# Patient Record
Sex: MALE | Race: WHITE | NOT HISPANIC OR LATINO | Employment: UNEMPLOYED | URBAN - METROPOLITAN AREA
[De-identification: names, ages, dates, MRNs, and addresses within clinical notes are randomized per-mention and may not be internally consistent; named-entity substitution may affect disease eponyms.]

---

## 2017-01-30 ENCOUNTER — TELEPHONE (OUTPATIENT)
Dept: MEDICAL GROUP | Facility: PHYSICIAN GROUP | Age: 57
End: 2017-01-30

## 2017-01-30 NOTE — TELEPHONE ENCOUNTER
1. Caller Name: Dylon                                         Call Back Number: [phone] (home)       Patient approves a detailed voicemail message: N\A    Pt calls with questions concerning his MA visit from 12/13/16, pt asks why he has received a bill for $25.00. Pt was notified that [facility] does charge for Flu/Strep testing within an MA visit. Pt understood. He will contact [facility].

## 2017-02-21 ENCOUNTER — OFFICE VISIT (OUTPATIENT)
Dept: URGENT CARE | Facility: PHYSICIAN GROUP | Age: 57
End: 2017-02-21
Payer: MEDICARE

## 2017-02-21 ENCOUNTER — HOSPITAL ENCOUNTER (OUTPATIENT)
Facility: MEDICAL CENTER | Age: 57
End: 2017-02-21
Attending: PHYSICIAN ASSISTANT
Payer: MEDICARE

## 2017-02-21 VITALS
HEART RATE: 87 BPM | SYSTOLIC BLOOD PRESSURE: 136 MMHG | BODY MASS INDEX: 24.96 KG/M2 | RESPIRATION RATE: 16 BRPM | OXYGEN SATURATION: 97 % | DIASTOLIC BLOOD PRESSURE: 70 MMHG | HEIGHT: 67 IN | WEIGHT: 159 LBS | TEMPERATURE: 98.4 F

## 2017-02-21 DIAGNOSIS — L08.9 DIABETIC FOOT INFECTION (HCC): ICD-10-CM

## 2017-02-21 DIAGNOSIS — E11.628 DIABETIC FOOT INFECTION (HCC): ICD-10-CM

## 2017-02-21 PROCEDURE — 4004F PT TOBACCO SCREEN RCVD TLK: CPT | Performed by: PHYSICIAN ASSISTANT

## 2017-02-21 PROCEDURE — G8420 CALC BMI NORM PARAMETERS: HCPCS | Performed by: PHYSICIAN ASSISTANT

## 2017-02-21 PROCEDURE — 87205 SMEAR GRAM STAIN: CPT

## 2017-02-21 PROCEDURE — 99214 OFFICE O/P EST MOD 30 MIN: CPT | Performed by: PHYSICIAN ASSISTANT

## 2017-02-21 PROCEDURE — 3017F COLORECTAL CA SCREEN DOC REV: CPT | Performed by: PHYSICIAN ASSISTANT

## 2017-02-21 PROCEDURE — 87070 CULTURE OTHR SPECIMN AEROBIC: CPT

## 2017-02-21 PROCEDURE — 87075 CULTR BACTERIA EXCEPT BLOOD: CPT

## 2017-02-21 PROCEDURE — 3045F PR MOST RECENT HEMOGLOBIN A1C LEVEL 7.0-9.0%: CPT | Performed by: PHYSICIAN ASSISTANT

## 2017-02-21 PROCEDURE — G8432 DEP SCR NOT DOC, RNG: HCPCS | Performed by: PHYSICIAN ASSISTANT

## 2017-02-21 PROCEDURE — G8484 FLU IMMUNIZE NO ADMIN: HCPCS | Performed by: PHYSICIAN ASSISTANT

## 2017-02-21 RX ORDER — SULFAMETHOXAZOLE AND TRIMETHOPRIM 800; 160 MG/1; MG/1
1 TABLET ORAL 2 TIMES DAILY
Qty: 20 TAB | Refills: 0 | Status: SHIPPED | OUTPATIENT
Start: 2017-02-21 | End: 2017-03-03

## 2017-02-21 RX ORDER — AMOXICILLIN AND CLAVULANATE POTASSIUM 875; 125 MG/1; MG/1
1 TABLET, FILM COATED ORAL 2 TIMES DAILY
Qty: 20 TAB | Refills: 0 | Status: SHIPPED | OUTPATIENT
Start: 2017-02-21 | End: 2017-03-03

## 2017-02-21 RX ORDER — ASPIRIN 81 MG/1
81 TABLET, CHEWABLE ORAL DAILY
Status: ON HOLD | COMMUNITY
End: 2017-05-02

## 2017-02-21 NOTE — MR AVS SNAPSHOT
"        Dylon Santa   2017 6:00 PM   Office Visit   MRN: [id number]    Department:  [facility]   Dept Phone:  [phone]    Description:  Male : 1960   Provider:  Sharon Lopez PA-C           Reason for Visit     Toe Pain Lt dorsal foot abrasion - originated as a blister from shoes x 3 wk, 3rd and 4th toes are red with burning x 2 days      Allergies as of 2017     No Known Allergies      You were diagnosed with     Diabetic foot infection (CMS-HCC)   [117897]         Vital Signs     Blood Pressure Pulse Temperature Respirations Height Weight    136/70 mmHg 87 36.9 °C (98.4 °F) 16 1.702 m (5' 7\") 72.122 kg (159 lb)    Body Mass Index Oxygen Saturation Smoking Status             24.90 kg/m2 97% Never Smoker          Basic Information     Date Of Birth Sex Race Ethnicity Preferred Language    1960 Male White Non- English      Your appointments     Mar 10, 2017  9:00 AM   Non Provider 1 with [facility])    [address], [address]  [facility] [address]   [phone]           You will be receiving a confirmation call a few days before your appointment from our automated call confirmation system.              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 Pascual N18.3   2016 - Present    Blind left eye H54.42   2016 - Present    Diabetic retinopathy of both " eyes (CMS-[facility]) E11.319   12/9/2016 - Present      Health Maintenance        Date Due Completion Dates    IMM INFLUENZA (1) 9/1/2016 11/12/2015, 12/31/2014    RETINAL SCREENING 12/21/2016 12/21/2015, 4/28/2014, 4/16/2013    IMM HEP B VACCINE (3 of 3 - Primary Series) 2/3/2017 12/9/2016, 9/30/2016    A1C SCREENING 2/17/2017 8/17/2016, 1/18/2016, 6/26/2015, 12/31/2014    FASTING LIPID PROFILE 5/13/2017 5/13/2016, 11/2/2015, 1/9/2015    URINE ACR / MICROALBUMIN 5/20/2017 5/20/2016, 1/18/2016    DIABETES MONOFILAMENT / LE EXAM 6/17/2017 6/17/2016 (Postponed)    Override on 6/17/2016: Postponed    SERUM CREATININE 9/23/2017 9/23/2016, 5/27/2016, 5/13/2016, 11/2/2015, 8/28/2015, 6/26/2015, 6/11/2015, 6/10/2015, 1/9/2015    COLONOSCOPY 9/24/2020 9/24/2015    IMM DTaP/Tdap/Td Vaccine (2 - Td) 12/31/2024 12/31/2014            Current Immunizations     Hepatitis B Vaccine Recombivax (Adol/Adult) 12/9/2016  8:40 AM, 9/30/2016    Influenza Vaccine Quad Inj (Preserved) 11/12/2015, 12/31/2014    Pneumococcal polysaccharide vaccine (PPSV-23) 6/17/2016    Tdap Vaccine 12/31/2014      Below and/or attached are the medications your provider expects you to take. Review all of your home medications and newly ordered medications with your provider and/or pharmacist. Follow medication instructions as directed by your provider and/or pharmacist. Please keep your medication list with you and share with your provider. Update the information when medications are discontinued, doses are changed, or new medications (including over-the-counter products) are added; and carry medication information at all times in the event of emergency situations     Allergies:  No Known Allergies          Medications  Valid as of: February 21, 2017 -  7:05 PM    Generic Name Brand Name Tablet Size Instructions for use    Acyclovir (Cap) ZOVIRAX 200 MG Take 1 Cap by mouth every day.        Amoxicillin-Pot Clavulanate (Tab) AUGMENTIN 875-125 MG Take 1 Tab by mouth  2 times a day for 10 days.        Aspirin (Chew Tab) ASA 81 MG Take 81 mg by mouth every day.        Azithromycin (Tab) ZITHROMAX 250 MG Uses directed if sputum becomes purulent.        Blood Glucose Monitoring Suppl (Misc) Blood Glucose Monitoring Suppl SUPPLIES Corrie Accu Check test strips, #600, RF4 and lancets #600, RF 4. To test fasting in the AM and random 4 times a day.        Blood Glucose Monitoring Suppl (Misc) Blood Glucose Monitoring Suppl SUPPLIES Test Strips freestyle lite or similar per insurance Sig: testing 5 times a day        Glucose Blood (Strip) ACCU-CHEK CORRIE PLUS          Guaifenesin-Codeine (Solution) ROBITUSSIN -10 mg/5mL Take 5 mL by mouth every four hours as needed.        Insulin NPH Human (Isophane) (Suspension) NOVOLIN N RELION 100 UNIT/ML USE AS DIRECTED PER SLIDING SCALE FOR MEAL TIME DOSING        Insulin Regular Human (Solution) NOVOLIN R RELION 100 UNIT/ML USE AS DIRECTED PER SLIDING SCALE FOR MEAL TIME DOSING        Lancet Devices (Misc) Lancet Device  Freestyle lite or similar per insurnace, To test 5 times a day        Lancets (Misc) ACCU-CHEK MULTICLIX LANCETS          Losartan Potassium (Tab) COZAAR 50 MG Take 50 mg by mouth every day.        Losartan Potassium (Tab) COZAAR 50 MG Take 1 Tab by mouth every day.        Metoprolol Succinate (TABLET SR 24 HR) TOPROL XL 25 MG Take 25 mg by mouth every day.        Metoprolol Succinate (TABLET SR 24 HR) TOPROL XL 25 MG TAKE ONE TABLET BY MOUTH ONCE DAILY        Misc. Devices (Kit) Misc. Devices  Glucometer (brand as accepted by insurance). To test fasting in the AM and random in the PM.        Simvastatin (Tab) ZOCOR 40 MG TAKE ONE TABLET BY MOUTH ONCE DAILY IN THE EVENING        Sulfamethoxazole-Trimethoprim (Tab) BACTRIM -160 MG Take 1 Tab by mouth 2 times a day for 10 days.        .                 Medicines prescribed today were sent to:     [facility] [address] [place] [address], [place] - [address]  [facility] [address]    Phone: [phone] Fax: [phone]    Open 24 Hours?: No    [facility] [place] [facility] - [place] - [address] [facility] [address]    [address] [place] [address] [address] [place] [address]    Phone: [phone] Fax: [phone]    Open 24 Hours?: No      Medication refill instructions:       If your prescription bottle indicates you have medication refills left, it is not necessary to call your provider’s office. Please contact your pharmacy and they will refill your medication.    If your prescription bottle indicates you do not have any refills left, you may request refills at any time through one of the following ways: The online [employer] system (except Urgent Care), by calling your provider’s office, or by asking your pharmacy to contact your provider’s office with a refill request. Medication refills are processed only during regular business hours and may not be available until the next business day. Your provider may request additional information or to have a follow-up visit with you prior to refilling your medication.   *Please Note: Medication refills are assigned a new Rx number when refilled electronically. Your pharmacy may indicate that no refills were authorized even though a new prescription for the same medication is available at the pharmacy. Please request the medicine by name with the pharmacy before contacting your provider for a refill.        Referral     A referral request has been sent to our patient care coordination department. Please allow 3-5 business days for us to process this request and contact you either by phone or mail. If you do not hear from us by the 5th business day, please call us at [phone].           [employer] Access Code: Activation code not generated  Current [employer] Status: Active

## 2017-02-22 LAB
GRAM STN SPEC: NORMAL
SIGNIFICANT IND 70042: NORMAL
SITE SITE: NORMAL
SOURCE SOURCE: NORMAL

## 2017-02-22 ASSESSMENT — ENCOUNTER SYMPTOMS
ABDOMINAL PAIN: 0
DIARRHEA: 0
SHORTNESS OF BREATH: 0
CHILLS: 0
NAUSEA: 0
VOMITING: 0
MUSCULOSKELETAL NEGATIVE: 1
DIZZINESS: 0
HEADACHES: 0
FEVER: 0

## 2017-02-22 NOTE — PROGRESS NOTES
"Subjective:      Dylon Santa is a 56 y.o. male who presents with Toe Pain            HPI  Patient presents to urgent care complaining of redness and burning on the dorsal aspect of his left foot. He states that he developed a small blister on the top of his foot from his shoes rubbing on the area about 3 weeks ago. He has been soaking the foot in epsom salts trying to get it to heal, but it hasn't gone away. Over the last 2-3 days he has noticed a red streak on his skin extending from the blister to his 3rd and 4th toes with some pain and burning. Patient also reports he had some \"hot flashes\" the past couple of days, but has not noticed a fever. Patient reports history of insulin dependent diabetes for 40 years, with significant peripheral neuropathy as well as retinopathy. Last Hemoglobin A1C was 8.4 in 8/2016.     Review of Systems   Constitutional: Negative for fever and chills.   Respiratory: Negative for shortness of breath.    Cardiovascular: Negative for chest pain.   Gastrointestinal: Negative for nausea, vomiting, abdominal pain and diarrhea.   Genitourinary: Negative.    Musculoskeletal: Negative.    Neurological: Negative for dizziness and headaches.          Objective:     /70 mmHg  Pulse 87  Temp(Src) 36.9 °C (98.4 °F)  Resp 16  Ht 1.702 m (5' 7\")  Wt 72.122 kg (159 lb)  BMI 24.90 kg/m2  SpO2 97%     Physical Exam   Constitutional: He is oriented to person, place, and time. He appears well-developed and well-nourished. No distress.   HENT:   Head: Normocephalic and atraumatic.   Eyes: Pupils are equal, round, and reactive to light.   Neck: Normal range of motion.   Cardiovascular: Normal rate.    Pulmonary/Chest: Effort normal.   Musculoskeletal: Normal range of motion.        Feet:    Small blister present on dorsal aspect of left foot, with erythema and warmth extending distally to 4th toe. No drainage or edema noted around blister.     Abscess present on plantar aspect of 4th toe with " significant fluid pocket present. No tenderness to palpation. Decreased sensation to all distal toes on left foot.     Significant amount of bloody, yellow purulent discharge extracted from abscess after lancing with 18 gauge needle.    Neurological: He is alert and oriented to person, place, and time.   Skin: Skin is warm and dry. He is not diaphoretic.   Psychiatric: He has a normal mood and affect. His behavior is normal.   Nursing note and vitals reviewed.         PMH:  has a past medical history of Type II or unspecified type diabetes mellitus without mention of complication, uncontrolled (12/31/2014); History of shingles (12/31/2014); Diabetic neuropathy (CMS-HCC) (12/31/2014); Hypertension; Hyperlipidemia; Kidney stones; and Diabetic retinopathy of both eyes (CMS-HCC) (12/9/2016).  MEDS:   Current outpatient prescriptions:   •  aspirin (ASA) 81 MG Chew Tab chewable tablet, Take 81 mg by mouth every day., Disp: , Rfl:   •  amoxicillin-clavulanate (AUGMENTIN) 875-125 MG Tab, Take 1 Tab by mouth 2 times a day for 10 days., Disp: 20 Tab, Rfl: 0  •  sulfamethoxazole-trimethoprim (BACTRIM DS) 800-160 MG tablet, Take 1 Tab by mouth 2 times a day for 10 days., Disp: 20 Tab, Rfl: 0  •  simvastatin (ZOCOR) 40 MG Tab, TAKE ONE TABLET BY MOUTH ONCE DAILY IN THE EVENING, Disp: 90 Tab, Rfl: 4  •  losartan (COZAAR) 50 MG Tab, Take 1 Tab by mouth every day., Disp: 90 Tab, Rfl: 4  •  metoprolol SR (TOPROL XL) 25 MG TABLET SR 24 HR, TAKE ONE TABLET BY MOUTH ONCE DAILY, Disp: 90 Tab, Rfl: 4  •  Misc. Devices Kit, Glucometer (brand as accepted by insurance). To test fasting in the AM and random in the PM., Disp: 1 Kit, Rfl: 0  •  Blood Glucose Monitoring Suppl SUPPLIES Misc, Test Strips freestyle lite or similar per insurance Sig: testing 5 times a day, Disp: 450 Each, Rfl: 4  •  acyclovir (ZOVIRAX) 200 MG Cap, Take 1 Cap by mouth every day., Disp: 90 Cap, Rfl: 4  •  ACCU-CHEK CAIN PLUS strip, , Disp: , Rfl:   •  ACCU-CHEK  MULTICLIX LANCETS Misc, , Disp: , Rfl:   •  NOVOLIN R RELION 100 UNIT/ML Solution, USE AS DIRECTED PER SLIDING SCALE FOR MEAL TIME DOSING, Disp: 10 mL, Rfl: 0  •  NOVOLIN N RELION 100 UNIT/ML Suspension, USE AS DIRECTED PER SLIDING SCALE FOR MEAL TIME DOSING, Disp: 10 mL, Rfl: 0  •  Blood Glucose Monitoring Suppl SUPPLIES Misc, Corrie Accu Check test strips, #600, RF4 and lancets #600, RF 4. To test fasting in the AM and random 4 times a day., Disp: 600 Each, Rfl: 4  •  azithromycin (ZITHROMAX) 250 MG Tab, Uses directed if sputum becomes purulent., Disp: 6 Tab, Rfl: 0  •  guaifenesin-codeine (CHERATUSSIN AC) Solution oral solution, Take 5 mL by mouth every four hours as needed., Disp: 150 mL, Rfl: 0  •  Lancet Device Misc, Freestyle lite or similar per insurnace, To test 5 times a day, Disp: 450 Each, Rfl: 4  •  losartan (COZAAR) 50 MG Tab, Take 50 mg by mouth every day., Disp: , Rfl:   •  metoprolol SR (TOPROL XL) 25 MG TABLET SR 24 HR, Take 25 mg by mouth every day., Disp: , Rfl:   ALLERGIES: No Known Allergies  SURGHX:   Past Surgical History   Procedure Laterality Date   • Eye surgery     • Appendectomy     • Knee arthroscopy       x3   • Tonsillectomy       SOCHX:  reports that he has never smoked. His smokeless tobacco use includes Snuff. He reports that he does not drink alcohol or use illicit drugs.  FH: family history includes Cancer in his paternal aunt and paternal grandfather; Diabetes in his maternal grandfather, maternal uncle, and paternal grandmother; Lung Disease in his brother, father, and mother; Thyroid in his brother, mother, and sister. There is no history of Hypertension, Hyperlipidemia, or Stroke.         Assessment/Plan:     1. Diabetic foot infection (CMS-HCC)  - Patient appears to have small area of cellulitis on dorsal aspect of left foot, as well as abscessed area on plantar aspect of left 4th toe.   - Patient was unaware of abscess present on plantar aspect of left 4th toe. Area was  sterilized with alcohol prep, then lanced with 18 gauge needle. Culture taken in clinic today. Abscess dressed with polysporin then dressed with band-aid.     - Keep area clean and dry   - amoxicillin-clavulanate (AUGMENTIN) 875-125 MG Tab; Take 1 Tab by mouth 2 times a day for 10 days.  Dispense: 20 Tab; Refill: 0   - Complete full course of antibiotics as prescribed   - sulfamethoxazole-trimethoprim (BACTRIM DS) 800-160 MG tablet; Take 1 Tab by mouth 2 times a day for 10 days.  Dispense: 20 Tab; Refill: 0   - Complete full course of antibiotics as prescribed   - Pending wound culture  - ANAEROBIC/AEROBIC/GRAM STAIN  - REFERRAL TO WOUND CLINIC

## 2017-02-24 LAB
BACTERIA WND AEROBE CULT: NORMAL
GRAM STN SPEC: NORMAL
SIGNIFICANT IND 70042: NORMAL
SITE SITE: NORMAL
SOURCE SOURCE: NORMAL

## 2017-02-25 LAB
BACTERIA SPEC ANAEROBE CULT: NORMAL
SIGNIFICANT IND 70042: NORMAL
SITE SITE: NORMAL
SOURCE SOURCE: NORMAL

## 2017-03-10 ENCOUNTER — NON-PROVIDER VISIT (OUTPATIENT)
Dept: MEDICAL GROUP | Facility: PHYSICIAN GROUP | Age: 57
End: 2017-03-10
Payer: MEDICARE

## 2017-03-10 ENCOUNTER — APPOINTMENT (OUTPATIENT)
Dept: RADIOLOGY | Facility: IMAGING CENTER | Age: 57
End: 2017-03-10
Attending: PHYSICIAN ASSISTANT
Payer: MEDICARE

## 2017-03-10 ENCOUNTER — OFFICE VISIT (OUTPATIENT)
Dept: URGENT CARE | Facility: PHYSICIAN GROUP | Age: 57
End: 2017-03-10
Payer: MEDICARE

## 2017-03-10 VITALS
HEIGHT: 67 IN | HEART RATE: 78 BPM | OXYGEN SATURATION: 95 % | WEIGHT: 163 LBS | RESPIRATION RATE: 16 BRPM | SYSTOLIC BLOOD PRESSURE: 140 MMHG | DIASTOLIC BLOOD PRESSURE: 78 MMHG | TEMPERATURE: 98 F | BODY MASS INDEX: 25.58 KG/M2

## 2017-03-10 DIAGNOSIS — L97.529 TOE ULCER, LEFT, WITH UNSPECIFIED SEVERITY (HCC): ICD-10-CM

## 2017-03-10 DIAGNOSIS — L97.529 DIABETIC ULCER OF LEFT FOOT ASSOCIATED WITH TYPE 1 DIABETES MELLITUS (HCC): ICD-10-CM

## 2017-03-10 DIAGNOSIS — Z23 NEED FOR HEPATITIS B VACCINATION: ICD-10-CM

## 2017-03-10 DIAGNOSIS — E10.621 DIABETIC ULCER OF LEFT FOOT ASSOCIATED WITH TYPE 1 DIABETES MELLITUS (HCC): ICD-10-CM

## 2017-03-10 PROCEDURE — G8432 DEP SCR NOT DOC, RNG: HCPCS | Performed by: PHYSICIAN ASSISTANT

## 2017-03-10 PROCEDURE — G8484 FLU IMMUNIZE NO ADMIN: HCPCS | Performed by: PHYSICIAN ASSISTANT

## 2017-03-10 PROCEDURE — G0010 ADMIN HEPATITIS B VACCINE: HCPCS | Performed by: INTERNAL MEDICINE

## 2017-03-10 PROCEDURE — 3017F COLORECTAL CA SCREEN DOC REV: CPT | Performed by: PHYSICIAN ASSISTANT

## 2017-03-10 PROCEDURE — G8419 CALC BMI OUT NRM PARAM NOF/U: HCPCS | Performed by: PHYSICIAN ASSISTANT

## 2017-03-10 PROCEDURE — 4004F PT TOBACCO SCREEN RCVD TLK: CPT | Performed by: PHYSICIAN ASSISTANT

## 2017-03-10 PROCEDURE — 3045F PR MOST RECENT HEMOGLOBIN A1C LEVEL 7.0-9.0%: CPT | Performed by: PHYSICIAN ASSISTANT

## 2017-03-10 PROCEDURE — 90746 HEPB VACCINE 3 DOSE ADULT IM: CPT | Performed by: INTERNAL MEDICINE

## 2017-03-10 PROCEDURE — 73660 X-RAY EXAM OF TOE(S): CPT | Mod: TC,LT | Performed by: PHYSICIAN ASSISTANT

## 2017-03-10 PROCEDURE — 99214 OFFICE O/P EST MOD 30 MIN: CPT | Performed by: PHYSICIAN ASSISTANT

## 2017-03-10 RX ORDER — AMOXICILLIN AND CLAVULANATE POTASSIUM 875; 125 MG/1; MG/1
1 TABLET, FILM COATED ORAL 2 TIMES DAILY
Qty: 14 TAB | Refills: 0 | Status: SHIPPED | OUTPATIENT
Start: 2017-03-10 | End: 2017-04-07

## 2017-03-10 ASSESSMENT — ENCOUNTER SYMPTOMS
NAUSEA: 0
VOMITING: 0
CONSTITUTIONAL NEGATIVE: 1
TINGLING: 1
BRUISES/BLEEDS EASILY: 0

## 2017-03-10 NOTE — PROGRESS NOTES
Dylon Santa is a 56 y.o. male here for a non-provider visit for:   HEPATITIS B 3 of 3    Reason for immunization: continue or complete series started at the office  Immunization records indicate need for vaccine: Yes  Minimum interval has been met for this vaccine: Yes  ABN completed: Not Indicated    VIS Dated  7/20/16 was given to patient Yes  IAC Questionnaire abnormal. Questionnaire reviewed and administration of injection approved by provider: yes    Patient tolerated injection and no adverse effects were observed or reported .    Pt scheduled for next dose in series: Not Indicated

## 2017-03-10 NOTE — PROGRESS NOTES
Subjective:      Dylon Santa is a 56 y.o. male who presents with Rash        Rash  Pertinent negatives include no vomiting.   Patient presents today for ongoing left foot complaints.   Patient was seen here on 02/21/17 and dx with foot ulcer, infection and placed on both Bactrim and Augmentin for coverage.  Wound culture was taken from a blister and ended up negative for pathologic growth.  Patient states he did not follow up with wound because he was doing fine until a few days ago he noticed pain and felt he saw a red streak starting on his foot.   Type 1 DM for last 40 years he states.  He does have neuropathy but states he can feel the pain in his toe.   No fevers.     Review of Systems   Constitutional: Negative.    Gastrointestinal: Negative for nausea and vomiting.   Musculoskeletal:        LEFT FOOT, SEE HPI   Skin: Positive for rash. Negative for itching.   Neurological: Positive for tingling (chronical bilateral LE neuropathy).   Endo/Heme/Allergies: Does not bruise/bleed easily.       PMH:  has a past medical history of Type II or unspecified type diabetes mellitus without mention of complication, uncontrolled (12/31/2014); History of shingles (12/31/2014); Diabetic neuropathy (CMS-HCC) (12/31/2014); Hypertension; Hyperlipidemia; Kidney stones; and Diabetic retinopathy of both eyes (CMS-HCC) (12/9/2016).  MEDS:   Current outpatient prescriptions:   •  amoxicillin-clavulanate (AUGMENTIN) 875-125 MG Tab, Take 1 Tab by mouth 2 times a day., Disp: 14 Tab, Rfl: 0  •  aspirin (ASA) 81 MG Chew Tab chewable tablet, Take 81 mg by mouth every day., Disp: , Rfl:   •  simvastatin (ZOCOR) 40 MG Tab, TAKE ONE TABLET BY MOUTH ONCE DAILY IN THE EVENING, Disp: 90 Tab, Rfl: 4  •  Misc. Devices Kit, Glucometer (brand as accepted by insurance). To test fasting in the AM and random in the PM., Disp: 1 Kit, Rfl: 0  •  Lancet Device Misc, Freestyle lite or similar per insurnace, To test 5 times a day, Disp: 450 Each, Rfl: 4  •   "Blood Glucose Monitoring Suppl SUPPLIES Misc, Test Strips freestyle lite or similar per insurance Sig: testing 5 times a day, Disp: 450 Each, Rfl: 4  •  acyclovir (ZOVIRAX) 200 MG Cap, Take 1 Cap by mouth every day., Disp: 90 Cap, Rfl: 4  •  losartan (COZAAR) 50 MG Tab, Take 50 mg by mouth every day., Disp: , Rfl:   •  metoprolol SR (TOPROL XL) 25 MG TABLET SR 24 HR, Take 25 mg by mouth every day., Disp: , Rfl:   •  NOVOLIN N RELION 100 UNIT/ML Suspension, USE AS DIRECTED PER SLIDING SCALE FOR MEAL TIME DOSING, Disp: 10 mL, Rfl: 0  •  Blood Glucose Monitoring Suppl SUPPLIES Misc, Corrie Accu Check test strips, #600, RF4 and lancets #600, RF 4. To test fasting in the AM and random 4 times a day., Disp: 600 Each, Rfl: 4  •  azithromycin (ZITHROMAX) 250 MG Tab, Uses directed if sputum becomes purulent., Disp: 6 Tab, Rfl: 0  •  guaifenesin-codeine (CHERATUSSIN AC) Solution oral solution, Take 5 mL by mouth every four hours as needed., Disp: 150 mL, Rfl: 0  •  losartan (COZAAR) 50 MG Tab, Take 1 Tab by mouth every day., Disp: 90 Tab, Rfl: 4  •  metoprolol SR (TOPROL XL) 25 MG TABLET SR 24 HR, TAKE ONE TABLET BY MOUTH ONCE DAILY, Disp: 90 Tab, Rfl: 4  •  ACCU-CHEK CORRIE PLUS strip, , Disp: , Rfl:   •  ACCU-CHEK MULTICLIX LANCETS Misc, , Disp: , Rfl:   •  NOVOLIN R RELION 100 UNIT/ML Solution, USE AS DIRECTED PER SLIDING SCALE FOR MEAL TIME DOSING, Disp: 10 mL, Rfl: 0  ALLERGIES: No Known Allergies  SURGHX:   Past Surgical History   Procedure Laterality Date   • Eye surgery     • Appendectomy     • Knee arthroscopy       x3   • Tonsillectomy       SOCHX:  reports that he has never smoked. His smokeless tobacco use includes Snuff. He reports that he does not drink alcohol or use illicit drugs.  FH: Family history was reviewed, no pertinent findings to report     Objective:     /78 mmHg  Pulse 78  Temp(Src) 36.7 °C (98 °F)  Resp 16  Ht 1.702 m (5' 7\")  Wt 73.936 kg (163 lb)  BMI 25.52 kg/m2  SpO2 95%     Physical " Exam   Constitutional: He is oriented to person, place, and time. He appears well-developed and well-nourished. No distress.   Cardiovascular: Normal rate.    Pulmonary/Chest: Effort normal.   Musculoskeletal:        Left foot: There is normal capillary refill.        Feet:    Neurological: He is alert and oriented to person, place, and time.   Skin: Skin is warm and dry.   Psychiatric: He has a normal mood and affect. His behavior is normal.   Vitals reviewed.       RAD    Impression        Soft tissue swelling left fifth toe. No bony destruction identified.         Reading Provider Reading Date     Pop Barry M.D. Mar 10, 2017        Assessment/Plan:     1. Toe ulcer, left, with unspecified severity (CMS-HCC)  DX-TOE(S) 2+ LEFT    amoxicillin-clavulanate (AUGMENTIN) 875-125 MG Tab   2. Diabetic ulcer of left foot associated with type 1 diabetes mellitus (CMS-HCC)  amoxicillin-clavulanate (AUGMENTIN) 875-125 MG Tab       -no clinical evidence or RAD evidence of developing osteo at this time.   -discussed concerns medically with patient for him to be established and following up with PCP and I want him to follow up with wound care at this point as well regarding this ulceration  -will cover for developing cellulitis at this time, tolerated Augmentin well. Will avoid further Bactrim due to CKD stage 3 and on Losartan as well. Also, culture was neg for MRSA  -elevate leg when possible.  Cushioning provided between toes.   -RTC precautions discussed.   Appt made with Dr. Kan for 04/07/17      Susan Joshi PA-C

## 2017-03-10 NOTE — MR AVS SNAPSHOT
"        Dylon Santa   3/10/2017 9:15 AM   Office Visit   MRN: [id number]    Department:  [facility]   Dept Phone:  [phone]    Description:  Male : 1960   Provider:  Susan Joshi PA-C           Reason for Visit     Rash L. foot and infection Pt. was seen on 2017for the same foot       Allergies as of 3/10/2017     No Known Allergies      You were diagnosed with     Toe ulcer, left, with unspecified severity (CMS-HCC)   [3072528]       Diabetic ulcer of left foot associated with type 1 diabetes mellitus (CMS-HCC)   [8168249]         Vital Signs     Blood Pressure Pulse Temperature Respirations Height Weight    140/78 mmHg 78 36.7 °C (98 °F) 16 1.702 m (5' 7\") 73.936 kg (163 lb)    Body Mass Index Oxygen Saturation Smoking Status             25.52 kg/m2 95% Never Smoker          Basic Information     Date Of Birth Sex Race Ethnicity Preferred Language    1960 Male White Non- English      Your appointments     2017 11:20 AM   Established Patient with Eliza Kan D.O.   [facility])    [address], [address]  [facility] [address] [phone]           You will be receiving a confirmation call a few days before your appointment from our automated call confirmation system.              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 Pascual N18.3   2016 - Present   " Blind left eye H54.42   6/17/2016 - Present    Diabetic retinopathy of both eyes (CMS-[facility]) E11.319   12/9/2016 - Present      Health Maintenance        Date Due Completion Dates    IMM INFLUENZA (1) 9/1/2016 11/12/2015, 12/31/2014    RETINAL SCREENING 12/21/2016 12/21/2015, 4/28/2014, 4/16/2013    A1C SCREENING 2/17/2017 8/17/2016, 1/18/2016, 6/26/2015, 12/31/2014    FASTING LIPID PROFILE 5/13/2017 5/13/2016, 11/2/2015, 1/9/2015    URINE ACR / MICROALBUMIN 5/20/2017 5/20/2016, 1/18/2016    DIABETES MONOFILAMENT / LE EXAM 6/17/2017 6/17/2016 (Postponed)    Override on 6/17/2016: Postponed    SERUM CREATININE 9/23/2017 9/23/2016, 5/27/2016, 5/13/2016, 11/2/2015, 8/28/2015, 6/26/2015, 6/11/2015, 6/10/2015, 1/9/2015    COLONOSCOPY 9/24/2020 9/24/2015    IMM DTaP/Tdap/Td Vaccine (2 - Td) 12/31/2024 12/31/2014            Current Immunizations     Hepatitis B Vaccine Recombivax (Adol/Adult) 3/10/2017, 12/9/2016  8:40 AM, 9/30/2016    Influenza Vaccine Quad Inj (Preserved) 11/12/2015, 12/31/2014    Pneumococcal polysaccharide vaccine (PPSV-23) 6/17/2016    Tdap Vaccine 12/31/2014      Below and/or attached are the medications your provider expects you to take. Review all of your home medications and newly ordered medications with your provider and/or pharmacist. Follow medication instructions as directed by your provider and/or pharmacist. Please keep your medication list with you and share with your provider. Update the information when medications are discontinued, doses are changed, or new medications (including over-the-counter products) are added; and carry medication information at all times in the event of emergency situations     Allergies:  No Known Allergies          Medications  Valid as of: March 10, 2017 - 10:23 AM    Generic Name Brand Name Tablet Size Instructions for use    Acyclovir (Cap) ZOVIRAX 200 MG Take 1 Cap by mouth every day.        Amoxicillin-Pot Clavulanate (Tab) AUGMENTIN 875-125 MG Take 1 Tab  by mouth 2 times a day.        Aspirin (Chew Tab) ASA 81 MG Take 81 mg by mouth every day.        Azithromycin (Tab) ZITHROMAX 250 MG Uses directed if sputum becomes purulent.        Blood Glucose Monitoring Suppl (Misc) Blood Glucose Monitoring Suppl SUPPLIES Corrie Accu Check test strips, #600, RF4 and lancets #600, RF 4. To test fasting in the AM and random 4 times a day.        Blood Glucose Monitoring Suppl (Misc) Blood Glucose Monitoring Suppl SUPPLIES Test Strips freestyle lite or similar per insurance Sig: testing 5 times a day        Glucose Blood (Strip) ACCU-CHEK CORRIE PLUS          Guaifenesin-Codeine (Solution) ROBITUSSIN -10 mg/5mL Take 5 mL by mouth every four hours as needed.        Insulin NPH Human (Isophane) (Suspension) NOVOLIN N RELION 100 UNIT/ML USE AS DIRECTED PER SLIDING SCALE FOR MEAL TIME DOSING        Insulin Regular Human (Solution) NOVOLIN R RELION 100 UNIT/ML USE AS DIRECTED PER SLIDING SCALE FOR MEAL TIME DOSING        Lancet Devices (Misc) Lancet Device  Freestyle lite or similar per insurnace, To test 5 times a day        Lancets (Misc) ACCU-CHEK MULTICLIX LANCETS          Losartan Potassium (Tab) COZAAR 50 MG Take 50 mg by mouth every day.        Losartan Potassium (Tab) COZAAR 50 MG Take 1 Tab by mouth every day.        Metoprolol Succinate (TABLET SR 24 HR) TOPROL XL 25 MG Take 25 mg by mouth every day.        Metoprolol Succinate (TABLET SR 24 HR) TOPROL XL 25 MG TAKE ONE TABLET BY MOUTH ONCE DAILY        Misc. Devices (Kit) Misc. Devices  Glucometer (brand as accepted by insurance). To test fasting in the AM and random in the PM.        Simvastatin (Tab) ZOCOR 40 MG TAKE ONE TABLET BY MOUTH ONCE DAILY IN THE EVENING        .                 Medicines prescribed today were sent to:     [facility] [address] [place] [address]    Phone: [phone] Fax: [phone]    Open 24 Hours?: No    [facility] [place] #  [address] [place] [address] 140    [address] [place] [address] 140 [facility] [address]    Phone: [phone] Fax: [phone]    Open 24 Hours?: No      Medication refill instructions:       If your prescription bottle indicates you have medication refills left, it is not necessary to call your provider’s office. Please contact your pharmacy and they will refill your medication.    If your prescription bottle indicates you do not have any refills left, you may request refills at any time through one of the following ways: The online [employer] system (except Urgent Care), by calling your provider’s office, or by asking your pharmacy to contact your provider’s office with a refill request. Medication refills are processed only during regular business hours and may not be available until the next business day. Your provider may request additional information or to have a follow-up visit with you prior to refilling your medication.   *Please Note: Medication refills are assigned a new Rx number when refilled electronically. Your pharmacy may indicate that no refills were authorized even though a new prescription for the same medication is available at the pharmacy. Please request the medicine by name with the pharmacy before contacting your provider for a refill.        Your To Do List     Future Labs/Procedures Complete By Expires    DX-TOE(S) 2+ LEFT  As directed 3/10/2018         [employer]Rock Access Code: Activation code not generated  Current TeamRock Status: Active

## 2017-03-10 NOTE — MR AVS SNAPSHOT
Dylon Santa   3/10/2017 9:00 AM   Non-Provider Visit   MRN: [id number]    Department:  [facility]   Dept Phone:  [phone]    Description:  Male : 1960   Provider:  [place] [facility]           Reason for Visit     Immunizations Heb B #3      Allergies as of 3/10/2017     No Known Allergies      You were diagnosed with     Need for hepatitis B vaccination   [391841]         Vital Signs     Smoking Status                   Never Smoker            Basic Information     Date Of Birth Sex Race Ethnicity Preferred Language    1960 Male White Non- English      Problem List              ICD-10-CM Priority Class Noted - Resolved    Essential hypertension I10   2014 - Present    History of shingles Z86.19   2014 - Present    Diabetic neuropathy (CMS-HCC) E11.40   2014 - Present    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 Pascual N18.3   2016 - Present    Blind left eye H54.42   2016 - Present    Diabetic retinopathy of both eyes (CMS-HCC) E11.319   2016 - Present      Health Maintenance        Date Due Completion Dates    IMM INFLUENZA (1) 2015, 2014    RETINAL SCREENING 2016, 2014, 2013    A1C SCREENING 2017, 2016, 2015, 2014    FASTING LIPID PROFILE 2017, 2015, 2015    URINE ACR / MICROALBUMIN 2017, 2016    DIABETES MONOFILAMENT / LE EXAM 2017 (Postponed)    Override on 2016: Postponed    SERUM CREATININE 2017, 2016, 2016, 2015, 2015, 2015, 2015,  6/10/2015, 1/9/2015    COLONOSCOPY 9/24/2020 9/24/2015    IMM DTaP/Tdap/Td Vaccine (2 - Td) 12/31/2024 12/31/2014            Current Immunizations     Hepatitis B Vaccine Recombivax (Adol/Adult) 3/10/2017, 12/9/2016  8:40 AM, 9/30/2016    Influenza Vaccine Quad Inj (Preserved) 11/12/2015, 12/31/2014    Pneumococcal polysaccharide vaccine (PPSV-23) 6/17/2016    Tdap Vaccine 12/31/2014      Below and/or attached are the medications your provider expects you to take. Review all of your home medications and newly ordered medications with your provider and/or pharmacist. Follow medication instructions as directed by your provider and/or pharmacist. Please keep your medication list with you and share with your provider. Update the information when medications are discontinued, doses are changed, or new medications (including over-the-counter products) are added; and carry medication information at all times in the event of emergency situations     Allergies:  No Known Allergies          Medications  Valid as of: March 10, 2017 -  9:25 AM    Generic Name Brand Name Tablet Size Instructions for use    Acyclovir (Cap) ZOVIRAX 200 MG Take 1 Cap by mouth every day.        Aspirin (Chew Tab) ASA 81 MG Take 81 mg by mouth every day.        Azithromycin (Tab) ZITHROMAX 250 MG Uses directed if sputum becomes purulent.        Blood Glucose Monitoring Suppl (Misc) Blood Glucose Monitoring Suppl SUPPLIES Corrie Accu Check test strips, #600, RF4 and lancets #600, RF 4. To test fasting in the AM and random 4 times a day.        Blood Glucose Monitoring Suppl (Misc) Blood Glucose Monitoring Suppl SUPPLIES Test Strips freestyle lite or similar per insurance Sig: testing 5 times a day        Glucose Blood (Strip) ACCU-CHEK CORRIE PLUS          Guaifenesin-Codeine (Solution) ROBITUSSIN -10 mg/5mL Take 5 mL by mouth every four hours as needed.        Insulin NPH Human (Isophane) (Suspension) NOVOLIN N RELION 100 UNIT/ML USE AS DIRECTED  PER SLIDING SCALE FOR MEAL TIME DOSING        Insulin Regular Human (Solution) NOVOLIN R RELION 100 UNIT/ML USE AS DIRECTED PER SLIDING SCALE FOR MEAL TIME DOSING        Lancet Devices (Misc) Lancet Device  Freestyle lite or similar per insurnace, To test 5 times a day        Lancets (Misc) ACCU-CHEK MULTICLIX LANCETS          Losartan Potassium (Tab) COZAAR 50 MG Take 50 mg by mouth every day.        Losartan Potassium (Tab) COZAAR 50 MG Take 1 Tab by mouth every day.        Metoprolol Succinate (TABLET SR 24 HR) TOPROL XL 25 MG Take 25 mg by mouth every day.        Metoprolol Succinate (TABLET SR 24 HR) TOPROL XL 25 MG TAKE ONE TABLET BY MOUTH ONCE DAILY        Misc. Devices (Kit) Misc. Devices  Glucometer (brand as accepted by insurance). To test fasting in the AM and random in the PM.        Simvastatin (Tab) ZOCOR 40 MG TAKE ONE TABLET BY MOUTH ONCE DAILY IN THE EVENING        .                 Medicines prescribed today were sent to:     [facility] [address] [place] [address]    Phone: [phone] Fax: [phone]    Open 24 Hours?: No    [facility] [place] [facility] [phone] - [address] [address]    Phone: [phone] Fax: [phone]    Open 24 Hours?: No      Medication refill instructions:       If your prescription bottle indicates you have medication refills left, it is not necessary to call your provider’s office. Please contact your pharmacy and they will refill your medication.    If your prescription bottle indicates you do not have any refills left, you may request refills at any time through one of the following ways: The online [employer] system (except Urgent Care), by calling your provider’s office, or by asking your pharmacy to contact your provider’s office with a refill request. Medication refills are processed only during regular business hours and may not be  available until the next business day. Your provider may request additional information or to have a follow-up visit with you prior to refilling your medication.   *Please Note: Medication refills are assigned a new Rx number when refilled electronically. Your pharmacy may indicate that no refills were authorized even though a new prescription for the same medication is available at the pharmacy. Please request the medicine by name with the pharmacy before contacting your provider for a refill.           [employer] Access Code: Activation code not generated  Current [employer] Status: Active

## 2017-03-14 ENCOUNTER — SUPERVISING PHYSICIAN REVIEW (OUTPATIENT)
Dept: URGENT CARE | Facility: PHYSICIAN GROUP | Age: 57
End: 2017-03-14

## 2017-03-16 ENCOUNTER — HOSPITAL ENCOUNTER (EMERGENCY)
Facility: MEDICAL CENTER | Age: 57
End: 2017-03-16
Attending: EMERGENCY MEDICINE
Payer: MEDICARE

## 2017-03-16 VITALS
RESPIRATION RATE: 16 BRPM | DIASTOLIC BLOOD PRESSURE: 80 MMHG | BODY MASS INDEX: 24.27 KG/M2 | HEART RATE: 87 BPM | SYSTOLIC BLOOD PRESSURE: 156 MMHG | TEMPERATURE: 98 F | WEIGHT: 155 LBS | OXYGEN SATURATION: 97 %

## 2017-03-16 DIAGNOSIS — E11.622 DIABETIC PRESSURE ULCER, STAGE 1 (HCC): ICD-10-CM

## 2017-03-16 DIAGNOSIS — L89.91 DIABETIC PRESSURE ULCER, STAGE 1 (HCC): ICD-10-CM

## 2017-03-16 PROCEDURE — 99283 EMERGENCY DEPT VISIT LOW MDM: CPT

## 2017-03-16 ASSESSMENT — ENCOUNTER SYMPTOMS
CHILLS: 0
FEVER: 0

## 2017-03-16 ASSESSMENT — PAIN SCALES - GENERAL: PAINLEVEL_OUTOF10: 3

## 2017-03-16 NOTE — ED NOTES
DC paperwork reviewed and signed. Pt denies any further questions at this time; pt ambulated independently to lobby.

## 2017-03-16 NOTE — ED AVS SNAPSHOT
[email or web address] Access Code: Activation code not generated  Current [email or web address].pl Status: Active    [employer]  A secure, online tool to manage your health information     [employer]’s TAXI5.pl® is a secure, online tool that connects you to your personalized health information from the privacy of your home -- day or night - making it very easy for you to manage your healthcare. Once the activation process is completed, you can even access your medical information using the [email or web address].pl allison, which is available for free in the Apple Allison store or Google Play store.     [email or web address].pl provides the following levels of access (as shown below):   My Chart Features   [facility] Primary Care Doctor [facility]  Specialists [facility]  Urgent  Care Non-[facility]  Primary Care  Doctor   Email your healthcare team securely and privately 24/7 X X X X   Manage appointments: schedule your next appointment; view details of past/upcoming appointments X      Request prescription refills. X      View recent personal medical records, including lab and immunizations X X X X   View health record, including health history, allergies, medications X X X X   Read reports about your outpatient visits, procedures, consult and ER notes X X X X   See your discharge summary, which is a recap of your hospital and/or ER visit that includes your diagnosis, lab results, and care plan. X X       How to register for [email or web address]:  1. Go to  [email or web address].  2. Click on the Sign Up Now box, which takes you to the New Member Sign Up page. You will need to provide the following information:  a. Enter your [email or web address].pl Access Code exactly as it appears at the top of this page. (You will not need to use this code after you’ve completed the sign-up process. If you do not sign up before the expiration date, you must request a new code.)   b. Enter your date of birth.   c. Enter your home email address.   d. Click Submit, and follow the next screen’s instructions.  3. Create a [email or web address].pl ID. This will  be your [employer] login ID and cannot be changed, so think of one that is secure and easy to remember.  4. Create a [employer] password. You can change your password at any time.  5. Enter your Password Reset Question and Answer. This can be used at a later time if you forget your password.   6. Enter your e-mail address. This allows you to receive e-mail notifications when new information is available in [employer].  7. Click Sign Up. You can now view your health information.    For assistance activating your [employer] account, call [phone]

## 2017-03-16 NOTE — ED NOTES
"Pt c/o blister to left foot, opened up at urgent care, seen at urgent care x2, finished 2 courses of antibiotics. Pt now states redness is worse and there is a \"hole\". Pt is a DM, insulin dependent.   "

## 2017-03-16 NOTE — ED AVS SNAPSHOT
3/16/2017          Dylon Santa  [address] [place] [address]    Dear Dylon:    [facility] wants to ensure your discharge home is safe and you or your loved ones have had all your questions answered regarding your care after you leave the hospital.    You may receive a telephone call within two days of your discharge.  This call is to make certain you understand your discharge instructions as well as ensure we provided you with the best care possible during your stay with us.     The call will only last approximately 3-5 minutes and will be done by a nurse.    Once again, we want to ensure your discharge home is safe and that you have a clear understanding of any next steps in your care.  If you have any questions or concerns, please do not hesitate to contact us, we are here for you.  Thank you for choosing [facility] for your healthcare needs.    Sincerely,    Roman Valenzuela    [facility]

## 2017-03-16 NOTE — DISCHARGE INSTRUCTIONS
Diabetes and Foot Care  Diabetes may cause you to have problems because of poor blood supply (circulation) to your feet and legs. This may cause the skin on your feet to become thinner, break easier, and heal more slowly. Your skin may become dry, and the skin may peel and crack. You may also have nerve damage in your legs and feet causing decreased feeling in them. You may not notice minor injuries to your feet that could lead to infections or more serious problems. Taking care of your feet is one of the most important things you can do for yourself.   HOME CARE INSTRUCTIONS  · Wear shoes at all times, even in the house. Do not go barefoot. Bare feet are easily injured.  · Check your feet daily for blisters, cuts, and redness. If you cannot see the bottom of your feet, use a mirror or ask someone for help.  · Wash your feet with warm water (do not use hot water) and mild soap. Then pat your feet and the areas between your toes until they are completely dry. Do not soak your feet as this can dry your skin.  · Apply a moisturizing lotion or petroleum jelly (that does not contain alcohol and is unscented) to the skin on your feet and to dry, brittle toenails. Do not apply lotion between your toes.  · Trim your toenails straight across. Do not dig under them or around the cuticle. File the edges of your nails with an emery board or nail file.  · Do not cut corns or calluses or try to remove them with medicine.  · Wear clean socks or stockings every day. Make sure they are not too tight. Do not wear knee-high stockings since they may decrease blood flow to your legs.  · Wear shoes that fit properly and have enough cushioning. To break in new shoes, wear them for just a few hours a day. This prevents you from injuring your feet. Always look in your shoes before you put them on to be sure there are no objects inside.  · Do not cross your legs. This may decrease the blood flow to your feet.  · If you find a minor scrape,  cut, or break in the skin on your feet, keep it and the skin around it clean and dry. These areas may be cleansed with mild soap and water. Do not cleanse the area with peroxide, alcohol, or iodine.  · When you remove an adhesive bandage, be sure not to damage the skin around it.  · If you have a wound, look at it several times a day to make sure it is healing.  · Do not use heating pads or hot water bottles. They may burn your skin. If you have lost feeling in your feet or legs, you may not know it is happening until it is too late.  · Make sure your health care provider performs a complete foot exam at least annually or more often if you have foot problems. Report any cuts, sores, or bruises to your health care provider immediately.  SEEK MEDICAL CARE IF:   · You have an injury that is not healing.  · You have cuts or breaks in the skin.  · You have an ingrown nail.  · You notice redness on your legs or feet.  · You feel burning or tingling in your legs or feet.  · You have pain or cramps in your legs and feet.  · Your legs or feet are numb.  · Your feet always feel cold.  SEEK IMMEDIATE MEDICAL CARE IF:   · There is increasing redness, swelling, or pain in or around a wound.  · There is a red line that goes up your leg.  · Pus is coming from a wound.  · You develop a fever or as directed by your health care provider.  · You notice a bad smell coming from an ulcer or wound.     This information is not intended to replace advice given to you by your health care provider. Make sure you discuss any questions you have with your health care provider.     Document Released: 12/15/2001 Document Revised: 08/20/2014 Document Reviewed: 05/27/2014  [employer] Interactive Patient Education ©2016 [employer]    Pressure Ulcer  A pressure ulcer is a sore that has formed from the breakdown of skin and exposure of deeper layers of tissue. It develops in areas of the body where there is unrelieved pressure. Pressure ulcers are  usually found over a bony area, such as the shoulder blades, spine, lower back, hips, knees, ankles, and heels.  Pressure ulcers vary in severity. Your health care provider may determine the severity (stage) of your pressure ulcer. The stages include:  · Stage I--The skin is red, and when the skin is pressed, it stays red.  · Stage II--The top layer of skin is gone, and there is a shallow, pink ulcer.  · Stage III--The ulcer becomes deeper, and it is more difficult to see the whole wound. Also, there may be yellow or brown parts, as well as pink and red parts.  · Stage IV--The ulcer may be deep and red, pink, brown, white, or yellow. Bone or muscle may be seen.  · Unstageable pressure ulcer--The ulcer is covered almost completely with black, brown, or yellow tissue. It is not known how deep the ulcer is or what stage it is until this covering comes off.  · Suspected deep tissue injury--A person's skin can be injured from pressure or pulling on the skin when his or her position is changed. The skin appears purple or maroon. There may not be an opening in the skin, but there could be a blood-filled blister. This deep tissue injury is often difficult to see in people with darker skin tones. The site may open and become deeper in time. However, early interventions will help the area heal and may prevent the area from opening.  CAUSES  Pressure ulcers are caused by pressure against the skin that limits the flow of blood to the skin and nearby tissues. There are many risk factors that can lead to pressure sores.  RISK FACTORS  · Decreased ability to move.  · Decreased ability to feel pain or discomfort.  · Excessive skin moisture from urine, stool, sweat, or secretions.  · Poor nutrition.  · Dehydration.  · Tobacco, drug, or alcohol abuse.  · Having someone pull on bedsheets that are under you, such as when health care workers are changing your position in a hospital bed.  · Obesity.  · Increased adult  age.  · Hospitalization in a critical care unit for longer than 4 days with use of medical devices.  · Prolonged use of medical devices.  · Critical illness.  · Anemia.  · Traumatic brain injury.  · Spinal cord injury.  · Stroke.  · Diabetes.  · Poor blood glucose control.  · Low blood pressure (hypotension).  · Low oxygen levels.  · Medicines that reduce blood flow.  · Infection.  · Being of -American or / descent.  · Loss of bladder and bowel control (incontinence).  · Malnutrition.  If you are at risk for pressure ulcers, your health care provider may recommend certain types of bedding to help prevent them. Bedding options may include foam or gel mattresses that are covered with one of these:  · A sheepskin blanket.  · A pad that is filled with gel, air, water, or foam.  DIAGNOSIS  Your health care provider will diagnose your pressure ulcer based on its appearance. The health care provider may determine the stage of your pressure ulcer as well. Tests may be done to check for infection, to assess your circulation, or to check for other diseases, such as diabetes.  TREATMENT  Treatment of your pressure ulcer begins with determining what stage the ulcer is in. Your treatment team may include your health care provider, a wound care specialist, a nutritionist, a physical therapist, and a surgeon. Possible treatments may include:  · Moving or repositioning every 1-2 hours.  · Using beds or mattresses to shift your body weight and pressure points frequently.  · Improving your diet.  · Cleaning and bandaging (dressing) the open wound.  · Giving antibiotic medicines.  · Removing damaged tissue.  · Surgery and sometimes skin grafts.  HOME CARE INSTRUCTIONS  · If you were hospitalized, follow the care plan that was started in the hospital.  · Avoid staying in the same position for more than 2 hours. Use padding, devices, or mattresses to cushion your pressure points as directed by your health care  provider.  · Eat a well-balanced diet. Take nutritional supplements and vitamins as directed by your health care provider.  · Keep all follow-up appointments.  · Only take over-the-counter or prescription medicines for pain, fever, or discomfort as directed by your health care provider.  SEEK MEDICAL CARE IF:  · Your pressure ulcer is not improving.  · You do not know how to care for your pressure ulcer.  · You notice other areas of redness on your skin.  · You have a fever.  SEEK IMMEDIATE MEDICAL CARE IF:  · You have increasing redness, swelling, or pain in your pressure ulcer.  · You notice pus coming from your pressure ulcer.  · You notice a bad smell coming from the wound or dressing.  · Your pressure ulcer opens up again.     This information is not intended to replace advice given to you by your health care provider. Make sure you discuss any questions you have with your health care provider.     Document Released: 12/18/2006 Document Revised: 01/08/2016 Document Reviewed: 08/25/2014  [employer] Interactive Patient Education ©2016 [employer]

## 2017-03-16 NOTE — ED NOTES
[id number] RN phoned wound care per Dr Uribe request;   spoke with wound care; POC includes Dc with follow up care at out patient wound clinic

## 2017-03-16 NOTE — ED PROVIDER NOTES
"ED Provider Note    Scribed for Roberto Carlos West M.D. by Lenard Tran. 3/16/2017, 8:01 AM.    Primary care provider: Eliza Kan D.O.  Means of arrival: Walk-in  History obtained from: Patient  History limited by: None    CHIEF COMPLAINT  Chief Complaint   Patient presents with   • Wound Infection     L 4th toe x 2/21/17       HPI  Dylon Santa is a 56 y.o. male who presents to the Emergency Department complaining of wound infection on the left toe.  Per patient, he has been dealing with this same wound since February 21st.  Patient states the wound was initially just red and sore.  He went to Urgent Care for evaluation and was prescribed antibiotics for the wound to heal.  The patient reports the antibiotics initially alleviated the soreness and the wound appeared better.  About four days after finishing the antibiotics the wound became infected again so patient went back to Urgent Care and received another dose of antibiotics.  Patient arrived to the ED today after finishing two courses of antibiotics for further evaluation of this reoccurring wound infection. Patient denies going to the wound clinic because they \"had no openings.\"  Patient is an insulin dependent diabetic.  He reports his blood sugars are pretty regular during the day but when he wakes up in the morning his sugars are always high.      REVIEW OF SYSTEMS  Review of Systems   Constitutional: Negative for fever and chills.   Skin:        Wound on fourth toe of left foot.        PAST MEDICAL HISTORY   has a past medical history of Type II or unspecified type diabetes mellitus without mention of complication, uncontrolled (12/31/2014); History of shingles (12/31/2014); Diabetic neuropathy (CMS-HCC) (12/31/2014); Hypertension; Hyperlipidemia; Kidney stones; and Diabetic retinopathy of both eyes (CMS-HCC) (12/9/2016).    SURGICAL HISTORY   has past surgical history that includes eye surgery; appendectomy; knee arthroscopy; and " tonsillectomy.    SOCIAL HISTORY  Social History   Substance Use Topics   • Smoking status: Never Smoker    • Smokeless tobacco: Current User     Types: Chew      Comment: tobacco cessation recommended   • Alcohol Use: No      History   Drug Use No       FAMILY HISTORY  Family History   Problem Relation Age of Onset   • Thyroid Mother    • Lung Disease Mother      COPD   • Thyroid Sister    • Thyroid Brother    • Lung Disease Brother      COPD   • Diabetes Maternal Uncle    • Cancer Paternal Aunt      type unknown   • Diabetes Maternal Grandfather    • Diabetes Paternal Grandmother    • Cancer Paternal Grandfather      prostate   • Lung Disease Father      COPD   • Hypertension Neg Hx    • Hyperlipidemia Neg Hx    • Stroke Neg Hx        CURRENT MEDICATIONS  No current facility-administered medications on file prior to encounter.     Current Outpatient Prescriptions on File Prior to Encounter   Medication Sig Dispense Refill   • amoxicillin-clavulanate (AUGMENTIN) 875-125 MG Tab Take 1 Tab by mouth 2 times a day. 14 Tab 0   • aspirin (ASA) 81 MG Chew Tab chewable tablet Take 81 mg by mouth every day.     • azithromycin (ZITHROMAX) 250 MG Tab Uses directed if sputum becomes purulent. 6 Tab 0   • guaifenesin-codeine (CHERATUSSIN AC) Solution oral solution Take 5 mL by mouth every four hours as needed. 150 mL 0   • simvastatin (ZOCOR) 40 MG Tab TAKE ONE TABLET BY MOUTH ONCE DAILY IN THE EVENING 90 Tab 4   • losartan (COZAAR) 50 MG Tab Take 1 Tab by mouth every day. 90 Tab 4   • metoprolol SR (TOPROL XL) 25 MG TABLET SR 24 HR TAKE ONE TABLET BY MOUTH ONCE DAILY 90 Tab 4   • Misc. Devices Kit Glucometer (brand as accepted by insurance). To test fasting in the AM and random in the PM. 1 Kit 0   • Lancet Device Misc Freestyle lite or similar per insurnace, To test 5 times a day 450 Each 4   • Blood Glucose Monitoring Suppl SUPPLIES Misc Test Strips freestyle lite or similar per insurance Sig: testing 5 times a day 450 Each  4   • acyclovir (ZOVIRAX) 200 MG Cap Take 1 Cap by mouth every day. 90 Cap 4   • ACCU-CHEK CAIN PLUS strip      • losartan (COZAAR) 50 MG Tab Take 50 mg by mouth every day.     • metoprolol SR (TOPROL XL) 25 MG TABLET SR 24 HR Take 25 mg by mouth every day.     • ACCU-CHEK MULTICLIX LANCETS Misc      • NOVOLIN R RELION 100 UNIT/ML Solution USE AS DIRECTED PER SLIDING SCALE FOR MEAL TIME DOSING 10 mL 0   • NOVOLIN N RELION 100 UNIT/ML Suspension USE AS DIRECTED PER SLIDING SCALE FOR MEAL TIME DOSING 10 mL 0   • Blood Glucose Monitoring Suppl SUPPLIES Misc Cain Accu Check test strips, #600, RF4 and lancets #600, RF 4. To test fasting in the AM and random 4 times a day. 600 Each 4       ALLERGIES  No Known Allergies    PHYSICAL EXAM  VITAL SIGNS: /81 mmHg  Pulse 92  Temp(Src) 36.7 °C (98 °F)  Resp 18  Wt 70.308 kg (155 lb)  SpO2 97%    Constitutional: Well developed, Well nourished, No acute distress, Non-toxic appearance.     Skin: Left ring toe has pressure ulcer 2cm in diameter appearing on lateral aspect of toe, appears well, no erythema, no streaking, no discharge. Warm, Dry.    Musculoskeletal: Good range of motion in all major joints. No tenderness to palpation or major deformities noted. Intact distal pulses, no clubbing, no cyanosis, no edema,   Neurologic: She has decreased sensation bilateral feet. History of diabetic neuropathy.   Psychiatric: Affect normal, Judgment normal, Mood normal.       COURSE & MEDICAL DECISION MAKING  Pertinent Labs & Imaging studies reviewed. (See chart for details)    8:01 AM - Patient seen and examined at bedside. I called the wound care to come evaluate patient.  I also called scheduling to schedule an appointment for patient with the wound care clinic.  Until patient receives an appointment, I informed him he needs to keep his toes  to relieve the pressure.  Patient was advised to clean the wound with soap and water and dry completely before   his toes with guaze.  Patient needs to keep the wound open for the meantime and wear open toed shoes.     8:39 AM - I spoke with the wound clinic who advised the patient to buy silicon toe sleeves to help keep his toes surrounding the wound .       Decision Making:  Currently, there is no signs of cellulitis. I recommended for the patient have aggressive wound care treatment at this time. Recommended that he follow-up with the wound care clinic. The patient's return as needed.    The patient will return for new or worsening symptoms and is stable at the time of discharge.    It was noticed that the patient's blood pressure was greater than 120/80 on today's visit. At this point, most likely related to reactive hypertension, secondary to the emergency visit itself. I have recommend the patient follow up with his primary care physician for recheck of his blood pressure.       DISPOSITION:  Patient will be discharged home in stable condition.    FOLLOW UP:  WOUND CARE [address] [address]  [phone]          OUTPATIENT MEDICATIONS:  Discharge Medication List as of 3/16/2017  9:25 AM               FINAL IMPRESSION  1. Diabetic pressure ulcer, stage 1 (CMS-HCC)          Lenard ROSE (Scribe), am scribing for, and in the presence of, Roberto Carlos West M.D..    Electronically signed by: Lenard Tran (Catalinoibchavo), 3/16/2017    Roberto Carlos ROSE M.D. personally performed the services described in this documentation, as scribed by Lenard Tran in my presence, and it is both accurate and complete.    The note accurately reflects work and decisions made by me.  Roberto Carlos West  3/16/2017  1:45 PM

## 2017-03-16 NOTE — ED NOTES
Pt brought to room 23 by primary RN  Chief Complaint   Patient presents with   • Wound Infection

## 2017-03-16 NOTE — ED AVS SNAPSHOT
Home Care Instructions                                                                                                                Dylon Santa   MRN: [id number]    Department:  [facility], Emergency Dept   Date of Visit:  3/16/2017            [facility], Emergency Dept    [address] [address]    Phone:  [phone]      You were seen by     Roberto Carlos West M.D.      Your Diagnosis Was     Diabetic pressure ulcer, stage 1 (CMS-[facility])     E11.622, L89.91       Follow-up Information     1. Follow up with [facility].    Contact information    [address] [address] [phone]      Medication Information     Review all of your home medications and newly ordered medications with your primary doctor and/or pharmacist as soon as possible. Follow medication instructions as directed by your doctor and/or pharmacist.     Please keep your complete medication list with you and share with your physician. Update the information when medications are discontinued, doses are changed, or new medications (including over-the-counter products) are added; and carry medication information at all times in the event of emergency situations.               Medication List      ASK your doctor about these medications        Instructions    Morning Afternoon Evening Bedtime    ACCU-CHEK CAIN PLUS strip   Generic drug:  glucose blood                             ACCU-CHEK MULTICLIX LANCETS Misc                             acyclovir 200 MG Caps   Commonly known as:  ZOVIRAX        Take 1 Cap by mouth every day.   Dose:  200 mg                        amoxicillin-clavulanate 875-125 MG Tabs   Commonly known as:  AUGMENTIN        Take 1 Tab by mouth 2 times a day.   Dose:  1 Tab                        aspirin 81 MG Chew chewable tablet   Commonly known as:  ASA        Take 81 mg by mouth every day.   Dose:  81 mg                        azithromycin 250 MG Tabs   Commonly  known as:  ZITHROMAX        Uses directed if sputum becomes purulent.                        * Blood Glucose Monitoring Suppl SUPPLIES Misc        Corrie Accu Check test strips, #600, RF4 and lancets #600, RF 4. To test fasting in the AM and random 4 times a day.                        * Blood Glucose Monitoring Suppl SUPPLIES Misc        Test Strips freestyle lite or similar per insurance Sig: testing 5 times a day                        guaifenesin-codeine Soln oral solution   Commonly known as:  CHERATUSSIN AC        Take 5 mL by mouth every four hours as needed.   Dose:  5 mL                        Lancet Device [facility]        Freestyle lite or similar per insurnace, To test 5 times a day                        * losartan 50 MG Tabs   Commonly known as:  COZAAR        Take 50 mg by mouth every day.   Dose:  50 mg                        * losartan 50 MG Tabs   Commonly known as:  COZAAR        Take 1 Tab by mouth every day.   Dose:  50 mg                        * metoprolol SR 25 MG Tb24   Commonly known as:  TOPROL XL        Take 25 mg by mouth every day.   Dose:  25 mg                        * metoprolol SR 25 MG Tb24   Commonly known as:  TOPROL XL        TAKE ONE TABLET BY MOUTH ONCE DAILY                        Misc. Devices Kit        Doctor's comments:  Freestyle lite or similar per insurance   Glucometer (brand as accepted by insurance). To test fasting in the AM and random in the PM.                        NOVOLIN N RELION 100 UNIT/ML Susp   Generic drug:  insulin NPH        USE AS DIRECTED PER SLIDING SCALE FOR MEAL TIME DOSING                        NOVOLIN R RELION 100 Unit/mL Soln   Generic drug:  insulin regular        USE AS DIRECTED PER SLIDING SCALE FOR MEAL TIME DOSING                        simvastatin 40 MG Tabs   Commonly known as:  ZOCOR        TAKE ONE TABLET BY MOUTH ONCE DAILY IN THE EVENING                        * Notice:  This list has 6 medication(s) that are the same as other medications  prescribed for you. Read the directions carefully, and ask your doctor or other care provider to review them with you.              Discharge Instructions       Diabetes and Foot Care  Diabetes may cause you to have problems because of poor blood supply (circulation) to your feet and legs. This may cause the skin on your feet to become thinner, break easier, and heal more slowly. Your skin may become dry, and the skin may peel and crack. You may also have nerve damage in your legs and feet causing decreased feeling in them. You may not notice minor injuries to your feet that could lead to infections or more serious problems. Taking care of your feet is one of the most important things you can do for yourself.   HOME CARE INSTRUCTIONS  · Wear shoes at all times, even in the house. Do not go barefoot. Bare feet are easily injured.  · Check your feet daily for blisters, cuts, and redness. If you cannot see the bottom of your feet, use a mirror or ask someone for help.  · Wash your feet with warm water (do not use hot water) and mild soap. Then pat your feet and the areas between your toes until they are completely dry. Do not soak your feet as this can dry your skin.  · Apply a moisturizing lotion or petroleum jelly (that does not contain alcohol and is unscented) to the skin on your feet and to dry, brittle toenails. Do not apply lotion between your toes.  · Trim your toenails straight across. Do not dig under them or around the cuticle. File the edges of your nails with an emery board or nail file.  · Do not cut corns or calluses or try to remove them with medicine.  · Wear clean socks or stockings every day. Make sure they are not too tight. Do not wear knee-high stockings since they may decrease blood flow to your legs.  · Wear shoes that fit properly and have enough cushioning. To break in new shoes, wear them for just a few hours a day. This prevents you from injuring your feet. Always look in your shoes before  you put them on to be sure there are no objects inside.  · Do not cross your legs. This may decrease the blood flow to your feet.  · If you find a minor scrape, cut, or break in the skin on your feet, keep it and the skin around it clean and dry. These areas may be cleansed with mild soap and water. Do not cleanse the area with peroxide, alcohol, or iodine.  · When you remove an adhesive bandage, be sure not to damage the skin around it.  · If you have a wound, look at it several times a day to make sure it is healing.  · Do not use heating pads or hot water bottles. They may burn your skin. If you have lost feeling in your feet or legs, you may not know it is happening until it is too late.  · Make sure your health care provider performs a complete foot exam at least annually or more often if you have foot problems. Report any cuts, sores, or bruises to your health care provider immediately.  SEEK MEDICAL CARE IF:   · You have an injury that is not healing.  · You have cuts or breaks in the skin.  · You have an ingrown nail.  · You notice redness on your legs or feet.  · You feel burning or tingling in your legs or feet.  · You have pain or cramps in your legs and feet.  · Your legs or feet are numb.  · Your feet always feel cold.  SEEK IMMEDIATE MEDICAL CARE IF:   · There is increasing redness, swelling, or pain in or around a wound.  · There is a red line that goes up your leg.  · Pus is coming from a wound.  · You develop a fever or as directed by your health care provider.  · You notice a bad smell coming from an ulcer or wound.     This information is not intended to replace advice given to you by your health care provider. Make sure you discuss any questions you have with your health care provider.     Document Released: 12/15/2001 Document Revised: 08/20/2014 Document Reviewed: 05/27/2014  [employer] Interactive Patient Education ©2016 [employer]    Pressure Ulcer  A pressure ulcer is a sore that has formed  from the breakdown of skin and exposure of deeper layers of tissue. It develops in areas of the body where there is unrelieved pressure. Pressure ulcers are usually found over a bony area, such as the shoulder blades, spine, lower back, hips, knees, ankles, and heels.  Pressure ulcers vary in severity. Your health care provider may determine the severity (stage) of your pressure ulcer. The stages include:  · Stage I--The skin is red, and when the skin is pressed, it stays red.  · Stage II--The top layer of skin is gone, and there is a shallow, pink ulcer.  · Stage III--The ulcer becomes deeper, and it is more difficult to see the whole wound. Also, there may be yellow or brown parts, as well as pink and red parts.  · Stage IV--The ulcer may be deep and red, pink, brown, white, or yellow. Bone or muscle may be seen.  · Unstageable pressure ulcer--The ulcer is covered almost completely with black, brown, or yellow tissue. It is not known how deep the ulcer is or what stage it is until this covering comes off.  · Suspected deep tissue injury--A person's skin can be injured from pressure or pulling on the skin when his or her position is changed. The skin appears purple or maroon. There may not be an opening in the skin, but there could be a blood-filled blister. This deep tissue injury is often difficult to see in people with darker skin tones. The site may open and become deeper in time. However, early interventions will help the area heal and may prevent the area from opening.  CAUSES  Pressure ulcers are caused by pressure against the skin that limits the flow of blood to the skin and nearby tissues. There are many risk factors that can lead to pressure sores.  RISK FACTORS  · Decreased ability to move.  · Decreased ability to feel pain or discomfort.  · Excessive skin moisture from urine, stool, sweat, or secretions.  · Poor nutrition.  · Dehydration.  · Tobacco, drug, or alcohol abuse.  · Having someone pull on  bedsheets that are under you, such as when health care workers are changing your position in a hospital bed.  · Obesity.  · Increased adult age.  · Hospitalization in a critical care unit for longer than 4 days with use of medical devices.  · Prolonged use of medical devices.  · Critical illness.  · Anemia.  · Traumatic brain injury.  · Spinal cord injury.  · Stroke.  · Diabetes.  · Poor blood glucose control.  · Low blood pressure (hypotension).  · Low oxygen levels.  · Medicines that reduce blood flow.  · Infection.  · Being of -American or / descent.  · Loss of bladder and bowel control (incontinence).  · Malnutrition.  If you are at risk for pressure ulcers, your health care provider may recommend certain types of bedding to help prevent them. Bedding options may include foam or gel mattresses that are covered with one of these:  · A sheepskin blanket.  · A pad that is filled with gel, air, water, or foam.  DIAGNOSIS  Your health care provider will diagnose your pressure ulcer based on its appearance. The health care provider may determine the stage of your pressure ulcer as well. Tests may be done to check for infection, to assess your circulation, or to check for other diseases, such as diabetes.  TREATMENT  Treatment of your pressure ulcer begins with determining what stage the ulcer is in. Your treatment team may include your health care provider, a wound care specialist, a nutritionist, a physical therapist, and a surgeon. Possible treatments may include:  · Moving or repositioning every 1-2 hours.  · Using beds or mattresses to shift your body weight and pressure points frequently.  · Improving your diet.  · Cleaning and bandaging (dressing) the open wound.  · Giving antibiotic medicines.  · Removing damaged tissue.  · Surgery and sometimes skin grafts.  HOME CARE INSTRUCTIONS  · If you were hospitalized, follow the care plan that was started in the hospital.  · Avoid staying in the  same position for more than 2 hours. Use padding, devices, or mattresses to cushion your pressure points as directed by your health care provider.  · Eat a well-balanced diet. Take nutritional supplements and vitamins as directed by your health care provider.  · Keep all follow-up appointments.  · Only take over-the-counter or prescription medicines for pain, fever, or discomfort as directed by your health care provider.  SEEK MEDICAL CARE IF:  · Your pressure ulcer is not improving.  · You do not know how to care for your pressure ulcer.  · You notice other areas of redness on your skin.  · You have a fever.  SEEK IMMEDIATE MEDICAL CARE IF:  · You have increasing redness, swelling, or pain in your pressure ulcer.  · You notice pus coming from your pressure ulcer.  · You notice a bad smell coming from the wound or dressing.  · Your pressure ulcer opens up again.     This information is not intended to replace advice given to you by your health care provider. Make sure you discuss any questions you have with your health care provider.     Document Released: 12/18/2006 Document Revised: 01/08/2016 Document Reviewed: 08/25/2014  [employer] Interactive Patient Education ©2016 [employer]            Patient Information     Patient Information    Following emergency treatment: all patient requiring follow-up care must return either to a private physician or a clinic if your condition worsens before you are able to obtain further medical attention, please return to the emergency room.     Billing Information    At [facility], we work to make the billing process streamlined for our patients.  Our Representatives are here to answer any questions you may have regarding your hospital bill.  If you have insurance coverage and have supplied your insurance information to us, we will submit a claim to your insurer on your behalf.  Should you have any questions regarding your bill, we can be reached online or by phone as  follows:  Online: You are able pay your bills online or live chat with our representatives about any billing questions you may have. We are here to help Monday - Friday from 8:00am to 7:30pm and 9:00am - 12:00pm on Saturdays.  Please visit [email or web address]  for more information.   Phone:  [phone] or [phone]    Please note that your emergency physician, surgeon, pathologist, radiologist, anesthesiologist, and other specialists are not employed by [facility] and will therefore bill separately for their services.  Please contact them directly for any questions concerning their bills at the numbers below:     Emergency Physician Services:  [phone]  [facility]:  [phone]  [facility]:  [phone]  [facility]:  [phone]    1. Your final bill may vary from the amount quoted upon discharge if all procedures are not complete at that time, or if your doctor has additional procedures of which we are not aware. You will receive an additional bill if you return to the Emergency Department at [facility] for suture removal regardless of the facility of which the sutures were placed.     2. Please arrange for settlement of this account at the emergency registration.    3. All self-pay accounts are due in full at the time of treatment.  If you are unable to meet this obligation then payment is expected within 4-5 days.     4. If you have had radiology studies (CT, X-ray, Ultrasound, MRI), you have received a preliminary result during your emergency department visit. Please contact the radiology department [phone] to receive a copy of your final result. Please discuss the Final result with your primary physician or with the follow up physician provided.     Crisis Hotline:  [place] Crisis Hotline:  6-329-XLMQPMZ or 1-928.381.9806  [place] Crisis Hotline:    1-110.105.6022 or 452-017-0469         ED Discharge Follow  Up Questions    1. In order to provide you with very good care, we would like to follow up with a phone call in the next few days.  May we have your permission to contact you?     YES /  NO    2. What is the best phone number to call you? (       )_____-__________    3. What is the best time to call you?      Morning  /  Afternoon  /  Evening                   Patient Signature:  ____________________________________________________________    Date:  ____________________________________________________________      Your appointments     Apr 07, 2017 11:20 AM   Established Patient with Eliza Kan D.O.   [facility] [place]    [address] [place] [address], [address]  [facility] [address]   [phone]           You will be receiving a confirmation call a few days before your appointment from our automated call confirmation system.

## 2017-03-17 ENCOUNTER — NON-PROVIDER VISIT (OUTPATIENT)
Dept: WOUND CARE | Facility: MEDICAL CENTER | Age: 57
End: 2017-03-17
Attending: EMERGENCY MEDICINE
Payer: MEDICARE

## 2017-03-17 DIAGNOSIS — E11.621 DIABETIC ULCER OF LEFT FIFTH TOE (HCC): Primary | ICD-10-CM

## 2017-03-17 DIAGNOSIS — R09.89 DIMINISHED PULSES IN LOWER EXTREMITY: ICD-10-CM

## 2017-03-17 DIAGNOSIS — L97.529 DIABETIC ULCER OF LEFT FIFTH TOE (HCC): Primary | ICD-10-CM

## 2017-03-17 PROCEDURE — 97162 PT EVAL MOD COMPLEX 30 MIN: CPT

## 2017-03-17 PROCEDURE — A6213 FOAM DRG >16<=48 SQ IN W/BDR: HCPCS

## 2017-03-17 PROCEDURE — A6402 STERILE GAUZE <= 16 SQ IN: HCPCS

## 2017-03-17 PROCEDURE — A6237 HYDROCOLLD DRG <=16 IN W/BDR: HCPCS

## 2017-03-17 PROCEDURE — 97597 DBRDMT OPN WND 1ST 20 CM/<: CPT

## 2017-03-17 PROCEDURE — A6212 FOAM DRG <=16 SQ IN W/BORDER: HCPCS

## 2017-03-18 NOTE — PROGRESS NOTES
Patient with nonhealing ulcer to left 5th toe.  Pedal pulses are not palpable.  Arterial studies ordered

## 2017-03-25 ENCOUNTER — NON-PROVIDER VISIT (OUTPATIENT)
Dept: WOUND CARE | Facility: MEDICAL CENTER | Age: 57
End: 2017-03-25
Attending: EMERGENCY MEDICINE
Payer: MEDICARE

## 2017-03-25 PROCEDURE — 97602 WOUND(S) CARE NON-SELECTIVE: CPT

## 2017-03-25 PROCEDURE — A6402 STERILE GAUZE <= 16 SQ IN: HCPCS

## 2017-03-25 PROCEDURE — A6212 FOAM DRG <=16 SQ IN W/BORDER: HCPCS

## 2017-03-25 NOTE — WOUND TEAM
Advanced Wound Care  [facility]  [address]  [address]  [place] [address]  [phone] Fax: [phone]      Encounter  For Certification Period:03/17/17-04/17/17            Referring Physician: Sharon Lopez PA-C ([facility])  Primary Physician:      Eliza Kan DO  Consulting Physicians:         Wound(s):L foot lateral digit 4  Start of Care: 3/17/17       Subjective:        HPI:      56 year old male referred by [facility] practitioner for treatment of non-healing left foot digit #4 wound.    Patient [place]        Pain:        Moderate pain with palpation of wound depth.    Past Medical History:   Diabetes type I, Neuropathy, HTN, Diabetic retinopathy- both eyes    Current Medications:  No recent changes.    Allergies: Review of patient's allergies indicates no known allergies.          Objective:      Tests and Measures:  Pulses faint, biphasic?    JD unable to determine-requested arterial wound healing study from [facility]    Fall Risk Assessment (tierney all that apply with an X):3/17/17: (+) fall risk       Orthotic, protective, supportive devices:      Wound Characteristics                                                    Location:  L foot lateral digit 4 Initial Evaluation  Date:3/17/17 Encounter  Date: 3/25/17   Tissue Type and %: 75%pink; 25%yellow 50% pale red, 50% pale moist pink   Periwound: callus callus   Drainage: KATIANA(no dressing) Minimal SS   Exposed structures none None visible or palpable   Wound Edges:   attached open   Odor: none none   S&S of Infection:   none erythema   Edema: none localized   Sensation: LOPS, pain with pressure to 4,5 digits and wound area impaired               Measurements:  L foot lateral digit 4 Initial Evaluation  Date:3/17/17 Encounter  Date: 3/25/17   Length (cm) 0.7 0.3   Width (cm) 0.5 0.3   Depth (cm) UA 0.1   Area (cm2) 0.35 0.09 cm2    Tract/undermine none Undermining circumferentially - 0.3cm        Procedures:     Debridement :  Non-selective using cotton-tipped applicator to remove biofilm from wound base.   Cleansed with:      NS                                                                    Periwound protected with: Cavilon skin prep   Primary dressing: Medihoney alginate   Secondary Dressing: non-adhesive foam secured with hypafix   Other:      Patient Education:    Patient instructed on need for arterial studies. Apparently [facility] called him to schedule an appointment and he thought studies had already been performed at last wound appointment, when MARILIA Alas attempted to hear pulses with doppler. I gave patient the phone number and directions on how to schedule and obtain arterial studies, he said he was going to see if he could have studies done today while he is at [facility]. I also changed patient's next appointment, next Saturday to be scheduled with MARILIA Alas for [facility] pending arterial studies. Patient was given the remainder of wound care supplies so that he can change dressing at home if needed. Patient has difficulty with transportation so can only come to wound care 1x/week.     Pt instructed in wound care POC, dressing selection rationale and maintenance. Reviewed importance of tight glucose control, implications of loss of protective sensation, including increased risk for amputation. Instructed to inspect both feet at least daily. Instructed in importance of offloading foot in order for wound to heal. Given prescription for inserts/diabetic footwear. Instructed pt to keep dressing dry until next visit.  Pt verbalized understanding.    Professional Collaboration: Evette Heller PT brought in to evaluate wound as no picture from initial evaluation was available. Evette thought that wound had improved somewhat in appearance.       Assessment:      PT Evaluation [id number]  Reference:  History: 01739  Exam of body systems: 11764  Clinical  "presentation: 12905    Wound etiology: neuropathic    Wound Progress: Decrease in wound measurements, increased undermining, pending arterial studies. Once callus is \"de-roofed\" wound will likely reveal increased dimensions.     Rationale for Treatment:honey alginate for antimicrobial, exudate control,autolytic properties with longer wear time.     Patient tolerance/compliance: Pt verbalized understanding of initial instructions and education; consented to POC    Complicating factors: DM, HTN, neuropathy    Need for ongoing Advanced Wound Care services:Pt requires continued skilled wound care for compression bandaging, sharp debridement prn, dressing management and application, patient education, and clinical observation       Plan:      Treatment Plan and Recommendations:  Diagnosis/ICD10: E11.69; L08.9    Procedures/CPT:selective, non-selective debridement    Frequency: 2x/week: pt unable to arrange transportation>1x/week.      Treatment Goals: STG 2 Weeks  LTG 4 Weeks   Granulation Tissue: 100% %   Decrease Necrotic Tissue to: 0% %   Wound Phase:  prolif    Decrease Size by: 25% 50%   Periwound:  intact    Decrease tracts/undermining by: % %   Decrease Pain:          At the time of each visit a thorough assessment of the patient is completed to assure the  appropriateness of our plan of care.  The dressings or modalities may need to be adapted   from the original plan to address any significant changes in the wound environment.          Clinician Signature:_______Evette Heller PT_________Date____3/17/17_______      Physician Signature:______________________________Date:__________________      "

## 2017-03-29 ENCOUNTER — NON-PROVIDER VISIT (OUTPATIENT)
Dept: WOUND CARE | Facility: MEDICAL CENTER | Age: 57
End: 2017-03-29
Attending: EMERGENCY MEDICINE
Payer: MEDICARE

## 2017-03-29 ENCOUNTER — HOSPITAL ENCOUNTER (OUTPATIENT)
Dept: RADIOLOGY | Facility: MEDICAL CENTER | Age: 57
End: 2017-03-29
Attending: NURSE PRACTITIONER
Payer: MEDICARE

## 2017-03-29 DIAGNOSIS — R09.89 DIMINISHED PULSES IN LOWER EXTREMITY: ICD-10-CM

## 2017-03-29 PROCEDURE — 93922 UPR/L XTREMITY ART 2 LEVELS: CPT

## 2017-03-29 PROCEDURE — 303972 HCHG HYPAFIX RET DRST 18SQ PC"

## 2017-03-29 PROCEDURE — A6402 STERILE GAUZE <= 16 SQ IN: HCPCS

## 2017-03-29 PROCEDURE — 97597 DBRDMT OPN WND 1ST 20 CM/<: CPT

## 2017-03-29 PROCEDURE — A6212 FOAM DRG <=16 SQ IN W/BORDER: HCPCS

## 2017-03-29 PROCEDURE — 93925 LOWER EXTREMITY STUDY: CPT

## 2017-03-29 NOTE — WOUND TEAM
Advanced Wound Care  [facility]  [address]  [address]  [place] [address]  [phone] Fax: [phone]      Encounter  For Certification Period:03/17/17-04/17/17            Referring Physician: Sharon Lopez PA-C ([facility])  Primary Physician:      Eliza Kan DO  Consulting Physicians:         Wound(s):L foot lateral digit 4  Start of Care: 3/17/17       Subjective:        HPI:      56 year old male referred by [facility] practitioner for treatment of non-healing left foot digit #4 wound.    [place]        Pain:        Moderate pain with palpation of wound depth.    Past Medical History:   Diabetes type I, Neuropathy, HTN, Diabetic retinopathy- both eyes    Current Medications:  No recent changes.    Allergies: Review of patient's allergies indicates no known allergies.          Objective:      Tests and Measures:  Pulses faint, biphasic?    JD unable to determine-requested arterial wound healing study from [facility]    Fall Risk Assessment (tierney all that apply with an X):3/17/17: (+) fall risk       Orthotic, protective, supportive devices:      Wound Characteristics                                                    Location:  L foot lateral digit 4 Initial Evaluation  Date:3/17/17 Encounter  Date: 3/29/17   Tissue Type and %: 75%pink; 25%yellow 50% pale red, 50% pale moist pink   Periwound: callus callus   Drainage: KATIANA(no dressing) Minimal SS   Exposed structures none None visible or palpable   Wound Edges:   attached open   Odor: none none   S&S of Infection:   none erythema   Edema: none localized   Sensation: LOPS, pain with pressure to 4,5 digits and wound area impaired               Measurements:  L foot lateral digit 4 Initial Evaluation  Date:3/17/17 Encounter  Date: 3/25/17   Length (cm) 0.7 0.3   Width (cm) 0.5 0.3   Depth (cm) UA 0.1   Area (cm2) 0.35 0.09 cm2    Tract/undermine none Undermining circumferentially - 0.3cm        Procedures:     Debridement : forceps and scissors to remove lifting tissue of open area surrounding wound that was pocketing fluid revealing macerated tissue   Cleansed with:      NS                                                                    Periwound protected with: Cavilon skin prep   Primary dressing: Medihoney colloid   Secondary Dressing: non-adhesive foam secured with hypafix   Other:      Patient Education:    3/29/17:  Arterial studies today.  Debridement done today was of unattached lifting tissue that was detrimental to surrounding tissue.  Will determine course of action once studies read and presented to our providers.     Patient instructed on need for arterial studies. Apparently [facility] called him to schedule an appointment and he thought studies had already been performed at last wound appointment, when MARILIA Alas attempted to hear pulses with doppler. I gave patient the phone number and directions on how to schedule and obtain arterial studies, he said he was going to see if he could have studies done today while he is at [facility]. I also changed patient's next appointment, next Saturday to be scheduled with MARILIA Alas for CSWD pending arterial studies. Patient was given the remainder of wound care supplies so that he can change dressing at home if needed. Patient has difficulty with transportation so can only come to wound care 1x/week.     Pt instructed in wound care POC, dressing selection rationale and maintenance. Reviewed importance of tight glucose control, implications of loss of protective sensation, including increased risk for amputation. Instructed to inspect both feet at least daily. Instructed in importance of offloading foot in order for wound to heal. Given prescription for inserts/diabetic footwear. Instructed pt to keep dressing dry until next visit.  Pt verbalized understanding.    Professional Collaboration:  Evette Heller, PT brought in to evaluate wound as no picture from initial evaluation was available. Evette thought that wound had improved somewhat in appearance.       Assessment:      PT Evaluation 88117  Reference:  History: 09638  Exam of body systems: 76340  Clinical presentation: 06216    Wound etiology: neuropathic    Wound Progress: Decrease in wound measurements, , pending arterial studies. Callous deroofed due to pocketing fluid and causing more damage to skin beneath  Rationale for Treatment:honey alginate for antimicrobial, exudate control,autolytic properties with longer wear time.     Patient tolerance/compliance: Pt verbalized understanding of initial instructions and education; consented to POC    Complicating factors: DM, HTN, neuropathy    Need for ongoing Advanced Wound Care services:Pt requires continued skilled wound care for compression bandaging, sharp debridement prn, dressing management and application, patient education, and clinical observation       Plan:      Treatment Plan and Recommendations:  Diagnosis/ICD10: E11.69; L08.9    Procedures/CPT:selective, non-selective debridement    Frequency: 2x/week: pt unable to arrange transportation>1x/week.      Treatment Goals: STG 2 Weeks  LTG 4 Weeks   Granulation Tissue: 100% %   Decrease Necrotic Tissue to: 0% %   Wound Phase:  prolif    Decrease Size by: 25% 50%   Periwound:  intact    Decrease tracts/undermining by: % %   Decrease Pain:          At the time of each visit a thorough assessment of the patient is completed to assure the  appropriateness of our plan of care.  The dressings or modalities may need to be adapted   from the original plan to address any significant changes in the wound environment.          Clinician Signature:______    Physician Signature:______________________________Date:__________________

## 2017-03-31 ENCOUNTER — NON-PROVIDER VISIT (OUTPATIENT)
Dept: WOUND CARE | Facility: MEDICAL CENTER | Age: 57
End: 2017-03-31
Attending: NURSE PRACTITIONER
Payer: MEDICARE

## 2017-03-31 PROCEDURE — A6402 STERILE GAUZE <= 16 SQ IN: HCPCS

## 2017-03-31 PROCEDURE — 97597 DBRDMT OPN WND 1ST 20 CM/<: CPT

## 2017-03-31 PROCEDURE — 303972 HCHG HYPAFIX RET DRST 18SQ PC"

## 2017-03-31 PROCEDURE — A6212 FOAM DRG <=16 SQ IN W/BORDER: HCPCS

## 2017-03-31 NOTE — WOUND TEAM
Advanced Wound Care  [facility]  [address]  [address]  [place] [address]  [phone] Fax: [phone]      Encounter  For Certification Period:03/17/17-04/17/17            Referring Physician: Sharon Lopez PA-C ([facility])  Primary Physician:      Eliza Kan DO  Consulting Physicians:         Wound(s):L foot lateral digit 4  Start of Care: 3/17/17       Subjective:        HPI:      56 year old male referred by [facility] practitioner for treatment of non-healing left foot digit #4 wound.    Patient believes wound [place].        Pain:        Moderate pain with palpation of wound depth.    Past Medical History:   Diabetes type I, Neuropathy, HTN, Diabetic retinopathy- both eyes    Current Medications:  No recent changes.    Allergies: Review of patient's allergies indicates no known allergies.          Objective:      Tests and Measures:  Pulses faint, biphasic?    JD unable to determine-requested arterial wound healing study from [facility]    Fall Risk Assessment (tierney all that apply with an X):3/17/17: (+) fall risk       Orthotic, protective, supportive devices:      Wound Characteristics                                                    Location:  L foot lateral digit 4 Initial Evaluation  Date:3/17/17 Encounter  Date: 3/29/17 Encounter  3/31/17   Tissue Type and %: 75%pink; 25%yellow 50% pale red, 50% pale moist pink 40% red and 60% light brown adherent non-viable tissue   Periwound: callus callus callus   Drainage: KATIANA(no dressing) Minimal SS Small ss   Exposed structures none None visible or palpable None visible   Wound Edges:   attached open open   Odor: none none none   S&S of Infection:   none erythema erythema   Edema: none localized localized   Sensation: LOPS, pain with pressure to 4,5 digits and wound area impaired Impaired, but reacts to sharp pain               Measurements:  L foot lateral digit  4 Initial Evaluation  Date:3/17/17 Encounter  Date: 3/25/17   Length (cm) 0.7 0.3   Width (cm) 0.5 0.3   Depth (cm) UA 0.1   Area (cm2) 0.35 0.09 cm2   Tract/undermine none Undermining circumferentially - 0.3cm        Procedures:     Debridement :CSWD to red area to remove biofilm.    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 Pt. Verbalized signs and symptoms of infection, including erythema, induration, increased pain, increased drainage and fever and to go to ER if any of these occur.    3/29/17:  Arterial studies today.  Debridement done today was of unattached lifting tissue that was detrimental to surrounding tissue.  Will determine course of action once studies read and presented to our providers.     Patient instructed on need for arterial studies. Apparently [facility] called him to schedule an appointment and he thought studies had already been performed at last wound appointment, when MARILIA Alas attempted to hear pulses with doppler. I gave patient the phone number and directions on how to schedule and obtain arterial studies, he said he was going to see if he could have studies done today while he is at [facility]. I also changed patient's next appointment, next Saturday to be scheduled with MARILIA Alas for CSWD pending arterial studies. Patient was given the remainder of wound care supplies so that he can change dressing at home if needed. Patient has difficulty with transportation so can only come to wound care 1x/week.     Pt instructed in wound care POC, dressing selection rationale and maintenance. Reviewed importance of tight glucose control, implications of loss of protective sensation,  including increased risk for amputation. Instructed to inspect both feet at least daily. Instructed in importance of offloading foot in order for wound to heal. Given prescription for inserts/diabetic footwear. Instructed pt to keep dressing dry until next visit.  Pt verbalized understanding.    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    Decrease in wound measurements, , pending arterial studies. Callous deroofed due to pocketing fluid and causing more damage to skin beneath  Rationale for Treatment:honey alginate for antimicrobial, exudate control,autolytic properties with longer wear time.     Patient tolerance/compliance: Pt verbalized understanding of initial instructions and education; consented to POC    Complicating factors: DM, HTN, neuropathy    Need for ongoing Advanced Wound Care services:Pt requires continued skilled wound care for compression bandaging, sharp debridement prn, dressing management and application, patient education, and clinical observation       Plan:      Treatment Plan and Recommendations:  Diagnosis/ICD10: E11.69; L08.9    Procedures/CPT:selective, non-selective debridement    Frequency: 2x/week: pt unable to arrange transportation>1x/week.      Treatment Goals: STG 2 Weeks  LTG 4 Weeks   Granulation Tissue: 100% %   Decrease Necrotic Tissue to: 0% %   Wound Phase:  prolif    Decrease Size by: 25% 50%   Periwound:  intact    Decrease tracts/undermining by: % %   Decrease Pain:          At the time of each visit a thorough assessment of the patient is completed to assure the  appropriateness of our plan of care.  The dressings or modalities may need to be adapted   from the original plan to address any significant changes in the wound  environment.          Clinician Signature:______    Physician Signature:______________________________Date:__________________    ''''

## 2017-04-01 ENCOUNTER — APPOINTMENT (OUTPATIENT)
Dept: WOUND CARE | Facility: MEDICAL CENTER | Age: 57
End: 2017-04-01
Attending: EMERGENCY MEDICINE
Payer: MEDICARE

## 2017-04-07 ENCOUNTER — OFFICE VISIT (OUTPATIENT)
Dept: MEDICAL GROUP | Facility: PHYSICIAN GROUP | Age: 57
End: 2017-04-07
Payer: MEDICARE

## 2017-04-07 ENCOUNTER — NON-PROVIDER VISIT (OUTPATIENT)
Dept: WOUND CARE | Facility: MEDICAL CENTER | Age: 57
End: 2017-04-07
Attending: EMERGENCY MEDICINE
Payer: MEDICARE

## 2017-04-07 VITALS
RESPIRATION RATE: 16 BRPM | SYSTOLIC BLOOD PRESSURE: 150 MMHG | TEMPERATURE: 98.6 F | BODY MASS INDEX: 25.58 KG/M2 | WEIGHT: 163 LBS | OXYGEN SATURATION: 99 % | HEART RATE: 86 BPM | DIASTOLIC BLOOD PRESSURE: 80 MMHG | HEIGHT: 67 IN

## 2017-04-07 DIAGNOSIS — E10.22 TYPE 1 DIABETES MELLITUS WITH STAGE 3 CHRONIC KIDNEY DISEASE (HCC): ICD-10-CM

## 2017-04-07 DIAGNOSIS — L97.529 DIABETIC ULCER OF LEFT FOOT ASSOCIATED WITH TYPE 1 DIABETES MELLITUS (HCC): ICD-10-CM

## 2017-04-07 DIAGNOSIS — E10.42 DIABETIC POLYNEUROPATHY ASSOCIATED WITH TYPE 1 DIABETES MELLITUS (HCC): ICD-10-CM

## 2017-04-07 DIAGNOSIS — E78.00 PURE HYPERCHOLESTEROLEMIA: ICD-10-CM

## 2017-04-07 DIAGNOSIS — E11.319 DIABETIC RETINOPATHY OF BOTH EYES (HCC): ICD-10-CM

## 2017-04-07 DIAGNOSIS — E10.621 DIABETIC ULCER OF LEFT FOOT ASSOCIATED WITH TYPE 1 DIABETES MELLITUS (HCC): ICD-10-CM

## 2017-04-07 DIAGNOSIS — I10 ESSENTIAL HYPERTENSION: ICD-10-CM

## 2017-04-07 DIAGNOSIS — N18.30 TYPE 1 DIABETES MELLITUS WITH STAGE 3 CHRONIC KIDNEY DISEASE (HCC): ICD-10-CM

## 2017-04-07 PROCEDURE — A6402 STERILE GAUZE <= 16 SQ IN: HCPCS

## 2017-04-07 PROCEDURE — A6237 HYDROCOLLD DRG <=16 IN W/BDR: HCPCS

## 2017-04-07 PROCEDURE — G8432 DEP SCR NOT DOC, RNG: HCPCS | Performed by: FAMILY MEDICINE

## 2017-04-07 PROCEDURE — A6212 FOAM DRG <=16 SQ IN W/BORDER: HCPCS

## 2017-04-07 PROCEDURE — 4004F PT TOBACCO SCREEN RCVD TLK: CPT | Performed by: FAMILY MEDICINE

## 2017-04-07 PROCEDURE — 3046F HEMOGLOBIN A1C LEVEL >9.0%: CPT | Mod: 8P | Performed by: FAMILY MEDICINE

## 2017-04-07 PROCEDURE — 99214 OFFICE O/P EST MOD 30 MIN: CPT | Performed by: FAMILY MEDICINE

## 2017-04-07 PROCEDURE — 97602 WOUND(S) CARE NON-SELECTIVE: CPT

## 2017-04-07 PROCEDURE — G8419 CALC BMI OUT NRM PARAM NOF/U: HCPCS | Performed by: FAMILY MEDICINE

## 2017-04-07 NOTE — PROGRESS NOTES
Subjective:     Chief Complaint   Patient presents with   • Follow-Up       Dylon Santa is a 56 y.o. male here today for follow-up on chronic conditions.  Pt has ulcer on left 4th toe.  Pt is following with wound center twice a week.Pt has bilat LE neuropathy.   Pt reports blood sugars are elevated with stress. Most recent A1c was 8.4 in August 2016. Pt takes blood sugar 5 times a day. He reports elevated levels at night. He denies taking sliding scale insulin. Patient states he understands corrections and keeps his blood sugars well maintained.      Patient has history of diabetic induced CK D stage III. He is followed by nephrology. Patient denies any changes in urination such as decreased urination, urinary frequency, dysuria, or change in urine color.  Patient has a history of diabetic neuropathy. Patient states it frequently occurs when his blood sugars are poorly controlled. Denies any current complaints of burning sensation in lower or upper extremities today. Patient has a history of diabetic retinopathy for which he is followed by ophthalmology.  No Known Allergies  Current medicines (including changes today)  Current Outpatient Prescriptions   Medication Sig Dispense Refill   • aspirin (ASA) 81 MG Chew Tab chewable tablet Take 81 mg by mouth every day.     • simvastatin (ZOCOR) 40 MG Tab TAKE ONE TABLET BY MOUTH ONCE DAILY IN THE EVENING 90 Tab 4   • metoprolol SR (TOPROL XL) 25 MG TABLET SR 24 HR TAKE ONE TABLET BY MOUTH ONCE DAILY 90 Tab 4   • Misc. Devices Kit Glucometer (brand as accepted by insurance). To test fasting in the AM and random in the PM. 1 Kit 0   • Lancet Device Misc Freestyle lite or similar per insurnace, To test 5 times a day 450 Each 4   • Blood Glucose Monitoring Suppl SUPPLIES Misc Test Strips freestyle lite or similar per insurance Sig: testing 5 times a day 450 Each 4   • acyclovir (ZOVIRAX) 200 MG Cap Take 1 Cap by mouth every day. 90 Cap 4   • ACCU-CHEK CAIN PLUS strip      •  losartan (COZAAR) 50 MG Tab Take 50 mg by mouth every day.     • ACCU-CHEK MULTICLIX LANCETS [facility]      • NOVOLIN N RELION 100 UNIT/ML Suspension USE AS DIRECTED PER SLIDING SCALE FOR MEAL TIME DOSING 10 mL 0   • Blood Glucose Monitoring Suppl SUPPLIES Misc Corrie Accu Check test strips, #600, RF4 and lancets #600, RF 4. To test fasting in the AM and random 4 times a day. 600 Each 4     No current facility-administered medications for this visit.     Social History   Substance Use Topics   • Smoking status: Never Smoker    • Smokeless tobacco: Current User     Types: Chew      Comment: tobacco cessation recommended   • Alcohol Use: No     Family Status   Relation Status Death Age   • Mother Alive    • Sister Alive    • Brother Alive    • Father Alive    • Brother       MVA     Family History   Problem Relation Age of Onset   • Thyroid Mother    • Lung Disease Mother      COPD   • Thyroid Sister    • Thyroid Brother    • Lung Disease Brother      COPD   • Diabetes Maternal Uncle    • Cancer Paternal Aunt      type unknown   • Diabetes Maternal Grandfather    • Diabetes Paternal Grandmother    • Cancer Paternal Grandfather      prostate   • Lung Disease Father      COPD   • Hypertension Neg Hx    • Hyperlipidemia Neg Hx    • Stroke Neg Hx      He    has a past medical history of Type II or unspecified type diabetes mellitus without mention of complication, uncontrolled (2014); History of shingles (2014); Diabetic neuropathy (CMS-HCC) (2014); Hypertension; Hyperlipidemia; Kidney stones; and Diabetic retinopathy of both eyes (CMS-HCC) (2016).        ROS GEN: no weight loss, fevers, or chills  HEENT: Left eye multiple areas of diminished vision, right eye loss of peripheral vision, no ear pain, no difficulty swallowing, no throat pain, no runny nose, no nasal congestion  Resp: no shortness of breath, no cough  CV: no racing heart, no irregular beats, no chest pain  Abd: no nausea, no  "vomiting, no diarrhea, no constipation, no blood in stool, no dark stools, no incontinence  : no dysuria, no hematuria, no urinary incontinence, no increased frequency  MSK: no muscle aches, no joint pain, no limited motion  Neuro: no headaches, no dizziness, no LOC, no weakness       Objective:     Blood pressure 150/80, pulse 86, temperature 37 °C (98.6 °F), resp. rate 16, height 1.702 m (5' 7.01\"), weight 73.936 kg (163 lb), SpO2 99 %. Body mass index is 25.52 kg/(m^2).   Physical Exam:  Constitutional: Alert, no distress.  Skin: Warm, dry, good turgor, no rashes in visible areas, left fourth toe lateral aspect 0.5 cm diameter ulcer with 0.1 cm surrounding erythema, no discharge.  Eye: Equal, round and reactive, conjunctiva clear, lids normal.  ENMT: Lips without lesions, good dentition, oropharynx non-erythematous, no exudate, moist oral mucosa, bilateral tympanic membranes: No bulging, no retraction, no fluid, nonerythematous, positive light reflex, external auditory canals: Clear, scant cerumen, nonerythematous  Neck: Trachea midline, no masses, no thyromegaly. No cervical or supraclavicular lymphadenopathy. Full ROM  Respiratory: Unlabored respiratory effort, good air movement, lungs clear to auscultation, no wheezes, no ronchi.  Cardiovascular:RRR, +S1, S2, no murmur, no peripheral edema, pedal and radial pulses equal and intact bilat  Abdomen: Soft, non-tender, no masses, no hepatosplenomegaly.  MSK:5/5 muscle strength in upper extremities as well as lower extremity bilaterally  Psych: Alert and oriented x3, appropriate affect and mood.  Neuro: CN2-12 intact, no gross motor or sensory deficits      Assessment and Plan:   The following treatment plan was discussed    1. Type 1 diabetes mellitus with stage 3 chronic kidney disease (CMS-HCC)  Chronic: Patient reports he controls his blood sugars with sliding scale. Most recent A1c from August 2016 is elevated. Therefore recommend repeat A1c. Patient has " multiple complications of his diabetes including CKD, microalbuminuria, neuropathy, and retinopathy. Patient declines insulin pump, diabetic education, or referral to endocrinologist.  - COMP METABOLIC PANEL; Future  - CBC WITH DIFFERENTIAL; Future  - LIPID PROFILE; Future  - MICROALBUMIN CREAT RATIO URINE; Future  - HEMOGLOBIN A1C; Future    2. Diabetic ulcer of left foot associated with type 1 diabetes mellitus (CMS-[facility])  Improving: Continue to follow with wound care    3. Diabetic polyneuropathy associated with type 1 diabetes mellitus (CMS-[facility])  Chronic: Unchanged    4. Diabetic retinopathy of both eyes (CMS-HCC)  Chronic: Continue to follow with ophthalmology    5. Essential hypertension  Chronic: Currently well controlled to continue metoprolol and losartan.    6. Pure hypercholesterolemia  Chronic: Will be test and treat as indicated. Continue simvastatin      Followup: Return in about 3 months (around 7/7/2017) for F/U DM.    Please note that this dictation was created using voice recognition software. I have made every reasonable attempt to correct obvious errors, but I expect that there are errors of grammar and possibly content that I did not discover before finalizing the note.

## 2017-04-07 NOTE — MR AVS SNAPSHOT
"        Dylon Chanoscar   2017 11:20 AM   Office Visit   MRN: [id number]    Department:  [facility], [place]   Dept Phone:  [phone]    Description:  Male : 1960   Provider:  Eliza Kan D.O.           Reason for Visit     Follow-Up           Allergies as of 2017     No Known Allergies      You were diagnosed with     Type 1 diabetes mellitus with stage 3 chronic kidney disease (CMS-HCC)   [555242]       Diabetic ulcer of left foot associated with type 1 diabetes mellitus (CMS-[facility])   [7489590]       Diabetic polyneuropathy associated with type 1 diabetes mellitus (CMS-[facility])   [1439778]       Diabetic retinopathy of both eyes (CMS-HCC)   [862685]       Essential hypertension   [3196931]       Pure hypercholesterolemia   [272.0.ICD-9-CM]         Vital Signs     Blood Pressure Pulse Temperature Respirations Height Weight    150/80 mmHg 86 37 °C (98.6 °F) 16 1.702 m (5' 7.01\") 73.936 kg (163 lb)    Body Mass Index Oxygen Saturation Smoking Status             25.52 kg/m2 99% Never Smoker          Basic Information     Date Of Birth Sex Race Ethnicity Preferred Language    1960 Male White Non- English      Your appointments     2017  2:00 PM   DEBRIDEMENT/TREATMENT with Evette Heller P.T.   [facility] ([address] [place] [address])    [address]  [place] [address]   [phone]            2017  2:00 PM   DEBRIDEMENT/TREATMENT with Teodora Benson R.N.   [facility] ([address] [place] [address])    [address]  [place] [address]   [phone]            2017 10:00 AM   DEBRIDEMENT/TREATMENT with Evette Heller, P.TWendi   [facility] ([address] [place] [address])    [address]  [place] [address]   [phone]            2017  9:00 AM   DEBRIDEMENT/TREATMENT with Teodora Benson R.N.   [facility] ([address] [place] [address])    [address]  [place] [address]   [phone]            2017 10:00 AM   DEBRIDEMENT/TREATMENT with Evette Heller, P.T.   "   [facility] ([address] [place] [address])    [address]  [place] [address]   [phone]            Apr 25, 2017 10:00 AM   DEBRIDEMENT/TREATMENT with Teodora Benson R.N.   [facility] ([address] [place] [address])    [address]  [place] [address]   [phone]            Apr 28, 2017  9:00 AM   DEBRIDEMENT/TREATMENT with Evette Heller P.T.   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   12/31/2014 - Present    HLD (hyperlipidemia) E78.5   7/31/2015 - Present    Blind left eye H54.42   6/17/2016 - Present    Diabetic retinopathy of both eyes (CMS-HCC) E11.319   12/9/2016 - Present    Diabetic ulcer of left foot associated with type 1 diabetes mellitus (CMS-HCC) E10.621, L97.529   3/17/2017 - Present    Diminished pulses in lower extremity R09.89   3/17/2017 - Present    Type 1 diabetes mellitus with diabetic chronic kidney disease (HCC) E10.22, N18.9   4/7/2017 - Present      Health Maintenance        Date Due Completion Dates    RETINAL SCREENING 12/21/2016 12/21/2015, 4/28/2014, 4/16/2013    A1C SCREENING 2/17/2017 8/17/2016, 1/18/2016, 6/26/2015, 12/31/2014    FASTING LIPID PROFILE 5/13/2017 5/13/2016, 11/2/2015, 1/9/2015    URINE ACR / MICROALBUMIN 5/20/2017 5/20/2016, 1/18/2016    DIABETES MONOFILAMENT / LE EXAM 6/17/2017 6/17/2016 (Postponed)    Override on 6/17/2016: Postponed    SERUM CREATININE 9/23/2017 9/23/2016, 5/27/2016, 5/13/2016, 11/2/2015, 8/28/2015, 6/26/2015, 6/11/2015, 6/10/2015, 1/9/2015    COLONOSCOPY 9/24/2020 9/24/2015    IMM DTaP/Tdap/Td Vaccine (2 - Td) 12/31/2024 12/31/2014            Current Immunizations     Hepatitis B Vaccine Recombivax (Adol/Adult)  3/10/2017, 12/9/2016  8:40 AM, 9/30/2016    Influenza Vaccine Quad Inj (Preserved) 11/12/2015, 12/31/2014    Pneumococcal polysaccharide vaccine (PPSV-23) 6/17/2016    Tdap Vaccine 12/31/2014      Below and/or attached are the medications your provider expects you to take. Review all of your home medications and newly ordered medications with your provider and/or pharmacist. Follow medication instructions as directed by your provider and/or pharmacist. Please keep your medication list with you and share with your provider. Update the information when medications are discontinued, doses are changed, or new medications (including over-the-counter products) are added; and carry medication information at all times in the event of emergency situations     Allergies:  No Known Allergies          Medications  Valid as of: April 07, 2017 - 12:13 PM    Generic Name Brand Name Tablet Size Instructions for use    Acyclovir (Cap) ZOVIRAX 200 MG Take 1 Cap by mouth every day.        Aspirin (Chew Tab) ASA 81 MG Take 81 mg by mouth every day.        Blood Glucose Monitoring Suppl (Misc) Blood Glucose Monitoring Suppl SUPPLIES Corrie Accu Check test strips, #600, RF4 and lancets #600, RF 4. To test fasting in the AM and random 4 times a day.        Blood Glucose Monitoring Suppl (Misc) Blood Glucose Monitoring Suppl SUPPLIES Test Strips freestyle lite or similar per insurance Sig: testing 5 times a day        Glucose Blood (Strip) ACCU-CHEK CORRIE PLUS          Insulin NPH Human (Isophane) (Suspension) NOVOLIN N RELION 100 UNIT/ML USE AS DIRECTED PER SLIDING SCALE FOR MEAL TIME DOSING        Lancet Devices (Misc) Lancet Device  Freestyle lite or similar per insurnace, To test 5 times a day        Lancets (Misc) ACCU-CHEK MULTICLIX LANCETS          Losartan Potassium (Tab) COZAAR 50 MG Take 50 mg by mouth every day.        Metoprolol Succinate (TABLET SR 24 HR) TOPROL XL 25 MG TAKE ONE TABLET BY MOUTH ONCE DAILY        Misc. Devices  (Kit) Misc. Devices  Glucometer (brand as accepted by insurance). To test fasting in the AM and random in the PM.        Simvastatin (Tab) ZOCOR 40 MG TAKE ONE TABLET BY MOUTH ONCE DAILY IN THE EVENING        .                 Medicines prescribed today were sent to:     [facility] [address] [place] [address] [place] - [address] [place] [address]    Phone: [phone] Fax: [phone]    Open 24 Hours?: No    [facility] [place] [id number] - [place], [place] - [address] [place] [address]    [address] [place] [address] [facility] [address]    Phone: [phone] Fax: [phone]    Open 24 Hours?: No      Medication refill instructions:       If your prescription bottle indicates you have medication refills left, it is not necessary to call your provider’s office. Please contact your pharmacy and they will refill your medication.    If your prescription bottle indicates you do not have any refills left, you may request refills at any time through one of the following ways: The online [email or web address] system (except Urgent Care), by calling your provider’s office, or by asking your pharmacy to contact your provider’s office with a refill request. Medication refills are processed only during regular business hours and may not be available until the next business day. Your provider may request additional information or to have a follow-up visit with you prior to refilling your medication.   *Please Note: Medication refills are assigned a new Rx number when refilled electronically. Your pharmacy may indicate that no refills were authorized even though a new prescription for the same medication is available at the pharmacy. Please request the medicine by name with the pharmacy before contacting your provider for a refill.        Your To Do List     Future Labs/Procedures Complete By Expires    CBC WITH DIFFERENTIAL  As directed 4/7/2018    COMP METABOLIC PANEL  As directed 4/7/2018    HEMOGLOBIN A1C  As  directed 4/7/2018    LIPID PROFILE  As directed 4/7/2018    MICROALBUMIN CREAT RATIO URINE  As directed 4/7/2018         MyChart Access Code: Activation code not generated  Current MyChart Status: Active          Quit Tobacco Information     Do you want to quit using tobacco?    Quitting tobacco decreases risks of cancer, heart and lung disease, increases life expectancy, improves sense of taste and smell, and increases spending money, among other benefits.    If you are thinking about quitting, we can help.  • [facility] Quit Tobacco Program: 132.569.7467  o Program occurs weekly for four weeks and includes pharmacist consultation on products to support quitting smoking or chewing tobacco. A provider referral is needed for pharmacist consultation.  • Tobacco Users Help Hotline: 6-350-QUITNOW (937-5073) or https://[place].quitlogix.org/  o Free, confidential telephone and online coaching for [place] residents. Sessions are designed on a schedule that is convenient for you. Eligible clients receive free nicotine replacement therapy.  • Nationally: www.smokefree.gov  o Information and professional assistance to support both immediate and long-term needs as you become, and remain, a non-smoker. Smokefree.gov allows you to choose the help that best fits your needs.

## 2017-04-07 NOTE — WOUND TEAM
"Advanced Wound Care  [facility]  [address]  [address]  [place] [address]  [phone] Fax: [phone]      Encounter  For Certification Period:03/17/17-04/17/17            Referring Physician: Sharon Lopez PA-C ([facility])  Primary Physician:      Eliza Kan DO  Consulting Physicians:         Wound(s):L foot lateral digit 4  Start of Care: 3/17/17       Subjective:        HPI:      56 year old male referred by [facility] practitioner for treatment of non-healing left foot digit #4 wound.    Patient believes wound [place].        Pain:        Moderate pain with palpation of wound depth.    Past Medical History:   Diabetes type I, Neuropathy, HTN, Diabetic retinopathy- both eyes    Current Medications:  No recent changes.    Allergies: Review of patient's allergies indicates no known allergies.          Objective:      Tests and Measures:  3/30/17: aterial duplex/imaging results :\"Increasing calcification of the arteries bilaterally from the common    femoral through the popliteal artery with no focal stenosis or occlusion.     Normal velocities & triphasic waveforms in these arteries.   The tibial arteries are heavily calcified bilaterally.   No flow is observed in the right distal anterior tibial artery by color &    spectral Doppler, it appears to be occluded.   No flow is observed in both the left distal anterior tibial  and posterior    tibial arteries by color & spectral Doppler, they appears to be occluded.\"    Fall Risk Assessment (tierney all that apply with an X):3/17/17: (+) fall risk       Orthotic, protective, supportive devices: off-loading boot     Wound Characteristics                                                    Location:  L foot lateral digit 4 Initial Evaluation  Date:3/17/17 Encounter  Date: 3/29/17 Encounter  4/7/17   Tissue Type and %: 75%pink; 25%yellow 50% pale red, 50% pale moist pink 20% red and " 80% tan/yellow adherent softened eschar   Periwound: callus callus Edema, erythema, dry skin   Drainage: KATIANA(no dressing) Minimal SS Unable to determinedressing removed by PCP this am   Exposed structures none None visible or palpable Unable to determine due to thick soft eschar   Wound Edges:   attached open open   Odor: none none none   S&S of Infection:   none erythema erythema   Edema: none localized localized   Sensation: LOPS, pain with pressure to 4,5 digits and wound area impaired Impaired, but reacts to sharp pain               Measurements:  L foot lateral digit 4 Initial Evaluation  Date:3/17/17 Encounter  Date: 4/7/17/17   Length (cm) 0.7 1   Width (cm) 0.5 1   Depth (cm) UA UA   Area (cm2) 0.35 1 cm2   Tract/undermine none Unable to determine        Procedures:     Debridement :Non-selective using NS moistened dsd, cotton tipped applicator   Cleansed with:      NS                                                                 Periwound protected with: Cavilon skin prep   Primary dressing: Medihoney alginate   Secondary Dressing: non-adhesive foam secured with hypafix   Other:      Patient Education:    3/31/17- Pt vascular studies show  L distal posterior and anterior arteries occluded. Pt. Verbalized signs and symptoms of infection, including erythema, induration, increased pain, increased drainage and fever and to go to ER if any of these occur.  3/29/17:  Arterial studies today.  Debridement done today was of unattached lifting tissue that was detrimental to surrounding tissue.  Will determine course of action once studies read and presented to our providers.   Patient instructed on need for arterial studies. Apparently [facility] called him to schedule an appointment and he thought studies had already been performed at last wound appointment, when Evette PT attempted to hear pulses with doppler. I gave patient the phone number and directions on how to schedule and obtain arterial studies, he said he  was going to see if he could have studies done today while he is at [facility]. I also changed patient's next appointment, next Saturday to be scheduled with MARILIA Alas for [facility] pending arterial studies. Patient was given the remainder of wound care supplies so that he can change dressing at home if needed. Patient has difficulty with transportation so can only come to wound care 1x/week.   3/17/17Pt instructed in wound care POC, dressing selection rationale and maintenance. Reviewed importance of tight glucose control, implications of loss of protective sensation, including increased risk for amputation. Instructed to inspect both feet at least daily. Instructed in importance of offloading foot in order for wound to heal. Given prescription for inserts/diabetic footwear. Instructed pt to keep dressing dry until next visit.  Pt verbalized understanding.    Professional Collaboration: Dr. Richard re arterial study results; she will be available to consult with pt next week; orders for XRays requested. Arterial insufficiency is prohibitive to wound healing; continue with conservative non-selective debridement and wound care.  Assessment:      PT Evaluation [id number]  Reference:  History: 26180  Exam of body systems: 81143  Clinical presentation: 05313    Wound etiology: neuropathic    Wound Progress: Pt vascular studies show  L distal posterior and anterior arteries occluded. Wound area, kenney-wound edema and erythema increasing;     Rationale for Treatment:honey alginate for antimicrobial, exudate control,autolytic properties with longer wear time.     Patient tolerance/compliance: Pt verbalized understanding of initial instructions and education; consented to POC    Complicating factors: DM, HTN, neuropathy    Need for ongoing Advanced Wound Care services:Pt requires continued skilled wound care for compression bandaging, sharp debridement prn, dressing management and application, patient education, and clinical observation        Plan:      Treatment Plan and Recommendations:  Diagnosis/ICD10: E11.69; L08.9    Procedures/CPT:selective, non-selective debridement    Frequency: 2x/week: pt unable to arrange transportation>1x/week.      Treatment Goals: STG 2 Weeks  LTG 4 Weeks   Granulation Tissue: 100% %   Decrease Necrotic Tissue to: 0% %   Wound Phase:  prolif    Decrease Size by: 25% 50%   Periwound:  intact    Decrease tracts/undermining by: % %   Decrease Pain:          At the time of each visit a thorough assessment of the patient is completed to assure the  appropriateness of our plan of care.  The dressings or modalities may need to be adapted   from the original plan to address any significant changes in the wound environment.          Clinician Signature:______    Physician Signature:______________________________Date:__________________    ''''

## 2017-04-13 ENCOUNTER — TELEPHONE (OUTPATIENT)
Dept: URGENT CARE | Facility: PHYSICIAN GROUP | Age: 57
End: 2017-04-13

## 2017-04-13 NOTE — TELEPHONE ENCOUNTER
Rec'd call from Katie who is the manager of wound care, she is arranging for xray and potential follow up visit for this pt today.

## 2017-04-13 NOTE — TELEPHONE ENCOUNTER
Pt called requesting help regarding an X-Ray of his foot that was supposed to have been ordered by wound care. I took his information and advised him I would look into this for him and get back to him. I spoke with Dr. Kan who is his PCP, she stated she would order it if necessary but that it should be done by wound care. I called and left a message with Mary in wound care who states she would have  call me this morning.

## 2017-04-14 ENCOUNTER — NON-PROVIDER VISIT (OUTPATIENT)
Dept: WOUND CARE | Facility: MEDICAL CENTER | Age: 57
End: 2017-04-14
Attending: EMERGENCY MEDICINE
Payer: MEDICARE

## 2017-04-14 ENCOUNTER — HOSPITAL ENCOUNTER (OUTPATIENT)
Dept: RADIOLOGY | Facility: MEDICAL CENTER | Age: 57
End: 2017-04-14
Attending: SURGERY
Payer: MEDICARE

## 2017-04-14 ENCOUNTER — HOSPITAL ENCOUNTER (OUTPATIENT)
Facility: MEDICAL CENTER | Age: 57
End: 2017-04-14
Attending: NURSE PRACTITIONER
Payer: MEDICARE

## 2017-04-14 DIAGNOSIS — L08.9 WOUND INFECTION: ICD-10-CM

## 2017-04-14 DIAGNOSIS — E08.621 DIABETIC ULCER OF TOE OF LEFT FOOT ASSOCIATED WITH DIABETES MELLITUS DUE TO UNDERLYING CONDITION, WITH FAT LAYER EXPOSED (HCC): ICD-10-CM

## 2017-04-14 DIAGNOSIS — L97.522 DIABETIC ULCER OF TOE OF LEFT FOOT ASSOCIATED WITH DIABETES MELLITUS DUE TO UNDERLYING CONDITION, WITH FAT LAYER EXPOSED (HCC): ICD-10-CM

## 2017-04-14 DIAGNOSIS — T14.8XXA WOUND INFECTION: ICD-10-CM

## 2017-04-14 DIAGNOSIS — E08.621 DIABETIC ULCER OF LEFT FOOT ASSOCIATED WITH DIABETES MELLITUS DUE TO UNDERLYING CONDITION (HCC): ICD-10-CM

## 2017-04-14 DIAGNOSIS — E08.621 DIABETIC ULCER OF LEFT FOOT ASSOCIATED WITH DIABETES MELLITUS DUE TO UNDERLYING CONDITION (HCC): Primary | ICD-10-CM

## 2017-04-14 DIAGNOSIS — L97.529 DIABETIC ULCER OF LEFT FOOT ASSOCIATED WITH DIABETES MELLITUS DUE TO UNDERLYING CONDITION (HCC): Primary | ICD-10-CM

## 2017-04-14 DIAGNOSIS — L97.529 DIABETIC ULCER OF LEFT FOOT ASSOCIATED WITH DIABETES MELLITUS DUE TO UNDERLYING CONDITION (HCC): ICD-10-CM

## 2017-04-14 LAB
GRAM STN SPEC: NORMAL
SIGNIFICANT IND 70042: NORMAL
SITE SITE: NORMAL
SOURCE SOURCE: NORMAL

## 2017-04-14 PROCEDURE — A6402 STERILE GAUZE <= 16 SQ IN: HCPCS

## 2017-04-14 PROCEDURE — 303972 HCHG HYPAFIX RET DRST 18SQ PC"

## 2017-04-14 PROCEDURE — 87205 SMEAR GRAM STAIN: CPT

## 2017-04-14 PROCEDURE — 73630 X-RAY EXAM OF FOOT: CPT | Mod: LT

## 2017-04-14 PROCEDURE — 11042 DBRDMT SUBQ TIS 1ST 20SQCM/<: CPT

## 2017-04-14 PROCEDURE — 87186 SC STD MICRODIL/AGAR DIL: CPT

## 2017-04-14 PROCEDURE — A6212 FOAM DRG <=16 SQ IN W/BORDER: HCPCS

## 2017-04-14 PROCEDURE — 87077 CULTURE AEROBIC IDENTIFY: CPT

## 2017-04-14 PROCEDURE — 11042 DBRDMT SUBQ TIS 1ST 20SQCM/<: CPT | Performed by: NURSE PRACTITIONER

## 2017-04-14 PROCEDURE — 87070 CULTURE OTHR SPECIMN AEROBIC: CPT

## 2017-04-14 RX ORDER — AMOXICILLIN AND CLAVULANATE POTASSIUM 875; 125 MG/1; MG/1
1 TABLET, FILM COATED ORAL 2 TIMES DAILY
Qty: 20 TAB | Refills: 0 | Status: SHIPPED | OUTPATIENT
Start: 2017-04-14 | End: 2017-04-24

## 2017-04-14 NOTE — CONSULTS
DATE OF SERVICE:  04/14/2017    REASON FOR CONSULTATION:  Left fourth toe wound.    HISTORY OF PRESENT ILLNESS:  The patient is a 56-year-old diabetic gentleman   who probably relates his diabetes for over 40 years.  He has actually been a   relatively healthy patient, taking great care to maintain good diabetic   control.  He relates the demise of his systemic situation with the development   of an ophthalmic condition.    His left fourth toe wound began while wearing sneakers and having to be on his   feet for an extended period of time at the airport in [place].  The wound   developed and there was question at one point of osteomyelitis.  Recently, he   had an x-ray of his foot and although it shows no obvious bony destruction,   there is some osteolysis on the lateral aspect of the phalanx.    PAST MEDICAL HISTORY:  1.  Type 2 diabetes.  2.  Diabetic neuropathy.  3.  Diabetic retinopathy.  4.  Diabetic nephropathy.  5.  Hypertension.  6.  Hyperlipidemia.  7.  Nephrolithiasis.  8.  History of shingles.    MEDICATIONS:  1.  Augmentin.  2.  Aspirin 81 mg.  3.  Zithromax p.r.n.  4.  Simvastatin 40 mg in the evening.  5.  Cozaar 50 mg daily.  6.  Metoprolol 25 mg daily.  7.  Zovirax 200 mg daily.  8.  Cozaar 50 mg daily.  9.  Sliding scale insulin.    ALLERGIES:  None known.    PAST SURGICAL HISTORY:  1.  Eye surgery.  2.  Appendectomy.  3.  Knee arthroscopy.  He apparently needs a total knee.  4.  Tonsillectomy.    SOCIAL HISTORY:  He is single.  No children.  Denies the use of tobacco and   has no alcohol abuse.  He does chew tobacco occasionally and uses marijuana   especially when he was having eye surgery.    FAMILY HISTORY:  Noncontributory.    REVIEW OF SYSTEMS:  A 14-point review of systems was performed and is negative   other than the pain associated with his fourth toe wound.    PHYSICAL EXAMINATION:  GENERAL:  This is a healthy-appearing middle-aged gentleman in no apparent   distress.  HEENT:  Pupils  are equal, round and reactive to light.  Extraocular muscles   intact.  NECK:  Supple.  LUNGS:  Clear.  HEART:  Regular rate and rhythm without murmur.  ABDOMEN:  Soft and benign.  EXTREMITIES:  He has palpable femoral pulses, weakly palpable popliteal pulses   and no palpable tibial pulses in either lower extremity.    Arterial duplex imaging was performed and demonstrates occlusion of the distal   right anterior tibial artery with flow through the right posterior and   peroneal artery, but on the left, there is only peroneal artery perfusion and   apparent occlusion in both the distal anterior and posterior tibial arteries.    IMPRESSION:  The patient is a 56-year-old gentleman with apparent tibial   artery disease.  The pattern is completely consistent with that seen routinely   in diabetics.  I recommend angiography for diagnosis of the severity of   disease and to see if wound healing is possible.  Obviously, if there is an   intervention to be performed will be done at the same visit.  The patient also   relates stage II renal insufficiency and his last labs show a creatinine of   1.5.  More diagnostic images can be performed with direct angiography limited   and dilute contrast along with possible treatment if indicated, compared to a   less invasive study like a CTA.    PLAN:  Left lower extremity angiogram with possible intervention for tibial   artery occlusive disease and to assess wound healing potential.  I explained   to the patient how I would plan to perform the procedure from the right   femoral approach, eventually winding up with contralateral treatment in an   antegrade fashion.       ____________________________________     MD LORRAINE Perdomo / EUGENIO    DD:  04/14/2017 11:12:04  DT:  04/14/2017 16:37:12    D#:  [id number]  Job#:  [id number]    cc: Eliza Ward DO

## 2017-04-14 NOTE — WOUND TEAM
"Advanced Wound Care  [facility]  [address]  [address]  [place] [address]  [phone] Fax: [phone]    30 Day Certification Summary  For Certification Period: 4/17/17 - 5/17/17    Referring Physician: Sharon Lopez PA-C ([facility])  Primary Physician:      Eliza Kan DO  Consulting Physicians:         Wound(s): L foot lateral digit 4  Start of Care: 3/17/17       Subjective:        HPI: 56 year old male referred by [facility] practitioner for treatment of non-healing left foot digit #4 wound. [place]        Pain:  Moderate pain with palpation of wound depth.    Past Medical History:  Diabetes type I, Neuropathy, HTN, Diabetic retinopathy- both eyes    Current Medications: No recent changes.    Allergies: Review of patient's allergies indicates no known allergies.     Objective:      Tests and Measures:  4/14/17 - C&S taken for s/s of infection   3/30/17: aterial duplex/imaging results: \"Increasing calcification of the arteries bilaterally from the common    femoral through the popliteal artery with no focal stenosis or occlusion.     Normal velocities & triphasic waveforms in these arteries.   The tibial arteries are heavily calcified bilaterally.   No flow is observed in the right distal anterior tibial artery by color &    spectral Doppler, it appears to be occluded.   No flow is observed in both the left distal anterior tibial  and posterior    tibial arteries by color & spectral Doppler, they appears to be occluded.\"    Fall Risk Assessment (tierney all that apply with an X):3/17/17: (+) fall risk     Orthotic, protective, supportive devices: off-loading boot     Wound Characteristics                                                    Location:  L foot lateral digit 4 Initial Evaluation  Date:3/17/17 Encounter  Date: 3/29/17 30 Day Summary:  4/14/17   Tissue Type and %: 75%pink; 25%yellow 50% pale " red, 50% pale moist pink 20% red and 80% tan/yellow adherent softened eschar   Periwound: callus callus Edema, erythema, dry skin   Drainage: KATIANA(no dressing) Minimal SS Minimal ss   Exposed structures None None visible or palpable None palpable   Wound Edges:   Attached Open Open   Odor: None None None   S&S of Infection:   None erythema Erythema   Edema: None Localized Localized   Sensation: LOPS, pain with pressure to 4,5 digits and wound area impaired Slightly diminished, does respond to sharp pain                Measurements:  L foot lateral digit 4 Initial Evaluation  Date:3/17/17 Encounter Date:  4/7/17 (please take photo and measure next visit)   Length (cm) 0.7 1   Width (cm) 0.5 1   Depth (cm) UA UA   Area (cm2) 0.35 1 cm2   Tract/undermine none Unable to determine        Procedures:     Debridement: CSWD with scalpel, forceps and scissors to remove non viable tissue performed by Viky CHAPA   Cleansed with: NS                                                                 Periwound protected with: Cavilon skin prep, zinc barrier paste   Primary dressing: Medihoney colloid   Secondary Dressing: non-adhesive foam secured with hypafix   Other:      Patient Education: Pt instructed in wound care POC, dressing selection rationale and maintenance. Reviewed importance of tight glucose control, implications of loss of protective sensation, including increased risk for amputation. Instructed to inspect both feet at least daily. Instructed in importance of offloading foot in order for wound to heal. Instructed pt to keep dressing dry until next visit. Pt verbalized understanding.     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 Pt. Verbalized signs and symptoms of infection, including erythema, induration, increased pain, increased drainage and fever and to go to ER if any of these occur.    Professional Collaboration: Viky CHAPA in to speak about xray and  debride wound bed. Abx were ordered and a culture was taken due to s/s of infection. Dr. Richard also in to speak about vascular studies and schedule an angiogram.     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 Wound area, kenney-wound edema and erythema increasing;     Rationale for Treatment:Honey colloid to allow rapid epithelialization at this phase of wound healing, maintain moist wound bed, to lower pH for wound healing and to facilitate autolytic debridement.    Patient tolerance/compliance: Pt verbalized understanding of initial instructions and education; consented to POC    Complicating factors: DM, HTN, neuropathy    Need for ongoing Advanced Wound Care services:Pt requires continued skilled wound care for compression bandaging, sharp debridement prn, dressing management and application, patient education, and clinical observation       Plan:      Treatment Plan and Recommendations:  Diagnosis/ICD10: E11.69; L08.9    Procedures/CPT:selective, non-selective debridement    Frequency: 2x/week: pt unable to arrange transportation>1x/week.      Treatment Goals: STG 2 Weeks  LTG 4 Weeks   Granulation Tissue: 100% %   Decrease Necrotic Tissue to: 0% %   Wound Phase:  prolif    Decrease Size by: 25% 50%   Periwound:  intact    Decrease tracts/undermining by: % %   Decrease Pain:          At the time of each visit a thorough assessment of the patient is completed to assure the  appropriateness of our plan of care.  The dressings or modalities may need to be adapted   from the original plan to address any significant changes in the wound environment.          Clinician Signature:______    Physician Signature:______________________________Date:__________________    ''''

## 2017-04-16 LAB
BACTERIA WND AEROBE CULT: ABNORMAL
BACTERIA WND AEROBE CULT: ABNORMAL
GRAM STN SPEC: ABNORMAL
SIGNIFICANT IND 70042: ABNORMAL
SITE SITE: ABNORMAL
SOURCE SOURCE: ABNORMAL

## 2017-04-18 ENCOUNTER — NON-PROVIDER VISIT (OUTPATIENT)
Dept: WOUND CARE | Facility: MEDICAL CENTER | Age: 57
End: 2017-04-18
Attending: EMERGENCY MEDICINE
Payer: MEDICARE

## 2017-04-18 PROCEDURE — 97602 WOUND(S) CARE NON-SELECTIVE: CPT

## 2017-04-18 PROCEDURE — A6402 STERILE GAUZE <= 16 SQ IN: HCPCS

## 2017-04-18 PROCEDURE — 303972 HCHG HYPAFIX RET DRST 18SQ PC"

## 2017-04-18 NOTE — WOUND TEAM
"Advanced Wound Care  [facility]  [address]  [address]  [place] [address]  [phone] Fax: [phone]    Encounter note  For Certification Period: 4/17/17 - 5/17/17    Referring Physician: Sharon Lopez PA-C ([facility])  Primary Physician:      Eilza Kan DO  Consulting Physicians:         Wound(s): L foot lateral digit 4  Start of Care: 3/17/17       Subjective:        HPI: 56 year old male referred by [facility] practitioner for treatment of non-healing left foot digit #4 wound. Patient believes [place].        Pain:  Mild pain with palpation of wound depth.    Past Medical History:  Diabetes type I, Neuropathy, HTN, Diabetic retinopathy- both eyes    Current Medications: No recent changes.    Allergies: Review of patient's allergies indicates no known allergies.     Objective:      Tests and Measures:  4/14/17 - C&S taken for s/s of infection, positive for E. Faecalis heavy growth   3/30/17: aterial duplex/imaging results: \"Increasing calcification of the arteries bilaterally from the common    femoral through the popliteal artery with no focal stenosis or occlusion.     Normal velocities & triphasic waveforms in these arteries.   The tibial arteries are heavily calcified bilaterally.   No flow is observed in the right distal anterior tibial artery by color &    spectral Doppler, it appears to be occluded.   No flow is observed in both the left distal anterior tibial  and posterior    tibial arteries by color & spectral Doppler, they appears to be occluded.\"    Fall Risk Assessment (tierney all that apply with an X):3/17/17: (+) fall risk     Orthotic, protective, supportive devices: off-loading boot     Wound Characteristics                                                    Location:  L foot lateral digit 4 Initial Evaluation  Date:3/17/17 30 Day Summary:  4/14/17 Encounter note  Date: 04/18/2017   Tissue Type and " %: 75%pink; 25%yellow 20% red and 80% tan/yellow adherent softened eschar 90% yellow adherent thick slough, 10% red at perimeter   Periwound: callus Edema, erythema, dry skin Edema, erythema, maceration   Drainage: KATIANA(no dressing) Minimal ss Minimal ss   Exposed structures None None palpable None palpable, katiana wound bed   Wound Edges:   Attached Open open   Odor: None None none   S&S of Infection:   None Erythema Erythema, edema, positive wound culture   Edema: None Localized localized   Sensation: LOPS, pain with pressure to 4,5 digits and wound area Slightly diminished, does respond to sharp pain  Slightly diminished               Measurements:  L foot lateral digit 4 Initial Evaluation  Date:3/17/17 Encounter Date:  4/7/17 (please take photo and measure next visit) Encounter note  Date: 04/18/2017   Length (cm) 0.7 1 0.7   Width (cm) 0.5 1 1.3   Depth (cm) UA UA 0.3   Area (cm2) 0.35 1 cm2 0.91 cm2   Tract/undermine None Unable to determine KATIANA        Procedures:     Debridement: non selective with cotton tip applicator   Cleansed with: NS to wound, no rinse foam cleanser                                                                 Periwound protected with: Cavilon skin prep, zinc barrier paste   Primary dressing: aquacel hydrofiber (per Mable's instructions)   Secondary Dressing: non-adhesive foam secured with hypafix   Other: none     Patient Education: Discussed POC, wound care rationale, dressing selection and to return to C twice weekly for appts. Pt instructed to keep dressing CDI and to return to ER for any s/s infection, n/v, fever or chills. Pt instructed to go to Dr. Richard to schedule angiogram today. Pt verbalized understanding to all.     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     Professional Collaboration: Viky Jones APRN in to speak about wound culture, pt is currently on oral Augmentin, APRN will assess sensitivities and appropriateness of  current abx course, instructed to place antimicrobial dressing    Assessment:      Wound etiology: neuropathic    Wound Progress: no improvement in wound, no CSWD due to poor vascular status     Rationale for Treatment: AqAg to manage bioburden, absorb exudate, and maintain moist wound environment without laterally wicking exudate therefore reducing kenney-wound maceration    Patient tolerance/compliance: Pt verbalized understanding of initial instructions and education; consented to POC    Complicating factors: DM, HTN, neuropathy    Need for ongoing Advanced Wound Care services:Pt requires continued skilled wound care for compression bandaging, sharp debridement prn, dressing management and application, patient education, and clinical observation       Plan:      Treatment Plan and Recommendations:  Diagnosis/ICD10: E11.69; L08.9    Procedures/CPT:selective, non-selective debridement    Frequency: 2x/week: pt unable to arrange transportation>1x/week.      Treatment Goals: STG 2 Weeks  LTG 4 Weeks   Granulation Tissue: 100% %   Decrease Necrotic Tissue to: 0% %   Wound Phase:  prolif    Decrease Size by: 25% 50%   Periwound:  intact    Decrease tracts/undermining by: % %   Decrease Pain:          At the time of each visit a thorough assessment of the patient is completed to assure the  appropriateness of our plan of care.  The dressings or modalities may need to be adapted   from the original plan to address any significant changes in the wound environment.          Clinician Signature:______    Physician Signature:______________________________Date:__________________    ''''

## 2017-04-21 ENCOUNTER — HOSPITAL ENCOUNTER (OUTPATIENT)
Facility: MEDICAL CENTER | Age: 57
End: 2017-04-21
Attending: SURGERY | Admitting: SURGERY
Payer: MEDICARE

## 2017-04-21 ENCOUNTER — APPOINTMENT (OUTPATIENT)
Dept: RADIOLOGY | Facility: MEDICAL CENTER | Age: 57
End: 2017-04-21
Attending: SURGERY
Payer: MEDICARE

## 2017-04-21 VITALS
SYSTOLIC BLOOD PRESSURE: 114 MMHG | HEIGHT: 68 IN | DIASTOLIC BLOOD PRESSURE: 62 MMHG | OXYGEN SATURATION: 96 % | TEMPERATURE: 97.2 F | RESPIRATION RATE: 14 BRPM | WEIGHT: 157 LBS | BODY MASS INDEX: 23.79 KG/M2 | HEART RATE: 75 BPM

## 2017-04-21 DIAGNOSIS — I73.9 PERIPHERAL VASCULAR DISEASE, UNSPECIFIED (HCC): ICD-10-CM

## 2017-04-21 DIAGNOSIS — E11.9 DIABETES MELLITUS WITHOUT COMPLICATION (HCC): ICD-10-CM

## 2017-04-21 LAB
ANION GAP SERPL CALC-SCNC: 7 MMOL/L (ref 0–11.9)
BASOPHILS # BLD AUTO: 0.5 % (ref 0–1.8)
BASOPHILS # BLD: 0.05 K/UL (ref 0–0.12)
BUN SERPL-MCNC: 29 MG/DL (ref 8–22)
CALCIUM SERPL-MCNC: 9.8 MG/DL (ref 8.5–10.5)
CHLORIDE SERPL-SCNC: 101 MMOL/L (ref 96–112)
CO2 SERPL-SCNC: 25 MMOL/L (ref 20–33)
CREAT SERPL-MCNC: 1.81 MG/DL (ref 0.5–1.4)
EKG IMPRESSION: NORMAL
EOSINOPHIL # BLD AUTO: 0.19 K/UL (ref 0–0.51)
EOSINOPHIL NFR BLD: 1.7 % (ref 0–6.9)
ERYTHROCYTE [DISTWIDTH] IN BLOOD BY AUTOMATED COUNT: 41.4 FL (ref 35.9–50)
GFR SERPL CREATININE-BSD FRML MDRD: 39 ML/MIN/1.73 M 2
GLUCOSE SERPL-MCNC: 233 MG/DL (ref 65–99)
HCT VFR BLD AUTO: 37.5 % (ref 42–52)
HGB BLD-MCNC: 12.8 G/DL (ref 14–18)
IMM GRANULOCYTES # BLD AUTO: 0.04 K/UL (ref 0–0.11)
IMM GRANULOCYTES NFR BLD AUTO: 0.4 % (ref 0–0.9)
LYMPHOCYTES # BLD AUTO: 1.3 K/UL (ref 1–4.8)
LYMPHOCYTES NFR BLD: 11.7 % (ref 22–41)
MCH RBC QN AUTO: 30.4 PG (ref 27–33)
MCHC RBC AUTO-ENTMCNC: 34.1 G/DL (ref 33.7–35.3)
MCV RBC AUTO: 89.1 FL (ref 81.4–97.8)
MONOCYTES # BLD AUTO: 0.7 K/UL (ref 0–0.85)
MONOCYTES NFR BLD AUTO: 6.3 % (ref 0–13.4)
NEUTROPHILS # BLD AUTO: 8.81 K/UL (ref 1.82–7.42)
NEUTROPHILS NFR BLD: 79.4 % (ref 44–72)
NRBC # BLD AUTO: 0 K/UL
NRBC BLD AUTO-RTO: 0 /100 WBC
PLATELET # BLD AUTO: 261 K/UL (ref 164–446)
PMV BLD AUTO: 9.9 FL (ref 9–12.9)
POTASSIUM SERPL-SCNC: 4.3 MMOL/L (ref 3.6–5.5)
RBC # BLD AUTO: 4.21 M/UL (ref 4.7–6.1)
SODIUM SERPL-SCNC: 133 MMOL/L (ref 135–145)
WBC # BLD AUTO: 11.1 K/UL (ref 4.8–10.8)

## 2017-04-21 PROCEDURE — 37228: CPT

## 2017-04-21 PROCEDURE — 700111 HCHG RX REV CODE 636 W/ 250 OVERRIDE (IP)

## 2017-04-21 PROCEDURE — 76937 US GUIDE VASCULAR ACCESS: CPT

## 2017-04-21 PROCEDURE — 80048 BASIC METABOLIC PNL TOTAL CA: CPT

## 2017-04-21 PROCEDURE — C1760 CLOSURE DEV, VASC: HCPCS

## 2017-04-21 PROCEDURE — 85025 COMPLETE CBC W/AUTO DIFF WBC: CPT

## 2017-04-21 PROCEDURE — 700102 HCHG RX REV CODE 250 W/ 637 OVERRIDE(OP)

## 2017-04-21 PROCEDURE — A9270 NON-COVERED ITEM OR SERVICE: HCPCS

## 2017-04-21 PROCEDURE — 700111 HCHG RX REV CODE 636 W/ 250 OVERRIDE (IP): Performed by: SURGERY

## 2017-04-21 PROCEDURE — 93010 ELECTROCARDIOGRAM REPORT: CPT | Performed by: INTERNAL MEDICINE

## 2017-04-21 PROCEDURE — 75710 ARTERY X-RAYS ARM/LEG: CPT | Mod: XU,LT

## 2017-04-21 PROCEDURE — 93005 ELECTROCARDIOGRAM TRACING: CPT | Performed by: SURGERY

## 2017-04-21 RX ORDER — SODIUM CHLORIDE 9 MG/ML
500 INJECTION, SOLUTION INTRAVENOUS PRN
Status: DISCONTINUED | OUTPATIENT
Start: 2017-04-21 | End: 2017-04-21 | Stop reason: HOSPADM

## 2017-04-21 RX ORDER — IODIXANOL 270 MG/ML
50 INJECTION, SOLUTION INTRAVASCULAR ONCE
Status: COMPLETED | OUTPATIENT
Start: 2017-04-21 | End: 2017-04-21

## 2017-04-21 RX ORDER — ONDANSETRON 2 MG/ML
4 INJECTION INTRAMUSCULAR; INTRAVENOUS EVERY 4 HOURS PRN
Status: DISCONTINUED | OUTPATIENT
Start: 2017-04-21 | End: 2017-04-21 | Stop reason: HOSPADM

## 2017-04-21 RX ORDER — MIDAZOLAM HYDROCHLORIDE 1 MG/ML
.5-2 INJECTION INTRAMUSCULAR; INTRAVENOUS PRN
Status: ACTIVE | OUTPATIENT
Start: 2017-04-21 | End: 2017-04-21

## 2017-04-21 RX ORDER — CLOPIDOGREL BISULFATE 75 MG/1
150 TABLET ORAL ONCE
Status: COMPLETED | OUTPATIENT
Start: 2017-04-21 | End: 2017-04-21

## 2017-04-21 RX ORDER — CLOPIDOGREL BISULFATE 75 MG/1
TABLET ORAL
Status: COMPLETED
Start: 2017-04-21 | End: 2017-04-21

## 2017-04-21 RX ORDER — HEPARIN SODIUM 1000 [USP'U]/ML
6000 INJECTION, SOLUTION INTRAVENOUS; SUBCUTANEOUS ONCE
Status: COMPLETED | OUTPATIENT
Start: 2017-04-21 | End: 2017-04-21

## 2017-04-21 RX ORDER — CLOPIDOGREL BISULFATE 75 MG/1
75 TABLET ORAL DAILY
Status: DISCONTINUED | OUTPATIENT
Start: 2017-04-22 | End: 2017-04-21 | Stop reason: HOSPADM

## 2017-04-21 RX ORDER — HYDROCODONE BITARTRATE AND ACETAMINOPHEN 5; 325 MG/1; MG/1
1 TABLET ORAL EVERY 4 HOURS PRN
Status: DISCONTINUED | OUTPATIENT
Start: 2017-04-21 | End: 2017-04-21 | Stop reason: HOSPADM

## 2017-04-21 RX ORDER — HEPARIN SODIUM,PORCINE 1000/ML
VIAL (ML) INJECTION
Status: COMPLETED
Start: 2017-04-21 | End: 2017-04-21

## 2017-04-21 RX ORDER — MIDAZOLAM HYDROCHLORIDE 1 MG/ML
INJECTION INTRAMUSCULAR; INTRAVENOUS
Status: COMPLETED
Start: 2017-04-21 | End: 2017-04-21

## 2017-04-21 RX ADMIN — FENTANYL CITRATE 50 MCG: 50 INJECTION, SOLUTION INTRAMUSCULAR; INTRAVENOUS at 11:50

## 2017-04-21 RX ADMIN — IODIXANOL 50 ML: 270 INJECTION, SOLUTION INTRAVASCULAR at 13:00

## 2017-04-21 RX ADMIN — NITROGLYCERIN 1 ML: 20 INJECTION INTRAVENOUS at 12:39

## 2017-04-21 RX ADMIN — HEPARIN SODIUM 6000 UNITS: 1000 INJECTION, SOLUTION INTRAVENOUS; SUBCUTANEOUS at 12:24

## 2017-04-21 RX ADMIN — CLOPIDOGREL BISULFATE 150 MG: 75 TABLET ORAL at 13:30

## 2017-04-21 RX ADMIN — SODIUM BICARBONATE 154 MEQ: 84 INJECTION, SOLUTION INTRAVENOUS at 13:30

## 2017-04-21 RX ADMIN — MIDAZOLAM 1 MG: 1 INJECTION INTRAMUSCULAR; INTRAVENOUS at 11:50

## 2017-04-21 RX ADMIN — MIDAZOLAM 1 MG: 1 INJECTION INTRAMUSCULAR; INTRAVENOUS at 11:25

## 2017-04-21 RX ADMIN — FENTANYL CITRATE 25 MCG: 50 INJECTION, SOLUTION INTRAMUSCULAR; INTRAVENOUS at 12:25

## 2017-04-21 RX ADMIN — CLOPIDOGREL 150 MG: 75 TABLET, FILM COATED ORAL at 13:30

## 2017-04-21 RX ADMIN — FENTANYL CITRATE 25 MCG: 50 INJECTION, SOLUTION INTRAMUSCULAR; INTRAVENOUS at 12:37

## 2017-04-21 ASSESSMENT — PAIN SCALES - GENERAL
PAINLEVEL_OUTOF10: 0

## 2017-04-21 NOTE — OR NURSING
1300: Patient from radiology to PPU via gurney s/p angioplasty. Patient is awake and on bedrest/flat X 3 hours. VSS. RLE CMS intact. R groin incision site soft and dressing clean, dry and intact. Vascular access care management per MD order (see assessment). No c/o pain or nausea at this time. Will monitor closely. Vital signs q 15 min x 4, q 30 min x 2 and q 1 hour per MD order.  1330: VSS. Patient tolerating PO well. R groin soft, CDI.   1420: VSS. Dr. Guan at bedside to update patient. Questions answered. Bicarb gtt infusing.   1500: VSS. Patient tolerating PO well. R groin soft, CDI.   1600: VSS. Patient is off bedrest. Patient ambulated to and from the bathroom without difficulty. R groin soft, CDI. Bicarb gtt infusing.   1700: VSS. DC instructions given. Questions answered.  R groin soft,CDI. No c/o pain or nausea. Patient wide awake. VSS. Patient met criteria for discharge.

## 2017-04-21 NOTE — IP AVS SNAPSHOT
4/21/2017    Dylon Santa  [address] [place] [address]    Dear Dylon:    [facility] wants to ensure your discharge home is safe and you or your loved ones have had all of your questions answered regarding your care after you leave the hospital.    Below is a list of resources and contact information should you have any questions regarding your hospital stay, follow-up instructions, or active medical symptoms.    Questions or Concerns Regarding… Contact   Medical Questions Related to Your Discharge  (7 days a week, 8am-5pm) Contact a Nurse Care Coordinator   [phone]   Medical Questions Not Related to Your Discharge  (24 hours a day / 7 days a week)  Contact the Nurse Health Line   [phone]    Medications or Discharge Instructions Refer to your discharge packet   or contact your [facility] Primary Care Provider   [phone]   Follow-up Appointment(s) Schedule your appointment via [employer]   or contact Scheduling [phone]   Billing Review your statement via [employer]  or contact Billing [phone]   Medical Records Review your records via [employer]   or contact Medical Records [phone]     You may receive a telephone call within two days of discharge. This call is to make certain you understand your discharge instructions and have the opportunity to have any questions answered. You can also easily access your medical information, test results and upcoming appointments via the [employer] free online health management tool. You can learn more and sign up at [email or web address]. For assistance setting up your [employer] account, please call [phone].    Once again, we want to ensure your discharge home is safe and that you have a clear understanding of any next steps in your care. If you have any questions or concerns, please do not hesitate to contact us, we are here for you. Thank you for choosing [facility] for your healthcare needs.    Sincerely,    Your [facility] Healthcare Team

## 2017-04-21 NOTE — OR SURGEON
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

## 2017-04-21 NOTE — DISCHARGE INSTRUCTIONS
ACTIVITY: Rest and take it easy for the first 24 hours.  A responsible adult is recommended to remain with you during that time.  It is normal to feel sleepy.  We encourage you to not do anything that requires balance, judgment or coordination.    MILD FLU-LIKE SYMPTOMS ARE NORMAL. YOU MAY EXPERIENCE GENERALIZED MUSCLE ACHES, THROAT IRRITATION, HEADACHE AND/OR SOME NAUSEA.    FOR 24 HOURS DO NOT:  Drive, operate machinery or run household appliances.  Drink beer or alcoholic beverages.   Make important decisions or sign legal documents.      DIET: To avoid nausea, slowly advance diet as tolerated, avoiding spicy or greasy foods for the first day.  Add more substantial food to your diet according to your physician's instructions.  Babies can be fed formula or breast milk as soon as they are hungry.  INCREASE FLUIDS AND FIBER TO AVOID CONSTIPATION.    SURGICAL DRESSING/BATHING:     Keep the dressing clean and dry for 2 days.  May remove dressing in 2 days and shower.  Do not submerge for 1 week.  No heavy lifting.      FOLLOW-UP APPOINTMENT:  A follow-up appointment should be arranged with your doctor in 1 week; call to schedule.    You should CALL YOUR PHYSICIAN if you develop:  Fever greater than 101 degrees F.  Pain not relieved by medication, or persistent nausea or vomiting.  Excessive bleeding (blood soaking through dressing) or unexpected drainage from the wound.  Extreme redness or swelling around the incision site, drainage of pus or foul smelling drainage.  Inability to urinate or empty your bladder within 8 hours.  Problems with breathing or chest pain.    You should call 911 if you develop problems with breathing or chest pain.  If you are unable to contact your doctor or surgical center, you should go to the nearest emergency room or urgent care center.      Physician's telephone #: [phone] [phone]     If any questions arise, call your doctor.  If your doctor is not available, please feel free to call  the Surgical Center at [phone].  The Center is open Monday through Friday from 7AM to 7PM.  You can also call the HEALTH HOTLINE open 24 hours/day, 7 days/week and speak to a nurse at [phone], or toll free at [phone].    A registered nurse may call you a few days after your surgery to see how you are doing after your procedure.    MEDICATIONS: Resume taking daily medication.  Take prescribed pain medication with food.  If no medication is prescribed, you may take non-aspirin pain medication if needed.  PAIN MEDICATION CAN BE VERY CONSTIPATING.  Take a stool softener or laxative such as senokot, pericolace, or milk of magnesia if needed.      If your physician has prescribed pain medication that includes Acetaminophen (Tylenol), do not take additional Acetaminophen (Tylenol) while taking the prescribed medication.    Depression / Suicide Risk    As you are discharged from this [facility] facility, it is important to learn how to keep safe from harming yourself.    Recognize the warning signs:  · Abrupt changes in personality, positive or negative- including increase in energy   · Giving away possessions  · Change in eating patterns- significant weight changes-  positive or negative  · Change in sleeping patterns- unable to sleep or sleeping all the time   · Unwillingness or inability to communicate  · Depression  · Unusual sadness, discouragement and loneliness  · Talk of wanting to die  · Neglect of personal appearance   · Rebelliousness- reckless behavior  · Withdrawal from people/activities they love  · Confusion- inability to concentrate     If you or a loved one observes any of these behaviors or has concerns about self-harm, here's what you can do:  · Talk about it- your feelings and reasons for harming yourself  · Remove any means that you might use to hurt yourself (examples: pills, rope, extension cords, firearm)  · Get professional help from the community (Mental Health, Substance  Abuse, psychological counseling)  · Do not be alone:Call your Safe Contact- someone whom you trust who will be there for you.  · Call your local CRISIS HOTLINE 912-1762 or 197-109-1383  · Call your local Children's Mobile Crisis Response Team [place] (719) 647-8638 or www.[employer]  · Call the toll free National Suicide Prevention Hotlines   · National Suicide Prevention Lifeline 401-395-NTHH (8318)  · National Hope Line Network 800-SUICIDE (430-5266)    Angiogram, Care After  These instructions give you information about caring for yourself after your procedure. Your doctor may also give you more specific instructions. Call your doctor if you have any problems or questions after your procedure.   HOME CARE  · Take medicines only as told by your doctor.  · Follow your doctor's instructions about:  ¨ Care of the area where the tube was inserted.  ¨ Bandage (dressing) changes and removal.  · You may shower 24-48 hours after the procedure or as told by your doctor.  · Do not take baths, swim, or use a hot tub until your doctor approves.  · Every day, check the area where the tube was inserted. Watch for:  ¨ Redness, swelling, or pain.  ¨ Fluid, blood, or pus.  · Do not apply powder or lotion to the site.  · Do not lift anything that is heavier than 10 lb (4.5 kg) for 5 days or as told by your doctor.  · Ask your doctor when you can:  ¨ Return to work or school.  ¨ Do physical activities or play sports.  ¨ Have sex.  · Do not drive or operate heavy machinery for 24 hours or as told by your doctor.  · Have someone with you for the first 24 hours after the procedure.  · Keep all follow-up visits as told by your doctor. This is important.  GET HELP IF:  · You have a fever.    · You have chills.    · You have more bleeding from the area where the tube was inserted. Hold pressure on the area.  · You have redness, swelling, or pain in the area where the tube was inserted.  · You have fluid or pus coming from the  area.  GET HELP RIGHT AWAY IF:   · You have a lot of pain in the area where the tube was inserted.  · The area where the tube was inserted is bleeding, and the bleeding does not stop after 30 minutes of holding steady pressure on the area.  · The area near or just beyond the insertion site becomes pale, cool, tingly, or numb.     This information is not intended to replace advice given to you by your health care provider. Make sure you discuss any questions you have with your health care provider.     Document Released: 03/16/2010 Document Revised: 01/08/2016 Document Reviewed: 07/06/2015  Elsevier Interactive Patient Education ©2016 [employer]

## 2017-04-21 NOTE — OP REPORT
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    PROCEDURES PERFORMED:  1.  Left leg angiogram.  2.  Left anterior tibial artery angioplasty.    SURGEON:  Inessa Richard MD.    ANESTHESIA:  Conscious sedation.    INDICATIONS:  The patient is a 56-year-old gentleman who has had diabetes for   40 years.  He recently developed a wound on his fourth toe and noninvasive   studies suggest tibial artery occlusive disease.  He is taken to the angio   suite for angiography and possible treatment of occlusive disease.  I detailed   the proposed procedure in detail to him as well as the contralateral femoral   approach.  He understands and agrees to proceed.    DESCRIPTION OF PROCEDURE:  The patient was taken to the angio suite and placed   in a supine position.  After adequate conscious sedation, the groins were   prepped and draped in the usual sterile fashion with Chloraseptic prep, cloth   towels and paper drapes.  An adequate time-out was performed and access to the   right femoral artery obtained with relative ease.  Guidewire was threaded   followed by a 4-Yemeni sheath.  Omni flush catheter was used to perform a   pelvic angiogram with dilute contrast.  Glidewire was advanced into the left   external iliac and superficial femoral artery.  Using this, a left leg   angiogram was performed in stepwise fashion.  Identifying anterior tibial   artery occlusive disease, the Glidewire was advanced and the 4-Yemeni sheath   exchanged for a 5-Yemeni sheath.  I then used an angled Quick-Cross guide   catheter with a Command wire and obtained access to the anterior tibial artery   With the  0.014 wire.  I was able to, with remarkable ease crossed the distal   anterior tibial artery where apparent occlusion was seen.  The patient was   given 7000 units of  heparin and this was allowed to circulate.  I then used a   2 mm x 150 cm balloon and performed the angioplasty, repeating treatment in   the distal aspect of the artery as well as the more proximal anterior tibial   artery.  A remarkably good response to treatment was seen.  Through the   catheter in the anterior tibial artery, I gave the patient 200 mcg of   nitroglycerin.  A final angiogram showed good flow with preferred flow through   the anterior tibial artery as opposed to the peroneal artery which was seen   prior.    I then pulled the 5-Namibian sheath back into the right iliac system and   deployed a StarClose device.  The device deployed, but it appeared to catch on   soft tissue on the way out and required the emergency release button.  There   was no apparent hematoma.  The patient tolerated procedure well and had good   response to therapy.       ____________________________________     MD LORRAINE Perdomo / EUGENIO    DD:  04/21/2017 13:24:22  DT:  04/21/2017 15:57:16    D#:  [id number]  Job#:  [id number]

## 2017-04-21 NOTE — IP AVS SNAPSHOT
" Home Care Instructions                                                                                                                Name:Dylon Santa  Medical Record Number:[id number]  CSN: [id number]    YOB: 1960   Age: 56 y.o.  Sex: male  HT:1.727 m (5' 8\") WT: 71.215 kg (157 lb)          Admit Date: 4/21/2017     Discharge Date:   Today's Date: 4/21/2017  Attending Doctor:  Inessa Richard M.D.                  Allergies:  Review of patient's allergies indicates no known allergies.                Discharge Instructions         ACTIVITY: Rest and take it easy for the first 24 hours.  A responsible adult is recommended to remain with you during that time.  It is normal to feel sleepy.  We encourage you to not do anything that requires balance, judgment or coordination.    MILD FLU-LIKE SYMPTOMS ARE NORMAL. YOU MAY EXPERIENCE GENERALIZED MUSCLE ACHES, THROAT IRRITATION, HEADACHE AND/OR SOME NAUSEA.    FOR 24 HOURS DO NOT:  Drive, operate machinery or run household appliances.  Drink beer or alcoholic beverages.   Make important decisions or sign legal documents.      DIET: To avoid nausea, slowly advance diet as tolerated, avoiding spicy or greasy foods for the first day.  Add more substantial food to your diet according to your physician's instructions.  Babies can be fed formula or breast milk as soon as they are hungry.  INCREASE FLUIDS AND FIBER TO AVOID CONSTIPATION.    SURGICAL DRESSING/BATHING:     Keep the dressing clean and dry for 2 days.  May remove dressing in 2 days and shower.  Do not submerge for 1 week.  No heavy lifting.      FOLLOW-UP APPOINTMENT:  A follow-up appointment should be arranged with your doctor in 1 week; call to schedule.    You should CALL YOUR PHYSICIAN if you develop:  Fever greater than 101 degrees F.  Pain not relieved by medication, or persistent nausea or vomiting.  Excessive bleeding (blood soaking through dressing) or unexpected drainage from the " wound.  Extreme redness or swelling around the incision site, drainage of pus or foul smelling drainage.  Inability to urinate or empty your bladder within 8 hours.  Problems with breathing or chest pain.    You should call 911 if you develop problems with breathing or chest pain.  If you are unable to contact your doctor or surgical center, you should go to the nearest emergency room or urgent care center.      Physician's telephone #: CHERRY [phone]     If any questions arise, call your doctor.  If your doctor is not available, please feel free to call the Surgical Center at [phone].  The Center is open Monday through Friday from 7AM to 7PM.  You can also call the HEALTH HOTLINE open 24 hours/day, 7 days/week and speak to a nurse at [phone], or toll free at [phone].    A registered nurse may call you a few days after your surgery to see how you are doing after your procedure.    MEDICATIONS: Resume taking daily medication.  Take prescribed pain medication with food.  If no medication is prescribed, you may take non-aspirin pain medication if needed.  PAIN MEDICATION CAN BE VERY CONSTIPATING.  Take a stool softener or laxative such as senokot, pericolace, or milk of magnesia if needed.      If your physician has prescribed pain medication that includes Acetaminophen (Tylenol), do not take additional Acetaminophen (Tylenol) while taking the prescribed medication.    Depression / Suicide Risk    As you are discharged from this [facility] facility, it is important to learn how to keep safe from harming yourself.    Recognize the warning signs:  · Abrupt changes in personality, positive or negative- including increase in energy   · Giving away possessions  · Change in eating patterns- significant weight changes-  positive or negative  · Change in sleeping patterns- unable to sleep or sleeping all the time   · Unwillingness or inability to communicate  · Depression  · Unusual sadness,  discouragement and loneliness  · Talk of wanting to die  · Neglect of personal appearance   · Rebelliousness- reckless behavior  · Withdrawal from people/activities they love  · Confusion- inability to concentrate     If you or a loved one observes any of these behaviors or has concerns about self-harm, here's what you can do:  · Talk about it- your feelings and reasons for harming yourself  · Remove any means that you might use to hurt yourself (examples: pills, rope, extension cords, firearm)  · Get professional help from the community (Mental Health, Substance Abuse, psychological counseling)  · Do not be alone:Call your Safe Contact- someone whom you trust who will be there for you.  · Call your local CRISIS HOTLINE 199-3213 or 572-883-0928  · Call your local Children's Mobile Crisis Response Team [place] (306) 127-7477 or www.SAVORTEX  · Call the toll free National Suicide Prevention Hotlines   · National Suicide Prevention Lifeline 162-113-XCNH (7199)  · National Hope Line Network 800-SUICIDE (134-9315)    Angiogram, Care After  These instructions give you information about caring for yourself after your procedure. Your doctor may also give you more specific instructions. Call your doctor if you have any problems or questions after your procedure.   HOME CARE  · Take medicines only as told by your doctor.  · Follow your doctor's instructions about:  ¨ Care of the area where the tube was inserted.  ¨ Bandage (dressing) changes and removal.  · You may shower 24-48 hours after the procedure or as told by your doctor.  · Do not take baths, swim, or use a hot tub until your doctor approves.  · Every day, check the area where the tube was inserted. Watch for:  ¨ Redness, swelling, or pain.  ¨ Fluid, blood, or pus.  · Do not apply powder or lotion to the site.  · Do not lift anything that is heavier than 10 lb (4.5 kg) for 5 days or as told by your doctor.  · Ask your doctor when you can:  ¨ Return to  work or school.  ¨ Do physical activities or play sports.  ¨ Have sex.  · Do not drive or operate heavy machinery for 24 hours or as told by your doctor.  · Have someone with you for the first 24 hours after the procedure.  · Keep all follow-up visits as told by your doctor. This is important.  GET HELP IF:  · You have a fever.    · You have chills.    · You have more bleeding from the area where the tube was inserted. Hold pressure on the area.  · You have redness, swelling, or pain in the area where the tube was inserted.  · You have fluid or pus coming from the area.  GET HELP RIGHT AWAY IF:   · You have a lot of pain in the area where the tube was inserted.  · The area where the tube was inserted is bleeding, and the bleeding does not stop after 30 minutes of holding steady pressure on the area.  · The area near or just beyond the insertion site becomes pale, cool, tingly, or numb.     This information is not intended to replace advice given to you by your health care provider. Make sure you discuss any questions you have with your health care provider.     Document Released: 03/16/2010 Document Revised: 01/08/2016 Document Reviewed: 07/06/2015  [employer] Interactive Patient Education ©2016 [employer]         Medication List      CONTINUE taking these medications        Instructions    Morning Afternoon Evening Bedtime    acyclovir 200 MG Caps   Commonly known as:  ZOVIRAX        Take 1 Cap by mouth every day.   Dose:  200 mg                        amoxicillin-clavulanate 875-125 MG Tabs   Commonly known as:  AUGMENTIN        Take 1 Tab by mouth 2 times a day for 10 days.   Dose:  1 Tab                        aspirin 81 MG Chew chewable tablet   Commonly known as:  ASA        Take 81 mg by mouth every day.   Dose:  81 mg                        Blood Glucose Monitoring Suppl SUPPLIES Misc        Test Strips freestyle lite or similar per insurance Sig: testing 5 times a day                        Lancet Device  Misc        Freestyle lite or similar per insurnace, To test 5 times a day                        losartan 50 MG Tabs   Commonly known as:  COZAAR        Take 50 mg by mouth every day.   Dose:  50 mg                        metoprolol SR 25 MG Tb24   Commonly known as:  TOPROL XL        TAKE ONE TABLET BY MOUTH ONCE DAILY                        Misc. Devices Kit        Doctor's comments:  Freestyle lite or similar per insurance   Glucometer (brand as accepted by insurance). To test fasting in the AM and random in the PM.                        NOVOLIN N RELION 100 UNIT/ML Susp   Generic drug:  insulin NPH        USE AS DIRECTED PER SLIDING SCALE FOR MEAL TIME DOSING                        simvastatin 40 MG Tabs   Commonly known as:  ZOCOR        TAKE ONE TABLET BY MOUTH ONCE DAILY IN THE EVENING                                Medication Information     Above and/or attached are the medications your physician expects you to take upon discharge. Review all of your home medications and newly ordered medications with your doctor and/or pharmacist. Follow medication instructions as directed by your doctor and/or pharmacist. Please keep your medication list with you and share with your physician. Update the information when medications are discontinued, doses are changed, or new medications (including over-the-counter products) are added; and carry medication information at all times in the event of emergency situations.        Resources     Quit Smoking / Tobacco Use:    I understand the use of any tobacco products increases my chance of suffering from future heart disease or stroke and could cause other illnesses which may shorten my life. Quitting the use of tobacco products is the single most important thing I can do to improve my health. For further information on smoking / tobacco cessation call a Toll Free Quit Line at 1-854.745.8814 (*National Cancer [place]) or 1-790.321.1304 (American Lung Association) or you  can access the web based program at www.lungOdersun.org.    [place] Tobacco Users Help Line:  (890) 824-1623       Toll Free: 1-452.429.5908  Quit Tobacco Program [facility] (106)236-2323    Crisis Hotline:    [place] Crisis Hotline:  0-776-ZJBIKXM or 1-357.979.5556    [place] Crisis Hotline:    1-167.868.4930 or 381-078-5287    Discharge Survey:   Thank you for choosing [facility]. We hope we did everything we could to make your hospital stay a pleasant one. You may be receiving a survey and we would appreciate your time and participation in answering the questions. Your input is very valuable to us in our efforts to improve our service to our patients and their families.            Signatures     My signature on this form indicates that:    1. I acknowledge receipt and understanding of these Home Care Instruction.  2. My questions regarding this information have been answered to my satisfaction.  3. I have formulated a plan with my discharge nurse to obtain my prescribed medications for home.    __________________________________      __________________________________                   Patient Signature                                 Guardian/Responsible Adult Signature      __________________________________                 __________       ________                       Nurse Signature                                               Date                 Time

## 2017-04-21 NOTE — PROGRESS NOTES
Pt underwent a left leg angiogram with angioplasty performed by Dr Richard. Pt remained hemodynamically stable throughout the procedure. No adverse reaction noted. Starclose placed to right femoral access site. Bandage to right groin procedure site cdi; soft. Report to BILL Piedra. Pt taken back to [facility] in stable condition.     Starclose SE Vascular Closure System- lot# [id number], ref# [id number]

## 2017-04-24 NOTE — WOUND TEAM
"Advanced Wound Care  [facility]  [address]  [address]  [place] [address]  [phone] Fax: [phone]    Encounter note  For Certification Period: 4/17/17 - 5/17/17    Referring Physician: Sharon Lopez PA-C ([facility])  Primary Physician:      Eliza Kan DO  Consulting Physicians:         Wound(s): L foot lateral digit 4  Start of Care: 3/17/17       Subjective: \"I am tired after the surgery.\"        HPI: 56 year old male referred by [facility] practitioner for treatment of non-healing left foot digit #4 wound. Patient believes [place].        Pain:  No number given, \" a lot of pain.\"    Past Medical History:  Diabetes type I, Neuropathy, HTN, Diabetic retinopathy- both eyes    Current Medications: No recent changes.    Allergies: Review of patient's allergies indicates no known allergies.     Objective:      Tests and Measures:4/21/2017 Dr. Richard's note post angiogram of LLE \"A remarkably good response to treatment was seen.  Through the    catheter in the anterior tibial artery, I gave the patient 200 mcg of    nitroglycerin.  A final angiogram showed good flow with preferred flow through   the anterior tibial artery as opposed to the peroneal artery which was seen    Prior.\" doppler pulses to DP/PT biphasic per Dr. Richard    4/14/17 - C&S taken for s/s of infection, positive for E. Faecalis heavy growth   3/30/17: aterial duplex/imaging results: \"Increasing calcification of the arteries bilaterally from the common    femoral through the popliteal artery with no focal stenosis or occlusion.     Normal velocities & triphasic waveforms in these arteries.   The tibial arteries are heavily calcified bilaterally.   No flow is observed in the right distal anterior tibial artery by color &    spectral Doppler, it appears to be occluded.   No flow is observed in both the left distal anterior tibial  and posterior " "   tibial arteries by color & spectral Doppler, they appears to be occluded.\"    Fall Risk Assessment (tierney all that apply with an X):3/17/17: (+) fall risk     Orthotic, protective, supportive devices: off-loading boot     Wound Characteristics                                                    Location:  L foot lateral digit 4 Initial Evaluation  Date:3/17/17 Encounter note  Date: 04/25/2017   Tissue Type and %: 75%pink; 25%yellow 90% yellow adherent thick slough, 10% red at perimeter   Periwound: callus Edema, erythema, maceration   Drainage: KATIANA(no dressing) Minimal ss   Exposed structures None None palpable, katiana wound bed   Wound Edges:   Attached open   Odor: None none   S&S of Infection:   None Erythema, edema, positive wound culture   Edema: None localized   Sensation: LOPS, pain with pressure to 4,5 digits and wound area Slightly diminished               Measurements:  L foot lateral digit 4 Initial Evaluation  Date:3/17/17 Encounter Date:  4/7/17 (please take photo and measure next visit) Encounter note  Date: 04/18/2017   Length (cm) 0.7 1 0.7   Width (cm) 0.5 1 1.3   Depth (cm) UA UA 0.3   Area (cm2) 0.35 1 cm2 0.91 cm2   Tract/undermine None Unable to determine KATIANA        Procedures:  Per Dr. Richard: Judicious sharps debridement is OK.\"   Debridement: CSWD with scissors and forceps to remove approx 1 cm2 of loose yellow necrotic tissue to wound bed and loose soft periwound callus.    Cleansed with: NS, gauze                                                               Periwound protected with: Cavilon skin prep, zinc barrier paste   Primary dressing: aquacel hydrofiber (per Mable's instructions)   Secondary Dressing: non-adhesive foam secured with hypafix   Other: none     Patient Education: Instructed pt on results of angiogram and that Dr. Richard contacted to confirm that pt is now appropriate for CSWD, also regarding petechiae/rash to LLE; to return twice weekly for appointments. Pt stated BS " "yesterday 64, he lost consciousness, felt better after ingesting sugar . Instructed him to contact PCP  Discussed POC, wound care rationale, dressing selection and to return to [facility] twice weekly for appts. Pt instructed to keep dressing CDI and to return to ER for any s/s infection, n/v, fever or chills. Pt instructed to go to Dr. Richard to schedule angiogram today. Pt verbalized understanding to all.     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     Professional Collaboration: Dr. Richard at bedside. Discussed rash to left dorsal foot and calf, she believes it is due to increased blood flow. Dr. Richard discussed results of angiogram and improved blood flow, doppler pulses that are biphasic. Stated \"Judicious sharps debridment is OK now.\" Instructed pt that his toe is still \"at risk.\" JACKY Valencia, at bedside. Instructed pt to contact PCP for possible adjustment of diabetic meds following episode of BS 64 and loss of consciousness.   Assessment:      Wound etiology: neuropathic    Wound Progress: vascular status improved following angiogram, \"judicious\" debridement OK per Dr. Richard.      Rationale for Treatment: AqAg to manage bioburden, absorb exudate, and maintain moist wound environment without laterally wicking exudate therefore reducing kenney-wound maceration    Patient tolerance/compliance: Pt verbalized understanding of initial instructions and education; consented to POC    Complicating factors: DM, HTN, neuropathy    Need for ongoing Advanced Wound Care services:Pt requires continued skilled wound care for compression bandaging, sharp debridement prn, dressing management and application, patient education, and clinical observation       Plan:      Treatment Plan and Recommendations:  Diagnosis/ICD10: E11.69; L08.9    Procedures/CPT:selective, non-selective debridement    Frequency: 2x/week: pt unable to arrange transportation>1x/week.      Treatment " Goals: STG 2 Weeks  LTG 4 Weeks   Granulation Tissue: 100% %   Decrease Necrotic Tissue to: 0% %   Wound Phase:  prolif    Decrease Size by: 25% 50%   Periwound:  intact    Decrease tracts/undermining by: % %   Decrease Pain:          At the time of each visit a thorough assessment of the patient is completed to assure the  appropriateness of our plan of care.  The dressings or modalities may need to be adapted   from the original plan to address any significant changes in the wound environment.          Clinician Signature:______    Physician Signature:______________________________Date:__________________    ''''

## 2017-04-25 ENCOUNTER — NON-PROVIDER VISIT (OUTPATIENT)
Dept: WOUND CARE | Facility: MEDICAL CENTER | Age: 57
End: 2017-04-25
Attending: EMERGENCY MEDICINE
Payer: MEDICARE

## 2017-04-25 PROCEDURE — 303972 HCHG HYPAFIX RET DRST 18SQ PC"

## 2017-04-25 PROCEDURE — 97597 DBRDMT OPN WND 1ST 20 CM/<: CPT

## 2017-04-28 ENCOUNTER — NON-PROVIDER VISIT (OUTPATIENT)
Dept: WOUND CARE | Facility: MEDICAL CENTER | Age: 57
End: 2017-04-28
Attending: EMERGENCY MEDICINE
Payer: MEDICARE

## 2017-04-28 ENCOUNTER — TELEPHONE (OUTPATIENT)
Dept: MEDICAL GROUP | Facility: PHYSICIAN GROUP | Age: 57
End: 2017-04-28
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
## 2017-04-28 DIAGNOSIS — R68.83 CHILLS: ICD-10-CM

## 2017-04-28 DIAGNOSIS — R53.83 LETHARGY: ICD-10-CM

## 2017-04-28 PROCEDURE — A6402 STERILE GAUZE <= 16 SQ IN: HCPCS

## 2017-04-28 PROCEDURE — 97597 DBRDMT OPN WND 1ST 20 CM/<: CPT

## 2017-04-28 NOTE — TELEPHONE ENCOUNTER
1. Caller Name: Dylon                                          Call Back Number: [phone]       Patient approves a detailed voicemail message: yes    Patient called stating that he was bitten by mosquitos and has developed chills, sweats and feeling lethargic since the 24th. Patient would like to know if there is a possibility to order a blood test to check for the west nile virus.    Please advise.

## 2017-04-28 NOTE — TELEPHONE ENCOUNTER
Please call patient and advised him:  For West Nile is not typically endemic in our area. I have ordered labs to check a CBC which we'll check for any infections as well as the West Nile antibody. If you continue to have symptoms I do recommend a follow-up appointment

## 2017-04-28 NOTE — WOUND TEAM
"Advanced Wound Care  [facility]  [address]  [address]  [place] [address]  [phone] Fax: [phone]    Encounter note  For Certification Period: 4/17/17 - 5/17/17    Referring Physician: Sharon Lopez PA-C ([facility])  Primary Physician:      Eliza Kan DO  Consulting Physicians:         Wound(s): L foot lateral digit 4  Start of Care: 3/17/17       Subjective: \"I am tired after the surgery.\"        HPI: 56 year old male referred by [facility] practitioner for treatment of non-healing left foot digit #4 wound. Patient [place].        Pain:  No number given, \" a lot of pain.\"    Past Medical History:  Diabetes type I, Neuropathy, HTN, Diabetic retinopathy- both eyes    Current Medications: No recent changes.    Allergies: Review of patient's allergies indicates no known allergies.     Objective:      Tests and Measures:4/21/2017 Dr. Richard's note post angiogram of LLE \"A remarkably good response to treatment was seen.  Through the    catheter in the anterior tibial artery, I gave the patient 200 mcg of    nitroglycerin.  A final angiogram showed good flow with preferred flow through   the anterior tibial artery as opposed to the peroneal artery which was seen    Prior.\" doppler pulses to DP/PT biphasic per Dr. Richard    4/14/17 - C&S taken for s/s of infection, positive for E. Faecalis heavy growth   3/30/17: aterial duplex/imaging results: \"Increasing calcification of the arteries bilaterally from the common    femoral through the popliteal artery with no focal stenosis or occlusion.     Normal velocities & triphasic waveforms in these arteries.   The tibial arteries are heavily calcified bilaterally.   No flow is observed in the right distal anterior tibial artery by color &    spectral Doppler, it appears to be occluded.   No flow is observed in both the left distal anterior tibial  and posterior " "   tibial arteries by color & spectral Doppler, they appears to be occluded.\"    Fall Risk Assessment (tierney all that apply with an X):3/17/17: (+) fall risk     Orthotic, protective, supportive devices: off-loading boot     Wound Characteristics                                                    Location:  L foot lateral digit 4 Initial Evaluation  Date:3/17/17 Encounter note  Date: 04/28/2017   Tissue Type and %: 75%pink; 25%yellow 90% red/tan yellow adherent thick slough, 10% moist red sub optimal   Periwound: callus Edema, erythema, maceration   Drainage: KATIANA(no dressing) Minimal ss   Exposed structures None None determined   Wound Edges:   Attached Open, detached   Odor: None none   S&S of Infection:   None Erythema, edema, positive wound culture   Edema: None localized   Sensation: LOPS, pain with pressure to 4,5 digits and wound area Slightly diminished               Measurements:  L foot lateral digit 4 Initial Evaluation  Date:3/17/17 Encounter Date:  4/7/17 (please take photo and measure next visit) Encounter note  Date: 04/28/2017   Length (cm) 0.7 1 1.8   Width (cm) 0.5 1 1.5   Depth (cm) UA UA UA   Area (cm2) 0.35 1 cm2 2.7 cm2   Tract/undermine None Unable to determine 0.8cm@3:00        Procedures:  Per Dr. Richard: Judicious sharps debridement is OK.\"   Debridement: CSWD with scalpel, scissors and forceps to remove approx 1 cm2 of loose yellow necrotic and non-viable tissue and lifting soft periwound callus.    Cleansed with: NS, gauze                                                               Periwound protected with: Cavilon skin prep   Primary dressing: aquacel hydrofiber    Secondary Dressing: non-adhesive foam secured with hypafix   Other: none     Patient Education:  4/24/17 Instructed pt on results of angiogram and that Dr. Richard contacted to confirm that pt is now appropriate for CSWD, also regarding petechiae/rash to LLE; to return twice weekly for appointments. Pt stated BS yesterday " "64, he lost consciousness, felt better after ingesting sugar . Instructed him to contact PCP  Discussed POC, wound care rationale, dressing selection and to return to [facility] twice weekly for appts. Pt instructed to keep dressing CDI and to return to ER for any s/s infection, n/v, fever or chills. Pt instructed to go to Dr. Richard to schedule angiogram today. Pt verbalized understanding to all.     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     Professional Collaboration:   4/28/17:  Dr. Richard re concern for poor tissue quality; requesting practitioner re-assessment, poss. debridement. Wound edges detached, tract @3:00  Dr. Richard at bedside. Discussed rash to left dorsal foot and calf, she believes it is due to increased blood flow. Dr. Richard discussed results of angiogram and improved blood flow, doppler pulses that are biphasic. Stated \"Judicious sharps debridment is OK now.\" Instructed pt that his toe is still \"at risk.\" JACKY Valencia, at bedside. Instructed pt to contact PCP for possible adjustment of diabetic meds following episode of BS 64 and loss of consciousness.   Assessment:      Wound etiology: neuropathic    Wound Progress: vascular status improved following angiogram, \"judicious\" debridement OK per Dr. Richard.      Rationale for Treatment: AqAg to manage bioburden, absorb exudate, and maintain moist wound environment without laterally wicking exudate therefore reducing kenney-wound maceration    Patient tolerance/compliance: Pt verbalized understanding of initial instructions and education; consented to POC    Complicating factors: DM, HTN, neuropathy    Need for ongoing Advanced Wound Care services:Pt requires continued skilled wound care for compression bandaging, sharp debridement prn, dressing management and application, patient education, and clinical observation       Plan:      Treatment Plan and Recommendations:  Diagnosis/ICD10: E11.69; " L08.9    Procedures/CPT:selective, non-selective debridement    Frequency: 2x/week: pt unable to arrange transportation>1x/week.      Treatment Goals: STG 2 Weeks  LTG 4 Weeks   Granulation Tissue: 100% %   Decrease Necrotic Tissue to: 0% %   Wound Phase:  prolif    Decrease Size by: 25% 50%   Periwound:  intact    Decrease tracts/undermining by: % %   Decrease Pain:          At the time of each visit a thorough assessment of the patient is completed to assure the  appropriateness of our plan of care.  The dressings or modalities may need to be adapted   from the original plan to address any significant changes in the wound environment.

## 2017-05-02 ENCOUNTER — RESOLUTE PROFESSIONAL BILLING HOSPITAL PROF FEE (OUTPATIENT)
Dept: HOSPITALIST | Facility: MEDICAL CENTER | Age: 57
End: 2017-05-02
Payer: MEDICARE

## 2017-05-02 ENCOUNTER — APPOINTMENT (OUTPATIENT)
Dept: RADIOLOGY | Facility: MEDICAL CENTER | Age: 57
DRG: 854 | End: 2017-05-02
Attending: HOSPITALIST
Payer: MEDICARE

## 2017-05-02 ENCOUNTER — NON-PROVIDER VISIT (OUTPATIENT)
Dept: WOUND CARE | Facility: MEDICAL CENTER | Age: 57
End: 2017-05-02
Attending: EMERGENCY MEDICINE
Payer: MEDICARE

## 2017-05-02 ENCOUNTER — APPOINTMENT (OUTPATIENT)
Dept: RADIOLOGY | Facility: MEDICAL CENTER | Age: 57
DRG: 854 | End: 2017-05-02
Attending: NURSE PRACTITIONER
Payer: MEDICARE

## 2017-05-02 ENCOUNTER — HOSPITAL ENCOUNTER (INPATIENT)
Facility: MEDICAL CENTER | Age: 57
LOS: 7 days | DRG: 854 | End: 2017-05-09
Attending: INTERNAL MEDICINE | Admitting: HOSPITALIST
Payer: MEDICARE

## 2017-05-02 DIAGNOSIS — L97.523 DIABETIC ULCER OF TOE OF LEFT FOOT ASSOCIATED WITH TYPE 1 DIABETES MELLITUS, WITH NECROSIS OF MUSCLE (HCC): ICD-10-CM

## 2017-05-02 DIAGNOSIS — E10.621 DIABETIC ULCER OF TOE OF LEFT FOOT ASSOCIATED WITH TYPE 1 DIABETES MELLITUS, WITH NECROSIS OF MUSCLE (HCC): ICD-10-CM

## 2017-05-02 LAB
ALBUMIN SERPL BCP-MCNC: 4 G/DL (ref 3.2–4.9)
ALBUMIN/GLOB SERPL: 1.1 G/DL
ALP SERPL-CCNC: 74 U/L (ref 30–99)
ALT SERPL-CCNC: 11 U/L (ref 2–50)
ANION GAP SERPL CALC-SCNC: 11 MMOL/L (ref 0–11.9)
AST SERPL-CCNC: 18 U/L (ref 12–45)
BASE EXCESS BLDV CALC-SCNC: -1 MMOL/L
BASOPHILS # BLD AUTO: 0.2 % (ref 0–1.8)
BASOPHILS # BLD: 0.05 K/UL (ref 0–0.12)
BILIRUB SERPL-MCNC: 0.7 MG/DL (ref 0.1–1.5)
BODY TEMPERATURE: ABNORMAL CENTIGRADE
BUN SERPL-MCNC: 35 MG/DL (ref 8–22)
CALCIUM SERPL-MCNC: 9.2 MG/DL (ref 8.5–10.5)
CHLORIDE SERPL-SCNC: 95 MMOL/L (ref 96–112)
CO2 SERPL-SCNC: 24 MMOL/L (ref 20–33)
CREAT SERPL-MCNC: 2.07 MG/DL (ref 0.5–1.4)
EOSINOPHIL # BLD AUTO: 0 K/UL (ref 0–0.51)
EOSINOPHIL NFR BLD: 0 % (ref 0–6.9)
ERYTHROCYTE [DISTWIDTH] IN BLOOD BY AUTOMATED COUNT: 40.1 FL (ref 35.9–50)
EST. AVERAGE GLUCOSE BLD GHB EST-MCNC: 200 MG/DL
GFR SERPL CREATININE-BSD FRML MDRD: 33 ML/MIN/1.73 M 2
GLOBULIN SER CALC-MCNC: 3.6 G/DL (ref 1.9–3.5)
GLUCOSE BLD-MCNC: 350 MG/DL (ref 65–99)
GLUCOSE SERPL-MCNC: 168 MG/DL (ref 65–99)
HBA1C MFR BLD: 8.6 % (ref 0–5.6)
HCO3 BLDV-SCNC: 25 MMOL/L (ref 24–28)
HCT VFR BLD AUTO: 32.8 % (ref 42–52)
HGB BLD-MCNC: 11.2 G/DL (ref 14–18)
IMM GRANULOCYTES # BLD AUTO: 0.13 K/UL (ref 0–0.11)
IMM GRANULOCYTES NFR BLD AUTO: 0.6 % (ref 0–0.9)
LACTATE BLD-SCNC: 1.5 MMOL/L (ref 0.5–2)
LACTATE BLD-SCNC: 2.1 MMOL/L (ref 0.5–2)
LYMPHOCYTES # BLD AUTO: 1.29 K/UL (ref 1–4.8)
LYMPHOCYTES NFR BLD: 6.4 % (ref 22–41)
MAGNESIUM SERPL-MCNC: 2.2 MG/DL (ref 1.5–2.5)
MAGNESIUM SERPL-MCNC: 2.3 MG/DL (ref 1.5–2.5)
MCH RBC QN AUTO: 30.4 PG (ref 27–33)
MCHC RBC AUTO-ENTMCNC: 34.1 G/DL (ref 33.7–35.3)
MCV RBC AUTO: 88.9 FL (ref 81.4–97.8)
MONOCYTES # BLD AUTO: 1.75 K/UL (ref 0–0.85)
MONOCYTES NFR BLD AUTO: 8.7 % (ref 0–13.4)
NEUTROPHILS # BLD AUTO: 16.85 K/UL (ref 1.82–7.42)
NEUTROPHILS NFR BLD: 84.1 % (ref 44–72)
NRBC # BLD AUTO: 0 K/UL
NRBC BLD AUTO-RTO: 0 /100 WBC
PCO2 BLDV: 42.5 MMHG (ref 41–51)
PH BLDV: 7.38 [PH] (ref 7.31–7.45)
PLATELET # BLD AUTO: 344 K/UL (ref 164–446)
PMV BLD AUTO: 9.5 FL (ref 9–12.9)
PO2 BLDV: 24.2 MMHG (ref 25–40)
POTASSIUM SERPL-SCNC: 4.9 MMOL/L (ref 3.6–5.5)
PROT SERPL-MCNC: 7.6 G/DL (ref 6–8.2)
RBC # BLD AUTO: 3.69 M/UL (ref 4.7–6.1)
SAO2 % BLDV: 40.5 %
SODIUM SERPL-SCNC: 130 MMOL/L (ref 135–145)
WBC # BLD AUTO: 20.1 K/UL (ref 4.8–10.8)

## 2017-05-02 PROCEDURE — 83036 HEMOGLOBIN GLYCOSYLATED A1C: CPT

## 2017-05-02 PROCEDURE — 82962 GLUCOSE BLOOD TEST: CPT | Mod: 91

## 2017-05-02 PROCEDURE — 83605 ASSAY OF LACTIC ACID: CPT

## 2017-05-02 PROCEDURE — 99213 OFFICE O/P EST LOW 20 MIN: CPT

## 2017-05-02 PROCEDURE — A9270 NON-COVERED ITEM OR SERVICE: HCPCS | Performed by: INTERNAL MEDICINE

## 2017-05-02 PROCEDURE — 85025 COMPLETE CBC W/AUTO DIFF WBC: CPT

## 2017-05-02 PROCEDURE — 80053 COMPREHEN METABOLIC PANEL: CPT

## 2017-05-02 PROCEDURE — 700105 HCHG RX REV CODE 258: Performed by: HOSPITALIST

## 2017-05-02 PROCEDURE — A9270 NON-COVERED ITEM OR SERVICE: HCPCS | Performed by: NURSE PRACTITIONER

## 2017-05-02 PROCEDURE — 36415 COLL VENOUS BLD VENIPUNCTURE: CPT

## 2017-05-02 PROCEDURE — A6402 STERILE GAUZE <= 16 SQ IN: HCPCS

## 2017-05-02 PROCEDURE — 700105 HCHG RX REV CODE 258: Performed by: INTERNAL MEDICINE

## 2017-05-02 PROCEDURE — 73630 X-RAY EXAM OF FOOT: CPT | Mod: LT

## 2017-05-02 PROCEDURE — 700102 HCHG RX REV CODE 250 W/ 637 OVERRIDE(OP): Performed by: HOSPITALIST

## 2017-05-02 PROCEDURE — 87040 BLOOD CULTURE FOR BACTERIA: CPT

## 2017-05-02 PROCEDURE — 71010 DX-CHEST-LIMITED (1 VIEW): CPT

## 2017-05-02 PROCEDURE — 700111 HCHG RX REV CODE 636 W/ 250 OVERRIDE (IP): Performed by: INTERNAL MEDICINE

## 2017-05-02 PROCEDURE — 770020 HCHG ROOM/CARE - TELE (206)

## 2017-05-02 PROCEDURE — 700102 HCHG RX REV CODE 250 W/ 637 OVERRIDE(OP): Performed by: NURSE PRACTITIONER

## 2017-05-02 PROCEDURE — 82803 BLOOD GASES ANY COMBINATION: CPT

## 2017-05-02 PROCEDURE — 83735 ASSAY OF MAGNESIUM: CPT | Mod: 91

## 2017-05-02 PROCEDURE — 700111 HCHG RX REV CODE 636 W/ 250 OVERRIDE (IP): Performed by: HOSPITALIST

## 2017-05-02 PROCEDURE — A9270 NON-COVERED ITEM OR SERVICE: HCPCS | Performed by: HOSPITALIST

## 2017-05-02 PROCEDURE — 700102 HCHG RX REV CODE 250 W/ 637 OVERRIDE(OP): Performed by: INTERNAL MEDICINE

## 2017-05-02 RX ORDER — FAMOTIDINE 20 MG/1
20 TABLET, FILM COATED ORAL 2 TIMES DAILY
Status: DISCONTINUED | OUTPATIENT
Start: 2017-05-02 | End: 2017-05-05

## 2017-05-02 RX ORDER — SODIUM CHLORIDE 9 MG/ML
INJECTION, SOLUTION INTRAVENOUS CONTINUOUS
Status: DISCONTINUED | OUTPATIENT
Start: 2017-05-02 | End: 2017-05-09 | Stop reason: HOSPADM

## 2017-05-02 RX ORDER — POLYETHYLENE GLYCOL 3350 17 G/17G
1 POWDER, FOR SOLUTION ORAL
Status: DISCONTINUED | OUTPATIENT
Start: 2017-05-02 | End: 2017-05-07

## 2017-05-02 RX ORDER — PROMETHAZINE HYDROCHLORIDE 25 MG/1
12.5-25 SUPPOSITORY RECTAL EVERY 4 HOURS PRN
Status: DISCONTINUED | OUTPATIENT
Start: 2017-05-02 | End: 2017-05-09 | Stop reason: HOSPADM

## 2017-05-02 RX ORDER — SODIUM CHLORIDE 9 MG/ML
500 INJECTION, SOLUTION INTRAVENOUS
Status: DISCONTINUED | OUTPATIENT
Start: 2017-05-02 | End: 2017-05-05

## 2017-05-02 RX ORDER — AMOXICILLIN 250 MG
2 CAPSULE ORAL 2 TIMES DAILY
Status: DISCONTINUED | OUTPATIENT
Start: 2017-05-02 | End: 2017-05-07

## 2017-05-02 RX ORDER — LINEZOLID 600 MG/1
600 TABLET, FILM COATED ORAL EVERY 12 HOURS
Status: DISCONTINUED | OUTPATIENT
Start: 2017-05-02 | End: 2017-05-06

## 2017-05-02 RX ORDER — DABIGATRAN ETEXILATE 75 MG/1
75 CAPSULE ORAL DAILY
Status: DISCONTINUED | OUTPATIENT
Start: 2017-05-02 | End: 2017-05-05

## 2017-05-02 RX ORDER — HYDROCODONE BITARTRATE AND ACETAMINOPHEN 10; 325 MG/1; MG/1
1 TABLET ORAL EVERY 4 HOURS PRN
Status: DISCONTINUED | OUTPATIENT
Start: 2017-05-02 | End: 2017-05-09 | Stop reason: HOSPADM

## 2017-05-02 RX ORDER — ONDANSETRON 2 MG/ML
4 INJECTION INTRAMUSCULAR; INTRAVENOUS EVERY 4 HOURS PRN
Status: DISCONTINUED | OUTPATIENT
Start: 2017-05-02 | End: 2017-05-09 | Stop reason: HOSPADM

## 2017-05-02 RX ORDER — PROMETHAZINE HYDROCHLORIDE 25 MG/1
12.5-25 TABLET ORAL EVERY 4 HOURS PRN
Status: DISCONTINUED | OUTPATIENT
Start: 2017-05-02 | End: 2017-05-09 | Stop reason: HOSPADM

## 2017-05-02 RX ORDER — HEPARIN SODIUM 5000 [USP'U]/ML
5000 INJECTION, SOLUTION INTRAVENOUS; SUBCUTANEOUS EVERY 8 HOURS
Status: DISCONTINUED | OUTPATIENT
Start: 2017-05-02 | End: 2017-05-02

## 2017-05-02 RX ORDER — ATORVASTATIN CALCIUM 40 MG/1
40 TABLET, FILM COATED ORAL
Status: DISCONTINUED | OUTPATIENT
Start: 2017-05-02 | End: 2017-05-09 | Stop reason: HOSPADM

## 2017-05-02 RX ORDER — ONDANSETRON 4 MG/1
4 TABLET, ORALLY DISINTEGRATING ORAL EVERY 4 HOURS PRN
Status: DISCONTINUED | OUTPATIENT
Start: 2017-05-02 | End: 2017-05-09 | Stop reason: HOSPADM

## 2017-05-02 RX ORDER — METOPROLOL SUCCINATE 25 MG/1
25 TABLET, EXTENDED RELEASE ORAL
Status: DISCONTINUED | OUTPATIENT
Start: 2017-05-02 | End: 2017-05-09 | Stop reason: HOSPADM

## 2017-05-02 RX ORDER — DABIGATRAN ETEXILATE 75 MG/1
75 CAPSULE ORAL DAILY
Status: ON HOLD | COMMUNITY
End: 2017-05-05

## 2017-05-02 RX ORDER — NICOTINE 21 MG/24HR
14 PATCH, TRANSDERMAL 24 HOURS TRANSDERMAL
Status: DISCONTINUED | OUTPATIENT
Start: 2017-05-02 | End: 2017-05-09 | Stop reason: HOSPADM

## 2017-05-02 RX ORDER — BISACODYL 10 MG
10 SUPPOSITORY, RECTAL RECTAL
Status: DISCONTINUED | OUTPATIENT
Start: 2017-05-02 | End: 2017-05-07

## 2017-05-02 RX ORDER — ACYCLOVIR 200 MG/1
200 CAPSULE ORAL DAILY
Status: DISCONTINUED | OUTPATIENT
Start: 2017-05-02 | End: 2017-05-09 | Stop reason: HOSPADM

## 2017-05-02 RX ORDER — SODIUM CHLORIDE 9 MG/ML
30 INJECTION, SOLUTION INTRAVENOUS
Status: DISCONTINUED | OUTPATIENT
Start: 2017-05-02 | End: 2017-05-05

## 2017-05-02 RX ADMIN — AMPICILLIN SODIUM AND SULBACTAM SODIUM 3 G: 2; 1 INJECTION, POWDER, FOR SOLUTION INTRAMUSCULAR; INTRAVENOUS at 16:50

## 2017-05-02 RX ADMIN — INSULIN LISPRO 6 UNITS: 100 INJECTION, SOLUTION INTRAVENOUS; SUBCUTANEOUS at 20:27

## 2017-05-02 RX ADMIN — PIPERACILLIN SODIUM AND TAZOBACTAM SODIUM 3.38 G: 3; .375 INJECTION, POWDER, FOR SOLUTION INTRAVENOUS at 16:09

## 2017-05-02 RX ADMIN — HYDROCODONE BITARTRATE AND ACETAMINOPHEN 1 TABLET: 10; 325 TABLET ORAL at 21:00

## 2017-05-02 RX ADMIN — LINEZOLID 600 MG: 600 TABLET, FILM COATED ORAL at 20:20

## 2017-05-02 RX ADMIN — FAMOTIDINE 20 MG: 20 TABLET, FILM COATED ORAL at 20:20

## 2017-05-02 RX ADMIN — ATORVASTATIN CALCIUM 40 MG: 40 TABLET, FILM COATED ORAL at 20:20

## 2017-05-02 RX ADMIN — FAMOTIDINE 20 MG: 20 TABLET, FILM COATED ORAL at 14:35

## 2017-05-02 RX ADMIN — SODIUM CHLORIDE: 9 INJECTION, SOLUTION INTRAVENOUS at 14:34

## 2017-05-02 RX ADMIN — PIPERACILLIN SODIUM AND TAZOBACTAM SODIUM 3.38 G: 3; .375 INJECTION, POWDER, FOR SOLUTION INTRAVENOUS at 23:42

## 2017-05-02 RX ADMIN — STANDARDIZED SENNA CONCENTRATE AND DOCUSATE SODIUM 2 TABLET: 8.6; 5 TABLET, FILM COATED ORAL at 20:20

## 2017-05-02 ASSESSMENT — LIFESTYLE VARIABLES
ALCOHOL_USE: NO
EVER_SMOKED: NEVER

## 2017-05-02 ASSESSMENT — PAIN SCALES - GENERAL
PAINLEVEL_OUTOF10: 6
PAINLEVEL_OUTOF10: 6
PAINLEVEL_OUTOF10: 9

## 2017-05-02 NOTE — IP AVS SNAPSHOT
5/9/2017    Dylon Santa  [address] [place] [address]    Dear Dylon:    [facility] wants to ensure your discharge home is safe and you or your loved ones have had all of your questions answered regarding your care after you leave the hospital.    Below is a list of resources and contact information should you have any questions regarding your hospital stay, follow-up instructions, or active medical symptoms.    Questions or Concerns Regarding… Contact   Medical Questions Related to Your Discharge  (7 days a week, 8am-5pm) Contact a Nurse Care Coordinator   [phone]   Medical Questions Not Related to Your Discharge  (24 hours a day / 7 days a week)  Contact the Nurse Health Line   [phone]    Medications or Discharge Instructions Refer to your discharge packet   or contact your [facility] Primary Care Provider   [phone]   Follow-up Appointment(s) Schedule your appointment via [employer]   or contact Scheduling [phone]   Billing Review your statement via [employer]  or contact Billing [phone]   Medical Records Review your records via [employer]   or contact Medical Records [phone]     You may receive a telephone call within two days of discharge. This call is to make certain you understand your discharge instructions and have the opportunity to have any questions answered. You can also easily access your medical information, test results and upcoming appointments via the [employer] free online health management tool. You can learn more and sign up at [employer]. For assistance setting up your [employer] account, please call [phone].    Once again, we want to ensure your discharge home is safe and that you have a clear understanding of any next steps in your care. If you have any questions or concerns, please do not hesitate to contact us, we are here for you. Thank you for choosing [facility] for your healthcare needs.    Sincerely,    Your [facility] Healthcare Team

## 2017-05-02 NOTE — PROGRESS NOTES
The Medication Reconciliation process has been completed by interviewing the patient who said he has been having a lot of trouble with his sugars.  He strives to keep it at about 145 as that is where he feels best.      Allergies have been reviewed  Antibiotic use in 30 days - none    Home Pharmacy:  [facility] - [place] or [facility]

## 2017-05-02 NOTE — PROGRESS NOTES
Direct admit from wound clinic. Accepted by Dr. Najera for infected 4th toe w/ fever, chills, gl >500.  ADT signed & held @ 1040, needs to be released upon pt arrival.  No written orders received.  Pt coming by private car.

## 2017-05-02 NOTE — WOUND TEAM
"Advanced Wound Care  [facility]  [address]  [address]  [place] [address]  [phone] Fax: [phone]    Encounter note  For Certification Period: 4/17/17 - 5/17/17    Referring Physician: Sharon Lopez PA-C ([facility])  Primary Physician:      Eliza Kan DO  Consulting Physicians:         Wound(s): L foot lateral digit 4  Start of Care: 3/17/17       Subjective: \"I am tired after the surgery.\"        HPI: 56 year old male referred by [facility] practitioner for treatment of non-healing left foot digit #4 wound. Patient believes [place].        Pain:  No number given, \" a lot of pain.\"    Past Medical History:  Diabetes type I, Neuropathy, HTN, Diabetic retinopathy- both eyes    Current Medications: No recent changes.    Allergies: Review of patient's allergies indicates no known allergies.     Objective:      Tests and Measures:  5/2/17 - Vitals: 120/63, hr 84, O2 sat., 97%, rr 16  Patient checked blood sugar while in clinic: 350    4/21/2017 Dr. Richard's note post angiogram of LLE \"A remarkably good response to treatment was seen.  Through the    catheter in the anterior tibial artery, I gave the patient 200 mcg of    nitroglycerin.  A final angiogram showed good flow with preferred flow through   the anterior tibial artery as opposed to the peroneal artery which was seen    Prior.\" doppler pulses to DP/PT biphasic per Dr. Richard    4/14/17 - C&S taken for s/s of infection, positive for E. Faecalis heavy growth   3/30/17: aterial duplex/imaging results: \"Increasing calcification of the arteries bilaterally from the common    femoral through the popliteal artery with no focal stenosis or occlusion.     Normal velocities & triphasic waveforms in these arteries.   The tibial arteries are heavily calcified bilaterally.   No flow is observed in the right distal anterior tibial artery by color &    spectral Doppler, " "it appears to be occluded.   No flow is observed in both the left distal anterior tibial  and posterior    tibial arteries by color & spectral Doppler, they appears to be occluded.\"    Fall Risk Assessment (tierney all that apply with an X):3/17/17: (+) fall risk     Orthotic, protective, supportive devices: off-loading boot     Wound Characteristics                                                    Location:  L foot lateral digit 4 Initial Evaluation  Date:3/17/17 Encounter note  Date: 5/2/2017   Tissue Type and %: 75%pink; 25%yellow 100% dark red boggy poor quality tissue   Periwound: callus Edema, erythema, maceration   Drainage: KATIANA(no dressing) Moderate SS   Exposed structures None palpates to bone, tendon   Wound Edges:   Attached Open   Odor: None none   S&S of Infection:   None Erythema, edema, boggy tissue   Edema: None 1-2+ pitting -  pedal   Sensation: LOPS, pain with pressure to 4,5 digits and wound area Slightly diminished               Measurements:  L foot lateral digit 4 Initial Evaluation  Date:3/17/17 Encounter Date:  4/7/17 (please take photo and measure next visit) Encounter note  Date: 5/2/2017   Length (cm) 0.7 1 1.5   Width (cm) 0.5 1 2   Depth (cm) UA UA 0.5   Area (cm2) 0.35 1 cm2 3 cm2   Tract/undermine None Unable to determine 0.8cm@3:00        Procedures:  Per Dr. Richard: Judicious sharps debridement is OK.\"   Debridement: none   Cleansed with: NS, gauze                                                               Periwound protected with: Cavilon skin prep   Primary dressing: aquacel hydrofiber    Secondary Dressing: non-adhesive foam secured with hypafix   Other: none     Patient Education:  5/2/17: Patient is aware that toe will likely be amputated, and agrees that he needs to be admitted to the hospital. Patient was discharged from wound care and informed that he will need a new referral once he is discharged from the hospital if he does continue to need wound care. Future wound care " "appointment's cancelled.     Professional Collaboration:   5/2/17: Viky in with patient, wound probes to bone, bluish appearance to toe, patient feels ill, has been having chills, increased pain to foot, increased edema. Reports blood sugars have been elevated even though he hasn't been eating. 570 BS this AM, yesterday 507. Viky is going to direct admit. Report called to Tele Phyllis KHAN, all questions answered. BILL Herrera walked patient directly to hospital room.    4/28/17:  Dr. Richard re concern for poor tissue quality; requesting practitioner re-assessment, poss. debridement. Wound edges detached, tract @3:00  Dr. Richard at bedside. Discussed rash to left dorsal foot and calf, she believes it is due to increased blood flow. Dr. Richard discussed results of angiogram and improved blood flow, doppler pulses that are biphasic. Stated \"Judicious sharps debridment is OK now.\" Instructed pt that his toe is still \"at risk.\" JACKY Valencia, at bedside. Instructed pt to contact PCP for possible adjustment of diabetic meds following episode of BS 64 and loss of consciousness.   Assessment:      Wound etiology: neuropathic    Wound Progress: Poor tissue quality, toe likely to be amputated.    Rationale for Treatment: AqAg to manage bioburden, absorb exudate, and maintain moist wound environment without laterally wicking exudate therefore reducing kenney-wound maceration    Patient tolerance/compliance: Pt verbalized understanding of initial instructions and education; consented to POC    Complicating factors: DM, HTN, neuropathy    Need for ongoing Advanced Wound Care services:Pt requires continued skilled wound care for compression bandaging, sharp debridement prn, dressing management and application, patient education, and clinical observation       Plan:      Treatment Plan and Recommendations:  Diagnosis/ICD10: E11.69; L08.9    Procedures/CPT:selective, non-selective debridement    Frequency: 2x/week: pt unable " to arrange transportation>1x/week.      Treatment Goals: STG 2 Weeks  LTG 4 Weeks   Granulation Tissue: 100% %   Decrease Necrotic Tissue to: 0% %   Wound Phase:  prolif    Decrease Size by: 25% 50%   Periwound:  intact    Decrease tracts/undermining by: % %   Decrease Pain:          At the time of each visit a thorough assessment of the patient is completed to assure the  appropriateness of our plan of care.  The dressings or modalities may need to be adapted   from the original plan to address any significant changes in the wound environment.

## 2017-05-02 NOTE — IP AVS SNAPSHOT
" Home Care Instructions                                                                                                                  Name:Dylon Santa  Medical Record Number:[id number]  CSN: [id number]    YOB: 1960   Age: 56 y.o.  Sex: male  HT:1.727 m (5' 8\") WT: 80 kg (176 lb 5.9 oz)          Admit Date: 5/2/2017     Discharge Date:   Today's Date: 5/9/2017  Attending Doctor:  Terry Rangel M.D.                  Allergies:  Review of patient's allergies indicates no known allergies.            Discharge Instructions       Discharge Instructions    Discharged to other by [facility] with escort. Discharged via wheelchair, hospital escort: Yes.  Special equipment needed: Not Applicable    Be sure to schedule a follow-up appointment with your primary care doctor or any specialists as instructed.     Discharge Plan:   Influenza Vaccine Indication: Indicated: Not available from distributor/    I understand that a diet low in cholesterol, fat, and sodium is recommended for good health. Unless I have been given specific instructions below for another diet, I accept this instruction as my diet prescription.   Other diet: Diabetic    Special Instructions: Wound Vac care M, W, F    · Is patient discharged on Warfarin / Coumadin?   No     · Is patient Post Blood Transfusion?  No    Depression / Suicide Risk    As you are discharged from this [facility] facility, it is important to learn how to keep safe from harming yourself.    Recognize the warning signs:  · Abrupt changes in personality, positive or negative- including increase in energy   · Giving away possessions  · Change in eating patterns- significant weight changes-  positive or negative  · Change in sleeping patterns- unable to sleep or sleeping all the time   · Unwillingness or inability to communicate  · Depression  · Unusual sadness, discouragement and loneliness  · Talk of wanting to die  · Neglect of personal " appearance   · Rebelliousness- reckless behavior  · Withdrawal from people/activities they love  · Confusion- inability to concentrate     If you or a loved one observes any of these behaviors or has concerns about self-harm, here's what you can do:  · Talk about it- your feelings and reasons for harming yourself  · Remove any means that you might use to hurt yourself (examples: pills, rope, extension cords, firearm)  · Get professional help from the community (Mental Health, Substance Abuse, psychological counseling)  · Do not be alone:Call your Safe Contact- someone whom you trust who will be there for you.  · Call your local CRISIS HOTLINE 315-9459 or 451-432-0345  · Call your local Children's Mobile Crisis Response Team [place] (003) 355-3411 or www.Trailerpop  · Call the toll free National Suicide Prevention Hotlines   · National Suicide Prevention Lifeline 172-127-QUWM (9176)  · Flyr Line Network 800-SUICIDE (347-6285)        Your appointments     May 12, 2017  9:30 AM   Follow Up Visit with Bandar Cazares M.D.   [facility] ([address])    [address] [place] [address], [address]  [facility] [address] [phone]           You will be receiving a confirmation call a few days before your appointment from our automated call confirmation system.              Follow-up Information     1. Follow up with [facility] ([facility]) .    Specialty:  Skilled Nursing Facility    Contact information    [address] [facility] [address]  [phone]         Discharge Medication Instructions:    Below are the medications your physician expects you to take upon discharge:    Review all your home medications and newly ordered medications with your doctor and/or pharmacist. Follow medication instructions as directed by your doctor and/or pharmacist.    Please keep your medication list with you and share with your physician.               Medication List       START taking these medications        Instructions    Morning Afternoon Evening Bedtime    atorvastatin 40 MG Tabs   Last time this was given:  40 mg on 5/8/2017 10:17 PM   Commonly known as:  LIPITOR   Next Dose Due:  Tonight          Take 1 Tab by mouth every bedtime.   Dose:  40 mg                        NS SOLN 100 mL with piperacillin-tazobactam 3.375 (3-0.375) G SOLR 3.375 g   Last time this was given:  3.375 g on 5/9/2017 11:15 AM   Next Dose Due:  q6 hours        3.375 g by Intravenous route every 6 hours for 25 days.   Dose:  3.375 g                          CONTINUE taking these medications        Instructions    Morning Afternoon Evening Bedtime    acyclovir 200 MG Caps   Last time this was given:  200 mg on 5/9/2017  8:36 AM   Commonly known as:  ZOVIRAX   Next Dose Due:  Tomorrow Morning            Take 1 Cap by mouth every day.   Dose:  200 mg                        clopidogrel 75 MG Tabs   Last time this was given:  75 mg on 5/9/2017  8:34 AM   Commonly known as:  PLAVIX   Next Dose Due:  Tomorrow Morning          Take 75 mg by mouth every day.   Dose:  75 mg                        insulin  UNIT/ML Susp   Commonly known as:  HUMULIN,NOVOLIN        Inject 20 Units as instructed every day.   Dose:  20 Units                        insulin regular 100 Unit/mL Soln   Commonly known as:  HUMULIN R        Inject 6-10 Units as instructed 3 times a day before meals.   Dose:  6-10 Units                        losartan 50 MG Tabs   Last time this was given:  50 mg on 5/9/2017  8:34 AM   Commonly known as:  COZAAR   Next Dose Due:  Tomorrow Morning          Take 50 mg by mouth every day.   Dose:  50 mg                        metoprolol SR 25 MG Tb24   Last time this was given:  25 mg on 5/9/2017  8:34 AM   Commonly known as:  TOPROL XL        TAKE ONE TABLET BY MOUTH ONCE DAILY                          STOP taking these medications     simvastatin 40 MG Tabs   Commonly known as:  ZOCOR                     Where to Get Your Medications      Information about where to get these medications is not yet available     ! Ask your nurse or doctor about these medications    - atorvastatin 40 MG Tabs  - NS SOLN 100 mL with piperacillin-tazobactam 3.375 (3-0.375) G SOLR 3.375 g            Orders for after discharge     REFERRAL TO SKILLED NURSING FACILITY    Complete by:  As directed              Instructions           Diet / Nutrition:    Follow any diet instructions given to you by your doctor or the dietician, including how much salt (sodium) you are allowed each day.    If you are overweight, talk to your doctor about a weight reduction plan.    Activity:    Remain physically active following your doctor's instructions about exercise and activity.    Rest often.     Any time you become even a little tired or short of breath, SIT DOWN and rest.    Worsening Symptoms:    Report any of the following signs and symptoms to the doctor's office immediately:    *Pain of jaw, arm, or neck  *Chest pain not relieved by medication                               *Dizziness or loss of consciousness  *Difficulty breathing even when at rest   *More tired than usual                                       *Bleeding drainage or swelling of surgical site  *Swelling of feet, ankles, legs or stomach                 *Fever (>100ºF)  *Pink or blood tinged sputum  *Weight gain (3lbs/day or 5lbs /week)           *Shock from internal defibrillator (if applicable)  *Palpitations or irregular heartbeats                *Cool and/or numb extremities    Stroke Awareness    Common Risk Factors for Stroke include:    Age  Atrial Fibrillation  Carotid Artery Stenosis  Diabetes Mellitus  Excessive alcohol consumption  High blood pressure  Overweight   Physical inactivity  Smoking    Warning signs and symptoms of a stroke include:    *Sudden numbness or weakness of the face, arm or leg (especially on one side of the body).  *Sudden confusion, trouble speaking  or understanding.  *Sudden trouble seeing in one or both eyes.  *Sudden trouble walking, dizziness, loss of balance or coordination.Sudden severe headache with no known cause.    It is very important to get treatment quickly when a stroke occurs. If you experience any of the above warning signs, call 911 immediately.                   Disclaimer         Quit Smoking / Tobacco Use:    I understand the use of any tobacco products increases my chance of suffering from future heart disease or stroke and could cause other illnesses which may shorten my life. Quitting the use of tobacco products is the single most important thing I can do to improve my health. For further information on smoking / tobacco cessation call a Toll Free Quit Line at 1-166.147.1939 (*National Cancer [place]) or 1-202.710.1467 (American Lung Association) or you can access the web based program at www.lungusa.org.    [place] Tobacco Users Help Line:  (717) 511-6331       Toll Free: 1-956.769.6388  Quit Tobacco Program [facility] (410)784-3883    Crisis Hotline:    [place] Crisis Hotline:  7-243-TSSZHWZ or 1-874.107.6898    [place] Crisis Hotline:    1-928.305.4524 or 918-168-7165    Discharge Survey:   Thank you for choosing [facility]. We hope we did everything we could to make your hospital stay a pleasant one. You may be receiving a phone survey and we would appreciate your time and participation in answering the questions. Your input is very valuable to us in our efforts to improve our service to our patients and their families.        My signature on this form indicates that:    1. I have reviewed and understand the above information.  2. My questions regarding this information have been answered to my satisfaction.  3. I have formulated a plan with my discharge nurse to obtain my prescribed medications for home.                  Disclaimer         __________________________________                     __________        ________                       Patient Signature                                                 Date                    Time

## 2017-05-02 NOTE — PROGRESS NOTES
2 RN skin check completed with Tyra KHAN. Wound to left foot fourth digit. Red/purple non-blanching spot on medial left ankle. Wound photos taken and uploaded to epic. Wound consult placed. Otherwise skin intact.

## 2017-05-02 NOTE — IP AVS SNAPSHOT
" <p align=\"LEFT\"><IMG SRC=\"//[email or web address]$/[email or web address]\" alt=\"Image\" WIDTH=\"50%\" HEIGHT=\"200\" BORDER=\"\"></p>                   Name:Dylon Santa  Medical Record Number:[id number]  CSN: [id number]    YOB: 1960   Age: 56 y.o.  Sex: male  HT:1.727 m (5' 8\") WT: 80 kg (176 lb 5.9 oz)          Admit Date: 5/2/2017     Discharge Date:   Today's Date: 5/9/2017  Attending Doctor:  Terry Rangel M.D.                  Allergies:  Review of patient's allergies indicates no known allergies.          Your appointments     May 12, 2017  9:30 AM   Follow Up Visit with Bandar Cazares M.D.   [facility] ([address] [place])    [address] [place] [address], [address]  [facility] [address] [phone]           You will be receiving a confirmation call a few days before your appointment from our automated call confirmation system.              Follow-up Information     1. Follow up with [facility] ([facility]) .    Specialty:  Skilled Nursing Facility    Contact information    [address] [facility]  [place] [id number] [phone]         Medication List      Take these Medications        Instructions    acyclovir 200 MG Caps   Commonly known as:  ZOVIRAX    Take 1 Cap by mouth every day.   Dose:  200 mg       atorvastatin 40 MG Tabs   Commonly known as:  LIPITOR    Take 1 Tab by mouth every bedtime.   Dose:  40 mg       clopidogrel 75 MG Tabs   Commonly known as:  PLAVIX    Take 75 mg by mouth every day.   Dose:  75 mg       insulin  UNIT/ML Susp   Commonly known as:  HUMULIN,NOVOLIN    Inject 20 Units as instructed every day.   Dose:  20 Units       insulin regular 100 Unit/mL Soln   Commonly known as:  HUMULIN R    Inject 6-10 Units as instructed 3 times a day before meals.   Dose:  6-10 Units       losartan 50 MG Tabs   Commonly known as:  COZAAR    Take 50 mg by mouth every day.   Dose:  50 mg       metoprolol SR 25 MG Tb24   Commonly known as:  TOPROL XL    TAKE ONE " TABLET BY MOUTH ONCE DAILY       NS SOLN 100 mL with piperacillin-tazobactam 3.375 (3-0.375) G SOLR 3.375 g    3.375 g by Intravenous route every 6 hours for 25 days.   Dose:  3.375 g

## 2017-05-02 NOTE — H&P
HOSPITAL MEDICINE HISTORY/ PHYSICAL    Date & Time note created:    5/2/2017   2:12 PM       Patient ID:   Name: Dylon Santa. YOB: 1960. Age: 56 y.o. male. MRN: [id number]    PCP : Eliza Kan D.O.        Chief Complaint:       Fevers, chills, left foot toe swelling    History of Present Illness:    Arina is a very pleasant 56 y.o. 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 Hence patient has been battling with left 4th toe infection for several months, he has been on multiple antibiotics, recently been on Augmentin and bactrim, however despite compliance to po antibiotics patient reports worsening of his 4th toe of his left foot. In addition patient was seen at wound care today, and was complaining of fevers, chills and per APN at wound clinic, there was noted interdigit tract probing of his 2nd metatarsal. At this time, patient will be monitored closely, sepsis protocol has been initiated, we will start patient on IV vancomycin, zosyn, based on wound cultures from 4/14 which shows evidence of Enterococcus faecalis     Review of Systems:    Please see HPI, all other systems were reviewed and are negative (AMA/CMS criteria)              Allergies to Medications  Review of patient's allergies indicates no known allergies.    Past Medical History  1. Insulin-dependent diabetes,  2. Diabetic retinopathy  3. Chronic kidney diease stage III  4. Hypertension  5. Hyperlipidemia  6. Tobacco dependence.      Family History:  Father had diabetes and aneurysm.     Social History:  Patient denies using Alcohol or Illicit drugs. Does chew tobacco daily.      Current Outpatient Medications:     dabigatran 75 MG Caps capsule      Lancet Device Misc      losartan 50 MG  "Tabs      metoprolol SR 25 MG Tb24      Misc. Devices Kit      NOVOLIN N RELION 100 UNIT/ML Susp      simvastatin 40 MG Tabs            Physical Exam:   Blood pressure 107/59, pulse 81, temperature 38 °C (100.4 °F), resp. rate 18, height 1.727 m (5' 8\"), weight 71.1 kg (156 lb 12 oz), SpO2 98 %.  Constitutional:  well developed, well nourished, non-toxic, no acute distress nontoxic appearance.  HENMT: Normocephalic, atraumatic, b/l ears normal, nose normal  Eyes:  EOMI, conjunctiva normal, no discharge  Neck: no tracheal deviation, supple, no stridor appreciated   Cardiovascular: tachycardic, normal rhythm, no murmurs, no rubs or gallops; no cyanosis, clubbing or edema  Lungs: Respiratory effort is normal,  breath sounds clear to auscultation bilaterally, no wheezes,No rales no rhonchi appreciated  Abdomen: soft, non-tender, non-distended, no guarding or rebound tenderness, no pulsatile masses noted.   Skin: warm, dry, no erythema and no rash  Extremities:  Distal pulses intact +2.  No cyanosis or clubbing. No edema Noted  Left foot 4th toe, mild edema, with sanguinous discharge,   Neurologic:  Alert and oriented x3.  Cranial nerves II-XII grossly intact.  No   focal deficits.  Psychiatric: No anxiety or depression      Lab Data Review:    WBC  20.1*   RBC  3.69*   HEMOGLOBIN  11.2*   HEMATOCRIT  32.8*   MCV  88.9   MCH  30.4   RDW  40.1   PLATELETCT  344   MPV  9.5   NEUTSPOLYS  84.10*   LYMPHOCYTES  6.40*   MONOCYTES  8.70   EOSINOPHILS  0.00   BASOPHILS  0.20     SODIUM  130*   POTASSIUM  4.9   CHLORIDE  95*   CO2  24   GLUCOSE  168*   BUN  35*       Imaging/Procedures Review:    DX-FOOT-COMPLETE 3+ LEFT    (Results Pending)   DX-FOOT-COMPLETE 3+ LEFT    (Results Pending)   DX-CHEST-LIMITED (1 VIEW)    (Results Pending)     Assessment and Plan:      1.  Severe Sepsis. Most likely secondary to left toe cellulitis, questionable osteomyelitis. At this time sepsis protocol has been initiated.   - Serum leukocytosis " >20,000,   - Blood, cultures pending, UA ordered, chest xray ordered.  - Started IV Vancomycin and Zosyn.  - previous wound cultures from 4/14 shows evidence of enterococcus fecalis. Pan-sensitive.  - Continue IV fluids, monitor closely.     2. Left 4th toe cellulitis with questionable osteomyelitis  - Continue antibiotics as jorge, 3-view foot xray ordered.  - Limb preservation services consulted, appreciate their assistance.    3. Insulin-dependent diabetes Type 1.  - Continue nph 7/30 home dose, Continue Insulin-sliding scale, accu-checks and hypoglycemia protocol.  - check A1c.    4. Acute on chronic renal failure  - suspect pre-renal component from sepsis.   - continue IV fluids, recheck bmp in the am.    5. Diabetic retinopathy.  - defer to outpatient management.    6. Essential hypertension  - Continue Metoprolol, will hold his losartan dose in the light of acute renal failure.    7. 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  - continue pradaxa, will hold if surgical intervention is warranted.    8. Tobacco dependence. Tobacco cessation counseling and education provided for more than 10 minutes. Nicotine replacement options provided including patch, and further medical treatments including Wellbutrin and chantix. [id number]      Prophylaxis: dabigatran  Code: Full code  Dispo: I anticipate hospital length of stay for medical management to be expected to take greater than than 2 midnights

## 2017-05-02 NOTE — PROGRESS NOTES
Patient arrived to T804/01 from advanced wound care as a direct admit. Tele monitor applied, monitor room called to confirm. Bed is locked and in lowest position with call light in reach. VSS. Pt A&O x4. Pt c/o 8/10 on left foot and fourth toe. Med rec completed. Height and weight obtained and input in epic. Dr. Arce paged to notify of pt arrival

## 2017-05-02 NOTE — PROGRESS NOTES
Patient presented today for wound care appt, states he has been feeling poorly for the past several days- fevers, chills, decreased appetite.  Blood sugar last night and this morning over 500.  Toe wound significantly deteriorated since last visit, increased erythema and edema, interdigital tract probing to 2nd MTH. Direct admit to [facility] arranged.

## 2017-05-02 NOTE — PROGRESS NOTES
LIMB PRESERVATION SERVICE     56-year-old male with history of type 1 diabetes, hypertension, and peripheral vascular disease sent from [facility] wound clinic for worsening of left 4th toe ulcer, fevers, chills and malaise.  LLE angioplasty on 4/21/17 by Dr. Richard.    Recent Labs      05/02/17   1258   WBC  20.1*   RBC  3.69*   HEMOGLOBIN  11.2*   HEMATOCRIT  32.8*   MCV  88.9   MCH  30.4   RDW  40.1   PLATELETCT  344   MPV  9.5   NEUTSPOLYS  84.10*   LYMPHOCYTES  6.40*   MONOCYTES  8.70   EOSINOPHILS  0.00   BASOPHILS  0.20     Recent Labs      05/02/17   1258   SODIUM  130*   POTASSIUM  4.9   CHLORIDE  95*   CO2  24   GLUCOSE  168*   BUN  35*       Infectious diseases has consulted  Wound cx and blood cxs pending    Dr. Richard notified of admission    Xray pending    LPS to follow

## 2017-05-02 NOTE — IP AVS SNAPSHOT
[email or web address] Access Code: Activation code not generated  Current [email or web address] Status: Active    [employer] [employer]  A secure, online tool to manage your health information     [employer]’s [email or web address]® is a secure, online tool that connects you to your personalized health information from the privacy of your home -- day or night - making it very easy for you to manage your healthcare. Once the activation process is completed, you can even access your medical information using the [email or web address].com allison, which is available for free in the Apple Allison store or Google Play store.     [email or web address].com provides the following levels of access (as shown below):   My Chart Features   [facility] Primary Care Doctor [facility]  Specialists [facility]  Urgent  Care Non-[facility]  Primary Care  Doctor   Email your healthcare team securely and privately 24/7 X X X X   Manage appointments: schedule your next appointment; view details of past/upcoming appointments X      Request prescription refills. X      View recent personal medical records, including lab and immunizations X X X X   View health record, including health history, allergies, medications X X X X   Read reports about your outpatient visits, procedures, consult and ER notes X X X X   See your discharge summary, which is a recap of your hospital and/or ER visit that includes your diagnosis, lab results, and care plan. X X       How to register for [email or web address]:  1. Go to  [email or web address].  2. Click on the Sign Up Now box, which takes you to the New Member Sign Up page. You will need to provide the following information:  a. Enter your [email or web address].com Access Code exactly as it appears at the top of this page. (You will not need to use this code after you’ve completed the sign-up process. If you do not sign up before the expiration date, you must request a new code.)   b. Enter your date of birth.   c. Enter your home email address.   d. Click Submit, and follow the next screen’s instructions.  3. Create a [email or web address].com ID. This will  be your Feedbooks login ID and cannot be changed, so think of one that is secure and easy to remember.  4. Create a Feedbooks password. You can change your password at any time.  5. Enter your Password Reset Question and Answer. This can be used at a later time if you forget your password.   6. Enter your e-mail address. This allows you to receive e-mail notifications when new information is available in Feedbooks.  7. Click Sign Up. You can now view your health information.    For assistance activating your Feedbooks account, call [phone]

## 2017-05-03 LAB
ALBUMIN SERPL BCP-MCNC: 3.2 G/DL (ref 3.2–4.9)
ALBUMIN/GLOB SERPL: 0.9 G/DL
ALP SERPL-CCNC: 61 U/L (ref 30–99)
ALT SERPL-CCNC: 9 U/L (ref 2–50)
ANION GAP SERPL CALC-SCNC: 6 MMOL/L (ref 0–11.9)
AST SERPL-CCNC: 13 U/L (ref 12–45)
BASOPHILS # BLD AUTO: 0.3 % (ref 0–1.8)
BASOPHILS # BLD: 0.04 K/UL (ref 0–0.12)
BILIRUB SERPL-MCNC: 0.5 MG/DL (ref 0.1–1.5)
BUN SERPL-MCNC: 38 MG/DL (ref 8–22)
CALCIUM SERPL-MCNC: 8.7 MG/DL (ref 8.5–10.5)
CHLORIDE SERPL-SCNC: 96 MMOL/L (ref 96–112)
CHOLEST SERPL-MCNC: 86 MG/DL (ref 100–199)
CO2 SERPL-SCNC: 24 MMOL/L (ref 20–33)
CREAT SERPL-MCNC: 2.3 MG/DL (ref 0.5–1.4)
EOSINOPHIL # BLD AUTO: 0.09 K/UL (ref 0–0.51)
EOSINOPHIL NFR BLD: 0.6 % (ref 0–6.9)
ERYTHROCYTE [DISTWIDTH] IN BLOOD BY AUTOMATED COUNT: 40.7 FL (ref 35.9–50)
GFR SERPL CREATININE-BSD FRML MDRD: 30 ML/MIN/1.73 M 2
GLOBULIN SER CALC-MCNC: 3.4 G/DL (ref 1.9–3.5)
GLUCOSE BLD-MCNC: 202 MG/DL (ref 65–99)
GLUCOSE BLD-MCNC: 283 MG/DL (ref 65–99)
GLUCOSE BLD-MCNC: 295 MG/DL (ref 65–99)
GLUCOSE BLD-MCNC: 297 MG/DL (ref 65–99)
GLUCOSE BLD-MCNC: 326 MG/DL (ref 65–99)
GLUCOSE BLD-MCNC: 352 MG/DL (ref 65–99)
GLUCOSE BLD-MCNC: 365 MG/DL (ref 65–99)
GLUCOSE SERPL-MCNC: 317 MG/DL (ref 65–99)
HCT VFR BLD AUTO: 31.8 % (ref 42–52)
HDLC SERPL-MCNC: 35 MG/DL
HGB BLD-MCNC: 10.8 G/DL (ref 14–18)
IMM GRANULOCYTES # BLD AUTO: 0.07 K/UL (ref 0–0.11)
IMM GRANULOCYTES NFR BLD AUTO: 0.5 % (ref 0–0.9)
LDLC SERPL CALC-MCNC: 36 MG/DL
LYMPHOCYTES # BLD AUTO: 2.21 K/UL (ref 1–4.8)
LYMPHOCYTES NFR BLD: 14.3 % (ref 22–41)
MCH RBC QN AUTO: 30.5 PG (ref 27–33)
MCHC RBC AUTO-ENTMCNC: 34 G/DL (ref 33.7–35.3)
MCV RBC AUTO: 89.8 FL (ref 81.4–97.8)
MONOCYTES # BLD AUTO: 1.55 K/UL (ref 0–0.85)
MONOCYTES NFR BLD AUTO: 10 % (ref 0–13.4)
NEUTROPHILS # BLD AUTO: 11.48 K/UL (ref 1.82–7.42)
NEUTROPHILS NFR BLD: 74.3 % (ref 44–72)
NRBC # BLD AUTO: 0 K/UL
NRBC BLD AUTO-RTO: 0 /100 WBC
PLATELET # BLD AUTO: 315 K/UL (ref 164–446)
PMV BLD AUTO: 10 FL (ref 9–12.9)
POTASSIUM SERPL-SCNC: 4.4 MMOL/L (ref 3.6–5.5)
PROT SERPL-MCNC: 6.6 G/DL (ref 6–8.2)
RBC # BLD AUTO: 3.54 M/UL (ref 4.7–6.1)
SODIUM SERPL-SCNC: 126 MMOL/L (ref 135–145)
TRIGL SERPL-MCNC: 74 MG/DL (ref 0–149)
WBC # BLD AUTO: 15.4 K/UL (ref 4.8–10.8)

## 2017-05-03 PROCEDURE — 501445 HCHG STAPLER, SKIN DISP: Performed by: SURGERY

## 2017-05-03 PROCEDURE — 160048 HCHG OR STATISTICAL LEVEL 1-5: Performed by: SURGERY

## 2017-05-03 PROCEDURE — 700102 HCHG RX REV CODE 250 W/ 637 OVERRIDE(OP): Performed by: INTERNAL MEDICINE

## 2017-05-03 PROCEDURE — 502240 HCHG MISC OR SUPPLY RC 0272: Performed by: SURGERY

## 2017-05-03 PROCEDURE — 700111 HCHG RX REV CODE 636 W/ 250 OVERRIDE (IP): Performed by: INTERNAL MEDICINE

## 2017-05-03 PROCEDURE — A9270 NON-COVERED ITEM OR SERVICE: HCPCS

## 2017-05-03 PROCEDURE — 160039 HCHG SURGERY MINUTES - EA ADDL 1 MIN LEVEL 3: Performed by: SURGERY

## 2017-05-03 PROCEDURE — 99233 SBSQ HOSP IP/OBS HIGH 50: CPT | Performed by: HOSPITALIST

## 2017-05-03 PROCEDURE — 160035 HCHG PACU - 1ST 60 MINS PHASE I: Performed by: SURGERY

## 2017-05-03 PROCEDURE — 97161 PT EVAL LOW COMPLEX 20 MIN: CPT

## 2017-05-03 PROCEDURE — 160009 HCHG ANES TIME/MIN: Performed by: SURGERY

## 2017-05-03 PROCEDURE — A9270 NON-COVERED ITEM OR SERVICE: HCPCS | Performed by: NURSE PRACTITIONER

## 2017-05-03 PROCEDURE — 700102 HCHG RX REV CODE 250 W/ 637 OVERRIDE(OP): Performed by: NURSE PRACTITIONER

## 2017-05-03 PROCEDURE — 160036 HCHG PACU - EA ADDL 30 MINS PHASE I: Performed by: SURGERY

## 2017-05-03 PROCEDURE — 88305 TISSUE EXAM BY PATHOLOGIST: CPT

## 2017-05-03 PROCEDURE — 700111 HCHG RX REV CODE 636 W/ 250 OVERRIDE (IP)

## 2017-05-03 PROCEDURE — A9270 NON-COVERED ITEM OR SERVICE: HCPCS | Performed by: INTERNAL MEDICINE

## 2017-05-03 PROCEDURE — 700102 HCHG RX REV CODE 250 W/ 637 OVERRIDE(OP)

## 2017-05-03 PROCEDURE — 700111 HCHG RX REV CODE 636 W/ 250 OVERRIDE (IP): Performed by: HOSPITALIST

## 2017-05-03 PROCEDURE — 770020 HCHG ROOM/CARE - TELE (206)

## 2017-05-03 PROCEDURE — 160002 HCHG RECOVERY MINUTES (STAT): Performed by: SURGERY

## 2017-05-03 PROCEDURE — 80053 COMPREHEN METABOLIC PANEL: CPT

## 2017-05-03 PROCEDURE — 700101 HCHG RX REV CODE 250

## 2017-05-03 PROCEDURE — 700105 HCHG RX REV CODE 258: Performed by: HOSPITALIST

## 2017-05-03 PROCEDURE — 501838 HCHG SUTURE GENERAL: Performed by: SURGERY

## 2017-05-03 PROCEDURE — 80061 LIPID PANEL: CPT

## 2017-05-03 PROCEDURE — 85025 COMPLETE CBC W/AUTO DIFF WBC: CPT

## 2017-05-03 PROCEDURE — 36415 COLL VENOUS BLD VENIPUNCTURE: CPT

## 2017-05-03 PROCEDURE — 160028 HCHG SURGERY MINUTES - 1ST 30 MINS LEVEL 3: Performed by: SURGERY

## 2017-05-03 PROCEDURE — 700102 HCHG RX REV CODE 250 W/ 637 OVERRIDE(OP): Performed by: HOSPITALIST

## 2017-05-03 PROCEDURE — 306263 VAC CANNISTER W/GEL 500ML: Performed by: SURGERY

## 2017-05-03 PROCEDURE — 97605 NEG PRS WND THER DME<=50SQCM: CPT

## 2017-05-03 PROCEDURE — 0Y6N0ZD DETACHMENT AT LEFT FOOT, PARTIAL 4TH RAY, OPEN APPROACH: ICD-10-PCS | Performed by: SURGERY

## 2017-05-03 PROCEDURE — A9270 NON-COVERED ITEM OR SERVICE: HCPCS | Performed by: HOSPITALIST

## 2017-05-03 PROCEDURE — 82962 GLUCOSE BLOOD TEST: CPT | Mod: 91

## 2017-05-03 PROCEDURE — 88311 DECALCIFY TISSUE: CPT

## 2017-05-03 PROCEDURE — 700105 HCHG RX REV CODE 258: Performed by: INTERNAL MEDICINE

## 2017-05-03 RX ORDER — INSULIN GLARGINE 100 [IU]/ML
15 INJECTION, SOLUTION SUBCUTANEOUS EVERY EVENING
Status: DISCONTINUED | OUTPATIENT
Start: 2017-05-03 | End: 2017-05-09 | Stop reason: HOSPADM

## 2017-05-03 RX ORDER — DEXTROSE MONOHYDRATE 25 G/50ML
25 INJECTION, SOLUTION INTRAVENOUS
Status: DISCONTINUED | OUTPATIENT
Start: 2017-05-03 | End: 2017-05-09 | Stop reason: HOSPADM

## 2017-05-03 RX ORDER — OXYCODONE HCL 5 MG/5 ML
SOLUTION, ORAL ORAL
Status: COMPLETED
Start: 2017-05-03 | End: 2017-05-03

## 2017-05-03 RX ORDER — MIDAZOLAM HYDROCHLORIDE 1 MG/ML
INJECTION INTRAMUSCULAR; INTRAVENOUS
Status: DISPENSED
Start: 2017-05-03 | End: 2017-05-03

## 2017-05-03 RX ORDER — ROPIVACAINE HYDROCHLORIDE 5 MG/ML
INJECTION, SOLUTION EPIDURAL; INFILTRATION; PERINEURAL
Status: DISCONTINUED | OUTPATIENT
Start: 2017-05-03 | End: 2017-05-03

## 2017-05-03 RX ADMIN — SODIUM CHLORIDE: 9 INJECTION, SOLUTION INTRAVENOUS at 04:50

## 2017-05-03 RX ADMIN — INSULIN LISPRO 7 UNITS: 100 INJECTION, SOLUTION INTRAVENOUS; SUBCUTANEOUS at 13:55

## 2017-05-03 RX ADMIN — INSULIN LISPRO 5 UNITS: 100 INJECTION, SOLUTION INTRAVENOUS; SUBCUTANEOUS at 10:37

## 2017-05-03 RX ADMIN — STANDARDIZED SENNA CONCENTRATE AND DOCUSATE SODIUM 2 TABLET: 8.6; 5 TABLET, FILM COATED ORAL at 09:42

## 2017-05-03 RX ADMIN — AMPICILLIN SODIUM AND SULBACTAM SODIUM 3 G: 2; 1 INJECTION, POWDER, FOR SOLUTION INTRAMUSCULAR; INTRAVENOUS at 10:39

## 2017-05-03 RX ADMIN — HYDROCODONE BITARTRATE AND ACETAMINOPHEN 1 TABLET: 10; 325 TABLET ORAL at 19:42

## 2017-05-03 RX ADMIN — OXYCODONE HYDROCHLORIDE 5 MG: 5 SOLUTION ORAL at 08:33

## 2017-05-03 RX ADMIN — ACYCLOVIR 200 MG: 200 CAPSULE ORAL at 09:42

## 2017-05-03 RX ADMIN — LINEZOLID 600 MG: 600 TABLET, FILM COATED ORAL at 21:12

## 2017-05-03 RX ADMIN — INSULIN LISPRO 5 UNITS: 100 INJECTION, SOLUTION INTRAVENOUS; SUBCUTANEOUS at 00:09

## 2017-05-03 RX ADMIN — LINEZOLID 600 MG: 600 TABLET, FILM COATED ORAL at 09:42

## 2017-05-03 RX ADMIN — PIPERACILLIN SODIUM AND TAZOBACTAM SODIUM 3.38 G: 3; .375 INJECTION, POWDER, FOR SOLUTION INTRAVENOUS at 21:12

## 2017-05-03 RX ADMIN — METOPROLOL SUCCINATE 25 MG: 25 TABLET, EXTENDED RELEASE ORAL at 09:42

## 2017-05-03 RX ADMIN — STANDARDIZED SENNA CONCENTRATE AND DOCUSATE SODIUM 2 TABLET: 8.6; 5 TABLET, FILM COATED ORAL at 21:12

## 2017-05-03 RX ADMIN — INSULIN GLARGINE 15 UNITS: 100 INJECTION, SOLUTION SUBCUTANEOUS at 21:20

## 2017-05-03 RX ADMIN — PIPERACILLIN SODIUM AND TAZOBACTAM SODIUM 3.38 G: 3; .375 INJECTION, POWDER, FOR SOLUTION INTRAVENOUS at 17:16

## 2017-05-03 RX ADMIN — INSULIN LISPRO 8 UNITS: 100 INJECTION, SOLUTION INTRAVENOUS; SUBCUTANEOUS at 05:33

## 2017-05-03 RX ADMIN — PIPERACILLIN SODIUM AND TAZOBACTAM SODIUM 3.38 G: 3; .375 INJECTION, POWDER, FOR SOLUTION INTRAVENOUS at 04:47

## 2017-05-03 RX ADMIN — FAMOTIDINE 20 MG: 20 TABLET, FILM COATED ORAL at 21:12

## 2017-05-03 RX ADMIN — ONDANSETRON 4 MG: 2 INJECTION INTRAMUSCULAR; INTRAVENOUS at 05:10

## 2017-05-03 RX ADMIN — HYDROCODONE BITARTRATE AND ACETAMINOPHEN 1 TABLET: 10; 325 TABLET ORAL at 14:08

## 2017-05-03 RX ADMIN — SODIUM CHLORIDE: 9 INJECTION, SOLUTION INTRAVENOUS at 21:00

## 2017-05-03 RX ADMIN — PROMETHAZINE HYDROCHLORIDE 25 MG: 25 TABLET ORAL at 10:44

## 2017-05-03 RX ADMIN — FAMOTIDINE 20 MG: 20 TABLET, FILM COATED ORAL at 09:42

## 2017-05-03 RX ADMIN — DABIGATRAN ETEXILATE MESYLATE 75 MG: 75 CAPSULE ORAL at 10:42

## 2017-05-03 RX ADMIN — MAGNESIUM HYDROXIDE 30 ML: 400 SUSPENSION ORAL at 17:50

## 2017-05-03 RX ADMIN — PIPERACILLIN SODIUM AND TAZOBACTAM SODIUM 3.38 G: 3; .375 INJECTION, POWDER, FOR SOLUTION INTRAVENOUS at 09:42

## 2017-05-03 RX ADMIN — HYDROCODONE BITARTRATE AND ACETAMINOPHEN 1 TABLET: 10; 325 TABLET ORAL at 01:54

## 2017-05-03 RX ADMIN — AMPICILLIN SODIUM AND SULBACTAM SODIUM 3 G: 2; 1 INJECTION, POWDER, FOR SOLUTION INTRAMUSCULAR; INTRAVENOUS at 01:50

## 2017-05-03 RX ADMIN — INSULIN LISPRO 4 UNITS: 100 INJECTION, SOLUTION INTRAVENOUS; SUBCUTANEOUS at 17:17

## 2017-05-03 RX ADMIN — ATORVASTATIN CALCIUM 40 MG: 40 TABLET, FILM COATED ORAL at 21:12

## 2017-05-03 RX ADMIN — AMPICILLIN SODIUM AND SULBACTAM SODIUM 3 G: 2; 1 INJECTION, POWDER, FOR SOLUTION INTRAMUSCULAR; INTRAVENOUS at 05:41

## 2017-05-03 ASSESSMENT — ENCOUNTER SYMPTOMS
SENSORY CHANGE: 0
DIZZINESS: 0
NERVOUS/ANXIOUS: 0
BACK PAIN: 0
PND: 0
PALPITATIONS: 0
DOUBLE VISION: 0
CLAUDICATION: 0
HEADACHES: 0
EYE PAIN: 0
VOMITING: 0
MEMORY LOSS: 0
SPUTUM PRODUCTION: 0
CONSTIPATION: 0
WEAKNESS: 1
SPEECH CHANGE: 0
TREMORS: 0
MYALGIAS: 1
ORTHOPNEA: 0
TINGLING: 0
NECK PAIN: 0
HEMOPTYSIS: 0
DEPRESSION: 0
PHOTOPHOBIA: 0
BLOOD IN STOOL: 0
NAUSEA: 0
STRIDOR: 0
BLURRED VISION: 0
DIARRHEA: 0
SORE THROAT: 0
CHILLS: 0
HEARTBURN: 0
COUGH: 0
FEVER: 0
SHORTNESS OF BREATH: 0
ABDOMINAL PAIN: 0

## 2017-05-03 ASSESSMENT — PAIN SCALES - GENERAL
PAINLEVEL_OUTOF10: 0
PAINLEVEL_OUTOF10: 4
PAINLEVEL_OUTOF10: 0
PAINLEVEL_OUTOF10: 3
PAINLEVEL_OUTOF10: ASSUMED PAIN PRESENT
PAINLEVEL_OUTOF10: 6

## 2017-05-03 NOTE — PROGRESS NOTES
Assumed care report received. Assessment completed. Pt sitting up in bed complains of pain 8/10, when putting foot on ground. Will call MD for pain medication. Dressing applied per nursing communication instructions. No other complaints at this time. Plan of care discussed, verbalized understanding. Fall precautions in place. Call light within reach. Tele monitor in place. White board updated. Pt up to bathroom with [email or web address].

## 2017-05-03 NOTE — OP REPORT
DATE OF SERVICE:  05/03/2017    PREOPERATIVE DIAGNOSES:  Left fourth toe infection with diabetic foot wound   and sepsis.    POSTPROCEDURE DIAGNOSES:  Left fourth toe infection with diabetic foot wound   and sepsis.    PROCEDURES:    1.  Incision and drainage of left foot infection with amputation of the left   fourth toe.    2.  Digital block.    SURGEON:  Inessa Richard MD      ANESTHESIA:  Tye Egan MD    INDICATIONS:  The patient is a 56-year-old diabetic gentleman who presented   with a nonhealing wound to his left fourth toe.  Noninvasive studies suggested   digital artery occlusion, and he underwent percutaneous procedure with   angioplasty of the anterior tibial artery providing direct blood flow to the   toe.  Within a week to 10 days, the foot became increasingly infected, and the   wound enlarged.  Over the weekend, his diabetes became uncontrollable, and he   presented to the hospital with signs of a necrotic diabetic foot infection.    Last night, plain films showed gas in the soft tissues, and I opted to   advocate for early intervention.  The fourth toe appears ischemic and dark   compared to the others and I believe cannot be salvaged.  Risks and benefits   were explained to the patient include bleeding, infection, and future   amputations.  He understands and agrees to proceed.      DESCRIPTION OF PROCEDURE:  Patient was taken to the operating room and placed   in supine position.  After adequate general anesthesia, the left foot was   prepped with Betadine gel, cloth towels and paper drapes were placed.  An   incision was made around the base of the fourth toe and up the dorsum of the   foot approximately 3 cm.  The base of the fourth toe was dissected around to   the bone and the fourth toe removed.  The top of the fourth metatarsal head   was removed with rongeurs, removing all articulating surface.  The wound was   debrided of necrotic tissue and irrigated with copious amounts of saline.   The   wound was closed in a single layer, closing deep structures over the bone.    The base of the toe was left open with the intention of negative pressure   wound therapy dressing later today.  There were no apparent complications.      I used 10 mL of ropivacaine and placed 5 mL in the interdigital space using   the fourth metatarsal as a guide for injection to provide pain control.  The   wound was packed with moist saline gauze and covered with dry gauze followed   by an Ace wrap.  There were no apparent complications.       ____________________________________     MD LORRAINE Perdomo / EUGENIO    DD:  05/03/2017 08:05:42  DT:  05/03/2017 08:47:18    D#:  [id number]  Job#:  [id number]

## 2017-05-03 NOTE — OR SURGEON
Immediate Post-Operative Note      PreOp Diagnosis: Left diabetic foot infection, 4th toe ischemia    PostOp Diagnosis: Same    Procedure(s):  IRRIGATION & DEBRIDEMENT GENERAL and Left Fourth Toe Amputation  - Wound Class: Dirty or Infected    Surgeon(s):  Inessa Richard M.D.    Anesthesiologist/Type of Anesthesia:  Anesthesiologist: Tye Egan M.D./General    Surgical Staff:  Circulator: Mira Jimenes R.N.  Scrub Person: Jean Alas    Specimen: 4th toe    Estimated Blood Loss: minimal    Findings: necrotic tissue around the base of the 4th toe.    Complications: none    [id number]    5/3/2017 7:59 AM Inessa Richard

## 2017-05-03 NOTE — CARE PLAN
Problem: Safety  Goal: Will remain free from injury  Outcome: PROGRESSING AS EXPECTED  Pt ambulates well with FWW up to the bathroom with no assistance needed. Pt educated regarding bed alarm and refuses. Pt also educated to call for help and disconnecting from IV pole before getting out of bed. Pt verbalized understanding. Call light within reach.    Problem: Knowledge Deficit  Goal: Knowledge of disease process/condition, treatment plan, diagnostic tests, and medications will improve  Outcome: PROGRESSING AS EXPECTED  Discussed POC with pt. Answered all questions appropriately. White board updated. All medications and interventions explained before performed. Pt verbalized understanding. Pt to have I&D procedure tmrw am per Dr. Richard. Pt awaiting MD to go over specifics.

## 2017-05-03 NOTE — PROGRESS NOTES
Infectious Disease Progress Note    Author: Temitope Shannon M.D. DOS & Time created: 5/3/2017  10:52 AM    Chief Complaint:  FU diabetic foot ulcer with gangrene      Interval History:  56-year-old diabetic male admitted with a non-healing ulceration of his left fourth digit superinfected with associated gangrene.   5/3 AF WBC 15.4 s/p OR this am with amp 4th digit. Tolerated well-denies SE abx  Labs Reviewed, Medications Reviewed, Radiology Reviewed and Wound Reviewed.    Review of Systems:  Review of Systems   Constitutional: Negative for fever and chills.   Gastrointestinal: Negative for nausea, vomiting, abdominal pain and diarrhea.   Musculoskeletal: Positive for joint pain.   Skin: Negative for rash.   All other systems reviewed and are negative.      Hemodynamics:  Temp (24hrs), Av.2 °C (99 °F), Min:36.3 °C (97.3 °F), Max:38 °C (100.4 °F)  Temperature: 36.3 °C (97.3 °F)  Pulse  Av.8  Min: 73  Max: 83Heart Rate (Monitored): 73  Blood Pressure: 119/65 mmHg, NIBP: 109/57 mmHg       Physical Exam:  Physical Exam   Constitutional: He is oriented to person, place, and time. He appears well-developed. No distress.   HENT:   Head: Atraumatic.   Eyes: EOM are normal. Pupils are equal, round, and reactive to light.   Neck: Neck supple.   Cardiovascular: Normal rate.    No murmur heard.  Pulmonary/Chest: Effort normal. No respiratory distress. He has no wheezes. He has no rales.   Abdominal: He exhibits no distension.   Musculoskeletal: He exhibits edema.   LLE dressed-serosanguinous drainage   Neurological: He is alert and oriented to person, place, and time.   Skin: No rash noted.   Nursing note and vitals reviewed.      Meds:    Current facility-administered medications:   •  MIDAZOLAM HCL 2 MG/2ML INJ SOLN, , , ,   •  FENTANYL CITRATE 0.05 MG/ML INJ SOLN, , , ,   •  ampicillin/sulbactam (UNASYN) 3 g in  mL IVPB, 3 g, Intravenous, Q6HRS, Temitope Shannon M.D., Last Rate: 200 mL/hr at 17  1039, 3 g at 05/03/17 1039  •  NS infusion 2,133 mL, 30 mL/kg, Intravenous, Once PRN, Natalya Monzon M.D.  •  senna-docusate (PERICOLACE or SENOKOT S) 8.6-50 MG per tablet 2 Tab, 2 Tab, Oral, BID, 2 Tab at 05/03/17 0942 **AND** polyethylene glycol/lytes (MIRALAX) PACKET 1 Packet, 1 Packet, Oral, QDAY PRN **AND** magnesium hydroxide (MILK OF MAGNESIA) suspension 30 mL, 30 mL, Oral, QDAY PRN **AND** bisacodyl (DULCOLAX) suppository 10 mg, 10 mg, Rectal, QDAY PRN, Natalya Monzon M.D.  •  NS infusion, , Intravenous, Continuous, Natalya Monzon M.D., Last Rate: 125 mL/hr at 05/03/17 0450  •  NS (BOLUS) infusion 500 mL, 500 mL, Intravenous, Once PRN, Natalya Monzon M.D.  •  piperacillin-tazobactam (ZOSYN) 3.375 g in  mL IVPB, 3.375 g, Intravenous, Q6HRS, Natalya Monzon M.D., Last Rate: 200 mL/hr at 05/03/17 0942, 3.375 g at 05/03/17 0942  •  insulin lispro (HUMALOG) injection 2-9 Units, 2-9 Units, Subcutaneous, 4X/DAY ACHS, Natalya Monzon M.D., 5 Units at 05/03/17 1037  •  ondansetron (ZOFRAN) syringe/vial injection 4 mg, 4 mg, Intravenous, Q4HRS PRN, Natalya Monzon M.D., 4 mg at 05/03/17 0510  •  ondansetron (ZOFRAN ODT) dispertab 4 mg, 4 mg, Oral, Q4HRS PRN, Natalya Monzon M.D.  •  promethazine (PHENERGAN) tablet 12.5-25 mg, 12.5-25 mg, Oral, Q4HRS PRN, Natalya Monzon M.D., 25 mg at 05/03/17 1044  •  promethazine (PHENERGAN) suppository 12.5-25 mg, 12.5-25 mg, Rectal, Q4HRS PRN, Natalya Monzon M.D.  •  prochlorperazine (COMPAZINE) injection 5-10 mg, 5-10 mg, Intravenous, Q4HRS PRN, Natalya Monzon M.D.  •  INITIATE NICOTINE REPLACEMENT PROTOCOL , , , Once **AND** nicotine (NICODERM) 14 MG/24HR 14 mg, 14 mg, Transdermal, Daily-0600, 14 mg at 05/02/17 1430 **AND** Protocol 205 PATIENT EDUCATION MATERIALS, , , Once **AND** Protocol 205 Rotate nicotine patch application sites daily , , , CONTINUOUS **AND** nicotine polacrilex (NICORETTE) 2 MG piece 2 mg, 2 mg, Oral, Q HOUR  PRN, Natalya Monzon M.D.  •  famotidine (PEPCID) tablet 20 mg, 20 mg, Oral, BID, 20 mg at 05/03/17 0942 **OR** famotidine (PEPCID) injection 20 mg, 20 mg, Intravenous, BID, Natalya Monzon M.D.  •  acyclovir (ZOVIRAX) capsule 200 mg, 200 mg, Oral, DAILY, Natalya Monzon M.D., 200 mg at 05/03/17 0942  •  dabigatran (PRADAXA) capsule 75 mg, 75 mg, Oral, DAILY, Natalya Monzon M.D., 75 mg at 05/03/17 1042  •  metoprolol SR (TOPROL XL) tablet 25 mg, 25 mg, Oral, Q DAY, Natalya Monzon M.D., 25 mg at 05/03/17 0942  •  atorvastatin (LIPITOR) tablet 40 mg, 40 mg, Oral, QHS, Natalya Monzon M.D., 40 mg at 05/02/17 2020  •  linezolid (ZYVOX) tablet 600 mg, 600 mg, Oral, Q12HRS, Temitope Shannon M.D., 600 mg at 05/03/17 0942  •  hydrocodone/acetaminophen (NORCO)  MG per tablet 1 Tab, 1 Tab, Oral, Q4HRS PRN, DAYO Carias, 1 Tab at 05/03/17 0154    Labs:  Recent Labs      05/02/17   1258  05/03/17   0143   WBC  20.1*  15.4*   RBC  3.69*  3.54*   HEMOGLOBIN  11.2*  10.8*   HEMATOCRIT  32.8*  31.8*   MCV  88.9  89.8   MCH  30.4  30.5   RDW  40.1  40.7   PLATELETCT  344  315   MPV  9.5  10.0   NEUTSPOLYS  84.10*  74.30*   LYMPHOCYTES  6.40*  14.30*   MONOCYTES  8.70  10.00   EOSINOPHILS  0.00  0.60   BASOPHILS  0.20  0.30     Recent Labs      05/02/17   1258  05/03/17   0143   SODIUM  130*  126*   POTASSIUM  4.9  4.4   CHLORIDE  95*  96   CO2  24  24   GLUCOSE  168*  317*   BUN  35*  38*     Recent Labs      05/02/17   1258  05/03/17   0143   ALBUMIN  4.0  3.2   TBILIRUBIN  0.7  0.5   ALKPHOSPHAT  74  61   TOTPROTEIN  7.6  6.6   ALTSGPT  11  9   ASTSGOT  18  13   CREATININE  2.07*  2.30*       Imaging:  Dx-chest-limited (1 View)  5/2/2017  No acute cardiopulmonary disease.    Dx-foot-complete 3+ Left  5/2/2017  Soft tissue gas in the fourth toe, concerning for necrotizing infection. Questionable osseous destruction seen along the lateral aspect of the fourth proximal phalanx, concerning for  "osteomyelitis. CRITICAL RESULT READ BACK: Preliminary findings discussed with and critical read back performed by JUAN JOSE Cowan via telephone on 5/2/2017 6:44 PM    Dx-foot-complete 3+ Left  4/14/2017  Negative LEFT foot series. No plain film evidence of osteomyelitis.      Micro:  BLOOD CULTURE   Date Value Ref Range Status   05/02/2017   Preliminary    No Growth    Note: Blood cultures are incubated for 5 days and  are monitored continuously.Positive blood cultures  are called to the RN and reported as soon as  they are identified.     05/02/2017   Preliminary    No Growth    Note: Blood cultures are incubated for 5 days and  are monitored continuously.Positive blood cultures  are called to the RN and reported as soon as  they are identified.        Results     Procedure Component Value Units Date/Time    Blood Culture [[id number]] Collected:  05/02/17 1558    Order Status:  Completed Specimen Information:  Blood from Peripheral Updated:  05/03/17 0643     Significant Indicator NEG      Source BLD      Site PERIPHERAL      Blood Culture --      Result:        No Growth    Note: Blood cultures are incubated for 5 days and  are monitored continuously.Positive blood cultures  are called to the RN and reported as soon as  they are identified.      Narrative:      Per Hospital Policy: Only change Specimen Src: to \"Line\" if  specified by physician order.    Blood Culture [[id number]] Collected:  05/02/17 1558    Order Status:  Completed Specimen Information:  Blood from Peripheral Updated:  05/03/17 0643     Significant Indicator NEG      Source BLD      Site PERIPHERAL      Blood Culture --      Result:        No Growth    Note: Blood cultures are incubated for 5 days and  are monitored continuously.Positive blood cultures  are called to the RN and reported as soon as  they are identified.      Narrative:      Per Hospital Policy: Only change Specimen Src: to \"Line\" if  specified by physician order.    Blood Culture " [[id number]]     Order Status:  Canceled Specimen Information:  Blood from Peripheral     Blood Culture [[id number]]     Order Status:  Canceled Specimen Information:  Blood from Peripheral     Culture Respiratory W/ GRM STN [[id number]]     Order Status:  Completed Specimen Information:  Respirate from Sputum           Assessment:  Diabetes  Leukocytosis  Diabetic foot ulcer  Gangrene      Plan:  Diabetic foot ulcer with gangrene  S/p amp 5/3  Continue Zyvox and Zosyn  Blood cxs neg so far  FU operative cxs  PICC line  Anticipate need for 6 weeks IV abx unless gets BKA    Leukocytosis  Due to above  Decreasing  FU cultures    Severe PVD  S/p recent angioplasty  Re-eval if feasible    Gangrene  XRAY shoed gas in tissues  Monitor closely for healing-may need additional debridement  Remains high risk for progression    Diabetes  Keep BS under 150 to help control current infection    Prognosis for limb salvage guarded  Discussed with internal Medicine-Dr Monzon

## 2017-05-03 NOTE — OR NURSING
Pt to recovery, sleeping, resps unlabored. Airway dc'd shortly after arrival. Pt medicated per MAR for assumed pain at surgical site. Foot elevated on 2 pillows. Scant shadowing present on dressing. Denies nausea. VSS. FSBS checked in PACU per MD order and =295. No further orders.

## 2017-05-03 NOTE — PROGRESS NOTES
Pt taken down to pre-op with transport and Autumn RN via hospital bed. Pt in gown all belongings left in pt room. Pre-op checklist started. IV saline locked. Chart and consent taken down with pt. Tele monitor removed. Monitor room notified.

## 2017-05-03 NOTE — DISCHARGE PLANNING
SW provided Advance Directive paperwork to pt as requested. SW to obtain certificate of competency tomorrow during rounds.

## 2017-05-03 NOTE — CONSULTS
DATE OF SERVICE:  05/02/2017    REASON FOR CONSULT:  Diabetic foot ulcer with cellulitis and gangrene.    CONSULTING PHYSICIAN:  Dr. Peter.    HISTORY OF PRESENT ILLNESS:  This is an unfortunate 56-year-old white male   with a longstanding history of diabetes, complicated by retinopathy and   neuropathy, and severe peripheral vascular disease who had undergone a left   lower extremity angiogram with angioplasty on April 20th.  He had had a distal   occlusion of the anterior tibial artery which responded well to the   angioplasty.  He states he has had ulceration on his left fourth digit since   February of this year, started while he is wearing tennis shoes for over 9   hours [place] the airport and developed a blister which never completely   healed.  He has been treated with multiple rounds of oral antibiotics   including Augmentin and Bactrim; however, it always recurred after he finished   the antibiotics.  He went to wound care today, was complaining of fever and   chills and he was sent directly to the hospital.  He is being started   empirically on vancomycin and Zosyn and Unasyn and infectious disease is   consulted for antibiotic recommendations and management.  Most recent cultures   were from April 14, which showed Enterococcus faecalis and vancomycin NIKOLAS of   2, sensitive to ampicillin.  Prior cultures were negative.  He denies any   nausea, vomiting, diarrhea, or other systemic complaints.  He denies any   recurrent trauma or injury to the foot.    REVIEW OF SYSTEMS:  Negative except for stated in the HPI.    ALLERGIES:  He has no known antibiotic allergies.    PAST MEDICAL HISTORY:  Insulin dependent diabetes, retinopathy, chronic kidney   disease, hypertension, and hyperlipidemia.    FAMILY HISTORY:  Multiple family members with diabetes.  Father had an   aneurysm, mom has dementia and COPD.    SOCIAL HISTORY:  He is a nonsmoker, states he has never smoked cigarettes.  He   did smoke marijuana  when he was having his eye surgery.  He does chew   tobacco.  He does not drink or use illicit drugs.    PHYSICAL EXAMINATION:  GENERAL:  He is a well-nourished, well-developed male, in no acute distress,   pleasant and cooperative.  VITAL SIGNS:  Temperature is 100.4, blood pressure 107/59, pulse 81,   respiratory rate 18, oxygen saturation 98% on room air.  He weighs 71 kilos.  HEENT:  Normocephalic and atraumatic.  Pupils are equal, round, and reactive   to light.  Extraocular movements are intact.  Oropharynx is clear.  NECK:  Supple.  CARDIOVASCULAR:  Regular rate and rhythm.  CHEST:  Clear to auscultation bilaterally, unlabored.  ABDOMEN:  Soft, nontender, and nondistended.  EXTREMITIES:  Show no cyanosis or clubbing.  His left lower extremity has   about 2-3+ pitting edema.  He has erythema extending along the dorsum of his   foot to about the midfoot.  He has the left fourth digit of the foot has an   ulceration and is cold to the touch.  The erythema on his foot is also   relatively cool, another is warm as one would expect with cellulitis.  NEUROLOGIC:  Awake, alert, and oriented.  Speech is fluent without dysarthria.    Cranial nerves are intact.    LABORATORY DATA:  Current labs show white blood cell count of 20, H and H 11.2   and 32.8, platelets of 334.  Sodium 130, potassium 4.9, chloride 95,   bicarbonate 24, glucose 168, BUN 35, creatinine of 2, which is up from his   baseline of 1.6-1.7.  Alkaline phosphatase is 0.7.  Urinalysis was positive   for glucose and ketones.  Prior cultures as stated in the HPI, grew   Enterococcus faecalis as well as mixed skin kasey that was pansusceptible.    X-ray of the foot on the 14th showed no osteomyelitis.  Vascular evaluation   prior to surgery showed calcification of the arteries bilaterally.  No flow   was observed in the right distal anterior tibial artery, no flow was seen in   the left distal anterior tibial and the posterior temporal arteries.  Review    of the chart shows he did undergo a left anterior tibial artery angioplasty on   April 21.  Picture of his foot from a week ago showed minimal erythema with a   shallow ulceration.  There was some edema of the toe, the wound measured 0.8   x 0.9 x 0.3 cm at that time.    ASSESSMENT AND PLAN:  A 56-year-old diabetic male who presents with a   non-healing ulceration of his left fourth digit now appears to be   superinfected with associated gangrene.  He did recently undergo   revascularization procedure, there is concern that he has had occlusion of the   vascularature given the coolness of the toe and the foot as compared to the   erythema seen as well as the development of a rapidly progressive infection.    He does have a marked leukocytosis.  Blood cultures have been ordered.  He has   been currently ordered to have IV vancomycin and Zosyn, but due to his acute   on chronic kidney injury, we will discontinue the vancomycin and start Zyvox   to cover for possible methicillin-resistant Staphylococcus aureus.  Continue   Unasyn for his prior isolated Enterococcus faecalis, enteric gram negatives,   and anaerobes.  Do recommend as stated for repeat vascular evaluation is to   attempt to keep his blood sugars under 150 to help with control of infection,   further recommendations per culture results and clinical course, discussed   with internal medicine.    Thank you and we will follow with you.       ____________________________________     MD SIR EDWARDS / NTS    DD:  05/02/2017 15:04:56  DT:  05/02/2017 19:10:17    D#:  [id number]  Job#:  [id number]

## 2017-05-03 NOTE — PROGRESS NOTES
Notified of Xray results by Radiologist. Concerns for gas, necrotizing tissue. Evaluated patient, appears stable. Notified Dr. Richard who is aware and will plan for surgery.

## 2017-05-04 ENCOUNTER — APPOINTMENT (OUTPATIENT)
Dept: RADIOLOGY | Facility: MEDICAL CENTER | Age: 57
DRG: 854 | End: 2017-05-04
Attending: INTERNAL MEDICINE
Payer: MEDICARE

## 2017-05-04 LAB
ALBUMIN SERPL BCP-MCNC: 3 G/DL (ref 3.2–4.9)
ALBUMIN/GLOB SERPL: 0.9 G/DL
ALP SERPL-CCNC: 58 U/L (ref 30–99)
ALT SERPL-CCNC: 8 U/L (ref 2–50)
ANION GAP SERPL CALC-SCNC: 5 MMOL/L (ref 0–11.9)
AST SERPL-CCNC: 12 U/L (ref 12–45)
BASOPHILS # BLD AUTO: 0.4 % (ref 0–1.8)
BASOPHILS # BLD: 0.05 K/UL (ref 0–0.12)
BILIRUB SERPL-MCNC: 0.3 MG/DL (ref 0.1–1.5)
BUN SERPL-MCNC: 27 MG/DL (ref 8–22)
CALCIUM SERPL-MCNC: 8.4 MG/DL (ref 8.5–10.5)
CHLORIDE SERPL-SCNC: 99 MMOL/L (ref 96–112)
CO2 SERPL-SCNC: 24 MMOL/L (ref 20–33)
CREAT SERPL-MCNC: 1.85 MG/DL (ref 0.5–1.4)
EOSINOPHIL # BLD AUTO: 0.18 K/UL (ref 0–0.51)
EOSINOPHIL NFR BLD: 1.5 % (ref 0–6.9)
ERYTHROCYTE [DISTWIDTH] IN BLOOD BY AUTOMATED COUNT: 41.2 FL (ref 35.9–50)
GFR SERPL CREATININE-BSD FRML MDRD: 38 ML/MIN/1.73 M 2
GLOBULIN SER CALC-MCNC: 3.2 G/DL (ref 1.9–3.5)
GLUCOSE BLD-MCNC: 181 MG/DL (ref 65–99)
GLUCOSE BLD-MCNC: 195 MG/DL (ref 65–99)
GLUCOSE BLD-MCNC: 209 MG/DL (ref 65–99)
GLUCOSE BLD-MCNC: 224 MG/DL (ref 65–99)
GLUCOSE BLD-MCNC: 363 MG/DL (ref 65–99)
GLUCOSE SERPL-MCNC: 292 MG/DL (ref 65–99)
HCT VFR BLD AUTO: 29.5 % (ref 42–52)
HGB BLD-MCNC: 9.7 G/DL (ref 14–18)
IMM GRANULOCYTES # BLD AUTO: 0.05 K/UL (ref 0–0.11)
IMM GRANULOCYTES NFR BLD AUTO: 0.4 % (ref 0–0.9)
LYMPHOCYTES # BLD AUTO: 1.93 K/UL (ref 1–4.8)
LYMPHOCYTES NFR BLD: 15.7 % (ref 22–41)
MAGNESIUM SERPL-MCNC: 2.3 MG/DL (ref 1.5–2.5)
MCH RBC QN AUTO: 29.8 PG (ref 27–33)
MCHC RBC AUTO-ENTMCNC: 32.9 G/DL (ref 33.7–35.3)
MCV RBC AUTO: 90.8 FL (ref 81.4–97.8)
MONOCYTES # BLD AUTO: 1.1 K/UL (ref 0–0.85)
MONOCYTES NFR BLD AUTO: 8.9 % (ref 0–13.4)
NEUTROPHILS # BLD AUTO: 9 K/UL (ref 1.82–7.42)
NEUTROPHILS NFR BLD: 73.1 % (ref 44–72)
NRBC # BLD AUTO: 0 K/UL
NRBC BLD AUTO-RTO: 0 /100 WBC
PLATELET # BLD AUTO: 279 K/UL (ref 164–446)
PMV BLD AUTO: 9.9 FL (ref 9–12.9)
POTASSIUM SERPL-SCNC: 4 MMOL/L (ref 3.6–5.5)
PROT SERPL-MCNC: 6.2 G/DL (ref 6–8.2)
RBC # BLD AUTO: 3.25 M/UL (ref 4.7–6.1)
SODIUM SERPL-SCNC: 128 MMOL/L (ref 135–145)
WBC # BLD AUTO: 12.3 K/UL (ref 4.8–10.8)

## 2017-05-04 PROCEDURE — 770020 HCHG ROOM/CARE - TELE (206)

## 2017-05-04 PROCEDURE — 76937 US GUIDE VASCULAR ACCESS: CPT

## 2017-05-04 PROCEDURE — 82962 GLUCOSE BLOOD TEST: CPT | Mod: 91

## 2017-05-04 PROCEDURE — A9270 NON-COVERED ITEM OR SERVICE: HCPCS | Performed by: INTERNAL MEDICINE

## 2017-05-04 PROCEDURE — 80053 COMPREHEN METABOLIC PANEL: CPT

## 2017-05-04 PROCEDURE — A9270 NON-COVERED ITEM OR SERVICE: HCPCS | Performed by: HOSPITALIST

## 2017-05-04 PROCEDURE — 85025 COMPLETE CBC W/AUTO DIFF WBC: CPT

## 2017-05-04 PROCEDURE — 83735 ASSAY OF MAGNESIUM: CPT

## 2017-05-04 PROCEDURE — 36569 INSJ PICC 5 YR+ W/O IMAGING: CPT

## 2017-05-04 PROCEDURE — 700102 HCHG RX REV CODE 250 W/ 637 OVERRIDE(OP): Performed by: NURSE PRACTITIONER

## 2017-05-04 PROCEDURE — 700111 HCHG RX REV CODE 636 W/ 250 OVERRIDE (IP): Performed by: HOSPITALIST

## 2017-05-04 PROCEDURE — 700105 HCHG RX REV CODE 258: Performed by: HOSPITALIST

## 2017-05-04 PROCEDURE — A9270 NON-COVERED ITEM OR SERVICE: HCPCS | Performed by: NURSE PRACTITIONER

## 2017-05-04 PROCEDURE — 99232 SBSQ HOSP IP/OBS MODERATE 35: CPT | Performed by: HOSPITALIST

## 2017-05-04 PROCEDURE — 700102 HCHG RX REV CODE 250 W/ 637 OVERRIDE(OP): Performed by: INTERNAL MEDICINE

## 2017-05-04 PROCEDURE — 36415 COLL VENOUS BLD VENIPUNCTURE: CPT

## 2017-05-04 PROCEDURE — 700102 HCHG RX REV CODE 250 W/ 637 OVERRIDE(OP): Performed by: HOSPITALIST

## 2017-05-04 RX ORDER — CALCIUM CARBONATE 500(1250)
500 TABLET ORAL 2 TIMES DAILY WITH MEALS
Status: DISCONTINUED | OUTPATIENT
Start: 2017-05-04 | End: 2017-05-09 | Stop reason: HOSPADM

## 2017-05-04 RX ADMIN — STANDARDIZED SENNA CONCENTRATE AND DOCUSATE SODIUM 2 TABLET: 8.6; 5 TABLET, FILM COATED ORAL at 21:01

## 2017-05-04 RX ADMIN — PIPERACILLIN SODIUM AND TAZOBACTAM SODIUM 3.38 G: 3; .375 INJECTION, POWDER, FOR SOLUTION INTRAVENOUS at 15:58

## 2017-05-04 RX ADMIN — INSULIN LISPRO 12 UNITS: 100 INJECTION, SOLUTION INTRAVENOUS; SUBCUTANEOUS at 00:14

## 2017-05-04 RX ADMIN — LINEZOLID 600 MG: 600 TABLET, FILM COATED ORAL at 08:55

## 2017-05-04 RX ADMIN — INSULIN LISPRO 3 UNITS: 100 INJECTION, SOLUTION INTRAVENOUS; SUBCUTANEOUS at 16:57

## 2017-05-04 RX ADMIN — CALCIUM CARBONATE 500 MG: 1250 TABLET ORAL at 17:04

## 2017-05-04 RX ADMIN — PIPERACILLIN SODIUM AND TAZOBACTAM SODIUM 3.38 G: 3; .375 INJECTION, POWDER, FOR SOLUTION INTRAVENOUS at 08:56

## 2017-05-04 RX ADMIN — SODIUM CHLORIDE: 9 INJECTION, SOLUTION INTRAVENOUS at 04:11

## 2017-05-04 RX ADMIN — DABIGATRAN ETEXILATE MESYLATE 75 MG: 75 CAPSULE ORAL at 09:00

## 2017-05-04 RX ADMIN — INSULIN LISPRO 3 UNITS: 100 INJECTION, SOLUTION INTRAVENOUS; SUBCUTANEOUS at 12:18

## 2017-05-04 RX ADMIN — INSULIN LISPRO 4 UNITS: 100 INJECTION, SOLUTION INTRAVENOUS; SUBCUTANEOUS at 05:56

## 2017-05-04 RX ADMIN — HYDROCODONE BITARTRATE AND ACETAMINOPHEN 1 TABLET: 10; 325 TABLET ORAL at 03:32

## 2017-05-04 RX ADMIN — LINEZOLID 600 MG: 600 TABLET, FILM COATED ORAL at 21:01

## 2017-05-04 RX ADMIN — METOPROLOL SUCCINATE 25 MG: 25 TABLET, EXTENDED RELEASE ORAL at 08:55

## 2017-05-04 RX ADMIN — SODIUM CHLORIDE: 9 INJECTION, SOLUTION INTRAVENOUS at 19:04

## 2017-05-04 RX ADMIN — ACYCLOVIR 200 MG: 200 CAPSULE ORAL at 08:55

## 2017-05-04 RX ADMIN — PIPERACILLIN SODIUM AND TAZOBACTAM SODIUM 3.38 G: 3; .375 INJECTION, POWDER, FOR SOLUTION INTRAVENOUS at 21:02

## 2017-05-04 RX ADMIN — FAMOTIDINE 20 MG: 20 TABLET, FILM COATED ORAL at 21:01

## 2017-05-04 RX ADMIN — CALCIUM CARBONATE 500 MG: 1250 TABLET ORAL at 12:20

## 2017-05-04 RX ADMIN — ATORVASTATIN CALCIUM 40 MG: 40 TABLET, FILM COATED ORAL at 21:01

## 2017-05-04 RX ADMIN — FAMOTIDINE 20 MG: 20 TABLET, FILM COATED ORAL at 08:55

## 2017-05-04 RX ADMIN — PIPERACILLIN SODIUM AND TAZOBACTAM SODIUM 3.38 G: 3; .375 INJECTION, POWDER, FOR SOLUTION INTRAVENOUS at 03:29

## 2017-05-04 RX ADMIN — HYDROCODONE BITARTRATE AND ACETAMINOPHEN 1 TABLET: 10; 325 TABLET ORAL at 19:04

## 2017-05-04 RX ADMIN — MAGNESIUM HYDROXIDE 30 ML: 400 SUSPENSION ORAL at 04:13

## 2017-05-04 RX ADMIN — STANDARDIZED SENNA CONCENTRATE AND DOCUSATE SODIUM 2 TABLET: 8.6; 5 TABLET, FILM COATED ORAL at 08:55

## 2017-05-04 RX ADMIN — INSULIN GLARGINE 15 UNITS: 100 INJECTION, SOLUTION SUBCUTANEOUS at 21:13

## 2017-05-04 ASSESSMENT — PAIN SCALES - GENERAL
PAINLEVEL_OUTOF10: 1
PAINLEVEL_OUTOF10: 1
PAINLEVEL_OUTOF10: 4
PAINLEVEL_OUTOF10: 3
PAINLEVEL_OUTOF10: 7

## 2017-05-04 ASSESSMENT — ENCOUNTER SYMPTOMS
PALPITATIONS: 0
SORE THROAT: 0
SENSORY CHANGE: 0
NECK PAIN: 0
VOMITING: 0
CONSTIPATION: 0
NERVOUS/ANXIOUS: 0
SPEECH CHANGE: 0
HEMOPTYSIS: 0
PHOTOPHOBIA: 0
WEAKNESS: 1
DEPRESSION: 0
CHILLS: 0
MYALGIAS: 1
SPUTUM PRODUCTION: 0
BACK PAIN: 0
MEMORY LOSS: 0
BLURRED VISION: 0
DIZZINESS: 0
HEADACHES: 0
HEARTBURN: 0
TINGLING: 0
PND: 0
ORTHOPNEA: 0
TREMORS: 0
STRIDOR: 0
BLOOD IN STOOL: 0
DIARRHEA: 0
DOUBLE VISION: 0
EYE PAIN: 0
FEVER: 0
NAUSEA: 0
COUGH: 0
CLAUDICATION: 0
SHORTNESS OF BREATH: 0
ABDOMINAL PAIN: 0

## 2017-05-04 NOTE — CARE PLAN
Problem: Safety  Goal: Will remain free from injury  Outcome: PROGRESSING AS EXPECTED    Problem: Venous Thromboembolism (VTW)/Deep Vein Thrombosis (DVT) Prevention:  Goal: Patient will participate in Venous Thrombosis (VTE)/Deep Vein Thrombosis (DVT)Prevention Measures  Outcome: PROGRESSING AS EXPECTED    Problem: Knowledge Deficit  Goal: Knowledge of disease process/condition, treatment plan, diagnostic tests, and medications will improve  Outcome: PROGRESSING AS EXPECTED    Problem: Pain Management  Goal: Pain level will decrease to patient’s comfort goal  Outcome: PROGRESSING AS EXPECTED

## 2017-05-04 NOTE — WOUND TEAM
[facility]  Inpatient Services  Initial Wound and Skin Care Evaluation    Admission Date:   5/2/17  HPI, PMH, SH: Reviewed  Unit where seen by Wound Team:   T8    WOUND CONSULT RELATED TO:  md request for npwt drsg placement and changes per protocol    SUBJECTIVE:  Pleasant/agreeable    Self Report / Pain Level:  ju okay    OBJECTIVE:   Post-op wet-to-dry drsg remained intact    WOUND TYPE, LOCATION, CHARACTERISTICS (Pressure ulcers: location, stage, POA or date identified)    Location and type of wound: Full thickness surgical wnd following left 4th toe amputation        Periwound:      excoriated  Drainage:      Scant sanguinous  Tissue Type and %:     100% red  Wound Edges:     attached  Odor:       none  Exposed structure(s):   muscle  S&S of Infection:    none      Measurements: (cm)   Taken 5/3/17  Length:      4.0  Width:       2.0  Depth:     2.0      INTERVENTIONS BY WOUND TEAM:   Prev drsg removed -- wnd irrigated with wnd cleanser -- benzoin, thin hydrocolloid and drape kenney-wnd -- 1 piece black foam to fill the wnd with a bridge and button to the dorsum of the foot -- sealed with drape and negative pressure applied at 125mmhg    Interdisciplinary consultation: nsg; pt; family    EVALUATION: clean surgical wnd that should do well with npwt    Factors affecting wound healing: dm    Goals:  100% granulation in 3 weeks -- decrease size X 5% every 2 weeks    NURSING PLAN OF CARE ORDERS (x):    Dressing changes: See Dressing Maintenance orders: x  Skin care: See Skin Care orders: x  Rectal tube care: See Rectal Tube Care orders:   Other orders:    RSKIN: CURRENT (X) ORDERED (O)  Q shift Solis:  X  Q shift pressure point assessments:  X  Pressure redistribution mattress        RUSTY      Bariatric RUSTY      Bariatric foam        Heel float boots       Heels floated on pillows    x  Barrier wipes      Barrier Cream      Barrier paste      Sacral silicone dressing      Silicone O2 tubing       Anchorfast      Trach with Optifoam split foam       Waffle cushion      Rectal tube or BMS      Antifungal tx    Turn q 2 hours     Up to chair     Ambulate   PT/OT     Dietician      PO     TF   TPN     PVN    NPO   # days   Other       WOUND TEAM PLAN OF CARE (x):   NPWT change 3 x week:      x  Dressing changes by wound team:       Follow up as needed:     x  Other (explain):    Anticipated discharge plans (x):  SNF:           Home Care:           Outpatient Wound Center:          x  Self Care:            Other:

## 2017-05-04 NOTE — WOUND TEAM
Patient has a spot of dry skin that is barely darker than the rest of his foot over left medial malleolus. This area does not need advanced wound care.

## 2017-05-04 NOTE — PROGRESS NOTES
PICC Insertion Procedure Note    Consents confirmed, vessel patency confirmed with ultrasound. Risks and benefits of procedure explained to patient/family and education regarding central line associated bloodstream infections provided. Questions answered.     PICC placed in RUE per MD order with ultrasound guidance. 4 Fr, SINGLE lumen PICC placed in BASILIC vein after 1 attempt(s). 3 cc's of 1% lidocaine injected intradermally, 21 gauge microintroducer needle and modified Seldinger technique used. 42 cm total catheter length with good blood return. Each lumen flushed without resistance with 10 mL 0.9% normal saline. PICC line secured with Biopatch and Tegaderm.    Pt tolerated procedure WELL.  Patient condition relayed to unit RN or ordering physician via this post procedure note in the EMR.     BARD Power PICC ref # [id number], lot # [id number]    PULLED BACK  2CM CXR CONFIRMED

## 2017-05-04 NOTE — PROGRESS NOTES
Assumed care of patient bedside report received from Mckenna updated on POC, call light within reach and fall precautions in place. Bed locked and in lowest position. Patient instructed to call for assistance before getting out of bed. All questions answered, no other needs at this time.

## 2017-05-04 NOTE — CARE PLAN
Problem: Mobility  Goal: Risk for activity intolerance will decrease  Outcome: PROGRESSING AS EXPECTED  Pt asked to use call light before getting out of bed to prevent falls r/t wound vac and iv pole

## 2017-05-04 NOTE — DISCHARGE PLANNING
SW faxed certificate of competency to Chloé Lara and notified her of pt's request to complete AD.

## 2017-05-04 NOTE — PROGRESS NOTES
POD#1 s/p left 4th toe amputation    VSS afeb    PICC placed and long term abx planned.  Hopefully, all infected tissue removed.  NPWT dressing in place.  Either myself or LP service APN would like to see wound with dressing change.  Renal function returned, but not quite to baseline.      Many questions answered for home care.  Patient not sure how he is going to get to daily infusion.  Brother at bedside and helping to plan home care.

## 2017-05-04 NOTE — PROGRESS NOTES
Infectious Disease Progress Note    Author: Temitope Shannon M.D. DOS & Time created: 2017  12:12 PM    Chief Complaint:  FU diabetic foot ulcer with gangrene      Interval History:  56-year-old diabetic male admitted with a non-healing ulceration of his left fourth digit superinfected with associated gangrene.   5/3 AF WBC 15.4 s/p OR this am with amp 4th digit. Tolerated well-denies SE abx   AF cxs neg Path reviewed-c/o pain  Labs Reviewed, Medications Reviewed, Radiology Reviewed and Wound Reviewed.    Review of Systems:  Review of Systems   Constitutional: Negative for fever and chills.   Gastrointestinal: Negative for nausea, vomiting, abdominal pain and diarrhea.   Musculoskeletal: Positive for joint pain.   Skin: Negative for rash.   All other systems reviewed and are negative.      Hemodynamics:  Temp (24hrs), Av.5 °C (97.7 °F), Min:36.3 °C (97.3 °F), Max:36.9 °C (98.4 °F)  Temperature: 36.7 °C (98 °F)  Pulse  Av.4  Min: 60  Max: 87   Blood Pressure: 118/69 mmHg       Physical Exam:  Physical Exam   Constitutional: He is oriented to person, place, and time. He appears well-developed. No distress.   HENT:   Head: Atraumatic.   Eyes: EOM are normal. Pupils are equal, round, and reactive to light.   Neck: Neck supple.   Cardiovascular: Normal rate.    No murmur heard.  Pulmonary/Chest: Effort normal. No respiratory distress. He has no wheezes. He has no rales.   Abdominal: He exhibits no distension.   Musculoskeletal: He exhibits edema.   LLE VAC  +edema   Neurological: He is alert and oriented to person, place, and time.   Skin: No rash noted.   Nursing note and vitals reviewed.      Meds:    Current facility-administered medications:   •  calcium carbonate (OS-CHRISTIN 500) tablet 500 mg, 500 mg, Oral, BID WITH MEALS, Natalya Monzon M.D.  •  PICC Line Insertion has been implemented, , , Once **AND** May use Lidocaine 1% not to exceed 3 mls for local at insertion site, , , CONTINUOUS **AND**  NOTIFY MD, , , Once **AND** Tip to dwell in the superior vena cava, , , CONTINUOUS **AND** Do not use PICC Line until placement verified by Chest X Ray, , , CONTINUOUS **AND** DX-CHEST-FOR PICC LINE Perform procedure in:: PICC Room, , , Once **AND** If radiologist reading of chest X-ray states any of the following the PICC should be used, , , CONTINUOUS **AND** Further evaluation of the PICC placement can be retrieved from X-Ray and Imaging, , , CONTINUOUS **AND** Blood draws through PICC line; draws by RN only, , , CONTINUOUS **AND** FLUSHING GUIDELINES WHEN IN USE, , , CONTINUOUS **AND** normal saline PF 10-20 mL, 10-20 mL, Intravenous, PRN **AND** FLUSHING GUIDELINES WHEN NOT IN USE, , , CONTINUOUS **AND** DRESSING MAINTENANCE, , , Once **AND** Change needleless pressure ports and IV tubing every 72 hours per hospital policy, , , CONTINUOUS **AND** TUBING, , , CONTINUOUS **AND** If there is an MD order to remove the PICC line, any RN may remove the PICC line, , , CONTINUOUS **AND** [] PATIENT EDUCATION MATERIALS, , , Prior to discharge **AND** NURSING COMMUNICATION, , , CONTINUOUS, Temitope Sahnnon M.D.  •  insulin glargine (LANTUS) injection 15 Units, 15 Units, Subcutaneous, Q EVENING, Natalya Monzon M.D., 15 Units at 17 2120  •  insulin lispro (HUMALOG) injection 3-14 Units, 3-14 Units, Subcutaneous, Q6HRS, Natalya Monzon M.D., 4 Units at 17 0556  •  Action is required: Protocol 1073 Hypoglycemia has been implemented, , , Once **AND** Protocol 1073 Inclusion Criteria, , , CONTINUOUS **AND** Protocol 1073 NOTIFY, , , Once **AND** Protocol 1073 Initiate protocol immediately if FSBG is less than or equal to 70 mg/dL, , , CONTINUOUS **AND** glucose 4 g chewable tablet 16 g, 16 g, Oral, Q15 MIN PRN **AND** dextrose 50% (D50W) injection 25 mL, 25 mL, Intravenous, Q15 MIN PRN, Natalya Monzon M.D.  •  NS infusion 2,133 mL, 30 mL/kg, Intravenous, Once PRNNatalya M.D.  •   senna-docusate (PERICOLACE or SENOKOT S) 8.6-50 MG per tablet 2 Tab, 2 Tab, Oral, BID, 2 Tab at 05/04/17 0855 **AND** polyethylene glycol/lytes (MIRALAX) PACKET 1 Packet, 1 Packet, Oral, QDAY PRN **AND** magnesium hydroxide (MILK OF MAGNESIA) suspension 30 mL, 30 mL, Oral, QDAY PRN, 30 mL at 05/04/17 0413 **AND** bisacodyl (DULCOLAX) suppository 10 mg, 10 mg, Rectal, QDAY PRN, Natalya Monzon M.D.  •  NS infusion, , Intravenous, Continuous, Natalya Monzon M.D., Last Rate: 125 mL/hr at 05/04/17 0411  •  NS (BOLUS) infusion 500 mL, 500 mL, Intravenous, Once PRN, Natalya Monzon M.D.  •  piperacillin-tazobactam (ZOSYN) 3.375 g in  mL IVPB, 3.375 g, Intravenous, Q6HRS, Natalya Monzon M.D., Stopped at 05/04/17 0900  •  ondansetron (ZOFRAN) syringe/vial injection 4 mg, 4 mg, Intravenous, Q4HRS PRN, Natalya Monzon M.D., 4 mg at 05/03/17 0510  •  ondansetron (ZOFRAN ODT) dispertab 4 mg, 4 mg, Oral, Q4HRS PRN, Natalya Monzon M.D.  •  promethazine (PHENERGAN) tablet 12.5-25 mg, 12.5-25 mg, Oral, Q4HRS PRN, Natalya Monzon M.D., 25 mg at 05/03/17 1044  •  promethazine (PHENERGAN) suppository 12.5-25 mg, 12.5-25 mg, Rectal, Q4HRS PRN, Natalya Monzon M.D.  •  prochlorperazine (COMPAZINE) injection 5-10 mg, 5-10 mg, Intravenous, Q4HRS PRN, Natalya Monzon M.D.  •  INITIATE NICOTINE REPLACEMENT PROTOCOL , , , Once **AND** nicotine (NICODERM) 14 MG/24HR 14 mg, 14 mg, Transdermal, Daily-0600, 14 mg at 05/02/17 1430 **AND** Protocol 205 PATIENT EDUCATION MATERIALS, , , Once **AND** Protocol 205 Rotate nicotine patch application sites daily , , , CONTINUOUS **AND** nicotine polacrilex (NICORETTE) 2 MG piece 2 mg, 2 mg, Oral, Q HOUR PRN, Natalya Monzon M.D.  •  famotidine (PEPCID) tablet 20 mg, 20 mg, Oral, BID, 20 mg at 05/04/17 0855 **OR** famotidine (PEPCID) injection 20 mg, 20 mg, Intravenous, BID, Natalya Monzon M.D.  •  acyclovir (ZOVIRAX) capsule 200 mg, 200 mg, Oral, DAILY, Natalya  MORRIS Monzon, 200 mg at 05/04/17 0855  •  dabigatran (PRADAXA) capsule 75 mg, 75 mg, Oral, DAILY, Natalya Monzon M.D., 75 mg at 05/04/17 0900  •  metoprolol SR (TOPROL XL) tablet 25 mg, 25 mg, Oral, Q DAY, Natalya Monzon M.D., 25 mg at 05/04/17 0855  •  atorvastatin (LIPITOR) tablet 40 mg, 40 mg, Oral, QHS, Natalya Monzon M.D., 40 mg at 05/03/17 2112  •  linezolid (ZYVOX) tablet 600 mg, 600 mg, Oral, Q12HRS, Temitope Shannon M.D., 600 mg at 05/04/17 0855  •  hydrocodone/acetaminophen (NORCO)  MG per tablet 1 Tab, 1 Tab, Oral, Q4HRS PRN, NGUYEN CariasP.R.N., 1 Tab at 05/04/17 0332    Labs:  Recent Labs      05/02/17   1258  05/03/17 0143  05/04/17 0225   WBC  20.1*  15.4*  12.3*   RBC  3.69*  3.54*  3.25*   HEMOGLOBIN  11.2*  10.8*  9.7*   HEMATOCRIT  32.8*  31.8*  29.5*   MCV  88.9  89.8  90.8   MCH  30.4  30.5  29.8   RDW  40.1  40.7  41.2   PLATELETCT  344  315  279   MPV  9.5  10.0  9.9   NEUTSPOLYS  84.10*  74.30*  73.10*   LYMPHOCYTES  6.40*  14.30*  15.70*   MONOCYTES  8.70  10.00  8.90   EOSINOPHILS  0.00  0.60  1.50   BASOPHILS  0.20  0.30  0.40     Recent Labs      05/02/17   1258  05/03/17   0143  05/04/17   0225   SODIUM  130*  126*  128*   POTASSIUM  4.9  4.4  4.0   CHLORIDE  95*  96  99   CO2  24  24  24   GLUCOSE  168*  317*  292*   BUN  35*  38*  27*     Recent Labs      05/02/17   1258  05/03/17   0143  05/04/17   0225   ALBUMIN  4.0  3.2  3.0*   TBILIRUBIN  0.7  0.5  0.3   ALKPHOSPHAT  74  61  58   TOTPROTEIN  7.6  6.6  6.2   ALTSGPT  11  9  8   ASTSGOT  18  13  12   CREATININE  2.07*  2.30*  1.85*       Imaging:  Dx-chest-limited (1 View)  5/2/2017  No acute cardiopulmonary disease.    Dx-foot-complete 3+ Left  5/2/2017  Soft tissue gas in the fourth toe, concerning for necrotizing infection. Questionable osseous destruction seen along the lateral aspect of the fourth proximal phalanx, concerning for osteomyelitis. CRITICAL RESULT READ BACK: Preliminary findings  "discussed with and critical read back performed by JUAN JOSE Cowan via telephone on 5/2/2017 6:44 PM    Dx-foot-complete 3+ Left  4/14/2017  Negative LEFT foot series. No plain film evidence of osteomyelitis.      Micro:  BLOOD CULTURE   Date Value Ref Range Status   05/02/2017   Preliminary    No Growth    Note: Blood cultures are incubated for 5 days and  are monitored continuously.Positive blood cultures  are called to the RN and reported as soon as  they are identified.     05/02/2017   Preliminary    No Growth    Note: Blood cultures are incubated for 5 days and  are monitored continuously.Positive blood cultures  are called to the RN and reported as soon as  they are identified.        Results     Procedure Component Value Units Date/Time    Blood Culture [[id number]] Collected:  05/02/17 1558    Order Status:  Completed Specimen Information:  Blood from Peripheral Updated:  05/03/17 0643     Significant Indicator NEG      Source BLD      Site PERIPHERAL      Blood Culture --      Result:        No Growth    Note: Blood cultures are incubated for 5 days and  are monitored continuously.Positive blood cultures  are called to the RN and reported as soon as  they are identified.      Narrative:      Per Hospital Policy: Only change Specimen Src: to \"Line\" if  specified by physician order.    Blood Culture [[id number]] Collected:  05/02/17 1558    Order Status:  Completed Specimen Information:  Blood from Peripheral Updated:  05/03/17 0643     Significant Indicator NEG      Source BLD      Site PERIPHERAL      Blood Culture --      Result:        No Growth    Note: Blood cultures are incubated for 5 days and  are monitored continuously.Positive blood cultures  are called to the RN and reported as soon as  they are identified.      Narrative:      Per Hospital Policy: Only change Specimen Src: to \"Line\" if  specified by physician order.    Blood Culture [[id number]]     Order Status:  Canceled Specimen Information:  " Blood from Peripheral     Blood Culture [[id number]]     Order Status:  Canceled Specimen Information:  Blood from Peripheral     Culture Respiratory W/ GRM STN [[id number]]     Order Status:  Completed Specimen Information:  Respirate from Sputum           Assessment:  Diabetes  Leukocytosis  Diabetic foot ulcer  Gangrene      Plan:  Diabetic foot ulcer with gangrene-margins not clear  S/p amp 5/3  Continue Zyvox and Zosyn  Blood cxs neg so far  FU operative cxs-neg  PICC line ordered   Anticipate need for 6 weeks IV abx unless gets BKA    Leukocytosis  Due to above  Decreasing  FU cultures as above    Severe PVD  S/p recent angioplasty  Re-eval if feasible    Gangrene  XRAY shoed gas in tissues  Monitor closely for healing-may need additional debridement  Remains high risk for progression    REGULO  Improved  Adjust abx as needed for renal function    Diabetes  Keep BS under 150 to help control current infection    Prognosis for limb salvage guarded  Discussed with internal Medicine-Dr Monzon

## 2017-05-04 NOTE — CARE PLAN
Problem: Infection  Goal: Will remain free from infection  Outcome: PROGRESSING AS EXPECTED  Intervention: Assess signs and symptoms of infection  Patient remaining fever free for shift

## 2017-05-04 NOTE — DISCHARGE PLANNING
Transitional Care Navigator:    Met with patient and his brother at bedside regarding discharge planning. Overview of outpatient vs. skilled facility for IV abx and wound care. Continue to follow; SW aware.

## 2017-05-04 NOTE — PROGRESS NOTES
Hospital Medicine Progress Note, Adult, Complex               Author: Natalya Monzon Date & Time created: 5/4/2017  9:40 AM     Interval History:  56 y.o. 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 Patient was admitted for left 3,4th toe cellulitis, sepsis.    Overnight patients foot xray revealed gas, concerning from necrotizing tissue,  was consulted and is undergoing debridement. Patient reports feeling much better.  ID was consulted, will change vancomycin to Zyvox and continue unasyn from zosyn.  Continue aggressive diabetic control.  Pain control, titrate as needed.    5/4  No issues overnight, patient blood sugar still elevated, however slowly improving  Continue Zyvox and unasyn, ID following.  Cont wound vac and wound care.  PICC Line placement pending. I do not anticipate that the patient is going to require dialysis at this time. At this point PICC line placement would allow for antibiotics which are vital in his clinical recovery. The benefits outweigh the risk PICC Line carries in terms of anticipation of future dialysis.     Review of Systems: grossly unchanged  Review of Systems   Constitutional: Positive for malaise/fatigue. Negative for fever and chills.   HENT: Negative for congestion, hearing loss, sore throat and tinnitus.    Eyes: Negative for blurred vision, double vision, photophobia and pain.   Respiratory: Negative for cough, hemoptysis, sputum production, shortness of breath and stridor.    Cardiovascular: Negative for chest pain, palpitations, orthopnea, claudication and PND.   Gastrointestinal: Negative for heartburn, nausea, vomiting, constipation, blood in stool and melena.   Genitourinary: Negative for dysuria, urgency and  frequency.   Musculoskeletal: Positive for myalgias and joint pain. Negative for back pain and neck pain.   Neurological: Positive for weakness. Negative for dizziness, tingling, tremors, sensory change, speech change and headaches.   Psychiatric/Behavioral: Negative for depression, suicidal ideas and memory loss. The patient is not nervous/anxious.        Physical Exam:  Physical Exam   Constitutional: He is oriented to person, place, and time. He appears well-developed and well-nourished.   HENT:   Head: Normocephalic and atraumatic.   Mouth/Throat: No oropharyngeal exudate.   Eyes: Conjunctivae are normal. Pupils are equal, round, and reactive to light. Right eye exhibits no discharge. No scleral icterus.   Neck: Neck supple. No JVD present. No thyromegaly present.   Cardiovascular: Intact distal pulses.    No murmur heard.  Pulmonary/Chest: Effort normal and breath sounds normal. No stridor. No respiratory distress. He has no wheezes. He has no rales.   Abdominal: Soft. Bowel sounds are normal. He exhibits no distension. There is no tenderness. There is no rebound.   Musculoskeletal: Normal range of motion. He exhibits edema and tenderness.   Left foot, toes in curlex, dry, intact, overall edema and erythema improved, wound vac in place, no discharge    Neurological: He is alert and oriented to person, place, and time.   Skin: Skin is warm. No erythema.   Psychiatric: He has a normal mood and affect. His behavior is normal. Thought content normal.       Labs:        Invalid input(s): [id number]      Recent Labs      05/02/17   1258  05/02/17   1448  05/03/17 0143 05/04/17 0225   SODIUM  130*   --   126*  128*   POTASSIUM  4.9   --   4.4  4.0   CHLORIDE  95*   --   96  99   CO2  24   --   24  24   BUN  35*   --   38*  27*   CREATININE  2.07*   --   2.30*  1.85*   MAGNESIUM  2.2  2.3   --   2.3   CALCIUM  9.2   --   8.7  8.4*     Recent Labs      05/02/17   1258  05/03/17   0143  05/04/17 0225   ALTSGPT   11  9  8   ASTSGOT  18  13  12   ALKPHOSPHAT  74  61  58   TBILIRUBIN  0.7  0.5  0.3   GLUCOSE  168*  317*  292*     Recent Labs      17   1258  17   0143  17   0225   RBC  3.69*  3.54*  3.25*   HEMOGLOBIN  11.2*  10.8*  9.7*   HEMATOCRIT  32.8*  31.8*  29.5*   PLATELETCT  344  315  279     Recent Labs      17   1258  17   0143  17   0225   WBC  20.1*  15.4*  12.3*   NEUTSPOLYS  84.10*  74.30*  73.10*   LYMPHOCYTES  6.40*  14.30*  15.70*   MONOCYTES  8.70  10.00  8.90   EOSINOPHILS  0.00  0.60  1.50   BASOPHILS  0.20  0.30  0.40   ASTSGOT  18  13  12   ALTSGPT  11  9  8   ALKPHOSPHAT  74  61  58   TBILIRUBIN  0.7  0.5  0.3           Hemodynamics:  Temp (24hrs), Av.4 °C (97.5 °F), Min:35.9 °C (96.6 °F), Max:36.9 °C (98.4 °F)  Temperature: 36.7 °C (98 °F)  Pulse  Av.4  Min: 60  Max: 87   Blood Pressure: 118/69 mmHg     Respiratory:    Respiration: 17, Pulse Oximetry: 95 %           Fluids:    Intake/Output Summary (Last 24 hours) at 17 0940  Last data filed at 17 0000   Gross per 24 hour   Intake    600 ml   Output    350 ml   Net    250 ml     Weight: 80.5 kg (177 lb 7.5 oz)  GI/Nutrition:  Orders Placed This Encounter   Procedures   • DIET ORDER     Standing Status: Standing      Number of Occurrences: 1      Standing Expiration Date:      Order Specific Question:  Diet:     Answer:  Diabetic [3]     Medical Decision Making, by Problem:    1.  Severe Sepsis.   - 2/2 to left 3,4th toe cellulitis, with findings of gas gangrene on xray.  - Pending Debridement per ..  - Continue with Zyvox and Unasyn. Cultures neg thus far.  - previous wound cultures from  shows evidence of enterococcus fecalis. Pan-sensitive.  - Continue IV fluids  - ID following.     2. Left 3,4th toe cellulitis with osteomyelitis.   - s/p I&D and amputation of the left    fourth toe.     - Continue antibiotics as jorge  - Limb preservation services following.   - continue wound  care.    3. Insulin-dependent diabetes Type 1.  - Increase lantus to 20U,   - Continue Insulin-sliding scale, accu-checks and hypoglycemia protocol.  - A1c : 8.6    4. Acute on chronic renal failure  - suspect pre-renal azotemia, improving,   - continue IV fluids, recheck bmp in the am.    5. Diabetic retinopathy.  - defer to outpatient management.    6. Essential hypertension  - Continue Metoprolol, will hold his losartan dose given REGULO.    7. 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  - continue pradaxa     8. Hyponatremia  - continue IVF, improving,   - check bmp in the am    9. Normocytic anemia  - no signs of gross bleeding, continue to monitor for acute changes.    Patient plan of care discussed at multidisplinary team rounds and with patient and R.N at beside.      Radiology images reviewed, Labs reviewed and Medications reviewed  Leslie catheter: No Leslie      DVT: pradaxa.  DVT prophylaxis - mechanical: SCDs    Antibiotics: Treating active infection/contamination beyond 24 hours perioperative coverage

## 2017-05-04 NOTE — DISCHARGE PLANNING
Care Transition Team Assessment    Completed screening and pt interested in crutches for discharge. Pt said that he does experience financial difficulty at times because the diabetic supplies can add up. Provided pt [employer] application. Pt said that the only additional barrier to discharge would be that he is at home alone at times and stated that there has been an occurrence of the pt passing out while he was alone. Communicated to SW.     Information Source  Orientation : Oriented x 4  Information Given By: Patient  Informant's Name: Dylon  Who is responsible for making decisions for patient? : Patient    Readmission Evaluation  Is this a readmission?: No    Elopement Risk  Legal Hold: No  Ambulatory or Self Mobile in Wheelchair: No-Not an Elopement Risk  Disoriented: No  Psychiatric Symptoms: None  History of Wandering: No  Elopement this Admit: No  Vocalizing Wanting to Leave: No  Displays Behaviors, Body Language Wanting to Leave: No-Not at Risk for Elopement  Elopement Risk: Not at Risk for Elopement    Interdisciplinary Discharge Planning  Does Admitting Nurse Feel This Could be a Complex Discharge?: No  Primary Care Physician: Dr. Bojorquez  Lives with - Patient's Self Care Capacity:  (Brother)  Patient or legal guardian wants to designate a caregiver (see row info): No  Support Systems: Family Member(s)  Housing / Facility: 2 Story House (trouble getting up the stairs)  Do You Take your Prescribed Medications Regularly: Yes  Able to Return to Previous ADL's: Future Time w/Therapy  Mobility Issues: Yes  Prior Services: None  Patient Expects to be Discharged to:: Home  Assistance Needed: Unknown at this Time  Durable Medical Equipment:  (Pt has cane may need crutches)    Discharge Preparedness  What is your plan after discharge?: Home with help  What are your discharge supports?: Sibling  Prior Functional Level: Independent with Activities of Daily Living, Independent with Medication Management  Difficulity  with ADLs: Walking  Difficulty with ADLs Comment: May need crutches  Difficulity with IADLs: Driving  Difficulity with IADL Comments: Sometimes has RTC Access (funding-out of area) and takes Uber    Functional Assesment  Prior Functional Level: Independent with Activities of Daily Living, Independent with Medication Management    Finances  Financial Barriers to Discharge: Yes (Pt diabetic supplies can become expensive - provide Careches)  Average Monthly Income: 1500 $ (~ 27,000 annually)  Source of Income: Social Security  Prescription Coverage: Yes ([employer] or [employer])    Vision / Hearing Impairment  Vision Impairment : Yes  Right Eye Vision: Impaired  Left Eye Vision: Impaired  Hearing Impairment : No    Values / Beliefs / Concerns  Values / Beliefs Concerns : No    Advance Directive  Advance Directive?: None  Advance Directive offered?: AD Booklet given    Domestic Abuse  Have you ever been the victim of abuse or violence?: No    Psychological Assessment  History of Substance Abuse: None  History of Psychiatric Problems: No  Non-compliant with Treatment: No  Newly Diagnosed Illness: No    Discharge Risks or Barriers  Discharge risks or barriers?: Complex medical needs    Anticipated Discharge Information  Anticipated discharge disposition: Home  Discharge Address: [address] [place], [place] [address] [id number]  Discharge Contact Phone Number: [phone]

## 2017-05-04 NOTE — DIETARY
Nutrition Services - Poor PO    Pt is a 56 yr old male admitted with sepsis infected lt 4th toe, fever, chills, elevated glucose, Cellulitis of fourth toe of left foot, Diabetes mellitus, Fever and chills    Spoke with pt at bedside.  Pt reports his appetite is fine.  Pt states low appetite before admit most likely due to foot infection.   Added afternoon snack for pt and encouraged him to speak with Nutrition Representative for meal choices.  Pt verbalized understanding.    RD will continue to follow per department policy.

## 2017-05-05 LAB
ALBUMIN SERPL BCP-MCNC: 2.9 G/DL (ref 3.2–4.9)
ALBUMIN/GLOB SERPL: 0.9 G/DL
ALP SERPL-CCNC: 59 U/L (ref 30–99)
ALT SERPL-CCNC: 8 U/L (ref 2–50)
ANION GAP SERPL CALC-SCNC: 7 MMOL/L (ref 0–11.9)
AST SERPL-CCNC: 15 U/L (ref 12–45)
BASOPHILS # BLD AUTO: 0.5 % (ref 0–1.8)
BASOPHILS # BLD: 0.05 K/UL (ref 0–0.12)
BILIRUB SERPL-MCNC: 0.3 MG/DL (ref 0.1–1.5)
BUN SERPL-MCNC: 17 MG/DL (ref 8–22)
CALCIUM SERPL-MCNC: 8.6 MG/DL (ref 8.5–10.5)
CHLORIDE SERPL-SCNC: 105 MMOL/L (ref 96–112)
CO2 SERPL-SCNC: 23 MMOL/L (ref 20–33)
CREAT SERPL-MCNC: 1.54 MG/DL (ref 0.5–1.4)
EOSINOPHIL # BLD AUTO: 0.29 K/UL (ref 0–0.51)
EOSINOPHIL NFR BLD: 3.1 % (ref 0–6.9)
ERYTHROCYTE [DISTWIDTH] IN BLOOD BY AUTOMATED COUNT: 42.2 FL (ref 35.9–50)
GFR SERPL CREATININE-BSD FRML MDRD: 47 ML/MIN/1.73 M 2
GLOBULIN SER CALC-MCNC: 3.3 G/DL (ref 1.9–3.5)
GLUCOSE BLD-MCNC: 125 MG/DL (ref 65–99)
GLUCOSE BLD-MCNC: 191 MG/DL (ref 65–99)
GLUCOSE BLD-MCNC: 220 MG/DL (ref 65–99)
GLUCOSE BLD-MCNC: 225 MG/DL (ref 65–99)
GLUCOSE BLD-MCNC: 252 MG/DL (ref 65–99)
GLUCOSE SERPL-MCNC: 140 MG/DL (ref 65–99)
HCT VFR BLD AUTO: 28.6 % (ref 42–52)
HGB BLD-MCNC: 9.2 G/DL (ref 14–18)
IMM GRANULOCYTES # BLD AUTO: 0.03 K/UL (ref 0–0.11)
IMM GRANULOCYTES NFR BLD AUTO: 0.3 % (ref 0–0.9)
LYMPHOCYTES # BLD AUTO: 2.11 K/UL (ref 1–4.8)
LYMPHOCYTES NFR BLD: 22.4 % (ref 22–41)
MAGNESIUM SERPL-MCNC: 2.4 MG/DL (ref 1.5–2.5)
MCH RBC QN AUTO: 29.7 PG (ref 27–33)
MCHC RBC AUTO-ENTMCNC: 32.2 G/DL (ref 33.7–35.3)
MCV RBC AUTO: 92.3 FL (ref 81.4–97.8)
MONOCYTES # BLD AUTO: 0.88 K/UL (ref 0–0.85)
MONOCYTES NFR BLD AUTO: 9.3 % (ref 0–13.4)
NEUTROPHILS # BLD AUTO: 6.07 K/UL (ref 1.82–7.42)
NEUTROPHILS NFR BLD: 64.4 % (ref 44–72)
NRBC # BLD AUTO: 0 K/UL
NRBC BLD AUTO-RTO: 0 /100 WBC
PLATELET # BLD AUTO: 307 K/UL (ref 164–446)
PMV BLD AUTO: 10.2 FL (ref 9–12.9)
POTASSIUM SERPL-SCNC: 4.2 MMOL/L (ref 3.6–5.5)
PROT SERPL-MCNC: 6.2 G/DL (ref 6–8.2)
RBC # BLD AUTO: 3.1 M/UL (ref 4.7–6.1)
SODIUM SERPL-SCNC: 135 MMOL/L (ref 135–145)
WBC # BLD AUTO: 9.4 K/UL (ref 4.8–10.8)

## 2017-05-05 PROCEDURE — 99232 SBSQ HOSP IP/OBS MODERATE 35: CPT | Performed by: HOSPITALIST

## 2017-05-05 PROCEDURE — 700111 HCHG RX REV CODE 636 W/ 250 OVERRIDE (IP): Performed by: HOSPITALIST

## 2017-05-05 PROCEDURE — 700102 HCHG RX REV CODE 250 W/ 637 OVERRIDE(OP): Performed by: NURSE PRACTITIONER

## 2017-05-05 PROCEDURE — 700102 HCHG RX REV CODE 250 W/ 637 OVERRIDE(OP): Performed by: INTERNAL MEDICINE

## 2017-05-05 PROCEDURE — 700102 HCHG RX REV CODE 250 W/ 637 OVERRIDE(OP): Performed by: HOSPITALIST

## 2017-05-05 PROCEDURE — A9270 NON-COVERED ITEM OR SERVICE: HCPCS | Performed by: NURSE PRACTITIONER

## 2017-05-05 PROCEDURE — A9270 NON-COVERED ITEM OR SERVICE: HCPCS | Performed by: INTERNAL MEDICINE

## 2017-05-05 PROCEDURE — A9270 NON-COVERED ITEM OR SERVICE: HCPCS | Performed by: HOSPITALIST

## 2017-05-05 PROCEDURE — 700105 HCHG RX REV CODE 258: Performed by: HOSPITALIST

## 2017-05-05 PROCEDURE — 82962 GLUCOSE BLOOD TEST: CPT | Mod: 91

## 2017-05-05 PROCEDURE — 97605 NEG PRS WND THER DME<=50SQCM: CPT

## 2017-05-05 PROCEDURE — 83735 ASSAY OF MAGNESIUM: CPT

## 2017-05-05 PROCEDURE — 80053 COMPREHEN METABOLIC PANEL: CPT

## 2017-05-05 PROCEDURE — 85025 COMPLETE CBC W/AUTO DIFF WBC: CPT

## 2017-05-05 PROCEDURE — 770020 HCHG ROOM/CARE - TELE (206)

## 2017-05-05 RX ORDER — CLOPIDOGREL BISULFATE 75 MG/1
75 TABLET ORAL DAILY
COMMUNITY
End: 2018-04-26

## 2017-05-05 RX ORDER — CLOPIDOGREL BISULFATE 75 MG/1
75 TABLET ORAL DAILY
Status: DISCONTINUED | OUTPATIENT
Start: 2017-05-06 | End: 2017-05-07

## 2017-05-05 RX ADMIN — INSULIN LISPRO 4 UNITS: 100 INJECTION, SOLUTION INTRAVENOUS; SUBCUTANEOUS at 16:49

## 2017-05-05 RX ADMIN — ATORVASTATIN CALCIUM 40 MG: 40 TABLET, FILM COATED ORAL at 21:24

## 2017-05-05 RX ADMIN — SODIUM CHLORIDE: 9 INJECTION, SOLUTION INTRAVENOUS at 15:14

## 2017-05-05 RX ADMIN — INSULIN LISPRO 3 UNITS: 100 INJECTION, SOLUTION INTRAVENOUS; SUBCUTANEOUS at 00:25

## 2017-05-05 RX ADMIN — DABIGATRAN ETEXILATE MESYLATE 75 MG: 75 CAPSULE ORAL at 08:09

## 2017-05-05 RX ADMIN — PIPERACILLIN SODIUM AND TAZOBACTAM SODIUM 3.38 G: 3; .375 INJECTION, POWDER, FOR SOLUTION INTRAVENOUS at 04:40

## 2017-05-05 RX ADMIN — LINEZOLID 600 MG: 600 TABLET, FILM COATED ORAL at 21:24

## 2017-05-05 RX ADMIN — SODIUM CHLORIDE: 9 INJECTION, SOLUTION INTRAVENOUS at 04:40

## 2017-05-05 RX ADMIN — METOPROLOL SUCCINATE 25 MG: 25 TABLET, EXTENDED RELEASE ORAL at 08:10

## 2017-05-05 RX ADMIN — ACYCLOVIR 200 MG: 200 CAPSULE ORAL at 08:09

## 2017-05-05 RX ADMIN — INSULIN GLARGINE 15 UNITS: 100 INJECTION, SOLUTION SUBCUTANEOUS at 21:18

## 2017-05-05 RX ADMIN — FAMOTIDINE 20 MG: 20 TABLET, FILM COATED ORAL at 08:09

## 2017-05-05 RX ADMIN — PIPERACILLIN SODIUM AND TAZOBACTAM SODIUM 3.38 G: 3; .375 INJECTION, POWDER, FOR SOLUTION INTRAVENOUS at 10:29

## 2017-05-05 RX ADMIN — STANDARDIZED SENNA CONCENTRATE AND DOCUSATE SODIUM 2 TABLET: 8.6; 5 TABLET, FILM COATED ORAL at 08:09

## 2017-05-05 RX ADMIN — HYDROCODONE BITARTRATE AND ACETAMINOPHEN 1 TABLET: 10; 325 TABLET ORAL at 21:37

## 2017-05-05 RX ADMIN — LINEZOLID 600 MG: 600 TABLET, FILM COATED ORAL at 08:10

## 2017-05-05 RX ADMIN — HYDROCODONE BITARTRATE AND ACETAMINOPHEN 1 TABLET: 10; 325 TABLET ORAL at 11:22

## 2017-05-05 RX ADMIN — INSULIN LISPRO 4 UNITS: 100 INJECTION, SOLUTION INTRAVENOUS; SUBCUTANEOUS at 11:18

## 2017-05-05 RX ADMIN — HYDROCODONE BITARTRATE AND ACETAMINOPHEN 1 TABLET: 10; 325 TABLET ORAL at 00:25

## 2017-05-05 RX ADMIN — PIPERACILLIN SODIUM AND TAZOBACTAM SODIUM 3.38 G: 3; .375 INJECTION, POWDER, FOR SOLUTION INTRAVENOUS at 16:01

## 2017-05-05 RX ADMIN — PIPERACILLIN SODIUM AND TAZOBACTAM SODIUM 3.38 G: 3; .375 INJECTION, POWDER, FOR SOLUTION INTRAVENOUS at 21:24

## 2017-05-05 ASSESSMENT — ENCOUNTER SYMPTOMS
CLAUDICATION: 0
DIZZINESS: 0
BACK PAIN: 0
HEADACHES: 0
MEMORY LOSS: 0
VOMITING: 0
WEAKNESS: 0
STRIDOR: 0
TINGLING: 0
HEMOPTYSIS: 0
NECK PAIN: 0
CONSTIPATION: 0
ORTHOPNEA: 0
FEVER: 0
PHOTOPHOBIA: 0
BLOOD IN STOOL: 0
BLURRED VISION: 0
ABDOMINAL PAIN: 0
SENSORY CHANGE: 0
SPEECH CHANGE: 0
EYE PAIN: 0
NAUSEA: 0
TREMORS: 0
HEARTBURN: 0
MYALGIAS: 1
PND: 0
CHILLS: 0
PALPITATIONS: 0
DOUBLE VISION: 0
NERVOUS/ANXIOUS: 0
COUGH: 0
DIARRHEA: 1
SHORTNESS OF BREATH: 0
SORE THROAT: 0
DEPRESSION: 0
SPUTUM PRODUCTION: 0

## 2017-05-05 ASSESSMENT — PAIN SCALES - GENERAL
PAINLEVEL_OUTOF10: 0
PAINLEVEL_OUTOF10: 5
PAINLEVEL_OUTOF10: 3
PAINLEVEL_OUTOF10: 0

## 2017-05-05 NOTE — WOUND TEAM
"[facility]  Inpatient Services  Wound and Skin Care Progress Note    HPI, PMH, SH: Reviewed    WOUND TEAM FOLLOW UP: Scheduled Negative Pressure Wound Therapy (NPWT) dressing change.  Unit where seen by Wound Team: T8.      SUBJECTIVE: \"Just be careful, that foot burns!\"    Self Report / Pain Level: 5/10 Patient premedicated.    OBJECTIVE: Very pleasant and engaging.     WOUND TYPE, LOCATION, CHARACTERISTICS:    Location and type of wound: Left foot, 4th digit amputation site: Open complicated surgical.         Periwound:     Partial thickness peeling, maceration, erythema to dorsum of foot.   Drainage:     Scant Sanguinous.  Tissue Type and %:    10% pale, 90% red.  Wound Edges:    Attached.  Odor:      None.  Exposed structure(s):  None.  S&S of Infection:    Periwound erythema.  Measurements:   Taken 05/03/2017  Length:     4 cm  Width:      2 cm  Depth:    2 cm      INTERVENTIONS BY WOUND TEAM: Dressing removed and wound cleansed with wound cleanser. Dr. Richard in to assess wound. After her assessment, benzoin applied to periwound. Hydrocolloid thin applied to partial thickness peeling and maceration from 0600 to 0900. Drape applied to dorsum of foot and to medial side of 5th digit. 1 piece of black foam inserted into wound bed, 1 bridge to dorsum of foot, and 1 button FOR TOTAL OF 3 PIECES OF BLACK FOAM. This was all draped except for button, trac pad sealed button. Achieved suction at 125 mmHg intermittent. Recieved call from BILL Oreilly about 20 minutes later because wound vac was alarming. Dressing was leaking between the 1st and 2nd digits. Had to apply drape to those digits to achieve a seal. Finally sealed dressing at 125 mmHg intermittent.     Interdisciplinary consultation: Patient, BILL Oreilly, Dr. Richard.    EVALUATION AND PROGRESS OF WOUND(S): Clean wound bed will beneft from NPWT.    Rationale for changes in Plan of Care: No changes at this time.    Factors affecting wound healing: " Type 1 diabetes. Infection.  Goals: Wound will decrease in size by 1% per week.    NURSING PLAN OF CARE:    Dressing changes: Continue previous Dressing Maintenance orders:   X     See new Dressing Maintenance orders:       Skin care: See Skin Care orders:        Rectal tube care: See Rectal Tube Care orders:      Other orders:           WOUND TEAM PLAN OF CARE (X):   NPWT change 3 x week:   X     Dressing changes:       Follow up as needed:       Other:

## 2017-05-05 NOTE — CARE PLAN
Problem: Knowledge Deficit  Goal: Knowledge of disease process/condition, treatment plan, diagnostic tests, and medications will improve  Intervention: Explain information regarding disease process/condition, treatment plan, diagnostic tests, and medications and document in education  Pt has multiple questions regarding plan of care, taken time to answer all pt questions      Problem: Pain Management  Goal: Pain level will decrease to patient’s comfort goal  Intervention: Follow pain managment plan developed in collaboration with patient and Interdisciplinary Team  Pt given PRN norco for pain relief, pt expresses relief after receiving pain medication. Pt also instructed to elevate left foot to decrease swelling in foot.

## 2017-05-05 NOTE — PROGRESS NOTES
Hospital Medicine Progress Note, Adult, Complex               Author: Natalya Monzon Date & Time created: 5/5/2017  10:49 AM     Interval History:  56 y.o. 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 Patient was admitted for left 3,4th toe cellulitis, sepsis.    Overnight patients foot xray revealed gas, concerning from necrotizing tissue,  was consulted and is undergoing debridement. Patient reports feeling much better.  ID was consulted, will change vancomycin to Zyvox and continue unasyn from zosyn.  Continue aggressive diabetic control.  Pain control, titrate as needed.    5/4  No issues overnight, patient blood sugar still elevated, however slowly improving  Continue Zyvox and unasyn, ID following.  Cont wound vac and wound care.  PICC Line placement pending. I do not anticipate that the patient is going to require dialysis at this time. At this point PICC line placement would allow for antibiotics which are vital in his clinical recovery. The benefits outweigh the risk PICC Line carries in terms of anticipation of future dialysis.     5/5  Patient clinically improving, his blood sugars are better controlled. Patient denies fevers/chills, chest pain, shortness of breath or nausea/vommiting. Patient denies significant limb pain.  Continue IV Zyvox and Zosyn. ID following  Cont wound care with wound vac.  Placement.    Review of Systems: grossly unchanged  Review of Systems   Constitutional: Positive for malaise/fatigue. Negative for fever and chills.   HENT: Negative for congestion, hearing loss, sore throat and tinnitus.    Eyes: Negative for blurred vision, double vision, photophobia and pain.   Respiratory: Negative for cough, hemoptysis, sputum  production, shortness of breath and stridor.    Cardiovascular: Negative for chest pain, palpitations, orthopnea, claudication and PND.   Gastrointestinal: Negative for heartburn, nausea, vomiting, constipation, blood in stool and melena.   Genitourinary: Negative for dysuria, urgency and frequency.   Musculoskeletal: Positive for myalgias (improving) and joint pain (decreased). Negative for back pain and neck pain.   Neurological: Negative for dizziness, tingling, tremors, sensory change, speech change, weakness and headaches.   Psychiatric/Behavioral: Negative for depression, suicidal ideas and memory loss. The patient is not nervous/anxious.        Physical Exam:  Physical Exam   Constitutional: He is oriented to person, place, and time. He appears well-developed and well-nourished.   HENT:   Head: Normocephalic and atraumatic.   Mouth/Throat: No oropharyngeal exudate.   Eyes: Conjunctivae are normal. Pupils are equal, round, and reactive to light. Right eye exhibits no discharge. No scleral icterus.   Neck: Neck supple. No JVD present. No thyromegaly present.   Cardiovascular: Intact distal pulses.    No murmur heard.  Pulmonary/Chest: Effort normal and breath sounds normal. No stridor. No respiratory distress. He has no wheezes. He has no rales.   Abdominal: Soft. Bowel sounds are normal. He exhibits no distension. There is no tenderness. There is no rebound.   Musculoskeletal: Normal range of motion. He exhibits edema (improving overall, minimal around wound vac site) and tenderness (minimal).   Left foot wound vac intact, no surrounding erythema, minimal edema,    Neurological: He is alert and oriented to person, place, and time.   Skin: Skin is warm. No erythema.   Psychiatric: He has a normal mood and affect. His behavior is normal. Thought content normal.       Labs:        Invalid input(s): [id number]      Recent Labs      05/02/17   1448  05/03/17   0143  05/04/17   0225  05/05/17   0415   SODIUM    --   126*  128*  135   POTASSIUM   --   4.4  4.0  4.2   CHLORIDE   --   96  99  105   CO2   --   24  24  23   BUN   --   38*  27*  17   CREATININE   --   2.30*  1.85*  1.54*   MAGNESIUM  2.3   --   2.3  2.4   CALCIUM   --   8.7  8.4*  8.6     Recent Labs      17   ALTSGPT  9  8  8   ASTSGOT  13  12  15   ALKPHOSPHAT  61  58  59   TBILIRUBIN  0.5  0.3  0.3   GLUCOSE  317*  292*  140*     Recent Labs      17   RBC  3.54*  3.25*  3.10*   HEMOGLOBIN  10.8*  9.7*  9.2*   HEMATOCRIT  31.8*  29.5*  28.6*   PLATELETCT  315  279  307     Recent Labs      17   WBC  15.4*  12.3*  9.4   NEUTSPOLYS  74.30*  73.10*  64.40   LYMPHOCYTES  14.30*  15.70*  22.40   MONOCYTES  10.00  8.90  9.30   EOSINOPHILS  0.60  1.50  3.10   BASOPHILS  0.30  0.40  0.50   ASTSGOT  13  12  15   ALTSGPT  9  8  8   ALKPHOSPHAT  61  58  59   TBILIRUBIN  0.5  0.3  0.3           Hemodynamics:  Temp (24hrs), Av.2 °C (98.9 °F), Min:36.8 °C (98.3 °F), Max:37.5 °C (99.5 °F)  Temperature: 36.8 °C (98.3 °F)  Pulse  Av.4  Min: 60  Max: 87   Blood Pressure: 153/82 mmHg     Respiratory:    Respiration: 19, Pulse Oximetry: 93 %           Fluids:    Intake/Output Summary (Last 24 hours) at 17 1049  Last data filed at 17 1800   Gross per 24 hour   Intake   1200 ml   Output      0 ml   Net   1200 ml     Weight: 80.7 kg (177 lb 14.6 oz)  GI/Nutrition:  Orders Placed This Encounter   Procedures   • DIET ORDER     Standing Status: Standing      Number of Occurrences: 1      Standing Expiration Date:      Order Specific Question:  Diet:     Answer:  Diabetic [3]     Medical Decision Making, by Problem:    1.  Severe Sepsis.   - 2/2 to left 3,4th toe cellulitis, with findings of gas gangrene on xray.  - Pending Debridement per ..  - Continue with Zyvox and Unasyn.  - Cultures neg   - Previous wound  cultures from 4/14 shows evidence of enterococcus fecalis. Pan-sensitive.  - Continue IV fluids  - ID following.     2. Left 3,4th toe cellulitis with osteomyelitis.   - s/p I&D and amputation of the left    fourth toe.     - Continue antibiotics as above  - Vascular surgery following  - continue wound care.    3. Insulin-dependent diabetes Type 1.  - Continue lantus 20U,    - Continue Insulin-sliding scale, accu-checks and hypoglycemia protocol.  - Overall blood sugars better controlled.  - A1c : 8.6    4. Acute on chronic renal failure  - suspect pre-renal azotemia,  - continuing to improve  - continue IV fluids, recheck bmp in the am.    5. Diabetic retinopathy.  - defer to outpatient management.    6. Essential hypertension  - Continue Metoprolol, will hold his losartan dose given REGULO.    7. 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  - continue pradaxa     8. Hyponatremia  - resolved    9. Normocytic anemia  - no signs of gross bleeding, continue to monitor for acute changes.    Patient plan of care discussed at multidisplinary team rounds and with patient and R.N at [facility].      Radiology images reviewed, Labs reviewed and Medications reviewed  Leslie catheter: No Leslie      DVT: pradaxa.  DVT prophylaxis - mechanical: SCDs    Antibiotics: Treating active infection/contamination beyond 24 hours perioperative coverage

## 2017-05-05 NOTE — CARE PLAN
Problem: Safety  Goal: Will remain free from injury  Bed locked and in lowest position. Patient refuses bed alarm. Treaded socks. Call light and belongings within reach. Patient educated to call for assistance. Pt verbalized understanding. Hourly rounding in place.     Problem: Knowledge Deficit  Goal: Knowledge of disease process/condition, treatment plan, diagnostic tests, and medications will improve  Discussed POC and medications with patient, pt. verbalized understanding.

## 2017-05-05 NOTE — PROGRESS NOTES
Bedside report received. No overnight events. Patient A&O x 4. VS'S. RA. No complaints of pain at this time. Minimal output on LLE foot wound vac.POC discussed with patient. Pt verbalizes understanding. Call light and belongings with in reach. Bed locked and in lowest position, alarm and fall precautions in place.

## 2017-05-05 NOTE — DISCHARGE PLANNING
Per choice form, SNF referral faxed to [facility], [facility], and [facility].

## 2017-05-05 NOTE — PROGRESS NOTES
Assumed pt care at 1900, bedside report received.  Pt in no distress.  A&Ox4.  Complaining of pain to left foot, pt had recent toe amputation and has wound vac in place.  No further complaints.  Bed locked in lowest position, call light within reach.  Pt refusing bed alarm to be on.  Will continue to monitor and follow plan of care.

## 2017-05-05 NOTE — DISCHARGE PLANNING
Transitional Care Update:    Follow up discussion with pt at bedside to discuss d/c plan. Pt is agreeable with SNF placement. Reviewed SNFs in the area. Pt prefers SNF with private room. Pt choice to send referrals to [facility], [facility], and [facility]. Choice form completed and faxed to [facility]. TCN to follow as needed.

## 2017-05-05 NOTE — PROGRESS NOTES
Med rec updated, called pharmacy to verify Pradaxa verse Plavix.    Patient is currently taking Plavix 75 mg once daily

## 2017-05-05 NOTE — PROGRESS NOTES
"LIMB PRESERVATION SERVICE     POD #2 s/p Incision and drainage of left foot infection with amputation of the left    fourth toe     /82 mmHg  Pulse 79  Temp(Src) 36.9 °C (98.4 °F)  Resp 18  Ht 1.727 m (5' 8\")  Wt 80.7 kg (177 lb 14.6 oz)  BMI 27.06 kg/m2  SpO2 94%   Blood glucose 225      Wound VAC in place, pressure maintained at 125 mmHg  Erythema radiating to lateral foot,  Dressing changed by IP wound team today    Recent Labs      05/03/17 0143 05/04/17 0225 05/05/17 0415   WBC  15.4*  12.3*  9.4   RBC  3.54*  3.25*  3.10*   HEMOGLOBIN  10.8*  9.7*  9.2*   HEMATOCRIT  31.8*  29.5*  28.6*   MCV  89.8  90.8  92.3   MCH  30.5  29.8  29.7   RDW  40.7  41.2  42.2   PLATELETCT  315  279  307   MPV  10.0  9.9  10.2   NEUTSPOLYS  74.30*  73.10*  64.40   LYMPHOCYTES  14.30*  15.70*  22.40   MONOCYTES  10.00  8.90  9.30   EOSINOPHILS  0.60  1.50  3.10   BASOPHILS  0.30  0.40  0.50     Recent Labs      05/03/17 0143 05/04/17 0225 05/05/17 0415   SODIUM  126*  128*  135   POTASSIUM  4.4  4.0  4.2   CHLORIDE  96  99  105   CO2  24  24  23   GLUCOSE  317*  292*  140*   BUN  38*  27*  17     PICC in place  ID managing abx    CM working on SNF placement      PLAN  -HWB only on left foot  -abx per ID recs  -Wound care per IP wound team  -discharge planning  "

## 2017-05-05 NOTE — PROGRESS NOTES
Infectious Disease Progress Note    Author: Temitope Shannon M.D. DOS & Time created: 2017  10:42 AM    Chief Complaint:  FU diabetic foot ulcer with gangrene      Interval History:  56-year-old diabetic male admitted with a non-healing ulceration of his left fourth digit superinfected with associated gangrene.   5/3 AF WBC 15.4 s/p OR this am with amp 4th digit. Tolerated well-denies SE abx   AF cxs neg Path reviewed-c/o pain   AF WBC 9.4 states 1st constipated-now loose stools. Pain in foot less  Labs Reviewed, Medications Reviewed, Radiology Reviewed and Wound Reviewed.    Review of Systems:  Review of Systems   Constitutional: Negative for fever and chills.   Cardiovascular: Positive for leg swelling.   Gastrointestinal: Positive for diarrhea. Negative for nausea, vomiting and abdominal pain.   Musculoskeletal: Positive for joint pain.   Skin: Negative for rash.   All other systems reviewed and are negative.      Hemodynamics:  Temp (24hrs), Av.2 °C (98.9 °F), Min:36.8 °C (98.3 °F), Max:37.5 °C (99.5 °F)  Temperature: 36.8 °C (98.3 °F)  Pulse  Av.4  Min: 60  Max: 87   Blood Pressure: 153/82 mmHg       Physical Exam:  Physical Exam   Constitutional: He is oriented to person, place, and time. He appears well-developed. No distress.   HENT:   Head: Atraumatic.   Eyes: EOM are normal. Pupils are equal, round, and reactive to light.   Neck: Neck supple.   Cardiovascular: Normal rate.    No murmur heard.  Pulmonary/Chest: Effort normal. No respiratory distress. He has no wheezes. He has no rales.   Abdominal: He exhibits no distension.   Musculoskeletal: He exhibits edema.   LLE VAC  +edema  RUE PICC nontender   Neurological: He is alert and oriented to person, place, and time.   Skin: No rash noted.   Nursing note and vitals reviewed.      Meds:    Current facility-administered medications:   •  calcium carbonate (OS-CHRISTIN 500) tablet 500 mg, 500 mg, Oral, BID WITH MEALS, Natalya Monzon M.D.,  500 mg at 17 1704  •  PICC Line Insertion has been implemented, , , Once **AND** May use Lidocaine 1% not to exceed 3 mls for local at insertion site, , , CONTINUOUS **AND** NOTIFY MD, , , Once **AND** Tip to dwell in the superior vena cava, , , CONTINUOUS **AND** Do not use PICC Line until placement verified by Chest X Ray, , , CONTINUOUS **AND** DX-CHEST-FOR PICC LINE Perform procedure in:: PICC Room, , , Once **AND** If radiologist reading of chest X-ray states any of the following the PICC should be used, , , CONTINUOUS **AND** Further evaluation of the PICC placement can be retrieved from X-Ray and Imaging, , , CONTINUOUS **AND** Blood draws through PICC line; draws by RN only, , , CONTINUOUS **AND** FLUSHING GUIDELINES WHEN IN USE, , , CONTINUOUS **AND** normal saline PF 10-20 mL, 10-20 mL, Intravenous, PRN **AND** FLUSHING GUIDELINES WHEN NOT IN USE, , , CONTINUOUS **AND** DRESSING MAINTENANCE, , , Once **AND** Change needleless pressure ports and IV tubing every 72 hours per hospital policy, , , CONTINUOUS **AND** TUBING, , , CONTINUOUS **AND** If there is an MD order to remove the PICC line, any RN may remove the PICC line, , , CONTINUOUS **AND** [] PATIENT EDUCATION MATERIALS, , , Prior to discharge **AND** NURSING COMMUNICATION, , , CONTINUOUS, Temitope Shannon M.D.  •  insulin glargine (LANTUS) injection 15 Units, 15 Units, Subcutaneous, Q EVENING, Natalya Monzon M.D., 15 Units at 17 2113  •  insulin lispro (HUMALOG) injection 3-14 Units, 3-14 Units, Subcutaneous, Q6HRS, Natalya Monzon M.D., Stopped at 17 0600  •  Action is required: Protocol 1073 Hypoglycemia has been implemented, , , Once **AND** Protocol  Inclusion Criteria, , , CONTINUOUS **AND** Protocol  NOTIFY, , , Once **AND** Protocol 1073 Initiate protocol immediately if FSBG is less than or equal to 70 mg/dL, , , CONTINUOUS **AND** glucose 4 g chewable tablet 16 g, 16 g, Oral, Q15 MIN PRN **AND**  dextrose 50% (D50W) injection 25 mL, 25 mL, Intravenous, Q15 MIN PRN, Natalya Monzon M.D.  •  NS infusion 2,133 mL, 30 mL/kg, Intravenous, Once PRN, Natalya Monzon M.D.  •  senna-docusate (PERICOLACE or SENOKOT S) 8.6-50 MG per tablet 2 Tab, 2 Tab, Oral, BID, 2 Tab at 05/05/17 0809 **AND** polyethylene glycol/lytes (MIRALAX) PACKET 1 Packet, 1 Packet, Oral, QDAY PRN **AND** magnesium hydroxide (MILK OF MAGNESIA) suspension 30 mL, 30 mL, Oral, QDAY PRN, 30 mL at 05/04/17 0413 **AND** bisacodyl (DULCOLAX) suppository 10 mg, 10 mg, Rectal, QDAY PRN, Natalya Monzon M.D.  •  NS infusion, , Intravenous, Continuous, Natalya Monzon M.D., Last Rate: 125 mL/hr at 05/05/17 0440  •  NS (BOLUS) infusion 500 mL, 500 mL, Intravenous, Once PRN, Natalya Monzon M.D.  •  piperacillin-tazobactam (ZOSYN) 3.375 g in  mL IVPB, 3.375 g, Intravenous, Q6HRS, Natalya Monzon M.D., Last Rate: 200 mL/hr at 05/05/17 1029, 3.375 g at 05/05/17 1029  •  ondansetron (ZOFRAN) syringe/vial injection 4 mg, 4 mg, Intravenous, Q4HRS PRN, Natalya Monzon M.D., 4 mg at 05/03/17 0510  •  ondansetron (ZOFRAN ODT) dispertab 4 mg, 4 mg, Oral, Q4HRS PRN, Natalya Monzon M.D.  •  promethazine (PHENERGAN) tablet 12.5-25 mg, 12.5-25 mg, Oral, Q4HRS PRN, Natalya Monzon M.D., 25 mg at 05/03/17 1044  •  promethazine (PHENERGAN) suppository 12.5-25 mg, 12.5-25 mg, Rectal, Q4HRS PRN, Natalya Monzon M.D.  •  prochlorperazine (COMPAZINE) injection 5-10 mg, 5-10 mg, Intravenous, Q4HRS PRN, Natalya Monzon M.D.  •  INITIATE NICOTINE REPLACEMENT PROTOCOL , , , Once **AND** nicotine (NICODERM) 14 MG/24HR 14 mg, 14 mg, Transdermal, Daily-0600, 14 mg at 05/02/17 1430 **AND** Protocol 205 PATIENT EDUCATION MATERIALS, , , Once **AND** Protocol 205 Rotate nicotine patch application sites daily , , , CONTINUOUS **AND** nicotine polacrilex (NICORETTE) 2 MG piece 2 mg, 2 mg, Oral, Q HOUR PRN, Natalya Monzon M.D.  •  famotidine  (PEPCID) tablet 20 mg, 20 mg, Oral, BID, 20 mg at 05/05/17 0809 **OR** famotidine (PEPCID) injection 20 mg, 20 mg, Intravenous, BID, Natalya Monzon M.D.  •  acyclovir (ZOVIRAX) capsule 200 mg, 200 mg, Oral, DAILY, Natalya Monzon M.D., 200 mg at 05/05/17 0809  •  dabigatran (PRADAXA) capsule 75 mg, 75 mg, Oral, DAILY, Natalya Monzon M.D., 75 mg at 05/05/17 0809  •  metoprolol SR (TOPROL XL) tablet 25 mg, 25 mg, Oral, Q DAY, Natalya Monzon M.D., 25 mg at 05/05/17 0810  •  atorvastatin (LIPITOR) tablet 40 mg, 40 mg, Oral, QHS, Natalya Monzon M.D., 40 mg at 05/04/17 2101  •  linezolid (ZYVOX) tablet 600 mg, 600 mg, Oral, Q12HRS, Temitope Shannon M.D., 600 mg at 05/05/17 0810  •  hydrocodone/acetaminophen (NORCO)  MG per tablet 1 Tab, 1 Tab, Oral, Q4HRS PRN, NGUYEN CariasP.R.N., 1 Tab at 05/05/17 0025    Labs:  Recent Labs      05/03/17   0143  05/04/17   0225  05/05/17   0415   WBC  15.4*  12.3*  9.4   RBC  3.54*  3.25*  3.10*   HEMOGLOBIN  10.8*  9.7*  9.2*   HEMATOCRIT  31.8*  29.5*  28.6*   MCV  89.8  90.8  92.3   MCH  30.5  29.8  29.7   RDW  40.7  41.2  42.2   PLATELETCT  315  279  307   MPV  10.0  9.9  10.2   NEUTSPOLYS  74.30*  73.10*  64.40   LYMPHOCYTES  14.30*  15.70*  22.40   MONOCYTES  10.00  8.90  9.30   EOSINOPHILS  0.60  1.50  3.10   BASOPHILS  0.30  0.40  0.50     Recent Labs      05/03/17   0143  05/04/17   0225  05/05/17   0415   SODIUM  126*  128*  135   POTASSIUM  4.4  4.0  4.2   CHLORIDE  96  99  105   CO2  24  24  23   GLUCOSE  317*  292*  140*   BUN  38*  27*  17     Recent Labs      05/03/17   0143  05/04/17 0225  05/05/17   0415   ALBUMIN  3.2  3.0*  2.9*   TBILIRUBIN  0.5  0.3  0.3   ALKPHOSPHAT  61  58  59   TOTPROTEIN  6.6  6.2  6.2   ALTSGPT  9  8  8   ASTSGOT  13  12  15   CREATININE  2.30*  1.85*  1.54*       Imaging:  Dx-chest-limited (1 View)  5/2/2017  No acute cardiopulmonary disease.    Dx-foot-complete 3+ Left  5/2/2017  Soft tissue gas in the  "fourth toe, concerning for necrotizing infection. Questionable osseous destruction seen along the lateral aspect of the fourth proximal phalanx, concerning for osteomyelitis. CRITICAL RESULT READ BACK: Preliminary findings discussed with and critical read back performed by JUAN JOSE Cowan via telephone on 5/2/2017 6:44 PM    Dx-foot-complete 3+ Left  4/14/2017  Negative LEFT foot series. No plain film evidence of osteomyelitis.      Micro:  BLOOD CULTURE   Date Value Ref Range Status   05/02/2017   Preliminary    No Growth    Note: Blood cultures are incubated for 5 days and  are monitored continuously.Positive blood cultures  are called to the RN and reported as soon as  they are identified.     05/02/2017   Preliminary    No Growth    Note: Blood cultures are incubated for 5 days and  are monitored continuously.Positive blood cultures  are called to the RN and reported as soon as  they are identified.        Results     Procedure Component Value Units Date/Time    Blood Culture [[id number]] Collected:  05/02/17 1558    Order Status:  Completed Specimen Information:  Blood from Peripheral Updated:  05/03/17 0643     Significant Indicator NEG      Source BLD      Site PERIPHERAL      Blood Culture --      Result:        No Growth    Note: Blood cultures are incubated for 5 days and  are monitored continuously.Positive blood cultures  are called to the RN and reported as soon as  they are identified.      Narrative:      Per Hospital Policy: Only change Specimen Src: to \"Line\" if  specified by physician order.    Blood Culture [[id number]] Collected:  05/02/17 1558    Order Status:  Completed Specimen Information:  Blood from Peripheral Updated:  05/03/17 0643     Significant Indicator NEG      Source BLD      Site PERIPHERAL      Blood Culture --      Result:        No Growth    Note: Blood cultures are incubated for 5 days and  are monitored continuously.Positive blood cultures  are called to the RN and reported as " "soon as  they are identified.      Narrative:      Per Hospital Policy: Only change Specimen Src: to \"Line\" if  specified by physician order.    Blood Culture [[id number]]     Order Status:  Canceled Specimen Information:  Blood from Peripheral     Blood Culture [[id number]]     Order Status:  Canceled Specimen Information:  Blood from Peripheral     Culture Respiratory W/ GRM STN [[id number]]     Order Status:  Completed Specimen Information:  Respirate from Sputum           Assessment:  Diabetes  Leukocytosis  Diabetic foot ulcer  Gangrene      Plan:  Diabetic foot ulcer with gangrene-margins not clear  S/p amp 5/3  Afebrile  Leukocytosis resolved  Continue Zyvox and Zosyn  Blood cxs neg so far  FU operative cxs-neg  PICC line done  Anticipate need for 6 weeks IV abx unless gets BKA    Severe PVD  S/p recent angioplasty  Re-eval if feasible    Gangrene  XRAY showed gas in tissues  Monitor closely for healing-may need additional debridement  Remains high risk for progression    REGULO  Improving  Avoid nephrotoxic agents  Adjust abx as needed for renal function    Diabetes  Keep BS under 150 to help control current infection    Loose stools  Check Cdiff if watery-doubt as no fever, abd pain, or leukocytosis  Hold stool softeners    Prognosis for limb salvage guarded  Discussed with internal Medicine-Dr Monzon  "

## 2017-05-05 NOTE — DISCHARGE PLANNING
[facility] and acceptance to [facility].

## 2017-05-05 NOTE — DISCHARGE PLANNING
STONE attempted to notify Candida with [employer] that pt will be going to SNF rather than home, however she did not answer and her mailbox is full. Will continue to try.   Candida: [phone]

## 2017-05-06 LAB
ALBUMIN SERPL BCP-MCNC: 2.9 G/DL (ref 3.2–4.9)
ALBUMIN/GLOB SERPL: 0.9 G/DL
ALP SERPL-CCNC: 64 U/L (ref 30–99)
ALT SERPL-CCNC: 9 U/L (ref 2–50)
ANION GAP SERPL CALC-SCNC: 9 MMOL/L (ref 0–11.9)
AST SERPL-CCNC: 16 U/L (ref 12–45)
BASOPHILS # BLD AUTO: 0.6 % (ref 0–1.8)
BASOPHILS # BLD: 0.05 K/UL (ref 0–0.12)
BILIRUB SERPL-MCNC: 0.3 MG/DL (ref 0.1–1.5)
BUN SERPL-MCNC: 13 MG/DL (ref 8–22)
C DIFF DNA SPEC QL NAA+PROBE: NEGATIVE
C DIFF TOX GENS STL QL NAA+PROBE: NEGATIVE
CALCIUM SERPL-MCNC: 8.1 MG/DL (ref 8.5–10.5)
CHLORIDE SERPL-SCNC: 105 MMOL/L (ref 96–112)
CO2 SERPL-SCNC: 22 MMOL/L (ref 20–33)
CREAT SERPL-MCNC: 1.5 MG/DL (ref 0.5–1.4)
EOSINOPHIL # BLD AUTO: 0.28 K/UL (ref 0–0.51)
EOSINOPHIL NFR BLD: 3.5 % (ref 0–6.9)
ERYTHROCYTE [DISTWIDTH] IN BLOOD BY AUTOMATED COUNT: 41.2 FL (ref 35.9–50)
GFR SERPL CREATININE-BSD FRML MDRD: 48 ML/MIN/1.73 M 2
GLOBULIN SER CALC-MCNC: 3.4 G/DL (ref 1.9–3.5)
GLUCOSE BLD-MCNC: 148 MG/DL (ref 65–99)
GLUCOSE BLD-MCNC: 218 MG/DL (ref 65–99)
GLUCOSE BLD-MCNC: 235 MG/DL (ref 65–99)
GLUCOSE BLD-MCNC: 303 MG/DL (ref 65–99)
GLUCOSE SERPL-MCNC: 188 MG/DL (ref 65–99)
HCT VFR BLD AUTO: 28.4 % (ref 42–52)
HGB BLD-MCNC: 9.2 G/DL (ref 14–18)
IMM GRANULOCYTES # BLD AUTO: 0.06 K/UL (ref 0–0.11)
IMM GRANULOCYTES NFR BLD AUTO: 0.7 % (ref 0–0.9)
LYMPHOCYTES # BLD AUTO: 1.5 K/UL (ref 1–4.8)
LYMPHOCYTES NFR BLD: 18.7 % (ref 22–41)
MAGNESIUM SERPL-MCNC: 2.1 MG/DL (ref 1.5–2.5)
MCH RBC QN AUTO: 29.5 PG (ref 27–33)
MCHC RBC AUTO-ENTMCNC: 32.4 G/DL (ref 33.7–35.3)
MCV RBC AUTO: 91 FL (ref 81.4–97.8)
MONOCYTES # BLD AUTO: 0.7 K/UL (ref 0–0.85)
MONOCYTES NFR BLD AUTO: 8.7 % (ref 0–13.4)
NEUTROPHILS # BLD AUTO: 5.45 K/UL (ref 1.82–7.42)
NEUTROPHILS NFR BLD: 67.8 % (ref 44–72)
NRBC # BLD AUTO: 0 K/UL
NRBC BLD AUTO-RTO: 0 /100 WBC
PLATELET # BLD AUTO: 309 K/UL (ref 164–446)
PMV BLD AUTO: 9.8 FL (ref 9–12.9)
POTASSIUM SERPL-SCNC: 4.1 MMOL/L (ref 3.6–5.5)
PROT SERPL-MCNC: 6.3 G/DL (ref 6–8.2)
RBC # BLD AUTO: 3.12 M/UL (ref 4.7–6.1)
SODIUM SERPL-SCNC: 136 MMOL/L (ref 135–145)
WBC # BLD AUTO: 8 K/UL (ref 4.8–10.8)

## 2017-05-06 PROCEDURE — 700111 HCHG RX REV CODE 636 W/ 250 OVERRIDE (IP): Performed by: HOSPITALIST

## 2017-05-06 PROCEDURE — 700105 HCHG RX REV CODE 258: Performed by: HOSPITALIST

## 2017-05-06 PROCEDURE — 83735 ASSAY OF MAGNESIUM: CPT

## 2017-05-06 PROCEDURE — A9270 NON-COVERED ITEM OR SERVICE: HCPCS | Performed by: NURSE PRACTITIONER

## 2017-05-06 PROCEDURE — 80053 COMPREHEN METABOLIC PANEL: CPT

## 2017-05-06 PROCEDURE — 700102 HCHG RX REV CODE 250 W/ 637 OVERRIDE(OP): Performed by: HOSPITALIST

## 2017-05-06 PROCEDURE — 85025 COMPLETE CBC W/AUTO DIFF WBC: CPT

## 2017-05-06 PROCEDURE — 700102 HCHG RX REV CODE 250 W/ 637 OVERRIDE(OP): Performed by: NURSE PRACTITIONER

## 2017-05-06 PROCEDURE — 770020 HCHG ROOM/CARE - TELE (206)

## 2017-05-06 PROCEDURE — 82962 GLUCOSE BLOOD TEST: CPT | Mod: 91

## 2017-05-06 PROCEDURE — 99232 SBSQ HOSP IP/OBS MODERATE 35: CPT | Performed by: HOSPITALIST

## 2017-05-06 PROCEDURE — A9270 NON-COVERED ITEM OR SERVICE: HCPCS | Performed by: HOSPITALIST

## 2017-05-06 PROCEDURE — 87493 C DIFF AMPLIFIED PROBE: CPT

## 2017-05-06 RX ORDER — DIPHENOXYLATE HYDROCHLORIDE AND ATROPINE SULFATE 2.5; .025 MG/1; MG/1
1 TABLET ORAL 4 TIMES DAILY PRN
Status: DISCONTINUED | OUTPATIENT
Start: 2017-05-06 | End: 2017-05-09 | Stop reason: HOSPADM

## 2017-05-06 RX ADMIN — PIPERACILLIN SODIUM AND TAZOBACTAM SODIUM 3.38 G: 3; .375 INJECTION, POWDER, FOR SOLUTION INTRAVENOUS at 15:33

## 2017-05-06 RX ADMIN — PIPERACILLIN SODIUM AND TAZOBACTAM SODIUM 3.38 G: 3; .375 INJECTION, POWDER, FOR SOLUTION INTRAVENOUS at 09:36

## 2017-05-06 RX ADMIN — SODIUM CHLORIDE: 9 INJECTION, SOLUTION INTRAVENOUS at 09:35

## 2017-05-06 RX ADMIN — INSULIN LISPRO 10 UNITS: 100 INJECTION, SOLUTION INTRAVENOUS; SUBCUTANEOUS at 16:32

## 2017-05-06 RX ADMIN — HYDROCODONE BITARTRATE AND ACETAMINOPHEN 1 TABLET: 10; 325 TABLET ORAL at 16:15

## 2017-05-06 RX ADMIN — INSULIN LISPRO 4 UNITS: 100 INJECTION, SOLUTION INTRAVENOUS; SUBCUTANEOUS at 00:00

## 2017-05-06 RX ADMIN — HYDROCODONE BITARTRATE AND ACETAMINOPHEN 1 TABLET: 10; 325 TABLET ORAL at 12:01

## 2017-05-06 RX ADMIN — CLOPIDOGREL 75 MG: 75 TABLET, FILM COATED ORAL at 12:01

## 2017-05-06 RX ADMIN — ACYCLOVIR 200 MG: 200 CAPSULE ORAL at 12:00

## 2017-05-06 RX ADMIN — INSULIN LISPRO 3 UNITS: 100 INJECTION, SOLUTION INTRAVENOUS; SUBCUTANEOUS at 06:46

## 2017-05-06 RX ADMIN — PIPERACILLIN SODIUM AND TAZOBACTAM SODIUM 3.38 G: 3; .375 INJECTION, POWDER, FOR SOLUTION INTRAVENOUS at 21:17

## 2017-05-06 RX ADMIN — INSULIN GLARGINE 15 UNITS: 100 INJECTION, SOLUTION SUBCUTANEOUS at 21:17

## 2017-05-06 RX ADMIN — DIPHENOXYLATE HYDROCHLORIDE AND ATROPINE SULFATE 1 TABLET: 2.5; .025 TABLET ORAL at 16:15

## 2017-05-06 RX ADMIN — ATORVASTATIN CALCIUM 40 MG: 40 TABLET, FILM COATED ORAL at 21:18

## 2017-05-06 RX ADMIN — PIPERACILLIN SODIUM AND TAZOBACTAM SODIUM 3.38 G: 3; .375 INJECTION, POWDER, FOR SOLUTION INTRAVENOUS at 04:21

## 2017-05-06 RX ADMIN — METOPROLOL SUCCINATE 25 MG: 25 TABLET, EXTENDED RELEASE ORAL at 12:00

## 2017-05-06 RX ADMIN — SODIUM CHLORIDE: 9 INJECTION, SOLUTION INTRAVENOUS at 20:02

## 2017-05-06 RX ADMIN — SODIUM CHLORIDE: 9 INJECTION, SOLUTION INTRAVENOUS at 00:39

## 2017-05-06 ASSESSMENT — ENCOUNTER SYMPTOMS
HEADACHES: 0
SPUTUM PRODUCTION: 0
SHORTNESS OF BREATH: 0
CONSTIPATION: 0
NAUSEA: 0
NERVOUS/ANXIOUS: 0
SENSORY CHANGE: 0
FEVER: 0
STRIDOR: 0
SPEECH CHANGE: 0
SORE THROAT: 0
BLOOD IN STOOL: 0
DOUBLE VISION: 0
EYE PAIN: 0
MEMORY LOSS: 0
TREMORS: 0
TINGLING: 0
HEMOPTYSIS: 0
ORTHOPNEA: 0
DIARRHEA: 1
MYALGIAS: 1
WEAKNESS: 0
PALPITATIONS: 0
PND: 0
BACK PAIN: 0
DIZZINESS: 0
ABDOMINAL PAIN: 0
BLURRED VISION: 0
NECK PAIN: 0
CLAUDICATION: 0
CHILLS: 0
COUGH: 0
HEARTBURN: 0
VOMITING: 0
PHOTOPHOBIA: 0
DEPRESSION: 0

## 2017-05-06 ASSESSMENT — PAIN SCALES - GENERAL
PAINLEVEL_OUTOF10: 4
PAINLEVEL_OUTOF10: 0
PAINLEVEL_OUTOF10: 0
PAINLEVEL_OUTOF10: 4
PAINLEVEL_OUTOF10: 3
PAINLEVEL_OUTOF10: 0

## 2017-05-06 NOTE — PROGRESS NOTES
Pt continues to complain about diarrhea, bowel movements has been liquid/loose, Mary RICO was notified no new orders at the moment, pt has had a stool softener in the last 24hrs. Will continue to monitor and follow plan of care.

## 2017-05-06 NOTE — PROGRESS NOTES
Bedside report received. Patient A&O x 4. RA. Complains of left foot pain 4/10 will medicate per MAR. Patient has been having frequent loose BM's, C-Diff PCR ordered this AM. POC discussed with patient. Pt verbalizes understanding. Call light and belongings with in reach. Bed locked and in lowest position, alarm and fall precautions in place.

## 2017-05-06 NOTE — PROGRESS NOTES
POD#3 s/p left 4th toe amputation    Severe bloody nose this am, not a new thing for this patient.  Foot feels better. Vac changed yesterday.    VSS afeb    Left foot with diminished cellulitis.  Nosebleed looks like it's stopping.     Imp:  Continue antibiotics, reassess for healing.     Plan:  VAC change on Monday.

## 2017-05-06 NOTE — DISCHARGE PLANNING
Received call from Myandb at [facility], they will accept patient if the Zovirax is changed to another med.  KAUR Whalen has been advised.

## 2017-05-06 NOTE — PROGRESS NOTES
Infectious Disease Progress Note    Author: Vanna Douglas M.D. DOS & Time created: 2017  7:41 AM    Chief Complaint:  FU diabetic foot ulcer with gangrene      Interval History:  56-year-old diabetic male admitted with a non-healing ulceration of his left fourth digit superinfected with associated gangrene.   5/3 AF WBC 15.4 s/p OR this am with amp 4th digit. Tolerated well-denies SE abx   AF cxs neg Path reviewed-c/o pain   AF WBC 9.4 states 1st constipated-now loose stools. Pain in foot less  2017- no fevers. Complains of diarrhea. WBC 8  Labs Reviewed, Medications Reviewed, Radiology Reviewed and Wound Reviewed.    Review of Systems:  Review of Systems   Constitutional: Negative for fever and chills.   Cardiovascular: Positive for leg swelling.   Gastrointestinal: Positive for diarrhea. Negative for nausea, vomiting and abdominal pain.        Says had 12 bowel movements last night but now easing up   Musculoskeletal: Positive for joint pain.   Skin: Negative for rash.   All other systems reviewed and are negative.      Hemodynamics:  Temp (24hrs), Av.1 °C (98.8 °F), Min:36.8 °C (98.3 °F), Max:37.7 °C (99.9 °F)  Temperature: 37.7 °C (99.9 °F)  Pulse  Av.4  Min: 60  Max: 89   Blood Pressure: 140/79 mmHg       Physical Exam:  Physical Exam   Constitutional: He is oriented to person, place, and time. He appears well-developed. No distress.   HENT:   Head: Atraumatic.   Eyes: EOM are normal. Pupils are equal, round, and reactive to light.   Neck: Neck supple.   Cardiovascular: Normal rate.    No murmur heard.  Pulmonary/Chest: Effort normal. No respiratory distress. He has no wheezes. He has no rales.   Abdominal: He exhibits no distension.   Musculoskeletal: He exhibits edema.   LLE VAC  +edema  RUE PICC nontender   Neurological: He is alert and oriented to person, place, and time.   Skin: No rash noted.   Nursing note and vitals reviewed.      Meds:    Current facility-administered  medications:   •  clopidogrel (PLAVIX) tablet 75 mg, 75 mg, Oral, DAILY, Natalya Monzon M.D.  •  calcium carbonate (OS-CHRISTIN 500) tablet 500 mg, 500 mg, Oral, BID WITH MEALS, Natalya Monzon M.D., 500 mg at 17 1704  •  PICC Line Insertion has been implemented, , , Once **AND** May use Lidocaine 1% not to exceed 3 mls for local at insertion site, , , CONTINUOUS **AND** NOTIFY MD, , , Once **AND** Tip to dwell in the superior vena cava, , , CONTINUOUS **AND** Do not use PICC Line until placement verified by Chest X Ray, , , CONTINUOUS **AND** DX-CHEST-FOR PICC LINE Perform procedure in:: PICC Room, , , Once **AND** If radiologist reading of chest X-ray states any of the following the PICC should be used, , , CONTINUOUS **AND** Further evaluation of the PICC placement can be retrieved from X-Ray and Imaging, , , CONTINUOUS **AND** Blood draws through PICC line; draws by RN only, , , CONTINUOUS **AND** FLUSHING GUIDELINES WHEN IN USE, , , CONTINUOUS **AND** normal saline PF 10-20 mL, 10-20 mL, Intravenous, PRN **AND** FLUSHING GUIDELINES WHEN NOT IN USE, , , CONTINUOUS **AND** DRESSING MAINTENANCE, , , Once **AND** Change needleless pressure ports and IV tubing every 72 hours per hospital policy, , , CONTINUOUS **AND** TUBING, , , CONTINUOUS **AND** If there is an MD order to remove the PICC line, any RN may remove the PICC line, , , CONTINUOUS **AND** [] PATIENT EDUCATION MATERIALS, , , Prior to discharge **AND** NURSING COMMUNICATION, , , CONTINUOUS, Temitope Shannon M.D.  •  insulin glargine (LANTUS) injection 15 Units, 15 Units, Subcutaneous, Q EVENING, Natalya Monzon M.D., 15 Units at 17 2118  •  insulin lispro (HUMALOG) injection 3-14 Units, 3-14 Units, Subcutaneous, Q6HRS, Natalya Monzon M.D., 3 Units at 17 0646  •  Action is required: Protocol 1073 Hypoglycemia has been implemented, , , Once **AND** Protocol 1073 Inclusion Criteria, , , CONTINUOUS **AND** Protocol 1073  NOTIFY, , , Once **AND** Protocol 1073 Initiate protocol immediately if FSBG is less than or equal to 70 mg/dL, , , CONTINUOUS **AND** glucose 4 g chewable tablet 16 g, 16 g, Oral, Q15 MIN PRN **AND** dextrose 50% (D50W) injection 25 mL, 25 mL, Intravenous, Q15 MIN PRN, Natalya Monzon M.D.  •  senna-docusate (PERICOLACE or SENOKOT S) 8.6-50 MG per tablet 2 Tab, 2 Tab, Oral, BID, 2 Tab at 05/05/17 0809 **AND** polyethylene glycol/lytes (MIRALAX) PACKET 1 Packet, 1 Packet, Oral, QDAY PRN **AND** magnesium hydroxide (MILK OF MAGNESIA) suspension 30 mL, 30 mL, Oral, QDAY PRN, 30 mL at 05/04/17 0413 **AND** bisacodyl (DULCOLAX) suppository 10 mg, 10 mg, Rectal, QDAY PRN, Natalya Monzon M.D.  •  NS infusion, , Intravenous, Continuous, Natalya Monzon M.D., Last Rate: 125 mL/hr at 05/06/17 0039  •  piperacillin-tazobactam (ZOSYN) 3.375 g in  mL IVPB, 3.375 g, Intravenous, Q6HRS, Natalya Monzon M.D., Stopped at 05/06/17 0451  •  ondansetron (ZOFRAN) syringe/vial injection 4 mg, 4 mg, Intravenous, Q4HRS PRN, Natalya Monzon M.D., 4 mg at 05/03/17 0510  •  ondansetron (ZOFRAN ODT) dispertab 4 mg, 4 mg, Oral, Q4HRS PRN, Natalya Monzon M.D.  •  promethazine (PHENERGAN) tablet 12.5-25 mg, 12.5-25 mg, Oral, Q4HRS PRN, Natalya Monzon M.D., 25 mg at 05/03/17 1044  •  promethazine (PHENERGAN) suppository 12.5-25 mg, 12.5-25 mg, Rectal, Q4HRS PRN, Natalya Monzon M.D.  •  prochlorperazine (COMPAZINE) injection 5-10 mg, 5-10 mg, Intravenous, Q4HRS PRN, Natalya Monzon M.D.  •  INITIATE NICOTINE REPLACEMENT PROTOCOL , , , Once **AND** nicotine (NICODERM) 14 MG/24HR 14 mg, 14 mg, Transdermal, Daily-0600, 14 mg at 05/02/17 1430 **AND** Protocol 205 PATIENT EDUCATION MATERIALS, , , Once **AND** Protocol 205 Rotate nicotine patch application sites daily , , , CONTINUOUS **AND** nicotine polacrilex (NICORETTE) 2 MG piece 2 mg, 2 mg, Oral, Q HOUR PRN, Natalya Monzon M.D.  •  acyclovir (ZOVIRAX)  capsule 200 mg, 200 mg, Oral, DAILY, Natalya Monzon M.D., 200 mg at 05/05/17 0809  •  metoprolol SR (TOPROL XL) tablet 25 mg, 25 mg, Oral, Q DAY, Natalya Monzon M.D., 25 mg at 05/05/17 0810  •  atorvastatin (LIPITOR) tablet 40 mg, 40 mg, Oral, QHS, Natalya Monzon M.D., 40 mg at 05/05/17 2124  •  linezolid (ZYVOX) tablet 600 mg, 600 mg, Oral, Q12HRS, Temitope Shannon M.D., 600 mg at 05/05/17 2124  •  hydrocodone/acetaminophen (NORCO)  MG per tablet 1 Tab, 1 Tab, Oral, Q4HRS PRN, HEATHER Carias.[email or web address], 1 Tab at 05/05/17 2137    Labs:  Recent Labs      05/04/17 0225 05/05/17 0415 05/06/17 0425   WBC  12.3*  9.4  8.0   RBC  3.25*  3.10*  3.12*   HEMOGLOBIN  9.7*  9.2*  9.2*   HEMATOCRIT  29.5*  28.6*  28.4*   MCV  90.8  92.3  91.0   MCH  29.8  29.7  29.5   RDW  41.2  42.2  41.2   PLATELETCT  279  307  309   MPV  9.9  10.2  9.8   NEUTSPOLYS  73.10*  64.40  67.80   LYMPHOCYTES  15.70*  22.40  18.70*   MONOCYTES  8.90  9.30  8.70   EOSINOPHILS  1.50  3.10  3.50   BASOPHILS  0.40  0.50  0.60     Recent Labs      05/04/17 0225 05/05/17 0415 05/06/17   0425   SODIUM  128*  135  136   POTASSIUM  4.0  4.2  4.1   CHLORIDE  99  105  105   CO2  24  23  22   GLUCOSE  292*  140*  188*   BUN  27*  17  13     Recent Labs      05/04/17 0225 05/05/17 0415 05/06/17   0425   ALBUMIN  3.0*  2.9*  2.9*   TBILIRUBIN  0.3  0.3  0.3   ALKPHOSPHAT  58  59  64   TOTPROTEIN  6.2  6.2  6.3   ALTSGPT  8  8  9   ASTSGOT  12  15  16   CREATININE  1.85*  1.54*  1.50*       Imaging:  Dx-chest-limited (1 View)  5/2/2017  No acute cardiopulmonary disease.    Dx-foot-complete 3+ Left  5/2/2017  Soft tissue gas in the fourth toe, concerning for necrotizing infection. Questionable osseous destruction seen along the lateral aspect of the fourth proximal phalanx, concerning for osteomyelitis. CRITICAL RESULT READ BACK: Preliminary findings discussed with and critical read back performed by JUAN JOSE Cowan via  "telephone on 5/2/2017 6:44 PM    Dx-foot-complete 3+ Left  4/14/2017  Negative LEFT foot series. No plain film evidence of osteomyelitis.      Micro:  BLOOD CULTURE   Date Value Ref Range Status   05/02/2017   Preliminary    No Growth    Note: Blood cultures are incubated for 5 days and  are monitored continuously.Positive blood cultures  are called to the RN and reported as soon as  they are identified.     05/02/2017   Preliminary    No Growth    Note: Blood cultures are incubated for 5 days and  are monitored continuously.Positive blood cultures  are called to the RN and reported as soon as  they are identified.        Results     Procedure Component Value Units Date/Time    Blood Culture [[id number]] Collected:  05/02/17 1558    Order Status:  Completed Specimen Information:  Blood from Peripheral Updated:  05/03/17 0643     Significant Indicator NEG      Source BLD      Site PERIPHERAL      Blood Culture --      Result:        No Growth    Note: Blood cultures are incubated for 5 days and  are monitored continuously.Positive blood cultures  are called to the RN and reported as soon as  they are identified.      Narrative:      Per Hospital Policy: Only change Specimen Src: to \"Line\" if  specified by physician order.    Blood Culture [[id number]] Collected:  05/02/17 1558    Order Status:  Completed Specimen Information:  Blood from Peripheral Updated:  05/03/17 0643     Significant Indicator NEG      Source BLD      Site PERIPHERAL      Blood Culture --      Result:        No Growth    Note: Blood cultures are incubated for 5 days and  are monitored continuously.Positive blood cultures  are called to the RN and reported as soon as  they are identified.      Narrative:      Per Hospital Policy: Only change Specimen Src: to \"Line\" if  specified by physician order.    Blood Culture [[id number]]     Order Status:  Canceled Specimen Information:  Blood from Peripheral     Blood Culture [[id number]]     Order Status:  " Canceled Specimen Information:  Blood from Peripheral     Culture Respiratory W/ GRM STN [[id number]]     Order Status:  Completed Specimen Information:  Respirate from Sputum           Assessment:  Diabetes  Leukocytosis  Diabetic foot ulcer  Gangrene      Plan:  Diabetic foot ulcer with gangrene-margins not clear  S/p amp 5/3  Afebrile  Previous cultures on 4/14/2017 were E faecalis  Discontinue Zyvox  Severe PVD  S/p recent angioplasty  Re-eval if feasible    Gangrene  XRAY showed gas in tissues  Monitor closely for healing-may need additional debridement  Remains high risk for progression    REGULO  Improving  Avoid nephrotoxic agents  Adjust abx as needed for renal function    Diabetes  Keep BS under 150 to help control current infection    Diarrhea  Multiple stools last night  Seems to be easing up-continue to monitor  May need to check a C. difficile if diarrhea recurs    Prognosis for limb salvage guarded  Discussed with internal Medicine-Dr Monzon

## 2017-05-06 NOTE — CARE PLAN
Problem: Communication  Goal: The ability to communicate needs accurately and effectively will improve  Outcome: PROGRESSING AS EXPECTED  Pt has multiple questions regarding plan of care    Problem: Safety  Goal: Will remain free from injury  Intervention: Provide assistance with mobility  Pt a fall risk but refusing to have bed alarm on, but pt has been calling staff appropriately for assistance      Problem: Bowel/Gastric:  Goal: Will not experience complications related to bowel motility  Outcome: MET Date Met:  05/06/17  Pt complaining of having diarrhea

## 2017-05-06 NOTE — PROGRESS NOTES
Assumed pt care at 1900. Pt in no distress.  A&Ox4.  Complaining of diarrhea.  Wound vac in place.  Call light within reach.  Will continue to monitor and follow plan of care.

## 2017-05-07 LAB
ALBUMIN SERPL BCP-MCNC: 3.2 G/DL (ref 3.2–4.9)
ALBUMIN/GLOB SERPL: 1 G/DL
ALP SERPL-CCNC: 63 U/L (ref 30–99)
ALT SERPL-CCNC: 13 U/L (ref 2–50)
ANION GAP SERPL CALC-SCNC: 8 MMOL/L (ref 0–11.9)
AST SERPL-CCNC: 19 U/L (ref 12–45)
BACTERIA BLD CULT: NORMAL
BACTERIA BLD CULT: NORMAL
BASOPHILS # BLD AUTO: 0.5 % (ref 0–1.8)
BASOPHILS # BLD: 0.04 K/UL (ref 0–0.12)
BILIRUB SERPL-MCNC: 0.3 MG/DL (ref 0.1–1.5)
BUN SERPL-MCNC: 15 MG/DL (ref 8–22)
CALCIUM SERPL-MCNC: 8.8 MG/DL (ref 8.5–10.5)
CHLORIDE SERPL-SCNC: 104 MMOL/L (ref 96–112)
CO2 SERPL-SCNC: 23 MMOL/L (ref 20–33)
CREAT SERPL-MCNC: 1.44 MG/DL (ref 0.5–1.4)
EOSINOPHIL # BLD AUTO: 0.33 K/UL (ref 0–0.51)
EOSINOPHIL NFR BLD: 3.7 % (ref 0–6.9)
ERYTHROCYTE [DISTWIDTH] IN BLOOD BY AUTOMATED COUNT: 40.8 FL (ref 35.9–50)
GFR SERPL CREATININE-BSD FRML MDRD: 51 ML/MIN/1.73 M 2
GLOBULIN SER CALC-MCNC: 3.3 G/DL (ref 1.9–3.5)
GLUCOSE BLD-MCNC: 116 MG/DL (ref 65–99)
GLUCOSE BLD-MCNC: 171 MG/DL (ref 65–99)
GLUCOSE BLD-MCNC: 180 MG/DL (ref 65–99)
GLUCOSE BLD-MCNC: 204 MG/DL (ref 65–99)
GLUCOSE BLD-MCNC: 207 MG/DL (ref 65–99)
GLUCOSE BLD-MCNC: 248 MG/DL (ref 65–99)
GLUCOSE SERPL-MCNC: 119 MG/DL (ref 65–99)
HCT VFR BLD AUTO: 27.3 % (ref 42–52)
HGB BLD-MCNC: 9 G/DL (ref 14–18)
IMM GRANULOCYTES # BLD AUTO: 0.03 K/UL (ref 0–0.11)
IMM GRANULOCYTES NFR BLD AUTO: 0.3 % (ref 0–0.9)
LYMPHOCYTES # BLD AUTO: 1.33 K/UL (ref 1–4.8)
LYMPHOCYTES NFR BLD: 15.1 % (ref 22–41)
MAGNESIUM SERPL-MCNC: 2 MG/DL (ref 1.5–2.5)
MCH RBC QN AUTO: 29.7 PG (ref 27–33)
MCHC RBC AUTO-ENTMCNC: 33 G/DL (ref 33.7–35.3)
MCV RBC AUTO: 90.1 FL (ref 81.4–97.8)
MONOCYTES # BLD AUTO: 0.71 K/UL (ref 0–0.85)
MONOCYTES NFR BLD AUTO: 8 % (ref 0–13.4)
NEUTROPHILS # BLD AUTO: 6.38 K/UL (ref 1.82–7.42)
NEUTROPHILS NFR BLD: 72.4 % (ref 44–72)
NRBC # BLD AUTO: 0 K/UL
NRBC BLD AUTO-RTO: 0 /100 WBC
PLATELET # BLD AUTO: 319 K/UL (ref 164–446)
PMV BLD AUTO: 9.5 FL (ref 9–12.9)
POTASSIUM SERPL-SCNC: 3.8 MMOL/L (ref 3.6–5.5)
PROT SERPL-MCNC: 6.5 G/DL (ref 6–8.2)
RBC # BLD AUTO: 3.03 M/UL (ref 4.7–6.1)
SIGNIFICANT IND 70042: NORMAL
SIGNIFICANT IND 70042: NORMAL
SITE SITE: NORMAL
SITE SITE: NORMAL
SODIUM SERPL-SCNC: 135 MMOL/L (ref 135–145)
SOURCE SOURCE: NORMAL
SOURCE SOURCE: NORMAL
WBC # BLD AUTO: 8.8 K/UL (ref 4.8–10.8)

## 2017-05-07 PROCEDURE — 99232 SBSQ HOSP IP/OBS MODERATE 35: CPT | Performed by: HOSPITALIST

## 2017-05-07 PROCEDURE — 700102 HCHG RX REV CODE 250 W/ 637 OVERRIDE(OP): Performed by: HOSPITALIST

## 2017-05-07 PROCEDURE — 80053 COMPREHEN METABOLIC PANEL: CPT

## 2017-05-07 PROCEDURE — 83735 ASSAY OF MAGNESIUM: CPT

## 2017-05-07 PROCEDURE — 770020 HCHG ROOM/CARE - TELE (206)

## 2017-05-07 PROCEDURE — 85025 COMPLETE CBC W/AUTO DIFF WBC: CPT

## 2017-05-07 PROCEDURE — 700105 HCHG RX REV CODE 258: Performed by: HOSPITALIST

## 2017-05-07 PROCEDURE — A9270 NON-COVERED ITEM OR SERVICE: HCPCS | Performed by: HOSPITALIST

## 2017-05-07 PROCEDURE — 700111 HCHG RX REV CODE 636 W/ 250 OVERRIDE (IP): Performed by: HOSPITALIST

## 2017-05-07 PROCEDURE — 82962 GLUCOSE BLOOD TEST: CPT | Mod: 91

## 2017-05-07 RX ORDER — OMEPRAZOLE 20 MG/1
20 CAPSULE, DELAYED RELEASE ORAL DAILY
Status: DISCONTINUED | OUTPATIENT
Start: 2017-05-07 | End: 2017-05-09 | Stop reason: HOSPADM

## 2017-05-07 RX ORDER — LOSARTAN POTASSIUM 50 MG/1
50 TABLET ORAL DAILY
Status: DISCONTINUED | OUTPATIENT
Start: 2017-05-07 | End: 2017-05-09 | Stop reason: HOSPADM

## 2017-05-07 RX ORDER — CLOPIDOGREL BISULFATE 75 MG/1
75 TABLET ORAL DAILY
Status: DISCONTINUED | OUTPATIENT
Start: 2017-05-08 | End: 2017-05-09 | Stop reason: HOSPADM

## 2017-05-07 RX ADMIN — PIPERACILLIN SODIUM AND TAZOBACTAM SODIUM 3.38 G: 3; .375 INJECTION, POWDER, FOR SOLUTION INTRAVENOUS at 11:06

## 2017-05-07 RX ADMIN — INSULIN LISPRO 4 UNITS: 100 INJECTION, SOLUTION INTRAVENOUS; SUBCUTANEOUS at 16:45

## 2017-05-07 RX ADMIN — SODIUM CHLORIDE: 9 INJECTION, SOLUTION INTRAVENOUS at 23:34

## 2017-05-07 RX ADMIN — DIPHENOXYLATE HYDROCHLORIDE AND ATROPINE SULFATE 1 TABLET: 2.5; .025 TABLET ORAL at 04:27

## 2017-05-07 RX ADMIN — PIPERACILLIN SODIUM AND TAZOBACTAM SODIUM 3.38 G: 3; .375 INJECTION, POWDER, FOR SOLUTION INTRAVENOUS at 04:14

## 2017-05-07 RX ADMIN — METOPROLOL SUCCINATE 25 MG: 25 TABLET, EXTENDED RELEASE ORAL at 08:15

## 2017-05-07 RX ADMIN — PIPERACILLIN SODIUM AND TAZOBACTAM SODIUM 3.38 G: 3; .375 INJECTION, POWDER, FOR SOLUTION INTRAVENOUS at 16:46

## 2017-05-07 RX ADMIN — INSULIN LISPRO 4 UNITS: 100 INJECTION, SOLUTION INTRAVENOUS; SUBCUTANEOUS at 00:16

## 2017-05-07 RX ADMIN — PIPERACILLIN SODIUM AND TAZOBACTAM SODIUM 3.38 G: 3; .375 INJECTION, POWDER, FOR SOLUTION INTRAVENOUS at 21:00

## 2017-05-07 RX ADMIN — ATORVASTATIN CALCIUM 40 MG: 40 TABLET, FILM COATED ORAL at 21:12

## 2017-05-07 RX ADMIN — ACYCLOVIR 200 MG: 200 CAPSULE ORAL at 08:15

## 2017-05-07 RX ADMIN — OMEPRAZOLE 20 MG: 20 CAPSULE, DELAYED RELEASE ORAL at 13:28

## 2017-05-07 RX ADMIN — SODIUM CHLORIDE: 9 INJECTION, SOLUTION INTRAVENOUS at 04:22

## 2017-05-07 RX ADMIN — CLOPIDOGREL 75 MG: 75 TABLET, FILM COATED ORAL at 08:15

## 2017-05-07 RX ADMIN — LOSARTAN POTASSIUM 50 MG: 50 TABLET, FILM COATED ORAL at 13:28

## 2017-05-07 RX ADMIN — SODIUM CHLORIDE: 9 INJECTION, SOLUTION INTRAVENOUS at 14:16

## 2017-05-07 RX ADMIN — INSULIN LISPRO 3 UNITS: 100 INJECTION, SOLUTION INTRAVENOUS; SUBCUTANEOUS at 11:08

## 2017-05-07 RX ADMIN — INSULIN GLARGINE 15 UNITS: 100 INJECTION, SOLUTION SUBCUTANEOUS at 21:16

## 2017-05-07 ASSESSMENT — PAIN SCALES - GENERAL
PAINLEVEL_OUTOF10: 0

## 2017-05-07 ASSESSMENT — ENCOUNTER SYMPTOMS
CHILLS: 0
WEAKNESS: 0
BLOOD IN STOOL: 0
CLAUDICATION: 0
PHOTOPHOBIA: 0
PND: 0
COUGH: 0
NERVOUS/ANXIOUS: 0
BLURRED VISION: 0
DEPRESSION: 0
HEARTBURN: 0
DOUBLE VISION: 0
FEVER: 0
STRIDOR: 0
SPUTUM PRODUCTION: 0
SHORTNESS OF BREATH: 0
SENSORY CHANGE: 0
ROS GI COMMENTS: IMPROVING
ORTHOPNEA: 0
HEMOPTYSIS: 0
SORE THROAT: 0
NECK PAIN: 0
MYALGIAS: 1
DIZZINESS: 0
HEADACHES: 0
TINGLING: 0
SPEECH CHANGE: 0
DIARRHEA: 1
PALPITATIONS: 0
VOMITING: 0
ABDOMINAL PAIN: 0
MEMORY LOSS: 0
BACK PAIN: 0
EYE PAIN: 0
CONSTIPATION: 0
TREMORS: 0
NAUSEA: 0

## 2017-05-07 NOTE — PROGRESS NOTES
Infectious Disease Progress Note    Author: Vanna Douglas M.D. DOS & Time created: 2017  1:27 PM    Chief Complaint:  FU diabetic foot ulcer with gangrene      Interval History:  56-year-old diabetic male admitted with a non-healing ulceration of his left fourth digit superinfected with associated gangrene.   5/3 AF WBC 15.4 s/p OR this am with amp 4th digit. Tolerated well-denies SE abx   AF cxs neg Path reviewed-c/o pain   AF WBC 9.4 states 1st constipated-now loose stools. Pain in foot less  2017- no fevers. Complains of diarrhea. WBC 8  - no fevers. Diarrhea is improving. The foot still hurts.  Labs Reviewed, Medications Reviewed, Radiology Reviewed and Wound Reviewed.    Review of Systems:  Review of Systems   Constitutional: Negative for fever and chills.   Cardiovascular: Positive for leg swelling.   Gastrointestinal: Positive for diarrhea. Negative for nausea, vomiting and abdominal pain.        Improving    Musculoskeletal: Positive for joint pain.   Skin: Negative for rash.   All other systems reviewed and are negative.      Hemodynamics:  Temp (24hrs), Av.6 °C (97.8 °F), Min:36.1 °C (97 °F), Max:36.8 °C (98.2 °F)  Temperature: 36.3 °C (97.4 °F)  Pulse  Av.3  Min: 60  Max: 89   Blood Pressure: 156/91 mmHg       Physical Exam:  Physical Exam   Constitutional: He is oriented to person, place, and time. He appears well-developed. No distress.   HENT:   Head: Atraumatic.   Eyes: EOM are normal. Pupils are equal, round, and reactive to light.   Neck: Neck supple.   Cardiovascular: Normal rate.    No murmur heard.  Pulmonary/Chest: Effort normal. No respiratory distress. He has no wheezes. He has no rales.   Abdominal: He exhibits no distension.   Musculoskeletal: He exhibits edema.   LLE VAC  +edema  RUE PICC nontender   Neurological: He is alert and oriented to person, place, and time.   Skin: No rash noted.   Nursing note and vitals reviewed.      Meds:    Current  facility-administered medications:   •  [START ON 2017] clopidogrel (PLAVIX) tablet 75 mg, 75 mg, Oral, DAILY, Natalya Monzon M.D.  •  losartan (COZAAR) tablet 50 mg, 50 mg, Oral, DAILY, Natalya Monzon M.D.  •  omeprazole (PRILOSEC) capsule 20 mg, 20 mg, Oral, DAILY, Natalya Monzon M.D.  •  diphenoxylate-atropine (LOMOTIL) 2.5-0.025 MG per tablet 1 Tab, 1 Tab, Oral, 4X/DAY PRN, Natalya Monzon M.D., 1 Tab at 17 0427  •  calcium carbonate (OS-CHRISTIN 500) tablet 500 mg, 500 mg, Oral, BID WITH MEALS, Natalya Monzon M.D., 500 mg at 17 1704  •  PICC Line Insertion has been implemented, , , Once **AND** May use Lidocaine 1% not to exceed 3 mls for local at insertion site, , , CONTINUOUS **AND** NOTIFY MD, , , Once **AND** Tip to dwell in the superior vena cava, , , CONTINUOUS **AND** Do not use PICC Line until placement verified by Chest X Ray, , , CONTINUOUS **AND** DX-CHEST-FOR PICC LINE Perform procedure in:: PICC Room, , , Once **AND** If radiologist reading of chest X-ray states any of the following the PICC should be used, , , CONTINUOUS **AND** Further evaluation of the PICC placement can be retrieved from X-Ray and Imaging, , , CONTINUOUS **AND** Blood draws through PICC line; draws by RN only, , , CONTINUOUS **AND** FLUSHING GUIDELINES WHEN IN USE, , , CONTINUOUS **AND** normal saline PF 10-20 mL, 10-20 mL, Intravenous, PRN **AND** FLUSHING GUIDELINES WHEN NOT IN USE, , , CONTINUOUS **AND** DRESSING MAINTENANCE, , , Once **AND** Change needleless pressure ports and IV tubing every 72 hours per hospital policy, , , CONTINUOUS **AND** TUBING, , , CONTINUOUS **AND** If there is an MD order to remove the PICC line, any RN may remove the PICC line, , , CONTINUOUS **AND** [] PATIENT EDUCATION MATERIALS, , , Prior to discharge **AND** NURSING COMMUNICATION, , , CONTINUOUS, Temitope Shannon M.D.  •  insulin glargine (LANTUS) injection 15 Units, 15 Units, Subcutaneous, Q EVENING,  Natalya Monzon M.D., 15 Units at 05/06/17 2117  •  insulin lispro (HUMALOG) injection 3-14 Units, 3-14 Units, Subcutaneous, Q6HRS, Natalya Monzon M.D., 3 Units at 05/07/17 1108  •  Action is required: Protocol 1073 Hypoglycemia has been implemented, , , Once **AND** Protocol 1073 Inclusion Criteria, , , CONTINUOUS **AND** Protocol 1073 NOTIFY, , , Once **AND** Protocol 1073 Initiate protocol immediately if FSBG is less than or equal to 70 mg/dL, , , CONTINUOUS **AND** glucose 4 g chewable tablet 16 g, 16 g, Oral, Q15 MIN PRN **AND** dextrose 50% (D50W) injection 25 mL, 25 mL, Intravenous, Q15 MIN PRN, Natalya Monzon M.D.  •  NS infusion, , Intravenous, Continuous, Natalya Monzon M.D., Last Rate: 125 mL/hr at 05/07/17 0422  •  piperacillin-tazobactam (ZOSYN) 3.375 g in  mL IVPB, 3.375 g, Intravenous, Q6HRS, Natalya Monzon M.D., Stopped at 05/07/17 1136  •  ondansetron (ZOFRAN) syringe/vial injection 4 mg, 4 mg, Intravenous, Q4HRS PRN, Natalya Monzon M.D., 4 mg at 05/03/17 0510  •  ondansetron (ZOFRAN ODT) dispertab 4 mg, 4 mg, Oral, Q4HRS PRN, Natalya Monzon M.D.  •  promethazine (PHENERGAN) tablet 12.5-25 mg, 12.5-25 mg, Oral, Q4HRS PRN, Natalya Monzon M.D., 25 mg at 05/03/17 1044  •  promethazine (PHENERGAN) suppository 12.5-25 mg, 12.5-25 mg, Rectal, Q4HRS PRN, Natalya Monzon M.D.  •  prochlorperazine (COMPAZINE) injection 5-10 mg, 5-10 mg, Intravenous, Q4HRS PRN, Natalya Monzon M.D.  •  INITIATE NICOTINE REPLACEMENT PROTOCOL , , , Once **AND** nicotine (NICODERM) 14 MG/24HR 14 mg, 14 mg, Transdermal, Daily-0600, 14 mg at 05/02/17 1430 **AND** Protocol 205 PATIENT EDUCATION MATERIALS, , , Once **AND** Protocol 205 Rotate nicotine patch application sites daily , , , CONTINUOUS **AND** nicotine polacrilex (NICORETTE) 2 MG piece 2 mg, 2 mg, Oral, Q HOUR PRN, Natalya Monzon M.D.  •  acyclovir (ZOVIRAX) capsule 200 mg, 200 mg, Oral, DAILY, Natalya Monzon M.D., 200  mg at 05/07/17 0815  •  metoprolol SR (TOPROL XL) tablet 25 mg, 25 mg, Oral, Q DAY, Natalya Monzon M.D., 25 mg at 05/07/17 0815  •  atorvastatin (LIPITOR) tablet 40 mg, 40 mg, Oral, QHS, Natalya Monzon M.D., 40 mg at 05/06/17 2118  •  hydrocodone/acetaminophen (NORCO)  MG per tablet 1 Tab, 1 Tab, Oral, Q4HRS PRN, NGUYEN CariasPWendiRWendiN., 1 Tab at 05/06/17 1615    Labs:  Recent Labs      05/05/17 0415 05/06/17 0425 05/07/17   0420   WBC  9.4  8.0  8.8   RBC  3.10*  3.12*  3.03*   HEMOGLOBIN  9.2*  9.2*  9.0*   HEMATOCRIT  28.6*  28.4*  27.3*   MCV  92.3  91.0  90.1   MCH  29.7  29.5  29.7   RDW  42.2  41.2  40.8   PLATELETCT  307  309  319   MPV  10.2  9.8  9.5   NEUTSPOLYS  64.40  67.80  72.40*   LYMPHOCYTES  22.40  18.70*  15.10*   MONOCYTES  9.30  8.70  8.00   EOSINOPHILS  3.10  3.50  3.70   BASOPHILS  0.50  0.60  0.50     Recent Labs      05/05/17 0415 05/06/17   0425  05/07/17   0420   SODIUM  135  136  135   POTASSIUM  4.2  4.1  3.8   CHLORIDE  105  105  104   CO2  23  22  23   GLUCOSE  140*  188*  119*   BUN  17  13  15     Recent Labs      05/05/17 0415 05/06/17   0425  05/07/17   0420   ALBUMIN  2.9*  2.9*  3.2   TBILIRUBIN  0.3  0.3  0.3   ALKPHOSPHAT  59  64  63   TOTPROTEIN  6.2  6.3  6.5   ALTSGPT  8  9  13   ASTSGOT  15  16  19   CREATININE  1.54*  1.50*  1.44*       Imaging:  Dx-chest-limited (1 View)  5/2/2017  No acute cardiopulmonary disease.    Dx-foot-complete 3+ Left  5/2/2017  Soft tissue gas in the fourth toe, concerning for necrotizing infection. Questionable osseous destruction seen along the lateral aspect of the fourth proximal phalanx, concerning for osteomyelitis. CRITICAL RESULT READ BACK: Preliminary findings discussed with and critical read back performed by JUAN JOSE Cowan via telephone on 5/2/2017 6:44 PM    Dx-foot-complete 3+ Left  4/14/2017  Negative LEFT foot series. No plain film evidence of osteomyelitis.      Micro:  BLOOD CULTURE   Date Value Ref  "Range Status   05/02/2017   Preliminary    No Growth    Note: Blood cultures are incubated for 5 days and  are monitored continuously.Positive blood cultures  are called to the RN and reported as soon as  they are identified.     05/02/2017   Preliminary    No Growth    Note: Blood cultures are incubated for 5 days and  are monitored continuously.Positive blood cultures  are called to the RN and reported as soon as  they are identified.        Results     Procedure Component Value Units Date/Time    CDIFF BY PCR [[id number]] Collected:  05/06/17 0845    Order Status:  Completed Specimen Information:  Stool from Stool Updated:  05/06/17 1247     C Diff by PCR Negative      027-NAP1-BI Presumptive Negative      Comment: Presumptive 027/NAP1/BI target DNA sequences are NOT DETECTED.       Narrative:      Collected By:[id number] KAREN TURNER  Does this patient have risk factors for C-diff?->Yes  Has patient taken stool softeners or laxatives in the last 5  days?->Yes    Blood Culture [[id number]] Collected:  05/02/17 1558    Order Status:  Completed Specimen Information:  Blood from Peripheral Updated:  05/03/17 0643     Significant Indicator NEG      Source BLD      Site PERIPHERAL      Blood Culture --      Result:        No Growth    Note: Blood cultures are incubated for 5 days and  are monitored continuously.Positive blood cultures  are called to the RN and reported as soon as  they are identified.      Narrative:      Per Hospital Policy: Only change Specimen Src: to \"Line\" if  specified by physician order.    Blood Culture [[id number]] Collected:  05/02/17 1558    Order Status:  Completed Specimen Information:  Blood from Peripheral Updated:  05/03/17 0643     Significant Indicator NEG      Source BLD      Site PERIPHERAL      Blood Culture --      Result:        No Growth    Note: Blood cultures are incubated for 5 days and  are monitored continuously.Positive blood cultures  are called to the RN and reported " "as soon as  they are identified.      Narrative:      Per Hospital Policy: Only change Specimen Src: to \"Line\" if  specified by physician order.    Blood Culture [[id number]]     Order Status:  Canceled Specimen Information:  Blood from Peripheral     Blood Culture [[id number]]     Order Status:  Canceled Specimen Information:  Blood from Peripheral     Culture Respiratory W/ GRM STN [[id number]]     Order Status:  Completed Specimen Information:  Respirate from Sputum           Assessment:  Diabetes  Leukocytosis  Diabetic foot ulcer  Gangrene      Plan:  Diabetic foot ulcer with gangrene-margins not clear  S/p amp 5/3  Afebrile  Previous cultures on 4/14/2017 were E faecalis  Discontinue Zyvox  Continue zosyn  Severe PVD  S/p recent angioplasty      Gangrene  XRAY showed gas in tissues  Monitor closely for healing-may need additional debridement  Remains high risk for progression    REGULO  Improving  Avoid nephrotoxic agents  Adjust abx as needed for renal function    Diabetes  Keep BS under 150 to help control current infection    Diarrhea  Multiple stools last night  Seems to be easing up-continue to monitor      Prognosis for limb salvage guarded  Discussed with internal Medicine-Dr Monzon  "

## 2017-05-07 NOTE — PROGRESS NOTES
Assumed care of pt. at 0700     Bedside report received from BILL Delvalle  And POC discussed  Pt. Is Asleep  Call light within reach    Bed locked and in lowest position.    Will continue to monitor

## 2017-05-07 NOTE — PROGRESS NOTES
Hospital Medicine Progress Note, Adult, Complex               Author: Natalya Monzon Date & Time created: 5/7/2017  11:51 AM     Interval History:  56 y.o. 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 Patient was admitted for left 3,4th toe cellulitis, sepsis.    Overnight patients foot xray revealed gas, concerning from necrotizing tissue,  was consulted and is undergoing debridement. Patient reports feeling much better.  ID was consulted, will change vancomycin to Zyvox and continue unasyn from zosyn.  Continue aggressive diabetic control.  Pain control, titrate as needed.    5/4  No issues overnight, patient blood sugar still elevated, however slowly improving  Continue Zyvox and unasyn, ID following.  Cont wound vac and wound care.  PICC Line placement pending. I do not anticipate that the patient is going to require dialysis at this time. At this point PICC line placement would allow for antibiotics which are vital in his clinical recovery. The benefits outweigh the risk PICC Line carries in terms of anticipation of future dialysis.     5/5  Patient clinically improving, his blood sugars are better controlled. Patient denies fevers/chills, chest pain, shortness of breath or nausea/vommiting. Patient denies significant limb pain.  Continue IV Zyvox and Zosyn. ID following  Cont wound care with wound vac.  Placement.    5/6  Patietn doing better clinically, cresencio complains of having multiple episodes of watery diarrhea started last night. Patient denies fevers/chills, chest pain, shortness of breath or nausea/vommiting.   Checking Cdiff.   Continue IV zosyn  Continue wound care  Placement at some point.    5/7  No issues overnight except for patient still  having diarrhea, although less frequency. Otherwise Patient denies fevers/chills, chest pain, shortness of breath or nausea/vommiting.   CDIFF neg  Change wound vac on Monday  Placement.      Review of Systems:   Review of Systems   Constitutional: Positive for malaise/fatigue. Negative for fever and chills.   HENT: Negative for congestion, hearing loss, sore throat and tinnitus.    Eyes: Negative for blurred vision, double vision, photophobia and pain.   Respiratory: Negative for cough, hemoptysis, sputum production, shortness of breath and stridor.    Cardiovascular: Negative for chest pain, palpitations, orthopnea, claudication and PND.   Gastrointestinal: Positive for diarrhea (improving). Negative for heartburn, nausea, vomiting, constipation, blood in stool and melena.   Genitourinary: Negative for dysuria, urgency and frequency.   Musculoskeletal: Positive for myalgias (minimal) and joint pain (decreased ate site of wound vac). Negative for back pain and neck pain.   Neurological: Negative for dizziness, tingling, tremors, sensory change, speech change, weakness and headaches.   Psychiatric/Behavioral: Negative for depression, suicidal ideas and memory loss. The patient is not nervous/anxious.        Physical Exam:  Physical Exam   Constitutional: He is oriented to person, place, and time. He appears well-developed and well-nourished.   HENT:   Head: Normocephalic and atraumatic.   Mouth/Throat: No oropharyngeal exudate.   Eyes: Conjunctivae are normal. Pupils are equal, round, and reactive to light. Right eye exhibits no discharge. No scleral icterus.   Neck: Neck supple. No JVD present. No thyromegaly present.   Cardiovascular: Intact distal pulses.    No murmur heard.  Pulmonary/Chest: Effort normal and breath sounds normal. No stridor. No respiratory distress. He has no wheezes. He has no rales.   Abdominal: Soft. Bowel sounds are normal. He exhibits no distension. There is no tenderness. There is no  rebound.   Musculoskeletal: Normal range of motion. He exhibits edema (minimal around wound vac unchanged). He exhibits no tenderness (minimal).   Left foot wound vac intact, no surrounding erythema, minimal edema,    Neurological: He is alert and oriented to person, place, and time.   Skin: Skin is warm. No erythema.   Psychiatric: He has a normal mood and affect. His behavior is normal. Thought content normal.       Labs:        Invalid input(s): [id number]      Recent Labs      17   SODIUM  135  136  135   POTASSIUM  4.2  4.1  3.8   CHLORIDE  105  105  104   CO2  23  22  23   BUN  17  13  15   CREATININE  1.54*  1.50*  1.44*   MAGNESIUM  2.4  2.1  2.0   CALCIUM  8.6  8.1*  8.8     Recent Labs      17   ALTSGPT  8  9  13   ASTSGOT  15  16  19   ALKPHOSPHAT  59  64  63   TBILIRUBIN  0.3  0.3  0.3   GLUCOSE  140*  188*  119*     Recent Labs      17   RBC  3.10*  3.12*  3.03*   HEMOGLOBIN  9.2*  9.2*  9.0*   HEMATOCRIT  28.6*  28.4*  27.3*   PLATELETCT  307  309  319     Recent Labs      170   WBC  9.4  8.0  8.8   NEUTSPOLYS  64.40  67.80  72.40*   LYMPHOCYTES  22.40  18.70*  15.10*   MONOCYTES  9.30  8.70  8.00   EOSINOPHILS  3.10  3.50  3.70   BASOPHILS  0.50  0.60  0.50   ASTSGOT  15  16  19   ALTSGPT  8  9  13   ALKPHOSPHAT  59  64  63   TBILIRUBIN  0.3  0.3  0.3           Hemodynamics:  Temp (24hrs), Av.6 °C (97.8 °F), Min:36.1 °C (97 °F), Max:36.8 °C (98.2 °F)  Temperature: 36.3 °C (97.4 °F)  Pulse  Av.3  Min: 60  Max: 89   Blood Pressure: 156/91 mmHg     Respiratory:    Respiration: 16, Pulse Oximetry: 97 %           Fluids:    Intake/Output Summary (Last 24 hours) at 17 1151  Last data filed at 17 0400   Gross per 24 hour   Intake      0 ml   Output    600 ml   Net   -600 ml     Weight: 79.8 kg (175  lb 14.8 oz)  GI/Nutrition:  Orders Placed This Encounter   Procedures   • DIET ORDER     Standing Status: Standing      Number of Occurrences: 1      Standing Expiration Date:      Order Specific Question:  Diet:     Answer:  Diabetic [3]     Medical Decision Making, by Problem:    1.  Severe Sepsis.   - 2/2 to left 3,4th toe cellulitis, with findings of gas gangrene on xray.  - Pending Debridement per ..  - Continue with zosyn  - Cultures neg   - Previous wound cultures from 4/14 shows evidence of enterococcus fecalis. Pan-sensitive.  - Continue IV fluids  - ID following.     2. Left 3,4th toe cellulitis with osteomyelitis.   - s/p I&D and amputation of the left    fourth toe.     - Continue antibiotics as above  - continue wound care.    3. Insulin-dependent diabetes Type 1.  - Continue lantus 20U,    - Continue Insulin-sliding scale, accu-checks and hypoglycemia protocol.  - blood sugars well controlled   - A1c : 8.6    4. Acute on chronic renal failure  - suspect pre-renal azotemia,  - at baseline.   - continue IV fluids, recheck bmp in the am. Improving.    5. Diabetic retinopathy.  - defer to outpatient management.    6. Essential hypertension  - Continue Metoprolol, restart losartan    7. 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  - continue plavix.     8. Hyponatremia  - resolved    9. Normocytic anemia  - no signs of gross bleeding  -  continue to monitor for acute changes.    Patient plan of care discussed at multidisplinary team rounds and with patient and R.N at beside.        Radiology images reviewed, Labs reviewed and Medications reviewed  Leslie catheter: No Leslie      DVT: pradaxa.  DVT prophylaxis - mechanical: SCDs    Antibiotics: Treating active infection/contamination beyond 24 hours perioperative  coverage

## 2017-05-07 NOTE — CARE PLAN
Problem: Safety  Goal: Will remain free from injury  Intervention: Provide assistance with mobility  Pt has been assisted to bathroom, ambulates well with frontwheel walker

## 2017-05-07 NOTE — PROGRESS NOTES
Assumed pt care at 1900.  A&0x4.  In no distress.  States bowel movements has decreased.  No other complaints. Call light within reach.  Will continue to monitor and follow plan of care.

## 2017-05-07 NOTE — CARE PLAN
Problem: Communication  Goal: The ability to communicate needs accurately and effectively will improve  Outcome: PROGRESSING AS EXPECTED  POC discussed with pt. All questions and concerns have been addressed at this time. Verbalized understanding received. Will continue to update POC    Problem: Safety  Goal: Will remain free from injury  Outcome: PROGRESSING AS EXPECTED  Pt. Is standby assist. Appropriate fall precautions in place. Call light within reach. Bed locked and in lowest position.

## 2017-05-07 NOTE — CARE PLAN
Problem: Infection  Goal: Will remain free from infection  Intervention: Assess signs and symptoms of infection  Pt receiving antibiotics for cellulitis, PICC line has no signs of infection and dressing change has been completed.  Pt has been receiving nightly CHG baths.

## 2017-05-07 NOTE — PROGRESS NOTES
Vascular    VSS afeb    Nosebleeds have stopped.  No extreme left foot pain.  Cellulitis looks nearly resolved.      Continue wound care.  Will try to again look at the wound tomorrow.

## 2017-05-08 LAB
ALBUMIN SERPL BCP-MCNC: 3.3 G/DL (ref 3.2–4.9)
ALBUMIN/GLOB SERPL: 0.9 G/DL
ALP SERPL-CCNC: 65 U/L (ref 30–99)
ALT SERPL-CCNC: 16 U/L (ref 2–50)
ANION GAP SERPL CALC-SCNC: 9 MMOL/L (ref 0–11.9)
AST SERPL-CCNC: 22 U/L (ref 12–45)
BASOPHILS # BLD AUTO: 0.5 % (ref 0–1.8)
BASOPHILS # BLD: 0.04 K/UL (ref 0–0.12)
BILIRUB SERPL-MCNC: 0.4 MG/DL (ref 0.1–1.5)
BUN SERPL-MCNC: 13 MG/DL (ref 8–22)
CALCIUM SERPL-MCNC: 9.2 MG/DL (ref 8.5–10.5)
CHLORIDE SERPL-SCNC: 104 MMOL/L (ref 96–112)
CO2 SERPL-SCNC: 21 MMOL/L (ref 20–33)
CREAT SERPL-MCNC: 1.4 MG/DL (ref 0.5–1.4)
EOSINOPHIL # BLD AUTO: 0.34 K/UL (ref 0–0.51)
EOSINOPHIL NFR BLD: 4.5 % (ref 0–6.9)
ERYTHROCYTE [DISTWIDTH] IN BLOOD BY AUTOMATED COUNT: 40.3 FL (ref 35.9–50)
GFR SERPL CREATININE-BSD FRML MDRD: 52 ML/MIN/1.73 M 2
GLOBULIN SER CALC-MCNC: 3.6 G/DL (ref 1.9–3.5)
GLUCOSE BLD-MCNC: 136 MG/DL (ref 65–99)
GLUCOSE BLD-MCNC: 144 MG/DL (ref 65–99)
GLUCOSE BLD-MCNC: 255 MG/DL (ref 65–99)
GLUCOSE BLD-MCNC: 276 MG/DL (ref 65–99)
GLUCOSE BLD-MCNC: 279 MG/DL (ref 65–99)
GLUCOSE SERPL-MCNC: 147 MG/DL (ref 65–99)
HCT VFR BLD AUTO: 27.3 % (ref 42–52)
HGB BLD-MCNC: 9.2 G/DL (ref 14–18)
IMM GRANULOCYTES # BLD AUTO: 0.03 K/UL (ref 0–0.11)
IMM GRANULOCYTES NFR BLD AUTO: 0.4 % (ref 0–0.9)
LYMPHOCYTES # BLD AUTO: 1.77 K/UL (ref 1–4.8)
LYMPHOCYTES NFR BLD: 23.3 % (ref 22–41)
MAGNESIUM SERPL-MCNC: 2 MG/DL (ref 1.5–2.5)
MCH RBC QN AUTO: 29.9 PG (ref 27–33)
MCHC RBC AUTO-ENTMCNC: 33.7 G/DL (ref 33.7–35.3)
MCV RBC AUTO: 88.6 FL (ref 81.4–97.8)
MONOCYTES # BLD AUTO: 0.66 K/UL (ref 0–0.85)
MONOCYTES NFR BLD AUTO: 8.7 % (ref 0–13.4)
NEUTROPHILS # BLD AUTO: 4.75 K/UL (ref 1.82–7.42)
NEUTROPHILS NFR BLD: 62.6 % (ref 44–72)
NRBC # BLD AUTO: 0 K/UL
NRBC BLD AUTO-RTO: 0 /100 WBC
PLATELET # BLD AUTO: 307 K/UL (ref 164–446)
PMV BLD AUTO: 9.4 FL (ref 9–12.9)
POTASSIUM SERPL-SCNC: 3.6 MMOL/L (ref 3.6–5.5)
PROT SERPL-MCNC: 6.9 G/DL (ref 6–8.2)
RBC # BLD AUTO: 3.08 M/UL (ref 4.7–6.1)
SODIUM SERPL-SCNC: 134 MMOL/L (ref 135–145)
WBC # BLD AUTO: 7.6 K/UL (ref 4.8–10.8)

## 2017-05-08 PROCEDURE — 80053 COMPREHEN METABOLIC PANEL: CPT

## 2017-05-08 PROCEDURE — 97605 NEG PRS WND THER DME<=50SQCM: CPT

## 2017-05-08 PROCEDURE — A9270 NON-COVERED ITEM OR SERVICE: HCPCS | Performed by: NURSE PRACTITIONER

## 2017-05-08 PROCEDURE — 770020 HCHG ROOM/CARE - TELE (206)

## 2017-05-08 PROCEDURE — 99232 SBSQ HOSP IP/OBS MODERATE 35: CPT | Performed by: HOSPITALIST

## 2017-05-08 PROCEDURE — 700105 HCHG RX REV CODE 258: Performed by: HOSPITALIST

## 2017-05-08 PROCEDURE — 700102 HCHG RX REV CODE 250 W/ 637 OVERRIDE(OP): Performed by: HOSPITALIST

## 2017-05-08 PROCEDURE — 85025 COMPLETE CBC W/AUTO DIFF WBC: CPT

## 2017-05-08 PROCEDURE — 82962 GLUCOSE BLOOD TEST: CPT | Mod: 91

## 2017-05-08 PROCEDURE — A9270 NON-COVERED ITEM OR SERVICE: HCPCS | Performed by: HOSPITALIST

## 2017-05-08 PROCEDURE — 700102 HCHG RX REV CODE 250 W/ 637 OVERRIDE(OP): Performed by: NURSE PRACTITIONER

## 2017-05-08 PROCEDURE — 700111 HCHG RX REV CODE 636 W/ 250 OVERRIDE (IP): Performed by: HOSPITALIST

## 2017-05-08 PROCEDURE — 83735 ASSAY OF MAGNESIUM: CPT

## 2017-05-08 RX ADMIN — SODIUM CHLORIDE: 9 INJECTION, SOLUTION INTRAVENOUS at 22:19

## 2017-05-08 RX ADMIN — CLOPIDOGREL 75 MG: 75 TABLET, FILM COATED ORAL at 08:53

## 2017-05-08 RX ADMIN — METOPROLOL SUCCINATE 25 MG: 25 TABLET, EXTENDED RELEASE ORAL at 08:53

## 2017-05-08 RX ADMIN — CALCIUM CARBONATE 500 MG: 1250 TABLET ORAL at 17:10

## 2017-05-08 RX ADMIN — PIPERACILLIN SODIUM AND TAZOBACTAM SODIUM 3.38 G: 3; .375 INJECTION, POWDER, FOR SOLUTION INTRAVENOUS at 04:45

## 2017-05-08 RX ADMIN — ACYCLOVIR 200 MG: 200 CAPSULE ORAL at 15:09

## 2017-05-08 RX ADMIN — INSULIN GLARGINE 15 UNITS: 100 INJECTION, SOLUTION SUBCUTANEOUS at 22:20

## 2017-05-08 RX ADMIN — SODIUM CHLORIDE: 9 INJECTION, SOLUTION INTRAVENOUS at 11:38

## 2017-05-08 RX ADMIN — PIPERACILLIN SODIUM AND TAZOBACTAM SODIUM 3.38 G: 3; .375 INJECTION, POWDER, FOR SOLUTION INTRAVENOUS at 22:19

## 2017-05-08 RX ADMIN — PIPERACILLIN SODIUM AND TAZOBACTAM SODIUM 3.38 G: 3; .375 INJECTION, POWDER, FOR SOLUTION INTRAVENOUS at 17:09

## 2017-05-08 RX ADMIN — HYDROCODONE BITARTRATE AND ACETAMINOPHEN 1 TABLET: 10; 325 TABLET ORAL at 13:04

## 2017-05-08 RX ADMIN — OMEPRAZOLE 20 MG: 20 CAPSULE, DELAYED RELEASE ORAL at 08:53

## 2017-05-08 RX ADMIN — ATORVASTATIN CALCIUM 40 MG: 40 TABLET, FILM COATED ORAL at 22:17

## 2017-05-08 RX ADMIN — PIPERACILLIN SODIUM AND TAZOBACTAM SODIUM 3.38 G: 3; .375 INJECTION, POWDER, FOR SOLUTION INTRAVENOUS at 08:54

## 2017-05-08 RX ADMIN — LOSARTAN POTASSIUM 50 MG: 50 TABLET, FILM COATED ORAL at 08:53

## 2017-05-08 ASSESSMENT — PAIN SCALES - GENERAL
PAINLEVEL_OUTOF10: 2
PAINLEVEL_OUTOF10: 0

## 2017-05-08 ASSESSMENT — ENCOUNTER SYMPTOMS
BLOOD IN STOOL: 0
NAUSEA: 0
MYALGIAS: 1
SHORTNESS OF BREATH: 0
ABDOMINAL PAIN: 0
CHILLS: 0
ORTHOPNEA: 0
BLURRED VISION: 0
CONSTIPATION: 0
TINGLING: 0
PALPITATIONS: 0
PND: 0
STRIDOR: 0
SORE THROAT: 0
SENSORY CHANGE: 0
COUGH: 0
MEMORY LOSS: 0
PHOTOPHOBIA: 0
DIARRHEA: 1
SPUTUM PRODUCTION: 0
BACK PAIN: 0
CLAUDICATION: 0
HEMOPTYSIS: 0
NERVOUS/ANXIOUS: 0
DEPRESSION: 0
VOMITING: 0
HEARTBURN: 0
SPEECH CHANGE: 0
WEAKNESS: 0
NECK PAIN: 0
HEADACHES: 0
FEVER: 0
TREMORS: 0
EYE PAIN: 0
DOUBLE VISION: 0
DIZZINESS: 0

## 2017-05-08 NOTE — DISCHARGE PLANNING
Pt is cleared to transfer to SNF tomorrow. Transport form for [facility] faxed to [facility]. Will follow up tomorrow AM for DC summary.

## 2017-05-08 NOTE — DISCHARGE PLANNING
Transitional Care Navigator:    Met with patient regarding choice from the three accepting facilities.  Pt would like [facility].  CCS and SW aware.

## 2017-05-08 NOTE — PROGRESS NOTES
Infectious Disease Progress Note    Author: Vanna Douglas M.D. DOS & Time created: 2017  11:37 AM    Chief Complaint:  FU diabetic foot ulcer with gangrene      Interval History:  56-year-old diabetic male admitted with a non-healing ulceration of his left fourth digit superinfected with associated gangrene.   5/3 AF WBC 15.4 s/p OR this am with amp 4th digit. Tolerated well-denies SE abx   AF cxs neg Path reviewed-c/o pain   AF WBC 9.4 states 1st constipated-now loose stools. Pain in foot less  2017- no fevers. Complains of diarrhea. WBC 8  - no fevers. Diarrhea is improving. The foot still hurts.  17- no fevers. Had a panic attack this morning. Diarrhea seems to be improving  Labs Reviewed, Medications Reviewed, Radiology Reviewed and Wound Reviewed.    Review of Systems:  Review of Systems   Constitutional: Negative for fever and chills.   Cardiovascular: Positive for leg swelling.   Gastrointestinal: Positive for diarrhea. Negative for nausea, vomiting and abdominal pain.        Improving . Had about 3 bowel movements last night and there was some formed stool   Musculoskeletal: Positive for joint pain.   Skin: Negative for rash.   Neurological: Negative for sensory change and speech change.   Psychiatric/Behavioral:        Panic attack       Hemodynamics:  Temp (24hrs), Av.7 °C (98.1 °F), Min:36.2 °C (97.1 °F), Max:37.3 °C (99.1 °F)  Temperature: 36.2 °C (97.1 °F)  Pulse  Av.1  Min: 60  Max: 89   Blood Pressure: 141/86 mmHg       Physical Exam:  Physical Exam   Constitutional: He is oriented to person, place, and time. He appears well-developed. No distress.   HENT:   Head: Atraumatic.   Eyes: EOM are normal. Pupils are equal, round, and reactive to light.   Neck: Neck supple.   Cardiovascular: Normal rate.    No murmur heard.  Pulmonary/Chest: Effort normal. No respiratory distress. He has no wheezes. He has no rales.   Abdominal: He exhibits no distension.   Musculoskeletal:  He exhibits edema.   LLE VAC  +edema  RUE PICC nontender   Neurological: He is alert and oriented to person, place, and time.   Skin: No rash noted.   Nursing note and vitals reviewed.      Meds:    Current facility-administered medications:   •  clopidogrel (PLAVIX) tablet 75 mg, 75 mg, Oral, DAILY, Natalya Monzon M.D., 75 mg at 05/08/17 0853  •  losartan (COZAAR) tablet 50 mg, 50 mg, Oral, DAILY, Natalya Monzon M.D., 50 mg at 05/08/17 0853  •  omeprazole (PRILOSEC) capsule 20 mg, 20 mg, Oral, DAILY, Natalya Monzon M.D., 20 mg at 05/08/17 0853  •  insulin lispro (HUMALOG) injection 3-14 Units, 3-14 Units, Subcutaneous, 4X/DAY ACHS, Natalya Monzon M.D., 7 Units at 05/08/17 1131  •  diphenoxylate-atropine (LOMOTIL) 2.5-0.025 MG per tablet 1 Tab, 1 Tab, Oral, 4X/DAY PRN, Natalya Monzon M.D., 1 Tab at 05/07/17 0427  •  calcium carbonate (OS-CHRISTIN 500) tablet 500 mg, 500 mg, Oral, BID WITH MEALS, Natalya Monzon M.D., 500 mg at 05/04/17 1704  •  PICC Line Insertion has been implemented, , , Once **AND** May use Lidocaine 1% not to exceed 3 mls for local at insertion site, , , CONTINUOUS **AND** NOTIFY MD, , , Once **AND** Tip to dwell in the superior vena cava, , , CONTINUOUS **AND** Do not use PICC Line until placement verified by Chest X Ray, , , CONTINUOUS **AND** DX-CHEST-FOR PICC LINE Perform procedure in:: PICC Room, , , Once **AND** If radiologist reading of chest X-ray states any of the following the PICC should be used, , , CONTINUOUS **AND** Further evaluation of the PICC placement can be retrieved from X-Ray and Imaging, , , CONTINUOUS **AND** Blood draws through PICC line; draws by RN only, , , CONTINUOUS **AND** FLUSHING GUIDELINES WHEN IN USE, , , CONTINUOUS **AND** normal saline PF 10-20 mL, 10-20 mL, Intravenous, PRN **AND** FLUSHING GUIDELINES WHEN NOT IN USE, , , CONTINUOUS **AND** DRESSING MAINTENANCE, , , Once **AND** Change needleless pressure ports and IV tubing every 72 hours  per hospital policy, , , CONTINUOUS **AND** TUBING, , , CONTINUOUS **AND** If there is an MD order to remove the PICC line, any RN may remove the PICC line, , , CONTINUOUS **AND** [] PATIENT EDUCATION MATERIALS, , , Prior to discharge **AND** NURSING COMMUNICATION, , , CONTINUOUS, Temitope Shannon M.D.  •  insulin glargine (LANTUS) injection 15 Units, 15 Units, Subcutaneous, Q EVENING, Natalya Monzon M.D., 15 Units at 17 2116  •  Action is required: Protocol 1073 Hypoglycemia has been implemented, , , Once **AND** Protocol 1073 Inclusion Criteria, , , CONTINUOUS **AND** Protocol 1073 NOTIFY, , , Once **AND** Protocol 1073 Initiate protocol immediately if FSBG is less than or equal to 70 mg/dL, , , CONTINUOUS **AND** glucose 4 g chewable tablet 16 g, 16 g, Oral, Q15 MIN PRN **AND** dextrose 50% (D50W) injection 25 mL, 25 mL, Intravenous, Q15 MIN PRN, Natalya Monzon M.D.  •  NS infusion, , Intravenous, Continuous, Natalya Monzon M.D., Last Rate: 125 mL/hr at 17 2334  •  piperacillin-tazobactam (ZOSYN) 3.375 g in  mL IVPB, 3.375 g, Intravenous, Q6HRS, Natalya Monzon M.D., Stopped at 17 0924  •  ondansetron (ZOFRAN) syringe/vial injection 4 mg, 4 mg, Intravenous, Q4HRS PRN, Natalya Monzon M.D., 4 mg at 17 0510  •  ondansetron (ZOFRAN ODT) dispertab 4 mg, 4 mg, Oral, Q4HRS PRN, Natalya Monzon M.D.  •  promethazine (PHENERGAN) tablet 12.5-25 mg, 12.5-25 mg, Oral, Q4HRS PRN, Natalya Monzon M.D., 25 mg at 17 1044  •  promethazine (PHENERGAN) suppository 12.5-25 mg, 12.5-25 mg, Rectal, Q4HRS PRN, Natalya Monzon M.D.  •  prochlorperazine (COMPAZINE) injection 5-10 mg, 5-10 mg, Intravenous, Q4HRS PRN, Natalya Monzon M.D.  •  INITIATE NICOTINE REPLACEMENT PROTOCOL , , , Once **AND** nicotine (NICODERM) 14 MG/24HR 14 mg, 14 mg, Transdermal, Daily-0600, 14 mg at 17 1430 **AND** Protocol 205 PATIENT EDUCATION MATERIALS, , , Once **AND**  Protocol 205 Rotate nicotine patch application sites daily , , , CONTINUOUS **AND** nicotine polacrilex (NICORETTE) 2 MG piece 2 mg, 2 mg, Oral, Q HOUR PRN, Natalya Monzon M.D.  •  acyclovir (ZOVIRAX) capsule 200 mg, 200 mg, Oral, DAILY, Natalya Monzon M.D., Stopped at 05/08/17 0900  •  metoprolol SR (TOPROL XL) tablet 25 mg, 25 mg, Oral, Q DAY, Natalya Monzon M.D., 25 mg at 05/08/17 0853  •  atorvastatin (LIPITOR) tablet 40 mg, 40 mg, Oral, QHS, Natalya Monzon M.D., 40 mg at 05/07/17 2112  •  hydrocodone/acetaminophen (NORCO)  MG per tablet 1 Tab, 1 Tab, Oral, Q4HRS PRN, DAYO Carias, 1 Tab at 05/06/17 1615    Labs:  Recent Labs      05/06/17 0425 05/07/17 0420 05/08/17   0300   WBC  8.0  8.8  7.6   RBC  3.12*  3.03*  3.08*   HEMOGLOBIN  9.2*  9.0*  9.2*   HEMATOCRIT  28.4*  27.3*  27.3*   MCV  91.0  90.1  88.6   MCH  29.5  29.7  29.9   RDW  41.2  40.8  40.3   PLATELETCT  309  319  307   MPV  9.8  9.5  9.4   NEUTSPOLYS  67.80  72.40*  62.60   LYMPHOCYTES  18.70*  15.10*  23.30   MONOCYTES  8.70  8.00  8.70   EOSINOPHILS  3.50  3.70  4.50   BASOPHILS  0.60  0.50  0.50     Recent Labs      05/06/17 0425 05/07/17   0420  05/08/17   0300   SODIUM  136  135  134*   POTASSIUM  4.1  3.8  3.6   CHLORIDE  105  104  104   CO2  22  23  21   GLUCOSE  188*  119*  147*   BUN  13  15  13     Recent Labs      05/06/17 0425 05/07/17   0420  05/08/17   0300   ALBUMIN  2.9*  3.2  3.3   TBILIRUBIN  0.3  0.3  0.4   ALKPHOSPHAT  64  63  65   TOTPROTEIN  6.3  6.5  6.9   ALTSGPT  9  13  16   ASTSGOT  16  19  22   CREATININE  1.50*  1.44*  1.40       Imaging:  Dx-chest-limited (1 View)  5/2/2017  No acute cardiopulmonary disease.    Dx-foot-complete 3+ Left  5/2/2017  Soft tissue gas in the fourth toe, concerning for necrotizing infection. Questionable osseous destruction seen along the lateral aspect of the fourth proximal phalanx, concerning for osteomyelitis. CRITICAL RESULT READ BACK:  "Preliminary findings discussed with and critical read back performed by JUAN JOSE Cowan via telephone on 5/2/2017 6:44 PM    Dx-foot-complete 3+ Left  4/14/2017  Negative LEFT foot series. No plain film evidence of osteomyelitis.      Micro:  BLOOD CULTURE   Date Value Ref Range Status   05/02/2017 No growth after 5 days of incubation.  Final   05/02/2017 No growth after 5 days of incubation.  Final      Results     Procedure Component Value Units Date/Time    Blood Culture [[id number]] Collected:  05/02/17 1558    Order Status:  Completed Specimen Information:  Blood from Peripheral Updated:  05/07/17 1700     Significant Indicator NEG      Source BLD      Site PERIPHERAL      Blood Culture No growth after 5 days of incubation.     Narrative:      Per Hospital Policy: Only change Specimen Src: to \"Line\" if  specified by physician order.    Blood Culture [[id number]] Collected:  05/02/17 1558    Order Status:  Completed Specimen Information:  Blood from Peripheral Updated:  05/07/17 1700     Significant Indicator NEG      Source BLD      Site PERIPHERAL      Blood Culture No growth after 5 days of incubation.     Narrative:      Per Hospital Policy: Only change Specimen Src: to \"Line\" if  specified by physician order.    CDIFF BY PCR [[id number]] Collected:  05/06/17 0845    Order Status:  Completed Specimen Information:  Stool from Stool Updated:  05/06/17 1247     C Diff by PCR Negative      027-NAP1-BI Presumptive Negative      Comment: Presumptive 027/NAP1/BI target DNA sequences are NOT DETECTED.       Narrative:      Collected By:[id number] KAREN TURNER  Does this patient have risk factors for C-diff?->Yes  Has patient taken stool softeners or laxatives in the last 5  days?->Yes    Blood Culture [[id number]]     Order Status:  Canceled Specimen Information:  Blood from Peripheral     Blood Culture [[id number]]     Order Status:  Canceled Specimen Information:  Blood from Peripheral     Culture Respiratory W/ " [facility] [[id number]]     Order Status:  Completed Specimen Information:  Respirate from Sputum           Assessment:  Diabetes  Leukocytosis  Diabetic foot ulcer  Gangrene      Plan:  Diabetic foot ulcer with gangrene-margins not clear  S/p amp 5/3  Afebrile  Previous cultures on 4/14/2017 were E faecalis  Discontinue Zyvox  Continue zosyn. Aim through 6/3/2017  May be going to sniff    Severe PVD  S/p recent angioplasty      Gangrene  XRAY showed gas in tissues  Monitor closely for healing-may need additional debridement  Remains high risk for progression    REGULO  Improving  Avoid nephrotoxic agents  Adjust abx as needed for renal function    Diabetes  Keep BS under 150 to help control current infection    Diarrhea  Possibly secondary to antibiotic  Seems to be improving  Continue to monitor  No fevers no leukocytosis to suggest an infectious cause    Prognosis for limb salvage guarded  Discussed with internal Medicine-Dr Monzon

## 2017-05-08 NOTE — PROGRESS NOTES
"LIMB PRESERVATION SERVICE     POD # 5 s/p Incision and drainage of left foot infection with amputation of the left    fourth toe by Dr. Richard   S/p recent angioplasty of ant tib artery LLE    /90 mmHg  Pulse 83  Temp(Src) 36.4 °C (97.6 °F)  Resp 16  Ht 1.727 m (5' 8\")  Wt 80 kg (176 lb 5.9 oz)  BMI 26.82 kg/m2  SpO2 97%   Blood glucose 276      Assessed wound during VAC change  granulation present  Maceration to periwound  Erythema noted to plantar arch of foot    Palpable pulses to L foot  Difficulty feeling pulse R foot but cap refill < 3sec      Recent Labs      05/06/17   0425 05/07/17   0420  05/08/17   0300   WBC  8.0  8.8  7.6   RBC  3.12*  3.03*  3.08*   HEMOGLOBIN  9.2*  9.0*  9.2*   HEMATOCRIT  28.4*  27.3*  27.3*   MCV  91.0  90.1  88.6   MCH  29.5  29.7  29.9   RDW  41.2  40.8  40.3   PLATELETCT  309  319  307   MPV  9.8  9.5  9.4   NEUTSPOLYS  67.80  72.40*  62.60   LYMPHOCYTES  18.70*  15.10*  23.30   MONOCYTES  8.70  8.00  8.70   EOSINOPHILS  3.50  3.70  4.50   BASOPHILS  0.60  0.50  0.50     Recent Labs      05/06/17 0425 05/07/17   0420  05/08/17   0300   SODIUM  136  135  134*   POTASSIUM  4.1  3.8  3.6   CHLORIDE  105  104  104   CO2  22  23  21   GLUCOSE  188*  119*  147*   BUN  13  15  13     PICC in place  ID managing abx    CM working on SNF placement      PLAN  -HWB only on left foot, papito boot ordered  -abx per ID recs  -Wound care per IP wound team  -discharge planning  "

## 2017-05-08 NOTE — DISCHARGE PLANNING
Received call from Jeanne with [facility] requesting PT, OT notes. Notified STONE Tran via voicemail. Notified STONE Shane via phone.

## 2017-05-08 NOTE — CARE PLAN
Problem: Infection  Goal: Will remain free from infection  Outcome: PROGRESSING AS EXPECTED  Vital signs stable. Pt and family educated on proper hand hygiene. Scrub access port for at least 15 seconds. Abx therapy in use.

## 2017-05-08 NOTE — PROGRESS NOTES
Assumed care at 0700, bedside report received from NOC shift RN. Patient is AAOX4 with no sign of distress. Initial assessment completed, orders reviewed, call light within reach, bed alarm in use, and hourly rounding in place. POC addressed with patient no additional questions at this time. Wound vac checked pressure adequate. PICC line functioning properly.

## 2017-05-08 NOTE — DISCHARGE PLANNING
Received transport form from STONE Tran. Contacted [facility] they are requesting the current MAR. Faxed to [phone]. Awaiting call back.

## 2017-05-08 NOTE — PROGRESS NOTES
Hospital Medicine Progress Note, Adult, Complex               Author: Natalya Monzon Date & Time created: 5/8/2017  3:32 PM     Interval History:  56 y.o. 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 Patient was admitted for left 3,4th toe cellulitis, sepsis.    Overnight patients foot xray revealed gas, concerning from necrotizing tissue,  was consulted and is undergoing debridement. Patient reports feeling much better.  ID was consulted, will change vancomycin to Zyvox and continue unasyn from zosyn.  Continue aggressive diabetic control.  Pain control, titrate as needed.    5/4  No issues overnight, patient blood sugar still elevated, however slowly improving  Continue Zyvox and unasyn, ID following.  Cont wound vac and wound care.  PICC Line placement pending. I do not anticipate that the patient is going to require dialysis at this time. At this point PICC line placement would allow for antibiotics which are vital in his clinical recovery. The benefits outweigh the risk PICC Line carries in terms of anticipation of future dialysis.     5/5  Patient clinically improving, his blood sugars are better controlled. Patient denies fevers/chills, chest pain, shortness of breath or nausea/vommiting. Patient denies significant limb pain.  Continue IV Zyvox and Zosyn. ID following  Cont wound care with wound vac.  Placement.    5/6  Patietn doing better clinically, cresencio complains of having multiple episodes of watery diarrhea started last night. Patient denies fevers/chills, chest pain, shortness of breath or nausea/vommiting.   Checking Cdiff.   Continue IV zosyn  Continue wound care  Placement at some point.    5/7  No issues overnight except for patient still  having diarrhea, although less frequency. Otherwise Patient denies fevers/chills, chest pain, shortness of breath or nausea/vommiting.   CDIFF neg  Change wound vac on Monday  Placement.    5/8  Patient ambulating without issues. Patient denies fevers/chills, chest pain, shortness of breath or nausea/vommiting.   Pending placement, since would be impossible for patint to drive daily for infusions.  Continue IV Zosyn Stop Date 6/3/2017      Review of Systems: grossly unchanged  Review of Systems   Constitutional: Positive for malaise/fatigue. Negative for fever and chills.   HENT: Negative for congestion, hearing loss, sore throat and tinnitus.    Eyes: Negative for blurred vision, double vision, photophobia and pain.   Respiratory: Negative for cough, hemoptysis, sputum production, shortness of breath and stridor.    Cardiovascular: Negative for chest pain, palpitations, orthopnea, claudication and PND.   Gastrointestinal: Positive for diarrhea (improving). Negative for heartburn, nausea, vomiting, constipation, blood in stool and melena.   Genitourinary: Negative for dysuria, urgency and frequency.   Musculoskeletal: Positive for myalgias (minimal) and joint pain (decreased ate site of wound vac). Negative for back pain and neck pain.   Neurological: Negative for dizziness, tingling, tremors, sensory change, speech change, weakness and headaches.   Psychiatric/Behavioral: Negative for depression, suicidal ideas and memory loss. The patient is not nervous/anxious.        Physical Exam: grossly unchanged.  Physical Exam   Constitutional: He is oriented to person, place, and time. He appears well-developed and well-nourished.   HENT:   Head: Normocephalic and atraumatic.   Mouth/Throat: No oropharyngeal exudate.   Eyes: Conjunctivae are normal. Pupils are equal, round, and reactive to light. Right eye exhibits no discharge. No scleral icterus.   Neck: Neck supple. No JVD present. No thyromegaly present.    Cardiovascular: Intact distal pulses.    No murmur heard.  Pulmonary/Chest: Effort normal and breath sounds normal. No stridor. No respiratory distress. He has no wheezes. He has no rales.   Abdominal: Soft. Bowel sounds are normal. He exhibits no distension. There is no tenderness. There is no rebound.   Musculoskeletal: Normal range of motion. He exhibits edema (minimal around wound vac unchanged). He exhibits no tenderness (minimal).   Left foot wound vac intact, no surrounding erythema, minimal edema,    Neurological: He is alert and oriented to person, place, and time.   Skin: Skin is warm. No erythema.   Psychiatric: He has a normal mood and affect. His behavior is normal. Thought content normal.       Labs:        Invalid input(s): [id number]      Recent Labs      17   0300   SODIUM  136  135  134*   POTASSIUM  4.1  3.8  3.6   CHLORIDE  105  104  104   CO2  22  23  21   BUN  13  15  13   CREATININE  1.50*  1.44*  1.40   MAGNESIUM  2.1  2.0  2.0   CALCIUM  8.1*  8.8  9.2     Recent Labs      17   0420  17   0300   ALTSGPT  9  13  16   ASTSGOT  16  19  22   ALKPHOSPHAT  64  63  65   TBILIRUBIN  0.3  0.3  0.4   GLUCOSE  188*  119*  147*     Recent Labs      17   0420  17   0300   RBC  3.12*  3.03*  3.08*   HEMOGLOBIN  9.2*  9.0*  9.2*   HEMATOCRIT  28.4*  27.3*  27.3*   PLATELETCT  309  319  307     Recent Labs      17   0420  17   0300   WBC  8.0  8.8  7.6   NEUTSPOLYS  67.80  72.40*  62.60   LYMPHOCYTES  18.70*  15.10*  23.30   MONOCYTES  8.70  8.00  8.70   EOSINOPHILS  3.50  3.70  4.50   BASOPHILS  0.60  0.50  0.50   ASTSGOT  16  19  22   ALTSGPT  9  13  16   ALKPHOSPHAT  64  63  65   TBILIRUBIN  0.3  0.3  0.4           Hemodynamics:  Temp (24hrs), Av.8 °C (98.2 °F), Min:36.2 °C (97.1 °F), Max:37.3 °C (99.1 °F)  Temperature: 36.4 °C (97.6 °F)  Pulse  Av.2  Min: 60  Max:  89   Blood Pressure: 154/90 mmHg     Respiratory:    Respiration: 16, Pulse Oximetry: 97 %           Fluids:  No intake or output data in the 24 hours ending 05/08/17 1532  Weight: 80 kg (176 lb 5.9 oz)  GI/Nutrition:  Orders Placed This Encounter   Procedures   • DIET ORDER     Standing Status: Standing      Number of Occurrences: 1      Standing Expiration Date:      Order Specific Question:  Diet:     Answer:  Diabetic [3]     Medical Decision Making, by Problem:    1.  Severe Sepsis.   - 2/2 to left 3,4th toe cellulitis, with findings of gas gangrene on xray.  - Pending Debridement per ..  - Continue with zosyn  Stop date 6/3/2017  - Previous wound cultures from 4/14 shows evidence of enterococcus fecalis. Pan-sensitive.  - Continue IV fluids  - ID following.     2. Left 3,4th toe cellulitis with osteomyelitis.   - s/p I&D and amputation of the left    fourth toe.     - Continue antibiotics as above  - continue wound care.    3. Insulin-dependent diabetes Type 1.  - Continue lantus 20U,    - Continue Insulin-sliding scale, accu-checks and hypoglycemia protocol.  - blood sugars well controlled   - A1c : 8.6    4. Acute on chronic renal failure  - suspect pre-renal azotemia,  - at baseline.   - continue IV fluids, Improving.    5. Diabetic retinopathy.  - defer to outpatient management.    6. Essential hypertension  - Continue Metoprolol, losartan    7. 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  - continue plavix.     8. Hyponatremia  - resolved    9. Normocytic anemia  - no signs of gross bleeding  -  continue to monitor for acute changes.    Patient plan of care discussed at multidisplinary team rounds and with patient and R.N at [facility].    DISPO: placement       Radiology images reviewed, Labs reviewed and Medications  reviewed  Leslie catheter: No Leslie      DVT: pradaxa.  DVT prophylaxis - mechanical: SCDs    Antibiotics: Treating active infection/contamination beyond 24 hours perioperative coverage

## 2017-05-09 VITALS
HEIGHT: 68 IN | WEIGHT: 176.37 LBS | SYSTOLIC BLOOD PRESSURE: 156 MMHG | OXYGEN SATURATION: 99 % | BODY MASS INDEX: 26.73 KG/M2 | RESPIRATION RATE: 16 BRPM | HEART RATE: 79 BPM | TEMPERATURE: 98.1 F | DIASTOLIC BLOOD PRESSURE: 83 MMHG

## 2017-05-09 LAB
GLUCOSE BLD-MCNC: 229 MG/DL (ref 65–99)
GLUCOSE BLD-MCNC: 287 MG/DL (ref 65–99)
GLUCOSE BLD-MCNC: 58 MG/DL (ref 65–99)
GLUCOSE BLD-MCNC: 78 MG/DL (ref 65–99)

## 2017-05-09 PROCEDURE — A9270 NON-COVERED ITEM OR SERVICE: HCPCS | Performed by: HOSPITALIST

## 2017-05-09 PROCEDURE — 700111 HCHG RX REV CODE 636 W/ 250 OVERRIDE (IP): Performed by: HOSPITALIST

## 2017-05-09 PROCEDURE — 700102 HCHG RX REV CODE 250 W/ 637 OVERRIDE(OP): Performed by: NURSE PRACTITIONER

## 2017-05-09 PROCEDURE — 97165 OT EVAL LOW COMPLEX 30 MIN: CPT

## 2017-05-09 PROCEDURE — A9270 NON-COVERED ITEM OR SERVICE: HCPCS | Performed by: NURSE PRACTITIONER

## 2017-05-09 PROCEDURE — G8978 MOBILITY CURRENT STATUS: HCPCS | Mod: CJ

## 2017-05-09 PROCEDURE — G8980 MOBILITY D/C STATUS: HCPCS | Mod: CJ

## 2017-05-09 PROCEDURE — 700102 HCHG RX REV CODE 250 W/ 637 OVERRIDE(OP): Performed by: HOSPITALIST

## 2017-05-09 PROCEDURE — 97161 PT EVAL LOW COMPLEX 20 MIN: CPT

## 2017-05-09 PROCEDURE — G8979 MOBILITY GOAL STATUS: HCPCS | Mod: CJ

## 2017-05-09 PROCEDURE — 82962 GLUCOSE BLOOD TEST: CPT | Mod: 91

## 2017-05-09 PROCEDURE — G8988 SELF CARE GOAL STATUS: HCPCS | Mod: CI

## 2017-05-09 PROCEDURE — G8987 SELF CARE CURRENT STATUS: HCPCS | Mod: CI

## 2017-05-09 PROCEDURE — 99239 HOSP IP/OBS DSCHRG MGMT >30: CPT | Performed by: HOSPITALIST

## 2017-05-09 PROCEDURE — 700105 HCHG RX REV CODE 258: Performed by: HOSPITALIST

## 2017-05-09 RX ORDER — ATORVASTATIN CALCIUM 40 MG/1
40 TABLET, FILM COATED ORAL
Qty: 30 TAB
Start: 2017-05-09 | End: 2018-04-26

## 2017-05-09 RX ADMIN — LOSARTAN POTASSIUM 50 MG: 50 TABLET, FILM COATED ORAL at 08:34

## 2017-05-09 RX ADMIN — OMEPRAZOLE 20 MG: 20 CAPSULE, DELAYED RELEASE ORAL at 08:34

## 2017-05-09 RX ADMIN — CLOPIDOGREL 75 MG: 75 TABLET, FILM COATED ORAL at 08:34

## 2017-05-09 RX ADMIN — PIPERACILLIN SODIUM AND TAZOBACTAM SODIUM 3.38 G: 3; .375 INJECTION, POWDER, FOR SOLUTION INTRAVENOUS at 04:21

## 2017-05-09 RX ADMIN — HYDROCODONE BITARTRATE AND ACETAMINOPHEN 1 TABLET: 10; 325 TABLET ORAL at 02:17

## 2017-05-09 RX ADMIN — DIPHENOXYLATE HYDROCHLORIDE AND ATROPINE SULFATE 1 TABLET: 2.5; .025 TABLET ORAL at 10:24

## 2017-05-09 RX ADMIN — PIPERACILLIN SODIUM AND TAZOBACTAM SODIUM 3.38 G: 3; .375 INJECTION, POWDER, FOR SOLUTION INTRAVENOUS at 11:15

## 2017-05-09 RX ADMIN — CALCIUM CARBONATE 500 MG: 1250 TABLET ORAL at 08:33

## 2017-05-09 RX ADMIN — ACYCLOVIR 200 MG: 200 CAPSULE ORAL at 08:36

## 2017-05-09 RX ADMIN — HYDROCODONE BITARTRATE AND ACETAMINOPHEN 1 TABLET: 10; 325 TABLET ORAL at 14:54

## 2017-05-09 RX ADMIN — SODIUM CHLORIDE: 9 INJECTION, SOLUTION INTRAVENOUS at 07:54

## 2017-05-09 RX ADMIN — METOPROLOL SUCCINATE 25 MG: 25 TABLET, EXTENDED RELEASE ORAL at 08:34

## 2017-05-09 ASSESSMENT — GAIT ASSESSMENTS
ASSISTIVE DEVICE: FRONT WHEEL WALKER
DISTANCE (FEET): 200
GAIT LEVEL OF ASSIST: SUPERVISED
DEVIATION: STEP TO

## 2017-05-09 ASSESSMENT — PAIN SCALES - GENERAL
PAINLEVEL_OUTOF10: 1
PAINLEVEL_OUTOF10: 6

## 2017-05-09 ASSESSMENT — ACTIVITIES OF DAILY LIVING (ADL): TOILETING: INDEPENDENT

## 2017-05-09 NOTE — DISCHARGE PLANNING
Called and spoke to Andreia([facility]) to obtain a OT/PT eval order prior to discharged on 05/09.

## 2017-05-09 NOTE — DISCHARGE PLANNING
Pt accepted to [facility]. Pt still requires PT/OT evals in place. Spoke with [facility] who will accept pt but is still requesting that pT/OT evals be done. MD aware and will dictate in anticipation of DC. Bedside RN aware that pt requires PT/OT also.

## 2017-05-09 NOTE — THERAPY
"Physical Therapy Evaluation completed.   Bed Mobility:  Supine to Sit: Supervised  Transfers: Sit to Stand: Supervised  Gait: Level Of Assist: Supervised with Front-Wheel Walker       Plan of Care: Patient with no further skilled PT needs in the acute care setting at this time  Discharge Recommendations: Equipment: Front-Wheel Walker.    See \"Rehab Therapy-Acute\" Patient Summary Report for complete documentation.     "

## 2017-05-09 NOTE — CARE PLAN
Problem: Communication  Goal: The ability to communicate needs accurately and effectively will improve  Outcome: PROGRESSING AS EXPECTED  Pt updated on POC and medications. Pt verbalized understanding.     Problem: Safety  Goal: Will remain free from injury  Outcome: PROGRESSING AS EXPECTED  Bedside table and call light are within reach. Bed is locked and in the lowest position. Treaded socks are on. Patient educated to call for assistance.

## 2017-05-09 NOTE — PROGRESS NOTES
Assumed care of patient bedside report received from BILL Norton updated on POC, call light within reach and fall precautions in place. Bed locked and in lowest position. Patient instructed to call for assistance before getting out of bed. All questions answered, no other needs at this time.

## 2017-05-09 NOTE — PROGRESS NOTES
Bedside report received. Patient A&O X 4, tele- SR,  RA,  Voids- bathroom, Activity- walker, Diet- DM. Complains of pain 4/10 medicated per MAR. POC discussed with patient. Pt verbalized understanding. Call light and belongings with in reach.  Bed locked and in lowest position, alarm and fall precautions in place.

## 2017-05-09 NOTE — DISCHARGE INSTRUCTIONS
Discharge Instructions    Discharged to other by [facility] with escort. Discharged via wheelchair, hospital escort: Yes.  Special equipment needed: Not Applicable    Be sure to schedule a follow-up appointment with your primary care doctor or any specialists as instructed.     Discharge Plan:   Influenza Vaccine Indication: Indicated: Not available from distributor/    I understand that a diet low in cholesterol, fat, and sodium is recommended for good health. Unless I have been given specific instructions below for another diet, I accept this instruction as my diet prescription.   Other diet: Diabetic    Special Instructions: Wound Vac care M, W, F    · Is patient discharged on Warfarin / Coumadin?   No     · Is patient Post Blood Transfusion?  No    Depression / Suicide Risk    As you are discharged from this Critical access hospital facility, it is important to learn how to keep safe from harming yourself.    Recognize the warning signs:  · Abrupt changes in personality, positive or negative- including increase in energy   · Giving away possessions  · Change in eating patterns- significant weight changes-  positive or negative  · Change in sleeping patterns- unable to sleep or sleeping all the time   · Unwillingness or inability to communicate  · Depression  · Unusual sadness, discouragement and loneliness  · Talk of wanting to die  · Neglect of personal appearance   · Rebelliousness- reckless behavior  · Withdrawal from people/activities they love  · Confusion- inability to concentrate     If you or a loved one observes any of these behaviors or has concerns about self-harm, here's what you can do:  · Talk about it- your feelings and reasons for harming yourself  · Remove any means that you might use to hurt yourself (examples: pills, rope, extension cords, firearm)  · Get professional help from the community (Mental Health, Substance Abuse, psychological counseling)  · Do not be alone:Call your Safe Contact-  someone whom you trust who will be there for you.  · Call your local CRISIS HOTLINE 548-7966 or 853-634-6994  · Call your local Children's Mobile Crisis Response Team [place] (157) 313-6519 or www.Certify  · Call the toll free National Suicide Prevention Hotlines   · National Suicide Prevention Lifeline 530-881-XIKH (1431)  · Aternity Line Network 800-SUICIDE (330-9759)

## 2017-05-09 NOTE — THERAPY
"Occupational Therapy Evaluation completed.   Functional Status:Pt seen for OT eval due to recent L toe amputation due to diabetes with hx of L eye deficit. Min A LB dressing due to new walking boot. Supervision [facility] ADL transfers. Pt would benefit from 1-2 more sessions of OT to discuss DME and adaptive strategies to complete ADLs within new WB status.   Plan of Care: Will benefit from Occupational Therapy 1-2 more sessions  Discharge Recommendations:  Equipment: Will Continue to Assess for Equipment Needs. Post-acute therapy Discharge to a transitional care facility for continued skilled therapy services ORDischarge to home with outpatient or home health for additional skilled therapy services.    See \"Rehab Therapy-Acute\" Patient Summary Report for complete documentation.    "

## 2017-05-09 NOTE — DISCHARGE PLANNING
Received call from Arpita with [facility]. Transportation arranged for [facility] to pickup at 1500. Notified DARNELL Layton via phone.

## 2017-05-09 NOTE — DISCHARGE PLANNING
Medical Social Work    STONE received call from [facility] Svitlana regarding pt's tentative transport to [facility] on 5/9/17 @ 1100, but advised SW this will not be able to take place without PT/OT notes and a SNF order. STONE called STONE Tran on Tele 8 and left a voicemail informing her of the above.

## 2017-05-09 NOTE — WOUND TEAM
[facility]  Inpatient Services  Wound and Skin Care Progress Note    Admission Date:   5/2/17  HPI, PMH, SH: Reviewed  Unit where seen by Wound Team:   T8    WOUND CONSULT RELATED TO:  Scheduled npwt drsg change    SUBJECTIVE:  Pleasant/agreeable    Self Report / Pain Level:  ju well    OBJECTIVE:   Prev npwt drsg remained intact    WOUND TYPE, LOCATION, CHARACTERISTICS (Pressure ulcers: location, stage, POA or date identified)    Location and type of wound: Full thickness surgical wnd following left 4th toe amputation        Periwound:      intact  Drainage:      Scant serosanguinous  Tissue Type and %:     100% pink/red  Wound Edges:     attached  Odor:       none  Exposed structure(s):   muscle  S&S of Infection:    none      Measurements: (cm)   Taken 5/3/17  Length:      4.0  Width:       2.0  Depth:     2.0      INTERVENTIONS BY WOUND TEAM:   Prev drsg removed -- wnd irrigated with wnd cleanser -- benzoin and drape kenney-wnd -- 1 piece black foam to fill the wnd with a bridge and button to the dorsum of the foot -- 5th toe wrapped in 4X4 -- sealed with drape and negative pressure applied at 125mmhg/dpc.    Interdisciplinary consultation: nsg; pt    EVALUATION:  Kenney-wnd improved; wnd remains clean with only scant drainage    Factors affecting wound healing: dm    Goals:  100% granulation in 3 weeks -- decrease size X 5% every 2 weeks    NURSING PLAN OF CARE ORDERS (x):    Dressing changes: See Dressing Maintenance orders: x  Skin care: See Skin Care orders: x  Rectal tube care: See Rectal Tube Care orders:   Other orders:    WOUND TEAM PLAN OF CARE (x):   NPWT change 3 x week:      x  Dressing changes by wound team:       Follow up as needed:     x  Other (explain):    Anticipated discharge plans (x):  SNF:         x  Home Care:           Outpatient Wound Center:  Self Care:            Other:

## 2017-05-09 NOTE — DISCHARGE PLANNING
Late Entry from 5/4/17 - Received referral to see patient re: Advance Directives along with Certificate of Competency from [facility] . Met with patient - answered questions, completed document and notarized. Will scan into Epic. OPAL Santa - [phone]

## 2017-05-09 NOTE — PROGRESS NOTES
No changes in pt status, AOX4,  RA, tele/HR - SR, voids- bathroom, tolerates DM diet.  2/10 Pain. Reviewed plan of care, continue to monitor vitals and manage pain.

## 2017-05-10 NOTE — PROGRESS NOTES
PT DC to Rehab. Reviewed DC orders. PT was disconnected from Wound Vac. Left leg boot in place. No questions or concerns from pt

## 2017-05-12 ENCOUNTER — TELEPHONE (OUTPATIENT)
Dept: INFECTIOUS DISEASES | Facility: MEDICAL CENTER | Age: 57
End: 2017-05-12

## 2017-05-12 ENCOUNTER — TELEPHONE (OUTPATIENT)
Dept: WOUND CARE | Facility: MEDICAL CENTER | Age: 57
End: 2017-05-12

## 2017-05-12 NOTE — TELEPHONE ENCOUNTER
Patient called with multiple complaints about [facility]  Too loud, does not like wound care, did not the staff, not getting any sleep etc  Wants to know if can go to Infusion instead-chart reviewed  DFU with gangrene, likely polymicrobial  Cxs with enterococcus  Change not advisable-not a candidate for Infusion  Also not a candidate to be readmitted to the hospital just because he does not like it there  States he may have his PICC pulled and leave AMA even if it means losing his leg  Advised to discuss his concerns with Dr Hardwick or  there

## 2017-05-17 ENCOUNTER — TELEPHONE (OUTPATIENT)
Dept: INFECTIOUS DISEASES | Facility: MEDICAL CENTER | Age: 57
End: 2017-05-17

## 2017-05-17 NOTE — TELEPHONE ENCOUNTER
Called [facility] to schedule a [facility] appt. No answer, so I left a message to have them call and schedule.     By Trent Malagon, Med Ass't

## 2017-05-30 ENCOUNTER — TELEPHONE (OUTPATIENT)
Dept: INFECTIOUS DISEASES | Facility: MEDICAL CENTER | Age: 57
End: 2017-05-30

## 2017-05-30 NOTE — TELEPHONE ENCOUNTER
Called [facility] and LM for Transportation to call back to make an appointment for the Resident there. CINDY

## 2017-05-31 ENCOUNTER — TELEPHONE (OUTPATIENT)
Dept: INFECTIOUS DISEASES | Facility: MEDICAL CENTER | Age: 57
End: 2017-05-31

## 2017-05-31 NOTE — TELEPHONE ENCOUNTER
Called [facility] and LM for Transportation to call back to make an appointment for the Resident there. CINDY

## 2017-06-05 ENCOUNTER — TELEPHONE (OUTPATIENT)
Dept: URGENT CARE | Facility: PHYSICIAN GROUP | Age: 57
End: 2017-06-05

## 2017-06-05 NOTE — TELEPHONE ENCOUNTER
[facility] called in regards about patient Dylon Santa  Admitted to home care for wound care on his right foot and physical therapy

## 2017-06-06 ENCOUNTER — TELEPHONE (OUTPATIENT)
Dept: INFECTIOUS DISEASES | Facility: MEDICAL CENTER | Age: 57
End: 2017-06-06

## 2017-06-06 NOTE — TELEPHONE ENCOUNTER
Pt is released from [facility] and home now. Pt has had PICC line removed. He wants to get life back in order then call back here to make his follow up appointment. CINDY

## 2017-06-15 ENCOUNTER — PATIENT OUTREACH (OUTPATIENT)
Dept: HEALTH INFORMATION MANAGEMENT | Facility: OTHER | Age: 57
End: 2017-06-15

## 2017-06-15 NOTE — PROGRESS NOTES
PROCEDURE NOTE    DATE OF SERVICE: 4/14/17      Patient seen in collaboration with wound care provider, Melissa Carrington RN    HPI:  Patient is a 56-year-old male with a history of type 1 diabetes, hypertension, and peripheral vascular disease being treated in the clinic for an ulcer of his left lateral 4th toe.  He presents to the clinic today with quite a bit of loose slough in the wound. The toe is edematous and erythemic. I performed excisional debridement of this tissue.       DESCRIPTION OF PROCEDURE:  excisional debridement of nonviable tissue from left lateral 4th toe ulcer    ANESTHESIA:  2% viscous lidocaine applied topically to wound bed for approximately 5 minutes prior to debridement    PROCEDURE: scalpel and forceps use to excise loose slough from wound bed.  Area debrided, 1 cm2.  Debridement carried into the subcutaneous tissue layer.      PLAN  -Wound culture ordered  -Rx for Augmentin sent to pt's pharmacy, will adjust as indicated by cx results  -xray ordered  -Dr. Richard to assess LE perfusion  -Patient to continue at wound clinic

## 2017-06-15 NOTE — PROGRESS NOTES
6/15/17   Outcome: Left Message    WebIZ Checked & Epic Updated:  yes    HealthConnect Verified: yes    Attempt # 1

## 2017-06-19 NOTE — PROGRESS NOTES
Attempt #:2    WebIZ Checked & Epic Updated: no  HealthConnect Verified: yes  Verify PCP: yes    Communication Preference Obtained: yes     Review Care Team: yes    Annual Wellness Visit Scheduling  1. Scheduling Status:Not Scheduled. Patient states they have too many other visits        Care Gap Scheduling (Attempt to Schedule EACH Overdue Care Gap!)     Health Maintenance Due   Topic Date Due   • RETINAL SCREENING  Not able to address   • URINE ACR / MICROALBUMIN  Not able to address   • DIABETES MONOFILAMENT / LE EXAM  Not able to address   • Annual Wellness Visit  declined         [employer] Activation: already active  [employer] Allison: no  Virtual Visits: no  Opt In to Text Messages: no

## 2017-07-05 ENCOUNTER — OFFICE VISIT (OUTPATIENT)
Dept: INFECTIOUS DISEASES | Facility: MEDICAL CENTER | Age: 57
End: 2017-07-05
Payer: MEDICARE

## 2017-07-05 VITALS
RESPIRATION RATE: 16 BRPM | OXYGEN SATURATION: 96 % | HEART RATE: 81 BPM | HEIGHT: 68 IN | DIASTOLIC BLOOD PRESSURE: 78 MMHG | WEIGHT: 158 LBS | SYSTOLIC BLOOD PRESSURE: 130 MMHG | BODY MASS INDEX: 23.95 KG/M2 | TEMPERATURE: 98.6 F

## 2017-07-05 DIAGNOSIS — L97.523 DIABETIC ULCER OF TOE OF LEFT FOOT ASSOCIATED WITH TYPE 1 DIABETES MELLITUS, WITH NECROSIS OF MUSCLE (HCC): ICD-10-CM

## 2017-07-05 DIAGNOSIS — N18.30 TYPE 1 DIABETES MELLITUS WITH STAGE 3 CHRONIC KIDNEY DISEASE (HCC): ICD-10-CM

## 2017-07-05 DIAGNOSIS — R19.7 DIARRHEA, UNSPECIFIED TYPE: ICD-10-CM

## 2017-07-05 DIAGNOSIS — E10.621 DIABETIC ULCER OF TOE OF LEFT FOOT ASSOCIATED WITH TYPE 1 DIABETES MELLITUS, WITH NECROSIS OF MUSCLE (HCC): ICD-10-CM

## 2017-07-05 DIAGNOSIS — I73.9 PVD (PERIPHERAL VASCULAR DISEASE) (HCC): ICD-10-CM

## 2017-07-05 DIAGNOSIS — Z72.0 TOBACCO ABUSE: ICD-10-CM

## 2017-07-05 DIAGNOSIS — E10.22 TYPE 1 DIABETES MELLITUS WITH STAGE 3 CHRONIC KIDNEY DISEASE (HCC): ICD-10-CM

## 2017-07-05 PROCEDURE — 99214 OFFICE O/P EST MOD 30 MIN: CPT | Performed by: NURSE PRACTITIONER

## 2017-07-05 RX ORDER — SIMVASTATIN 40 MG
40 TABLET ORAL NIGHTLY
COMMUNITY
End: 2017-12-18 | Stop reason: SDUPTHER

## 2017-07-05 NOTE — MR AVS SNAPSHOT
"        Dylon Maddenscar   2017 1:30 PM   Office Visit   MRN: [id number]    Department:  [facility]   Dept Phone:  [phone]    Description:  Male : 1960   Provider:  DAYO Velarde           Reason for Visit     Hospital Follow-up toe amputation/ Left lateral 4th toe      Allergies as of 2017     No Known Allergies      Vital Signs     Blood Pressure Pulse Temperature Respirations Height Weight    130/78 mmHg 81 37 °C (98.6 °F) 16 1.727 m (5' 8\") 71.668 kg (158 lb)    Body Mass Index Oxygen Saturation Smoking Status             24.03 kg/m2 96% Never Smoker          Basic Information     Date Of Birth Sex Race Ethnicity Preferred Language    1960 Male White Non- English      Your appointments     2017  2:15 PM   Follow Up Visit with Bandar Cazares M.D.   [facility] ([address] [place] [address])    [facility] [address]  [facility] [id number] [phone]           You will be receiving a confirmation call a few days before your appointment from our automated call confirmation system.              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 Pascual N18.3   2016 - Present    Blind left eye H54.42   2016 - Present    Diabetic retinopathy of both eyes (CMS-HCC) E11.319   2016 - Present    Diabetic ulcer of left foot associated with type 1 diabetes mellitus (CMS-HCC) E10.621, L97.529   3/17/2017 - Present    Diminished pulses in lower extremity R09.89   3/17/2017 - Present    Type 1 diabetes mellitus with diabetic chronic kidney disease (HCC) E10.22, N18.9   2017 - Present    Wound infection T14.8, L08.9   2017 - " Present      Health Maintenance        Date Due Completion Dates    RETINAL SCREENING 12/21/2016 12/21/2015, 4/28/2014, 4/16/2013    URINE ACR / MICROALBUMIN 5/20/2017 5/20/2016, 1/18/2016, 8/28/2015    DIABETES MONOFILAMENT / LE EXAM 6/17/2017 6/17/2016 (Postponed)    Override on 6/17/2016: Postponed    IMM INFLUENZA (1) 9/1/2017 11/12/2015, 12/31/2014    A1C SCREENING 11/2/2017 5/2/2017, 8/17/2016, 1/18/2016, 6/26/2015, 12/31/2014    FASTING LIPID PROFILE 5/3/2018 5/3/2017, 5/13/2016, 11/2/2015, 1/9/2015    SERUM CREATININE 5/8/2018 5/8/2017, 5/7/2017, 5/6/2017, 5/5/2017, 5/4/2017, 5/3/2017, 5/2/2017, 4/21/2017, 9/23/2016, 5/27/2016, 5/13/2016, 11/2/2015, 8/28/2015, 6/26/2015, 6/11/2015, 6/10/2015, 1/9/2015    COLONOSCOPY 9/24/2020 9/24/2015    IMM DTaP/Tdap/Td Vaccine (2 - Td) 12/31/2024 12/31/2014            Current Immunizations     Hepatitis B Vaccine Recombivax (Adol/Adult) 3/10/2017, 12/9/2016  8:40 AM, 9/30/2016    Influenza Vaccine Quad Inj (Preserved) 11/12/2015, 12/31/2014    Pneumococcal polysaccharide vaccine (PPSV-23) 6/17/2016    Tdap Vaccine 12/31/2014      Below and/or attached are the medications your provider expects you to take. Review all of your home medications and newly ordered medications with your provider and/or pharmacist. Follow medication instructions as directed by your provider and/or pharmacist. Please keep your medication list with you and share with your provider. Update the information when medications are discontinued, doses are changed, or new medications (including over-the-counter products) are added; and carry medication information at all times in the event of emergency situations     Allergies:  No Known Allergies          Medications  Valid as of: July 05, 2017 -  1:49 PM    Generic Name Brand Name Tablet Size Instructions for use    Acyclovir (Cap) ZOVIRAX 200 MG Take 1 Cap by mouth every day.        Atorvastatin Calcium (Tab) LIPITOR 40 MG Take 1 Tab by mouth every  bedtime.        Clopidogrel Bisulfate (Tab) PLAVIX 75 MG Take 75 mg by mouth every day.        Insulin NPH Human (Isophane) (Suspension) HUMULIN,NOVOLIN 100 UNIT/ML Inject 20 Units as instructed every day.        Insulin Regular Human (Solution) HUMULIN R 100 Unit/mL Inject 6-10 Units as instructed 3 times a day before meals.        Losartan Potassium (Tab) COZAAR 50 MG Take 50 mg by mouth every day.        Metoprolol Succinate (TABLET SR 24 HR) TOPROL XL 25 MG TAKE ONE TABLET BY MOUTH ONCE DAILY        Simvastatin (Tab) ZOCOR 40 MG Take 40 mg by mouth every evening.        .                 Medicines prescribed today were sent to:     [facility] [address] [place] [address], [place] [address] [place] [address]    Phone: [phone] Fax: [phone]    Open 24 Hours?: No      Medication refill instructions:       If your prescription bottle indicates you have medication refills left, it is not necessary to call your provider’s office. Please contact your pharmacy and they will refill your medication.    If your prescription bottle indicates you do not have any refills left, you may request refills at any time through one of the following ways: The online Lighting by LED system (except Urgent Care), by calling your provider’s office, or by asking your pharmacy to contact your provider’s office with a refill request. Medication refills are processed only during regular business hours and may not be available until the next business day. Your provider may request additional information or to have a follow-up visit with you prior to refilling your medication.   *Please Note: Medication refills are assigned a new Rx number when refilled electronically. Your pharmacy may indicate that no refills were authorized even though a new prescription for the same medication is available at the pharmacy. Please request the medicine by name with the pharmacy before contacting your provider for a refill.            [employer] Access Code: Activation code not generated  Current EGT Status: Active          Quit Tobacco Information     Do you want to quit using tobacco?    Quitting tobacco decreases risks of cancer, heart and lung disease, increases life expectancy, improves sense of taste and smell, and increases spending money, among other benefits.    If you are thinking about quitting, we can help.  • [facility] Quit Tobacco Program: 913.816.9809  o Program occurs weekly for four weeks and includes pharmacist consultation on products to support quitting smoking or chewing tobacco. A provider referral is needed for pharmacist consultation.  • Tobacco Users Help Hotline: 4-335-QUIT-NOW (155-9332) or https://[place].quitlogix.org/  o Free, confidential telephone and online coaching for [place] residents. Sessions are designed on a schedule that is convenient for you. Eligible clients receive free nicotine replacement therapy.  • Nationally: www.smokefree.gov  o Information and professional assistance to support both immediate and long-term needs as you become, and remain, a non-smoker. Smokefree.gov allows you to choose the help that best fits your needs.

## 2017-07-05 NOTE — PROGRESS NOTES
Infectious Disease Clinic    Subjective:     Chief Complaint   Patient presents with   • Hospital Follow-up     toe amputation/ Left foot     This is my first time meeting Mr. Santa.    Interval History: 56 y.o. white male with a longstanding history of diabetes, complicated by retinopathy and    neuropathy and severe peripheral vascular disease.  Pt had undergone a left lower extremity angiogram with angioplasty on 4/20/17.  He had had a distal occlusion of the anterior tibial artery which responded well to the    angioplasty.  History of an ulceration on his left fourth digit since February of this year, which started while he was [place] tennis [place] the airport and developed a blister which never completely healed.  He has been treated with multiple rounds of oral antibiotics including Augmentin and Bactrim; however, it always recurred after he finished the antibiotics.  Wound cx from 4/14 +E faecalis, with Vanco NIKOLAS of 2.  Hospitalized from 5/2- 5/9/17, admitted after being seen at wound care, during which time he was complaining of fever and chills.  L foot xray on 5/2 showed gas in the 4th toe tissue with concern for OM.  On 5/3 pt underwent a L 4th toe I&D and amputation with Dr. Richard.  No OR cx obtained.  Pt discharged to [facility] on IV Zosyn, end date 6/3/17.    Hospital records reviewed    Today, 7/5/2017:  Pt returned home on 6/3/17, after completing the IV Zosyn at [facility].  He was not discharged home on any PO abx.  He has been receiving  wound care 3 x a week, feel that his wound has made great progress.  There is minimal swelling and redness, no pain and mild drainage noted after the dressing has been in place for 2-3 days with an odor that he is told is normal per HH RN.  Seen by Dr. Richard within the past couple weeks who is happy with his progress.  He is scheduled for FU with Nephrology on 7/14/17.  BS average 145.    ROS  As documented above in my HPI.    Past  "Medical History   Diagnosis Date   • Type II or unspecified type diabetes mellitus without mention of complication, uncontrolled 12/31/2014   • History of shingles 12/31/2014   • Diabetic neuropathy (CMS-HCC) 12/31/2014     retinopathy   • Hypertension    • Hyperlipidemia    • Kidney stones    • Diabetic retinopathy of both eyes (CMS-HCC) 12/9/2016   • High cholesterol    • Anesthesia      hx PONV   • Arthritis      L knee; hands   • Hemorrhagic disorder (CMS-HCC)      nose bleeds   • Heart murmur    • Dental disorder      \"bad shape\"   • Cold 4/5/17     prod cough   • Bronchitis 2016   • Cataract      nicole iol   • Pain      left leg/foot   • Psychiatric problem      situational depression       Social History   Substance Use Topics   • Smoking status: Never Smoker    • Smokeless tobacco: Current User     Types: Chew      Comment: tobacco cessation recommended   • Alcohol Use: No      Comment: rare beer       Allergies: Review of patient's allergies indicates no known allergies.    Pt's medication and problem list reviewed.     Objective:     PE:  /78 mmHg  Pulse 81  Temp(Src) 37 °C (98.6 °F)  Resp 16  Ht 1.727 m (5' 8\")  Wt 71.668 kg (158 lb)  BMI 24.03 kg/m2  SpO2 96%    Vital signs reviewed    Constitutional: Appears well-developed and well-nourished. No acute distress.  Speech fluent.    Eyes: Conjunctivae normal and EOM are normal. Pupils are equal, round, and reactive to light.   Neck: Trachea midline. Normal range of motion.   Cardiovascular: Normal rate, regular rhythm, normal heart sounds and faint distal pulse to LLE. No murmur, gallop, or friction rub. Trace LLE edema.  Respiratory: No respiratory distress, unlabored respiratory effort.  Lungs clear to auscultation bilaterally. No wheezes or rales.   Abdomen: Soft, non tender, non-distended. BS + x 4. No masses.   Musculoskeletal: Steady gait.  Normal range of motion.    L 4th toe amp site- wound the size of an eraser tip, wound bed pink with " scant yellow eschar, scant serous drainage, no odor, trace surrounding erythema and maceration.  Skin: Warm and dry.  No visible rashes or lesions.  Neurological: No cranial nerve deficit. Coordination normal.  Decreased BLE sensation.  Psychiatric: Alert and oriented to person, place, and time. Normal mood, calm affect.      Assessment and Plan:   The following treatment plan was discussed with patient at length:    1. Diabetic ulcer of toe of left foot associated with type 1 diabetes mellitus, with necrosis of muscle (CMS-HCC)      Completed IV Zosyn on 6/3/17.  No PO abx since this time.  No need for additional abx at this time as wound is healing.  Continue with wound care as directed.  Monitor for s/sx of worsening off abx: increased redness, pain, swelling, drainage, breakdown of surgical site, fevers, chills, general malaise, etc.   2. Type 1 diabetes mellitus with stage 3 chronic kidney disease (CMS-HCC)      -HgA1C 8.6% on 5/2/17.  Continue to monitor blood sugars closely as DM will impair wound healing and can worsen infection.  Discussed diabetic foot care.  Advised to not go barefoot, check feet everyday and to check the inside of shoes before putting them on. Recommended pt to call the clinic immediately with any new and/or worsening foot injuries and/or signs of infection.  -FU with Nephrology as directed.   3. PVD (peripheral vascular disease) (CMS-HCC)      FU with Vascular, Dr. Richard, as directed.   4. Diarrhea, unspecified type      Resolved.     5. Tobacco abuse      +Tobacco abuse- chewing tobacco.  Tobacco cessation enouraged.  Discussed the negative consequences of tobacco use. The patient was educated about quitting strategies: use distraction, use oral substitutes, such as gum, toothpicks, etc., breathe deeply, reconsider necessity of tobacco, take a break, schedule enjoyable activities. Avoid boredom. Avoid being around other tobacco users and situations that trigger tobacco use (bar,  celena). FU with PCP.      Follow up: PRN, RTC if needed. FU with PCP for ongoing chronic medical conditions.     HEATHER Velarde.R.N.       >25 min spent face to face with patient, >50% of time spent counseling, coordinating care, reviewing records, discussing POC, educating patient.

## 2017-07-14 ENCOUNTER — OFFICE VISIT (OUTPATIENT)
Dept: NEPHROLOGY | Facility: MEDICAL CENTER | Age: 57
End: 2017-07-14
Payer: MEDICARE

## 2017-07-14 VITALS
HEART RATE: 84 BPM | TEMPERATURE: 97.8 F | WEIGHT: 157 LBS | BODY MASS INDEX: 23.79 KG/M2 | HEIGHT: 68 IN | RESPIRATION RATE: 16 BRPM | SYSTOLIC BLOOD PRESSURE: 128 MMHG | DIASTOLIC BLOOD PRESSURE: 74 MMHG

## 2017-07-14 DIAGNOSIS — N18.30 TYPE 1 DIABETES MELLITUS WITH STAGE 3 CHRONIC KIDNEY DISEASE (HCC): ICD-10-CM

## 2017-07-14 DIAGNOSIS — N18.30 CKD (CHRONIC KIDNEY DISEASE) STAGE 3, GFR 30-59 ML/MIN (HCC): ICD-10-CM

## 2017-07-14 DIAGNOSIS — E10.22 TYPE 1 DIABETES MELLITUS WITH STAGE 3 CHRONIC KIDNEY DISEASE (HCC): ICD-10-CM

## 2017-07-14 DIAGNOSIS — I10 ESSENTIAL HYPERTENSION: ICD-10-CM

## 2017-07-14 PROCEDURE — 99214 OFFICE O/P EST MOD 30 MIN: CPT | Performed by: INTERNAL MEDICINE

## 2017-07-14 ASSESSMENT — ENCOUNTER SYMPTOMS
PALPITATIONS: 0
FEVER: 0
CHILLS: 0

## 2017-07-14 NOTE — PROGRESS NOTES
"Subjective:      Dylon Santa is a 57 y.o. male who presents with CKD3 Follow-Up            HPI  Dylon is a 55-year-old gentleman with a long history of type 1 diabetes since age 8. Over this period of time he has been very well controlled. He has a significant and strong history of exercise and excellent diet over this period of time that has helped him control his condition. He has been told over the years and he has protein in the urine, and is on appropriate ACE inhibition. He is referred for chronic kidney disease management and elevated serum creatinine.     Recently toe amputation and angioplasty    1. CKD III - Last Cr was 1.4, in May, feeling about the same, no uremic symptoms  2. HTN - BP controlled, doing well  3. DM1 -  Offered endocrine, though unable to see due to social factors    Review of Systems   Constitutional: Negative for fever and chills.   Cardiovascular: Negative for chest pain and palpitations.   All other systems reviewed and are negative.         Objective:     /74 mmHg  Pulse 84  Temp(Src) 36.6 °C (97.8 °F) (Temporal)  Resp 16  Ht 1.727 m (5' 8\")  Wt 71.215 kg (157 lb)  BMI 23.88 kg/m2     Physical Exam   Constitutional: He is oriented to person, place, and time. He appears well-developed and well-nourished.   HENT:   Head: Normocephalic and atraumatic.   Cardiovascular: Normal rate and regular rhythm.    Pulmonary/Chest: Effort normal and breath sounds normal.   Neurological: He is alert and oriented to person, place, and time.   Skin: Skin is warm and dry.   Psychiatric: He has a normal mood and affect. His behavior is normal.               Assessment/Plan:     1. CKD (chronic kidney disease) stage 3, GFR 30-59 ml/min Dr. Pascual  Check CKD labs, monitor carefully    - BASIC METABOLIC PANEL; Future  - CBC WITHOUT DIFFERENTIAL; Future  - PTH INTACT (PTH ONLY); Future  - VITAMIN D,25 HYDROXY; Future  - MICROALBUMIN CREAT RATIO URINE; Future    2. Essential " hypertension  BP at goal    3. Type 1 diabetes mellitus with stage 3 chronic kidney disease (CMS-HCC)  Discussed with patient, working with his PCP on the diabetes. On ARB, plavix, lipitor

## 2017-07-14 NOTE — MR AVS SNAPSHOT
"        Dylon Santa   2017 2:15 PM   Office Visit   MRN: [id number]    Department:  [facility]   Dept Phone:  [phone]    Description:  Male : 1960   Provider:  Bandar Cazares M.D.           Reason for Visit     Follow-Up           Allergies as of 2017     No Known Allergies      You were diagnosed with     CKD (chronic kidney disease) stage 3, GFR 30-59 ml/min   [377479]       Essential hypertension   [1389332]       Type 1 diabetes mellitus with stage 3 chronic kidney disease (CMS-HCC)   [686747]         Vital Signs     Blood Pressure Pulse Temperature Respirations Height Weight    128/74 mmHg 84 36.6 °C (97.8 °F) (Temporal) 16 1.727 m (5' 8\") 71.215 kg (157 lb)    Body Mass Index Smoking Status                23.88 kg/m2 Never Smoker           Basic Information     Date Of Birth Sex Race Ethnicity Preferred Language    1960 Male White Non- English      Your appointments     Oct 06, 2017  1:15 PM   Follow Up Visit with Bandar Cazares M.D.   [facility] ([address] [place] [address])    [facility] [address], [address]  [facility] [id number] [phone]           You will be receiving a confirmation call a few days before your appointment from our automated call confirmation system.              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 Pascual N18.3   2016 - Present    Blind left eye H54.42   2016 - Present    Diabetic retinopathy of both eyes (CMS-HCC) E11.319   2016 - Present    Diabetic ulcer of left foot associated with type 1 diabetes mellitus (CMS-HCC) E10.621, L97.529   3/17/2017 - Present    Diminished pulses in lower " extremity R09.89   3/17/2017 - Present    Type 1 diabetes mellitus with diabetic chronic kidney disease (HCC) E10.22, N18.9   4/7/2017 - Present    Wound infection T14.8, L08.9   4/14/2017 - Present      Health Maintenance        Date Due Completion Dates    RETINAL SCREENING 12/21/2016 12/21/2015, 4/28/2014, 4/16/2013    URINE ACR / MICROALBUMIN 5/20/2017 5/20/2016, 1/18/2016, 8/28/2015    DIABETES MONOFILAMENT / LE EXAM 6/17/2017 6/17/2016 (Postponed)    Override on 6/17/2016: Postponed    IMM INFLUENZA (1) 9/1/2017 11/12/2015, 12/31/2014    A1C SCREENING 11/2/2017 5/2/2017, 8/17/2016, 1/18/2016, 6/26/2015, 12/31/2014    FASTING LIPID PROFILE 5/3/2018 5/3/2017, 5/13/2016, 11/2/2015, 1/9/2015    SERUM CREATININE 5/8/2018 5/8/2017, 5/7/2017, 5/6/2017, 5/5/2017, 5/4/2017, 5/3/2017, 5/2/2017, 4/21/2017, 9/23/2016, 5/27/2016, 5/13/2016, 11/2/2015, 8/28/2015, 6/26/2015, 6/11/2015, 6/10/2015, 1/9/2015    COLONOSCOPY 9/24/2020 9/24/2015    IMM DTaP/Tdap/Td Vaccine (2 - Td) 12/31/2024 12/31/2014            Current Immunizations     Hepatitis B Vaccine Recombivax (Adol/Adult) 3/10/2017, 12/9/2016  8:40 AM, 9/30/2016    Influenza Vaccine Quad Inj (Preserved) 11/12/2015, 12/31/2014    Pneumococcal polysaccharide vaccine (PPSV-23) 6/17/2016    Tdap Vaccine 12/31/2014      Below and/or attached are the medications your provider expects you to take. Review all of your home medications and newly ordered medications with your provider and/or pharmacist. Follow medication instructions as directed by your provider and/or pharmacist. Please keep your medication list with you and share with your provider. Update the information when medications are discontinued, doses are changed, or new medications (including over-the-counter products) are added; and carry medication information at all times in the event of emergency situations     Allergies:  No Known Allergies          Medications  Valid as of: July 14, 2017 -  2:40 PM    Generic  Name Brand Name Tablet Size Instructions for use    Acyclovir (Cap) ZOVIRAX 200 MG Take 1 Cap by mouth every day.        Atorvastatin Calcium (Tab) LIPITOR 40 MG Take 1 Tab by mouth every bedtime.        Clopidogrel Bisulfate (Tab) PLAVIX 75 MG Take 75 mg by mouth every day.        Insulin NPH Human (Isophane) (Suspension) HUMULIN,NOVOLIN 100 UNIT/ML Inject 20 Units as instructed every day.        Insulin Regular Human (Solution) HUMULIN R 100 Unit/mL Inject 6-10 Units as instructed 3 times a day before meals.        Losartan Potassium (Tab) COZAAR 50 MG Take 50 mg by mouth every day.        Metoprolol Succinate (TABLET SR 24 HR) TOPROL XL 25 MG TAKE ONE TABLET BY MOUTH ONCE DAILY        Simvastatin (Tab) ZOCOR 40 MG Take 40 mg by mouth every evening.        .                 Medicines prescribed today were sent to:     [facility] [address] [place] [id number]    Phone: [phone] Fax: [phone]    Open 24 Hours?: No      Medication refill instructions:       If your prescription bottle indicates you have medication refills left, it is not necessary to call your provider’s office. Please contact your pharmacy and they will refill your medication.    If your prescription bottle indicates you do not have any refills left, you may request refills at any time through one of the following ways: The online [employer] system (except Urgent Care), by calling your provider’s office, or by asking your pharmacy to contact your provider’s office with a refill request. Medication refills are processed only during regular business hours and may not be available until the next business day. Your provider may request additional information or to have a follow-up visit with you prior to refilling your medication.   *Please Note: Medication refills are assigned a new Rx number when refilled electronically. Your pharmacy may indicate that no refills were authorized even  though a new prescription for the same medication is available at the pharmacy. Please request the medicine by name with the pharmacy before contacting your provider for a refill.        Your To Do List     Future Labs/Procedures Complete By Expires    BASIC METABOLIC PANEL  As directed 1/11/2018    CBC WITHOUT DIFFERENTIAL  As directed 1/11/2018    MICROALBUMIN CREAT RATIO URINE  As directed 1/13/2018    PTH INTACT (PTH ONLY)  As directed 7/15/2018    VITAMIN D,25 HYDROXY  As directed 1/14/2018         MyChart Access Code: Activation code not generated  Current MyChart Status: Active          Quit Tobacco Information     Do you want to quit using tobacco?    Quitting tobacco decreases risks of cancer, heart and lung disease, increases life expectancy, improves sense of taste and smell, and increases spending money, among other benefits.    If you are thinking about quitting, we can help.  • [facility] Quit Tobacco Program: 898.971.2369  o Program occurs weekly for four weeks and includes pharmacist consultation on products to support quitting smoking or chewing tobacco. A provider referral is needed for pharmacist consultation.  • Tobacco Users Help Hotline: 3-572-QUIT-NOW (383-0602) or https://[place].quitlogix.org/  o Free, confidential telephone and online coaching for [place] residents. Sessions are designed on a schedule that is convenient for you. Eligible clients receive free nicotine replacement therapy.  • Nationally: www.smokefree.gov  o Information and professional assistance to support both immediate and long-term needs as you become, and remain, a non-smoker. Smokefree.gov allows you to choose the help that best fits your needs.

## 2017-10-06 ENCOUNTER — APPOINTMENT (OUTPATIENT)
Dept: NEPHROLOGY | Facility: MEDICAL CENTER | Age: 57
End: 2017-10-06
Payer: MEDICARE

## 2017-11-15 ENCOUNTER — HOSPITAL ENCOUNTER (OUTPATIENT)
Dept: LAB | Facility: MEDICAL CENTER | Age: 57
End: 2017-11-15
Attending: INTERNAL MEDICINE
Payer: MEDICARE

## 2017-11-15 ENCOUNTER — TELEPHONE (OUTPATIENT)
Dept: MEDICAL GROUP | Facility: CLINIC | Age: 57
End: 2017-11-15

## 2017-11-15 ENCOUNTER — HOSPITAL ENCOUNTER (OUTPATIENT)
Dept: LAB | Facility: MEDICAL CENTER | Age: 57
End: 2017-11-15
Attending: FAMILY MEDICINE
Payer: MEDICARE

## 2017-11-15 ENCOUNTER — NON-PROVIDER VISIT (OUTPATIENT)
Dept: MEDICAL GROUP | Facility: PHYSICIAN GROUP | Age: 57
End: 2017-11-15
Payer: MEDICARE

## 2017-11-15 DIAGNOSIS — N18.30 TYPE 1 DIABETES MELLITUS WITH STAGE 3 CHRONIC KIDNEY DISEASE (HCC): ICD-10-CM

## 2017-11-15 DIAGNOSIS — E10.22 TYPE 1 DIABETES MELLITUS WITH STAGE 3 CHRONIC KIDNEY DISEASE (HCC): ICD-10-CM

## 2017-11-15 DIAGNOSIS — Z23 NEED FOR VACCINATION: ICD-10-CM

## 2017-11-15 DIAGNOSIS — N18.30 CKD (CHRONIC KIDNEY DISEASE) STAGE 3, GFR 30-59 ML/MIN (HCC): ICD-10-CM

## 2017-11-15 DIAGNOSIS — R53.83 LETHARGY: ICD-10-CM

## 2017-11-15 DIAGNOSIS — R68.83 CHILLS: ICD-10-CM
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
## 2017-11-15 LAB
25(OH)D3 SERPL-MCNC: 27 NG/ML (ref 30–100)
ALBUMIN SERPL BCP-MCNC: 4.6 G/DL (ref 3.2–4.9)
ALBUMIN/GLOB SERPL: 1.4 G/DL
ALP SERPL-CCNC: 72 U/L (ref 30–99)
ALT SERPL-CCNC: 32 U/L (ref 2–50)
ANION GAP SERPL CALC-SCNC: 8 MMOL/L (ref 0–11.9)
ANION GAP SERPL CALC-SCNC: 9 MMOL/L (ref 0–11.9)
AST SERPL-CCNC: 36 U/L (ref 12–45)
BASOPHILS # BLD AUTO: 1.1 % (ref 0–1.8)
BASOPHILS # BLD: 0.07 K/UL (ref 0–0.12)
BILIRUB SERPL-MCNC: 0.8 MG/DL (ref 0.1–1.5)
BUN SERPL-MCNC: 22 MG/DL (ref 8–22)
BUN SERPL-MCNC: 23 MG/DL (ref 8–22)
CALCIUM SERPL-MCNC: 10.3 MG/DL (ref 8.5–10.5)
CALCIUM SERPL-MCNC: 10.4 MG/DL (ref 8.5–10.5)
CHLORIDE SERPL-SCNC: 103 MMOL/L (ref 96–112)
CHLORIDE SERPL-SCNC: 104 MMOL/L (ref 96–112)
CHOLEST SERPL-MCNC: 137 MG/DL (ref 100–199)
CO2 SERPL-SCNC: 27 MMOL/L (ref 20–33)
CO2 SERPL-SCNC: 28 MMOL/L (ref 20–33)
CREAT SERPL-MCNC: 1.63 MG/DL (ref 0.5–1.4)
CREAT SERPL-MCNC: 1.63 MG/DL (ref 0.5–1.4)
CREAT UR-MCNC: 466 MG/DL
CREAT UR-MCNC: 485.7 MG/DL
EOSINOPHIL # BLD AUTO: 0.12 K/UL (ref 0–0.51)
EOSINOPHIL NFR BLD: 1.9 % (ref 0–6.9)
ERYTHROCYTE [DISTWIDTH] IN BLOOD BY AUTOMATED COUNT: 44.5 FL (ref 35.9–50)
ERYTHROCYTE [DISTWIDTH] IN BLOOD BY AUTOMATED COUNT: 45.3 FL (ref 35.9–50)
EST. AVERAGE GLUCOSE BLD GHB EST-MCNC: 177 MG/DL
GFR SERPL CREATININE-BSD FRML MDRD: 44 ML/MIN/1.73 M 2
GFR SERPL CREATININE-BSD FRML MDRD: 44 ML/MIN/1.73 M 2
GLOBULIN SER CALC-MCNC: 3.4 G/DL (ref 1.9–3.5)
GLUCOSE SERPL-MCNC: 27 MG/DL (ref 65–99)
GLUCOSE SERPL-MCNC: 28 MG/DL (ref 65–99)
HBA1C MFR BLD: 7.8 % (ref 0–5.6)
HCT VFR BLD AUTO: 44.7 % (ref 42–52)
HCT VFR BLD AUTO: 45.9 % (ref 42–52)
HDLC SERPL-MCNC: 52 MG/DL
HGB BLD-MCNC: 14.7 G/DL (ref 14–18)
HGB BLD-MCNC: 15 G/DL (ref 14–18)
IMM GRANULOCYTES # BLD AUTO: 0.02 K/UL (ref 0–0.11)
IMM GRANULOCYTES NFR BLD AUTO: 0.3 % (ref 0–0.9)
LDLC SERPL CALC-MCNC: 72 MG/DL
LYMPHOCYTES # BLD AUTO: 1.47 K/UL (ref 1–4.8)
LYMPHOCYTES NFR BLD: 22.8 % (ref 22–41)
MCH RBC QN AUTO: 29.7 PG (ref 27–33)
MCH RBC QN AUTO: 29.9 PG (ref 27–33)
MCHC RBC AUTO-ENTMCNC: 32.7 G/DL (ref 33.7–35.3)
MCHC RBC AUTO-ENTMCNC: 32.9 G/DL (ref 33.7–35.3)
MCV RBC AUTO: 90.3 FL (ref 81.4–97.8)
MCV RBC AUTO: 91.4 FL (ref 81.4–97.8)
MICROALBUMIN UR-MCNC: 13.7 MG/DL
MICROALBUMIN UR-MCNC: 15.7 MG/DL
MICROALBUMIN/CREAT UR: 29 MG/G (ref 0–30)
MICROALBUMIN/CREAT UR: 32 MG/G (ref 0–30)
MONOCYTES # BLD AUTO: 0.53 K/UL (ref 0–0.85)
MONOCYTES NFR BLD AUTO: 8.2 % (ref 0–13.4)
NEUTROPHILS # BLD AUTO: 4.24 K/UL (ref 1.82–7.42)
NEUTROPHILS NFR BLD: 65.7 % (ref 44–72)
NRBC # BLD AUTO: 0 K/UL
NRBC BLD AUTO-RTO: 0 /100 WBC
PLATELET # BLD AUTO: 286 K/UL (ref 164–446)
PLATELET # BLD AUTO: 297 K/UL (ref 164–446)
PMV BLD AUTO: 10.3 FL (ref 9–12.9)
PMV BLD AUTO: 10.3 FL (ref 9–12.9)
POTASSIUM SERPL-SCNC: 4 MMOL/L (ref 3.6–5.5)
POTASSIUM SERPL-SCNC: 4.2 MMOL/L (ref 3.6–5.5)
PROT SERPL-MCNC: 8 G/DL (ref 6–8.2)
PTH-INTACT SERPL-MCNC: 31.2 PG/ML (ref 14–72)
RBC # BLD AUTO: 4.95 M/UL (ref 4.7–6.1)
RBC # BLD AUTO: 5.02 M/UL (ref 4.7–6.1)
SODIUM SERPL-SCNC: 139 MMOL/L (ref 135–145)
SODIUM SERPL-SCNC: 140 MMOL/L (ref 135–145)
TRIGL SERPL-MCNC: 64 MG/DL (ref 0–149)
WBC # BLD AUTO: 6.2 K/UL (ref 4.8–10.8)
WBC # BLD AUTO: 6.5 K/UL (ref 4.8–10.8)

## 2017-11-15 PROCEDURE — 86790 VIRUS ANTIBODY NOS: CPT | Mod: 91

## 2017-11-15 PROCEDURE — 82043 UR ALBUMIN QUANTITATIVE: CPT

## 2017-11-15 PROCEDURE — 82043 UR ALBUMIN QUANTITATIVE: CPT | Mod: 91

## 2017-11-15 PROCEDURE — 83036 HEMOGLOBIN GLYCOSYLATED A1C: CPT

## 2017-11-15 PROCEDURE — G0008 ADMIN INFLUENZA VIRUS VAC: HCPCS | Performed by: FAMILY MEDICINE

## 2017-11-15 PROCEDURE — 80048 BASIC METABOLIC PNL TOTAL CA: CPT

## 2017-11-15 PROCEDURE — 83970 ASSAY OF PARATHORMONE: CPT

## 2017-11-15 PROCEDURE — 90686 IIV4 VACC NO PRSV 0.5 ML IM: CPT | Performed by: FAMILY MEDICINE

## 2017-11-15 PROCEDURE — 85025 COMPLETE CBC W/AUTO DIFF WBC: CPT

## 2017-11-15 PROCEDURE — 80053 COMPREHEN METABOLIC PANEL: CPT

## 2017-11-15 PROCEDURE — 36415 COLL VENOUS BLD VENIPUNCTURE: CPT

## 2017-11-15 PROCEDURE — 82306 VITAMIN D 25 HYDROXY: CPT

## 2017-11-15 PROCEDURE — 80061 LIPID PANEL: CPT

## 2017-11-15 PROCEDURE — 82570 ASSAY OF URINE CREATININE: CPT | Mod: 91

## 2017-11-15 PROCEDURE — 82570 ASSAY OF URINE CREATININE: CPT

## 2017-11-15 PROCEDURE — 85027 COMPLETE CBC AUTOMATED: CPT

## 2017-11-15 NOTE — PROGRESS NOTES
"Dylon Santa is a 57 y.o. male here for a non-provider visit for:   FLU    Reason for immunization: Annual Flu Vaccine  Immunization records indicate need for vaccine: Yes, confirmed with Epic  Minimum interval has been met for this vaccine: Yes  ABN completed: Not Indicated    Order and dose verified by: Amanda JULIO was given to patient: Yes  All IAC Questionnaire questions were answered \"No.\"    Patient tolerated injection and no adverse effects were observed or reported: Yes    Pt scheduled for next dose in series: Not Indicated    "

## 2017-11-16 NOTE — TELEPHONE ENCOUNTER
Was called by [facility] lab for critically low blood sugar. I see that his primary has already sent a message through my chart. I could not reach anyone at the regular number so I left a message on his cellular voicemail that the morning blood sugar was extremely low and he needs to be eating and checking his blood sugars very carefully throughout the day. I also advised him to make sure he was not skipping any meals.

## 2017-11-17 ENCOUNTER — OFFICE VISIT (OUTPATIENT)
Dept: MEDICAL GROUP | Facility: PHYSICIAN GROUP | Age: 57
End: 2017-11-17
Payer: MEDICARE

## 2017-11-17 VITALS
HEIGHT: 68 IN | WEIGHT: 157 LBS | DIASTOLIC BLOOD PRESSURE: 70 MMHG | TEMPERATURE: 98.2 F | SYSTOLIC BLOOD PRESSURE: 112 MMHG | BODY MASS INDEX: 23.79 KG/M2 | HEART RATE: 74 BPM | OXYGEN SATURATION: 94 %

## 2017-11-17 DIAGNOSIS — S29.012A STRAIN OF LATISSIMUS DORSI MUSCLE, INITIAL ENCOUNTER: ICD-10-CM

## 2017-11-17 DIAGNOSIS — R30.0 BURNING WITH URINATION: ICD-10-CM

## 2017-11-17 DIAGNOSIS — J01.90 ACUTE NON-RECURRENT SINUSITIS, UNSPECIFIED LOCATION: ICD-10-CM

## 2017-11-17 DIAGNOSIS — M77.9 BONE SPUR: ICD-10-CM

## 2017-11-17 LAB
APPEARANCE UR: CLEAR
BILIRUB UR STRIP-MCNC: NORMAL MG/DL
COLOR UR AUTO: YELLOW
GLUCOSE UR STRIP.AUTO-MCNC: 2000 MG/DL
KETONES UR STRIP.AUTO-MCNC: NORMAL MG/DL
LEUKOCYTE ESTERASE UR QL STRIP.AUTO: NORMAL
NITRITE UR QL STRIP.AUTO: NORMAL
PH UR STRIP.AUTO: 5 [PH] (ref 5–8)
PROT UR QL STRIP: NORMAL MG/DL
RBC UR QL AUTO: NORMAL
SP GR UR STRIP.AUTO: 1.01
UROBILINOGEN UR STRIP-MCNC: NORMAL MG/DL

## 2017-11-17 PROCEDURE — 99214 OFFICE O/P EST MOD 30 MIN: CPT | Performed by: PHYSICIAN ASSISTANT

## 2017-11-17 PROCEDURE — 81002 URINALYSIS NONAUTO W/O SCOPE: CPT | Performed by: PHYSICIAN ASSISTANT

## 2017-11-17 RX ORDER — AMOXICILLIN AND CLAVULANATE POTASSIUM 875; 125 MG/1; MG/1
1 TABLET, FILM COATED ORAL 2 TIMES DAILY
Qty: 20 TAB | Refills: 0 | Status: SHIPPED | OUTPATIENT
Start: 2017-11-17 | End: 2017-11-27

## 2017-11-17 NOTE — ASSESSMENT & PLAN NOTE
Patient states that he has been experiencing burning sensation with urination for the last 5 days. He denies any hematuria, frequency, fever. Has had UTIs in the past. Denies concern for STDs. States has not been sexually active since 2002. Does mention that he drinks quite a bit of coffee and has wondered if that could be contributing to urinary symptoms. Drinks two very large thermos mugs of coffee every day.

## 2017-11-17 NOTE — ASSESSMENT & PLAN NOTE
Patient states he's had tenderness underneath his armpit over the last several days. Hurts to the touch. Does mention that he's been doing a lot of heavy lifting lately.

## 2017-11-17 NOTE — PROGRESS NOTES
"Subjective:   Dylon Santa is a 57 y.o. male here today for sinus issues, burning with urination, pain under armpit, bump on left hand. He is an established patient of Dr. Kan.    HPI:    Burning with urination  Patient states that he has been experiencing burning sensation with urination for the last 5 days. He denies any hematuria, frequency, fever. Has had UTIs in the past. Denies concern for STDs. States has not been sexually active since 2002. Does mention that he drinks quite a bit of coffee and has wondered if that could be contributing to urinary symptoms. Drinks two very large thermos mugs of coffee every day.    Acute non-recurrent sinusitis  Patient states he has been having persistent sinus drainage for over a month now. Nose constantly runs, has cough productive of \"brownish\" phlegm. Ears have been popping and draining as well. Has sinus pain and pressure on either side of his nose. Denies fever, shortness of breath, wheezing.    Strain of latissimus dorsi muscle  Patient states he's had tenderness underneath his armpit over the last several days. Hurts to the touch. Does mention that he's been doing a lot of heavy lifting lately.    Bone spur  Patient has a hard bump at the base of his thumb on his left hand. Denies difficulty with thumb movement, but sometimes bothers him with excessive activity or when he firmly presses on it.       Current medicines (including changes today)  Current Outpatient Prescriptions   Medication Sig Dispense Refill   • amoxicillin-clavulanate (AUGMENTIN) 875-125 MG Tab Take 1 Tab by mouth 2 times a day for 10 days. 20 Tab 0   • simvastatin (ZOCOR) 40 MG Tab Take 40 mg by mouth every evening.     • atorvastatin (LIPITOR) 40 MG Tab Take 1 Tab by mouth every bedtime. 30 Tab    • clopidogrel (PLAVIX) 75 MG Tab Take 75 mg by mouth every day.     • insulin NPH (HUMULIN,NOVOLIN) 100 UNIT/ML Suspension Inject 20 Units as instructed every day.     • insulin regular (HUMULIN R) " "100 Unit/mL Solution Inject 6-10 Units as instructed 3 times a day before meals.     • metoprolol SR (TOPROL XL) 25 MG TABLET SR 24 HR TAKE ONE TABLET BY MOUTH ONCE DAILY 90 Tab 4   • acyclovir (ZOVIRAX) 200 MG Cap Take 1 Cap by mouth every day. 90 Cap 4   • losartan (COZAAR) 50 MG Tab Take 50 mg by mouth every day.       No current facility-administered medications for this visit.      He  has a past medical history of Anesthesia; Arthritis; Bronchitis (2016); Cataract; Cold (4/5/17); Dental disorder; Diabetic neuropathy (CMS-HCC) (12/31/2014); Diabetic retinopathy of both eyes (CMS-HCC) (12/9/2016); Heart murmur; Hemorrhagic disorder (CMS-HCC); High cholesterol; History of shingles (12/31/2014); Hyperlipidemia; Hypertension; Kidney stones; Pain; Psychiatric problem; and Type II or unspecified type diabetes mellitus without mention of complication, uncontrolled (12/31/2014).      ROS  Constitutional ROS: No unexplained fevers, sweats, or chills  Ear ROS: No ear pain, Positive for bilateral drainage  Pulmonary ROS: No wheezing, No shortness of breath, Positive for productive cough   Cardiovascular ROS: No chest pain  Genitourinary: No hematuria, urinary frequency. Positive for dysuria.       Objective:     Blood pressure 112/70, pulse 74, temperature 36.8 °C (98.2 °F), height 1.727 m (5' 8\"), weight 71.2 kg (157 lb), SpO2 94 %. Body mass index is 23.87 kg/m².   Physical Exam:  Constitutional: Alert, no distress.  Skin: Warm, dry, good turgor, no rashes in visible areas.  Head: Tender just lateral to nasal bridge bilaterally.  Eye: Pupils are equal and round, conjunctiva clear, lids normal.  ENMT: Ear canal clear, non-injected and without drainage. Nasal mucosa mildly inflamed. Lips without lesions, moist mucus membranes. No pharyngeal erythema or edema.  Neck: No masses. No submandibular or cervical lymphadenopathy.  Respiratory: Unlabored respiratory effort, lungs clear to auscultation, no wheezes, no " rhonchi.  Cardiovascular: Normal S1, S2, no murmur, no lower extremity edema.  Musculoskeletal: Focused exam performed to left hand. Small, ~1 cm bony mass palpable on the volar aspect of the thumb base. Focused exam also performed to the left axillary area. There is no lymphadenopathy. Tender to palpation over latissimus dorsi muscle just inferior to the axilla.      Assessment and Plan:   The following treatment plan was discussed    1. Acute non-recurrent sinusitis, unspecified location  Given longevity of symptoms, will treat empirically for bacterial sinusitis. Augmentin prescribed. Patient advised to drink plenty of fluids, get lots of rest.  - amoxicillin-clavulanate (AUGMENTIN) 875-125 MG Tab; Take 1 Tab by mouth 2 times a day for 10 days.  Dispense: 20 Tab; Refill: 0    2. Burning with urination  U/A done today in office which was completely negative other than high glucose levels (patient has type 1 diabetes). Advised patient that coffee can be a bladder irritant and he is drinking excessive amounts. Recommend he decrease his coffee consumption and drink plenty of fluids to help flush out bladder.  - POCT Urinalysis    3. Strain of latissimus dorsi muscle, initial encounter  Has localized tenderness to latissimus dorsi on exam. Likely strained with recent heavy lifting. Should resolve on its own.    4. Bone spur  Bony mass on left hand consistent with bone spur. Explained that these are benign lesions. Generally can be left alone unless they significantly increase in size and/or are causing significant discomfort.      Followup: Return if symptoms worsen or fail to improve.    Sharon Borges P.A.-C.

## 2017-11-17 NOTE — ASSESSMENT & PLAN NOTE
Patient has a hard bump at the base of his thumb on his left hand. Denies difficulty with thumb movement, but sometimes bothers him with excessive activity or when he firmly presses on it.

## 2017-11-17 NOTE — ASSESSMENT & PLAN NOTE
"Patient states he has been having persistent sinus drainage for over a month now. Nose constantly runs, has cough productive of \"brownish\" phlegm. Ears have been popping and draining as well. Has sinus pain and pressure on either side of his nose. Denies fever, shortness of breath, wheezing.  "

## 2017-11-20 LAB — WNV IGM SER QL IA: NORMAL

## 2017-12-18 DIAGNOSIS — I10 ESSENTIAL HYPERTENSION: ICD-10-CM

## 2017-12-18 DIAGNOSIS — Z86.19 HISTORY OF SHINGLES: ICD-10-CM

## 2017-12-18 RX ORDER — LANCETS 30 GAUGE
EACH MISCELLANEOUS
Qty: 100 EACH | Refills: 3 | Status: SHIPPED | OUTPATIENT
Start: 2017-12-18 | End: 2017-12-21 | Stop reason: SDUPTHER

## 2017-12-18 RX ORDER — LOSARTAN POTASSIUM 50 MG/1
50 TABLET ORAL DAILY
Qty: 90 TAB | Refills: 1 | Status: SHIPPED | OUTPATIENT
Start: 2017-12-18 | End: 2017-12-21 | Stop reason: SDUPTHER

## 2017-12-18 RX ORDER — ACYCLOVIR 200 MG/1
200 CAPSULE ORAL DAILY
Qty: 90 CAP | Refills: 1 | Status: SHIPPED | OUTPATIENT
Start: 2017-12-18 | End: 2017-12-21 | Stop reason: SDUPTHER

## 2017-12-18 RX ORDER — SIMVASTATIN 40 MG
40 TABLET ORAL EVERY EVENING
Qty: 90 TAB | Refills: 1 | Status: SHIPPED | OUTPATIENT
Start: 2017-12-18 | End: 2017-12-21 | Stop reason: SDUPTHER

## 2017-12-18 RX ORDER — METOPROLOL SUCCINATE 25 MG/1
TABLET, EXTENDED RELEASE ORAL
Qty: 90 TAB | Refills: 1 | Status: SHIPPED | OUTPATIENT
Start: 2017-12-18 | End: 2017-12-21 | Stop reason: SDUPTHER

## 2017-12-18 NOTE — TELEPHONE ENCOUNTER
Pt has had OV within the 12 month protocol and lipid panel is current. 6 month supply sent to pharmacy. 12 months on lancets and test strips.  Lab Results   Component Value Date/Time    CHOLSTRLTOT 137 11/15/2017 10:36 AM    LDL 72 11/15/2017 10:36 AM    HDL 52 11/15/2017 10:36 AM    TRIGLYCERIDE 64 11/15/2017 10:36 AM       Lab Results   Component Value Date/Time    SODIUM 139 11/15/2017 10:37 AM    POTASSIUM 4.0 11/15/2017 10:37 AM    CHLORIDE 104 11/15/2017 10:37 AM    CO2 27 11/15/2017 10:37 AM    GLUCOSE 27 (LL) 11/15/2017 10:37 AM    BUN 22 11/15/2017 10:37 AM    CREATININE 1.63 (H) 11/15/2017 10:37 AM     Lab Results   Component Value Date/Time    ALKPHOSPHAT 72 11/15/2017 10:36 AM    ASTSGOT 36 11/15/2017 10:36 AM    ALTSGPT 32 11/15/2017 10:36 AM    TBILIRUBIN 0.8 11/15/2017 10:36 AM

## 2017-12-18 NOTE — TELEPHONE ENCOUNTER
*Simvastatin and Losartan is listed as historical, Pt is also requesting lancets and test strips*  Was the patient seen in the last year in this department? Yes     Does patient have an active prescription for medications requested? No     Received Request Via: Pharmacy    Pt met protocol?: Yes     Last OV 11/2017  BP Readings from Last 1 Encounters:   11/17/17 112/70     Lab Results   Component Value Date/Time    HBA1C 7.8 (H) 11/15/2017 10:36 AM        Lab Results  Component Value Date/Time   CHOLSTRLTOT 137 11/15/2017 1036   TRIGLYCERIDE 64 11/15/2017 1036   HDL 52 11/15/2017 1036   LDL 72 11/15/2017 1036

## 2017-12-21 DIAGNOSIS — I10 ESSENTIAL HYPERTENSION: ICD-10-CM

## 2017-12-21 DIAGNOSIS — Z86.19 HISTORY OF SHINGLES: ICD-10-CM

## 2017-12-21 RX ORDER — ACYCLOVIR 200 MG/1
200 CAPSULE ORAL DAILY
Qty: 90 CAP | Refills: 1 | Status: SHIPPED | OUTPATIENT
Start: 2017-12-21 | End: 2017-12-21 | Stop reason: SDUPTHER

## 2017-12-21 RX ORDER — SIMVASTATIN 40 MG
40 TABLET ORAL EVERY EVENING
Qty: 90 TAB | Refills: 1 | Status: SHIPPED | OUTPATIENT
Start: 2017-12-21 | End: 2017-12-21 | Stop reason: SDUPTHER

## 2017-12-21 RX ORDER — METOPROLOL SUCCINATE 25 MG/1
TABLET, EXTENDED RELEASE ORAL
Qty: 90 TAB | Refills: 1 | Status: SHIPPED | OUTPATIENT
Start: 2017-12-21 | End: 2017-12-21 | Stop reason: SDUPTHER

## 2017-12-21 RX ORDER — LOSARTAN POTASSIUM 50 MG/1
50 TABLET ORAL DAILY
Qty: 90 TAB | Refills: 1 | Status: SHIPPED | OUTPATIENT
Start: 2017-12-21 | End: 2018-08-16 | Stop reason: SDUPTHER

## 2017-12-21 RX ORDER — METOPROLOL SUCCINATE 25 MG/1
TABLET, EXTENDED RELEASE ORAL
Qty: 90 TAB | Refills: 1 | Status: SHIPPED | OUTPATIENT
Start: 2017-12-21 | End: 2018-08-16 | Stop reason: SDUPTHER

## 2017-12-21 RX ORDER — LOSARTAN POTASSIUM 50 MG/1
50 TABLET ORAL DAILY
Qty: 90 TAB | Refills: 1 | Status: SHIPPED | OUTPATIENT
Start: 2017-12-21 | End: 2017-12-21 | Stop reason: SDUPTHER

## 2017-12-21 RX ORDER — SIMVASTATIN 40 MG
40 TABLET ORAL EVERY EVENING
Qty: 90 TAB | Refills: 1 | Status: SHIPPED | OUTPATIENT
Start: 2017-12-21 | End: 2018-08-16 | Stop reason: SDUPTHER

## 2017-12-21 RX ORDER — LANCETS 30 GAUGE
EACH MISCELLANEOUS
Qty: 100 EACH | Refills: 3 | Status: SHIPPED | OUTPATIENT
Start: 2017-12-21 | End: 2018-05-04 | Stop reason: SDUPTHER

## 2017-12-21 RX ORDER — LANCETS 30 GAUGE
EACH MISCELLANEOUS
Qty: 100 EACH | Refills: 3 | Status: SHIPPED | OUTPATIENT
Start: 2017-12-21 | End: 2017-12-21 | Stop reason: SDUPTHER

## 2017-12-21 RX ORDER — ACYCLOVIR 200 MG/1
200 CAPSULE ORAL DAILY
Qty: 90 CAP | Refills: 1 | Status: SHIPPED | OUTPATIENT
Start: 2017-12-21 | End: 2018-08-16 | Stop reason: SDUPTHER

## 2017-12-21 NOTE — TELEPHONE ENCOUNTER
These were recently refilled but sent to wrong pharmacy, they will not let me call in, please advise refill. Thank you.

## 2017-12-21 NOTE — TELEPHONE ENCOUNTER
I am sorry Dr Kan, I forgot to mention what pharmacy, please send to [facility]. Rx sent to [facility] have been canceled.    Thank you.

## 2018-02-04 ENCOUNTER — APPOINTMENT (OUTPATIENT)
Dept: RADIOLOGY | Facility: IMAGING CENTER | Age: 58
End: 2018-02-04
Attending: NURSE PRACTITIONER
Payer: MEDICARE

## 2018-02-04 ENCOUNTER — OFFICE VISIT (OUTPATIENT)
Dept: URGENT CARE | Facility: PHYSICIAN GROUP | Age: 58
End: 2018-02-04
Payer: MEDICARE

## 2018-02-04 VITALS
WEIGHT: 155 LBS | TEMPERATURE: 98.9 F | RESPIRATION RATE: 16 BRPM | SYSTOLIC BLOOD PRESSURE: 116 MMHG | DIASTOLIC BLOOD PRESSURE: 68 MMHG | OXYGEN SATURATION: 96 % | HEIGHT: 68 IN | HEART RATE: 76 BPM | BODY MASS INDEX: 23.49 KG/M2

## 2018-02-04 DIAGNOSIS — M79.645 PAIN OF LEFT THUMB: ICD-10-CM

## 2018-02-04 DIAGNOSIS — M25.531 RIGHT WRIST PAIN: Primary | ICD-10-CM

## 2018-02-04 DIAGNOSIS — M25.531 RIGHT WRIST PAIN: ICD-10-CM

## 2018-02-04 PROCEDURE — 73110 X-RAY EXAM OF WRIST: CPT | Mod: TC,RT | Performed by: NURSE PRACTITIONER

## 2018-02-04 PROCEDURE — 73130 X-RAY EXAM OF HAND: CPT | Mod: TC,LT | Performed by: NURSE PRACTITIONER

## 2018-02-04 PROCEDURE — 99213 OFFICE O/P EST LOW 20 MIN: CPT | Performed by: NURSE PRACTITIONER

## 2018-02-04 NOTE — PROGRESS NOTES
"Subjective:      Dylon Santa is a 57 y.o. male who presents with Wrist Pain (C/o RT wrist pain, bump/swelling, LT thumb stuck in flex position in the AM x2 days getting worse since last night)            Medications, Allergies and Prior Medical Hx reviewed and updated in [facility].with patient/family today       The pt c/o pain in his right wrist with a firm mass. He denies any trauma or precipitating event. The pt also c/o of left thumb pain radiating into his wrist. He denies any trauma or precipitating event.  He denies any treatments.  The pain increases with movement.         Review of Systems   Constitutional: Negative for chills, fever and malaise/fatigue.   Musculoskeletal: Positive for joint pain. Negative for back pain, myalgias and neck pain.   Skin: Negative for rash.   Neurological: Negative for tingling, sensory change and focal weakness.          Objective:     /68   Pulse 76   Temp 37.2 °C (98.9 °F)   Resp 16   Ht 1.727 m (5' 8\")   Wt 70.3 kg (155 lb)   SpO2 96%   BMI 23.57 kg/m²      Physical Exam   Constitutional: He appears well-developed and well-nourished. No distress.   HENT:   Head: Normocephalic and atraumatic.   Eyes: Conjunctivae are normal. Pupils are equal, round, and reactive to light.   Neck: Neck supple.   Cardiovascular: Normal rate.    Pulmonary/Chest: Effort normal. No respiratory distress.   Musculoskeletal:        Right wrist: He exhibits decreased range of motion, tenderness and bony tenderness. He exhibits no swelling, no effusion, no crepitus, no deformity and no laceration.        Left hand: He exhibits decreased range of motion, tenderness and bony tenderness. He exhibits normal capillary refill, no deformity, no laceration and no swelling.        Hands:  Neurological: He is alert.   Awake, alert, answering questions appropriately, moving all extremeties   Skin: Skin is warm and dry. No erythema.   Psychiatric: He has a normal mood and affect. His behavior is " normal.   Vitals reviewed.              Assessment/Plan:     1. Right wrist pain  DX-WRIST-COMPLETE 3+ RIGHT    REFERRAL TO ORTHOPEDICS    WRIST SPLINT   2. Pain of left thumb  DX-HAND 3+ LEFT    REFERRAL TO ORTHOPEDICS         Xray of left hand -  Reviewed film and radiology interpretation:   1.  No evidence of fracture.  2.  Mild degenerative change.  3.  Mild sclerosis and cystic change of the lunate which may indicate presence of avascular necrosis.    Xray of right wrist -  Reviewed film and radiology interpretation:   1.  Sclerotic change of the RIGHT lunate which may represent avascular necrosis.  2.  Negative right ulnar variance is present which may predispose to avascular necrosis of the lunate.  3.  Minimal cystic change of the proximal aspect of the right carpal navicular.

## 2018-02-07 ASSESSMENT — ENCOUNTER SYMPTOMS
SENSORY CHANGE: 0
NECK PAIN: 0
MYALGIAS: 0
FEVER: 0
TINGLING: 0
FOCAL WEAKNESS: 0
BACK PAIN: 0
CHILLS: 0

## 2018-03-16 ENCOUNTER — PATIENT OUTREACH (OUTPATIENT)
Dept: HEALTH INFORMATION MANAGEMENT | Facility: OTHER | Age: 58
End: 2018-03-16

## 2018-03-16 NOTE — PROGRESS NOTES
1. Attempt #: 1    2. [facility] Verified: yes    3. Verify PCP: yes    4. Care Team Updated:       •   DME Company (gait device, O2, CPAP, etc.): YES       •   Other Specialists (eye doctor, derm, GYN, cardiology, endo, etc): YES    5.  Reviewed/Updated the following with patient:       •   Communication Preference Obtained? YES       •   Preferred Pharmacy? YES       •   Preferred Lab? YES       •   Family History (document living status of immediate family members and if + hx of cancer, diabetes, hypertension, hyperlipidemia, heart attack, stroke) YES. Was Abstract Encounter opened and chart updated? YES    6. [employer] Activation: already active    7. [employer] Allison: no    8. Annual Wellness Visit Scheduling  Scheduling Status:Scheduled      9. Care Gap Scheduling (Attempt to Schedule EACH Overdue Care Gap!)     Health Maintenance Due   Topic Date Due   • RETINAL SCREENING  12/21/2016   • DIABETES MONOFILAMENT / LE EXAM  06/17/2017   • Annual Wellness Visit  06/18/2017        Scheduled patient for Annual Wellness Visit    10. Patient was advised: “This is a free wellness visit. The provider will screen for medical conditions to help you stay healthy. If you have other concerns to address you may be asked to discuss these at a separate visit or there may be an additional fee.”     11. Patient was informed to arrive 15 min prior to their scheduled appointment and bring in their medication bottles.

## 2018-04-25 ENCOUNTER — TELEPHONE (OUTPATIENT)
Dept: MEDICAL GROUP | Facility: PHYSICIAN GROUP | Age: 58
End: 2018-04-25

## 2018-04-25 NOTE — TELEPHONE ENCOUNTER
Future Appointments       Provider Department Center    4/26/2018 9:20 AM Kassi Merrill M.D. [facility]    5/22/2018 9:00 AM Kassi Merrill M.D.; [facility]  [facility]        ESTABLISHED PATIENT PRE-VISIT PLANNING     Note: Patient will not be contacted if there is no indication to call.     1.  Reviewed notes from the last few office visits within the medical group: Yes 11/17/2017    2.  If any orders were placed at last visit or intended to be done for this visit (i.e. 6 mos follow-up), do we have Results/Consult Notes?        •  Labs - Labs were not ordered at last office visit.       •  Imaging - Imaging ordered, completed and results are in chart.       •  Referrals - Referral ordered, patient has NOT been seen.    3. Is this appointment scheduled as a Hospital Follow-Up? No    4.  Immunizations were updated in [facility] using WebIZ?: Yes       •  Web Iz Recommendations: HEPATITIS A , MMR , TD and VARICELLA (Chicken Pox)     5.  Patient is due for the following Health Maintenance Topics:   Health Maintenance Due   Topic Date Due   • RETINAL SCREENING  12/21/2016   • DIABETES MONOFILAMENT / LE EXAM  06/17/2017   • Annual Wellness Visit  06/18/2017         6.  MDX printed for Provider? YES    7.  Patient was informed to arrive 15 min prior to their scheduled appointment and bring in their medication bottles.confirmed through automated call

## 2018-04-26 ENCOUNTER — OFFICE VISIT (OUTPATIENT)
Dept: MEDICAL GROUP | Facility: PHYSICIAN GROUP | Age: 58
End: 2018-04-26
Payer: MEDICARE

## 2018-04-26 ENCOUNTER — HOSPITAL ENCOUNTER (OUTPATIENT)
Dept: LAB | Facility: MEDICAL CENTER | Age: 58
End: 2018-04-26
Attending: INTERNAL MEDICINE
Payer: MEDICARE

## 2018-04-26 VITALS
TEMPERATURE: 98.6 F | OXYGEN SATURATION: 97 % | BODY MASS INDEX: 24.25 KG/M2 | SYSTOLIC BLOOD PRESSURE: 110 MMHG | DIASTOLIC BLOOD PRESSURE: 70 MMHG | HEIGHT: 68 IN | HEART RATE: 79 BPM | WEIGHT: 160 LBS

## 2018-04-26 DIAGNOSIS — E10.3593 PROLIFERATIVE DIABETIC RETINOPATHY OF BOTH EYES WITHOUT MACULAR EDEMA ASSOCIATED WITH TYPE 1 DIABETES MELLITUS (HCC): ICD-10-CM

## 2018-04-26 DIAGNOSIS — E10.22 TYPE 1 DIABETES MELLITUS WITH STAGE 3 CHRONIC KIDNEY DISEASE (HCC): ICD-10-CM

## 2018-04-26 DIAGNOSIS — N18.30 TYPE 1 DIABETES MELLITUS WITH STAGE 3 CHRONIC KIDNEY DISEASE (HCC): ICD-10-CM

## 2018-04-26 DIAGNOSIS — G58.9 MONONEUROPATHY: ICD-10-CM

## 2018-04-26 DIAGNOSIS — I10 ESSENTIAL HYPERTENSION: ICD-10-CM

## 2018-04-26 DIAGNOSIS — E78.00 PURE HYPERCHOLESTEROLEMIA: ICD-10-CM

## 2018-04-26 DIAGNOSIS — Z00.00 HEALTHCARE MAINTENANCE: ICD-10-CM

## 2018-04-26 DIAGNOSIS — I73.9 PVD (PERIPHERAL VASCULAR DISEASE) (HCC): ICD-10-CM

## 2018-04-26 DIAGNOSIS — H54.40 BLIND LEFT EYE: ICD-10-CM

## 2018-04-26 DIAGNOSIS — N18.30 CKD (CHRONIC KIDNEY DISEASE) STAGE 3, GFR 30-59 ML/MIN (HCC): ICD-10-CM

## 2018-04-26 DIAGNOSIS — E55.9 VITAMIN D DEFICIENCY: ICD-10-CM

## 2018-04-26 PROBLEM — L08.9 WOUND INFECTION: Status: RESOLVED | Noted: 2017-04-14 | Resolved: 2018-04-26

## 2018-04-26 PROBLEM — T14.8XXA WOUND INFECTION: Status: RESOLVED | Noted: 2017-04-14 | Resolved: 2018-04-26

## 2018-04-26 PROBLEM — L97.529 DIABETIC ULCER OF LEFT FOOT ASSOCIATED WITH TYPE 1 DIABETES MELLITUS (HCC): Status: RESOLVED | Noted: 2017-03-17 | Resolved: 2018-04-26

## 2018-04-26 PROBLEM — E10.621 DIABETIC ULCER OF LEFT FOOT ASSOCIATED WITH TYPE 1 DIABETES MELLITUS (HCC): Status: RESOLVED | Noted: 2017-03-17 | Resolved: 2018-04-26

## 2018-04-26 LAB
ALBUMIN SERPL BCP-MCNC: 4.3 G/DL (ref 3.2–4.9)
ALBUMIN/GLOB SERPL: 1.3 G/DL
ALP SERPL-CCNC: 73 U/L (ref 30–99)
ALT SERPL-CCNC: 42 U/L (ref 2–50)
ANION GAP SERPL CALC-SCNC: 6 MMOL/L (ref 0–11.9)
AST SERPL-CCNC: 36 U/L (ref 12–45)
BILIRUB SERPL-MCNC: 0.8 MG/DL (ref 0.1–1.5)
BUN SERPL-MCNC: 23 MG/DL (ref 8–22)
CALCIUM SERPL-MCNC: 9.7 MG/DL (ref 8.5–10.5)
CHLORIDE SERPL-SCNC: 104 MMOL/L (ref 96–112)
CHOLEST SERPL-MCNC: 133 MG/DL (ref 100–199)
CO2 SERPL-SCNC: 29 MMOL/L (ref 20–33)
CREAT SERPL-MCNC: 1.65 MG/DL (ref 0.5–1.4)
CREAT UR-MCNC: 171.2 MG/DL
EST. AVERAGE GLUCOSE BLD GHB EST-MCNC: 197 MG/DL
GLOBULIN SER CALC-MCNC: 3.3 G/DL (ref 1.9–3.5)
GLUCOSE SERPL-MCNC: 131 MG/DL (ref 65–99)
HBA1C MFR BLD: 8.5 % (ref 0–5.6)
HDLC SERPL-MCNC: 53 MG/DL
LDLC SERPL CALC-MCNC: 66 MG/DL
MICROALBUMIN UR-MCNC: 2.6 MG/DL
MICROALBUMIN/CREAT UR: 15 MG/G (ref 0–30)
POTASSIUM SERPL-SCNC: 4.5 MMOL/L (ref 3.6–5.5)
PROT SERPL-MCNC: 7.6 G/DL (ref 6–8.2)
SODIUM SERPL-SCNC: 139 MMOL/L (ref 135–145)
TRIGL SERPL-MCNC: 68 MG/DL (ref 0–149)

## 2018-04-26 PROCEDURE — 80053 COMPREHEN METABOLIC PANEL: CPT

## 2018-04-26 PROCEDURE — 80061 LIPID PANEL: CPT

## 2018-04-26 PROCEDURE — 82043 UR ALBUMIN QUANTITATIVE: CPT

## 2018-04-26 PROCEDURE — 99204 OFFICE O/P NEW MOD 45 MIN: CPT | Performed by: INTERNAL MEDICINE

## 2018-04-26 PROCEDURE — 36415 COLL VENOUS BLD VENIPUNCTURE: CPT

## 2018-04-26 PROCEDURE — 83036 HEMOGLOBIN GLYCOSYLATED A1C: CPT

## 2018-04-26 PROCEDURE — 82570 ASSAY OF URINE CREATININE: CPT

## 2018-04-26 RX ORDER — GABAPENTIN 100 MG/1
100 CAPSULE ORAL DAILY
Qty: 90 CAP | Refills: 2 | Status: ON HOLD | OUTPATIENT
Start: 2018-04-26 | End: 2019-10-21

## 2018-04-26 ASSESSMENT — PATIENT HEALTH QUESTIONNAIRE - PHQ9: CLINICAL INTERPRETATION OF PHQ2 SCORE: 0

## 2018-04-26 NOTE — ASSESSMENT & PLAN NOTE
This is a chronic health problem that is well controlled with current medications and lifestyle measures. Occasional discomfort on left foot after walking a while like a claudication pain. Previously followed Dr. Richard had stents placed in the left lower extremity in May 2017. And also had the amputation of the fourth toe in 2017. Previously on Plavix and Atorvastatin which was stopped and later on aspirin and simvastatin 40 mg daily at bedtime but given the risk of bleeding./Epistaxis aspirin was stopped one year back by pt himself.

## 2018-04-26 NOTE — LETTER
Request for Medical Records    Patient Name: Dylon Santa    : 1960      Dear Doctor:     The above named patient receives primary care at the [facility] by Kassi Merrill M.D..  The patient informs us that you are his eye care Provider.    Please fax a copy of the most recent eye exam to [phone] or answer the  questions below and fax this sheet back to us at the above number.  Attached is a signed Release of Information.      Date of last eye exam: _____________    Retinal eye exam summary:        Please select the choice(s) that apply.    ____ No diabetic retinopathy    ____    Diabetic retinopathy present      Printed Name and Credentials: __________________________________    Signature of Eye Care Provider: _________________________________    We appreciate your assistance and collaboration in providing efficient patient care!    Kindest Regards,    [facility]  [address]  [place] [address] [phone]

## 2018-04-26 NOTE — ASSESSMENT & PLAN NOTE
This is a new problem since past few weeks, not on gabapentin . Mostly on both hands after waking up since 1 year. Mostly tingling numbness.

## 2018-04-26 NOTE — ASSESSMENT & PLAN NOTE
This is a chronic health problem that is uncontrolled with current medications and lifestyle measures. Last A1c 11/2017 was 7.6%. Currently on no medications of Novolin 20 units daily. And Humulin R only on sliding scale. Check blood glucose around 5 times a day , highest 200's.

## 2018-04-26 NOTE — ASSESSMENT & PLAN NOTE
This is a chronic health problem that is well controlled with current medications and lifestyle measures. Previously had laser therapies on both eyes with ophthalmologist at [place] and with Dr. Moy.

## 2018-04-26 NOTE — PROGRESS NOTES
CC:  To establish care with new PCP, diabetes mellitus.    HISTORY OF THE PRESENT ILLNESS: Patient is a 57 y.o. male. This pleasant patient is here today to establish care with new PCP and discuss on his medical conditions as mentioned in history of present illness below.      Off Note : When asked regarding his uncontrolled diabetes and checking on his blood glucose numbers, patient is very angry and does not want to get any blood glucose log next visit and does not want any discussions regarding this. He says that he can very well manage his diabetes and does not want any physician to involve in his management. I believe that he just wants basic labwork from us periodically.    Health Maintenance: Completed      Vitamin D deficiency  Last check 11/2017 was low at 27. Not on any vitamin D supplements.  Refusing the high dose vitamin D offered , On OTC and wants only that.    Type 1 diabetes mellitus with diabetic chronic kidney disease (HCC)  This is a chronic health problem that is uncontrolled with current medications and lifestyle measures. Last A1c 11/2017 was 7.6%. Currently on no medications of Novolin 20 units daily. And Humulin R only on sliding scale. Check blood glucose around 5 times a day , highest 200's.    Mononeuropathy  This is a new problem since past few weeks, not on gabapentin . Mostly on both hands after waking up since 1 year. Mostly tingling numbness.    PVD (peripheral vascular disease) (CMS-HCC)  This is a chronic health problem that is well controlled with current medications and lifestyle measures. Occasional discomfort on left foot after walking a while like a claudication pain. Previously followed Dr. Richard had stents placed in the left lower extremity in May 2017. And also had the amputation of the fourth toe in 2017. Previously on Plavix and Atorvastatin which was stopped and later on aspirin and simvastatin 40 mg daily at bedtime but given the risk of bleeding./Epistaxis aspirin  was stopped one year back by pt himself.    CKD (chronic kidney disease) stage 3, GFR 30-59 ml/min Dr. Pascual  This is a chronic health problem that is well controlled with current medications and lifestyle measures.Last visited  more than a year.    Diabetic retinopathy of both eyes (CMS-[facility])  This is a chronic health problem that is well controlled with current medications and lifestyle measures. Previously had laser therapies on both eyes with ophthalmologist at [place] and with Dr. Moy.    Essential hypertension  This is a chronic health problem that is well controlled with current medications and lifestyle measures. Blood pressure in office today is 110/70, currently on losartan 50 mg daily. He rarely checks blood pressure at home.    Healthcare maintenance  Last colonoscopy in 2015 and said in 5 yrs. received all immunizations.    Blind left eye  Due to diabetes.    PHQ score 0, BMI within normal limits, Current active chewing  tobacco refuses to quit, no fall injuries    Allergies: Patient has no known allergies.    Current Outpatient Prescriptions Ordered in [facility]   Medication Sig Dispense Refill   • gabapentin (NEURONTIN) 100 MG Cap Take 1 Cap by mouth every day. 90 Cap 2   • acyclovir (ZOVIRAX) 200 MG Cap Take 1 Cap by mouth every day. 90 Cap 1   • Blood Glucose Monitoring Suppl Supplies Misc Test strips order: Test strips for meter covered by pt's insurance. Sig: use once daily and prn ssx high or low sugar 100 Strip 3   • Lancets Misc Lancets order: Lancets for meter covered by insurance. Sig: use once daily and prn ssx high or low sugar. 100 Each 3   • losartan (COZAAR) 50 MG Tab Take 1 Tab by mouth every day. 90 Tab 1   • metoprolol SR (TOPROL XL) 25 MG TABLET SR 24 HR TAKE ONE TABLET BY MOUTH ONCE DAILY 90 Tab 1   • simvastatin (ZOCOR) 40 MG Tab Take 1 Tab by mouth every evening. 90 Tab 1   • insulin NPH (HUMULIN,NOVOLIN) 100 UNIT/ML Suspension  "Inject 20 Units as instructed every day.     • insulin regular (HUMULIN R) 100 Unit/mL Solution Inject 6-10 Units as instructed 3 times a day before meals.       No current Epic-ordered facility-administered medications on file.        Past Medical History:   Diagnosis Date   • Anesthesia     hx PONV   • Arthritis     L knee; hands   • Bronchitis 2016   • Cataract     nicole iol   • Cold 4/5/17    prod cough   • Dental disorder     \"bad shape\"   • Diabetic neuropathy (CMS-HCC) 12/31/2014    retinopathy   • Diabetic retinopathy of both eyes (CMS-HCC) 12/9/2016   • Heart murmur    • Hemorrhagic disorder (CMS-HCC)     nose bleeds   • High cholesterol    • History of shingles 12/31/2014   • Hyperlipidemia    • Hypertension    • Kidney stones    • Pain     left leg/foot   • Psychiatric problem     situational depression   • Type II or unspecified type diabetes mellitus without mention of complication, uncontrolled 12/31/2014       Past Surgical History:   Procedure Laterality Date   • IRRIGATION & DEBRIDEMENT GENERAL Left 5/3/2017    Procedure: IRRIGATION & DEBRIDEMENT GENERAL and Left Fourth Toe Amputation ;  Surgeon: Inessa Richard M.D.;  Location: SURGERY [facility];  Service:    • APPENDECTOMY     • CATARACT EXTRACTION WITH IOL Bilateral    • EYE SURGERY     • HAND SURGERY  1990's   • KNEE ARTHROSCOPY      x3   • TONSILLECTOMY         Social History   Substance Use Topics   • Smoking status: Never Smoker   • Smokeless tobacco: Current User     Types: Chew      Comment: tobacco cessation recommended   • Alcohol use No      Comment: rare beer       Social History     Social History Narrative   • No narrative on file       Family History   Problem Relation Age of Onset   • Thyroid Mother    • Lung Disease Mother      COPD   • Thyroid Sister    • Thyroid Brother    • Lung Disease Brother      COPD   • Hypertension Brother    • Hyperlipidemia Brother    • Lung Disease Father      COPD   • Diabetes Maternal Uncle    • " "Cancer Paternal Aunt      type unknown   • Diabetes Maternal Grandfather    • Diabetes Paternal Grandmother    • Cancer Paternal Grandfather      prostate   • Stroke Neg Hx        ROS:     - Constitutional: Negative for fever, chills, unexpected weight change, and fatigue/generalized weakness.     - HEENT positive for left eye blindness: Negative for headaches, vision changes, hearing changes, ear pain, ear discharge, rhinorrhea, sinus congestion, sore throat, and neck pain.      - Respiratory: Negative for cough, sputum production, chest congestion, dyspnea, wheezing, and crackles.      - Cardiovascular: Negative for chest pain, palpitations, orthopnea, and bilateral lower extremity edema.     - Gastrointestinal: Negative for heartburn, nausea, vomiting, abdominal pain, hematochezia, melena, diarrhea, constipation, and greasy/foul-smelling stools.     - Genitourinary: Negative for dysuria, polyuria, hematuria, pyuria, urinary urgency, and urinary incontinence.     - Musculoskeletal: Positive for left foot discomfort as a claudication pain . Negative for myalgias, back pain, and joint pain.     - Skin: Negative for rash, itching, cyanotic skin color change.     - Neurological: Negative for dizziness, tingling, tremors, focal sensory deficit, focal weakness and headaches.     - Endo/Heme/Allergies: Does not bruise/bleed easily.     - Psychiatric/Behavioral: Negative for depression, suicidal/homicidal ideation and memory loss.          Exam: Blood pressure 110/70, pulse 79, temperature 37 °C (98.6 °F), height 1.727 m (5' 8\"), weight 72.6 kg (160 lb), SpO2 97 %. Body mass index is 24.33 kg/m².    General: Normal appearing. No distress.  HEENT: Normocephalic. Eyes conjunctiva clear lids without ptosis, pupils equal and reactive to light accommodation, ears normal shape and contour, canals are clear bilaterally, tympanic membranes are benign, nasal mucosa benign, oropharynx is without erythema, edema or exudates.   Neck: " Supple without JVD or bruit. Thyroid is not enlarged.  Pulmonary: Clear to ausculation.  Normal effort. No rales, ronchi, or wheezing.  Cardiovascular: Regular rate and rhythm without murmur. Carotid and radial pulses are intact and equal bilaterally.  Abdomen: Soft, nontender, nondistended. Normal bowel sounds. Liver and spleen are not palpable  Neurologic: Grossly nonfocal  Lymph: No cervical, supraclavicular or axillary lymph nodes are palpable  Skin: Warm and dry.  No obvious lesions.  Musculoskeletal: Normal gait. No extremity cyanosis, clubbing, or edema.  Positive for reamputation of left fourth toe incision well-healed.  Psych: Normal mood and affect. Alert and oriented x3. Judgment and insight is normal.  Diabetic Foot exam :   Monofilament testing with a 10 gram force: sensation: decreased bilaterally  Visual Inspection: Feet without maceration, ulcers, or fissures.  Pedal pulses: intact bilaterally    Please note that this dictation was created using voice recognition software. I have made every reasonable attempt to correct obvious errors, but I expect that there are errors of grammar and possibly content that I did not discover before finalizing the note.      Assessment/Plan  1. CKD (chronic kidney disease) stage 3, GFR 30-59 ml/min Dr. Pascual  Will check CMP today and possible need of visiting nephrology Dr. Cazares if it is abnormal and worsening.  - COMP METABOLIC PANEL; Future    2. PVD (peripheral vascular disease) (CMS-HCC)  Stable, not on aspirin given his risk of proliferative retinopathy with bleeding in the eye and also epistaxis problem. Currently on simvastatin 40 mg daily at bedtime.    3. Type 1 diabetes mellitus with stage 3 chronic kidney disease (CMS-HCC)  Uncontrolled last A1c in 11/2017 was 7.6%. Continue the current medication Novolin 20 units daily, Humulin 6-10 units 3 times a day depending on sliding scale. Will recheck his A1c and ACR today.  - Diabetic Monofilament Lower  Extremity Exam  - COMP METABOLIC PANEL; Future  - HEMOGLOBIN A1C; Future  - MICROALBUMIN CREAT RATIO URINE (LAB COLLECT); Future    4. Pure hypercholesterolemia  Last lipid panel in normal 2017 was within normal limits, continue the current medications simvastatin 40 g daily at bedtime. Will recheck at this time given the risk factors of peripheral vascular disease and uncontrolled diabetes.  - COMP METABOLIC PANEL; Future  - LIPID PROFILE; Future    5.  Essential hypertension  Stable on current medication losartan 50 mg daily to continue same. We'll check his renal function.  - COMP METABOLIC PANEL; Future    7. Vitamin D deficiency  Previous vitamin D check and normal 2017 was low at 20, patient refusing to take any high-dose vitamin D as he feels that he is fine with his over-the-counter vitamin D. Explain him on all the risk factors of decreasing and bone density given chronic vitamin D deficiency which patient understood but refusing to take any new medications.    8. Mononeuropathy  Numbness in bilateral hands, starting him on gabapentin from today with low-dose 800 mg daily.  - gabapentin (NEURONTIN) 100 MG Cap; Take 1 Cap by mouth every day.  Dispense: 90 Cap; Refill: 2    9. Proliferative diabetic retinopathy of both eyes without macular edema associated with type 1 diabetes mellitus (CMS-[facility])  Follows ophthalmologist at the [facility], Dr. Moy manages his retinopathy. He has an appointment coming up there and do not need any help from us. Medical release request form signed to get the records from there.    10. Blind left eye  Patient is on permanent disability status given his left blind eye due to proliferative retinopathy.

## 2018-04-26 NOTE — PROGRESS NOTES
Monofilament testing with a 10 gram force: sensation intact: decreased bilaterally  Visual Inspection: Feet without maceration, ulcers, fissures.  Pedal pulses: intact bilaterally

## 2018-04-26 NOTE — ASSESSMENT & PLAN NOTE
This is a chronic health problem that is well controlled with current medications and lifestyle measures.Last visited  more than a year.

## 2018-04-26 NOTE — ASSESSMENT & PLAN NOTE
Last check 11/2017 was low at 27. Not on any vitamin D supplements.  Refusing the high dose vitamin D offered , On OTC and wants only that.

## 2018-04-26 NOTE — ASSESSMENT & PLAN NOTE
This is a chronic health problem that is well controlled with current medications and lifestyle measures. Blood pressure in office today is 110/70, currently on losartan 50 mg daily. He rarely checks blood pressure at home.

## 2018-04-26 NOTE — LETTER
[facility]  Kassi Merrill M.D.  [address]  [place] [address]  Fax: [phone]   Authorization for Release/Disclosure of   Protected Health Information   Name: JEN SHINOMI : 1960 SSN: [id number]   Address: [address] [place], [place] [address] [address] Phone:    [phone] (home)    I authorize the entity listed below to release/disclose the PHI below to:   [facility]/Kassi Merrill M.D. and Kassi Merrill M.D.   Provider or Entity Name:     Address   City, State, Zip   Phone:      Fax:     Reason for request: continuity of care   Information to be released:    [  ] LAST COLONOSCOPY,  including any PATH REPORT and follow-up  [  ] LAST FIT/COLOGUARD RESULT [  ] LAST DEXA  [  ] LAST MAMMOGRAM  [  ] LAST PAP  [  ] LAST LABS [X  ] RETINA EXAM REPORT  [  ] IMMUNIZATION RECORDS  [  ] Release all info      [  ] Check here and initial the line next to each item to release ALL health information INCLUDING  _____ Care and treatment for drug and / or alcohol abuse  _____ HIV testing, infection status, or AIDS  _____ Genetic Testing    DATES OF SERVICE OR TIME PERIOD TO BE DISCLOSED: _____________  I understand and acknowledge that:  * This Authorization may be revoked at any time by you in writing, except if your health information has already been used or disclosed.  * Your health information that will be used or disclosed as a result of you signing this authorization could be re-disclosed by the recipient. If this occurs, your re-disclosed health information may no longer be protected by State or Federal laws.  * You may refuse to sign this Authorization. Your refusal will not affect your ability to obtain treatment.  * This Authorization becomes effective upon signing and will  on (date) __________.      If no date is indicated, this Authorization will  one (1) year from the signature date.    Name: Jen Santa    Signature:   Date:     2018       PLEASE FAX  REQUESTED RECORDS BACK TO: [phone]

## 2018-04-28 DIAGNOSIS — N18.30 CKD (CHRONIC KIDNEY DISEASE) STAGE 3, GFR 30-59 ML/MIN (HCC): ICD-10-CM

## 2018-04-28 DIAGNOSIS — E10.22 TYPE 1 DIABETES MELLITUS WITH STAGE 3 CHRONIC KIDNEY DISEASE (HCC): ICD-10-CM

## 2018-04-28 DIAGNOSIS — N18.30 TYPE 1 DIABETES MELLITUS WITH STAGE 3 CHRONIC KIDNEY DISEASE (HCC): ICD-10-CM

## 2018-04-28 DIAGNOSIS — E10.3593 PROLIFERATIVE DIABETIC RETINOPATHY OF BOTH EYES WITHOUT MACULAR EDEMA ASSOCIATED WITH TYPE 1 DIABETES MELLITUS (HCC): ICD-10-CM

## 2018-04-30 ENCOUNTER — TELEPHONE (OUTPATIENT)
Dept: MEDICAL GROUP | Facility: PHYSICIAN GROUP | Age: 58
End: 2018-04-30

## 2018-04-30 NOTE — TELEPHONE ENCOUNTER
----- Message from Kassi Merrill M.D. sent at 4/28/2018  3:17 PM PDT -----  - Your A1C is uncontrolled at 8.5% which needs to be corrected by increasing your Insulin Novolin to 22 units is my recommendation.   - Given the risk factors f worsening CKD and Retinopathy due to diabetes , you will need specialist Endocrinology which I have given referral for uncontrolled diabetes.  - Please take Nephrology appointment for worsening kidney function with creatinine at 1.65.  Kassi Merrill M.D.

## 2018-05-04 RX ORDER — LANCETS 30 GAUGE
EACH MISCELLANEOUS
Qty: 100 EACH | Refills: 3 | Status: SHIPPED | OUTPATIENT
Start: 2018-05-04 | End: 2019-04-01 | Stop reason: SDUPTHER

## 2018-05-04 NOTE — TELEPHONE ENCOUNTER
Patient has recently been seen by PCP within the last 6 months per protocol (4/18). Will refill DM supplies for 12 months.  Lab Results   Component Value Date/Time    HBA1C 8.5 (H) 04/26/2018 10:26 AM      Lab Results   Component Value Date/Time    MALBCRT 15 04/26/2018 10:25 AM    MICROALBUR 2.6 04/26/2018 10:25 AM      Lab Results   Component Value Date/Time    ALKPHOSPHAT 73 04/26/2018 10:26 AM    ASTSGOT 36 04/26/2018 10:26 AM    ALTSGPT 42 04/26/2018 10:26 AM    TBILIRUBIN 0.8 04/26/2018 10:26 AM

## 2018-05-04 NOTE — TELEPHONE ENCOUNTER
Was the patient seen in the last year in this department? Yes     Does patient have an active prescription for medications requested? No     Received Request Via: Pharmacy      Pt met protocol?: Yes pt last ov 4/2018   Lab Results   Component Value Date/Time    HBA1C 8.5 (H) 04/26/2018 10:26 AM      Glucose   Date Value Ref Range Status   04/26/2018 131 (H) 65 - 99 mg/dL Final

## 2018-05-04 NOTE — TELEPHONE ENCOUNTER
Pt is requesting new meter because his a broke. He is requesting more strips because he states that the meter not working properly has caused him to waste strips.    Please advise. Thank you.

## 2018-05-15 NOTE — ED NOTES
RN provided bacitracin and sterile guaze, tape to pt for wound care; pt performed the care independently   Relayed information to patient. Verbalized understanding.

## 2018-05-16 NOTE — TELEPHONE ENCOUNTER
See MA's notes below, pt has met protocol, OV last month, med not current on list  Lab Results   Component Value Date/Time    CHOLSTRLTOT 133 04/26/2018 10:26 AM    LDL 66 04/26/2018 10:26 AM    HDL 53 04/26/2018 10:26 AM    TRIGLYCERIDE 68 04/26/2018 10:26 AM       Lab Results   Component Value Date/Time    SODIUM 139 04/26/2018 10:26 AM    POTASSIUM 4.5 04/26/2018 10:26 AM    CHLORIDE 104 04/26/2018 10:26 AM    CO2 29 04/26/2018 10:26 AM    GLUCOSE 131 (H) 04/26/2018 10:26 AM    BUN 23 (H) 04/26/2018 10:26 AM    CREATININE 1.65 (H) 04/26/2018 10:26 AM     Lab Results   Component Value Date/Time    ALKPHOSPHAT 73 04/26/2018 10:26 AM    ASTSGOT 36 04/26/2018 10:26 AM    ALTSGPT 42 04/26/2018 10:26 AM    TBILIRUBIN 0.8 04/26/2018 10:26 AM

## 2018-05-17 RX ORDER — CLOPIDOGREL BISULFATE 75 MG/1
75 TABLET ORAL DAILY
Qty: 30 TAB | OUTPATIENT
Start: 2018-05-17

## 2018-05-17 NOTE — TELEPHONE ENCOUNTER
Pt was previously seen by Dr Richard and was discussed 4/18 to stop Plavix and just stay on simvastatin. Pharmacy aware.

## 2018-05-24 ENCOUNTER — TELEPHONE (OUTPATIENT)
Dept: NEPHROLOGY | Facility: MEDICAL CENTER | Age: 58
End: 2018-05-24

## 2018-05-24 NOTE — TELEPHONE ENCOUNTER
Joseline Cazares,    Pt called wondering if it was necessary to come in for an appointment. He recently did some labs for his PCP and is asking if you can please review them to see if he is doing well enough to hold off on coming in.     Thank you.

## 2018-08-16 DIAGNOSIS — Z86.19 HISTORY OF SHINGLES: ICD-10-CM

## 2018-08-16 DIAGNOSIS — I10 ESSENTIAL HYPERTENSION: ICD-10-CM

## 2018-08-17 RX ORDER — METOPROLOL SUCCINATE 25 MG/1
TABLET, EXTENDED RELEASE ORAL
Qty: 90 TAB | Refills: 0 | Status: SHIPPED | OUTPATIENT
Start: 2018-08-17 | End: 2018-11-27 | Stop reason: SDUPTHER

## 2018-08-17 RX ORDER — SIMVASTATIN 40 MG
40 TABLET ORAL EVERY EVENING
Qty: 90 TAB | Refills: 0 | Status: SHIPPED | OUTPATIENT
Start: 2018-08-17 | End: 2018-11-27 | Stop reason: SDUPTHER

## 2018-08-17 RX ORDER — ACYCLOVIR 200 MG/1
200 CAPSULE ORAL DAILY
Qty: 90 CAP | Refills: 0 | Status: SHIPPED | OUTPATIENT
Start: 2018-08-17 | End: 2018-11-27 | Stop reason: SDUPTHER

## 2018-08-17 RX ORDER — LOSARTAN POTASSIUM 50 MG/1
50 TABLET ORAL DAILY
Qty: 90 TAB | Refills: 0 | Status: SHIPPED | OUTPATIENT
Start: 2018-08-17 | End: 2018-11-27 | Stop reason: SDUPTHER

## 2018-08-17 NOTE — TELEPHONE ENCOUNTER
See MA's notes below, pt has met protocol, OV 4/18   BP Readings from Last 1 Encounters:   04/26/18 110/70     Lab Results   Component Value Date/Time    CHOLSTRLTOT 133 04/26/2018 10:26 AM    LDL 66 04/26/2018 10:26 AM    HDL 53 04/26/2018 10:26 AM    TRIGLYCERIDE 68 04/26/2018 10:26 AM       Lab Results   Component Value Date/Time    SODIUM 139 04/26/2018 10:26 AM    POTASSIUM 4.5 04/26/2018 10:26 AM    CHLORIDE 104 04/26/2018 10:26 AM    CO2 29 04/26/2018 10:26 AM    GLUCOSE 131 (H) 04/26/2018 10:26 AM    BUN 23 (H) 04/26/2018 10:26 AM    CREATININE 1.65 (H) 04/26/2018 10:26 AM     Lab Results   Component Value Date/Time    ALKPHOSPHAT 73 04/26/2018 10:26 AM    ASTSGOT 36 04/26/2018 10:26 AM    ALTSGPT 42 04/26/2018 10:26 AM    TBILIRUBIN 0.8 04/26/2018 10:26 AM

## 2018-08-17 NOTE — TELEPHONE ENCOUNTER
Last seen by PCP 4/18. Will send 3 months to pharmacy. Patient is due for an appointment. Please schedule.

## 2018-08-21 ENCOUNTER — TELEPHONE (OUTPATIENT)
Dept: MEDICAL GROUP | Facility: PHYSICIAN GROUP | Age: 58
End: 2018-08-21

## 2018-09-04 NOTE — PROGRESS NOTES
"Chief Complaint   Patient presents with     Consult     ovarian follicles       Initial BP 98/62 (BP Location: Left arm, Patient Position: Sitting, Cuff Size: Adult Regular)  Pulse 71  Ht 5' 2\" (1.575 m)  Wt 125 lb (56.7 kg)  LMP 08/10/2013  SpO2 98%  BMI 22.86 kg/m2 Estimated body mass index is 22.86 kg/(m^2) as calculated from the following:    Height as of this encounter: 5' 2\" (1.575 m).    Weight as of this encounter: 125 lb (56.7 kg).  Medication Reconciliation: complete    Tasia Hercules LPN  " Hospital Medicine Progress Note, Adult, Complex               Author: Natalya Nicolás Date & Time created: 5/6/2017  11:35 AM     Interval History:  56 y.o. 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 Patient was admitted for left 3,4th toe cellulitis, sepsis.    Overnight patients foot xray revealed gas, concerning from necrotizing tissue,  was consulted and is undergoing debridement. Patient reports feeling much better.  ID was consulted, will change vancomycin to Zyvox and continue unasyn from zosyn.  Continue aggressive diabetic control.  Pain control, titrate as needed.    5/4  No issues overnight, patient blood sugar still elevated, however slowly improving  Continue Zyvox and unasyn, ID following.  Cont wound vac and wound care.  PICC Line placement pending. I do not anticipate that the patient is going to require dialysis at this time. At this point PICC line placement would allow for antibiotics which are vital in his clinical recovery. The benefits outweigh the risk PICC Line carries in terms of anticipation of future dialysis.     5/5  Patient clinically improving, his blood sugars are better controlled. Patient denies fevers/chills, chest pain, shortness of breath or nausea/vommiting. Patient denies significant limb pain.  Continue IV Zyvox and Zosyn. ID following  Cont wound care with wound vac.  Placement.    5/6  Patietn doing better clinically, cresencio complains of having multiple episodes of watery diarrhea started last night. Patient denies fevers/chills, chest pain, shortness of breath or nausea/vommiting.   Checking Cdiff.   Continue IV zosyn  Continue wound care  Placement at some point.    Review of Systems:   Review of Systems    Constitutional: Positive for malaise/fatigue. Negative for fever and chills.   HENT: Negative for congestion, hearing loss, sore throat and tinnitus.    Eyes: Negative for blurred vision, double vision, photophobia and pain.   Respiratory: Negative for cough, hemoptysis, sputum production, shortness of breath and stridor.    Cardiovascular: Negative for chest pain, palpitations, orthopnea, claudication and PND.   Gastrointestinal: Positive for diarrhea. Negative for heartburn, nausea, vomiting, constipation, blood in stool and melena.   Genitourinary: Negative for dysuria, urgency and frequency.   Musculoskeletal: Positive for myalgias (improving) and joint pain (decreased). Negative for back pain and neck pain.   Neurological: Negative for dizziness, tingling, tremors, sensory change, speech change, weakness and headaches.   Psychiatric/Behavioral: Negative for depression, suicidal ideas and memory loss. The patient is not nervous/anxious.        Physical Exam:  Physical Exam   Constitutional: He is oriented to person, place, and time. He appears well-developed and well-nourished.   HENT:   Head: Normocephalic and atraumatic.   Mouth/Throat: No oropharyngeal exudate.   Eyes: Conjunctivae are normal. Pupils are equal, round, and reactive to light. Right eye exhibits no discharge. No scleral icterus.   Neck: Neck supple. No JVD present. No thyromegaly present.   Cardiovascular: Intact distal pulses.    No murmur heard.  Pulmonary/Chest: Effort normal and breath sounds normal. No stridor. No respiratory distress. He has no wheezes. He has no rales.   Abdominal: Soft. Bowel sounds are normal. He exhibits no distension. There is no tenderness. There is no rebound.   Musculoskeletal: Normal range of motion. He exhibits edema (minimal around wound vac). He exhibits no tenderness (minimal).   Left foot wound vac intact, no surrounding erythema, minimal edema,    Neurological: He is alert and oriented to person, place,  and time.   Skin: Skin is warm. No erythema.   Psychiatric: He has a normal mood and affect. His behavior is normal. Thought content normal.       Labs:        Invalid input(s): [id number]      Recent Labs      17   SODIUM  128*  135  136   POTASSIUM  4.0  4.2  4.1   CHLORIDE  99  105  105   CO2  24  23  22   BUN  27*  17  13   CREATININE  1.85*  1.54*  1.50*   MAGNESIUM  2.3  2.4  2.1   CALCIUM  8.4*  8.6  8.1*     Recent Labs      17   042   ALTSGPT  8  8  9   ASTSGOT  12  15  16   ALKPHOSPHAT  58  59  64   TBILIRUBIN  0.3  0.3  0.3   GLUCOSE  292*  140*  188*     Recent Labs      17   RBC  3.25*  3.10*  3.12*   HEMOGLOBIN  9.7*  9.2*  9.2*   HEMATOCRIT  29.5*  28.6*  28.4*   PLATELETCT  279  307  309     Recent Labs      17   WBC  12.3*  9.4  8.0   NEUTSPOLYS  73.10*  64.40  67.80   LYMPHOCYTES  15.70*  22.40  18.70*   MONOCYTES  8.90  9.30  8.70   EOSINOPHILS  1.50  3.10  3.50   BASOPHILS  0.40  0.50  0.60   ASTSGOT  12  15  16   ALTSGPT  8  8  9   ALKPHOSPHAT  58  59  64   TBILIRUBIN  0.3  0.3  0.3           Hemodynamics:  Temp (24hrs), Av °C (98.6 °F), Min:36.3 °C (97.4 °F), Max:37.7 °C (99.9 °F)  Temperature: 36.3 °C (97.4 °F)  Pulse  Av.6  Min: 60  Max: 89   Blood Pressure: 149/74 mmHg     Respiratory:    Respiration: 18, Pulse Oximetry: 94 %           Fluids:  No intake or output data in the 24 hours ending 17 1135  Weight: 82.8 kg (182 lb 8.7 oz)  GI/Nutrition:  Orders Placed This Encounter   Procedures   • DIET ORDER     Standing Status: Standing      Number of Occurrences: 1      Standing Expiration Date:      Order Specific Question:  Diet:     Answer:  Diabetic [3]     Medical Decision Making, by Problem:    1.  Severe Sepsis.   - 2/2 to left 3,4th toe cellulitis, with findings of gas gangrene on xray.  -  Pending Debridement per ..  - Continue with zosyn, d/mary zyvox.  - Cultures neg   - Previous wound cultures from 4/14 shows evidence of enterococcus fecalis. Pan-sensitive.  - Continue IV fluids  - ID following.     2. Left 3,4th toe cellulitis with osteomyelitis.   - s/p I&D and amputation of the left    fourth toe.     - Continue antibiotics as above  - continue wound care.    3. Insulin-dependent diabetes Type 1.  - Continue lantus 20U,    - Continue Insulin-sliding scale, accu-checks and hypoglycemia protocol.  - blood sugars improving  - A1c : 8.6    4. Acute on chronic renal failure  - suspect pre-renal azotemia,  - at baseline.   - continue IV fluids, recheck bmp in the am.    5. Diabetic retinopathy.  - defer to outpatient management.    6. Essential hypertension  - Continue Metoprolol, will hold his losartan dose given REGULO.    7. 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  - continue plavix.     8. Hyponatremia  - resolved    9. Normocytic anemia  - no signs of gross bleeding, continue to monitor for acute changes.    Patient plan of care discussed at multidisplinary team rounds and with patient and R.N at beside.      The total time spent face to face with this patient was about 45 mins of which 60% of time was spent on counseling, review of records including pertinent lab data and studies, as well as discussing diagnostic evaluation and work up, planned therapeutic interventions and future disposition of care. Where indicated, the assessment and plan reflect discussion of patient with consultants, other healthcare providers, family members, and additional research needed to obtain further information in formulating the plan of care of this patient.         Radiology images reviewed, Labs reviewed and Medications  reviewed  Leslie catheter: No Leslie      DVT: pradaxa.  DVT prophylaxis - mechanical: SCDs    Antibiotics: Treating active infection/contamination beyond 24 hours perioperative coverage

## 2018-10-04 ENCOUNTER — TELEPHONE (OUTPATIENT)
Dept: MEDICAL GROUP | Facility: PHYSICIAN GROUP | Age: 58
End: 2018-10-04

## 2018-10-04 NOTE — TELEPHONE ENCOUNTER
Future Appointments       Provider Department [place]    10/5/2018 3:20 PM Kassi Merrill M.D. [facility] [place]    11/19/2018 11:15 AM Sandi Manning M.D. [facility]        ESTABLISHED PATIENT PRE-VISIT PLANNING     Note: Patient will not be contacted if there is no indication to call.     1.  Reviewed notes from the last few office visits within the medical group: Yes    2.  If any orders were placed at last visit or intended to be done for this visit (i.e. 6 mos follow-up), do we have Results/Consult Notes?        •  Labs - Labs ordered, completed on 04/26/18 and results are in chart.       •  Imaging - Imaging was not ordered at last office visit.       •  Referrals - Referral ordered, patient has NOT been seen.    3. Is this appointment scheduled as a Hospital Follow-Up? No    4.  Immunizations were updated in [employer] using WebIZ?: Yes       •  Web Iz Recommendations: FLU, HEPATITIS A , MMR , TD, VARICELLA (Chicken Pox)  and ZOSTAVAX (Shingles)    5.  Patient is due for the following Health Maintenance Topics:   Health Maintenance Due   Topic Date Due   • IMM ZOSTER VACCINES (1 of 2) 06/27/2011   • Annual Wellness Visit  06/18/2017   • IMM INFLUENZA (1) 09/01/2018       6.  MDX printed for Provider? NO complaint during visit 04/26/18    7.  Patient was informed to arrive 15 min prior to their scheduled appointment and bring in their medication bottles.

## 2018-10-05 ENCOUNTER — OFFICE VISIT (OUTPATIENT)
Dept: MEDICAL GROUP | Facility: PHYSICIAN GROUP | Age: 58
End: 2018-10-05
Payer: MEDICARE

## 2018-10-05 VITALS
OXYGEN SATURATION: 98 % | HEIGHT: 68 IN | BODY MASS INDEX: 24.1 KG/M2 | DIASTOLIC BLOOD PRESSURE: 62 MMHG | WEIGHT: 159 LBS | HEART RATE: 72 BPM | TEMPERATURE: 98.7 F | SYSTOLIC BLOOD PRESSURE: 112 MMHG

## 2018-10-05 DIAGNOSIS — E10.22 TYPE 1 DIABETES MELLITUS WITH STAGE 3 CHRONIC KIDNEY DISEASE (HCC): ICD-10-CM

## 2018-10-05 DIAGNOSIS — N18.30 TYPE 1 DIABETES MELLITUS WITH STAGE 3 CHRONIC KIDNEY DISEASE (HCC): ICD-10-CM

## 2018-10-05 DIAGNOSIS — Z23 NEED FOR VACCINATION: ICD-10-CM

## 2018-10-05 DIAGNOSIS — L98.9 SKIN LESION, SUPERFICIAL: ICD-10-CM

## 2018-10-05 DIAGNOSIS — J06.9 VIRAL URI WITH COUGH: ICD-10-CM

## 2018-10-05 PROCEDURE — G0008 ADMIN INFLUENZA VIRUS VAC: HCPCS | Performed by: INTERNAL MEDICINE

## 2018-10-05 PROCEDURE — 99214 OFFICE O/P EST MOD 30 MIN: CPT | Mod: 25 | Performed by: INTERNAL MEDICINE

## 2018-10-05 PROCEDURE — 90686 IIV4 VACC NO PRSV 0.5 ML IM: CPT | Performed by: INTERNAL MEDICINE

## 2018-10-05 RX ORDER — BENZONATATE 100 MG/1
100 CAPSULE ORAL 3 TIMES DAILY PRN
Qty: 60 CAP | Refills: 0 | Status: SHIPPED | OUTPATIENT
Start: 2018-10-05 | End: 2018-11-19

## 2018-10-05 NOTE — ASSESSMENT & PLAN NOTE
This is a chronic health problem that is uncontrolled with current medications and lifestyle measures. Last checked 04/2018 was 8.5% , currently on Novolin 22 U once a day , Novolin R 7 units twice a day.

## 2018-10-05 NOTE — ASSESSMENT & PLAN NOTE
This is a new problem started about 6 weeks back with brown productive cough preceded by night sweats and sore throat.

## 2018-10-05 NOTE — PROGRESS NOTES
CC: Follow-up visit, unspecified skin lesions.    HISTORY OF PRESENT ILLNESS: Patient is a 58 y.o. male established patient who presents today to discuss some medical conditions as mentioned in history of present illness below.    Health Maintenance: Completed    Skin lesion, superficial  This is a new problem which patient says he has been seeing for the past 4 months only on the face. Denies any itching and he squeezes out the pus from it. Denies these lesions at this time but comes and goes.    Viral URI with cough  This is a new problem started about 6 weeks back with brown productive cough preceded by night sweats and sore throat.     Type 1 diabetes mellitus with diabetic chronic kidney disease (HCC)  This is a chronic health problem that is uncontrolled with current medications and lifestyle measures. Last checked 04/2018 was 8.5% , currently on Novolin 22 U once a day , Novolin R 7 units twice a day.      PHQ score 0, BMI within normal limits, no tobacco, no fall injuries.    Patient Active Problem List    Diagnosis Date Noted   • Skin lesion, superficial 10/05/2018   • Viral URI with cough 10/05/2018   • Healthcare maintenance 04/26/2018   • Vitamin D deficiency 04/26/2018   • Mononeuropathy 04/26/2018   • PVD (peripheral vascular disease) ([facility]) 04/26/2018   • Acute non-recurrent sinusitis 11/17/2017   • Burning with urination 11/17/2017   • Strain of latissimus dorsi muscle 11/17/2017   • Bone spur 11/17/2017   • Type 1 diabetes mellitus with diabetic chronic kidney disease ([facility]) 04/07/2017   • Diminished pulses in lower extremity 03/17/2017   • Diabetic retinopathy of both eyes ([facility]) 12/09/2016   • Blind left eye 06/17/2016   • CKD (chronic kidney disease) stage 3, GFR 30-59 ml/min Dr. 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    Current Outpatient Prescriptions   Medication Sig Dispense Refill   • mupirocin (BACTROBAN) 2 % Ointment Apply to the affected area twice a day. 1 Tube 1   • benzonatate (TESSALON PERLES) 100 MG Cap Take 1 Cap by mouth 3 times a day as needed for Cough. 60 Cap 0   • insulin NPH (HUMULIN,NOVOLIN) 100 UNIT/ML Suspension Inject 22 Units as instructed every day. 10 mL 2   • simvastatin (ZOCOR) 40 MG Tab Take 1 Tab by mouth every evening. 90 Tab 0   • metoprolol SR (TOPROL XL) 25 MG TABLET SR 24 HR TAKE ONE TABLET BY MOUTH ONCE DAILY 90 Tab 0   • losartan (COZAAR) 50 MG Tab Take 1 Tab by mouth every day. 90 Tab 0   • acyclovir (ZOVIRAX) 200 MG Cap Take 1 Cap by mouth every day. 90 Cap 0   • Blood Glucose Monitoring Suppl Supplies Misc Test strips order: Test strips for meter covered by pt's insurance. Sig: use once daily and prn ssx high or low sugar 100 Strip 3   • Lancets Misc Lancets order: Lancets for meter covered by insurance. Sig: use once daily and prn ssx high or low sugar. 100 Each 3   • Blood Glucose Monitoring Suppl Device Meter: Dispense Device of Insurance Preference. Sig. Use as directed for blood sugar monitoring. #1. NR. 1 Device 0   • gabapentin (NEURONTIN) 100 MG Cap Take 1 Cap by mouth every day. 90 Cap 2   • insulin regular (HUMULIN R) 100 Unit/mL Solution Inject 6-10 Units as instructed 3 times a day before meals.       No current facility-administered medications for this visit.        Social History   Substance Use Topics   • Smoking status: Never Smoker   • Smokeless tobacco: Current User     Types: Chew      Comment: tobacco cessation recommended   • Alcohol use No      Comment: rare beer     Social History     Social History Narrative   • No narrative on file       Family History   Problem Relation Age of Onset   • Thyroid Mother    • Lung Disease Mother         COPD   • Thyroid Sister    • Thyroid Brother    • Lung Disease Brother         COPD   • Hypertension Brother    •  "Hyperlipidemia Brother    • Lung Disease Father         COPD   • Diabetes Maternal Uncle    • Cancer Paternal Aunt         type unknown   • Diabetes Maternal Grandfather    • Diabetes Paternal Grandmother    • Cancer Paternal Grandfather         prostate   • Stroke Neg Hx         ROS:     - Constitutional:  Negative for fever, chills, unexpected weight change, and fatigue/generalized weakness.    - HEENT:  Negative for headaches, vision changes, hearing changes, ear pain, ear discharge, rhinorrhea, sinus congestion, sore throat, and neck pain.      - Respiratory: Negative for cough, sputum production, chest congestion, dyspnea, wheezing, and crackles.      - Cardiovascular: Negative for chest pain, palpitations, orthopnea, and bilateral lower extremity edema.     - Gastrointestinal: Negative for heartburn, nausea, vomiting, abdominal pain, hematochezia, melena, diarrhea, constipation, and greasy/foul-smelling stools.     - Genitourinary: Negative for dysuria, polyuria, hematuria, pyuria, urinary urgency, and urinary incontinence.     - Musculoskeletal: Negative for myalgias, back pain, and joint pain.     - Skin: Negative for rash, itching, cyanotic skin color change.     - Neurological: Negative for dizziness, tingling, tremors, focal sensory deficit, focal weakness and headaches.     - Endo/Heme/Allergies: Does not bruise/bleed easily.     - Psychiatric/Behavioral: Negative for depression, suicidal/homicidal ideation and memory loss.      Last lab work in April 2018 reviewed and discussed with the patient.      Exam:    Blood pressure 112/62, pulse 72, temperature 37.1 °C (98.7 °F), temperature source Temporal, height 1.727 m (5' 8\"), weight 72.1 kg (159 lb), SpO2 98 %. Body mass index is 24.18 kg/m².    General:  Well nourished, well developed male in NAD  Head is grossly normal.  Neck: Supple without JVD or bruit. Thyroid is not enlarged.  Pulmonary: Clear to ausculation and percussion.  Normal effort. No rales, " ronchi, or wheezing.  Cardiovascular: Regular rate and rhythm without murmur. Carotid and radial pulses are intact and equal bilaterally.  Extremities: no clubbing, cyanosis, or edema.  Skin exam : The skin looks absolutely normal on the face. Patient is complaining of getting pus all over for the past couple of days.    Please note that this dictation was created using voice recognition software. I have made every reasonable attempt to correct obvious errors, but I expect that there are errors of grammar and possibly content that I did not discover before finalizing the note.    Assessment/Plan:  1. Skin lesion, superficial  I have explained to the patient that there is no lesion at this time where he can get to the diagnosis. Possible MRSA, given instructions to get a picture on his cell phone when he finds one which she understood and agreed. For now I'll give him mupirocin ointment for topical application when necessary.  - mupirocin (BACTROBAN) 2 % Ointment; Apply to the affected area twice a day.  Dispense: 1 Tube; Refill: 1    2. Viral URI with cough  Given the patient's symptoms of upper respiratory infection, it's been resolving and having the cough in the later stages of URI. Explained the same to the patient, will give him Tessalon Perles.  - benzonatate (TESSALON PERLES) 100 MG Cap; Take 1 Cap by mouth 3 times a day as needed for Cough.  Dispense: 60 Cap; Refill: 0    3. Type 1 diabetes mellitus with stage 3 chronic kidney disease (HCC)  Uncontrolled, last HbA1c in April 2018 was 8.3%. We will recheck his HbA1c and adjust the dose of his insulin if needed. To continue current Novolin 22 units daily, regular insulin sliding scale.  - insulin NPH (HUMULIN,NOVOLIN) 100 UNIT/ML Suspension; Inject 22 Units as instructed every day.  Dispense: 10 mL; Refill: 2  - HEMOGLOBIN A1C; Future    4. Need for vaccination  Refusing shingles vaccine okay to get the flu vaccine.  - Flu Quad Inj >3 Year Pre-Filled PF

## 2018-10-05 NOTE — ASSESSMENT & PLAN NOTE
This is a new problem which patient says he has been seeing for the past 4 months only on the face. Denies any itching and he squeezes out the pus from it. Denies these lesions at this time but comes and goes.

## 2018-10-10 DIAGNOSIS — E10.22 TYPE 1 DIABETES MELLITUS WITH STAGE 3 CHRONIC KIDNEY DISEASE (HCC): ICD-10-CM

## 2018-10-10 DIAGNOSIS — N18.30 TYPE 1 DIABETES MELLITUS WITH STAGE 3 CHRONIC KIDNEY DISEASE (HCC): ICD-10-CM

## 2018-10-10 NOTE — TELEPHONE ENCOUNTER
Was the patient seen in the last year in this department? Yes    Does patient have an active prescription for medications requested? Yes    Received Request Via: Pharmacy      Pharmacy is requesting if we can change order to a 90 day supply, they can't combine refills to make a 90 day supply. Please advise.

## 2018-10-17 DIAGNOSIS — N18.30 TYPE 1 DIABETES MELLITUS WITH STAGE 3 CHRONIC KIDNEY DISEASE (HCC): ICD-10-CM

## 2018-10-17 DIAGNOSIS — E10.22 TYPE 1 DIABETES MELLITUS WITH STAGE 3 CHRONIC KIDNEY DISEASE (HCC): ICD-10-CM

## 2018-10-17 NOTE — TELEPHONE ENCOUNTER
VM received from [facility] calling requesting a new prescription for pt's Humulog.  Pharmacy states the patient needs the RX re-written to state that 20mL equals 90 days of medication.      RX pended for provider review. Charity Tang, Med Ass't

## 2018-11-16 ENCOUNTER — TELEPHONE (OUTPATIENT)
Dept: MEDICAL GROUP | Facility: PHYSICIAN GROUP | Age: 58
End: 2018-11-16

## 2018-11-16 NOTE — TELEPHONE ENCOUNTER
Future Appointments       Provider Department [facility]    11/19/2018 11:15 AM Sandi Manning M.D. [facility]        ESTABLISHED PATIENT PRE-VISIT PLANNING     Patient was NOT contacted to complete PVP.     Note: Patient will not be contacted if there is no indication to call.     1.  Reviewed notes from the last few office visits within the medical group: Yes    2.  If any orders were placed at last visit or intended to be done for this visit (i.e. 6 mos follow-up), do we have Results/Consult Notes?        •  Labs - Labs ordered, NOT completed. Patient advised to complete prior to next appointment.       •  Imaging - Imaging was not ordered at last office visit.       •  Referrals - No referrals were ordered at last office visit.    3. Is this appointment scheduled as a Hospital Follow-Up? No    4.  Immunizations were updated in [facility] using WebIZ?: Yes       •  Web Iz Recommendations: HEPATITIS A , MMR , TD and ZOSTAVAX (Shingles)    5.  Patient is due for the following Health Maintenance Topics:   Health Maintenance Due   Topic Date Due   • IMM ZOSTER VACCINES (1 of 2) 06/27/2011   • Annual Wellness Visit  06/18/2017   • A1C SCREENING  10/26/2018       6.  MDX printed for Provider? NO  Complaint during visit 04/26/18  7.  Patient was informed to arrive 15 min prior to their scheduled appointment and bring in their medication bottles. ASIYA

## 2018-11-19 ENCOUNTER — HOSPITAL ENCOUNTER (OUTPATIENT)
Dept: LAB | Facility: MEDICAL CENTER | Age: 58
End: 2018-11-19
Attending: INTERNAL MEDICINE
Payer: MEDICARE

## 2018-11-19 ENCOUNTER — OFFICE VISIT (OUTPATIENT)
Dept: MEDICAL GROUP | Facility: PHYSICIAN GROUP | Age: 58
End: 2018-11-19
Payer: MEDICARE

## 2018-11-19 ENCOUNTER — APPOINTMENT (OUTPATIENT)
Dept: RADIOLOGY | Facility: IMAGING CENTER | Age: 58
End: 2018-11-19
Attending: FAMILY MEDICINE
Payer: MEDICARE

## 2018-11-19 VITALS
HEART RATE: 82 BPM | TEMPERATURE: 98.2 F | DIASTOLIC BLOOD PRESSURE: 72 MMHG | WEIGHT: 162 LBS | HEIGHT: 69 IN | SYSTOLIC BLOOD PRESSURE: 120 MMHG | RESPIRATION RATE: 18 BRPM | OXYGEN SATURATION: 98 % | BODY MASS INDEX: 23.99 KG/M2

## 2018-11-19 DIAGNOSIS — N18.30 TYPE 1 DIABETES MELLITUS WITH STAGE 3 CHRONIC KIDNEY DISEASE (HCC): ICD-10-CM

## 2018-11-19 DIAGNOSIS — L98.9 SKIN LESION, SUPERFICIAL: ICD-10-CM

## 2018-11-19 DIAGNOSIS — M79.641 PAIN OF RIGHT HAND: ICD-10-CM

## 2018-11-19 DIAGNOSIS — N18.30 CKD (CHRONIC KIDNEY DISEASE) STAGE 3, GFR 30-59 ML/MIN (HCC): ICD-10-CM

## 2018-11-19 DIAGNOSIS — R05.3 CHRONIC COUGH: ICD-10-CM

## 2018-11-19 DIAGNOSIS — E10.22 TYPE 1 DIABETES MELLITUS WITH STAGE 3 CHRONIC KIDNEY DISEASE (HCC): ICD-10-CM

## 2018-11-19 PROBLEM — R30.0 BURNING WITH URINATION: Status: RESOLVED | Noted: 2017-11-17 | Resolved: 2018-11-19

## 2018-11-19 PROBLEM — J01.90 ACUTE NON-RECURRENT SINUSITIS: Status: RESOLVED | Noted: 2017-11-17 | Resolved: 2018-11-19

## 2018-11-19 PROBLEM — J06.9 VIRAL URI WITH COUGH: Status: RESOLVED | Noted: 2018-10-05 | Resolved: 2018-11-19

## 2018-11-19 LAB
EST. AVERAGE GLUCOSE BLD GHB EST-MCNC: 200 MG/DL
HBA1C MFR BLD: 8.6 % (ref 0–5.6)

## 2018-11-19 PROCEDURE — 71046 X-RAY EXAM CHEST 2 VIEWS: CPT | Mod: 26 | Performed by: FAMILY MEDICINE

## 2018-11-19 PROCEDURE — 99214 OFFICE O/P EST MOD 30 MIN: CPT | Performed by: FAMILY MEDICINE

## 2018-11-19 PROCEDURE — 73140 X-RAY EXAM OF FINGER(S): CPT | Mod: 26,RT | Performed by: FAMILY MEDICINE

## 2018-11-19 PROCEDURE — 83036 HEMOGLOBIN GLYCOSYLATED A1C: CPT

## 2018-11-19 PROCEDURE — 36415 COLL VENOUS BLD VENIPUNCTURE: CPT

## 2018-11-19 RX ORDER — CLOPIDOGREL BISULFATE 75 MG/1
75 TABLET ORAL DAILY
Qty: 30 TAB | Refills: 5
Start: 2018-11-19 | End: 2019-10-28 | Stop reason: SDUPTHER

## 2018-11-19 NOTE — PROGRESS NOTES
cc: chronic cough       Subjective:     Dylon Santa is a 58 y.o. male presenting for the following:     For the last 3-4 months, patient with continued cough. Has been slightly better or worse but always present. No SOB, CP, swelling. It has been productive of thin green sputum in the last few weeks. He has had nasal congestion, worse in the morning and does have to clear his nose throughout the day. No eye irritation. Occasional sneezing. No fever/chills, malaise. No facial pain or pressure.   He does have some occasional trouble with GERD less than once weekly. Does use tums when he has this.     History of PVD. [facility] Does take daily clopidogrel. Has taken for >one year. NO easy bruising or bleeding.     Patient also complains that over the last few months he has had some small skin lesions on his face. He does not have any today but he brings in his iPAD with photos.     Review of systems:  All others reviewed and are negative.       Current Outpatient Prescriptions:   •  clopidogrel (PLAVIX) 75 MG Tab, Take 1 Tab by mouth every day., Disp: 30 Tab, Rfl: 5  •  mupirocin (BACTROBAN) 2 % Ointment, Apply to the affected area twice a day., Disp: 1 Tube, Rfl: 1  •  insulin NPH (HUMULIN,NOVOLIN) 100 UNIT/ML Suspension, Inject 22 Units as instructed every day., Disp: 20 mL, Rfl: 0  •  simvastatin (ZOCOR) 40 MG Tab, Take 1 Tab by mouth every evening., Disp: 90 Tab, Rfl: 0  •  metoprolol SR (TOPROL XL) 25 MG TABLET SR 24 HR, TAKE ONE TABLET BY MOUTH ONCE DAILY, Disp: 90 Tab, Rfl: 0  •  losartan (COZAAR) 50 MG Tab, Take 1 Tab by mouth every day., Disp: 90 Tab, Rfl: 0  •  acyclovir (ZOVIRAX) 200 MG Cap, Take 1 Cap by mouth every day., Disp: 90 Cap, Rfl: 0  •  Blood Glucose Monitoring Suppl Supplies Misc, Test strips order: Test strips for meter covered by pt's insurance. Sig: use once daily and prn ssx high or low sugar, Disp: 100 Strip, Rfl: 3  •  Lancets Misc, Lancets order: Lancets  "for meter covered by insurance. Sig: use once daily and prn ssx high or low sugar., Disp: 100 Each, Rfl: 3  •  gabapentin (NEURONTIN) 100 MG Cap, Take 1 Cap by mouth every day., Disp: 90 Cap, Rfl: 2  •  insulin regular (HUMULIN R) 100 Unit/mL Solution, Inject 6-10 Units as instructed 3 times a day before meals., Disp: , Rfl:     Allergies, past medical history, past surgical history, family history, social history reviewed and updated    Objective:     Vitals: /72 (BP Location: Right arm, Patient Position: Sitting, BP Cuff Size: Adult)   Pulse 82   Temp 36.8 °C (98.2 °F) (Temporal)   Resp 18   Ht 1.74 m (5' 8.5\")   Wt 73.5 kg (162 lb)   SpO2 98%   BMI 24.27 kg/m²   General: Alert, pleasant, NAD  HEENT: Normocephalic.   EOMI, no icterus or pallor.  Conjunctivae and lids normal. External ears normal. Oropharynx non-erythematous, mucous membranes moist.    Neck supple.  No thyromegaly or masses palpated. No cervical or supraclavicular lymphadenopathy.  Heart: Regular rate and rhythm.  S1 and S2 normal.  No murmurs appreciated.  Respiratory: Normal respiratory effort.  Clear to auscultation bilaterally.  Skin: Warm, dry, no rashes. iPAD with photos of small (~1mm)white lesions near nose and eyebrows. Only max 2 at once.   Musculoskeletal: Gait is normal.  Moves all extremities well.  Extremities: No leg edema.    Neurological: No tremors, sensation grossly intact,  tone/strength normal, gait is normal, CN2-12 grossly intact  Psych:  Affect is normal, judgement is good, memory is intact, grooming is appropriate.    Assessment/Plan:     Dylon was seen today for annual exam.    Diagnoses and all orders for this visit:    Chronic cough: patient does have nasal congestion and will first treat for post nasal drip/allergic rhinitis. Will obtain CXR, but less likely PNA.  Patient does have GERD, but does not have symptoms often, but may consider PPI if still not improved. May also consider PFTs, but no h/o asthma " and never smoker.   -     DX-CHEST-2 VIEWS; Future    CKD (chronic kidney disease) stage 3, GFR 30-59 ml/min: Sees Dr. Pascual. Will monitor. No new symptoms.   -     COMP METABOLIC PANEL; Future    Pain of right hand: Pain in 2nd finger for >2 months. No swelling seen but slightly TTP. Will xray.   -     DX-FINGER(S) 2+ RIGHT; Future    Type 1 diabetes mellitus with stage 3 chronic kidney disease (HCC): Patient declines Referral to Endocrinology. Last HA1C of 8.6. Patient aware that this is not well controlled. No hypoglycemia.   -     COMP METABOLIC PANEL; Future  -     TSH WITH REFLEX TO FT4; Future    Skin lesion, superficial: seemingly with few, mild closed comedones but hard to diagnose without any lesions present. Cirilo does have dry skin and would like to avoid regular topicals.   -     mupirocin (BACTROBAN) 2 % Ointment; Apply to the affected area twice a day.    Other orders  -     clopidogrel (PLAVIX) 75 MG Tab; Take 1 Tab by mouth every day.      Return in about 3 months (around 2/19/2019).

## 2018-11-19 NOTE — PATIENT INSTRUCTIONS
To start, take cetirizine in the morning and benadryl at night.   Put a bit of Vaseline in yours nose everyday.     Try a zinc based sun screen for your face.

## 2018-11-27 DIAGNOSIS — I10 ESSENTIAL HYPERTENSION: ICD-10-CM

## 2018-11-27 DIAGNOSIS — Z86.19 HISTORY OF SHINGLES: ICD-10-CM

## 2018-11-29 RX ORDER — SIMVASTATIN 40 MG
40 TABLET ORAL EVERY EVENING
Qty: 90 TAB | Refills: 1 | Status: SHIPPED | OUTPATIENT
Start: 2018-11-29 | End: 2019-05-08 | Stop reason: SDUPTHER

## 2018-11-29 RX ORDER — LOSARTAN POTASSIUM 50 MG/1
50 TABLET ORAL DAILY
Qty: 90 TAB | Refills: 1 | Status: SHIPPED | OUTPATIENT
Start: 2018-11-29 | End: 2019-05-08 | Stop reason: SDUPTHER

## 2018-11-29 RX ORDER — ACYCLOVIR 200 MG/1
200 CAPSULE ORAL DAILY
Qty: 90 CAP | Refills: 1 | Status: SHIPPED | OUTPATIENT
Start: 2018-11-29 | End: 2019-05-08 | Stop reason: SDUPTHER

## 2018-11-29 RX ORDER — METOPROLOL SUCCINATE 25 MG/1
TABLET, EXTENDED RELEASE ORAL
Qty: 90 TAB | Refills: 1 | Status: SHIPPED | OUTPATIENT
Start: 2018-11-29 | End: 2019-05-08 | Stop reason: SDUPTHER

## 2018-11-29 NOTE — TELEPHONE ENCOUNTER
See MA's notes below, pt has met protocol, OV 11/19/18  BP Readings from Last 1 Encounters:   11/19/18 120/72       Lab Results  Component Value Date/Time   CHOLSTRLTOT 133 04/26/2018 1026   TRIGLYCERIDE 68 04/26/2018 1026   HDL 53 04/26/2018 1026   LDL 66 04/26/2018 1026

## 2018-11-29 NOTE — TELEPHONE ENCOUNTER
Pt has had OV within the 12 month protocol and lipid panel is current. 6 month supply sent to pharmacy.   Lab Results   Component Value Date/Time    CHOLSTRLTOT 133 04/26/2018 10:26 AM    LDL 66 04/26/2018 10:26 AM    HDL 53 04/26/2018 10:26 AM    TRIGLYCERIDE 68 04/26/2018 10:26 AM       Lab Results   Component Value Date/Time    SODIUM 139 04/26/2018 10:26 AM    POTASSIUM 4.5 04/26/2018 10:26 AM    CHLORIDE 104 04/26/2018 10:26 AM    CO2 29 04/26/2018 10:26 AM    GLUCOSE 131 (H) 04/26/2018 10:26 AM    BUN 23 (H) 04/26/2018 10:26 AM    CREATININE 1.65 (H) 04/26/2018 10:26 AM     Lab Results   Component Value Date/Time    ALKPHOSPHAT 73 04/26/2018 10:26 AM    ASTSGOT 36 04/26/2018 10:26 AM    ALTSGPT 42 04/26/2018 10:26 AM    TBILIRUBIN 0.8 04/26/2018 10:26 AM

## 2019-01-23 ENCOUNTER — TELEPHONE (OUTPATIENT)
Dept: MEDICAL GROUP | Facility: PHYSICIAN GROUP | Age: 59
End: 2019-01-23

## 2019-01-23 NOTE — TELEPHONE ENCOUNTER
1. Caller Name: Dylon Santa                                         Call Back Number: [phone] (home)       Patient approves a detailed voicemail message: N\A    LVM  LVM for Pt. To complete lab work at earliest convenience

## 2019-02-19 ENCOUNTER — APPOINTMENT (OUTPATIENT)
Dept: RADIOLOGY | Facility: IMAGING CENTER | Age: 59
End: 2019-02-19
Attending: NURSE PRACTITIONER
Payer: MEDICARE

## 2019-02-19 ENCOUNTER — HOSPITAL ENCOUNTER (OUTPATIENT)
Dept: RADIOLOGY | Facility: MEDICAL CENTER | Age: 59
End: 2019-02-19
Attending: NURSE PRACTITIONER
Payer: MEDICARE

## 2019-02-19 ENCOUNTER — OFFICE VISIT (OUTPATIENT)
Dept: URGENT CARE | Facility: PHYSICIAN GROUP | Age: 59
End: 2019-02-19
Payer: MEDICARE

## 2019-02-19 ENCOUNTER — TELEPHONE (OUTPATIENT)
Dept: URGENT CARE | Facility: PHYSICIAN GROUP | Age: 59
End: 2019-02-19

## 2019-02-19 VITALS
DIASTOLIC BLOOD PRESSURE: 60 MMHG | SYSTOLIC BLOOD PRESSURE: 106 MMHG | BODY MASS INDEX: 23.67 KG/M2 | TEMPERATURE: 97.6 F | HEART RATE: 72 BPM | OXYGEN SATURATION: 99 % | WEIGHT: 158 LBS
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
## 2019-02-19 DIAGNOSIS — G44.319 ACUTE POST-TRAUMATIC HEADACHE, NOT INTRACTABLE: ICD-10-CM

## 2019-02-19 DIAGNOSIS — M25.531 RIGHT WRIST PAIN: ICD-10-CM

## 2019-02-19 DIAGNOSIS — S50.811A ABRASION OF RIGHT FOREARM, INITIAL ENCOUNTER: ICD-10-CM

## 2019-02-19 DIAGNOSIS — M25.521 RIGHT ELBOW PAIN: ICD-10-CM

## 2019-02-19 DIAGNOSIS — R07.81 RIB PAIN ON RIGHT SIDE: ICD-10-CM

## 2019-02-19 DIAGNOSIS — S22.31XA CLOSED FRACTURE OF ONE RIB OF RIGHT SIDE, INITIAL ENCOUNTER: ICD-10-CM

## 2019-02-19 PROCEDURE — 73080 X-RAY EXAM OF ELBOW: CPT | Mod: TC,RT | Performed by: NURSE PRACTITIONER

## 2019-02-19 PROCEDURE — 99214 OFFICE O/P EST MOD 30 MIN: CPT | Performed by: NURSE PRACTITIONER

## 2019-02-19 PROCEDURE — 73110 X-RAY EXAM OF WRIST: CPT | Mod: 26,RT | Performed by: NURSE PRACTITIONER

## 2019-02-19 PROCEDURE — 71101 X-RAY EXAM UNILAT RIBS/CHEST: CPT | Mod: TC,RT | Performed by: NURSE PRACTITIONER

## 2019-02-19 PROCEDURE — 70450 CT HEAD/BRAIN W/O DYE: CPT

## 2019-02-19 ASSESSMENT — ENCOUNTER SYMPTOMS
VISUAL CHANGE: 0
FEVER: 0
EYE PAIN: 0
BOWEL INCONTINENCE: 0
CHILLS: 0
NUMBNESS: 0
VOMITING: 0
MYALGIAS: 1
DIZZINESS: 0
SORE THROAT: 0
NAUSEA: 0
LOSS OF CONSCIOUSNESS: 0
HEADACHES: 1
SHORTNESS OF BREATH: 0
TINGLING: 0

## 2019-02-19 NOTE — PROGRESS NOTES
Subjective:   Dylon Santa is a 58 y.o. male who presents for Rib Injury (Pt slipped and fell on ice this am, hit Rt side of head, chest. c/o SOB, possible LOC )        Fall   The accident occurred less than 1 hour ago. The fall occurred while standing (on ice). He fell from a height of 1 to 2 ft. Impact surface: ice  The point of impact was the head and right elbow (right rib cage). The pain is present in the right elbow, right wrist and head (right rib cage). The pain is at a severity of 8/10. The pain is moderate. The symptoms are aggravated by movement. Associated symptoms include headaches. Pertinent negatives include no bowel incontinence, fever, hematuria, loss of consciousness (uncertain ), nausea, numbness, tingling, visual change or vomiting. He has tried nothing for the symptoms. The treatment provided no relief.     Review of Systems   Constitutional: Negative for chills and fever.   HENT: Negative for sore throat.    Eyes: Negative for pain.   Respiratory: Negative for shortness of breath.    Cardiovascular: Negative for chest pain.   Gastrointestinal: Negative for bowel incontinence, nausea and vomiting.   Genitourinary: Negative for hematuria.   Musculoskeletal: Positive for joint pain and myalgias.   Skin: Negative for rash.   Neurological: Positive for headaches. Negative for dizziness, tingling, loss of consciousness (uncertain ) and numbness.     No Known Allergies   Objective:   /60 (BP Location: Left arm, Patient Position: Sitting, BP Cuff Size: Adult)   Pulse 72   Temp 36.4 °C (97.6 °F) (Temporal)   Wt 71.7 kg (158 lb)   SpO2 99%   BMI 23.67 kg/m²   Physical Exam   Constitutional: He is oriented to person, place, and time. He appears well-developed and well-nourished. No distress.   HENT:   Head: Normocephalic and atraumatic.   Right Ear: Tympanic membrane normal.   Left Ear: Tympanic membrane normal.   Nose: Nose normal. Right sinus exhibits no maxillary sinus tenderness and no  frontal sinus tenderness. Left sinus exhibits no maxillary sinus tenderness and no frontal sinus tenderness.   Mouth/Throat: Uvula is midline, oropharynx is clear and moist and mucous membranes are normal. No posterior oropharyngeal edema, posterior oropharyngeal erythema or tonsillar abscesses. No tonsillar exudate.   Eyes: Pupils are equal, round, and reactive to light. Conjunctivae and EOM are normal. Right eye exhibits no discharge. Left eye exhibits no discharge.   Neck: Full passive range of motion without pain. No Brudzinski's sign and no Kernig's sign noted.   Cardiovascular: Normal rate and regular rhythm.    No murmur heard.  Pulmonary/Chest: Effort normal and breath sounds normal. No respiratory distress.   Abdominal: Soft. He exhibits no distension. There is no tenderness.   Musculoskeletal:        Right elbow: He exhibits normal range of motion and no swelling. Tenderness found.        Right wrist: He exhibits decreased range of motion and tenderness. He exhibits no swelling and no deformity.        Arms:  Neurological: He is alert and oriented to person, place, and time. He has normal reflexes. He is not disoriented. No cranial nerve deficit or sensory deficit. GCS eye subscore is 4. GCS verbal subscore is 5. GCS motor subscore is 6.   Skin: Skin is warm, dry and intact.   Psychiatric: He has a normal mood and affect.         Assessment/Plan:     1. Fall, initial encounter     2. Rib pain on right side  [id number] (WITH 1-VIEW CXR) RIGHT   3. Right elbow pain  DX-ELBOW-COMPLETE 3+ RIGHT   4. Abrasion of right forearm, initial encounter     5. Acute post-traumatic headache, not intractable  CT-HEAD W/O   6. Right wrist pain  DX-WRIST-COMPLETE 3+ RIGHT   7. Closed fracture of one rib of right side, initial encounter         Xray results  1.  There is a questionable right posterior lateral nondisplaced 9th rib fracture.   1.  There is no acute fracture or malalignment of the right elbow.  1.   There is no acute fracture or malalignment of the right wrist.  2.  There is multifocal degenerative change throughout the wrist.  3.  There is extensive atherosclerosis    Ct results  1.  There is no acute intracranial hemorrhage or calvarial fracture.  2.  There is minimal fluid in the right inferior mastoid air cells. There is no associated fracture identified      Patient exquisitely tender of the right side suspect rib fracture as noted on x-ray.  Discussed ice, rest, anti-inflammatories.  Patient declining oral narcotic prescription at this time.  Will follow up with pending CT results.  Patient given precautionary s/sx that mandate immediate follow up and evaluation in the ED. Advised of risks of not doing so.    DDX, Supportive care, and indications for immediate follow-up discussed with patient.    Instructed to return to clinic or nearest emergency department if we are not available for any change in condition, further concerns, or worsening of symptoms.    The patient demonstrated a good understanding and agreed with the treatment plan.

## 2019-03-05 ENCOUNTER — TELEPHONE (OUTPATIENT)
Dept: MEDICAL GROUP | Facility: PHYSICIAN GROUP | Age: 59
End: 2019-03-05

## 2019-03-05 RX ORDER — OMEPRAZOLE 20 MG/1
20 CAPSULE, DELAYED RELEASE ORAL DAILY
Qty: 30 CAP | Refills: 0 | Status: SHIPPED | OUTPATIENT
Start: 2019-03-05 | End: 2019-10-21

## 2019-03-05 NOTE — TELEPHONE ENCOUNTER
1. Caller Name: Dylon                                         Call Back Number: [phone] (home)       Patient approves a detailed voicemail message: no    Pt was seen in  2/19/19 for a fall. Since them he has been having a lot of acid reflux and trouble sleeping due to pain. He would like to know if you can send in a rx for acid reflux/pain to the [facility]. He is unable to drive at the time to  prescriptions at the local pharmacy. Please advise.

## 2019-03-06 NOTE — TELEPHONE ENCOUNTER
Informed pt of message below. Pt states he has been taking NSAIDS and they are not helping. Please advise.

## 2019-03-06 NOTE — TELEPHONE ENCOUNTER
If he would like prescription strength pain medication then I will need an appointment to discuss his symptoms.

## 2019-04-02 RX ORDER — LANCETS 30 GAUGE
EACH MISCELLANEOUS
Qty: 100 EACH | Refills: 3 | Status: SHIPPED | OUTPATIENT
Start: 2019-04-02 | End: 2019-04-04 | Stop reason: SDUPTHER

## 2019-04-02 NOTE — TELEPHONE ENCOUNTER
Patient has recently been seen by PCP within the last 6 months per protocol (11/18). Will refill DM supplies for 12 months.  Lab Results   Component Value Date/Time    HBA1C 8.6 (H) 11/19/2018 10:29 AM      Lab Results   Component Value Date/Time    MALBCRT 15 04/26/2018 10:25 AM    MICROALBUR 2.6 04/26/2018 10:25 AM      Lab Results   Component Value Date/Time    ALKPHOSPHAT 73 04/26/2018 10:26 AM    ASTSGOT 36 04/26/2018 10:26 AM    ALTSGPT 42 04/26/2018 10:26 AM    TBILIRUBIN 0.8 04/26/2018 10:26 AM

## 2019-04-04 ENCOUNTER — TELEPHONE (OUTPATIENT)
Dept: MEDICAL GROUP | Facility: PHYSICIAN GROUP | Age: 59
End: 2019-04-04

## 2019-04-04 RX ORDER — LANCETS 30 GAUGE
EACH MISCELLANEOUS
Qty: 400 EACH | Refills: 1 | Status: ON HOLD | OUTPATIENT
Start: 2019-04-04 | End: 2019-10-21

## 2019-04-04 NOTE — TELEPHONE ENCOUNTER
1. Caller Name: Dylon Santa                                         Call Back Number: [phone] (home)       Patient approves a detailed voicemail message: yes    patient called this morning saying his pharmacy called him sayin the new prescription for his testing was only once a day. he tests 4-5 times a day. he also wanted to know how many ribs were broken. i already talked to Dr. Manning. it was only one rib, potential. Dr. Manning he said he tests 4-5 times daily.

## 2019-05-14 NOTE — TELEPHONE ENCOUNTER
Was the patient seen in the last year in this department? Yes    Does patient have an active prescription for medications requested? No     Received Request Via: Pharmacy      Pt met protocol?: Yes    OV 11/18     *PT IS SCP, PLEASE FILL 100 DAY SUPPLY

## 2019-05-14 NOTE — TELEPHONE ENCOUNTER
Was the patient seen in the last year in this department? Yes    Does patient have an active prescription for medications requested? No     Received Request Via: Pharmacy      Pt met protocol?: NO    OV 11/18    (PT IS SCP, PLEASE FILL 100 DAY SUPPLY)      Lab Results  Component Value Date/Time   CHOLSTRLTOT 133 04/26/2018 1026   TRIGLYCERIDE 68 04/26/2018 1026   HDL 53 04/26/2018 1026   LDL 66 04/26/2018 1026

## 2019-05-15 RX ORDER — LOSARTAN POTASSIUM 50 MG/1
50 TABLET ORAL
Qty: 90 TAB | Refills: 0 | OUTPATIENT
Start: 2019-05-15

## 2019-05-15 RX ORDER — SIMVASTATIN 40 MG
40 TABLET ORAL
Qty: 100 TAB | OUTPATIENT
Start: 2019-05-15

## 2019-05-15 NOTE — TELEPHONE ENCOUNTER
Request declined. 90-day supply sent 5/9/19 and patient will need appointment/ labs for additional refills.

## 2019-07-03 ENCOUNTER — TELEPHONE (OUTPATIENT)
Dept: MEDICAL GROUP | Facility: PHYSICIAN GROUP | Age: 59
End: 2019-07-03

## 2019-07-03 NOTE — TELEPHONE ENCOUNTER
Future Appointments       Provider Department Center    7/8/2019 10:50 AM Nesha Peralta D.O. [facility]        ESTABLISHED PATIENT PRE-VISIT PLANNING     Patient was contacted to complete PVP.     Note: Patient will not be contacted if there is no indication to call.     1.  Reviewed notes from the last few office visits within the medical group: Yes    2.  If any orders were placed at last visit or intended to be done for this visit (i.e. 6 mos follow-up), do we have Results/Consult Notes?        •  Labs - Labs ordered, NOT completed. Patient advised to complete prior to next appointment.   Note: If patient appointment is for lab review and patient did not complete labs, check with provider if OK to reschedule patient until labs completed.       •  Imaging - Imaging ordered, completed and results are in chart.       •  Referrals - Referral ordered, patient has NOT been seen.    3. Is this appointment scheduled as a Hospital Follow-Up? No    4.  Immunizations were updated in [employer] using WebIZ?: Yes       •  Web Iz Recommendations: FLU, HEPATITIS A , MMR , TD, VARICELLA (Chicken Pox)  and SHINGRIX (Shingles)    5.  Patient is due for the following Health Maintenance Topics:   Health Maintenance Due   Topic Date Due   • HEPATITIS C SCREENING  1960   • IMM ZOSTER VACCINES (1 of 2) 06/27/2011   • RETINAL SCREENING  12/21/2016   • Annual Wellness Visit  06/18/2017   • FASTING LIPID PROFILE  04/26/2019   • URINE ACR / MICROALBUMIN  04/26/2019   • SERUM CREATININE  04/26/2019   • DIABETES MONOFILAMENT / LE EXAM  04/26/2019   • A1C SCREENING  05/19/2019       6. Orders for overdue Health Maintenance topics pended in Pre-Charting? YES    7.  AHA (MDX) form printed for Provider? YES    8.  Patient was informed to arrive 15 min prior to their scheduled appointment and bring in their medication bottles.

## 2019-07-08 ENCOUNTER — OFFICE VISIT (OUTPATIENT)
Dept: MEDICAL GROUP | Facility: PHYSICIAN GROUP | Age: 59
End: 2019-07-08
Payer: MEDICARE

## 2019-07-08 ENCOUNTER — HOSPITAL ENCOUNTER (OUTPATIENT)
Dept: LAB | Facility: MEDICAL CENTER | Age: 59
End: 2019-07-08
Attending: FAMILY MEDICINE
Payer: MEDICARE

## 2019-07-08 VITALS
TEMPERATURE: 98.2 F | DIASTOLIC BLOOD PRESSURE: 72 MMHG | BODY MASS INDEX: 24.59 KG/M2 | OXYGEN SATURATION: 98 % | HEART RATE: 72 BPM | WEIGHT: 166 LBS | SYSTOLIC BLOOD PRESSURE: 126 MMHG | HEIGHT: 69 IN

## 2019-07-08 DIAGNOSIS — J06.9 UPPER RESPIRATORY TRACT INFECTION, UNSPECIFIED TYPE: ICD-10-CM

## 2019-07-08 DIAGNOSIS — E10.22 TYPE 1 DIABETES MELLITUS WITH DIABETIC CHRONIC KIDNEY DISEASE, UNSPECIFIED CKD STAGE (HCC): ICD-10-CM

## 2019-07-08 DIAGNOSIS — N18.30 CKD (CHRONIC KIDNEY DISEASE) STAGE 3, GFR 30-59 ML/MIN (HCC): ICD-10-CM

## 2019-07-08 DIAGNOSIS — L98.9 SKIN LESION, SUPERFICIAL: ICD-10-CM

## 2019-07-08 DIAGNOSIS — R21 RASH OF FACE: ICD-10-CM

## 2019-07-08 DIAGNOSIS — E10.22 TYPE 1 DIABETES MELLITUS WITH STAGE 3 CHRONIC KIDNEY DISEASE (HCC): ICD-10-CM

## 2019-07-08 DIAGNOSIS — N18.30 TYPE 1 DIABETES MELLITUS WITH STAGE 3 CHRONIC KIDNEY DISEASE (HCC): ICD-10-CM

## 2019-07-08 LAB
ALBUMIN SERPL BCP-MCNC: 4.3 G/DL (ref 3.2–4.9)
ALBUMIN/GLOB SERPL: 1.3 G/DL
ALP SERPL-CCNC: 65 U/L (ref 30–99)
ALT SERPL-CCNC: 34 U/L (ref 2–50)
ANION GAP SERPL CALC-SCNC: 7 MMOL/L (ref 0–11.9)
AST SERPL-CCNC: 40 U/L (ref 12–45)
BILIRUB SERPL-MCNC: 0.7 MG/DL (ref 0.1–1.5)
BUN SERPL-MCNC: 37 MG/DL (ref 8–22)
CALCIUM SERPL-MCNC: 10.3 MG/DL (ref 8.5–10.5)
CHLORIDE SERPL-SCNC: 103 MMOL/L (ref 96–112)
CO2 SERPL-SCNC: 26 MMOL/L (ref 20–33)
CREAT SERPL-MCNC: 1.65 MG/DL (ref 0.5–1.4)
GLOBULIN SER CALC-MCNC: 3.4 G/DL (ref 1.9–3.5)
GLUCOSE SERPL-MCNC: 186 MG/DL (ref 65–99)
POTASSIUM SERPL-SCNC: 4.4 MMOL/L (ref 3.6–5.5)
PROT SERPL-MCNC: 7.7 G/DL (ref 6–8.2)
SODIUM SERPL-SCNC: 136 MMOL/L (ref 135–145)
TSH SERPL DL<=0.005 MIU/L-ACNC: 1.34 UIU/ML (ref 0.38–5.33)

## 2019-07-08 PROCEDURE — 84443 ASSAY THYROID STIM HORMONE: CPT

## 2019-07-08 PROCEDURE — 80053 COMPREHEN METABOLIC PANEL: CPT

## 2019-07-08 PROCEDURE — 36415 COLL VENOUS BLD VENIPUNCTURE: CPT

## 2019-07-08 PROCEDURE — 99214 OFFICE O/P EST MOD 30 MIN: CPT | Performed by: FAMILY MEDICINE

## 2019-07-08 RX ORDER — AZITHROMYCIN 250 MG/1
TABLET, FILM COATED ORAL
Qty: 6 TAB | Refills: 0 | Status: SHIPPED | OUTPATIENT
Start: 2019-07-08 | End: 2019-10-20

## 2019-07-08 ASSESSMENT — PATIENT HEALTH QUESTIONNAIRE - PHQ9: CLINICAL INTERPRETATION OF PHQ2 SCORE: 0

## 2019-07-08 NOTE — PROGRESS NOTES
"CC: Diabetes    HISTORY OF THE PRESENT ILLNESS: Patient is a 59 y.o. male. This pleasant patient is here today to discuss the following health issues.    Type 1 diabetes: Chronic and uncontrolled issue for the patient.  He at length begins to talk about how high his sugars are, but states that he self manages his diabetes.  He does not follow-up with endocrinology.  He is taking his long-acting insulin 20 units nightly.  He takes his regular insulin \"when he feels like his sugars are high\".  He states this is usually twice a day and he will do up to 20 units of it.  He reports his sugars been out of control recently due to illness in both of his parents and the stress of this.  Is very difficult to get a specific number out of him, but patient reports he has been in the 500s and 600s at times.  When asked directly if he wants a referral to endocrinology or if he wants suggestions on managing his diabetes, he states that he can manage it on his own.  Per chart review he does have a history of osteomyelitis, neuropathy, nephropathy and retinopathy related to his diabetes.    Respiratory tract infection: Patient reports he has had a cold and a cough for about 2 months now.  He states that he is \"coughing up hangers\".  Most of the symptoms have resolved but he has a residual cough.  No fevers or chills.  No nasal congestion or sore throat.  He does report feeling cold a lot of the time when asked about fevers or chills.    Rash of face: Patient is concerned with a rash on his face which is not present today.  He states that white stuff \"oozes out of his face\".  He reports having to use compresses on his face.  He also states that he sand in his eyes.  He does not have the rash right now.  He has no current symptoms.  He reports having been prescribed some kind of ointment in the past which did not help per his PCP.  He is requesting referral to dermatology.    Sore on toe: Patient reports that he recently bumped his " "third right toe and it was bleeding excessively.  He is lost toes on the other side of his feet, he is concerned about the potential for that this time.  He reports no pain, as he does not have sensation in either foot.    Allergies: Patient has no known allergies.    Current Outpatient Prescriptions Ordered in [facility]   Medication Sig Dispense Refill   • mupirocin (BACTROBAN) 2 % Ointment Apply to the affected area twice a day. 1 Tube 1   • azithromycin (ZITHROMAX) 250 MG Tab Take 2 pills on day one and 1 pill on day 2-5. 6 Tab 0   • acyclovir (ZOVIRAX) 200 MG Cap TAKE 1 CAPSULE BY MOUTH DAILY 90 Cap 0   • simvastatin (ZOCOR) 40 MG Tab TAKE 1 TABLET BY MOUTH EVERY EVENING 90 Tab 0   • metoprolol SR (TOPROL XL) 25 MG TABLET SR 24 HR TAKE 1 TABLET BY MOUTH ONE TIME A DAY 90 Tab 0   • losartan (COZAAR) 50 MG Tab TAKE 1 TABLET BY MOUTH ONE TIME A DAY 90 Tab 0   • Lancets Lancets order: Lancets for meter covered by insurance. Sig: use QID and prn ssx high or low sugar. 400 Each 1   • clopidogrel (PLAVIX) 75 MG Tab Take 1 Tab by mouth every day. 30 Tab 5   • insulin NPH (HUMULIN,NOVOLIN) 100 UNIT/ML Suspension Inject 22 Units as instructed every day. 20 mL 0   • insulin regular (HUMULIN R) 100 Unit/mL Solution Inject 6-10 Units as instructed 3 times a day before meals.     • Blood Glucose Test Strips Test strips order: Test strips for meter covered by pt's insurance. Sig: use QID and prn ssx high or low sugar 400 Strip 1   • omeprazole (PRILOSEC) 20 MG delayed-release capsule Take 1 Cap by mouth every day. (Patient not taking: Reported on 7/8/2019) 30 Cap 0   • gabapentin (NEURONTIN) 100 MG Cap Take 1 Cap by mouth every day. 90 Cap 2     No current Epic-ordered facility-administered medications on file.        Past Medical History:   Diagnosis Date   • Anesthesia     hx PONV   • Arthritis     L knee; hands   • Bronchitis 2016   • Cataract     nicole iol   • Cold 4/5/17    prod cough   • Dental disorder     \"bad shape\"   • " "Diabetic neuropathy ([facility]) 12/31/2014    retinopathy   • Diabetic retinopathy of both eyes ([facility]) 12/9/2016   • Heart murmur    • Hemorrhagic disorder ([facility])     nose bleeds   • High cholesterol    • History of shingles 12/31/2014   • Hyperlipidemia    • Hypertension    • Kidney stones    • Pain     left leg/foot   • Psychiatric problem     situational depression   • Type II or unspecified type diabetes mellitus without mention of complication, uncontrolled 12/31/2014       Past Surgical History:   Procedure Laterality Date   • IRRIGATION & DEBRIDEMENT GENERAL Left 5/3/2017    Procedure: IRRIGATION & DEBRIDEMENT GENERAL and Left Fourth Toe Amputation ;  Surgeon: Inessa Richard M.D.;  Location: SURGERY [facility];  Service:    • APPENDECTOMY     • CATARACT EXTRACTION WITH IOL Bilateral    • EYE SURGERY     • HAND SURGERY  1990's   • KNEE ARTHROSCOPY      x3   • TONSILLECTOMY         Social History   Substance Use Topics   • Smoking status: Never Smoker   • Smokeless tobacco: Current User     Types: Chew      Comment: tobacco cessation recommended   • Alcohol use No      Comment: rare beer       Social History     Social History Narrative   • No narrative on file       Family History   Problem Relation Age of Onset   • Thyroid Mother    • Lung Disease Mother         COPD   • Thyroid Sister    • Thyroid Brother    • Lung Disease Brother         COPD   • Hypertension Brother    • Hyperlipidemia Brother    • Lung Disease Father         COPD   • Diabetes Maternal Uncle    • Cancer Paternal Aunt         type unknown   • Diabetes Maternal Grandfather    • Diabetes Paternal Grandmother    • Cancer Paternal Grandfather         prostate   • Stroke Neg Hx        ROS:   See HPI    Exam: /72 (BP Location: Left arm, Patient Position: Sitting, BP Cuff Size: Adult)   Pulse 72   Temp 36.8 °C (98.2 °F) (Temporal)   Ht 1.74 m (5' 8.5\")   Wt 75.3 kg (166 lb)   SpO2 98%  Body mass index is 24.87 kg/m².    General: Well " appearing, NAD  HEENT: Normocephalic. Conjunctiva clear, lids without ptosis, pupils equal and reactive to light accommodation, ears normal shape and contour, oropharynx is without erythema, edema or exudates.   Neck: Supple without JVD. No thyromegaly or nodules  Pulmonary: Clear to ausculation.  Normal effort. No rales, ronchi, or wheezing.  Cardiovascular: Regular rate and rhythm without murmur, rubs or gallop.   Lymph: No cervical, supraclavicular lymph nodes are palpable  Skin: Warm and dry.  Small abrasion noted at the tip of the right third toe, with no signs of secondary infection, surrounding erythema or purulent drainage.  Psych: Normal mood and affect. Alert and oriented.  Poor insight.    Please note that this dictation was created using voice recognition software. I have made every reasonable attempt to correct obvious errors, but I expect that there are errors of grammar and possibly content that I did not discover before finalizing the note.      Assessment/Plan  Dylon was seen today for uri and toe injury.    Diagnoses and all orders for this visit:    Type 1 diabetes mellitus with diabetic chronic kidney disease, unspecified CKD stage (HCC)  Chronic uncontrolled issue for the patient.  He has very poor insight and judgment regarding self management of his diabetes.  I did an extended discussion with him about appropriate use of insulin, as well as dangers of uncontrolled diabetes.  In spite of the fact that he has multiple comorbidities including nephropathy, neuropathy, retinopathy and history of amputation due to osteomyelitis, he continues to prefer self management of his diabetes.  He does not want to follow with endocrinology and does not want to change the way he is administering his insulin at this time.  He feels he is managing it quite well.  Will need further discussion with this, will defer to PCP.    Skin lesion, superficial  New issue for the patient.  He does have a small abrasion on  "his toe.  No signs of secondary infection at this time.  However, I have asked the patient to watch this very closely as he has no sensation in his feet.  Red flag symptoms were discussed including redness, warmth or purulent drainage.  We will go ahead and have patient apply Bactroban to the area.  We also discussed keeping the area dry and clean.  -     mupirocin (BACTROBAN) 2 % Ointment; Apply to the affected area twice a day.    Rash of face  Patient is reporting rash where white stuff \"drainage from his face\".  No rashes present today.  Per patient request, will refer to dermatology.  -     REFERRAL TO DERMATOLOGY    Upper respiratory tract infection, unspecified type  New uncontrolled issue for the patient.  Unlikely to be bacterial.  However, given the fact that he is poorly controlled diabetic we will go ahead and treat.  He does agree to follow-up if symptoms are not improving.  He may need chest x-ray and further work-up at that time.  -     azithromycin (ZITHROMAX) 250 MG Tab; Take 2 pills on day one and 1 pill on day 2-5.    Follow-up if symptoms not improving.    Counseling: Patient was seen for a total of 25 minutes face-to-face by myself, with more than half of the time spent counseling on risks of self management of diabetes, using insulin safely, red flags related to the sore on his toe.    Nesha Peralta, DO  [facility]      "

## 2019-08-15 DIAGNOSIS — N18.30 TYPE 1 DIABETES MELLITUS WITH STAGE 3 CHRONIC KIDNEY DISEASE (HCC): ICD-10-CM

## 2019-08-15 DIAGNOSIS — Z86.19 HISTORY OF SHINGLES: ICD-10-CM

## 2019-08-15 DIAGNOSIS — I10 ESSENTIAL HYPERTENSION: ICD-10-CM

## 2019-08-15 DIAGNOSIS — E10.22 TYPE 1 DIABETES MELLITUS WITH STAGE 3 CHRONIC KIDNEY DISEASE (HCC): ICD-10-CM

## 2019-08-16 RX ORDER — ACYCLOVIR 200 MG/1
CAPSULE ORAL
Qty: 90 CAP | Refills: 0 | Status: ON HOLD | OUTPATIENT
Start: 2019-08-16 | End: 2019-10-21 | Stop reason: SDUPTHER

## 2019-08-16 RX ORDER — METOPROLOL SUCCINATE 25 MG/1
TABLET, EXTENDED RELEASE ORAL
Qty: 90 TAB | Refills: 0 | Status: SHIPPED | OUTPATIENT
Start: 2019-08-16 | End: 2019-10-28 | Stop reason: SDUPTHER

## 2019-08-16 RX ORDER — LOSARTAN POTASSIUM 50 MG/1
TABLET ORAL
Qty: 100 TAB | Refills: 0 | Status: ON HOLD | OUTPATIENT
Start: 2019-08-16 | End: 2019-10-25

## 2019-08-16 RX ORDER — SIMVASTATIN 40 MG
TABLET ORAL
Qty: 100 TAB | Refills: 0 | Status: SHIPPED | OUTPATIENT
Start: 2019-08-16 | End: 2019-10-28 | Stop reason: SDUPTHER

## 2019-08-16 NOTE — TELEPHONE ENCOUNTER
Was the patient seen in the last year in this department? Yes    Does patient have an active prescription for medications requested? No     Received Request Via: Pharmacy      Pt met protocol?: Yes, lipid labs 4/18    Cholesterol,Tot 100 - 199 mg/dL 133    Triglycerides 0 - 149 mg/dL 68    HDL >=40 mg/dL 53    LDL <100 mg/dL 66    Resulting        Ov 7/19 bp 126/72

## 2019-09-13 ENCOUNTER — PATIENT OUTREACH (OUTPATIENT)
Dept: HEALTH INFORMATION MANAGEMENT | Facility: OTHER | Age: 59
End: 2019-09-13

## 2019-09-13 NOTE — PROGRESS NOTES
Outcome: Left Message    Please transfer to Patient Outreach Team at [phone] when patient returns call.    [facility] Verified: yes    Attempt # 1    Outreach AHA list

## 2019-10-20 ENCOUNTER — HOSPITAL ENCOUNTER (INPATIENT)
Facility: MEDICAL CENTER | Age: 59
LOS: 3 days | DRG: 287 | End: 2019-10-25
Attending: EMERGENCY MEDICINE | Admitting: INTERNAL MEDICINE
Payer: MEDICARE

## 2019-10-20 ENCOUNTER — APPOINTMENT (OUTPATIENT)
Dept: RADIOLOGY | Facility: MEDICAL CENTER | Age: 59
DRG: 287 | End: 2019-10-20
Attending: EMERGENCY MEDICINE
Payer: MEDICARE

## 2019-10-20 DIAGNOSIS — S09.90XA CLOSED HEAD INJURY, INITIAL ENCOUNTER: ICD-10-CM

## 2019-10-20 DIAGNOSIS — R55 NEAR SYNCOPE: ICD-10-CM

## 2019-10-20 DIAGNOSIS — F07.81 POST CONCUSSIVE SYNDROME: ICD-10-CM

## 2019-10-20 DIAGNOSIS — R41.0 CONFUSION: ICD-10-CM

## 2019-10-20 DIAGNOSIS — R07.9 CHEST PAIN, UNSPECIFIED TYPE: ICD-10-CM

## 2019-10-20 DIAGNOSIS — G58.9 MONONEUROPATHY: ICD-10-CM

## 2019-10-20 DIAGNOSIS — R79.89 ELEVATED TROPONIN: ICD-10-CM

## 2019-10-20 LAB
BASOPHILS # BLD AUTO: 0.6 % (ref 0–1.8)
BASOPHILS # BLD: 0.04 K/UL (ref 0–0.12)
EOSINOPHIL # BLD AUTO: 0.08 K/UL (ref 0–0.51)
EOSINOPHIL NFR BLD: 1.2 % (ref 0–6.9)
ERYTHROCYTE [DISTWIDTH] IN BLOOD BY AUTOMATED COUNT: 44.6 FL (ref 35.9–50)
GLUCOSE BLD-MCNC: 250 MG/DL (ref 65–99)
HCT VFR BLD AUTO: 36.4 % (ref 42–52)
HGB BLD-MCNC: 12 G/DL (ref 14–18)
IMM GRANULOCYTES # BLD AUTO: 0.02 K/UL (ref 0–0.11)
IMM GRANULOCYTES NFR BLD AUTO: 0.3 % (ref 0–0.9)
LYMPHOCYTES # BLD AUTO: 1.41 K/UL (ref 1–4.8)
LYMPHOCYTES NFR BLD: 22 % (ref 22–41)
MCH RBC QN AUTO: 30.2 PG (ref 27–33)
MCHC RBC AUTO-ENTMCNC: 33 G/DL (ref 33.7–35.3)
MCV RBC AUTO: 91.7 FL (ref 81.4–97.8)
MONOCYTES # BLD AUTO: 0.68 K/UL (ref 0–0.85)
MONOCYTES NFR BLD AUTO: 10.6 % (ref 0–13.4)
NEUTROPHILS # BLD AUTO: 4.19 K/UL (ref 1.82–7.42)
NEUTROPHILS NFR BLD: 65.3 % (ref 44–72)
NRBC # BLD AUTO: 0 K/UL
NRBC BLD-RTO: 0 /100 WBC
PLATELET # BLD AUTO: 221 K/UL (ref 164–446)
PMV BLD AUTO: 10.4 FL (ref 9–12.9)
RBC # BLD AUTO: 3.97 M/UL (ref 4.7–6.1)
WBC # BLD AUTO: 6.4 K/UL (ref 4.8–10.8)

## 2019-10-20 PROCEDURE — 80053 COMPREHEN METABOLIC PANEL: CPT

## 2019-10-20 PROCEDURE — 85025 COMPLETE CBC W/AUTO DIFF WBC: CPT

## 2019-10-20 PROCEDURE — 71045 X-RAY EXAM CHEST 1 VIEW: CPT

## 2019-10-20 PROCEDURE — 700105 HCHG RX REV CODE 258: Performed by: EMERGENCY MEDICINE

## 2019-10-20 PROCEDURE — 99285 EMERGENCY DEPT VISIT HI MDM: CPT

## 2019-10-20 PROCEDURE — 93005 ELECTROCARDIOGRAM TRACING: CPT | Performed by: EMERGENCY MEDICINE

## 2019-10-20 PROCEDURE — 82962 GLUCOSE BLOOD TEST: CPT

## 2019-10-20 PROCEDURE — A9270 NON-COVERED ITEM OR SERVICE: HCPCS | Performed by: EMERGENCY MEDICINE

## 2019-10-20 PROCEDURE — 85610 PROTHROMBIN TIME: CPT

## 2019-10-20 PROCEDURE — 84484 ASSAY OF TROPONIN QUANT: CPT

## 2019-10-20 PROCEDURE — 700102 HCHG RX REV CODE 250 W/ 637 OVERRIDE(OP): Performed by: EMERGENCY MEDICINE

## 2019-10-20 PROCEDURE — 85730 THROMBOPLASTIN TIME PARTIAL: CPT

## 2019-10-20 PROCEDURE — 83036 HEMOGLOBIN GLYCOSYLATED A1C: CPT

## 2019-10-20 RX ORDER — HYDROCODONE BITARTRATE AND ACETAMINOPHEN 5; 325 MG/1; MG/1
1-2 TABLET ORAL EVERY 4 HOURS PRN
COMMUNITY
End: 2019-10-21

## 2019-10-20 RX ORDER — ONDANSETRON 4 MG/1
4 TABLET, ORALLY DISINTEGRATING ORAL EVERY 6 HOURS PRN
COMMUNITY
End: 2019-10-21

## 2019-10-20 RX ORDER — DIAZEPAM 5 MG/1
5 TABLET ORAL ONCE
Status: COMPLETED | OUTPATIENT
Start: 2019-10-20 | End: 2019-10-20

## 2019-10-20 RX ORDER — SODIUM CHLORIDE 9 MG/ML
1000 INJECTION, SOLUTION INTRAVENOUS ONCE
Status: COMPLETED | OUTPATIENT
Start: 2019-10-20 | End: 2019-10-21

## 2019-10-20 RX ORDER — IBUPROFEN 600 MG/1
600 TABLET ORAL EVERY 6 HOURS PRN
COMMUNITY
End: 2019-10-21

## 2019-10-20 RX ADMIN — DIAZEPAM 5 MG: 5 TABLET ORAL at 23:21

## 2019-10-20 RX ADMIN — SODIUM CHLORIDE 1000 ML: 9 INJECTION, SOLUTION INTRAVENOUS at 23:15

## 2019-10-21 ENCOUNTER — APPOINTMENT (OUTPATIENT)
Dept: CARDIOLOGY | Facility: MEDICAL CENTER | Age: 59
DRG: 287 | End: 2019-10-21
Attending: INTERNAL MEDICINE
Payer: MEDICARE

## 2019-10-21 ENCOUNTER — APPOINTMENT (OUTPATIENT)
Dept: RADIOLOGY | Facility: MEDICAL CENTER | Age: 59
DRG: 287 | End: 2019-10-21
Attending: INTERNAL MEDICINE
Payer: MEDICARE

## 2019-10-21 DIAGNOSIS — I10 ESSENTIAL HYPERTENSION: ICD-10-CM

## 2019-10-21 DIAGNOSIS — Z86.19 HISTORY OF SHINGLES: ICD-10-CM

## 2019-10-21 PROBLEM — R07.9 CHEST PAIN: Status: ACTIVE | Noted: 2019-10-21

## 2019-10-21 PROBLEM — R42 DIZZINESS: Status: ACTIVE | Noted: 2019-10-21

## 2019-10-21 PROBLEM — F07.81 POSTCONCUSSION SYNDROME: Status: ACTIVE | Noted: 2019-10-21

## 2019-10-21 LAB
ALBUMIN SERPL BCP-MCNC: 4.2 G/DL (ref 3.2–4.9)
ALBUMIN/GLOB SERPL: 1.4 G/DL
ALP SERPL-CCNC: 59 U/L (ref 30–99)
ALT SERPL-CCNC: 34 U/L (ref 2–50)
ANION GAP SERPL CALC-SCNC: 10 MMOL/L (ref 0–11.9)
APTT PPP: 30.8 SEC (ref 24.7–36)
AST SERPL-CCNC: 51 U/L (ref 12–45)
BILIRUB SERPL-MCNC: 0.5 MG/DL (ref 0.1–1.5)
BUN SERPL-MCNC: 35 MG/DL (ref 8–22)
CALCIUM SERPL-MCNC: 9.9 MG/DL (ref 8.5–10.5)
CHLORIDE SERPL-SCNC: 103 MMOL/L (ref 96–112)
CO2 SERPL-SCNC: 23 MMOL/L (ref 20–33)
CREAT SERPL-MCNC: 1.34 MG/DL (ref 0.5–1.4)
EKG IMPRESSION: NORMAL
EKG IMPRESSION: NORMAL
EST. AVERAGE GLUCOSE BLD GHB EST-MCNC: 171 MG/DL
GLOBULIN SER CALC-MCNC: 2.9 G/DL (ref 1.9–3.5)
GLUCOSE BLD-MCNC: 170 MG/DL (ref 65–99)
GLUCOSE BLD-MCNC: 200 MG/DL (ref 65–99)
GLUCOSE BLD-MCNC: 223 MG/DL (ref 65–99)
GLUCOSE BLD-MCNC: 247 MG/DL (ref 65–99)
GLUCOSE SERPL-MCNC: 270 MG/DL (ref 65–99)
HBA1C MFR BLD: 7.6 % (ref 0–5.6)
INR PPP: 0.96 (ref 0.87–1.13)
LV EJECT FRACT  99904: 60
LV EJECT FRACT MOD 2C 99903: 67.99
LV EJECT FRACT MOD 4C 99902: 75.8
LV EJECT FRACT MOD BP 99901: 72.68
POTASSIUM SERPL-SCNC: 4.1 MMOL/L (ref 3.6–5.5)
PROT SERPL-MCNC: 7.1 G/DL (ref 6–8.2)
PROTHROMBIN TIME: 13 SEC (ref 12–14.6)
SODIUM SERPL-SCNC: 136 MMOL/L (ref 135–145)
TROPONIN T SERPL-MCNC: 29 NG/L (ref 6–19)
TROPONIN T SERPL-MCNC: 31 NG/L (ref 6–19)
TROPONIN T SERPL-MCNC: 31 NG/L (ref 6–19)
TROPONIN T SERPL-MCNC: 33 NG/L (ref 6–19)

## 2019-10-21 PROCEDURE — A9270 NON-COVERED ITEM OR SERVICE: HCPCS | Performed by: INTERNAL MEDICINE

## 2019-10-21 PROCEDURE — 96372 THER/PROPH/DIAG INJ SC/IM: CPT

## 2019-10-21 PROCEDURE — 700102 HCHG RX REV CODE 250 W/ 637 OVERRIDE(OP): Performed by: INTERNAL MEDICINE

## 2019-10-21 PROCEDURE — 90471 IMMUNIZATION ADMIN: CPT

## 2019-10-21 PROCEDURE — A9502 TC99M TETROFOSMIN: HCPCS

## 2019-10-21 PROCEDURE — 3E02340 INTRODUCTION OF INFLUENZA VACCINE INTO MUSCLE, PERCUTANEOUS APPROACH: ICD-10-PCS | Performed by: INTERNAL MEDICINE

## 2019-10-21 PROCEDURE — 93306 TTE W/DOPPLER COMPLETE: CPT

## 2019-10-21 PROCEDURE — 700111 HCHG RX REV CODE 636 W/ 250 OVERRIDE (IP): Performed by: INTERNAL MEDICINE

## 2019-10-21 PROCEDURE — 99205 OFFICE O/P NEW HI 60 MIN: CPT | Performed by: INTERNAL MEDICINE

## 2019-10-21 PROCEDURE — 93010 ELECTROCARDIOGRAM REPORT: CPT | Mod: 59 | Performed by: INTERNAL MEDICINE

## 2019-10-21 PROCEDURE — 82962 GLUCOSE BLOOD TEST: CPT

## 2019-10-21 PROCEDURE — 36415 COLL VENOUS BLD VENIPUNCTURE: CPT

## 2019-10-21 PROCEDURE — G0378 HOSPITAL OBSERVATION PER HR: HCPCS

## 2019-10-21 PROCEDURE — 700102 HCHG RX REV CODE 250 W/ 637 OVERRIDE(OP): Performed by: EMERGENCY MEDICINE

## 2019-10-21 PROCEDURE — 84484 ASSAY OF TROPONIN QUANT: CPT

## 2019-10-21 PROCEDURE — 99220 PR INITIAL OBSERVATION CARE,LEVL III: CPT | Performed by: INTERNAL MEDICINE

## 2019-10-21 PROCEDURE — 700111 HCHG RX REV CODE 636 W/ 250 OVERRIDE (IP)

## 2019-10-21 PROCEDURE — A9270 NON-COVERED ITEM OR SERVICE: HCPCS | Performed by: EMERGENCY MEDICINE

## 2019-10-21 PROCEDURE — 93005 ELECTROCARDIOGRAM TRACING: CPT | Performed by: INTERNAL MEDICINE

## 2019-10-21 PROCEDURE — 93306 TTE W/DOPPLER COMPLETE: CPT | Mod: 26 | Performed by: INTERNAL MEDICINE

## 2019-10-21 PROCEDURE — 90686 IIV4 VACC NO PRSV 0.5 ML IM: CPT | Performed by: INTERNAL MEDICINE

## 2019-10-21 RX ORDER — OMEPRAZOLE 20 MG/1
20 CAPSULE, DELAYED RELEASE ORAL DAILY
Status: DISCONTINUED | OUTPATIENT
Start: 2019-10-21 | End: 2019-10-21

## 2019-10-21 RX ORDER — METOPROLOL SUCCINATE 25 MG/1
25 TABLET, EXTENDED RELEASE ORAL
Status: DISCONTINUED | OUTPATIENT
Start: 2019-10-21 | End: 2019-10-25 | Stop reason: HOSPADM

## 2019-10-21 RX ORDER — ACETAMINOPHEN 325 MG/1
650 TABLET ORAL EVERY 6 HOURS PRN
Status: DISCONTINUED | OUTPATIENT
Start: 2019-10-21 | End: 2019-10-25 | Stop reason: HOSPADM

## 2019-10-21 RX ORDER — REGADENOSON 0.08 MG/ML
0.4 INJECTION, SOLUTION INTRAVENOUS
Status: COMPLETED | OUTPATIENT
Start: 2019-10-21 | End: 2019-10-21

## 2019-10-21 RX ORDER — ASPIRIN 325 MG
325 TABLET ORAL ONCE
Status: DISCONTINUED | OUTPATIENT
Start: 2019-10-21 | End: 2019-10-21

## 2019-10-21 RX ORDER — BISACODYL 10 MG
10 SUPPOSITORY, RECTAL RECTAL
Status: DISCONTINUED | OUTPATIENT
Start: 2019-10-21 | End: 2019-10-25 | Stop reason: HOSPADM

## 2019-10-21 RX ORDER — POLYETHYLENE GLYCOL 3350 17 G/17G
1 POWDER, FOR SOLUTION ORAL
Status: DISCONTINUED | OUTPATIENT
Start: 2019-10-21 | End: 2019-10-25 | Stop reason: HOSPADM

## 2019-10-21 RX ORDER — GABAPENTIN 100 MG/1
100 CAPSULE ORAL DAILY
Status: DISCONTINUED | OUTPATIENT
Start: 2019-10-21 | End: 2019-10-25 | Stop reason: HOSPADM

## 2019-10-21 RX ORDER — HYDROCODONE BITARTRATE AND ACETAMINOPHEN 5; 325 MG/1; MG/1
1 TABLET ORAL EVERY 4 HOURS PRN
Status: DISCONTINUED | OUTPATIENT
Start: 2019-10-21 | End: 2019-10-25 | Stop reason: HOSPADM

## 2019-10-21 RX ORDER — LOSARTAN POTASSIUM 50 MG/1
50 TABLET ORAL
Status: DISCONTINUED | OUTPATIENT
Start: 2019-10-21 | End: 2019-10-23

## 2019-10-21 RX ORDER — ZOLPIDEM TARTRATE 5 MG/1
5 TABLET ORAL
Status: DISCONTINUED | OUTPATIENT
Start: 2019-10-21 | End: 2019-10-24

## 2019-10-21 RX ORDER — CLOPIDOGREL BISULFATE 75 MG/1
75 TABLET ORAL DAILY
Status: DISCONTINUED | OUTPATIENT
Start: 2019-10-21 | End: 2019-10-25 | Stop reason: HOSPADM

## 2019-10-21 RX ORDER — CALCIUM CARBONATE 500 MG/1
500 TABLET, CHEWABLE ORAL EVERY 6 HOURS PRN
Status: DISCONTINUED | OUTPATIENT
Start: 2019-10-21 | End: 2019-10-25 | Stop reason: HOSPADM

## 2019-10-21 RX ORDER — AMINOPHYLLINE 25 MG/ML
100 INJECTION, SOLUTION INTRAVENOUS
Status: DISCONTINUED | OUTPATIENT
Start: 2019-10-21 | End: 2019-10-25 | Stop reason: HOSPADM

## 2019-10-21 RX ORDER — ASPIRIN 300 MG/1
300 SUPPOSITORY RECTAL ONCE
Status: DISCONTINUED | OUTPATIENT
Start: 2019-10-21 | End: 2019-10-21

## 2019-10-21 RX ORDER — ASPIRIN 325 MG
325 TABLET ORAL ONCE
Status: COMPLETED | OUTPATIENT
Start: 2019-10-21 | End: 2019-10-21

## 2019-10-21 RX ORDER — REGADENOSON 0.08 MG/ML
INJECTION, SOLUTION INTRAVENOUS
Status: COMPLETED
Start: 2019-10-21 | End: 2019-10-21

## 2019-10-21 RX ORDER — AMOXICILLIN 250 MG
2 CAPSULE ORAL 2 TIMES DAILY
Status: DISCONTINUED | OUTPATIENT
Start: 2019-10-21 | End: 2019-10-25 | Stop reason: HOSPADM

## 2019-10-21 RX ORDER — ASPIRIN 81 MG/1
324 TABLET, CHEWABLE ORAL ONCE
Status: DISCONTINUED | OUTPATIENT
Start: 2019-10-21 | End: 2019-10-21

## 2019-10-21 RX ORDER — SIMVASTATIN 40 MG
40 TABLET ORAL EVERY EVENING
Status: DISCONTINUED | OUTPATIENT
Start: 2019-10-21 | End: 2019-10-25 | Stop reason: HOSPADM

## 2019-10-21 RX ADMIN — ZOLPIDEM TARTRATE 5 MG: 5 TABLET ORAL at 03:34

## 2019-10-21 RX ADMIN — REGADENOSON 0.4 MG: 0.08 INJECTION, SOLUTION INTRAVENOUS at 14:22

## 2019-10-21 RX ADMIN — CLOPIDOGREL BISULFATE 75 MG: 75 TABLET ORAL at 04:33

## 2019-10-21 RX ADMIN — SIMVASTATIN 40 MG: 40 TABLET, FILM COATED ORAL at 18:18

## 2019-10-21 RX ADMIN — SENNOSIDES, DOCUSATE SODIUM 2 TABLET: 50; 8.6 TABLET, FILM COATED ORAL at 18:17

## 2019-10-21 RX ADMIN — LOSARTAN POTASSIUM 50 MG: 50 TABLET ORAL at 04:33

## 2019-10-21 RX ADMIN — INSULIN HUMAN 2 UNITS: 100 INJECTION, SOLUTION PARENTERAL at 04:42

## 2019-10-21 RX ADMIN — ASPIRIN 325 MG: 325 TABLET, FILM COATED ORAL at 01:27

## 2019-10-21 RX ADMIN — INFLUENZA A VIRUS A/BRISBANE/02/2018 IVR-190 (H1N1) ANTIGEN (FORMALDEHYDE INACTIVATED), INFLUENZA A VIRUS A/KANSAS/14/2017 X-327 (H3N2) ANTIGEN (FORMALDEHYDE INACTIVATED), INFLUENZA B VIRUS B/PHUKET/3073/2013 ANTIGEN (FORMALDEHYDE INACTIVATED), AND INFLUENZA B VIRUS B/MARYLAND/15/2016 BX-69A ANTIGEN (FORMALDEHYDE INACTIVATED) 0.5 ML: 15; 15; 15; 15 INJECTION, SUSPENSION INTRAMUSCULAR at 04:38

## 2019-10-21 RX ADMIN — INSULIN HUMAN 2 UNITS: 100 INJECTION, SOLUTION PARENTERAL at 18:15

## 2019-10-21 RX ADMIN — INSULIN HUMAN 5 UNITS: 100 INJECTION, SOLUTION PARENTERAL at 21:48

## 2019-10-21 RX ADMIN — INSULIN HUMAN 22 UNITS: 100 INJECTION, SUSPENSION SUBCUTANEOUS at 04:42

## 2019-10-21 RX ADMIN — METOPROLOL SUCCINATE 25 MG: 25 TABLET, EXTENDED RELEASE ORAL at 04:33

## 2019-10-21 RX ADMIN — GABAPENTIN 100 MG: 100 CAPSULE ORAL at 18:18

## 2019-10-21 ASSESSMENT — LIFESTYLE VARIABLES
EVER FELT BAD OR GUILTY ABOUT YOUR DRINKING: NO
TOTAL SCORE: 0
TOTAL SCORE: 0
CONSUMPTION TOTAL: NEGATIVE
ALCOHOL_USE: YES
HAVE PEOPLE ANNOYED YOU BY CRITICIZING YOUR DRINKING: NO
ON A TYPICAL DAY WHEN YOU DRINK ALCOHOL HOW MANY DRINKS DO YOU HAVE: 1
DOES PATIENT WANT TO STOP DRINKING: NO
EVER HAD A DRINK FIRST THING IN THE MORNING TO STEADY YOUR NERVES TO GET RID OF A HANGOVER: NO
AVERAGE NUMBER OF DAYS PER WEEK YOU HAVE A DRINK CONTAINING ALCOHOL: 1
EVER_SMOKED: YES
HAVE YOU EVER FELT YOU SHOULD CUT DOWN ON YOUR DRINKING: NO
EVER_SMOKED: YES
HOW MANY TIMES IN THE PAST YEAR HAVE YOU HAD 5 OR MORE DRINKS IN A DAY: 0
TOTAL SCORE: 0

## 2019-10-21 ASSESSMENT — COGNITIVE AND FUNCTIONAL STATUS - GENERAL
MOBILITY SCORE: 24
SUGGESTED CMS G CODE MODIFIER DAILY ACTIVITY: CH
DAILY ACTIVITIY SCORE: 24
SUGGESTED CMS G CODE MODIFIER MOBILITY: CH

## 2019-10-21 ASSESSMENT — ENCOUNTER SYMPTOMS
SEIZURES: 0
SORE THROAT: 0
DIZZINESS: 1
BACK PAIN: 0
PALPITATIONS: 0
SPUTUM PRODUCTION: 0
FEVER: 0
HEADACHES: 0
ABDOMINAL PAIN: 0
BLOOD IN STOOL: 0
CHILLS: 0
FLANK PAIN: 0
BLURRED VISION: 0
WHEEZING: 0
NECK PAIN: 0
FOCAL WEAKNESS: 0
DIARRHEA: 0
DIAPHORESIS: 0
BRUISES/BLEEDS EASILY: 0
COUGH: 0
NAUSEA: 0
VOMITING: 0
MYALGIAS: 0
SHORTNESS OF BREATH: 1

## 2019-10-21 ASSESSMENT — COPD QUESTIONNAIRES
DURING THE PAST 4 WEEKS HOW MUCH DID YOU FEEL SHORT OF BREATH: NONE/LITTLE OF THE TIME
DO YOU EVER COUGH UP ANY MUCUS OR PHLEGM?: NO/ONLY WITH OCCASIONAL COLDS OR INFECTIONS
COPD SCREENING SCORE: 1
HAVE YOU SMOKED AT LEAST 100 CIGARETTES IN YOUR ENTIRE LIFE: NO/DON'T KNOW
IN THE PAST 12 MONTHS DO YOU DO LESS THAN YOU USED TO BECAUSE OF YOUR BREATHING PROBLEMS: DISAGREE/UNSURE
HAVE YOU SMOKED AT LEAST 100 CIGARETTES IN YOUR ENTIRE LIFE: NO/DON'T KNOW
DURING THE PAST 4 WEEKS HOW MUCH DID YOU FEEL SHORT OF BREATH: NONE/LITTLE OF THE TIME
COPD SCREENING SCORE: 1
DO YOU EVER COUGH UP ANY MUCUS OR PHLEGM?: NO/ONLY WITH OCCASIONAL COLDS OR INFECTIONS

## 2019-10-21 ASSESSMENT — PATIENT HEALTH QUESTIONNAIRE - PHQ9
1. LITTLE INTEREST OR PLEASURE IN DOING THINGS: NOT AT ALL
2. FEELING DOWN, DEPRESSED, IRRITABLE, OR HOPELESS: NOT AT ALL
1. LITTLE INTEREST OR PLEASURE IN DOING THINGS: NOT AT ALL
2. FEELING DOWN, DEPRESSED, IRRITABLE, OR HOPELESS: NOT AT ALL
SUM OF ALL RESPONSES TO PHQ9 QUESTIONS 1 AND 2: 0
SUM OF ALL RESPONSES TO PHQ9 QUESTIONS 1 AND 2: 0

## 2019-10-21 NOTE — ASSESSMENT & PLAN NOTE
Resolved. Rule out cardiogenic causes. Echo with normal EF and moderate LVH. Severe mitral annular calcification.   - Continuous cardiac monitoring on telemetry  - not orthostatic    Echo:  Left ventricular ejection fraction is visually estimated to be 60%.  Modrate left ventricular hypertrophy.  Mild aortic insufficiency.  Severe mitral annular calcification.  Unable to estimate RVSP, normal RAP

## 2019-10-21 NOTE — ED TRIAGE NOTES
"Dylon Santa  Chief Complaint   Patient presents with   • Head Injury   • Generalized Body Aches   • Back Pain     Pt ambulatory to triage with above complaint. Pt reports a 70lb birth bath fell on head approx 3 weeks in [place]. Pt was seen in ED at [facility]. CT and MRI completed and findings show mass effect on the cervical spinal cord at C3-4 and moderate degenerative changes. Pt is difficult to obtain medical hx and reports multiple complaints. Pt reports generalized body aches, neck and back pain, and anxiety. Pt states \"I was told by MD in [place] to be reevaluated in [place], don't know for what.\"     BP (!) 175/93   Pulse 84   Temp 36.6 °C (97.9 °F) (Temporal)   Resp 18   Ht 1.74 m (5' 8.5\")   Wt 76.1 kg (167 lb 12.3 oz)   SpO2 98%   BMI 25.14 kg/m²     Pt informed of triage process and encouraged to notify staff of any changes or concerns. Pt verbalized understanding of instructions. Apologized for long wait time. Pt placed back in lobby.     "

## 2019-10-21 NOTE — ASSESSMENT & PLAN NOTE
TBI from a bird bath falling on him  - recommend outpatient evaluation by neurology  - trial atarax TID PRN anxiety

## 2019-10-21 NOTE — ASSESSMENT & PLAN NOTE
Baseline Cr ~1.3, slight bump today.  - continue with IVF and repeat tomorrow (did get contrast with left heart cath)  - Avoiding nephrotoxics: NSAIDs etc

## 2019-10-21 NOTE — ASSESSMENT & PLAN NOTE
Uncontrolled with hyperglycemia. A1C 7.2%  - DM diet and hypoglycemia protocol  - increase to moderate sliding scale\  - prandial insulin increased to 12U with meals

## 2019-10-21 NOTE — H&P
Hospital Medicine History & Physical Note    Date of Service  10/21/2019    Primary Care Physician  Sandi Manning M.D.    Consultants  None    Code Status  Full code    Chief Complaint  Chest pain and lightheadedness    History of Presenting Illness  59 y.o. male with a past medical history of insulin-dependent diabetes mellitus, hypertension, hyperlipidemia, peripheral arterial disease who presented 10/20/2019 with lightheadedness.  Patient states that he was struck on the head by a large fountain while moving furniture.  He reported losing consciousness at the time.  Since then he has noted symptoms of forgetfulness, generalized weakness, fatigue and lightheadedness.  He denies any new focal weakness or numbness.  He denies any fevers, chills or incontinence.  He does wrote report exertional chest pain without radiation associated with shortness of breath.  He denies any previous history of MI, stroke or PE.  He does not smoke or drink excessive alcohol.     The patient was evaluated in outlying facility and was diagnosed with postconcussive syndrome.  The patient had head CT without contrast showing no acute intracranial abnormalities.  He had a CT of the cervical spine which showed chronic appearing height loss of C5 and C6, multilevel degenerative changes, anterolisthesis of C4 on C5 and multilevel disc space narrowings.  He had no weakness or numbness on his upper extremities however due the evidence of spinal stenosis and apparent compression of the cervical spine at C3/4 and C5/6 and MRI was done on inpatient setting.  The MRI showed severe central spinal stenosis from C3/4 to C6/7 related to disc/facet disease as well as prominent PLL plus or minus shortened pedicles.  Compression of the cord at C3/4 and to lesser extent at C4/5, without definitive increased cord signal.  Multilevel neural foraminal narrowing.    EKG interpreted by me reveals sinus rhythm with left anterior fascicular block.  No ST elevation  or ST depression  Chest x-ray interpreted by me reveals no acute cardiopulmonary process    Review of Systems  Review of Systems   Constitutional: Positive for malaise/fatigue. Negative for chills, diaphoresis and fever.   HENT: Negative for hearing loss and sore throat.    Eyes: Negative for blurred vision.   Respiratory: Positive for shortness of breath. Negative for cough, sputum production and wheezing.    Cardiovascular: Positive for chest pain. Negative for palpitations and leg swelling.   Gastrointestinal: Negative for abdominal pain, blood in stool, diarrhea, nausea and vomiting.   Genitourinary: Negative for dysuria, flank pain and urgency.   Musculoskeletal: Negative for back pain, joint pain, myalgias and neck pain.   Skin: Negative for rash.   Neurological: Positive for dizziness. Negative for focal weakness, seizures and headaches.   Endo/Heme/Allergies: Does not bruise/bleed easily.   Psychiatric/Behavioral: Negative for suicidal ideas.   All other systems reviewed and are negative.      Past Medical History   has a past medical history of Anesthesia, Arthritis, Bronchitis (2016), Cataract, Cold (4/5/17), Dental disorder, Diabetic neuropathy ([facility]) (12/31/2014), Diabetic retinopathy of both eyes ([facility]) (12/9/2016), Heart murmur, Hemorrhagic disorder (formerly [facility]), High cholesterol, History of shingles (12/31/2014), Hyperlipidemia, Hypertension, Kidney stones, Pain, Psychiatric problem, and Type II or unspecified type diabetes mellitus without mention of complication, uncontrolled (12/31/2014).    Surgical History   has a past surgical history that includes eye surgery; appendectomy; knee arthroscopy; tonsillectomy; cataract extraction with iol (Bilateral); irrigation & debridement general (Left, 5/3/2017); and hand surgery (1990's).     Family History  family history includes Cancer in his paternal aunt and paternal grandfather; Diabetes in his maternal grandfather, maternal uncle, and paternal grandmother;  Hyperlipidemia in his brother; Hypertension in his brother; Lung Disease in his brother, father, and mother; Thyroid in his brother, mother, and sister.     Social History   reports that he has quit smoking. His smokeless tobacco use includes chew. He reports that he does not drink alcohol or use drugs.    Allergies  No Known Allergies    Medications  Prior to Admission Medications   Prescriptions Last Dose Informant Patient Reported? Taking?   Blood Glucose Test Strips   No No   Sig: Test strips order: Test strips for meter covered by pt's insurance. Sig: use QID and prn ssx high or low sugar   HYDROcodone-acetaminophen (NORCO) 5-325 MG Tab per tablet   Yes Yes   Sig: Take 1-2 Tabs by mouth every four hours as needed.   Lancets   No No   Sig: Lancets order: Lancets for meter covered by insurance. Sig: use QID and prn ssx high or low sugar.   acyclovir (ZOVIRAX) 200 MG Cap   No No   Sig: TAKE ONE CAPSULE BY MOUTH DAILY   clopidogrel (PLAVIX) 75 MG Tab   No No   Sig: Take 1 Tab by mouth every day.   gabapentin (NEURONTIN) 100 MG Cap   No No   Sig: Take 1 Cap by mouth every day.   ibuprofen (MOTRIN) 600 MG Tab   Yes Yes   Sig: Take 600 mg by mouth every 6 hours as needed.   insulin NPH (HUMULIN,NOVOLIN) 100 UNIT/ML Suspension   No No   Sig: Inject 22 Units as instructed every day.   insulin regular (HUMULIN R) 100 Unit/mL Solution  Patient Yes No   Sig: Inject 6-10 Units as instructed 3 times a day before meals.   losartan (COZAAR) 50 MG Tab   No No   Sig: TAKE ONE TABLET BY MOUTH ONE TIME DAILY    metoprolol SR (TOPROL XL) 25 MG TABLET SR 24 HR   No No   Sig: TAKE ONE TABLET BY MOUTH ONE TIME DAILY    mupirocin (BACTROBAN) 2 % Ointment   No No   Sig: Apply to the affected area twice a day.   omeprazole (PRILOSEC) 20 MG delayed-release capsule   No No   Sig: Take 1 Cap by mouth every day.   Patient not taking: Reported on 7/8/2019   ondansetron (ZOFRAN ODT) 4 MG TABLET DISPERSIBLE   Yes Yes   Sig: Take 4 mg by mouth  every 6 hours as needed for Nausea.   simvastatin (ZOCOR) 40 MG Tab   No No   Sig: TAKE 1 TABLET BY MOUTH EVERY EVENING       Facility-Administered Medications: None       Physical Exam  Temp:  [36.6 °C (97.9 °F)] 36.6 °C (97.9 °F)  Pulse:  [69-84] 69  Resp:  [18] 18  BP: (136-175)/(69-93) 143/80  SpO2:  [94 %-99 %] 97 %    Physical Exam   Constitutional: He is oriented to person, place, and time. He appears well-developed and well-nourished. No distress.   HENT:   Head: Normocephalic and atraumatic.   Mouth/Throat: Oropharynx is clear and moist.   Eyes: Pupils are equal, round, and reactive to light. Conjunctivae are normal. No scleral icterus.   Neck: Normal range of motion. Neck supple.   Cardiovascular: Normal rate, regular rhythm and normal heart sounds.   Pulmonary/Chest: Effort normal and breath sounds normal. No respiratory distress. He has no wheezes. He has no rales.   Abdominal: Soft. Bowel sounds are normal. He exhibits no distension. There is no tenderness. There is no rebound.   Musculoskeletal: Normal range of motion. He exhibits no edema or tenderness.   Lymphadenopathy:     He has no cervical adenopathy.   Neurological: He is alert and oriented to person, place, and time. No cranial nerve deficit. Coordination normal.   Strength and sensation intact bilaterally   Skin: Skin is warm. No rash noted.   Psychiatric: He has a normal mood and affect. His behavior is normal.   Nursing note and vitals reviewed.      Laboratory:  Recent Labs     10/20/19  2328   WBC 6.4   RBC 3.97*   HEMOGLOBIN 12.0*   HEMATOCRIT 36.4*   MCV 91.7   MCH 30.2   MCHC 33.0*   RDW 44.6   PLATELETCT 221   MPV 10.4     Recent Labs     10/20/19  2328   SODIUM 136   POTASSIUM 4.1   CHLORIDE 103   CO2 23   GLUCOSE 270*   BUN 35*   CREATININE 1.34   CALCIUM 9.9     Recent Labs     10/20/19  2328   ALTSGPT 34   ASTSGOT 51*   ALKPHOSPHAT 59   TBILIRUBIN 0.5   GLUCOSE 270*     Recent Labs     10/20/19  2328   APTT 30.8   INR 0.96     No  results for input(s): NTPROBNP in the last 72 hours.      Recent Labs     10/20/19  2328   TROPONINT 33*       Urinalysis:    No results found     Imaging:  DX-CHEST-PORTABLE (1 VIEW)   Final Result         1.  No acute cardiopulmonary disease.   2.  Atherosclerosis      NM-CARDIAC STRESS TEST    (Results Pending)         Assessment/Plan:  I anticipate this patient is appropriate for observation status at this time.    Chest pain- (present on admission)  Assessment & Plan  Rule out ACS  Continuous cardiac monitoring with serial EKG and troponin  Nuclear medicine cardiac stress test in the morning if troponin remains negative  Patient has been given full dose of aspirin  Check lipid panel, TSH and hemoglobin A1c  Nitro when necessary for chest pain      Postconcussion syndrome- (present on admission)  Assessment & Plan  I recommended outpatient evaluation by neurology if his symptoms persist     Dizziness- (present on admission)  Assessment & Plan  Rule out cardiogenic causes  Continuous cardiac monitoring on telemetry  Check 2D echo  Check orthostatics      Type 1 diabetes mellitus with diabetic chronic kidney disease (HCC)- (present on admission)  Assessment & Plan  Uncontrolled with hyperglycemia  Start on insulin sliding scale with serial Accu-Checks  Check hemoglobin A1c  Hypoglycemic protocol in place        CKD (chronic kidney disease) stage 3, GFR 30-59 ml/min Dr. Pascual- (present on admission)  Assessment & Plan  At baseline creatinine  Avoiding nephrotoxics: NSAIDs etc  Monitor BMP        VTE prophylaxis: SCD

## 2019-10-21 NOTE — ASSESSMENT & PLAN NOTE
Abnormal stress test. Minimal non-obstruction CAD seen   - s/p cardiac cath on 10/22 without intervention  - cardiology evaluated, appreciate recs  - continue ASA and plavix  - monitor on telemetry

## 2019-10-21 NOTE — CARE PLAN
Problem: Communication  Goal: The ability to communicate needs accurately and effectively will improve  Outcome: PROGRESSING AS EXPECTED  Intervention: [place] patient and significant other/support system to call light to alert staff of needs  Flowsheets (Taken 10/21/2019 0326)  Oriented to:: All of the Following : Location of Bathroom, Visiting Policy, Unit Routine, Call Light and Bedside Controls, Bedside Rail Policy, Smoking Policy, Rights and Responsibilities, Bedside Report, and Patient Education Notebook  Note:   Pt educated on POC and medications. Pt verbalized understanding.      Problem: Safety  Goal: Will remain free from falls  Outcome: PROGRESSING AS EXPECTED  Intervention: Assess risk factors for falls  Flowsheets (Taken 10/21/2019 0326)  Pt Calls for Assistance: Yes  Fall Risk: Risk to Fall -  0 - 1 point  History of fall: 0  Mobility Status Assessment: 1-1 Healthcare Provider Required for Assistance with Ambulation & Transfer  Risk for Injury-Any positive answers results in the pt being at high risk for fall related injury: Not Applicable  Note:   Pt bedside table and call-light are within reach, bed in lowest position and locked. Treaded socks on and pt has been educated on call light use and asked to call before getting up.

## 2019-10-21 NOTE — CARE PLAN
Pt was educated on the stress test procedure and was able to repeat back to RN on why he needs to procedure and what will occur during the procedure, pt is learning how to get out of bed slowly and steadily, also understanding the signs of when he needs to sit down

## 2019-10-21 NOTE — ED PROVIDER NOTES
ED Provider Note    CHIEF COMPLAINT  Chief Complaint   Patient presents with   • Head Injury   • Generalized Body Aches   • Back Pain       HPI  Patient is a 59-year-old diabetic male who presents emergency room for evaluation of lightheadedness, dizziness, near syncopal event.  He reports that he has been not feeling like himself for some time after he sustained a head injury.  He reports approximately 3 weeks ago he was struck on the left side of his head by a large 70 pound birdbath.  Within a week after that he was having headaches and he lost consciousness on several occasions dropping him to going to [facility] in [facility].      These records are provided by the patient and are remarkable for the following.  The patient had head CT without contrast showing no acute intracranial abnormalities.  He had a CT of the cervical spine which showed chronic appearing height loss of C5 and C6, multilevel degenerative changes, anterolisthesis of C4 on C5 and multilevel disc space narrowings.  He had no weakness or numbness on his upper extremities however due the evidence of spinal stenosis and apparent compression of the cervical spine at C3/4 and C5/6 and MRI was done on inpatient setting.  The MRI showed severe central spinal stenosis from C3/4 to C6/7 related to disc/facet disease as well as prominent PLL plus or minus shortened pedicles.  Compression of the cord at C3/4 and to lesser extent at C4/5, without definitive increased cord signal.  Multilevel neural foraminal narrowing.    The patient is a relatively poor historian, says that he has had increased sleep disturbances, irritability, restless leg syndrome and overall anxiousness since his head injury.  He feels like he was going to lose consciousness and was having dizziness earlier in the last couple of days prompting him to come into the emergency department.  He denies any fevers, nausea vomiting, worsening headaches.  He does  feel the sensations of tightness in his chest when the symptoms are more rampant however they are not related to any exertional symptomology and he does not get shortness of breath.  The patient believes he is supposed to follow-up with somebody, perhaps an orthopedic or neurosurgeon however he is unsure of this process.  The patient is getting further outpatient follow-up with his previously established primary care doctor.    REVIEW OF SYSTEMS  Constitutional: No fevers, chills, or recent illness.  Skin: No rashes or diaphoresis.  Eyes: No change in vision, no discharge.  ENT: No hearing change. No rhinorrhea or nasal congestion, no ST or difficulty swallowing.  Respiratory: No SOB. No coughing or hemoptysis. No Wheezing.  Cardiac: No palpitations, edema. No PND or orthopnea.  GI: No Abdominal Pain; N/V; diarrhea, constipation. No blood in stool.  : No dysuria. No D/C. No frequency  MSK: chronic back pain.  Neuro: No HA or paresthesias. No focal weakness.  Endocrine: No polyuria or polydipsia. No heat or cold intolerance.  Heme: No easy bruising. No history of bleeding disorders or anemia.    PAST MEDICAL HISTORY   has a past medical history of Anesthesia, Arthritis, Bronchitis (2016), Cataract, Cold (4/5/17), Dental disorder, Diabetic neuropathy ([facility]) (12/31/2014), Diabetic retinopathy of both eyes ([facility]) (12/9/2016), Heart murmur, Hemorrhagic disorder ([facility]), High cholesterol, History of shingles (12/31/2014), Hyperlipidemia, Hypertension, Kidney stones, Pain, Psychiatric problem, and Type II or unspecified type diabetes mellitus without mention of complication, uncontrolled (12/31/2014).    SOCIAL HISTORY  Social History     Tobacco Use   • Smoking status: Former Smoker   • Smokeless tobacco: Current User     Types: Chew   • Tobacco comment: tobacco cessation recommended   Substance and Sexual Activity   • Alcohol use: No     Alcohol/week: 0.0 oz     Comment: rare beer   • Drug use: No   • Sexual activity:  "Never     Comment: Never  , On disability due to lack of eye sight.     SURGICAL HISTORY   has a past surgical history that includes eye surgery; appendectomy; knee arthroscopy; tonsillectomy; cataract extraction with iol (Bilateral); irrigation & debridement general (Left, 5/3/2017); and hand surgery (1990's).    CURRENT MEDICATIONS  Home Medications     Reviewed by Elisa Combs R.N. (Registered Nurse) on 10/20/19 at 2108  Med List Status: Partial   Medication Last Dose Status   acyclovir (ZOVIRAX) 200 MG Cap  Active   Blood Glucose Test Strips  Active   clopidogrel (PLAVIX) 75 MG Tab  Active   gabapentin (NEURONTIN) 100 MG Cap  Active   HYDROcodone-acetaminophen (NORCO) 5-325 MG Tab per tablet  Active   ibuprofen (MOTRIN) 600 MG Tab  Active   insulin NPH (HUMULIN,NOVOLIN) 100 UNIT/ML Suspension  Active   insulin regular (HUMULIN R) 100 Unit/mL Solution  Active   Lancets  Active   losartan (COZAAR) 50 MG Tab  Active   metoprolol SR (TOPROL XL) 25 MG TABLET SR 24 HR  Active   mupirocin (BACTROBAN) 2 % Ointment  Active   omeprazole (PRILOSEC) 20 MG delayed-release capsule  Active   ondansetron (ZOFRAN ODT) 4 MG TABLET DISPERSIBLE  Active   simvastatin (ZOCOR) 40 MG Tab  Active              ALLERGIES  No Known Allergies    PHYSICAL EXAM  VITAL SIGNS: /69   Pulse 71   Temp 36.6 °C (97.9 °F) (Temporal)   Resp 18   Ht 1.74 m (5' 8.5\")   Wt 76.1 kg (167 lb 12.3 oz)   SpO2 94%   BMI 25.14 kg/m²   Pulse ox interpretation: I interpret this pulse ox as normal.  Genl: M sitting in gurney comfortably, speaking clearly, appears anxious but in no acute distress   Head: NC/AT   ENT: Mucous membranes dry, posterior pharynx clear, uvula midline, nares patent bilaterally   Eyes: Normal sclera, pupils equal round reactive to light  Neck: Supple, FROM, no LAD appreciated  Pulmonary: Lungs are clear to auscultation bilaterally  Chest: No TTP  CV:  RRR, no murmur appreciated, pulses 2+ in both upper and lower " extremities,  Abdomen: soft, NT/ND; no rebound/guarding, no masses palpated, no HSM  : no CVA or suprapubic tenderness  Musculoskeletal: Pain free ROM of the neck. Moving upper and lower extremities and spontaneous in coordinated fashion  Neuro: Mental Status: Speech fluent without errors. Follows all commands. No dysarthria or apraxia.  Cranial Nerves: Pupils equal round and reactive to light. Extraocular motion intact. Visual fields intact. No nystagmus. CN V1-V3 intact to light touch. No facial asymmetry. Hearing clinically intact bilaterally. Tongue protrusion midline. No uvular deviation. Normal shoulder shrug and head turn.  Motor:  RUE: 5/5 with hand , 5/5 with flexion at the elbow 5/5 with extension at the elbow  LUE: 5/5 with hand , 5/5 with flexion at the elbow 5/5 with extension at the elbow  RLE: 5/5 with leg raise, 5/5 with plantar flexion, 5/5 with dorsal flexion  LLE: 5/5 with leg raise, 5/5 with plantar flexion, 5/5 with dorsal flexion  Sensation to light touch intact throughout  Reflexes 2+ Patellar tendons  No ataxia noted  Rapidly alternating movements without difficulty  HINTS: reassuring Head Impulse Test; reassuring test of skew  Psych: Patient has an appropriate affect and behavior  Skin: No rash or lesions.  No pallor or jaundice.  No cyanosis.  Warm and dry.     DIAGNOSTIC STUDIES / PROCEDURES    EKG  Results for orders placed or performed during the hospital encounter of 10/20/19   EKG (NOW)   Result Value Ref Range    Report       [facility] Emergency Dept.    Test Date:  2019-10-20  Pt Name:    JEN JAMISON                   Department: ER  MRN:        [id number]                      Room:       [facility]  Gender:     Male                         Technician: [id number]  :        1960                   Requested By:JIA MURILLO  Order #:    [id number]                    Reading MD: Graeme Guan MD    Measurements  Intervals                                Axis  Rate:        78                           P:          60  NE:         156                          QRS:        -58  QRSD:       86                           T:          40  QT:         404  QTc:        461    Interpretive Statements  INCOMPLETE ANALYSIS DUE TO MISSING DATA IN PRECORDIAL LEAD(S)  SINUS RHYTHM  LEFT ANTERIOR FASCICULAR BLOCK  LATE PRECORDIAL R/S TRANSITION  MISSING LEAD(S): V1  Compared to ECG 04/21/2017 10:21:09  No significant changes    Electronically Signed On 10- 1:11:30 PDT by Graeme Guan MD       LABS  Labs Reviewed   CBC WITH DIFFERENTIAL - Abnormal; Notable for the following components:       Result Value    RBC 3.97 (*)     Hemoglobin 12.0 (*)     Hematocrit 36.4 (*)     MCHC 33.0 (*)     All other components within normal limits    Narrative:     Indicate which anticoagulants the patient is on:->UNKNOWN   COMP METABOLIC PANEL - Abnormal; Notable for the following components:    Glucose 270 (*)     Bun 35 (*)     AST(SGOT) 51 (*)     All other components within normal limits    Narrative:     Indicate which anticoagulants the patient is on:->UNKNOWN   TROPONIN - Abnormal; Notable for the following components:    Troponin T 33 (*)     All other components within normal limits    Narrative:     Indicate which anticoagulants the patient is on:->UNKNOWN   ESTIMATED GFR - Abnormal; Notable for the following components:    GFR If Non  55 (*)     All other components within normal limits    Narrative:     Indicate which anticoagulants the patient is on:->UNKNOWN   ACCU-CHEK GLUCOSE - Abnormal; Notable for the following components:    Glucose - Accu-Ck 250 (*)     All other components within normal limits   PROTHROMBIN TIME    Narrative:     Indicate which anticoagulants the patient is on:->UNKNOWN   APTT    Narrative:     Indicate which anticoagulants the patient is on:->UNKNOWN   TSH   FREE THYROXINE   TROPONIN     RADIOLOGY  DX-CHEST-PORTABLE (1 VIEW)   Final Result         1.  No  acute cardiopulmonary disease.   2.  Atherosclerosis        COURSE & MEDICAL DECISION MAKING  Pertinent Labs & Imaging studies reviewed. (See chart for details)    DDX:  Post concussive symptoms  Spinal injury - unlikely  Heart arrhythmia  ACS  Neurocardiogenic  anemia  Vasovagal syncope/Orthostasis  Dehydration  Polypharmacy  Medication side effect    MDM    Initial evaluation at 2200:    Patient seen and evaluated for the symptoms as described above.  He has a rather nebulous course with recent medical illnesses include a closed head injury and has been having debilitating dysfunction from what is described as overall symptomology consistent with a postconcussive symptom.  The patient has peripheral vascular disease after reviewing his chart, is a relatively well-controlled diabetic who also now endorses having nonspecific chest tightness and discomfort that is difficult to describe temporarily as he is a relatively poor historian.  He also endorses confusing possible near syncopal events and has no family members around to corroborate.     His recent medical imaging is reviewed and demonstrates no signs of structural intracranial abnormalities and he does have evidence of spinal stenosis that had an MRI showing stable chronic disease processes, with recommendation of outpatient follow-up with neurosurgery.  The patient has no new upper extremity weakness, no paresthesias or sensation changes consistent with an acute worsening of this overall symptomology.  He also denies any falls or recent traumatic injuries.     The patient's chest pain is difficult to tap down and he is placed on aspirin due to his PMHx.  EKG without new ischemic changes, chest x-ray is grossly unremarkable.  The patient has not had cardiac stress testing or recent echocardiography and with his recent life stressors and worsening chest discomfort I feel he would benefit by observation admission and risk stratification/stress testing.  This is  discussed the hospitalist and he admitted in guarded condition.    HYDRATION: Based on the patient's presentation of Dehydration the patient was given IV fluids. IV Hydration was used because oral hydration was not adequate alone. Upon recheck following hydration, the patient was improved.    FINAL IMPRESSION  Visit Diagnoses     ICD-10-CM   1. Confusion R41.0   2. Chest pain, unspecified type R07.9   3. Closed head injury, initial encounter S09.90XA   4. Post concussive syndrome F07.81   5. Elevated troponin R79.89   6. Near syncope R55          Electronically signed by: Roge Bedolla, 10/20/2019 10:00 PM

## 2019-10-21 NOTE — PROGRESS NOTES
2 RN Skin Check    2 RN skin check complete. With Krish KHAN.  Devices in place: NA.  Skin assessed under devices: N\A.  Confirmed pressure ulcers found on: NA.  New potential pressure ulcers noted on NA. Wound consult placed N/A.  The following interventions in place Pillows     Pt has generalized bruising on the legs bilaterally, as well as a bruise on his forehead.  Heels are red and blanching    No other skin breakdown noted at this time

## 2019-10-21 NOTE — PROGRESS NOTES
Pt brought to the floor from [facility] 63. Monitor place on pt, monitor room notified. Pt able to ambulate from gurney to bed Steady gait standby assist. POC discussed with pt, all questions answered at this time. Pt is on 0 O2. A/O x4.

## 2019-10-22 ENCOUNTER — APPOINTMENT (OUTPATIENT)
Dept: CARDIOLOGY | Facility: MEDICAL CENTER | Age: 59
DRG: 287 | End: 2019-10-22
Attending: INTERNAL MEDICINE
Payer: MEDICARE

## 2019-10-22 LAB
ALBUMIN SERPL BCP-MCNC: 4.2 G/DL (ref 3.2–4.9)
ALBUMIN/GLOB SERPL: 1.5 G/DL
ALP SERPL-CCNC: 61 U/L (ref 30–99)
ALT SERPL-CCNC: 38 U/L (ref 2–50)
ANION GAP SERPL CALC-SCNC: 6 MMOL/L (ref 0–11.9)
ANION GAP SERPL CALC-SCNC: 6 MMOL/L (ref 0–11.9)
APTT PPP: 30.6 SEC (ref 24.7–36)
AST SERPL-CCNC: 41 U/L (ref 12–45)
BASOPHILS # BLD AUTO: 0.8 % (ref 0–1.8)
BASOPHILS # BLD: 0.04 K/UL (ref 0–0.12)
BILIRUB SERPL-MCNC: 0.7 MG/DL (ref 0.1–1.5)
BUN SERPL-MCNC: 26 MG/DL (ref 8–22)
BUN SERPL-MCNC: 27 MG/DL (ref 8–22)
CALCIUM SERPL-MCNC: 9.2 MG/DL (ref 8.5–10.5)
CALCIUM SERPL-MCNC: 9.3 MG/DL (ref 8.5–10.5)
CHLORIDE SERPL-SCNC: 104 MMOL/L (ref 96–112)
CHLORIDE SERPL-SCNC: 104 MMOL/L (ref 96–112)
CHOLEST SERPL-MCNC: 137 MG/DL (ref 100–199)
CO2 SERPL-SCNC: 25 MMOL/L (ref 20–33)
CO2 SERPL-SCNC: 27 MMOL/L (ref 20–33)
CREAT SERPL-MCNC: 1.33 MG/DL (ref 0.5–1.4)
CREAT SERPL-MCNC: 1.36 MG/DL (ref 0.5–1.4)
EOSINOPHIL # BLD AUTO: 0.12 K/UL (ref 0–0.51)
EOSINOPHIL NFR BLD: 2.4 % (ref 0–6.9)
ERYTHROCYTE [DISTWIDTH] IN BLOOD BY AUTOMATED COUNT: 43.3 FL (ref 35.9–50)
ERYTHROCYTE [DISTWIDTH] IN BLOOD BY AUTOMATED COUNT: 43.8 FL (ref 35.9–50)
GLOBULIN SER CALC-MCNC: 2.8 G/DL (ref 1.9–3.5)
GLUCOSE BLD-MCNC: 146 MG/DL (ref 65–99)
GLUCOSE BLD-MCNC: 155 MG/DL (ref 65–99)
GLUCOSE BLD-MCNC: 217 MG/DL (ref 65–99)
GLUCOSE BLD-MCNC: 243 MG/DL (ref 65–99)
GLUCOSE BLD-MCNC: 290 MG/DL (ref 65–99)
GLUCOSE BLD-MCNC: 392 MG/DL (ref 65–99)
GLUCOSE BLD-MCNC: 52 MG/DL (ref 65–99)
GLUCOSE SERPL-MCNC: 128 MG/DL (ref 65–99)
GLUCOSE SERPL-MCNC: 194 MG/DL (ref 65–99)
HCT VFR BLD AUTO: 36.6 % (ref 42–52)
HCT VFR BLD AUTO: 38.4 % (ref 42–52)
HDLC SERPL-MCNC: 46 MG/DL
HGB BLD-MCNC: 12.3 G/DL (ref 14–18)
HGB BLD-MCNC: 12.4 G/DL (ref 14–18)
IMM GRANULOCYTES # BLD AUTO: 0.01 K/UL (ref 0–0.11)
IMM GRANULOCYTES NFR BLD AUTO: 0.2 % (ref 0–0.9)
INR PPP: 1.04 (ref 0.87–1.13)
LDLC SERPL CALC-MCNC: 72 MG/DL
LYMPHOCYTES # BLD AUTO: 1.47 K/UL (ref 1–4.8)
LYMPHOCYTES NFR BLD: 28.9 % (ref 22–41)
MCH RBC QN AUTO: 29.4 PG (ref 27–33)
MCH RBC QN AUTO: 30.6 PG (ref 27–33)
MCHC RBC AUTO-ENTMCNC: 32 G/DL (ref 33.7–35.3)
MCHC RBC AUTO-ENTMCNC: 33.9 G/DL (ref 33.7–35.3)
MCV RBC AUTO: 90.4 FL (ref 81.4–97.8)
MCV RBC AUTO: 91.9 FL (ref 81.4–97.8)
MONOCYTES # BLD AUTO: 0.52 K/UL (ref 0–0.85)
MONOCYTES NFR BLD AUTO: 10.2 % (ref 0–13.4)
NEUTROPHILS # BLD AUTO: 2.92 K/UL (ref 1.82–7.42)
NEUTROPHILS NFR BLD: 57.5 % (ref 44–72)
NRBC # BLD AUTO: 0 K/UL
NRBC BLD-RTO: 0 /100 WBC
PLATELET # BLD AUTO: 220 K/UL (ref 164–446)
PLATELET # BLD AUTO: 229 K/UL (ref 164–446)
PMV BLD AUTO: 10.2 FL (ref 9–12.9)
PMV BLD AUTO: 10.2 FL (ref 9–12.9)
POTASSIUM SERPL-SCNC: 3.7 MMOL/L (ref 3.6–5.5)
POTASSIUM SERPL-SCNC: 3.8 MMOL/L (ref 3.6–5.5)
PROT SERPL-MCNC: 7 G/DL (ref 6–8.2)
PROTHROMBIN TIME: 13.8 SEC (ref 12–14.6)
RBC # BLD AUTO: 4.05 M/UL (ref 4.7–6.1)
RBC # BLD AUTO: 4.18 M/UL (ref 4.7–6.1)
SODIUM SERPL-SCNC: 135 MMOL/L (ref 135–145)
SODIUM SERPL-SCNC: 137 MMOL/L (ref 135–145)
TRIGL SERPL-MCNC: 93 MG/DL (ref 0–149)
TSH SERPL DL<=0.005 MIU/L-ACNC: 1.34 UIU/ML (ref 0.38–5.33)
WBC # BLD AUTO: 5.1 K/UL (ref 4.8–10.8)
WBC # BLD AUTO: 5.1 K/UL (ref 4.8–10.8)

## 2019-10-22 PROCEDURE — 770020 HCHG ROOM/CARE - TELE (206)

## 2019-10-22 PROCEDURE — 700117 HCHG RX CONTRAST REV CODE 255: Performed by: INTERNAL MEDICINE

## 2019-10-22 PROCEDURE — A9270 NON-COVERED ITEM OR SERVICE: HCPCS

## 2019-10-22 PROCEDURE — 84443 ASSAY THYROID STIM HORMONE: CPT

## 2019-10-22 PROCEDURE — 700101 HCHG RX REV CODE 250

## 2019-10-22 PROCEDURE — 82962 GLUCOSE BLOOD TEST: CPT | Mod: 91

## 2019-10-22 PROCEDURE — 93458 L HRT ARTERY/VENTRICLE ANGIO: CPT | Mod: 26 | Performed by: INTERNAL MEDICINE

## 2019-10-22 PROCEDURE — 700105 HCHG RX REV CODE 258: Performed by: INTERNAL MEDICINE

## 2019-10-22 PROCEDURE — 85027 COMPLETE CBC AUTOMATED: CPT

## 2019-10-22 PROCEDURE — 99232 SBSQ HOSP IP/OBS MODERATE 35: CPT | Performed by: HOSPITALIST

## 2019-10-22 PROCEDURE — 80048 BASIC METABOLIC PNL TOTAL CA: CPT

## 2019-10-22 PROCEDURE — 85610 PROTHROMBIN TIME: CPT

## 2019-10-22 PROCEDURE — 80061 LIPID PANEL: CPT

## 2019-10-22 PROCEDURE — 700102 HCHG RX REV CODE 250 W/ 637 OVERRIDE(OP): Performed by: INTERNAL MEDICINE

## 2019-10-22 PROCEDURE — 700111 HCHG RX REV CODE 636 W/ 250 OVERRIDE (IP)

## 2019-10-22 PROCEDURE — 700102 HCHG RX REV CODE 250 W/ 637 OVERRIDE(OP)

## 2019-10-22 PROCEDURE — 85730 THROMBOPLASTIN TIME PARTIAL: CPT

## 2019-10-22 PROCEDURE — 80053 COMPREHEN METABOLIC PANEL: CPT

## 2019-10-22 PROCEDURE — 4A023N7 MEASUREMENT OF CARDIAC SAMPLING AND PRESSURE, LEFT HEART, PERCUTANEOUS APPROACH: ICD-10-PCS | Performed by: INTERNAL MEDICINE

## 2019-10-22 PROCEDURE — 85025 COMPLETE CBC W/AUTO DIFF WBC: CPT

## 2019-10-22 PROCEDURE — 36415 COLL VENOUS BLD VENIPUNCTURE: CPT

## 2019-10-22 PROCEDURE — 99153 MOD SED SAME PHYS/QHP EA: CPT

## 2019-10-22 PROCEDURE — 96372 THER/PROPH/DIAG INJ SC/IM: CPT

## 2019-10-22 PROCEDURE — A9270 NON-COVERED ITEM OR SERVICE: HCPCS | Performed by: INTERNAL MEDICINE

## 2019-10-22 PROCEDURE — 99152 MOD SED SAME PHYS/QHP 5/>YRS: CPT | Performed by: INTERNAL MEDICINE

## 2019-10-22 PROCEDURE — B2111ZZ FLUOROSCOPY OF MULTIPLE CORONARY ARTERIES USING LOW OSMOLAR CONTRAST: ICD-10-PCS | Performed by: INTERNAL MEDICINE

## 2019-10-22 RX ORDER — SODIUM CHLORIDE 9 MG/ML
INJECTION, SOLUTION INTRAVENOUS CONTINUOUS
Status: DISCONTINUED | OUTPATIENT
Start: 2019-10-22 | End: 2019-10-24 | Stop reason: ALTCHOICE

## 2019-10-22 RX ORDER — ACYCLOVIR 200 MG/1
CAPSULE ORAL
Qty: 90 CAP | Refills: 0 | Status: SHIPPED | OUTPATIENT
Start: 2019-10-22 | End: 2020-02-19

## 2019-10-22 RX ORDER — HEPARIN SODIUM 200 [USP'U]/100ML
INJECTION, SOLUTION INTRAVENOUS
Status: COMPLETED
Start: 2019-10-22 | End: 2019-10-22

## 2019-10-22 RX ORDER — HEPARIN SODIUM,PORCINE 1000/ML
VIAL (ML) INJECTION
Status: COMPLETED
Start: 2019-10-22 | End: 2019-10-22

## 2019-10-22 RX ORDER — ASPIRIN 81 MG/1
TABLET, CHEWABLE ORAL
Status: COMPLETED
Start: 2019-10-22 | End: 2019-10-22

## 2019-10-22 RX ORDER — VERAPAMIL HYDROCHLORIDE 2.5 MG/ML
INJECTION, SOLUTION INTRAVENOUS
Status: COMPLETED
Start: 2019-10-22 | End: 2019-10-22

## 2019-10-22 RX ORDER — LIDOCAINE HYDROCHLORIDE 20 MG/ML
INJECTION, SOLUTION INFILTRATION; PERINEURAL
Status: COMPLETED
Start: 2019-10-22 | End: 2019-10-22

## 2019-10-22 RX ORDER — SIMVASTATIN 40 MG
TABLET ORAL
Refills: 0 | OUTPATIENT
Start: 2019-10-22

## 2019-10-22 RX ORDER — METOPROLOL SUCCINATE 25 MG/1
TABLET, EXTENDED RELEASE ORAL
Refills: 0 | OUTPATIENT
Start: 2019-10-22

## 2019-10-22 RX ORDER — LOSARTAN POTASSIUM 50 MG/1
TABLET ORAL
Refills: 0 | OUTPATIENT
Start: 2019-10-22

## 2019-10-22 RX ORDER — MIDAZOLAM HYDROCHLORIDE 1 MG/ML
INJECTION INTRAMUSCULAR; INTRAVENOUS
Status: COMPLETED
Start: 2019-10-22 | End: 2019-10-22

## 2019-10-22 RX ADMIN — IOHEXOL 25 ML: 350 INJECTION, SOLUTION INTRAVENOUS at 14:52

## 2019-10-22 RX ADMIN — METOPROLOL SUCCINATE 25 MG: 25 TABLET, EXTENDED RELEASE ORAL at 05:14

## 2019-10-22 RX ADMIN — NITROGLYCERIN 10 ML: 20 INJECTION INTRAVENOUS at 13:57

## 2019-10-22 RX ADMIN — HEPARIN SODIUM: 1000 INJECTION, SOLUTION INTRAVENOUS; SUBCUTANEOUS at 13:58

## 2019-10-22 RX ADMIN — CLOPIDOGREL BISULFATE 75 MG: 75 TABLET ORAL at 05:12

## 2019-10-22 RX ADMIN — ZOLPIDEM TARTRATE 5 MG: 5 TABLET ORAL at 00:32

## 2019-10-22 RX ADMIN — VERAPAMIL HYDROCHLORIDE 2.5 MG: 2.5 INJECTION INTRAVENOUS at 13:57

## 2019-10-22 RX ADMIN — SODIUM CHLORIDE: 9 INJECTION, SOLUTION INTRAVENOUS at 05:09

## 2019-10-22 RX ADMIN — ZOLPIDEM TARTRATE 5 MG: 5 TABLET ORAL at 02:14

## 2019-10-22 RX ADMIN — FENTANYL CITRATE 100 MCG: 50 INJECTION INTRAMUSCULAR; INTRAVENOUS at 14:45

## 2019-10-22 RX ADMIN — LOSARTAN POTASSIUM 50 MG: 50 TABLET ORAL at 05:14

## 2019-10-22 RX ADMIN — HEPARIN SODIUM 2000 UNITS: 200 INJECTION, SOLUTION INTRAVENOUS at 14:22

## 2019-10-22 RX ADMIN — GABAPENTIN 100 MG: 100 CAPSULE ORAL at 17:38

## 2019-10-22 RX ADMIN — SIMVASTATIN 40 MG: 40 TABLET, FILM COATED ORAL at 17:39

## 2019-10-22 RX ADMIN — SODIUM CHLORIDE 1000 ML: 9 INJECTION, SOLUTION INTRAVENOUS at 21:04

## 2019-10-22 RX ADMIN — INSULIN HUMAN 7 UNITS: 100 INJECTION, SOLUTION PARENTERAL at 00:22

## 2019-10-22 RX ADMIN — LIDOCAINE HYDROCHLORIDE: 20 INJECTION, SOLUTION INFILTRATION; PERINEURAL at 13:58

## 2019-10-22 RX ADMIN — MIDAZOLAM HYDROCHLORIDE 2 MG: 1 INJECTION, SOLUTION INTRAMUSCULAR; INTRAVENOUS at 14:38

## 2019-10-22 RX ADMIN — ASPIRIN 81 MG 81 MG: 81 TABLET ORAL at 14:00

## 2019-10-22 RX ADMIN — DEXTROSE MONOHYDRATE 250 ML: 100 INJECTION, SOLUTION INTRAVENOUS at 05:02

## 2019-10-22 ASSESSMENT — ENCOUNTER SYMPTOMS
VOMITING: 0
PSYCHIATRIC NEGATIVE: 1
NAUSEA: 0
FLANK PAIN: 0
CHEST TIGHTNESS: 0
SHORTNESS OF BREATH: 0
ABDOMINAL PAIN: 0
PALPITATIONS: 0
FOCAL WEAKNESS: 0
FALLS: 0
HEADACHES: 1
CHILLS: 0
LOSS OF CONSCIOUSNESS: 0
BLOOD IN STOOL: 0
HEADACHES: 0
COUGH: 0
FEVER: 0

## 2019-10-22 NOTE — CARE PLAN
Problem: Safety  Goal: Will remain free from falls  Outcome: PROGRESSING AS EXPECTED   Bed locked and in lowest position. Treaded socks on. Call light and belongings within reach. Patient educated to call for assistance. Patient verbalized understanding. Hourly rounding in place.    Problem: Infection  Goal: Will remain free from infection  Outcome: PROGRESSING AS EXPECTED   Patient showing no s/s of infection

## 2019-10-22 NOTE — CONSULTS
DATE OF SERVICE:  10/21/2019    CARDIOLOGY CONSULTATION    REFERRING PHYSICIAN:  Giuliano Chou MD.    CHIEF COMPLAINT:  1.  Chest pain.  2.  Abnormal myocardial perfusion study.    HISTORY OF PRESENT ILLNESS:  The patient is a 59-year-old male with past   medical history significant for diabetes, hypertension and hyperlipidemia, peripheral  vascular disease and prior chewing tobacco and marijuana smoking history.  He   has been experiencing chest pain for the last 6 months.  He describes his chest   pain to be a mid chest, sharp sensation without radiation, occasionally mild,   sometimes severe, lasting up to 10 minutes.  He admits to associated shortness   of breath.  Denies associated nausea, vomiting or diaphoresis.  The patient   underwent a nuclear stress which was abnormal.  In addition, noncardiac wise,   the patient did suffer a closed head trauma approximately 3 weeks ago.  He   underwent unremarkable evaluation for intracranial hemorrhage.    ALLERGIES:  No known drug allergies.    MEDICATIONS:  Clopidogrel 75 mg per day, gabapentin 100 mg per day, insulin,   losartan 50 mg per day, metoprolol SR 25 mg per day, simvastatin 40 mg at   bedtime.    PAST MEDICAL ILLNESS:  As above plus arthritis; cataracts; diabetic neuropathy;   chronic kidney disease, unspecified; history of shingles; psychiatric disorder, unspecified.    PAST SURGICAL HISTORY:  Appendectomy, cataract surgery, hand surgery, knee   surgery, tonsillectomy.    SOCIAL HISTORY:  He is single.  Previous for chewing tobacco and marijuana   smoking history.    FAMILY HISTORY:  Unremarkable for coronary artery disease.    REVIEW OF SYSTEMS:  Positive for fatigue, chronic visual changes, hearing   impairment, diabetes, chronic back pain, face pain, neck pain, dizziness,   Anxiety/depression/insomnia.  GENERAL: The patient denies fever, chills or weight changes.   HEENT: The patient denies headache, visual or hearing changes.   CENTRAL NERVOUS SYSTEM:  The patient denies lightheadedness,   syncope or seizures.   ENDOCRINE: The patient denies thyroid disorder.  RESPIRATORY: The patient denies productive cough, asthma or emphysema.  CARDIOVASCULAR: As above.   GASTROINTESTINAL: The patient denies bowel changes.   GENITOURINARY: The patient denies urinary changes.   PSYCHIATRIC: As above.  EXTREMITIES: As above.    PHYSICAL EXAMINATION:  VITAL SIGNS:  Include above.  Pulse 65, respiration 15, /72.  GENERAL:  A 59-year-old anxious male in no acute distress.  HEENT:  Head normocephalic, ecchymosis of the left scalp.  Eyes equal, oral   moist.  NECK:  Supple.  RESPIRATORY:  Clear to auscultation bilaterally.  Good effort.  CARDIOVASCULAR:  S1, S2 regular rate and rhythm without S3, S4 or murmur.  ABDOMEN:  Soft, nondistended.  No hepatosplenomegaly noted.  EXTREMITIES:  Upper extremities:  No clubbing or cyanosis.  Lower extremity,   no edema.  NEUROLOGIC:  Alert and oriented x3.  PSYCHIATRIC:  Very anxious.  SKIN:  Warm and dry.    CARDIAC STUDIES/PROCEDURE:      CT OF HEAD (02/19/19)  1.  There is no acute intracranial hemorrhage or calvarial fracture.  2.  There is minimal fluid in the right inferior mastoid air cells. There is no associated fracture identified.   (study result reviewed)    ECHOCARDIOGRAM CONCLUSIONS (10/21/19)  No prior study is available for comparison.   Left ventricular ejection fraction is visually estimated to be 60%.  Modrate left ventricular hypertrophy.  Mild aortic insufficiency.  Severe mitral annular calcification.  Unable to estimate RVSP, normal [id number]   (study result reviewed)    EKG performed on (10/20/19) was reviewed: EKG personally interpreted shows sinus rhythm with early R/S transition.    Laboratory results of (10/20/19) were reviewed. Troponin T level of 33 ng/L noted.    MYOCARDIAL PERFUSION STUDY CONCLUSIONS (10/21/19)  There is a probable small area of reversible ischemia in the inferolateral apex vs less likely  artifact.  Normal left ventricular size, ejection fraction, and wall motion.  (study result reviewed)    PERIPHERAL INTERVENTION by Inessa Rivera (04/21/17)  Left fourth toe infection with tibial artery occlusive disease.    ASSESSMENT AND PLAN:  1.  Chest pain with abnormal myocardial perfusion study:  The patient is a   59-year-old male with past medical history significant for diabetes, hypertension,   peripheral vascular diseasehyperlipidemia, and previous marijuana use history.    The patient has been experiencing chest pain and underwent a myocardial   perfusion study, which was abnormal as above.  He will be scheduled for cardiac   catheterization.  He understands the risk and benefits and agrees with this plan.  2.  Hypertension:  His blood pressure is well controlled.  We will continue   with beta blockade and angiotensin receptor blockade therapy.  3.  Hyperlipidemia:  He is doing well on statin therapy without myalgia.  4.  Peripheral vascular disease: Stable on medical therapy.  5.  Status post closed head trauma 3 weeks ago with unremarkable CT scan of   the head.    Thank you for this consult.       ____________________________________     MD KAREN ALVA / EUGENIO    DD:  10/21/2019 18:42:21  DT:  10/21/2019 22:10:13    D#:  [id number]  Job#:  [id number]

## 2019-10-22 NOTE — PROGRESS NOTES
Cardiology Follow Up Progress Note    Date of Service  10/22/2019    Attending Physician  Mary Dubose M.D.    Chief Complaint   Chest pain    Cardiology consulted due to abnormal MPI.    HPI  Dylon Santa is a 59 y.o. male admitted 10/20/2019 with ongoing chest discomfort for 6 months.  Pain is located in the mid chest and described as sharp without radiation.  States that his discomfort is occasionally mild sometimes severe lasting up to 10 minutes and is associated with shortness of breath.  Patient underwent a cardiac MPI which showed a probable small area of reversible ischemia in the inferolateral apex.    Patient recently had a closed head injury approximately 3 weeks ago after he fell hitting the back of his head while trying to move a 70 lb fountain, the fountain landed on his forehead an broke. Workup was completed at an out [facility] medical center and was reportedly unremarkable.     Other past medical history significant for diabetes, hypertension, hyperlipidemia and peripheral vascular disease.    Interim Events  10/22/2019: Plan is for left cardiac catheterization today. Primary complaint this am is that he was not able to sleep last night at all due to his room mate, patient would like a private room. States that every since his head injury he has had a difficult time concentrating and has intermittent headaches. Currently is chest pain free. Denies SOB, palpitations or syncope.     10/23/19: Primary complaint is anxiety and difficulty sleeping. Patient feels that his chest pain may have been caused by anxiety now that he knows his cath showed minimal CAD. No significant events overnight per nursing. SR [id number] with no ectopy on tele overnight.     Review of Systems  Review of Systems   Constitutional: Negative for fever.   Respiratory: Negative for chest tightness and shortness of breath.    Cardiovascular: Negative for chest pain, palpitations and leg swelling.   Gastrointestinal: Negative for  abdominal pain and blood in stool.   Genitourinary: Negative for hematuria.   Musculoskeletal: Negative for gait problem.   Neurological: Positive for headaches. Negative for dizziness and syncope.        Difficulty sleeping   Psychiatric/Behavioral: The patient is nervous/anxious.    All other systems reviewed and are negative.      Vital signs in last 24 hours  Temp:  [36.2 °C (97.2 °F)-37.1 °C (98.8 °F)] 36.4 °C (97.6 °F)  Pulse:  [65-76] 71  Resp:  [15-18] 18  BP: (110-177)/(66-83) 110/66  SpO2:  [96 %-100 %] 96 %    Physical Exam  Physical Exam   Constitutional: He is oriented to person, place, and time. He appears well-developed and well-nourished.   HENT:   Head: Normocephalic.   Eyes: EOM are normal.   Neck: No JVD present.   Cardiovascular: Normal rate, regular rhythm, normal heart sounds and intact distal pulses.   Pulmonary/Chest: Effort normal and breath sounds normal. No respiratory distress.   Abdominal: Soft. There is no tenderness.   Musculoskeletal: Normal range of motion. He exhibits no edema.   Neurological: He is alert and oriented to person, place, and time.   Skin: Skin is warm and dry.   Psychiatric: He has a normal mood and affect. His behavior is normal. Thought content normal.   Nursing note and vitals reviewed.      Lab Review  Lab Results   Component Value Date/Time    WBC 5.1 10/22/2019 02:23 AM    RBC 4.18 (L) 10/22/2019 02:23 AM    HEMOGLOBIN 12.3 (L) 10/22/2019 02:23 AM    HEMATOCRIT 38.4 (L) 10/22/2019 02:23 AM    MCV 91.9 10/22/2019 02:23 AM    MCH 29.4 10/22/2019 02:23 AM    MCHC 32.0 (L) 10/22/2019 02:23 AM    MPV 10.2 10/22/2019 02:23 AM      Lab Results   Component Value Date/Time    SODIUM 137 10/22/2019 02:23 AM    POTASSIUM 3.7 10/22/2019 02:23 AM    CHLORIDE 104 10/22/2019 02:23 AM    CO2 27 10/22/2019 02:23 AM    GLUCOSE 128 (H) 10/22/2019 02:23 AM    BUN 26 (H) 10/22/2019 02:23 AM    CREATININE 1.33 10/22/2019 02:23 AM      Lab Results   Component Value Date/Time     ASTSGOT 41 10/22/2019 02:23 AM    ALTSGPT 38 10/22/2019 02:23 AM     Lab Results   Component Value Date/Time    CHOLSTRLTOT 137 10/22/2019 01:56 AM    LDL 72 10/22/2019 01:56 AM    HDL 46 10/22/2019 01:56 AM    TRIGLYCERIDE 93 10/22/2019 01:56 AM    TROPONINT 31 (H) 10/21/2019 06:32 PM       No results for input(s): NTPROBNP in the last 72 hours.    Cardiac Imaging and Procedures Review  Echocardiogram 10/21/2019:  CONCLUSIONS  No prior study is available for comparison.   Left ventricular ejection fraction is visually estimated to be 60%.  Modrate left ventricular hypertrophy.  Mild aortic insufficiency.  Severe mitral annular calcification.  Unable to estimate RVSP, normal RAP.    Left Ventricle  Normal left ventricular chamber size. Modrate left ventricular hypertrophy. Normal left ventricular systolic function. Left ventricular ejection fraction is visually estimated to be 60%. Normal regional wall motion. A reliable estimation of diastolic function cannot be made due to mitral valve disease.    Mitral Valve  Severe mitral annular calcification. No mitral stenosis. Trace mitral   regurgitation.     NM-Cardiac Stress Test 10/21/19:  NUCLEAR IMAGING INTERPRETATION  There is a probable small area of reversible ischemia in the inferolateral apex vs less likely artifact.  Normal left ventricular size, ejection fraction, and wall motion.    Cardiac Catheterization 10/22/19:  Findings:  1.  Left main coronary artery:  Normal.  2.  Left anterior descending artery:  Luminal irregularities. Proximal LAD is calcified but no significant luminal narrowing. First and second diagonal branches have moderate disease, but these are small vessels.   3.  Left circumflex coronary artery:  Normal. Gives three marginal branches, which have no significant disease   4.  Right coronary artery:  Very small artery, no significant disease.  This is a left dominant system.  5.  Left ventricular end diastolic pressure:  16 mmHg.  No  signficant gradient across the aortic valve.    Final impression:  Minimal Non-obstructive coronary artery disease     Recommendations:  Guideline directed medical therapy   Cardiovascular Risk factor modification    Assessment/Plan  Chest Pain:  -LHC on 10/22/19 showed minimal non-obstructive CAD.  -TTE showed preserved LVEF with normal regional wall motion.  -Trop flat 33-->29-->31-->31.  -Continue Plavix 75 mg daily, Toprol XL 75 mg daily, losartan 50 mg daily and statin therapy.     Mitral valve disease:  -TTE on 10/21/2019 showed severe mitral annular calcification with trace MR and moderate LVH.    Hypertension:  -Well controlled.   -Continue BB/ARB as above.     Hyperlipidemia:   -LDL on 10/22/19 was 72 with .  -Continue Simvastatin 40 mg daily.     PVD:  -Continue home Plavix and statin therapy.       Thank you for allowing us to participate in the care of this patient. Please us if any questions or concerns.    Future Appointments   Date Time Provider Department Center   11/8/2019 10:15 AM DAYO Huertas [facility] None       DAYO Núñez   [facility]  [phone]

## 2019-10-22 NOTE — PROGRESS NOTES
Bedside report received from night RN, pt care assumed. Updated on POC, questions answered. Bed in lowest, locked position, treaded socks on, call light and belongings within reach.

## 2019-10-22 NOTE — PROGRESS NOTES
Patient down to cath lab with transport and ACLS RN. Monitor room notified. Patients belongings locked up in care coordination per patient's request.

## 2019-10-22 NOTE — PROGRESS NOTES
Bedside report received. Patient A&O x4. Currently complains of no pain. Pt is up independently, pt instructed to sit down in place if he feels dizzy at all.     POC discussed with patient. Patient verbalized understanding. Call light and belongings within reach. Bed locked and in lowest position, alarm and fall precautions in place.

## 2019-10-22 NOTE — PROCEDURES
Cardiac Catheterization report    10/22/2019  2:54 PM    Referring MD:     Primary Care Provider: Sandi Manning M.D.    Indication for procedure: Positive stress test moderate or high risk    Procedures performed:  · Coronary arteriograms  · Left heart catheterization    Final impression:  Minimal Non-obstructive coronary artery disease    Recommendations:  Guideline directed medical therapy   Cardiovascular Risk factor modification    Findings:  1.  Left main coronary artery:  Normal.  2.  Left anterior descending artery:  Luminal irregularities. Proximal LAD is calcified but no significant luminal narrowing. First and second diagonal branches have moderate disease, but these are small vessels.   3.  Left circumflex coronary artery:  Normal. Gives three marginal branches, which have no significant disease   4.  Right coronary artery:  Very small artery, no significant disease.  This is a left dominant system.  5.  Left ventricular end diastolic pressure:  16 mmHg.  No signficant gradient across the aortic valve.      EBL: <10 CC    Specimens: None    Procedure details:  The risks and benefits of cardiac catheterization and possible intervention were explained to the patient including death, heart attack, stroke, and emergency surgery.  The patient verbalized understanding and wished to proceed.  The patient was brought to the cardiac catheterization laboratory in the fasting state and prepped and draped in the usual sterile fashion.  The right wrist was locally anesthetized with lidocaine and the right radial artery was cannulated with 5/6-Bhutanese equipment and standard radial cocktail was given.  Radial artery was small, calcified.  Unable to advance sheath completely, unable to advance catheters.  Then right femoral arterial access was obtained using 4 Bhutanese sheath.  Case was completed through femoral access.  Coronary angiography was performed using JR 4 and JL 4 diagnostic catheters in the usual fashion,  results mentioned below.  JR 4 catheter was used to cross the aortic valve to perform  left heart catheterization.    Once all the views were obtained, all wires and catheters were removed from the patient without difficulty.  A Vasc-Band was placed over the right radial artery and the radial artery sheath was removed without difficulty.      Complications:  None    Sedation time:  I supervised moderate sedation over a trained independent nursing staff,  Sedation Start time:  14:20    Sedation Stop time: 14:47      ARI Altamirano  [facility]

## 2019-10-22 NOTE — PROGRESS NOTES
Dr. Cancino updated on fsbs of 290. Patient requesting 20 units of Humulin R. Per Dr. Cancino give 7 units of Humulin R and recheck in 2 hours as patient is NPO at midnight. Patient updated on POC.

## 2019-10-22 NOTE — PROGRESS NOTES
Patient back from cath lab with Cath lab RN. Patient placed back on tele monitor, monitor room notified. Pressure applied to groin site by cath lab RN and this RN for 15 minutes per Dr. Wilder. Will continue to monitor.

## 2019-10-22 NOTE — PROGRESS NOTES
Patient admitted after midnight.    59-year-old male type I diabetic, hypertension hyperlipidemia, presented 10/20/2019 with complaints of lightheadedness.  Recently had concussion after being hit on left forehead by large fountain.    No evidence of bleed on CT at outside hospital, MRI showed severe central spinal stenosis from C3-C4 to C6-C7.    Troponin 31, sugar is 170-200.  Echo performed here shows LVEF 60%, moderate LVH with mild aortic insufficiency.  He has severe mitral annular calcification.    Interval stress testing shows probable small area of reversible ischemia in the inferolateral apex, less likely to be artifact.  I discussed these findings with Dr. Gonsalves.     Patient seen and examined afterwards, he reports some on and off staggering left-sided chest pain for the last few weeks.  No diaphoresis, no nausea or vomiting.  He is more worried about his postconcussive symptoms, has been very forgetful and having word finding issues.    Given this stress test finding in the setting of a type I diabetic, would suspect he would benefit from cardiac catheterization.  I discussed this with cardiology, anticipate consultation.    <<68550>>  I spent a total of 40 minutes of non face to face time performing additional research, reviewing medical records and labs and/or discussing plan of care with other healthcare providers.  Discussion with providers as noted above.  Discussed with RN in addition to face time with patient this afternoon to provide updates.    Start time: 4:50 PM  End time: 5:30 PM

## 2019-10-23 ENCOUNTER — PATIENT OUTREACH (OUTPATIENT)
Dept: HEALTH INFORMATION MANAGEMENT | Facility: OTHER | Age: 59
End: 2019-10-23

## 2019-10-23 PROBLEM — I24.9 ACS (ACUTE CORONARY SYNDROME) (HCC): Status: ACTIVE | Noted: 2019-10-21

## 2019-10-23 LAB
ALBUMIN SERPL BCP-MCNC: 4.6 G/DL (ref 3.2–4.9)
ALBUMIN/GLOB SERPL: 1.3 G/DL
ALP SERPL-CCNC: 71 U/L (ref 30–99)
ALT SERPL-CCNC: 34 U/L (ref 2–50)
ANION GAP SERPL CALC-SCNC: 6 MMOL/L (ref 0–11.9)
ANION GAP SERPL CALC-SCNC: 8 MMOL/L (ref 0–11.9)
AST SERPL-CCNC: 32 U/L (ref 12–45)
BILIRUB SERPL-MCNC: 0.7 MG/DL (ref 0.1–1.5)
BUN SERPL-MCNC: 29 MG/DL (ref 8–22)
BUN SERPL-MCNC: 30 MG/DL (ref 8–22)
CALCIUM SERPL-MCNC: 8.5 MG/DL (ref 8.5–10.5)
CALCIUM SERPL-MCNC: 9.3 MG/DL (ref 8.5–10.5)
CHLORIDE SERPL-SCNC: 102 MMOL/L (ref 96–112)
CHLORIDE SERPL-SCNC: 103 MMOL/L (ref 96–112)
CO2 SERPL-SCNC: 24 MMOL/L (ref 20–33)
CO2 SERPL-SCNC: 25 MMOL/L (ref 20–33)
CREAT SERPL-MCNC: 1.49 MG/DL (ref 0.5–1.4)
CREAT SERPL-MCNC: 1.6 MG/DL (ref 0.5–1.4)
ERYTHROCYTE [DISTWIDTH] IN BLOOD BY AUTOMATED COUNT: 44.9 FL (ref 35.9–50)
GLOBULIN SER CALC-MCNC: 3.6 G/DL (ref 1.9–3.5)
GLUCOSE BLD-MCNC: 135 MG/DL (ref 65–99)
GLUCOSE BLD-MCNC: 183 MG/DL (ref 65–99)
GLUCOSE BLD-MCNC: 257 MG/DL (ref 65–99)
GLUCOSE BLD-MCNC: 285 MG/DL (ref 65–99)
GLUCOSE BLD-MCNC: 287 MG/DL (ref 65–99)
GLUCOSE BLD-MCNC: 315 MG/DL (ref 65–99)
GLUCOSE BLD-MCNC: 359 MG/DL (ref 65–99)
GLUCOSE SERPL-MCNC: 234 MG/DL (ref 65–99)
GLUCOSE SERPL-MCNC: 308 MG/DL (ref 65–99)
HBV CORE AB SERPL QL IA: NEGATIVE
HBV SURFACE AB SERPL IA-ACNC: 22.73 MIU/ML (ref 0–10)
HBV SURFACE AG SER QL: NEGATIVE
HCT VFR BLD AUTO: 34.8 % (ref 42–52)
HCT VFR BLD AUTO: 41.6 % (ref 42–52)
HCV AB SER QL: REACTIVE
HGB BLD-MCNC: 11.4 G/DL (ref 14–18)
HGB BLD-MCNC: 13.9 G/DL (ref 14–18)
HIV 1+2 AB+HIV1 P24 AG SERPL QL IA: NON REACTIVE
MAGNESIUM SERPL-MCNC: 1.9 MG/DL (ref 1.5–2.5)
MCH RBC QN AUTO: 29.8 PG (ref 27–33)
MCHC RBC AUTO-ENTMCNC: 32.4 G/DL (ref 33.7–35.3)
MCV RBC AUTO: 92 FL (ref 81.4–97.8)
PLATELET # BLD AUTO: 271 K/UL (ref 164–446)
PMV BLD AUTO: 10.2 FL (ref 9–12.9)
POTASSIUM SERPL-SCNC: 4 MMOL/L (ref 3.6–5.5)
POTASSIUM SERPL-SCNC: 4.3 MMOL/L (ref 3.6–5.5)
PROT SERPL-MCNC: 8.2 G/DL (ref 6–8.2)
RBC # BLD AUTO: 4.63 M/UL (ref 4.7–6.1)
SODIUM SERPL-SCNC: 133 MMOL/L (ref 135–145)
SODIUM SERPL-SCNC: 135 MMOL/L (ref 135–145)
WBC # BLD AUTO: 8.4 K/UL (ref 4.8–10.8)

## 2019-10-23 PROCEDURE — 80053 COMPREHEN METABOLIC PANEL: CPT

## 2019-10-23 PROCEDURE — 86803 HEPATITIS C AB TEST: CPT

## 2019-10-23 PROCEDURE — 99232 SBSQ HOSP IP/OBS MODERATE 35: CPT | Performed by: HOSPITALIST

## 2019-10-23 PROCEDURE — 87340 HEPATITIS B SURFACE AG IA: CPT

## 2019-10-23 PROCEDURE — 700102 HCHG RX REV CODE 250 W/ 637 OVERRIDE(OP): Performed by: HOSPITALIST

## 2019-10-23 PROCEDURE — 700105 HCHG RX REV CODE 258: Performed by: INTERNAL MEDICINE

## 2019-10-23 PROCEDURE — 82962 GLUCOSE BLOOD TEST: CPT | Mod: 91

## 2019-10-23 PROCEDURE — 85027 COMPLETE CBC AUTOMATED: CPT

## 2019-10-23 PROCEDURE — 99232 SBSQ HOSP IP/OBS MODERATE 35: CPT | Performed by: INTERNAL MEDICINE

## 2019-10-23 PROCEDURE — 36415 COLL VENOUS BLD VENIPUNCTURE: CPT

## 2019-10-23 PROCEDURE — 86706 HEP B SURFACE ANTIBODY: CPT

## 2019-10-23 PROCEDURE — 83735 ASSAY OF MAGNESIUM: CPT

## 2019-10-23 PROCEDURE — A9270 NON-COVERED ITEM OR SERVICE: HCPCS | Performed by: INTERNAL MEDICINE

## 2019-10-23 PROCEDURE — 85018 HEMOGLOBIN: CPT

## 2019-10-23 PROCEDURE — 80048 BASIC METABOLIC PNL TOTAL CA: CPT

## 2019-10-23 PROCEDURE — 86704 HEP B CORE ANTIBODY TOTAL: CPT

## 2019-10-23 PROCEDURE — 700102 HCHG RX REV CODE 250 W/ 637 OVERRIDE(OP): Performed by: INTERNAL MEDICINE

## 2019-10-23 PROCEDURE — A9270 NON-COVERED ITEM OR SERVICE: HCPCS | Performed by: HOSPITALIST

## 2019-10-23 PROCEDURE — 87389 HIV-1 AG W/HIV-1&-2 AB AG IA: CPT

## 2019-10-23 PROCEDURE — 87522 HEPATITIS C REVRS TRNSCRPJ: CPT

## 2019-10-23 PROCEDURE — 85014 HEMATOCRIT: CPT

## 2019-10-23 PROCEDURE — 770020 HCHG ROOM/CARE - TELE (206)

## 2019-10-23 RX ORDER — LORAZEPAM 1 MG/1
0.5 TABLET ORAL 3 TIMES DAILY PRN
Status: DISCONTINUED | OUTPATIENT
Start: 2019-10-23 | End: 2019-10-24

## 2019-10-23 RX ADMIN — INSULIN LISPRO 7 UNITS: 100 INJECTION, SOLUTION INTRAVENOUS; SUBCUTANEOUS at 06:18

## 2019-10-23 RX ADMIN — GABAPENTIN 100 MG: 100 CAPSULE ORAL at 17:13

## 2019-10-23 RX ADMIN — LOSARTAN POTASSIUM 50 MG: 50 TABLET ORAL at 05:16

## 2019-10-23 RX ADMIN — SIMVASTATIN 40 MG: 40 TABLET, FILM COATED ORAL at 17:13

## 2019-10-23 RX ADMIN — SODIUM CHLORIDE 1000 ML: 9 INJECTION, SOLUTION INTRAVENOUS at 20:37

## 2019-10-23 RX ADMIN — INSULIN HUMAN 22 UNITS: 100 INJECTION, SUSPENSION SUBCUTANEOUS at 05:14

## 2019-10-23 RX ADMIN — ZOLPIDEM TARTRATE 5 MG: 5 TABLET ORAL at 23:02

## 2019-10-23 RX ADMIN — METOPROLOL SUCCINATE 25 MG: 25 TABLET, EXTENDED RELEASE ORAL at 05:17

## 2019-10-23 RX ADMIN — ZOLPIDEM TARTRATE 5 MG: 5 TABLET ORAL at 01:24

## 2019-10-23 RX ADMIN — SODIUM CHLORIDE: 9 INJECTION, SOLUTION INTRAVENOUS at 10:21

## 2019-10-23 RX ADMIN — LORAZEPAM 0.5 MG: 1 TABLET ORAL at 20:41

## 2019-10-23 RX ADMIN — CLOPIDOGREL BISULFATE 75 MG: 75 TABLET ORAL at 05:16

## 2019-10-23 ASSESSMENT — ENCOUNTER SYMPTOMS
FLANK PAIN: 0
FOCAL WEAKNESS: 0
FALLS: 0
DIZZINESS: 0
PALPITATIONS: 0
VOMITING: 0
NAUSEA: 0
INSOMNIA: 1
COUGH: 0
DIAPHORESIS: 0
LOSS OF CONSCIOUSNESS: 0
CHILLS: 0
FEVER: 0
NERVOUS/ANXIOUS: 1
SHORTNESS OF BREATH: 0
HEADACHES: 0
ABDOMINAL PAIN: 0

## 2019-10-23 NOTE — DISCHARGE PLANNING
Care Transition Team Assessment     Patient lives with brother and was independent with ADL's prior to admit. Patient uses Uber or gets rides from family when able to. Patient uses [facility] mail order pharmacy. Patient reports depression as his father recently passed away. Brother, Luis Carlos, is DPOA on file.        Information Source  Orientation : Oriented x 4  Information Given By: Patient  Who is responsible for making decisions for patient? : Patient    Readmission Evaluation  Is this a readmission?: No    Elopement Risk  Legal Hold: No  Ambulatory or Self Mobile in Wheelchair: Yes  Disoriented: No  Psychiatric Symptoms: None  History of Wandering: No  Elopement this Admit: No  Vocalizing Wanting to Leave: No  Displays Behaviors, Body Language Wanting to Leave: No-Not at Risk for Elopement  Elopement Risk: Not at Risk for Elopement    Interdisciplinary Discharge Planning  Does Admitting Nurse Feel This Could be a Complex Discharge?: No  Primary Care Physician: Sandi Manning  Lives with - Patient's Self Care Capacity: Other (Comments)(brother)  Patient or legal guardian wants to designate a caregiver (see row info): No  Support Systems: Family Member(s)  Do You Take your Prescribed Medications Regularly: Yes  Able to Return to Previous ADL's: Yes  Mobility Issues: No  Prior Services: None  Patient Expects to be Discharged to:: home  Assistance Needed: Unknown at this Time  Durable Medical Equipment: Not Applicable    Discharge Preparedness  What is your plan after discharge?: (home)  What are your discharge supports?: Sibling  Prior Functional Level: Ambulatory, Independent with Activities of Daily Living, Independent with Medication Management    Functional Assesment  Prior Functional Level: Ambulatory, Independent with Activities of Daily Living, Independent with Medication Management    Finances  Financial Barriers to Discharge: No  Prescription Coverage: Yes    Vision / Hearing Impairment  Vision Impairment :  Yes  Right Eye Vision: Impaired, Wears Glasses  Left Eye Vision: Impaired, Wears Glasses  Hearing Impairment : No    Advance Directive  Advance Directive?: DPOA for Health Care  Durable Power of  Name and Contact : Pedro Pablo Santa [phone]    Domestic Abuse  Have you ever been the victim of abuse or violence?: No  Physical Abuse or Sexual Abuse: No  Verbal Abuse or Emotional Abuse: No  Possible Abuse Reported to:: Not Applicable    Psychological Assessment  History of Substance Abuse: None  History of Psychiatric Problems: No    Anticipated Discharge Information  Anticipated discharge disposition: Home  Discharge Address: [facility] [address], [place] [address]  Discharge Contact Phone Number: [phone]

## 2019-10-23 NOTE — PROGRESS NOTES
Patient . Patient upset about insulin sliding scale, refusing to take 2 units. MD notified, new orders received.

## 2019-10-23 NOTE — CARE PLAN
Problem: Communication  Goal: The ability to communicate needs accurately and effectively will improve  Outcome: PROGRESSING AS EXPECTED   Pt's whiteboard updated. Pt has been updated on POC. All questions have been answered.    Problem: Venous Thromboembolism (VTW)/Deep Vein Thrombosis (DVT) Prevention:  Goal: Patient will participate in Venous Thrombosis (VTE)/Deep Vein Thrombosis (DVT)Prevention Measures  Outcome: PROGRESSING AS EXPECTED   Patient ambulating frequently.

## 2019-10-23 NOTE — THERAPY
Physical Therapy Contact Note    PT cardiac rehab order received, positive stress test (probable reversable ischemia vs artifact) however cath with minimal non obstructive dx, no intervention with preserved EF. Pt not appropriate for phase I cardiac education at this time and can be referred to outpt cardiac rehab at discretion of provider for primary prevention. Please reconsult should condition change.     Shanell Logan, PT, DPT Pager: [phone]

## 2019-10-23 NOTE — PROGRESS NOTES
Bedside report received. Patient A&O x4. Pt complains of slight discomfort in right groin sight. Assessed site with Susan COTTER. Area is soft around cath site with some swelling. Susan Cotter stated that this hasn't changed since they last applied pressure to site. Pt can be off bed rest at 830pm per Susan COTTER as long as pt remains hemodynamically stable. Will continue to monitor and assess. Advised the pt to notify me of increasing pain/ discomfort or any back pain.       POC discussed with patient. Patient verbalized understanding. Call light and belongings within reach. Bed locked and in lowest position, alarm and fall precautions in place.

## 2019-10-23 NOTE — ASSESSMENT & PLAN NOTE
Normotensive.  - continue metop  - ARB held for increase in Cr which is now improving, resume when needed

## 2019-10-23 NOTE — PROGRESS NOTES
Bedside report received from night RN, pt care assumed. Patient sleeping. Bed in lowest, locked position, treaded socks on, call light and belongings within reach.

## 2019-10-23 NOTE — PROGRESS NOTES
"Hospital Medicine Daily Progress Note    Date of Service  10/23/2019    Chief Complaint  59 y.o. male admitted 10/20/2019 with chest pain and lightheadedness.     Interval Problem Update  Doesn't like the DM diet, as it includes pancakes and lots of carbs. Increasing insulin regimen. No chest pain but with anxiety and states he had a \"panic attack\" overnight.     Consultants/Specialty  Cardiology    Code Status  Full    Disposition  Anticipate d/c home tomorrow.    Review of Systems  Review of Systems   Constitutional: Positive for malaise/fatigue (improving). Negative for chills, diaphoresis and fever.   HENT: Negative for congestion.    Respiratory: Negative for cough and shortness of breath.    Cardiovascular: Positive for chest pain (greatly improved). Negative for palpitations and leg swelling.   Gastrointestinal: Negative for abdominal pain, nausea and vomiting.   Genitourinary: Negative for dysuria and flank pain.   Musculoskeletal: Negative for falls.   Neurological: Negative for focal weakness, loss of consciousness and headaches.   Psychiatric/Behavioral: The patient is nervous/anxious and has insomnia.    All other systems reviewed and are negative.       Physical Exam  Temp:  [36.4 °C (97.6 °F)-37.2 °C (98.9 °F)] 37.2 °C (98.9 °F)  Pulse:  [65-86] 76  Resp:  [14-16] 14  BP: (123-146)/(59-85) 127/59  SpO2:  [93 %-99 %] 95 %    Physical Exam   Constitutional: He is oriented to person, place, and time. He appears well-developed. No distress.   HENT:   Head: Normocephalic.   Mouth/Throat: Oropharynx is clear and moist.   Eyes: Pupils are equal, round, and reactive to light. EOM are normal. No scleral icterus.   Neck: Normal range of motion. Neck supple. No tracheal deviation present.   Cardiovascular: Normal rate, regular rhythm and intact distal pulses.   Pulmonary/Chest: Effort normal and breath sounds normal. No respiratory distress. He has no rales.   Abdominal: Soft. Bowel sounds are normal. He exhibits " no distension. There is no tenderness. There is no guarding.   Musculoskeletal: He exhibits no tenderness.   Neurological: He is alert and oriented to person, place, and time.   Skin: Skin is warm and dry.   Psychiatric: He has a normal mood and affect. His speech is normal and behavior is normal.   Vitals reviewed.  Patient seen and examined today on 10/23, unchanged from 10/22.    Fluids  No intake or output data in the 24 hours ending 10/23/19 0751    Laboratory  Recent Labs     10/20/19  2328 10/22/19  0156 10/22/19  0223 10/23/19  0319   WBC 6.4 5.1 5.1  --    RBC 3.97* 4.05* 4.18*  --    HEMOGLOBIN 12.0* 12.4* 12.3* 11.4*   HEMATOCRIT 36.4* 36.6* 38.4* 34.8*   MCV 91.7 90.4 91.9  --    MCH 30.2 30.6 29.4  --    MCHC 33.0* 33.9 32.0*  --    RDW 44.6 43.3 43.8  --    PLATELETCT 221 220 229  --    MPV 10.4 10.2 10.2  --      Recent Labs     10/22/19  0156 10/22/19  0223 10/23/19  0319   SODIUM 135 137 133*   POTASSIUM 3.8 3.7 4.3   CHLORIDE 104 104 103   CO2 25 27 24   GLUCOSE 194* 128* 308*   BUN 27* 26* 29*   CREATININE 1.36 1.33 1.60*   CALCIUM 9.2 9.3 8.5     Recent Labs     10/20/19  2328 10/22/19  0223   APTT 30.8 30.6   INR 0.96 1.04         Recent Labs     10/22/19  0156   TRIGLYCERIDE 93   HDL 46   LDL 72       Imaging  NM-CARDIAC STRESS TEST   Final Result      EC-ECHOCARDIOGRAM COMPLETE W/O CONT   Final Result      DX-CHEST-PORTABLE (1 VIEW)   Final Result         1.  No acute cardiopulmonary disease.   2.  Atherosclerosis      CL-LEFT HEART CATHETERIZATION WITH POSSIBLE INTERVENTION    (Results Pending)        Assessment/Plan  Chest pain- (present on admission)  Assessment & Plan  Abnormal stress test. Minimal non-obstruction CAD seen   - s/p cardiac cath on 10/22 with stent placement  - cardiology following, appreciate recs  - continue ASA and plavix  - monitor on telemetry    Postconcussion syndrome- (present on admission)  Assessment & Plan  After a bird bath falling on him  - recommend outpatient  evaluation by neurology    Dizziness- (present on admission)  Assessment & Plan  Rule out cardiogenic causes. Echo with normal EF and moderate LVH. Severe mitral annular calcification.   - Continuous cardiac monitoring on telemetry  - orthostatic vs pending    No prior study is available for comparison.   Left ventricular ejection fraction is visually estimated to be 60%.  Modrate left ventricular hypertrophy.  Mild aortic insufficiency.  Severe mitral annular calcification.  Unable to estimate RVSP, normal RAP    PVD (peripheral vascular disease) ([facility])- (present on admission)  Assessment & Plan  - continue with ASA and statin    Type 1 diabetes mellitus with diabetic chronic kidney disease (HCC)- (present on admission)  Assessment & Plan  Uncontrolled with hyperglycemia. A1C 7.2%  - DM diet and hypoglycemia protocol  - increase to moderate sliding scale\  - prandial insulin 10U with meals    CKD (chronic kidney disease) stage 3, GFR 30-59 ml/min Dr. Pascual- (present on admission)  Assessment & Plan  Baseline Cr ~1.3, slight bump today.  - continue with IVF and repeat tomorrow (did get contrast with left heart cath)  - Avoiding nephrotoxics: NSAIDs etc    Essential hypertension- (present on admission)  Assessment & Plan  Normotensive.  - continue metop  - holding ARB for slight bump in Cr       VTE prophylaxis: SCDs

## 2019-10-24 LAB
ANION GAP SERPL CALC-SCNC: 7 MMOL/L (ref 0–11.9)
BUN SERPL-MCNC: 29 MG/DL (ref 8–22)
CALCIUM SERPL-MCNC: 8.4 MG/DL (ref 8.5–10.5)
CHLORIDE SERPL-SCNC: 104 MMOL/L (ref 96–112)
CO2 SERPL-SCNC: 22 MMOL/L (ref 20–33)
CREAT SERPL-MCNC: 1.39 MG/DL (ref 0.5–1.4)
GLUCOSE BLD-MCNC: 286 MG/DL (ref 65–99)
GLUCOSE BLD-MCNC: 326 MG/DL (ref 65–99)
GLUCOSE SERPL-MCNC: 318 MG/DL (ref 65–99)
HCT VFR BLD AUTO: 33.5 % (ref 42–52)
HGB BLD-MCNC: 11.1 G/DL (ref 14–18)
POTASSIUM SERPL-SCNC: 4.2 MMOL/L (ref 3.6–5.5)
SODIUM SERPL-SCNC: 133 MMOL/L (ref 135–145)

## 2019-10-24 PROCEDURE — 80048 BASIC METABOLIC PNL TOTAL CA: CPT

## 2019-10-24 PROCEDURE — 99232 SBSQ HOSP IP/OBS MODERATE 35: CPT | Performed by: HOSPITALIST

## 2019-10-24 PROCEDURE — 36415 COLL VENOUS BLD VENIPUNCTURE: CPT

## 2019-10-24 PROCEDURE — 85014 HEMATOCRIT: CPT

## 2019-10-24 PROCEDURE — 85018 HEMOGLOBIN: CPT

## 2019-10-24 PROCEDURE — A9270 NON-COVERED ITEM OR SERVICE: HCPCS | Performed by: INTERNAL MEDICINE

## 2019-10-24 PROCEDURE — 700102 HCHG RX REV CODE 250 W/ 637 OVERRIDE(OP): Performed by: INTERNAL MEDICINE

## 2019-10-24 PROCEDURE — 82962 GLUCOSE BLOOD TEST: CPT | Mod: 91

## 2019-10-24 PROCEDURE — 770020 HCHG ROOM/CARE - TELE (206)

## 2019-10-24 RX ORDER — HYDROXYZINE HYDROCHLORIDE 10 MG/1
10 TABLET, FILM COATED ORAL 3 TIMES DAILY PRN
Status: DISCONTINUED | OUTPATIENT
Start: 2019-10-24 | End: 2019-10-25 | Stop reason: HOSPADM

## 2019-10-24 RX ADMIN — METOPROLOL SUCCINATE 25 MG: 25 TABLET, EXTENDED RELEASE ORAL at 06:08

## 2019-10-24 RX ADMIN — CLOPIDOGREL BISULFATE 75 MG: 75 TABLET ORAL at 06:08

## 2019-10-24 RX ADMIN — GABAPENTIN 100 MG: 100 CAPSULE ORAL at 17:25

## 2019-10-24 RX ADMIN — SIMVASTATIN 40 MG: 40 TABLET, FILM COATED ORAL at 17:25

## 2019-10-24 RX ADMIN — INSULIN HUMAN 22 UNITS: 100 INJECTION, SUSPENSION SUBCUTANEOUS at 06:09

## 2019-10-24 ASSESSMENT — ENCOUNTER SYMPTOMS
CHILLS: 0
COUGH: 0
INSOMNIA: 1
LOSS OF CONSCIOUSNESS: 0
FOCAL WEAKNESS: 0
DEPRESSION: 1
FLANK PAIN: 0
PALPITATIONS: 0
DIAPHORESIS: 0
FALLS: 0
MYALGIAS: 0
NAUSEA: 0
DIZZINESS: 0
VOMITING: 0
NERVOUS/ANXIOUS: 1
FEVER: 0
SHORTNESS OF BREATH: 0
ABDOMINAL PAIN: 0
HEADACHES: 0

## 2019-10-24 NOTE — DISCHARGE PLANNING
Anticipated Discharge Disposition: Home    Action: RN CM talked with patient at bedside to discuss resources for counseling/depression as patient has expressed concerns about his panic attacks.  Patient told this RN CM about all the unfortunate personal and health events that have taken place in his life over the last few years. Patient receives $786/month from disability due to his loss of eyesight, and recently lost his father. Patient reports having panic attacks at night at home and concerned of what to do. RN CM provided support and active listening and gave list of resources for counseling and mental health for patient. Patient appreciative.  Patient reports having trouble with transportation as he was denied RTC access. Patient informed of [facility] through his [facility] for transportation to medical appointments. Patient received info for this from [place] with [employer].  Per patient he is staying another night and may discharge tomorrow.    Barriers to Discharge: pending medical clearance    Plan: RN CM is available for discharge needs, [phone]
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
## 2019-10-24 NOTE — PROGRESS NOTES
Report received via telephone because Nurse is down in ER currently. Patient A&O x4. No complaints of pain but pt is highly anxious about possibly discharging tomorrow. Will continue to monitor.      POC discussed with patient. Patient verbalized understanding. Call light and belongings within reach. Bed locked and in lowest position, alarm and fall precautions in place.
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
## 2019-10-25 VITALS
DIASTOLIC BLOOD PRESSURE: 74 MMHG | RESPIRATION RATE: 16 BRPM | TEMPERATURE: 97.9 F | SYSTOLIC BLOOD PRESSURE: 121 MMHG | OXYGEN SATURATION: 98 % | BODY MASS INDEX: 24.03 KG/M2 | HEART RATE: 76 BPM | WEIGHT: 162.26 LBS | HEIGHT: 69 IN

## 2019-10-25 PROBLEM — R07.9 CHEST PAIN: Status: RESOLVED | Noted: 2019-10-21 | Resolved: 2019-10-25

## 2019-10-25 PROBLEM — R42 DIZZINESS: Status: RESOLVED | Noted: 2019-10-21 | Resolved: 2019-10-25

## 2019-10-25 PROBLEM — F41.0 PANIC ATTACKS: Status: ACTIVE | Noted: 2019-10-25

## 2019-10-25 LAB
ANION GAP SERPL CALC-SCNC: 9 MMOL/L (ref 0–11.9)
BUN SERPL-MCNC: 28 MG/DL (ref 8–22)
CALCIUM SERPL-MCNC: 9.4 MG/DL (ref 8.5–10.5)
CHLORIDE SERPL-SCNC: 101 MMOL/L (ref 96–112)
CO2 SERPL-SCNC: 24 MMOL/L (ref 20–33)
CREAT SERPL-MCNC: 1.45 MG/DL (ref 0.5–1.4)
GLUCOSE BLD-MCNC: 167 MG/DL (ref 65–99)
GLUCOSE BLD-MCNC: 186 MG/DL (ref 65–99)
GLUCOSE BLD-MCNC: 217 MG/DL (ref 65–99)
GLUCOSE BLD-MCNC: 237 MG/DL (ref 65–99)
GLUCOSE BLD-MCNC: 282 MG/DL (ref 65–99)
GLUCOSE BLD-MCNC: 318 MG/DL (ref 65–99)
GLUCOSE BLD-MCNC: 88 MG/DL (ref 65–99)
GLUCOSE SERPL-MCNC: 222 MG/DL (ref 65–99)
HCT VFR BLD AUTO: 34.7 % (ref 42–52)
HGB BLD-MCNC: 11.6 G/DL (ref 14–18)
POTASSIUM SERPL-SCNC: 4 MMOL/L (ref 3.6–5.5)
SODIUM SERPL-SCNC: 134 MMOL/L (ref 135–145)

## 2019-10-25 PROCEDURE — 36415 COLL VENOUS BLD VENIPUNCTURE: CPT

## 2019-10-25 PROCEDURE — 99239 HOSP IP/OBS DSCHRG MGMT >30: CPT | Performed by: HOSPITALIST

## 2019-10-25 PROCEDURE — 700102 HCHG RX REV CODE 250 W/ 637 OVERRIDE(OP): Performed by: INTERNAL MEDICINE

## 2019-10-25 PROCEDURE — A9270 NON-COVERED ITEM OR SERVICE: HCPCS | Performed by: INTERNAL MEDICINE

## 2019-10-25 PROCEDURE — 85014 HEMATOCRIT: CPT

## 2019-10-25 PROCEDURE — 700102 HCHG RX REV CODE 250 W/ 637 OVERRIDE(OP): Performed by: HOSPITALIST

## 2019-10-25 PROCEDURE — 85018 HEMOGLOBIN: CPT

## 2019-10-25 PROCEDURE — 82962 GLUCOSE BLOOD TEST: CPT | Mod: 91

## 2019-10-25 PROCEDURE — 80048 BASIC METABOLIC PNL TOTAL CA: CPT

## 2019-10-25 PROCEDURE — A9270 NON-COVERED ITEM OR SERVICE: HCPCS | Performed by: HOSPITALIST

## 2019-10-25 RX ORDER — TRAZODONE HYDROCHLORIDE 50 MG/1
50 TABLET ORAL NIGHTLY PRN
Qty: 7 TAB | Refills: 0 | Status: SHIPPED | OUTPATIENT
Start: 2019-10-25 | End: 2019-10-28 | Stop reason: SDUPTHER

## 2019-10-25 RX ORDER — GABAPENTIN 100 MG/1
100 CAPSULE ORAL DAILY
Qty: 90 CAP | Refills: 2 | Status: SHIPPED | OUTPATIENT
Start: 2019-10-25 | End: 2019-10-28 | Stop reason: SDUPTHER

## 2019-10-25 RX ADMIN — METOPROLOL SUCCINATE 25 MG: 25 TABLET, EXTENDED RELEASE ORAL at 05:51

## 2019-10-25 RX ADMIN — INSULIN HUMAN 22 UNITS: 100 INJECTION, SUSPENSION SUBCUTANEOUS at 06:24

## 2019-10-25 RX ADMIN — CLOPIDOGREL BISULFATE 75 MG: 75 TABLET ORAL at 05:51

## 2019-10-25 RX ADMIN — HYDROXYZINE HYDROCHLORIDE 10 MG: 10 TABLET, FILM COATED ORAL at 01:36

## 2019-10-25 ASSESSMENT — PATIENT HEALTH QUESTIONNAIRE - PHQ9
1. LITTLE INTEREST OR PLEASURE IN DOING THINGS: NOT AT ALL
2. FEELING DOWN, DEPRESSED, IRRITABLE, OR HOPELESS: NOT AT ALL
SUM OF ALL RESPONSES TO PHQ9 QUESTIONS 1 AND 2: 0

## 2019-10-25 NOTE — CARE PLAN
Pt seems to still struggle with anxiety, depression, paranoia, anxiety meds did not seem to help, pt is doing well about taking safety precautions while walking around unit

## 2019-10-25 NOTE — DISCHARGE INSTRUCTIONS
Discharge Instructions per Mary Dubose M.D.    Please follow up with your PCP and the behavioral health services in the area. You would likely benefit from outpatient neurology consultation given your post-concussive syndrome.     Please follow up on the confirmatory test for hepatitis C. Screen test was positive but can be a false positive.     DIET: Diabetic diet    ACTIVITY: as tolerated    DIAGNOSIS: atypical chest pain; panic attacks    Return to ER if you develop new or worsening symptoms.  Discharge Instructions    Discharged to home by car with relative. Discharged via walking, hospital escort: Yes.  Special equipment needed: Not Applicable    Be sure to schedule a follow-up appointment with your primary care doctor or any specialists as instructed.     Discharge Plan:   Diet Plan: Discussed  Activity Level: Discussed  Confirmed Follow up Appointment: Appointment Scheduled  Confirmed Symptoms Management: Discussed  Medication Reconciliation Updated: Yes  Influenza Vaccine Indication: Indicated: 9 to 64 years of age  Influenza Vaccine Given - only chart on this line when given: Influenza Vaccine Given (See MAR)    I understand that a diet low in cholesterol, fat, and sodium is recommended for good health. Unless I have been given specific instructions below for another diet, I accept this instruction as my diet prescription.   Other diet: heart healthy     Special Instructions:     · Is patient discharged on Warfarin / Coumadin?   No     Depression / Suicide Risk    As you are discharged from this [facility] facility, it is important to learn how to keep safe from harming yourself.    Recognize the warning signs:  · Abrupt changes in personality, positive or negative- including increase in energy   · Giving away possessions  · Change in eating patterns- significant weight changes-  positive or negative  · Change in sleeping patterns- unable to sleep or sleeping all the time   · Unwillingness or  inability to communicate  · Depression  · Unusual sadness, discouragement and loneliness  · Talk of wanting to die  · Neglect of personal appearance   · Rebelliousness- reckless behavior  · Withdrawal from people/activities they love  · Confusion- inability to concentrate     If you or a loved one observes any of these behaviors or has concerns about self-harm, here's what you can do:  · Talk about it- your feelings and reasons for harming yourself  · Remove any means that you might use to hurt yourself (examples: pills, rope, extension cords, firearm)  · Get professional help from the community (Mental Health, Substance Abuse, psychological counseling)  · Do not be alone:Call your Safe Contact- someone whom you trust who will be there for you.  · Call your local CRISIS HOTLINE 268-1031 or 792-452-7461  · Call your local Children's Mobile Crisis Response Team [place] (496) 392-8122 or www.[employer]  · Call the toll free National Suicide Prevention Hotlines   · National Suicide Prevention Lifeline 804-496-LMGW (9507)  · REVENTIVE Hope Line Network 800-SUICIDE (571-2643)    Panic Attacks  Panic attacks are sudden, short feelings of great fear or discomfort. You may have them for no reason when you are relaxed, when you are uneasy (anxious), or when you are sleeping.  Follow these instructions at home:  Take all your medicines as told.  Check with your doctor before starting new medicines.  Keep all doctor visits.  Contact a doctor if:  You are not able to take your medicines as told.  Your symptoms do not get better.  Your symptoms get worse.  Get help right away if:  Your attacks seem different than your normal attacks.  You have thoughts about hurting yourself or others.  You take panic attack medicine and you have a side effect.  This information is not intended to replace advice given to you by your health care provider. Make sure you discuss any questions you have with your health care  provider.  Document Released: 01/20/2012 Document Revised: 05/25/2017 Document Reviewed: 08/01/2014  [employer] Interactive Patient Education © 2017 [employer]      Coronary Angiogram With Stent  Coronary angiogram with stent placement is a procedure to widen or open a narrow blood vessel of the heart (coronary artery). Arteries may become blocked by cholesterol buildup (plaques) in the lining or wall. When a coronary artery becomes partially blocked, blood flow to that area decreases. This may lead to chest pain or a heart attack (myocardial infarction).  A stent is a small piece of metal that looks like mesh or a spring. Stent placement may be done as treatment for a heart attack or right after a coronary angiogram in which a blocked artery is found.  Let your health care provider know about:  · Any allergies you have.  · All medicines you are taking, including vitamins, herbs, eye drops, creams, and over-the-counter medicines.  · Any problems you or family members have had with anesthetic medicines.  · Any blood disorders you have.  · Any surgeries you have had.  · Any medical conditions you have.  · Whether you are pregnant or may be pregnant.  What are the risks?  Generally, this is a safe procedure. However, problems may occur, including:  · Damage to the heart or its blood vessels.  · A return of blockage.  · Bleeding, infection, or bruising at the insertion site.  · A collection of blood under the skin (hematoma) at the insertion site.  · A blood clot in another part of the body.  · Kidney injury.  · Allergic reaction to the dye or contrast that is used.  · Bleeding into the abdomen (retroperitoneal bleeding).  What happens before the procedure?  Staying hydrated   Follow instructions from your health care provider about hydration, which may include:  · Up to 2 hours before the procedure - you may continue to drink clear liquids, such as water, clear fruit juice, black coffee, and plain tea.  Eating and  drinking restrictions   Follow instructions from your health care provider about eating and drinking, which may include:  · 8 hours before the procedure - stop eating heavy meals or foods such as meat, fried foods, or fatty foods.  · 6 hours before the procedure - stop eating light meals or foods, such as toast or cereal.  · 2 hours before the procedure - stop drinking clear liquids.  Ask your health care provider about:  · Changing or stopping your regular medicines. This is especially important if you are taking diabetes medicines or blood thinners.  · Taking medicines such as ibuprofen. These medicines can thin your blood. Do not take these medicines before your procedure if your health care provider instructs you not to. Generally, aspirin is recommended before a procedure of passing a small, thin tube (catheter) through a blood vessel and into the heart (cardiac catheterization).  What happens during the procedure?  · An IV tube will be inserted into one of your veins.  · You will be given one or more of the following:  ¨ A medicine to help you relax (sedative).  ¨ A medicine to numb the area where the catheter will be inserted into an artery (local anesthetic).  · To reduce your risk of infection:  ¨ Your health care team will wash or sanitize their hands.  ¨ Your skin will be washed with soap.  ¨ Hair may be removed from the area where the catheter will be inserted.  · Using a guide wire, the catheter will be inserted into an artery. The location may be in your groin, in your wrist, or in the fold of your arm (near your elbow).  · A type of X-ray (fluoroscopy) will be used to help guide the catheter to the opening of the arteries in the heart.  · A dye will be injected into the catheter, and X-rays will be taken. The dye will help to show where any narrowing or blockages are located in the arteries.  · A tiny wire will be guided to the blocked spot, and a balloon will be inflated to make the artery  wider.  · The stent will be expanded and will crush the plaques into the wall of the vessel. The stent will hold the area open and improve the blood flow. Most stents have a drug coating to reduce the risk of the stent narrowing over time.  · The artery may be made wider using a drill, laser, or other tools to remove plaques.  · When the blood flow is better, the catheter will be removed. The lining of the artery will grow over the stent, which stays where it was placed.  This procedure may vary among health care providers and hospitals.  What happens after the procedure?  · If the procedure is done through the leg, you will be kept in bed lying flat for about 6 hours. You will be instructed to not bend and not cross your legs.  · The insertion site will be checked frequently.  · The pulse in your foot or wrist will be checked frequently.  · You may have additional blood tests, X-rays, and a test that records the electrical activity of your heart (electrocardiogram, or ECG).  This information is not intended to replace advice given to you by your health care provider. Make sure you discuss any questions you have with your health care provider.  Document Released: 06/23/2004 Document Revised: 08/17/2017 Document Reviewed: 07/23/2017  [employer] Interactive Patient Education © 2017 [employer]    Post-Concussion Syndrome  Introduction  Post-concussion syndrome is the symptoms that can occur after a head injury. These symptoms can last from weeks to months.  Follow these instructions at home:  Take medicines only as told by your doctor.  Do not take aspirin.  Sleep with your head raised to help with headaches.  Avoid activities that can cause another head injury.  Do not play contact sports like football, hockey, soccer, or basketball.  Do not do other risky activities like downhill skiing, martial arts, or horseback riding until your doctor says it is okay.  Keep all follow-up visits as told by your doctor. This is  important.  Contact a doctor if:  You have a harder time:  Paying attention.  Focusing.  Remembering.  Learning new information.  Dealing with stress.  You need more time to complete tasks.  You are easily bothered (irritable).  You have more symptoms.  Get help if you have any of these symptoms for more than two weeks after your injury:  Long-lasting (chronic) headaches.  Dizziness.  Trouble balancing.  Feeling sick to your stomach (nauseous).  Trouble with your vision.  Noise or light bothers you more.  Depression.  Mood swings.  Feeling worried (anxious).  Easily bothered.  Memory problems.  Trouble concentrating or paying attention.  Sleep problems.  Feeling tired all of the time.  Get help right away if:  You feel confused.  You feel very sleepy.  You are hard to wake up.  You feel sick to your stomach.  You keep throwing up (vomiting).  You feel like you are moving when you are not (vertigo).  Your eyes move back and forth very quickly.  You start shaking (convulsing) or pass out (faint).  You have very bad headaches that do not get better with medicine.  You cannot use your arms or legs like normal.  One of the black centers of your eyes (pupils) is bigger than the other.  You have clear or bloody fluid coming from your nose or ears.  Your problems get worse, not better.  This information is not intended to replace advice given to you by your health care provider. Make sure you discuss any questions you have with your health care provider.  Document Released: 01/25/2006 Document Revised: 05/25/2017 Document Reviewed: 03/25/2015  © 2017 [employer]

## 2019-10-25 NOTE — PROGRESS NOTES
Hand off report received. Patient sitting on side of bed waiting for breakfast. Voices no needs at this time. Will continue to monitor
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
## 2019-10-25 NOTE — CARE PLAN
Problem: Communication  Goal: The ability to communicate needs accurately and effectively will improve  Outcome: PROGRESSING AS EXPECTED  Patient calls for assistance.      Problem: Safety  Goal: Will remain free from injury  Outcome: PROGRESSING AS EXPECTED  Patient calls for assistance.      Problem: Venous Thromboembolism (VTW)/Deep Vein Thrombosis (DVT) Prevention:  Goal: Patient will participate in Venous Thrombosis (VTE)/Deep Vein Thrombosis (DVT)Prevention Measures  Outcome: PROGRESSING AS EXPECTED  Patient ambulates hallway

## 2019-10-25 NOTE — CARE PLAN
Problem: Communication  Goal: The ability to communicate needs accurately and effectively will improve  Outcome: PROGRESSING AS EXPECTED  Intervention: [facility] patient and significant other/support system to call light to alert staff of needs  Note:   Patient oriented to call light system and has been able to communicate needs accurately and effectively.      Problem: Safety  Goal: Will remain free from falls  Outcome: PROGRESSING AS EXPECTED  Intervention: Assess risk factors for falls  Note:   Patient has been assessed for fall risks and fall precautions have been put in place.

## 2019-10-26 LAB
HCV RNA SERPL NAA+PROBE-ACNC: NOT DETECTED IU/ML
HCV RNA SERPL NAA+PROBE-LOG IU: NOT DETECTED LOG IU/ML
HCV RNA SERPL QL NAA+PROBE: NOT DETECTED

## 2019-10-28 ENCOUNTER — PATIENT OUTREACH (OUTPATIENT)
Dept: HEALTH INFORMATION MANAGEMENT | Facility: OTHER | Age: 59
End: 2019-10-28

## 2019-10-28 ENCOUNTER — HOSPITAL ENCOUNTER (OUTPATIENT)
Dept: LAB | Facility: MEDICAL CENTER | Age: 59
End: 2019-10-28
Attending: FAMILY MEDICINE
Payer: MEDICARE

## 2019-10-28 ENCOUNTER — OFFICE VISIT (OUTPATIENT)
Dept: MEDICAL GROUP | Facility: PHYSICIAN GROUP | Age: 59
End: 2019-10-28
Payer: MEDICARE

## 2019-10-28 VITALS
TEMPERATURE: 98.4 F | HEIGHT: 69 IN | WEIGHT: 162 LBS | OXYGEN SATURATION: 97 % | RESPIRATION RATE: 18 BRPM | SYSTOLIC BLOOD PRESSURE: 140 MMHG | DIASTOLIC BLOOD PRESSURE: 80 MMHG | BODY MASS INDEX: 23.99 KG/M2 | HEART RATE: 70 BPM

## 2019-10-28 DIAGNOSIS — I10 ESSENTIAL HYPERTENSION: ICD-10-CM

## 2019-10-28 DIAGNOSIS — N18.30 TYPE 1 DIABETES MELLITUS WITH STAGE 3 CHRONIC KIDNEY DISEASE (HCC): ICD-10-CM

## 2019-10-28 DIAGNOSIS — F41.9 ANXIETY: ICD-10-CM

## 2019-10-28 DIAGNOSIS — I70.90 ATHEROSCLEROSIS: ICD-10-CM

## 2019-10-28 DIAGNOSIS — N18.30 CKD (CHRONIC KIDNEY DISEASE) STAGE 3, GFR 30-59 ML/MIN (HCC): ICD-10-CM

## 2019-10-28 DIAGNOSIS — G58.9 MONONEUROPATHY: ICD-10-CM

## 2019-10-28 DIAGNOSIS — Z89.429 TOE AMPUTEE (HCC): ICD-10-CM

## 2019-10-28 DIAGNOSIS — E10.3593 PROLIFERATIVE DIABETIC RETINOPATHY OF BOTH EYES WITHOUT MACULAR EDEMA ASSOCIATED WITH TYPE 1 DIABETES MELLITUS (HCC): ICD-10-CM

## 2019-10-28 DIAGNOSIS — F41.0 PANIC ATTACKS: ICD-10-CM

## 2019-10-28 DIAGNOSIS — F07.81 POSTCONCUSSION SYNDROME: ICD-10-CM

## 2019-10-28 DIAGNOSIS — E10.22 TYPE 1 DIABETES MELLITUS WITH STAGE 3 CHRONIC KIDNEY DISEASE (HCC): ICD-10-CM

## 2019-10-28 DIAGNOSIS — R29.898 HAND WEAKNESS: ICD-10-CM

## 2019-10-28 DIAGNOSIS — M54.2 NECK PAIN: ICD-10-CM

## 2019-10-28 LAB
CHOLEST SERPL-MCNC: 132 MG/DL (ref 100–199)
CREAT UR-MCNC: 164.6 MG/DL
ERYTHROCYTE [DISTWIDTH] IN BLOOD BY AUTOMATED COUNT: 45 FL (ref 35.9–50)
HCT VFR BLD AUTO: 36.9 % (ref 42–52)
HDLC SERPL-MCNC: 46 MG/DL
HGB BLD-MCNC: 12.2 G/DL (ref 14–18)
LDLC SERPL CALC-MCNC: 74 MG/DL
MCH RBC QN AUTO: 30.2 PG (ref 27–33)
MCHC RBC AUTO-ENTMCNC: 33.1 G/DL (ref 33.7–35.3)
MCV RBC AUTO: 91.3 FL (ref 81.4–97.8)
MICROALBUMIN UR-MCNC: 6 MG/DL
MICROALBUMIN/CREAT UR: 36 MG/G (ref 0–30)
PLATELET # BLD AUTO: 227 K/UL (ref 164–446)
PMV BLD AUTO: 10.5 FL (ref 9–12.9)
RBC # BLD AUTO: 4.04 M/UL (ref 4.7–6.1)
TRIGL SERPL-MCNC: 60 MG/DL (ref 0–149)
WBC # BLD AUTO: 6.9 K/UL (ref 4.8–10.8)

## 2019-10-28 PROCEDURE — 80061 LIPID PANEL: CPT

## 2019-10-28 PROCEDURE — 85027 COMPLETE CBC AUTOMATED: CPT

## 2019-10-28 PROCEDURE — 82043 UR ALBUMIN QUANTITATIVE: CPT

## 2019-10-28 PROCEDURE — 83036 HEMOGLOBIN GLYCOSYLATED A1C: CPT

## 2019-10-28 PROCEDURE — 99215 OFFICE O/P EST HI 40 MIN: CPT | Performed by: FAMILY MEDICINE

## 2019-10-28 PROCEDURE — 82570 ASSAY OF URINE CREATININE: CPT

## 2019-10-28 PROCEDURE — 36415 COLL VENOUS BLD VENIPUNCTURE: CPT

## 2019-10-28 RX ORDER — TRAZODONE HYDROCHLORIDE 50 MG/1
50 TABLET ORAL NIGHTLY PRN
Qty: 30 TAB | Refills: 5 | Status: SHIPPED | OUTPATIENT
Start: 2019-10-28 | End: 2019-10-28 | Stop reason: SDUPTHER

## 2019-10-28 RX ORDER — CLOPIDOGREL BISULFATE 75 MG/1
75 TABLET ORAL DAILY
Qty: 90 TAB | Refills: 3 | Status: SHIPPED | OUTPATIENT
Start: 2019-10-28 | End: 2022-04-08 | Stop reason: SDUPTHER

## 2019-10-28 RX ORDER — GABAPENTIN 100 MG/1
100 CAPSULE ORAL DAILY
Qty: 90 CAP | Refills: 2 | Status: SHIPPED
Start: 2019-10-28 | End: 2019-12-30

## 2019-10-28 RX ORDER — TRAZODONE HYDROCHLORIDE 50 MG/1
50-100 TABLET ORAL NIGHTLY PRN
Qty: 60 TAB | Refills: 5 | Status: SHIPPED
Start: 2019-10-28 | End: 2019-12-30

## 2019-10-28 RX ORDER — METOPROLOL SUCCINATE 25 MG/1
TABLET, EXTENDED RELEASE ORAL
Qty: 90 TAB | Refills: 3 | Status: SHIPPED | OUTPATIENT
Start: 2019-10-28 | End: 2021-01-08 | Stop reason: SDUPTHER

## 2019-10-28 RX ORDER — SIMVASTATIN 40 MG
TABLET ORAL
Qty: 90 TAB | Refills: 3 | Status: SHIPPED | OUTPATIENT
Start: 2019-10-28 | End: 2019-10-30 | Stop reason: SDUPTHER

## 2019-10-28 NOTE — PROGRESS NOTES
cc: Anxiety      Subjective:     Dylon Santa is a 59 y.o. male presenting for the following:     Patient has recently lost his father and has had some increased life stressors. Then, he had a head injury and was seen in [facility] in [place]. He did not have an ICH but he did develop symptoms c/w post-concussive syndrome. He is having issues with severe insomnia, dizziness, difficulty with memory and concentration. And he has developed new severe anxiety and panic attacks.  He has been taking trazodone at night but he does not believe this is very helpful.    He does admit that he has had some anxiety in the past but it has never been this severe.  He denies any suicidal or homicidal ideation.    Also, he did have worsening of his chronic neck pain and some tingling and  strength weakening. A CT scan of his neck there showed some severe spinal stenosis.     Then with recent hospitalization for lightheadedness and CP.  He does have some continued bruising in his groin and around his testicles from the cardiac cath done in the hospital.   He has been injecting his insulin as he was has.  He denies any hypoglycemia episodes.    Review of systems:  All others reviewed and are negative.       Current Outpatient Medications:   •  losartan (COZAAR) 25 MG Tab, Take 1 Tab by mouth every day., Disp: 90 Tab, Rfl: 3  •  clopidogrel (PLAVIX) 75 MG Tab, Take 1 Tab by mouth every day., Disp: 90 Tab, Rfl: 3  •  gabapentin (NEURONTIN) 100 MG Cap, Take 1 Cap by mouth every day., Disp: 90 Cap, Rfl: 2  •  insulin NPH (HUMULIN,NOVOLIN) 100 UNIT/ML Suspension, Inject 22 Units as instructed every day., Disp: 20 mL, Rfl: 3  •  insulin regular (HUMULIN R) 100 Unit/mL Solution, Inject 6-10 Units as instructed 3 times a day before meals., Disp: 10 mL, Rfl: 3  •  metoprolol SR (TOPROL XL) 25 MG TABLET SR 24 HR, TAKE ONE TABLET BY MOUTH ONE TIME DAILY, Disp: 90 Tab, Rfl: 3  •  simvastatin (ZOCOR) 40 MG Tab, TAKE 1 TABLET BY  "MOUTH EVERY EVENING, Disp: 90 Tab, Rfl: 3  •  traZODone (DESYREL) 50 MG Tab, Take 1-2 Tabs by mouth at bedtime as needed for Sleep., Disp: 60 Tab, Rfl: 5  •  acyclovir (ZOVIRAX) 200 MG Cap, TAKE ONE CAPSULE BY MOUTH ONE TIME DAILY , Disp: 90 Cap, Rfl: 0  •  OYSTER SHELL CALCIUM PO, Take 1 Tab by mouth every day., Disp: , Rfl:   •  ALFALFA PO, Take 1 Tab by mouth 3 times a day., Disp: , Rfl:     Allergies, past medical history, past surgical history, family history, social history reviewed and updated    Objective:     Vitals: /80 (BP Location: Left arm, Patient Position: Sitting, BP Cuff Size: Adult)   Pulse 70   Temp 36.9 °C (98.4 °F) (Temporal)   Resp 18   Ht 1.74 m (5' 8.5\")   Wt 73.5 kg (162 lb)   SpO2 97%   BMI 24.27 kg/m²   General: Alert, pleasant, NAD  HEENT:  Neck with decreased range of motion due to pain and tender to palpation paraspinal muscles.  Negative compression signs.  Grossly normal strength in bilateral hands.  Heart: Regular rate and rhythm.    Resp: Normal respiratory effort.  Extremities: No leg edema.    Neurological:  CN2-12 grossly intact  Psych: Vision is very nervous appearing.  He does answer all questions appropriately but is slightly tangential in his thought process.  Genitals: bruising over penile shaft, no discoloration over the glans. Testicles with bruising and mild swelling b/l. Not tender.     Assessment/Plan:     Dylon was seen today for hospital follow-up.    Diagnoses and all orders for this visit:    Anxiety/Panic attacks: Patient with severe anxiety and panic attacks recently.  He does have increased life stressors and severe increase after his recent concussion.  -We will continue trazodone at nighttime.  -     REFERRAL TO BEHAVIORAL HEALTH    Postconcussion syndrome: Patient with continued dizziness, difficulty with concentration, emotionality.  -     REFERRAL TO NEUROLOGY    Neck pain/Hand weakness: Some chronic neck pain but much worse after recent head " injury.  Patient reports that a CT scan in [place] did show spinal stenosis.  Will refer to neurosurgery and request records of imaging.  -     REFERRAL TO NEUROSURGERY    Mononeuropathy: Refill of medication.  -     gabapentin (NEURONTIN) 100 MG Cap; Take 1 Cap by mouth every day.    Type 1 diabetes mellitus with stage 3 chronic kidney disease (HCC): Poorly controlled but last hemoglobin A1c improved at 7.6.  Patient declining and referral to endocrinology.  Will refill medication.  -     insulin NPH (HUMULIN,NOVOLIN) 100 UNIT/ML Suspension; Inject 22 Units as instructed every day.    Essential hypertension: Systolic slightly elevated today but patient is very nervous.  When asked why his losartan was discontinued, he is unsure. Will restart this at a low dose.   -     metoprolol SR (TOPROL XL) 25 MG TABLET SR 24 HR; TAKE ONE TABLET BY MOUTH ONE TIME DAILY    Proliferative diabetic retinopathy of both eyes without macular edema associated with type 1 diabetes mellitus (HCC): Patient was previously treated with laser treatments.  He has no worsening of his vision recently.    CKD (chronic kidney disease) stage 3, GFR 30-59 ml/min: Patient no longer regularly following with nephrology.  I am unsure why his losartan was discontinued in the hospital. Will restart at low dose.     Atherosclerosis: Aortic atherosclerosis on CT scan in 2015.    Toe amputee (HCC): Patient reports that his left fourth toe is indeed still amputated.  This was done due to an arterial occlusion.  He is taking Plavix and simvastatin daily.    Other orders  -     clopidogrel (PLAVIX) 75 MG Tab; Take 1 Tab by mouth every day.  -     insulin regular (HUMULIN R) 100 Unit/mL Solution; Inject 6-10 Units as instructed 3 times a day before meals.  -     simvastatin (ZOCOR) 40 MG Tab; TAKE 1 TABLET BY MOUTH EVERY EVENING  -     traZODone (DESYREL) 50 MG Tab; Take 1-2 Tabs by mouth at bedtime as needed for Sleep.    Patient was seen for 45 minutes face  to face of which > 50% of appointment time was spent on counseling and coordination of care regarding the above.      Return in about 4 weeks (around 11/25/2019), or if symptoms worsen or fail to improve.

## 2019-10-28 NOTE — LETTER
[facility]  Sandi Manning M.D.  [address]  [place] [address]  Fax: [phone]   Authorization for Release/Disclosure of   Protected Health Information   Name: JEN SANTA : 1960 SSN: [id number]   Address: [address] [place], [place] [address] [address] Phone:    [phone] (home)    I authorize the entity listed below to release/disclose the PHI below to:   [facility]/Sandi Manning M.D. and Sandi Manning M.D.   Provider or Entity Name:     Address   City, State, Zip   Phone:      Fax:     Reason for request: continuity of care   Information to be released:    [  ] LAST COLONOSCOPY,  including any PATH REPORT and follow-up  [  ] LAST FIT/COLOGUARD RESULT [  ] LAST DEXA  [  ] LAST MAMMOGRAM  [  ] LAST PAP  [  ] LAST LABS [  ] RETINA EXAM REPORT  [  ] IMMUNIZATION RECORDS  [  ] Release all info      [  ] Check here and initial the line next to each item to release ALL health information INCLUDING  _____ Care and treatment for drug and / or alcohol abuse  _____ HIV testing, infection status, or AIDS  _____ Genetic Testing    DATES OF SERVICE OR TIME PERIOD TO BE DISCLOSED: _____________  I understand and acknowledge that:  * This Authorization may be revoked at any time by you in writing, except if your health information has already been used or disclosed.  * Your health information that will be used or disclosed as a result of you signing this authorization could be re-disclosed by the recipient. If this occurs, your re-disclosed health information may no longer be protected by State or Federal laws.  * You may refuse to sign this Authorization. Your refusal will not affect your ability to obtain treatment.  * This Authorization becomes effective upon signing and will  on (date) __________.      If no date is indicated, this Authorization will  one (1) year from the signature date.    Name: Jen Santa    Signature:   Date:     10/28/2019       PLEASE FAX REQUESTED RECORDS  BACK TO: [phone]

## 2019-10-28 NOTE — LETTER
October 28, 2019        Dylon Santa is a patient of our clinic. He did recently have a head injury and is suffering from post-concussive syndrome which causes dizziness, emotionality, difficulty with concentration and memory, and often lasts for 6-12 months. Also, he is having worsening anxiety both from the head injury and his other chronic medical problems. His neck does have a chronic injury that has been worsened by this head injury and he has been referred to Neurosurgery for this.                                 Sandi Manning M.D.

## 2019-10-29 ENCOUNTER — TELEPHONE (OUTPATIENT)
Dept: MEDICAL GROUP | Facility: PHYSICIAN GROUP | Age: 59
End: 2019-10-29

## 2019-10-29 PROBLEM — S29.012A STRAIN OF LATISSIMUS DORSI MUSCLE: Status: RESOLVED | Noted: 2017-11-17 | Resolved: 2019-10-29

## 2019-10-29 PROBLEM — L98.9 SKIN LESION, SUPERFICIAL: Status: RESOLVED | Noted: 2018-10-05 | Resolved: 2019-10-29

## 2019-10-29 PROBLEM — M77.9 BONE SPUR: Status: RESOLVED | Noted: 2017-11-17 | Resolved: 2019-10-29

## 2019-10-29 PROBLEM — I70.90 ATHEROSCLEROSIS: Status: ACTIVE | Noted: 2019-10-29

## 2019-10-29 PROBLEM — Z89.429 TOE AMPUTEE (HCC): Status: ACTIVE | Noted: 2019-10-29

## 2019-10-29 LAB
EST. AVERAGE GLUCOSE BLD GHB EST-MCNC: 180 MG/DL
HBA1C MFR BLD: 7.9 % (ref 0–5.6)

## 2019-10-29 RX ORDER — LOSARTAN POTASSIUM 25 MG/1
25 TABLET ORAL DAILY
Qty: 90 TAB | Refills: 3 | Status: SHIPPED | OUTPATIENT
Start: 2019-10-29 | End: 2019-10-30 | Stop reason: SDUPTHER

## 2019-10-29 NOTE — TELEPHONE ENCOUNTER
traZODone (DESYREL) 50 MG Tab [[id number]]     Order Details   Dose:  mg Route: Oral Frequency: NIGHTLY PRN for Sleep   Dispense Quantity: 60 Tab Refills: 5 Fills remaining: --           Sig: Take 1-2 Tabs by mouth at bedtime as needed for Sleep.          Written Date: 10/28/19 Expiration Date: --     Start Date: 10/28/19 End Date: --                   Pt called and stated he was unable to  script. By any chance did we receive a PA ?

## 2019-10-30 ENCOUNTER — TELEPHONE (OUTPATIENT)
Dept: MEDICAL GROUP | Facility: PHYSICIAN GROUP | Age: 59
End: 2019-10-30

## 2019-10-30 RX ORDER — SIMVASTATIN 40 MG
TABLET ORAL
Qty: 100 TAB | Refills: 3 | Status: SHIPPED
Start: 2019-10-30 | End: 2020-07-02

## 2019-10-30 RX ORDER — LOSARTAN POTASSIUM 25 MG/1
25 TABLET ORAL DAILY
Qty: 100 TAB | Refills: 3 | Status: SHIPPED
Start: 2019-10-30 | End: 2020-02-25

## 2019-10-30 NOTE — TELEPHONE ENCOUNTER
Phone Number Called: [phone] (home)     Call outcome: spoke to patient regarding message below    Message: Dr. Manning would like to schedule an appointment for the end of December for a follow up. I called the patient, he answered and told me to call back tomorrow because he was laying down due to a bloody nose.

## 2019-10-31 NOTE — PROGRESS NOTES
12 hour chart check    Physical Exam: 


SUBJECTIVE: Patient seen and examined in the morning. No acute events 

overnight. Was awake and resting comfortably when examined. No complaints of 

chest pain, shortness of breath, abdominal pain, nausea, vomiting, or fever. 








OBJECTIVE:





 Vital Signs











 Period  Temp  Pulse  Resp  BP Sys/[facility]  Pulse Ox


 


 Last 24 Hr  97.7 F-98.2 F  74-81  18-20  104-119/45-58  99








PHYSICAL EXAM





GENERAL: The patient is awake, alert, and fully oriented, in no acute distress.


HEAD: Normal with no signs of trauma.


EYES: PERRL, extraocular movements intact, sclera anicteric, conjunctiva clear. 


NECK: Trachea midline, full range of motion, supple. Lymph nodes were 

nonpalpable. 


LUNGS: Breath sounds equal, clear to auscultation bilaterally, no wheezes, no 

crackles, no accessory muscle use. 


HEART: Regular rate and rhythm, S1, S2 without murmur, rub or gallop.


ABDOMEN: Soft, nontender, nondistended, normoactive bowel sounds, no guarding.


EXTREMITIES: 2+ pulses, warm, well-perfused, no edema. 


NEUROLOGICAL: Cranial nerves II through XII grossly intact. Normal speech, gait 

not observed.





LABS


 Laboratory Results - last 24 hr





 CBC, BMP





 10/31/19 05:30 





 10/31/19 05:30 











HOSPITAL COURSE:





Date of Admission:10/28/19





Date of Discharge: 10/31/19





89F with PMH of primary lung cancer with metastatic cancer to the dura, HTN, HLD

, prior SBO who presented with focal seizure activity of the right upper 

extremity. Patient was given a loading dose of Keppra in the ED, and continued 

on home dose of Keppra.  Head CT was done which did not show any bleeds or new 

masses, and chest x-ray did not show any interval changes. Neurology was 

consulted and recommended outpatient MRI. Patient was noted to have elevated 

troponin on admission but cardiology did not see it as a sign of ischemia or 

MI. Patient was noted to have thoracic esophageal narrowing on barium swallow 

with enlarged mediastinal lymph nodes on follow up Chest CT, no further workup 

required as she has oncologic care at [facility]. Incidental 

pericardial effusion was seen on chest CT, but follow up echocardiogram showed 

no effusion and normal function. 





Relevant Imaging: 


Head CT: evidence of acute intracranial hemorrhage. 





Barium swallow: Moderate segment luminal narrowing is noted involving the 

thoracic esophagus within the middle third. 





CT Chest: Increased retrosternal mediastinal lymphadenopathy which abuts the 

traversing segment of the thoracic esophagus immediately above the level of the 

tracheal bifurcation. Development of small right pleural effusion noted. Small 

stable left lower lobe pulmonary nodules. 








Minutes to complete discharge: 35





Discharge Summary


Problems reviewed: Yes


Reason For Visit: ELEVATED TROPONIN LEVEL


Current Active Problems





Seizure (Acute)








Condition: Improved





- Instructions


Diet, Activity, Other Instructions: 


You were admitted to the hospital because of seizure activity in your arm. 

While you were here we scanned your brain and saw that there was no bleeding. 

We evaluated your heart while you were here and it was functioning normally. We 

tested your urine and found a urinary tract infection, which we treated with 

antibiotics.





You are being discharged with the following new medications to help your heart 

function appropriately:


Metoprolol 12.5 mg, by mouth, daily


Aspirin 81 mg by mouth, daily





While you were here your TSH (thyroid level) was low. Please have repeat labs 

with your primary care physician in one week to monitor this value.





We evaluated your ability to swallow and saw there was some narrowing of your 

esophagus. Because of this we did imaging of your chest. On this image we saw 

there is a small amount of fluid around your heart, however , this was not 

present on echo . You were evaluated by cardiology and there is nothing to do 

at this time. Please make sure you follow up with Cardiology to continue to 

monitor your heart.





regarding the narrowing in your esophagus , this is to be addressed by your 

doctors at [facility]. Next step of action to be determined ( chemo vs stent vs other

) 





Please continue all your other home medications as before. 


Follow up with Dr. Paredes within 1 week


Follow up with Dr. Conklin within 1 week. 


Follow up with your Oncologist within 1 week


Follow up with Dr. Bejarano within 1 week


Return to the emergency room if you have any recurrence of tremors, shaking, 

loss of balance, fall, chest pain, or worsening of your symptoms. 


Referrals: 


Shawn Paredes MD [Staff Physician] - 1 Week


David Bejarano MD [Staff Physician] - 1 Week


Disposition: HOME





- Home Medications


Comprehensive Discharge Medication List: 


Ambulatory Orders





Atorvastatin Ca [Lipitor] 20 mg PO HS 05/07/14 


Levothyroxine [Synthroid -] 50 mcg PO DAILY 05/07/14 


Calcium Carbonate [Calcium] 600 mg PO TID 06/04/19 


Multivitamin [Multiple Vitamins] 1 each PO DAILY 06/04/19 


Pantoprazole Sodium [Protonix -] 40 mg PO HS #30 tablet.ec 06/05/19 


Acetaminophen [Tylenol .Extra-Strength -] 500 mg PO Q6H PRN 10/27/19 


Aspirin [Aspirin EC] 81 mg PO DAILY 10/27/19 


Ondansetron HCl [Zofran] 8 mg PO TID PRN 10/27/19 


levETIRAcetam [Keppra -] 500 mg PO BID #60 tablet 10/27/19 


Mag Carb/Aluminum Hydrox/Algin [Gaviscon Liquid] 5 ml PO PRN PRN 10/28/19 


Memantine HCl 2 tab PO BID 10/28/19 


Metoprolol Succinate [Toprol XL -] 12.5 mg PO DAILY #30 tab.sr.24h 10/30/19 








This patient is new to me today: No


Emergency Visit: No


Critical Care patient: No





- Discharge Referral


Referred to [facility] No





ATTENDING PHYSICIAN STATEMENT





I saw and evaluated the patient.


I reviewed the resident's note and discussed the case with the resident.


I agree with the resident's findings and plan as documented.








SUBJECTIVE:








OBJECTIVE:








ASSESSMENT AND PLAN:

## 2019-11-05 ENCOUNTER — PATIENT OUTREACH (OUTPATIENT)
Dept: HEALTH INFORMATION MANAGEMENT | Facility: OTHER | Age: 59
End: 2019-11-05

## 2019-11-05 SDOH — ECONOMIC STABILITY: FOOD INSECURITY: WITHIN THE PAST 12 MONTHS, THE FOOD YOU BOUGHT JUST DIDN'T LAST AND YOU DIDN'T HAVE MONEY TO GET MORE.: NEVER TRUE

## 2019-11-05 SDOH — ECONOMIC STABILITY: FOOD INSECURITY: WITHIN THE PAST 12 MONTHS, YOU WORRIED THAT YOUR FOOD WOULD RUN OUT BEFORE YOU GOT MONEY TO BUY MORE.: NEVER TRUE

## 2019-11-05 SDOH — ECONOMIC STABILITY: TRANSPORTATION INSECURITY
IN THE PAST 12 MONTHS, HAS LACK OF TRANSPORTATION KEPT YOU FROM MEETINGS, WORK, OR FROM GETTING THINGS NEEDED FOR DAILY LIVING?: YES

## 2019-11-05 SDOH — ECONOMIC STABILITY: TRANSPORTATION INSECURITY
IN THE PAST 12 MONTHS, HAS THE LACK OF TRANSPORTATION KEPT YOU FROM MEDICAL APPOINTMENTS OR FROM GETTING MEDICATIONS?: YES

## 2019-11-05 NOTE — PROGRESS NOTES
1. Attempt #:1    2. [facility] Verified: yes    3. Verify PCP: yes    4. Review Care Team: yes      6. Reviewed/Updated the following with patient:       •   Communication Preference Obtained? YES       •   Preferred Pharmacy? YES       •   Preferred Lab? YES       •   Family History (document living status of immediate family members and if + hx of cancer, diabetes, hypertension, hyperlipidemia, heart attack, stroke) YES       8. Care Gap Scheduling (Attempt to Schedule EACH Overdue Care Gap!) PT WILL DISCUSS CARE GAPS WITH PCP ON FV  12/30/2019     Health Maintenance Due   Topic Date Due   • HEPATITIS C SCREENING  1960   • IMM ZOSTER VACCINES (1 of 2) 06/27/2011   • RETINAL SCREENING  12/21/2016   • Annual Wellness Visit  06/18/2017   • DIABETES MONOFILAMENT / LE EXAM  04/26/2019             9. [employer] Activation: already active    10. [employer] [place]: no

## 2019-11-05 NOTE — LETTER
[facility]  Sandi Manning M.D.  [address]  [place] [address]  Fax: [phone]   Authorization for Release/Disclosure of   Protected Health Information   Name: JEN JAMISON : 1960 SSN: [id number]   Address: [address] [place], [place] [address] [address] Phone:    [phone] (home)    I authorize the entity listed below to release/disclose the PHI below to:   [facility]/Sandi Manning M.D. and Mary Galloway C.N.A.   Provider or Entity Name:  Dr. Michael Moy   Address   City, State, Zip   Phone:      Fax:  [phone]   Reason for request: continuity of care   Information to be released:    [  ] LAST COLONOSCOPY,  including any PATH REPORT and follow-up  [  ] LAST FIT/COLOGUARD RESULT [  ] LAST DEXA  [  ] LAST MAMMOGRAM  [  ] LAST PAP  [  ] LAST LABS [ X ] RETINA EXAM REPORT  [  ] IMMUNIZATION RECORDS  [  ] Release all info      [  ] Check here and initial the line next to each item to release ALL health information INCLUDING  _____ Care and treatment for drug and / or alcohol abuse  _____ HIV testing, infection status, or AIDS  _____ Genetic Testing    DATES OF SERVICE OR TIME PERIOD TO BE DISCLOSED: _____________  I understand and acknowledge that:  * This Authorization may be revoked at any time by you in writing, except if your health information has already been used or disclosed.  * Your health information that will be used or disclosed as a result of you signing this authorization could be re-disclosed by the recipient. If this occurs, your re-disclosed health information may no longer be protected by State or Federal laws.  * You may refuse to sign this Authorization. Your refusal will not affect your ability to obtain treatment.  * This Authorization becomes effective upon signing and will  on (date) __________.      If no date is indicated, this Authorization will  one (1) year from the signature date.    Name: Jen Jamison    Signature:CONTINUITY OF CARE    Date:     11/5/2019       PLEASE FAX REQUESTED RECORDS BACK TO: [phone]

## 2019-11-07 DIAGNOSIS — R04.0 EPISTAXIS, RECURRENT: ICD-10-CM

## 2019-11-08 ENCOUNTER — OFFICE VISIT (OUTPATIENT)
Dept: CARDIOLOGY | Facility: MEDICAL CENTER | Age: 59
End: 2019-11-08
Payer: MEDICARE

## 2019-11-08 VITALS
HEART RATE: 75 BPM | SYSTOLIC BLOOD PRESSURE: 122 MMHG | BODY MASS INDEX: 25.01 KG/M2 | HEIGHT: 69 IN | WEIGHT: 168.87 LBS | OXYGEN SATURATION: 99 % | DIASTOLIC BLOOD PRESSURE: 82 MMHG

## 2019-11-08 DIAGNOSIS — E78.00 PURE HYPERCHOLESTEROLEMIA: ICD-10-CM

## 2019-11-08 DIAGNOSIS — E10.22 TYPE 1 DIABETES MELLITUS WITH STAGE 3 CHRONIC KIDNEY DISEASE (HCC): ICD-10-CM

## 2019-11-08 DIAGNOSIS — N18.30 TYPE 1 DIABETES MELLITUS WITH STAGE 3 CHRONIC KIDNEY DISEASE (HCC): ICD-10-CM

## 2019-11-08 DIAGNOSIS — I25.10 CORONARY ARTERY DISEASE INVOLVING NATIVE CORONARY ARTERY OF NATIVE HEART WITHOUT ANGINA PECTORIS: ICD-10-CM

## 2019-11-08 DIAGNOSIS — I10 ESSENTIAL HYPERTENSION: ICD-10-CM

## 2019-11-08 DIAGNOSIS — I70.90 ATHEROSCLEROSIS: ICD-10-CM

## 2019-11-08 PROBLEM — R09.89 DIMINISHED PULSES IN LOWER EXTREMITY: Status: RESOLVED | Noted: 2017-03-17 | Resolved: 2019-11-08

## 2019-11-08 PROCEDURE — 99214 OFFICE O/P EST MOD 30 MIN: CPT | Performed by: NURSE PRACTITIONER

## 2019-11-08 ASSESSMENT — ENCOUNTER SYMPTOMS
PND: 0
ABDOMINAL PAIN: 0
COUGH: 0
DIZZINESS: 0
PALPITATIONS: 0
NERVOUS/ANXIOUS: 1
FEVER: 0
ORTHOPNEA: 0
CLAUDICATION: 0
SHORTNESS OF BREATH: 0
MYALGIAS: 0

## 2019-11-08 NOTE — PROGRESS NOTES
"Chief Complaint   Patient presents with   • Chest Pain     Subjective:   Dylon Santa is a 59 y.o. male who presents today for hospital follow up and new patient establishment for non-obstructive CAD found on angiogram with atypical chest pain.    He is a prior patient of Dr. Fields and now will establish with Dr. Rowe in our office.    Hx of arthritis, diabetes with neuropathy and retinopathy, HLD, HTN, PAD, panic attacks, and now minimal non-obstructive CAD on angiogram.    He presents today stating that he still gets sharp chest pains in his chest, even at rest.    His panic attacks have improved.    He is established with a PCP.    He states he is clumsy and runs into things a lot causing injuries to his legs. He also had a 70 lb concrete piece fall on him the other day.    Past Medical History:   Diagnosis Date   • Anesthesia     hx PONV   • Arthritis     L knee; hands   • Bronchitis 2016   • Cataract     nicole iol   • Cold 4/5/17    prod cough   • Dental disorder     \"bad shape\"   • Diabetic neuropathy ([facility]) 12/31/2014    retinopathy   • Diabetic retinopathy of both eyes ([facility]) 12/9/2016   • Heart murmur    • Hemorrhagic disorder ([facility])     nose bleeds   • High cholesterol    • History of shingles 12/31/2014   • Hyperlipidemia    • Hypertension    • Kidney stones    • Pain     left leg/foot   • Psychiatric problem     situational depression   • Type II or unspecified type diabetes mellitus without mention of complication, uncontrolled 12/31/2014     Past Surgical History:   Procedure Laterality Date   • IRRIGATION & DEBRIDEMENT GENERAL Left 5/3/2017    Procedure: IRRIGATION & DEBRIDEMENT GENERAL and Left Fourth Toe Amputation ;  Surgeon: Inessa Richard M.D.;  Location: SURGERY [facility];  Service:    • APPENDECTOMY     • CATARACT EXTRACTION WITH IOL Bilateral    • EYE SURGERY     • HAND SURGERY  1990's   • KNEE ARTHROSCOPY      x3   • TONSILLECTOMY       Family History   Problem Relation Age of " Onset   • Thyroid Mother    • Lung Disease Mother         COPD   • Cancer Mother         eyebrow    • Thyroid Sister    • Other Sister         mental health issues    • Thyroid Brother    • Lung Disease Brother         COPD   • Hypertension Brother    • Hyperlipidemia Brother    • Lung Disease Father         COPD   • Cancer Father    • Diabetes Maternal Uncle    • Cancer Paternal Aunt         type unknown   • Diabetes Maternal Grandfather    • Diabetes Paternal Grandmother    • Cancer Paternal Grandfather         prostate   • Stroke Neg Hx      Social History     Socioeconomic History   • Marital status: Single     Spouse name: Not on file   • Number of children: Not on file   • Years of education: Not on file   • Highest education level: Not on file   Occupational History   • Not on file   Social Needs   • Financial resource strain: Not on file   • Food insecurity:     Worry: Never true     Inability: Never true   • Transportation needs:     Medical: Yes     Non-medical: Yes   Tobacco Use   • Smoking status: Former Smoker     Packs/day: 0.00   • Smokeless tobacco: Current User     Types: Chew   • Tobacco comment: tobacco cessation recommended   Substance and Sexual Activity   • Alcohol use: No     Alcohol/week: 0.0 oz     Comment: rare beer   • Drug use: No   • Sexual activity: Never     Comment: Never  , On disability due to lack of eye sight.   Lifestyle   • Physical activity:     Days per week: Not on file     Minutes per session: Not on file   • Stress: Not on file   Relationships   • Social connections:     Talks on phone: Not on file     Gets together: Not on file     Attends Evangelical service: Not on file     Active member of club or organization: Not on file     Attends meetings of clubs or organizations: Not on file     Relationship status: Not on file   • Intimate partner violence:     Fear of current or ex partner: Not on file     Emotionally abused: Not on file     Physically abused: Not on file  "    Forced sexual activity: Not on file   Other Topics Concern   • Not on file   Social History Narrative   • Not on file     No Known Allergies  Outpatient Encounter Medications as of 11/8/2019   Medication Sig Dispense Refill   • losartan (COZAAR) 25 MG Tab Take 1 Tab by mouth every day. 100 Tab 3   • simvastatin (ZOCOR) 40 MG Tab TAKE 1 TABLET BY MOUTH EVERY EVENING 100 Tab 3   • clopidogrel (PLAVIX) 75 MG Tab Take 1 Tab by mouth every day. 90 Tab 3   • gabapentin (NEURONTIN) 100 MG Cap Take 1 Cap by mouth every day. 90 Cap 2   • insulin NPH (HUMULIN,NOVOLIN) 100 UNIT/ML Suspension Inject 22 Units as instructed every day. 20 mL 3   • insulin regular (HUMULIN R) 100 Unit/mL Solution Inject 6-10 Units as instructed 3 times a day before meals. 10 mL 3   • metoprolol SR (TOPROL XL) 25 MG TABLET SR 24 HR TAKE ONE TABLET BY MOUTH ONE TIME DAILY 90 Tab 3   • traZODone (DESYREL) 50 MG Tab Take 1-2 Tabs by mouth at bedtime as needed for Sleep. 60 Tab 5   • acyclovir (ZOVIRAX) 200 MG Cap TAKE ONE CAPSULE BY MOUTH ONE TIME DAILY  90 Cap 0   • OYSTER SHELL CALCIUM PO Take 1 Tab by mouth every day.     • ALFALFA PO Take 1 Tab by mouth 3 times a day.       No facility-administered encounter medications on file as of 11/8/2019.      Review of Systems   Constitutional: Negative for fever and malaise/fatigue.   Respiratory: Negative for cough and shortness of breath.    Cardiovascular: Positive for chest pain. Negative for palpitations, orthopnea, claudication, leg swelling and PND.        Sharp chest pains at rest   Gastrointestinal: Negative for abdominal pain.   Musculoskeletal: Negative for myalgias.   Neurological: Negative for dizziness.   Psychiatric/Behavioral: The patient is nervous/anxious.         Panic attacks but have improved on medication        Objective:   /82 (BP Location: Left arm, Patient Position: Sitting, BP Cuff Size: Adult)   Pulse 75   Ht 1.74 m (5' 8.5\")   Wt 76.6 kg (168 lb 14 oz)   SpO2 99%  "  BMI 25.30 kg/m²     Physical Exam   Constitutional: He appears well-developed and well-nourished.   HENT:   Head: Normocephalic and atraumatic.   Eyes: EOM are normal.   Neck: No JVD present.   Cardiovascular: Normal rate, regular rhythm, normal heart sounds and intact distal pulses.   Pulmonary/Chest: Effort normal and breath sounds normal.   Musculoskeletal: Normal range of motion.         General: No edema.   Skin: Skin is warm and dry.   Psychiatric: He has a normal mood and affect.   Nursing note and vitals reviewed.      Assessment:     1. Type 1 diabetes mellitus with stage 3 chronic kidney disease (HCC)     2. Pure hypercholesterolemia     3. Essential hypertension     4. Atherosclerosis     5. Coronary artery disease involving native coronary artery of native heart without angina pectoris       Medical Decision Making:  Today's Assessment / Status / Plan:     1. Minimal CAD  -chest pains atypical  -non-obstructive disease on angiogram  -cont with med management with plavix, statin, bb therapy  -recommend PCP review of atypical chest pains    2. HLD  -statin  -LDL goal <70 with CAD and PAD  -recommend annual labs with PCP    3. HTN  -good control on losartan and metoprolol  -follow     4. PAD  -followed by Hilary COVARRUBIAS  -recommend cont statin and plavix    FU in clinic in 6 months with JI with annual labs    Patient verbalizes understanding and agrees with the plan of care.     Collaborating MD: Jorge COVARRUBIAS

## 2019-11-19 ENCOUNTER — OFFICE VISIT (OUTPATIENT)
Dept: MEDICAL GROUP | Facility: PHYSICIAN GROUP | Age: 59
End: 2019-11-19
Payer: MEDICARE

## 2019-11-19 VITALS
BODY MASS INDEX: 23.99 KG/M2 | SYSTOLIC BLOOD PRESSURE: 168 MMHG | OXYGEN SATURATION: 97 % | WEIGHT: 162 LBS | RESPIRATION RATE: 18 BRPM | HEIGHT: 69 IN | DIASTOLIC BLOOD PRESSURE: 86 MMHG | HEART RATE: 90 BPM | TEMPERATURE: 98 F

## 2019-11-19 DIAGNOSIS — K21.9 GASTROESOPHAGEAL REFLUX DISEASE WITHOUT ESOPHAGITIS: ICD-10-CM

## 2019-11-19 DIAGNOSIS — G43.009 MIGRAINE WITHOUT AURA AND WITHOUT STATUS MIGRAINOSUS, NOT INTRACTABLE: ICD-10-CM

## 2019-11-19 DIAGNOSIS — F07.81 POSTCONCUSSION SYNDROME: ICD-10-CM

## 2019-11-19 DIAGNOSIS — F41.9 ANXIETY: ICD-10-CM

## 2019-11-19 DIAGNOSIS — R04.0 RECURRENT EPISTAXIS: ICD-10-CM

## 2019-11-19 PROCEDURE — 99214 OFFICE O/P EST MOD 30 MIN: CPT | Performed by: FAMILY MEDICINE

## 2019-11-19 RX ORDER — OMEPRAZOLE 20 MG/1
20 CAPSULE, DELAYED RELEASE ORAL DAILY
Qty: 90 CAP | Refills: 0 | Status: SHIPPED
Start: 2019-11-19 | End: 2020-02-20

## 2019-11-19 NOTE — PROGRESS NOTES
cc: Postconcussive syndrome      Subjective:     Dylon Santa is a 59 y.o. male presenting for the following:     Neurologist recently started topiramate for the postconcussive syndrome and headaches.  Fortunately, patient believes that this has caused his blood sugars to go low.  He is finding that his morning fastings have been very low and he did wake up with a symptomatic hypoglycemia last week.  So he has not been taking this for the last few days.  He does think that his blood sugars are improving now that he is off this.  He has not had any hyper or hypoglycemia since stopping this medication.    Overall, his dizziness is slowly improving.  He is still having difficulty with anxiety and sleep.  He admits he has a lot of stress in his life around his family right now.  He is having difficulty with decision-making, concentration, daily worry.  This worry does sometimes keep him from sleeping.  He denies any suicidal or homicidal ideation.    Patient has had difficulty over the last few weeks with recurrent nosebleeds.  He is on Plavix.  His last nosebleed he claims lasted for many hours.  He was  anemic on his last CBC and he is not surprised by this as he has had many of these.  He denies any melena, severe GERD symptoms, any cuts or trauma.  He does occasionally have some acid reflux.  He claims about 3 times a week depending on what he eats.    Review of systems:  All others reviewed and are negative.       Current Outpatient Medications:   •  sertraline (ZOLOFT) 50 MG Tab, Take 1 Tab by mouth every day., Disp: 90 Tab, Rfl: 0  •  omeprazole (PRILOSEC) 20 MG delayed-release capsule, Take 1 Cap by mouth every day., Disp: 90 Cap, Rfl: 0  •  losartan (COZAAR) 25 MG Tab, Take 1 Tab by mouth every day., Disp: 100 Tab, Rfl: 3  •  simvastatin (ZOCOR) 40 MG Tab, TAKE 1 TABLET BY MOUTH EVERY EVENING, Disp: 100 Tab, Rfl: 3  •  clopidogrel (PLAVIX) 75 MG Tab, Take 1 Tab by mouth every day., Disp: 90 Tab, Rfl: 3  •   "gabapentin (NEURONTIN) 100 MG Cap, Take 1 Cap by mouth every day., Disp: 90 Cap, Rfl: 2  •  insulin NPH (HUMULIN,NOVOLIN) 100 UNIT/ML Suspension, Inject 22 Units as instructed every day., Disp: 20 mL, Rfl: 3  •  insulin regular (HUMULIN R) 100 Unit/mL Solution, Inject 6-10 Units as instructed 3 times a day before meals., Disp: 10 mL, Rfl: 3  •  metoprolol SR (TOPROL XL) 25 MG TABLET SR 24 HR, TAKE ONE TABLET BY MOUTH ONE TIME DAILY, Disp: 90 Tab, Rfl: 3  •  traZODone (DESYREL) 50 MG Tab, Take 1-2 Tabs by mouth at bedtime as needed for Sleep., Disp: 60 Tab, Rfl: 5  •  acyclovir (ZOVIRAX) 200 MG Cap, TAKE ONE CAPSULE BY MOUTH ONE TIME DAILY , Disp: 90 Cap, Rfl: 0  •  OYSTER SHELL CALCIUM PO, Take 1 Tab by mouth every day., Disp: , Rfl:   •  ALFALFA PO, Take 1 Tab by mouth 3 times a day., Disp: , Rfl:     Allergies, past medical history, past surgical history, family history, social history reviewed and updated    Objective:     Vitals: BP (!) 168/86 (BP Location: Left arm, Patient Position: Sitting, BP Cuff Size: Adult)   Pulse 90   Temp 36.7 °C (98 °F) (Temporal)   Resp 18   Ht 1.74 m (5' 8.5\")   Wt 73.5 kg (162 lb)   SpO2 97%   BMI 24.27 kg/m²   General: Alert, pleasant, NAD  HEENT: Normocephalic.   EOMI, no icterus or pallor.  Heart: Regular rate and rhythm.  S1 and S2 normal.  No murmurs appreciated.  Respiratory: Normal respiratory effort.  Clear to auscultation bilaterally.  Neurological: No tremors, CN2-12 grossly intact  Psych: Patient is nervous appearing, slightly tangential, memory is intact, grooming is appropriate.    Assessment/Plan:     Dylon was seen today for diabetes and other.    Diagnoses and all orders for this visit:    Postconcussion syndromeMigraine without aura and without status migrainosus, not intractable:: Overall this problem is slowly improving.Patient has stopped the topiramate prescribed by neurologist and does plan to follow-up with the neurologist office.    Anxiety: We will " trial sertraline for this.  Patient has already been referred to behavioral health given information for this today.  Warned of common side effects of sertraline and also the fact that it may take some weeks before there is any effect from the drug.  -     sertraline (ZOLOFT) 50 MG Tab; Take 1 Tab by mouth every day.      Gastroesophageal reflux disease without esophagitis: Mild chronic problem but as patient is at high risk for GI bleed as he is on Plavix will start daily omeprazole.  Patient encouraged to take a daily multivitamin with this.  -     omeprazole (PRILOSEC) 20 MG delayed-release capsule; Take 1 Cap by mouth every day.    Recurrent epistaxis: Patient has been referred to ears nose throat and he does have an upcoming appointment.  Explained to patient that he does need to go to emergency room if his nosebleed is lasting more than 10-15 minutes.      Return in about 4 weeks (around 12/17/2019), or if symptoms worsen or fail to improve.

## 2019-11-20 NOTE — PATIENT INSTRUCTIONS
[facility]  [address]  [place] [address]  Phone: [phone]

## 2019-11-24 ENCOUNTER — PATIENT OUTREACH (OUTPATIENT)
Dept: HEALTH INFORMATION MANAGEMENT | Facility: OTHER | Age: 59
End: 2019-11-24

## 2019-12-05 ENCOUNTER — TELEPHONE (OUTPATIENT)
Dept: MEDICAL GROUP | Facility: PHYSICIAN GROUP | Age: 59
End: 2019-12-05

## 2019-12-05 DIAGNOSIS — M48.02 CERVICAL STENOSIS OF SPINE: ICD-10-CM

## 2019-12-05 NOTE — TELEPHONE ENCOUNTER
Spoke to patient on phone regarding his consultation with neurosurgery.  He has been nervous about the recommended surgery.  Reviewed the note from [facility] neurosurgery group.  Explained that he does have severe cervical spine stenosis and that surgery would be more or less the standard treatment for this.  Explained that although surgery does carry risk that he is also at risk for cervical spinal damage if he were to have any further injuries or falls.  -Patient reassured and does plan to schedule surgery for January or February 2020.

## 2019-12-10 NOTE — PROGRESS NOTES
A 59-year-old male was an emergent admission to [facility] from 10/20/2019 to 10/25/2019 to treat Chest pain, unspecified. IHD visited the patient bedside. The patient was discharged Home.      The patient was ordered to start/continue to take the following medications: Topiramate (Topamax), Acyclovir (Zovirax), and Trazodone (Desyrel). The patient successfully filled all medications.     The patient was ordered to follow-up with PCP and Specialist. The patient had the following appointments:     1) 10/28/2019 @ 10:55 Sandi Manning, general/family practice - CONFIRMED AS KEPT     2) 11/8/2019 @ 8:15 Chika Engel, neurology - PATIENT RESCHEDULED     3) 11/8/2019 @ 10:15 Teodora Purvis, cardiology - CONFIRMED AS KEPT     4) 11/13/2019 @ 1:30 Chika Engel, neurology - CONFIRMED AS KEPT     5) 11/19/2019 @ 3:45 Sandi Manning general/family practice - CONFIRMED AS KEPT    The patient has future appointments scheduled for:     1) 12/10/2019 @ 1:00 Thomas Jenesn , otolaryngology - SCHEDULED     2) 1/28/2020 @ 2:00 Susan Longoria, psychiatry - SCHEDULED     3) 2/4/2020 @ 8:45 Paulie Piña, dermatology - SCHEDULED     4) 4/9/2020 @ 1:50 Janette Pñea, mental health provider - SCHEDULED      Patient previously utilized [facility] but was discontinued due to location parameters, IHD educated patient about [facility]. Patient is I/ADLs, does not have any DME. IHD completed a home visit on 11/14/2019 to complete a Medical Record Release form for a CT Scan completed in the [facility] to be sent to [facility]. IHD coordinated multiple appointments, transportation services, and collaborated with Client. IHD escalated this patient to Case Management through [facility].  PPS score: 70%     IHD followed the patient for a total of 43 days and Patient  was receptive to services. In summary, IHD Collaborated with client on patient, Coordinated physician appointments, Coordinated with  physician on referral, Educated patient on community services, Identified and successfully secured community services on behalf of patient, Identified Social Determinants of Health , and Provided education to patient (health edu, benefits, resources, etc.). As a result, Coordinated with physician on referral, IHD established patient with new provider(s), IHD facilitated transportation to appointments, IHD improved continuity of care between providers, IHD redirected patient to a more appropriate level of care, IHD secured a sooner appointment with provider(s), IHD successfully transitioned patient to clinical care team, Patient adhered with Discharge Orders, Patient attended follow up appointments, Patient felt supported (where there was no or little support previously), Patient’s condition or quality of life improved, and Patient successfully recovered.

## 2019-12-23 NOTE — DISCHARGE SUMMARY
CHIEF COMPLAINT ON ADMISSION  Left foot pain    CODE STATUS  Full Code    HPI & HOSPITAL COURSE  56 y.o. 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 Patient was admitted for left 3rd & 4th toe cellulitis and sepsis. He was taken for left fourth toe amputation on 5/3 and a wound vac was placed. He was continued on antibiotics which were able to be de-escalated to Zosyn, with a stop date of 6/3.     Due to his wound vac and need for long-term abx, he was recommended for SNF. This was arranged. Therefore, he is discharged in fair and stable condition for further post-acute management.     SPECIFIC OUTPATIENT FOLLOW-UP  Consider ID follow up if antibiotics require change    DISCHARGE PROBLEM LIST  Sepsis, resolved  Left 4th osteomyelitis s/p amputation, on long-term abx  Type 1 diabetes mellitus with diabetic retinopathy  Acute on chronic renal failure, returned to baseline  Diabetic retinopathy  Essential hypertension  Peripheral vascular disease  Hyponatremia, resolved  Normocytic anemia, stable    FOLLOW UP  Future Appointments  Date Time Provider Department Center   5/12/2017 9:30 AM Bandar Cazares M.D. [facility] 2nd .   Inessa Richard MD - 1-2 weeks     MEDICATIONS ON DISCHARGE     Home Medication Instructions      Medication Information      acyclovir (ZOVIRAX) 200 MG Cap  Take 1 Cap by mouth every day.     atorvastatin (LIPITOR) 40 MG Tab  Take 1 Tab by mouth every bedtime.     clopidogrel (PLAVIX) 75 MG Tab  Take 75 mg by mouth every day.     insulin NPH (HUMULIN,NOVOLIN) 100 UNIT/ML Suspension  Inject 20 Units as instructed every day.     insulin regular (HUMULIN R) 100 Unit/mL Solution  Inject 6-10 Units as instructed 3 times a day before meals.     losartan (COZAAR)  50 MG Tab  Take 50 mg by mouth every day.     metoprolol SR (TOPROL XL) 25 MG TABLET SR 24 HR  TAKE ONE TABLET BY MOUTH ONCE DAILY     NS SOLN 100 mL with piperacillin-tazobactam 3.375 (3-0.375) G SOLR 3.375 g  3.375 g by Intravenous route every 6 hours for 25 days (through 6/3/17)         DIET  Orders Placed This Encounter   Procedures   • DIET ORDER     Standing Status: Standing      Number of Occurrences: 1      Standing Expiration Date:      Order Specific Question:  Diet:     Answer:  Diabetic [3]       ACTIVITY  As tolerated and directed by skilled nursing.    LINES, DRAINS, AND WOUNDS  This is an automated list. Peripheral IVs will be removed prior to discharge.  Central Line Group 1 (A) Right;Basilic Single Lumen;PICC;Power Injection Catheter 4 (Active)   Line Secured Taped;Transparent 5/8/2017  9:45 PM   Patency and Function Check Performed at Beginning of Shift 5/8/2017  9:45 PM   Line Necessity Assessed Antibiotic Therapy Greater than 7 Days 5/8/2017  9:45 PM   Hand Washing / Gloves Prior to Every Access Yes 5/8/2017  9:45 PM   Next Daily Chlorhexidine Bath Due (Regional ONLY) 05/08/17 5/8/2017  9:45 PM   Port Access  Scrub the Hub Prior to Access 5/8/2017  9:45 PM   Site Condition / Description Assessed;Patent;Clean;Dry;Intact 5/8/2017  9:45 PM   Signs and Symptoms of Infection None Apparent at this Time 5/8/2017  9:45 PM   Dressing Type / Description Transparent;Antimicrobial Patch (BioPatch);Clean;Dry;Intact 5/8/2017  9:45 PM   Dressing Status Observed 5/8/2017  9:45 PM   Next Dressing Change  05/13/17 5/8/2017  9:45 PM   Date Primary Tubing Changed 05/08/17 5/8/2017  9:45 PM   Date Secondary Tubing Changed 05/08/17 5/8/2017  9:45 PM   NEXT Primary Tubing Change  05/09/17 5/8/2017  9:45 PM   NEXT Secondary Tubing Change  05/09/17 5/8/2017  9:45 PM       Wound Open Surgical (Complicated) Foot;Digit 4 Left (Active)   Wound Bed Pink;Red 5/8/2017  9:45 PM   Drainage  Scant;Serosanguinous 5/8/2017  9:45 PM    Periwound Skin Intact 5/8/2017  9:45 PM   Cleansing Approved Wound Cleanser 5/8/2017  9:45 PM   Dressing Options Wound Vac 5/8/2017  9:45 PM   Dressing Status / Change Intact;Observed 5/8/2017  9:45 PM   Dressing Cleansing/Solutions Not Applicable 5/8/2017  9:45 PM   Periwound Protectant Benzoin 5/8/2017  9:45 PM   Length (cm) 4 5/3/2017  7:30 PM   Width (cm) 2 5/3/2017  7:30 PM   Depth (cm) 2 5/3/2017  7:30 PM   Weekly Photo (Inpatient Only) 05/03/17 5/8/2017  3:30 PM   Dressing Change Frequency Monday, Wednesday, Friday 5/8/2017  9:45 PM   Next Dressing Change  05/10/17 5/8/2017  9:45 PM   NPWT Pump Mode / Pressure Setting Intermittent;125 mmHg 5/8/2017  3:30 PM   NPWT Canister Change Date 05/03/17 5/3/2017  3:30 PM   NPWT Dressings Small;Black Foam 5/8/2017  3:30 PM   Time Spent with Patient (mins) 60 5/8/2017  3:30 PM                  MENTAL STATUS ON TRANSFER  Level of Consciousness: Alert  Orientation : Oriented x 4  Speech: Speech Clear    CONSULTATIONS  Hilary vascular surgery    PROCEDURES  DATE OF SERVICE:  05/03/2017  PREOPERATIVE DIAGNOSES:  Left fourth toe infection with diabetic foot wound and sepsis.  POSTPROCEDURE DIAGNOSES:  Left fourth toe infection with diabetic foot wound and sepsis.  PROCEDURES:     1.  Incision and drainage of left foot infection with amputation of the left fourth toe.     2.  Digital block.  SURGEON:  Inessa Richard MD     ANESTHESIA:  Tye Egan MD    LABORATORY  Lab Results   Component Value Date/Time    SODIUM 134* 05/08/2017 03:00 AM    POTASSIUM 3.6 05/08/2017 03:00 AM    CHLORIDE 104 05/08/2017 03:00 AM    CO2 21 05/08/2017 03:00 AM    GLUCOSE 147* 05/08/2017 03:00 AM    BUN 13 05/08/2017 03:00 AM    CREATININE 1.40 05/08/2017 03:00 AM        Lab Results   Component Value Date/Time    WBC 7.6 05/08/2017 03:00 AM    HEMOGLOBIN 9.2* 05/08/2017 03:00 AM    HEMATOCRIT 27.3* 05/08/2017 03:00 AM    PLATELET COUNT 307 05/08/2017 03:00 AM        Total time of the  discharge process exceeds 40 minutes.   1

## 2019-12-30 ENCOUNTER — OFFICE VISIT (OUTPATIENT)
Dept: MEDICAL GROUP | Facility: PHYSICIAN GROUP | Age: 59
End: 2019-12-30
Payer: MEDICARE

## 2019-12-30 VITALS
HEIGHT: 69 IN | BODY MASS INDEX: 23.99 KG/M2 | SYSTOLIC BLOOD PRESSURE: 130 MMHG | OXYGEN SATURATION: 95 % | WEIGHT: 162 LBS | DIASTOLIC BLOOD PRESSURE: 80 MMHG | HEART RATE: 74 BPM | TEMPERATURE: 97 F | RESPIRATION RATE: 18 BRPM

## 2019-12-30 DIAGNOSIS — J06.9 UPPER RESPIRATORY TRACT INFECTION, UNSPECIFIED TYPE: ICD-10-CM

## 2019-12-30 DIAGNOSIS — F41.9 ANXIETY: ICD-10-CM

## 2019-12-30 DIAGNOSIS — N18.30 CKD (CHRONIC KIDNEY DISEASE) STAGE 3, GFR 30-59 ML/MIN (HCC): ICD-10-CM

## 2019-12-30 PROCEDURE — 99214 OFFICE O/P EST MOD 30 MIN: CPT | Performed by: FAMILY MEDICINE

## 2019-12-30 RX ORDER — TRAZODONE HYDROCHLORIDE 100 MG/1
100 TABLET ORAL NIGHTLY PRN
Qty: 90 TAB | Refills: 3 | Status: SHIPPED
Start: 2019-12-30 | End: 2020-02-20

## 2019-12-30 NOTE — PROGRESS NOTES
cc: URI      Subjective:     Dylon Santa is a 59 y.o. male presenting for the following:     For the last 10 days patient has had cough, nasal congestion.  Symptoms are improving today.  He does have some facial pressure but he feels like this is slowly getting better.  No severe headaches, dizziness.  If he has sinus infections he usually gets a very bad taste in his mouth and he does not have this currently.    Denies vomiting/diarrhea, abdominal pain, chest pain, shortness of breath, syncope, skin changes, neck pain, photophobia, recent travel.  Has tried Robitussin.    Post-concussive syndrome/Anxiety: Patient has been taking trazodone at night and this is helping with sleep. He does have some continued stress at home but overall improving.  He denies any severe worry, suicidal or homicidal ideation.  His headaches and dizziness are slowly improving which he believes is also helping his anxiety.    Nosebleeds: Patient did see ENT and had caturization.  He has not had any nosebleeds since then.  He is taking Plavix daily.    Review of systems:  All others reviewed and are negative.       Current Outpatient Medications:   •  traZODone (DESYREL) 100 MG Tab, Take 1 Tab by mouth at bedtime as needed for Sleep., Disp: 90 Tab, Rfl: 3  •  sertraline (ZOLOFT) 50 MG Tab, Take 1 Tab by mouth every day., Disp: 90 Tab, Rfl: 0  •  omeprazole (PRILOSEC) 20 MG delayed-release capsule, Take 1 Cap by mouth every day., Disp: 90 Cap, Rfl: 0  •  losartan (COZAAR) 25 MG Tab, Take 1 Tab by mouth every day., Disp: 100 Tab, Rfl: 3  •  simvastatin (ZOCOR) 40 MG Tab, TAKE 1 TABLET BY MOUTH EVERY EVENING, Disp: 100 Tab, Rfl: 3  •  clopidogrel (PLAVIX) 75 MG Tab, Take 1 Tab by mouth every day., Disp: 90 Tab, Rfl: 3  •  metoprolol SR (TOPROL XL) 25 MG TABLET SR 24 HR, TAKE ONE TABLET BY MOUTH ONE TIME DAILY, Disp: 90 Tab, Rfl: 3  •  acyclovir (ZOVIRAX) 200 MG Cap, TAKE ONE CAPSULE BY MOUTH ONE TIME DAILY , Disp: 90 Cap, Rfl: 0  •   "OYSTER SHELL CALCIUM PO, Take 1 Tab by mouth every day., Disp: , Rfl:   •  insulin NPH (HUMULIN,NOVOLIN) 100 UNIT/ML Suspension, Inject 22 Units as instructed every day., Disp: 20 mL, Rfl: 3  •  insulin regular (HUMULIN R) 100 Unit/mL Solution, Inject 6-10 Units as instructed 3 times a day before meals., Disp: 10 mL, Rfl: 3  •  ALFALFA PO, Take 1 Tab by mouth 3 times a day., Disp: , Rfl:     Allergies, past medical history, past surgical history, family history, social history reviewed and updated    Objective:     Vitals: /80 (BP Location: Left arm, Patient Position: Sitting, BP Cuff Size: Adult)   Pulse 74   Temp 36.1 °C (97 °F) (Temporal)   Resp 18   Ht 1.74 m (5' 8.5\")   Wt 73.5 kg (162 lb)   SpO2 95%   BMI 24.27 kg/m²   General: Alert, pleasant, NAD  HEENT: Normocephalic.   EOMI, no icterus or pallor.    Conjunctivae and lids normal.   External ears normal.  TMs pearly bilaterally  oropharynx mildly erythematous, no exudate, mucous membranes moist.    Neck supple.  No cervical or supraclavicular lymphadenopathy.  Heart: Regular rate and rhythm.  S1 and S2 normal.    Respiratory: Normal respiratory effort.  Clear to auscultation bilaterally.  Skin: No rashes  Neurological: sensation grossly intact,  tone/strength normal, gait is normal, CN2-12 grossly intact  Psych:  Affect is normal, judgement is good, grooming is appropriate.    Assessment/Plan:     Dylon was seen today for follow-up.    Diagnoses and all orders for this visit:    Upper respiratory tract infection, unspecified type If any worsening, patient to let us know. He does not think it's a sinus infection currently but will let us know if there is any worsening and will send Augmentin.  This antibiotic was discussed today. Discussed likely viral URI, recommended conservative management.    Currently, low suspicion of pneumonia, meningitis, sinusitis, acute otitis media, peritonsillar abscess, or endocarditis.  To return to clinic if any " worsening symptoms, or not improving within 48 hours.  To emergency room if any shortness of breath, severe neck pain, confusion, syncope or near syncope.    CKD (chronic kidney disease) stage 3, GFR 30-59 ml/min: We will monitor for stability with fasting blood sugar.  -     Basic Metabolic Panel; Future    Anxiety: Problem is improving as his postconcussive symptoms improved.  Has sleep has been improved with trazodone patient not having side effect with this.  Will reorder.    Other orders  -     traZODone (DESYREL) 100 MG Tab; Take 1 Tab by mouth at bedtime as needed for Sleep.      Return in about 2 months (around 2/29/2020), or if symptoms worsen or fail to improve.

## 2020-01-13 ENCOUNTER — TELEPHONE (OUTPATIENT)
Dept: MEDICAL GROUP | Facility: PHYSICIAN GROUP | Age: 60
End: 2020-01-13

## 2020-01-14 ENCOUNTER — OFFICE VISIT (OUTPATIENT)
Dept: MEDICAL GROUP | Facility: PHYSICIAN GROUP | Age: 60
End: 2020-01-14
Payer: MEDICARE

## 2020-01-14 VITALS
RESPIRATION RATE: 16 BRPM | TEMPERATURE: 97.1 F | HEART RATE: 72 BPM | SYSTOLIC BLOOD PRESSURE: 140 MMHG | OXYGEN SATURATION: 98 % | HEIGHT: 69 IN | BODY MASS INDEX: 24.73 KG/M2 | DIASTOLIC BLOOD PRESSURE: 80 MMHG | WEIGHT: 167 LBS

## 2020-01-14 DIAGNOSIS — M48.02 CERVICAL STENOSIS OF SPINE: ICD-10-CM

## 2020-01-14 PROCEDURE — 99214 OFFICE O/P EST MOD 30 MIN: CPT | Performed by: FAMILY MEDICINE

## 2020-01-14 ASSESSMENT — PATIENT HEALTH QUESTIONNAIRE - PHQ9: CLINICAL INTERPRETATION OF PHQ2 SCORE: 0

## 2020-01-14 NOTE — TELEPHONE ENCOUNTER
ESTABLISHED PATIENT PRE-VISIT PLANNING     Patient was NOT contacted to complete PVP.    1.  Reviewed notes from the last few office visits within the medical group: Yes    2.  If any orders were placed at last visit or intended to be done for this visit (i.e. 6 mos follow-up), do we have Results/Consult Notes?        •  Labs - Labs ordered, but not to be completed until 2/29/2020.       •  Imaging - Imaging was not ordered at last office visit.       •  Referrals - No referrals were ordered at last office visit.    3. Is this appointment scheduled as a Hospital Follow-Up? No    4.  Immunizations were updated in [facility] using WebIZ?: Yes       •  Web Iz Recommendations: HEPATITIS A , MMR , TD, VARICELLA (Chicken Pox)  and SHINGRIX (Shingles)    5.  Patient is due for the following Health Maintenance Topics:   Health Maintenance Due   Topic Date Due   • HEPATITIS C SCREENING  1960   • IMM ZOSTER VACCINES (1 of 2) 06/27/2011   • RETINAL SCREENING  12/21/2016   • Annual Wellness Visit  06/18/2017   • DIABETES MONOFILAMENT / LE EXAM  04/26/2019     6. Orders for overdue Health Maintenance topics pended in Pre-Charting? NO    7.  AHA (MDX) form printed for Provider? YES    8.  Patient was NOT informed to arrive 15 min prior to their scheduled appointment and bring in their medication bottles.

## 2020-01-15 NOTE — PROGRESS NOTES
cc: Cervical stenosis      Subjective:     Dylon Santa is a 59 y.o. male presenting for the following:     Patient has been seen by Dr. Curiel who does recommend fusion for him.  He has had some problems with neck pain in the past but this has mostly improved.  Also, although he occasionally has some tingling in his hands he does not complain of hand weakness.  He does not  things weekly and he does not drop things often.  Because he is relatively asymptomatic he is very hesitant to undergo fusion.    Review of systems:  All others reviewed and are negative.       Current Outpatient Medications:   •  traZODone (DESYREL) 100 MG Tab, Take 1 Tab by mouth at bedtime as needed for Sleep., Disp: 90 Tab, Rfl: 3  •  sertraline (ZOLOFT) 50 MG Tab, Take 1 Tab by mouth every day., Disp: 90 Tab, Rfl: 0  •  omeprazole (PRILOSEC) 20 MG delayed-release capsule, Take 1 Cap by mouth every day., Disp: 90 Cap, Rfl: 0  •  losartan (COZAAR) 25 MG Tab, Take 1 Tab by mouth every day., Disp: 100 Tab, Rfl: 3  •  simvastatin (ZOCOR) 40 MG Tab, TAKE 1 TABLET BY MOUTH EVERY EVENING, Disp: 100 Tab, Rfl: 3  •  clopidogrel (PLAVIX) 75 MG Tab, Take 1 Tab by mouth every day., Disp: 90 Tab, Rfl: 3  •  insulin NPH (HUMULIN,NOVOLIN) 100 UNIT/ML Suspension, Inject 22 Units as instructed every day., Disp: 20 mL, Rfl: 3  •  insulin regular (HUMULIN R) 100 Unit/mL Solution, Inject 6-10 Units as instructed 3 times a day before meals., Disp: 10 mL, Rfl: 3  •  metoprolol SR (TOPROL XL) 25 MG TABLET SR 24 HR, TAKE ONE TABLET BY MOUTH ONE TIME DAILY, Disp: 90 Tab, Rfl: 3  •  acyclovir (ZOVIRAX) 200 MG Cap, TAKE ONE CAPSULE BY MOUTH ONE TIME DAILY , Disp: 90 Cap, Rfl: 0  •  OYSTER SHELL CALCIUM PO, Take 1 Tab by mouth every day., Disp: , Rfl:   •  ALFALFA PO, Take 1 Tab by mouth 3 times a day., Disp: , Rfl:     Allergies, past medical history, past surgical history, family history, social history reviewed and updated    Objective:     Vitals: /80  "(BP Location: Left arm, Patient Position: Sitting, BP Cuff Size: Adult)   Pulse 72   Temp 36.2 °C (97.1 °F) (Temporal)   Resp 16   Ht 1.74 m (5' 8.5\")   Wt 75.8 kg (167 lb)   SpO2 98%   BMI 25.02 kg/m²   General: Alert, pleasant, NAD  HEENT: Normocephalic.    Neck supple.  Nontender  Musculoskeletal: Gait is normal.  Moves all extremities well.  Patient and hands grossly intact.    Assessment/Plan:     Dylon was seen today for back injury.    Diagnoses and all orders for this visit:    Cervical stenosis of spine: Reviewed patient's images today.  He does understand that he has severe cord compression C3-7 and likely surgery will be recommended. However, as he is relatively asymptomatic and understandably hesitant to have a surgery which may decrease his quality of life, so we decided that he would be reassured by a second opinion.   -     REFERRAL TO NEUROSURGERY      Return if symptoms worsen or fail to improve.  "

## 2020-01-29 ENCOUNTER — TELEPHONE (OUTPATIENT)
Dept: MEDICAL GROUP | Facility: PHYSICIAN GROUP | Age: 60
End: 2020-01-29

## 2020-01-29 NOTE — TELEPHONE ENCOUNTER
1. Caller Name: Dylon  Call Back Number: [phone] (home)         How would the patient prefer to be contacted with a response: Phone call OK to leave a detailed message    LVM for pt to return call to schedule AWV

## 2020-02-03 ENCOUNTER — TELEPHONE (OUTPATIENT)
Dept: MEDICAL GROUP | Facility: PHYSICIAN GROUP | Age: 60
End: 2020-02-03

## 2020-02-03 DIAGNOSIS — M48.02 CERVICAL STENOSIS OF SPINE: ICD-10-CM

## 2020-02-04 ENCOUNTER — APPOINTMENT (RX ONLY)
Dept: URBAN - METROPOLITAN AREA CLINIC 22 | Facility: CLINIC | Age: 60
Setting detail: DERMATOLOGY
End: 2020-02-04

## 2020-02-04 DIAGNOSIS — L81.4 OTHER MELANIN HYPERPIGMENTATION: ICD-10-CM

## 2020-02-04 DIAGNOSIS — Z71.89 OTHER SPECIFIED COUNSELING: ICD-10-CM

## 2020-02-04 DIAGNOSIS — L70.0 ACNE VULGARIS: ICD-10-CM | Status: STABLE

## 2020-02-04 DIAGNOSIS — L73.8 OTHER SPECIFIED FOLLICULAR DISORDERS: ICD-10-CM

## 2020-02-04 PROCEDURE — ? COUNSELING

## 2020-02-04 PROCEDURE — 99203 OFFICE O/P NEW LOW 30 MIN: CPT

## 2020-02-04 PROCEDURE — ? ADDITIONAL NOTES

## 2020-02-04 ASSESSMENT — LOCATION SIMPLE DESCRIPTION DERM
LOCATION SIMPLE: RIGHT UPPER BACK
LOCATION SIMPLE: RIGHT CHEEK
LOCATION SIMPLE: LEFT FOREHEAD
LOCATION SIMPLE: LEFT FOREARM
LOCATION SIMPLE: RIGHT FOREARM
LOCATION SIMPLE: LEFT CHEEK

## 2020-02-04 ASSESSMENT — LOCATION DETAILED DESCRIPTION DERM
LOCATION DETAILED: RIGHT SUPERIOR UPPER BACK
LOCATION DETAILED: LEFT DISTAL DORSAL FOREARM
LOCATION DETAILED: RIGHT PROXIMAL DORSAL FOREARM
LOCATION DETAILED: LEFT MEDIAL FOREHEAD
LOCATION DETAILED: LEFT CENTRAL MALAR CHEEK
LOCATION DETAILED: RIGHT CENTRAL MALAR CHEEK

## 2020-02-04 ASSESSMENT — LOCATION ZONE DERM
LOCATION ZONE: ARM
LOCATION ZONE: TRUNK
LOCATION ZONE: FACE

## 2020-02-04 NOTE — TELEPHONE ENCOUNTER
"That is not a problem.  I will put in referral to Dr. Be, as he is in a different office than Dr. Renee.    Per last clinic note:  \"He does understand that he has severe cord compression C3-7 and likely surgery will be recommended. However, as he is relatively asymptomatic and understandably hesitant to have a surgery which may decrease his quality of life, so we decided that he would be reassured by a second opinion. \"  "

## 2020-02-04 NOTE — PROCEDURE: COUNSELING
Detail Level: Zone
Detail Level: Generalized
Dapsone Pregnancy And Lactation Text: This medication is Pregnancy Category C and is not considered safe during pregnancy or breast feeding.
Topical Retinoid Pregnancy And Lactation Text: This medication is Pregnancy Category C. It is unknown if this medication is excreted in breast milk.
Topical Sulfur Applications Counseling: Topical Sulfur Counseling: Patient counseled that this medication may cause skin irritation or allergic reactions.  In the event of skin irritation, the patient was advised to reduce the amount of the drug applied or use it less frequently.   The patient verbalized understanding of the proper use and possible adverse effects of topical sulfur application.  All of the patient's questions and concerns were addressed.
Minocycline Counseling: Patient advised regarding possible photosensitivity and discoloration of the teeth, skin, lips, tongue and gums.  Patient instructed to avoid sunlight, if possible.  When exposed to sunlight, patients should wear protective clothing, sunglasses, and sunscreen.  The patient was instructed to call the office immediately if the following severe adverse effects occur:  hearing changes, easy bruising/bleeding, severe headache, or vision changes.  The patient verbalized understanding of the proper use and possible adverse effects of minocycline.  All of the patient's questions and concerns were addressed.
Tetracycline Pregnancy And Lactation Text: This medication is Pregnancy Category D and not consider safe during pregnancy. It is also excreted in breast milk.
Isotretinoin Counseling: Patient should get monthly blood tests, not donate blood, not drive at night if vision affected, not share medication, and not undergo elective surgery for 6 months after tx completed. Side effects reviewed, pt to contact office should one occur.
Bactrim Pregnancy And Lactation Text: This medication is Pregnancy Category D and is known to cause fetal risk.  It is also excreted in breast milk.
Tazorac Counseling:  Patient advised that medication is irritating and drying.  Patient may need to apply sparingly and wash off after an hour before eventually leaving it on overnight.  The patient verbalized understanding of the proper use and possible adverse effects of tazorac.  All of the patient's questions and concerns were addressed.
Topical Sulfur Applications Pregnancy And Lactation Text: This medication is Pregnancy Category C and has an unknown safety profile during pregnancy. It is unknown if this topical medication is excreted in breast milk.
Doxycycline Counseling:  Patient counseled regarding possible photosensitivity and increased risk for sunburn.  Patient instructed to avoid sunlight, if possible.  When exposed to sunlight, patients should wear protective clothing, sunglasses, and sunscreen.  The patient was instructed to call the office immediately if the following severe adverse effects occur:  hearing changes, easy bruising/bleeding, severe headache, or vision changes.  The patient verbalized understanding of the proper use and possible adverse effects of doxycycline.  All of the patient's questions and concerns were addressed.
Benzoyl Peroxide Counseling: Patient counseled that medicine may cause skin irritation and bleach clothing.  In the event of skin irritation, the patient was advised to reduce the amount of the drug applied or use it less frequently.   The patient verbalized understanding of the proper use and possible adverse effects of benzoyl peroxide.  All of the patient's questions and concerns were addressed.
Isotretinoin Pregnancy And Lactation Text: This medication is Pregnancy Category X and is considered extremely dangerous during pregnancy. It is unknown if it is excreted in breast milk.
Include Pregnancy/Lactation Warning?: No
Birth Control Pills Counseling: Birth Control Pill Counseling: I discussed with the patient the potential side effects of OCPs including but not limited to increased risk of stroke, heart attack, thrombophlebitis, deep venous thrombosis, hepatic adenomas, breast changes, GI upset, headaches, and depression.  The patient verbalized understanding of the proper use and possible adverse effects of OCPs. All of the patient's questions and concerns were addressed.
Tazorac Pregnancy And Lactation Text: This medication is not safe during pregnancy. It is unknown if this medication is excreted in breast milk.
Spironolactone Counseling: Patient advised regarding risks of diarrhea, abdominal pain, hyperkalemia, birth defects (for female patients), liver toxicity and renal toxicity. The patient may need blood work to monitor liver and kidney function and potassium levels while on therapy. The patient verbalized understanding of the proper use and possible adverse effects of spironolactone.  All of the patient's questions and concerns were addressed.
Doxycycline Pregnancy And Lactation Text: This medication is Pregnancy Category D and not consider safe during pregnancy. It is also excreted in breast milk but is considered safe for shorter treatment courses.
Benzoyl Peroxide Pregnancy And Lactation Text: This medication is Pregnancy Category C. It is unknown if benzoyl peroxide is excreted in breast milk.
Azithromycin Counseling:  I discussed with the patient the risks of azithromycin including but not limited to GI upset, allergic reaction, drug rash, diarrhea, and yeast infections.
High Dose Vitamin A Counseling: Side effects reviewed, pt to contact office should one occur.
Birth Control Pills Pregnancy And Lactation Text: This medication should be avoided if pregnant and for the first 30 days post-partum.
Detail Level: Simple
Topical Clindamycin Counseling: Patient counseled that this medication may cause skin irritation or allergic reactions.  In the event of skin irritation, the patient was advised to reduce the amount of the drug applied or use it less frequently.   The patient verbalized understanding of the proper use and possible adverse effects of clindamycin.  All of the patient's questions and concerns were addressed.
Spironolactone Pregnancy And Lactation Text: This medication can cause feminization of the male fetus and should be avoided during pregnancy. The active metabolite is also found in breast milk.
Topical Retinoid counseling:  Patient advised to apply a pea-sized amount only at bedtime and wait 30 minutes after washing their face before applying.  If too drying, patient may add a non-comedogenic moisturizer. The patient verbalized understanding of the proper use and possible adverse effects of retinoids.  All of the patient's questions and concerns were addressed.
Erythromycin Counseling:  I discussed with the patient the risks of erythromycin including but not limited to GI upset, allergic reaction, drug rash, diarrhea, increase in liver enzymes, and yeast infections.
Azithromycin Pregnancy And Lactation Text: This medication is considered safe during pregnancy and is also secreted in breast milk.
High Dose Vitamin A Pregnancy And Lactation Text: High dose vitamin A therapy is contraindicated during pregnancy and breast feeding.
Dapsone Counseling: I discussed with the patient the risks of dapsone including but not limited to hemolytic anemia, agranulocytosis, rashes, methemoglobinemia, kidney failure, peripheral neuropathy, headaches, GI upset, and liver toxicity.  Patients who start dapsone require monitoring including baseline LFTs and weekly CBCs for the first month, then every month thereafter.  The patient verbalized understanding of the proper use and possible adverse effects of dapsone.  All of the patient's questions and concerns were addressed.
Topical Clindamycin Pregnancy And Lactation Text: This medication is Pregnancy Category B and is considered safe during pregnancy. It is unknown if it is excreted in breast milk.
Tetracycline Counseling: Patient counseled regarding possible photosensitivity and increased risk for sunburn.  Patient instructed to avoid sunlight, if possible.  When exposed to sunlight, patients should wear protective clothing, sunglasses, and sunscreen.  The patient was instructed to call the office immediately if the following severe adverse effects occur:  hearing changes, easy bruising/bleeding, severe headache, or vision changes.  The patient verbalized understanding of the proper use and possible adverse effects of tetracycline.  All of the patient's questions and concerns were addressed. Patient understands to avoid pregnancy while on therapy due to potential birth defects.
Erythromycin Pregnancy And Lactation Text: This medication is Pregnancy Category B and is considered safe during pregnancy. It is also excreted in breast milk.
Bactrim Counseling:  I discussed with the patient the risks of sulfa antibiotics including but not limited to GI upset, allergic reaction, drug rash, diarrhea, dizziness, photosensitivity, and yeast infections.  Rarely, more serious reactions can occur including but not limited to aplastic anemia, agranulocytosis, methemoglobinemia, blood dyscrasias, liver or kidney failure, lung infiltrates or desquamative/blistering drug rashes.

## 2020-02-04 NOTE — PROCEDURE: ADDITIONAL NOTES
Detail Level: Generalized
Additional Notes: Recommended an otc face wash with Benzoyl peroxide.  Use once or twice daily

## 2020-02-04 NOTE — TELEPHONE ENCOUNTER
1. Caller Name: Dylon                          Call Back Number: [phone] (home)         How would the patient prefer to be contacted with a response: Phone call OK to leave a detailed message    Pt is requesting to get a second opinion with a doctor out side of the office of  Dr. Curiel. Was previously seen by Dr. Curiel and was sent to another provider inside the same office for a second opinion from another provider but pt does not feel comfortable going into the office and have the chance of running into  as they do not like him and would like to avoid the conflict.    Call pt's Cell phone  [phone]

## 2020-02-06 NOTE — TELEPHONE ENCOUNTER
1. Caller Name: Dylon                          Call Back Number: [phone] (home)         How would the patient prefer to be contacted with a response: Phone call OK to leave a detailed message    LVM for pt to return call at earliest convenience

## 2020-02-13 ENCOUNTER — TELEPHONE (OUTPATIENT)
Dept: MEDICAL GROUP | Facility: PHYSICIAN GROUP | Age: 60
End: 2020-02-13

## 2020-02-14 ENCOUNTER — TELEPHONE (OUTPATIENT)
Dept: MEDICAL GROUP | Facility: PHYSICIAN GROUP | Age: 60
End: 2020-02-14

## 2020-02-14 NOTE — TELEPHONE ENCOUNTER
1. Caller Name: Dylon    Call Back Number: [phone] (home)         How would the patient prefer to be contacted with a response: Phone call do NOT leave a detailed message    Pt called to schedule AWV

## 2020-02-14 NOTE — TELEPHONE ENCOUNTER
ESTABLISHED PATIENT PRE-VISIT PLANNING     Patient was NOT contacted to complete PVP.    1.  Reviewed notes from the last few office visits within the medical group: Yes    2.  If any orders were placed at last visit or intended to be done for this visit (i.e. 6 mos follow-up), do we have Results/Consult Notes?        •  Labs - Labs were not ordered at last office visit.       •  Imaging - Imaging was not ordered at last office visit.       •  Referrals - Referral ordered, patient has NOT been seen.    3. Is this appointment scheduled as a Hospital Follow-Up? No    4.  Immunizations were updated in [facility] using WebIZ?: Yes       •  Web Iz Recommendations: HEPATITIS A , MMR , TD, VARICELLA (Chicken Pox)  and SHINGRIX (Shingles)    5.  Patient is due for the following Health Maintenance Topics:   Health Maintenance Due   Topic Date Due   • HEPATITIS C SCREENING  1960   • IMM ZOSTER VACCINES (1 of 2) 06/27/2011   • RETINAL SCREENING  12/21/2016   • Annual Wellness Visit  06/18/2017   • DIABETES MONOFILAMENT / LE EXAM  04/26/2019     6. Orders for overdue Health Maintenance topics pended in Pre-Charting? NO    7.  AHA (MDX) form printed for Provider? YES    8.  Patient was NOT informed to arrive 15 min prior to their scheduled appointment and bring in their medication bottles.

## 2020-02-18 DIAGNOSIS — Z86.19 HISTORY OF SHINGLES: ICD-10-CM

## 2020-02-19 ENCOUNTER — TELEPHONE (OUTPATIENT)
Dept: MEDICAL GROUP | Facility: PHYSICIAN GROUP | Age: 60
End: 2020-02-19

## 2020-02-19 RX ORDER — TOPIRAMATE 25 MG/1
CAPSULE, EXTENDED RELEASE ORAL
COMMUNITY
End: 2020-08-25

## 2020-02-19 RX ORDER — ACYCLOVIR 200 MG/1
CAPSULE ORAL
Qty: 90 CAP | Refills: 0 | Status: SHIPPED | OUTPATIENT
Start: 2020-02-19 | End: 2020-05-21

## 2020-02-19 NOTE — TELEPHONE ENCOUNTER
ANNUAL WELLNESS VISIT PRE-VISIT PLANNING WITHOUT OUTREACH    1.  Reviewed note from last office visit with PCP: YES    2.  If any orders were placed at last visit, do we have Results/Consult Notes?        •  Labs - Labs were not ordered at last office visit.        •  Imaging - Imaging was not ordered at last office visit.       •  Referrals - Referral ordered, patient has NOT been seen.    3.  Immunizations were updated in [facility] using WebIZ?: Yes       •  WebIZ Recommendations: HEPATITIS A , TD, SHINGRIX (Shingles) and MMR, Varicella        •  Is patient due for Tdap? NO       •  Is patient due for Shingrix? YES. Patient was not notified of copay/out of pocket cost.     4.  Patient is due for the following Health Maintenance Topics:   Health Maintenance Due   Topic Date Due   • HEPATITIS C SCREENING  1960   • IMM ZOSTER VACCINES (1 of 2) 06/27/2011   • RETINAL SCREENING  12/21/2016   • Annual Wellness Visit  06/18/2017   • DIABETES MONOFILAMENT / LE EXAM  04/26/2019     5.  Reviewed/Updated the following with patient:       •   Preferred Pharmacy? YES       •   Preferred Lab? YES       •   Preferred Communication? YES       •   Allergies? YES       •   Medications? YES. Was Abstract Encounter opened and chart updated? YES       •   Social History? YES. Was Abstract Encounter opened and chart updated? YES       •   Family History (document living status of immediate family members and if + hx of  cancer, diabetes, hypertension, hyperlipidemia, heart attack, stroke) YES. Was Abstract Encounter opened and chart updated? YES    6.  Care Team Updated:       •   DME Company (gait device, O2, CPAP, etc.): YES       •   Other Specialists (eye doctor, derm, GYN, cardiology, endo, etc): YES    7. Orders for overdue Health Maintenance topics pended in Pre-Charting? NO    8.  Patient has the following [facility] diagnoses on Problem List:  DIABETES    Has patient ever had diabetes education? Yes, and is NOT interested in  more at this time.      9.  Patient was advised: “This is a free wellness visit. The provider will screen for medical conditions to help you stay healthy. If you have other concerns to address you may be asked to discuss these at a separate visit or there may be an additional fee.”     10.  Patient was informed to arrive 15 min prior to their scheduled appointment and bring in their medication bottles.

## 2020-02-19 NOTE — TELEPHONE ENCOUNTER
Was the patient seen in the last year in this department? Yes    Does patient have an active prescription for medications requested? No     Received Request Via: Pharmacy    Pt met protocol?: Yes     Last OV 01/14/2020

## 2020-02-20 ENCOUNTER — OFFICE VISIT (OUTPATIENT)
Dept: MEDICAL GROUP | Facility: PHYSICIAN GROUP | Age: 60
End: 2020-02-20
Payer: MEDICARE

## 2020-02-20 VITALS
DIASTOLIC BLOOD PRESSURE: 72 MMHG | WEIGHT: 165.2 LBS | SYSTOLIC BLOOD PRESSURE: 124 MMHG | HEIGHT: 69 IN | HEART RATE: 74 BPM | TEMPERATURE: 98.4 F | OXYGEN SATURATION: 94 % | BODY MASS INDEX: 24.47 KG/M2

## 2020-02-20 DIAGNOSIS — N18.30 TYPE 1 DIABETES MELLITUS WITH STAGE 3 CHRONIC KIDNEY DISEASE (HCC): ICD-10-CM

## 2020-02-20 DIAGNOSIS — N18.30 CKD (CHRONIC KIDNEY DISEASE) STAGE 3, GFR 30-59 ML/MIN: ICD-10-CM

## 2020-02-20 DIAGNOSIS — H54.40 BLINDNESS OF LEFT EYE, UNSPECIFIED RIGHT EYE VISUAL IMPAIRMENT CATEGORY: ICD-10-CM

## 2020-02-20 DIAGNOSIS — E10.22 TYPE 1 DIABETES MELLITUS WITH STAGE 3 CHRONIC KIDNEY DISEASE (HCC): ICD-10-CM

## 2020-02-20 DIAGNOSIS — E10.3593 PROLIFERATIVE DIABETIC RETINOPATHY OF BOTH EYES WITHOUT MACULAR EDEMA ASSOCIATED WITH TYPE 1 DIABETES MELLITUS (HCC): ICD-10-CM

## 2020-02-20 DIAGNOSIS — Z89.429 TOE AMPUTEE (HCC): ICD-10-CM

## 2020-02-20 DIAGNOSIS — M48.02 CERVICAL STENOSIS OF SPINE: ICD-10-CM

## 2020-02-20 PROCEDURE — 99213 OFFICE O/P EST LOW 20 MIN: CPT | Performed by: FAMILY MEDICINE

## 2020-02-20 PROCEDURE — 8041 PR SCP AHA: Performed by: FAMILY MEDICINE

## 2020-02-20 NOTE — PROGRESS NOTES
Subjective:     Dylon Santa is a 59 y.o. male here today for disability paperworkAnnual Health Assessment.    Patient has been seen in [place] at retinal eye specialists many times.  He has had laser procedures on the eyes.  These have been stable and he sees his ophthalmologist in [place] about once a year.  He has been on disability for visual impairment since about 2015.  He does have paperwork for renewal of this today.  His vision has been stable but certainly not improving.  He has peripheral vision loss as well as the decreased visual acuity.    Health Maintenance Summary                HEPATITIS C SCREENING Overdue 1960     IMM ZOSTER VACCINES Overdue 6/27/2011     RETINAL SCREENING Overdue 12/21/2016      Done 12/21/2015 REFERRAL FOR RETINAL SCREENING EXAM     Patient has more history with this topic...    Annual Wellness Visit Overdue 6/18/2017      Done 6/17/2016 Visit Dx: Medicare annual wellness visit, initial    DIABETES MONOFILAMENT / LE EXAM Overdue 4/26/2019      Done 4/26/2018 AMB DIABETIC MONOFILAMENT LOWER EXTREMITY EXAM     Patient has more history with this topic...    A1C SCREENING Next Due 4/28/2020      Done 10/28/2019 HEMOGLOBIN A1C     Patient has more history with this topic...    COLONOSCOPY Next Due 9/24/2020      Done 9/24/2015 AMB REFERRAL TO GI FOR COLONOSCOPY    SERUM CREATININE Next Due 10/25/2020      Done 10/25/2019 BASIC METABOLIC PANEL     Patient has more history with this topic...    FASTING LIPID PROFILE Next Due 10/28/2020      Done 10/28/2019 LIPID PROFILE     Patient has more history with this topic...    URINE ACR / MICROALBUMIN Next Due 10/28/2020      Done 10/28/2019 MICROALBUMIN CREAT RATIO URINE     Patient has more history with this topic...    IMM DTaP/Tdap/Td Vaccine Next Due 12/31/2024      Done 12/31/2014 Imm Admin: Tdap Vaccine           Annual Health Assessment Questions:     1.  Are you currently engaging in any exercise or physical activity?  No    2.  How would you describe your mood or emotional well-being today? good    3.  Have you had any falls in the last year? yes    4.  Have you noticed any problems with your balance or had difficulty walking? No    5.  In the last six months have you experienced any leakage of urine? No    6. DPA/Advanced Directive: Patient does not have an Advanced Directive.  A packet and workshop information was given on Advanced Directives.    Current medicines (including changes today)  Current Outpatient Medications   Medication Sig Dispense Refill   • acyclovir (ZOVIRAX) 200 MG Cap TAKE ONE CAPSULE BY MOUTH ONE TIME DAILY  90 Cap 0   • Topiramate ER (TROKENDI XR) 25 MG CAPSULE SR 24 HR Take  by mouth.     • losartan (COZAAR) 25 MG Tab Take 1 Tab by mouth every day. 100 Tab 3   • simvastatin (ZOCOR) 40 MG Tab TAKE 1 TABLET BY MOUTH EVERY EVENING 100 Tab 3   • clopidogrel (PLAVIX) 75 MG Tab Take 1 Tab by mouth every day. 90 Tab 3   • insulin NPH (HUMULIN,NOVOLIN) 100 UNIT/ML Suspension Inject 22 Units as instructed every day. 20 mL 3   • insulin regular (HUMULIN R) 100 Unit/mL Solution Inject 6-10 Units as instructed 3 times a day before meals. 10 mL 3   • metoprolol SR (TOPROL XL) 25 MG TABLET SR 24 HR TAKE ONE TABLET BY MOUTH ONE TIME DAILY 90 Tab 3   • OYSTER SHELL CALCIUM PO Take 1 Tab by mouth every day.     • ALFALFA PO Take 1 Tab by mouth 3 times a day.       No current facility-administered medications for this visit.        He  has a past medical history of Anesthesia, Arthritis, Bronchitis (2016), Cataract, Cold (4/5/17), Dental disorder, Diabetic neuropathy ([facility]) (12/31/2014), Diabetic retinopathy of both eyes ([facility]) (12/9/2016), Heart murmur, Hemorrhagic disorder ([facility]), High cholesterol, History of shingles (12/31/2014), Hyperlipidemia, Hypertension, Kidney stones, Pain, Psychiatric problem, and Type II or unspecified type diabetes mellitus without mention of complication, uncontrolled (12/31/2014).    Patient  "has no known allergies.    He  reports that he has quit smoking. He smoked 0.00 packs per day. He has quit using smokeless tobacco.  His smokeless tobacco use included chew. He reports that he does not drink alcohol or use drugs.  Counseling given: Not Answered  Comment: tobacco cessation recommended      ROS   No chest pain, no shortness of breath, no abdominal pain.     Objective:     Physical Exam:  /72 (BP Location: Right arm, Patient Position: Sitting)   Pulse 74   Temp 36.9 °C (98.4 °F) (Temporal)   Ht 1.74 m (5' 8.5\")   Wt 74.9 kg (165 lb 3.2 oz)   SpO2 94%  Body mass index is 24.75 kg/m².   General: Alert, pleasant, NAD  HEENT: Normocephalic.   EOMI, no icterus or pallor.    Conjunctivae and lids normal.   External ears normal. mucous membranes moist.    Neck supple.  No cervical or supraclavicular lymphadenopathy.  Heart: Regular rate and rhythm.  S1 and S2 normal.    Respiratory: Normal respiratory effort.  Clear to auscultation bilaterally.  Skin: No rashes      Assessment and Plan:     1. Type 1 diabetes mellitus with stage 3 chronic kidney disease (HCC)  Chronic problem.  Patient declines any referral back to endocrinology.  Last hemoglobin A1c of 7.9.    2. Proliferative diabetic retinopathy of both eyes without macular edema associated with type 1 diabetes mellitus (HCC)  Explained to patient that I am unable to complete this paperwork has I am certainly not qualified to categorize his visual impairment.  As his ophthalmologist is in [place] explained that it may be quicker for him to see an ophthalmologist here in [place].  - REFERRAL TO OPHTHALMOLOGY    3. Blindness of left eye, unspecified right eye visual impairment category  - REFERRAL TO OPHTHALMOLOGY    4. CKD (chronic kidney disease) stage 3, GFR 30-59 ml/min Dr. Pascual  Chronic stable problem.    5. Cervical stenosis of spine  Patient has been evaluated by neurosurgery    6. Toe amputee (HCC)  Toe is still gone.    Discussion " today about general wellness and lifestyle habits:    · Engage in regular physical activity and social activities.  · Prevent falls and reduce trip hazards; using ambulatory aides, hearing and vision testing if appropriate.  · Steps to improve urinary incontinence.  · Advanced care planning.    Follow-Up: Return in about 3 months (around 5/20/2020), or if symptoms worsen or fail to improve, for Diabetes.         PLEASE NOTE: This dictation was created using voice recognition software. I have made every reasonable attempt to correct obvious errors, but I expect that there are errors of grammar and possibly content that I did not discover before finalizing the note.

## 2020-02-25 ENCOUNTER — OFFICE VISIT (OUTPATIENT)
Dept: MEDICAL GROUP | Facility: PHYSICIAN GROUP | Age: 60
End: 2020-02-25
Payer: MEDICARE

## 2020-02-25 VITALS
TEMPERATURE: 98.6 F | HEIGHT: 69 IN | RESPIRATION RATE: 16 BRPM | WEIGHT: 162 LBS | HEART RATE: 78 BPM | DIASTOLIC BLOOD PRESSURE: 70 MMHG | OXYGEN SATURATION: 98 % | BODY MASS INDEX: 23.99 KG/M2 | SYSTOLIC BLOOD PRESSURE: 136 MMHG

## 2020-02-25 DIAGNOSIS — I15.2 HYPERTENSION ASSOCIATED WITH TYPE 1 DIABETES MELLITUS (HCC): ICD-10-CM

## 2020-02-25 DIAGNOSIS — Z00.00 MEDICARE ANNUAL WELLNESS VISIT, SUBSEQUENT: Primary | ICD-10-CM

## 2020-02-25 DIAGNOSIS — E10.59 HYPERTENSION ASSOCIATED WITH TYPE 1 DIABETES MELLITUS (HCC): ICD-10-CM

## 2020-02-25 DIAGNOSIS — I73.9 PVD (PERIPHERAL VASCULAR DISEASE) (HCC): ICD-10-CM

## 2020-02-25 DIAGNOSIS — N18.30 TYPE 1 DIABETES MELLITUS WITH STAGE 3 CHRONIC KIDNEY DISEASE (HCC): ICD-10-CM

## 2020-02-25 DIAGNOSIS — E10.22 TYPE 1 DIABETES MELLITUS WITH STAGE 3 CHRONIC KIDNEY DISEASE (HCC): ICD-10-CM

## 2020-02-25 DIAGNOSIS — Z11.59 NEED FOR HEPATITIS C SCREENING TEST: ICD-10-CM

## 2020-02-25 PROCEDURE — G0439 PPPS, SUBSEQ VISIT: HCPCS | Performed by: FAMILY MEDICINE

## 2020-02-25 RX ORDER — LOSARTAN POTASSIUM 50 MG/1
50 TABLET ORAL DAILY
Qty: 90 TAB | Refills: 3 | Status: SHIPPED | OUTPATIENT
Start: 2020-02-25 | End: 2020-03-04 | Stop reason: SDUPTHER

## 2020-02-25 ASSESSMENT — ACTIVITIES OF DAILY LIVING (ADL)
BATHING_REQUIRES_ASSISTANCE: 0
TRANSPORTATION COMMENTS: UBER

## 2020-02-25 ASSESSMENT — PATIENT HEALTH QUESTIONNAIRE - PHQ9: CLINICAL INTERPRETATION OF PHQ2 SCORE: 0

## 2020-02-25 ASSESSMENT — ENCOUNTER SYMPTOMS: GENERAL WELL-BEING: GOOD

## 2020-02-25 NOTE — PATIENT INSTRUCTIONS
Today, your Healthcare Provider may have discussed the following recommendations:    1. Exercise and Physical Activity  According to the American Heart Association, it is recommended to engage in physical activity regularly and to aim for 150 minutes of moderate-intensity aerobic activity per week.  Your Healthcare Provider may have recommended taking the stairs instead of the elevator, starting or maintaining a walking program or strength-training program.    2. Emotional Well-being  Mental and emotional well-being is essential to overall health.  Your Healthcare Provider may have encouraged you to build strong, positive relationships with family and friends, become more involved in your community (by volunteering or joining a spiritual community), or focus on self-care.    3. Fall and Injury Prevention  To prevent falls and injuries and also improve your balance, your Healthcare Provider may have suggested that you use a cane or walker, start an exercise of physical therapy program, or have your vision and/or hearing tested.    4. Urinary Leakage (Urinary Incontinence)  To control or manage the leakage of urine, your Healthcare Provider may have recommended you start bladder training exercises (such as Kegel exercises), a trial of a medication or a referral to see a specialist to discuss surgical options.      [facility]   [address]  [place] [address]   ?Phone [phone]  Fax: [phone]

## 2020-02-25 NOTE — PROGRESS NOTES
Chief Complaint   Patient presents with   • Annual Wellness Visit         HPI:  Dylon is a 59 y.o. here for Medicare Annual Wellness Visit    DMI: patient does watch his diet. Gives himself insulin 2-3 times a day depending on how often he eats. He uses regular insulin when he is elevated. Uses about 20 units of NPH prior to breakfast. He feels well on this regimen. Patient denies any polyuria/polydipsia, rashes, recurrent infections, changes in vision, changes in sensation.  He denies any hypoglycemia recently.    Hypertension: well controlled on current medications. Patient is on ARB and BB. Patient denies any chest pain, shortness of breath, palpitations, swelling, or lightheadedness.     PAD: He does see Dr. Richard about yearly. She told him to continue with plavix and statin.  He does not have any further problems with leg pain or cramping.      Patient Active Problem List    Diagnosis Date Noted   • Coronary artery disease involving native coronary artery of native heart without angina pectoris 11/08/2019     Priority: Medium   • Atherosclerosis 10/29/2019     Priority: Medium   • Type 1 diabetes mellitus with diabetic chronic kidney disease ([facility]) 04/07/2017     Priority: Medium   • HLD (hyperlipidemia) 07/31/2015     Priority: Medium   • Hypertension associated with type 1 diabetes mellitus ([facility]) 12/31/2014     Priority: Medium   • Toe amputee ([facility]) 10/29/2019     Priority: Low   • Anxiety 10/28/2019     Priority: Low   • Neck pain 10/28/2019     Priority: Low   • Panic attacks 10/25/2019     Priority: Low   • Postconcussion syndrome 10/21/2019     Priority: Low   • Chronic cough 11/19/2018     Priority: Low   • Vitamin D deficiency 04/26/2018     Priority: Low   • Mononeuropathy 04/26/2018     Priority: Low   • PVD (peripheral vascular disease) ([facility]) 04/26/2018     Priority: Low   • Diabetic retinopathy of both eyes ([facility]) 12/09/2016     Priority: Low   • Blind left eye 06/17/2016     Priority: Low   • CKD  (chronic kidney disease) stage 3, GFR 30-59 ml/min Dr. 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 90 Tab 3   • acyclovir (ZOVIRAX) 200 MG Cap TAKE ONE CAPSULE BY MOUTH ONE TIME DAILY  90 Cap 0   • Topiramate ER (TROKENDI XR) 25 MG CAPSULE SR 24 HR Take  by mouth.     • simvastatin (ZOCOR) 40 MG Tab TAKE 1 TABLET BY MOUTH EVERY EVENING 100 Tab 3   • clopidogrel (PLAVIX) 75 MG Tab Take 1 Tab by mouth every day. 90 Tab 3   • insulin NPH (HUMULIN,NOVOLIN) 100 UNIT/ML Suspension Inject 22 Units as instructed every day. 20 mL 3   • insulin regular (HUMULIN R) 100 Unit/mL Solution Inject 6-10 Units as instructed 3 times a day before meals. 10 mL 3   • metoprolol SR (TOPROL XL) 25 MG TABLET SR 24 HR TAKE ONE TABLET BY MOUTH ONE TIME DAILY 90 Tab 3   • OYSTER SHELL CALCIUM PO Take 1 Tab by mouth every day.     • ALFALFA PO Take 1 Tab by mouth 3 times a day.       No current facility-administered medications for this visit.         Patient is taking medications as noted in medication list.  Current supplements as per medication list.     Allergies: Patient has no known allergies.    Current social contact/activities: Visiting with friends and family    Is patient current with immunizations? Yes.    He  reports that he has quit smoking. He smoked 0.00 packs per day. He has quit using smokeless tobacco.  His smokeless tobacco use included chew. He reports that he does not drink alcohol or use drugs.  Counseling given: Not Answered  Comment: tobacco cessation  recommended      DPA/Advanced directive: Patient has Advanced Directive on file.     ROS:    Gait: Uses no assistive device   Ostomy: No   Other tubes: No   Amputations: Yes Toe on L. Foot  Chronic oxygen use No   Last eye exam 2019   Wears hearing aids: No   : Denies any urinary leakage during the last 6 months    Annual Health Assessment Questions:    1.  Are you currently engaging in any exercise or physical activity? Yes    2.  How would you describe your mood or emotional well-being today? good    3.  Have you had any falls in the last year? Yes    4.  Have you noticed any problems with your balance or had difficulty walking? No    5.  In the last six months have you experienced any leakage of urine? No    6. DPA/Advanced Directive: Patient has Advanced Directive on file.     Screening:    Depression Screening    Little interest or pleasure in doing things?  0 - not at all  Feeling down, depressed, or hopeless? 0 - not at all  Patient Health Questionnaire Score: 0    If depressive symptoms identified deferred to follow up visit unless specifically addressed in assessment and plan.    Interpretation of PHQ-9 Total Score   Score Severity   1-4 No Depression   5-9 Mild Depression   10-14 Moderate Depression   15-19 Moderately Severe Depression   20-27 Severe Depression    Screening for Cognitive Impairment    Three Minute Recall (village, kitchen, baby)  1/3 Village Kitchen Baby  Jose clock face with all 12 numbers and set the hands to show 10 past 10.  Yes 10:10  5/5  If cognitive concerns identified, deferred for follow up unless specifically addressed in assessment and plan.    Fall Risk Assessment    Has the patient had two or more falls in the last year or any fall with injury in the last year?  Yes  If fall risk identified, deferred for follow up unless specifically addressed in assessment and plan.    Safety Assessment    Throw rugs on floor.  Yes  Handrails on all stairs.  Yes  Good lighting in all  hallways.  Yes  Difficulty hearing.  No  Patient counseled about all safety risks that were identified.    Functional Assessment ADLs    Are there any barriers preventing you from cooking for yourself or meeting nutritional needs?  No.    Are there any barriers preventing you from driving safely or obtaining transportation?  No. Uber  Are there any barriers preventing you from using a telephone or calling for help?  No.    Are there any barriers preventing you from shopping?  No.    Are there any barriers preventing you from taking care of your own finances?  No.    Are there any barriers preventing you from managing your medications?  No.    Are there any barriers preventing you from showering, bathing or dressing yourself?  No.    Are you currently engaging in any exercise or physical activity?  Yes.  Stairs  What is your perception of your health?  Good.    Health Maintenance Summary                HEPATITIS C SCREENING Overdue 1960     IMM ZOSTER VACCINES Overdue 6/27/2011     RETINAL SCREENING Overdue 12/21/2016      Done 12/21/2015 REFERRAL FOR RETINAL SCREENING EXAM     Patient has more history with this topic...    Annual Wellness Visit Overdue 6/18/2017      Done 6/17/2016 Visit Dx: Medicare annual wellness visit, initial    DIABETES MONOFILAMENT / LE EXAM Overdue 4/26/2019      Done 4/26/2018 AMB DIABETIC MONOFILAMENT LOWER EXTREMITY EXAM     Patient has more history with this topic...    A1C SCREENING Next Due 4/28/2020      Done 10/28/2019 HEMOGLOBIN A1C     Patient has more history with this topic...    COLONOSCOPY Next Due 9/24/2020      Done 9/24/2015 AMB REFERRAL TO GI FOR COLONOSCOPY    SERUM CREATININE Next Due 10/25/2020      Done 10/25/2019 BASIC METABOLIC PANEL     Patient has more history with this topic...    FASTING LIPID PROFILE Next Due 10/28/2020      Done 10/28/2019 LIPID PROFILE     Patient has more history with this topic...    URINE ACR / MICROALBUMIN Next Due 10/28/2020      Done  10/28/2019 MICROALBUMIN CREAT RATIO URINE     Patient has more history with this topic...    IMM DTaP/Tdap/Td Vaccine Next Due 12/31/2024      Done 12/31/2014 Imm Admin: Tdap Vaccine          Patient Care Team:  Sandi Manning M.D. as PCP - General (Family Medicine)  Jamey Fields M.D. as Consulting Physician (Cardiology)  Dr. Michael Moy as Consulting Physician (Ophthalmology)  Inessa Richard M.D. as Consulting Physician (Surgery)  Mary Galloway C.N.A. as      Social History     Tobacco Use   • Smoking status: Former Smoker     Packs/day: 0.00   • Smokeless tobacco: Former User     Types: Chew   • Tobacco comment: tobacco cessation recommended   Substance Use Topics   • Alcohol use: No     Alcohol/week: 0.0 oz     Comment: rare beer   • Drug use: No     Family History   Problem Relation Age of Onset   • Thyroid Mother    • Lung Disease Mother         COPD   • Cancer Mother         eyebrow    • Thyroid Sister    • Other Sister         mental health issues    • Thyroid Brother    • Lung Disease Brother         COPD   • Hypertension Brother    • Hyperlipidemia Brother    • Lung Disease Father         COPD   • Cancer Father    • Diabetes Maternal Uncle    • Cancer Paternal Aunt         type unknown   • Diabetes Maternal Grandfather    • Diabetes Paternal Grandmother    • Cancer Paternal Grandfather         prostate   • Stroke Neg Hx      He  has a past medical history of Anesthesia, Arthritis, Bronchitis (2016), Cataract, Cold (4/5/17), Dental disorder, Diabetic neuropathy (HCC) (12/31/2014), Diabetic retinopathy of both eyes (HCC) (12/9/2016), Heart murmur, Hemorrhagic disorder (HCC), High cholesterol, History of shingles (12/31/2014), Hyperlipidemia, Hypertension, Kidney stones, Pain, Psychiatric problem, and Type II or unspecified type diabetes mellitus without mention of complication, uncontrolled (12/31/2014).   Past Surgical History:   Procedure Laterality Date   • IRRIGATION &  "DEBRIDEMENT GENERAL Left 5/3/2017    Procedure: IRRIGATION & DEBRIDEMENT GENERAL and Left Fourth Toe Amputation ;  Surgeon: Inessa Richard M.D.;  Location: SURGERY [facility];  Service:    • APPENDECTOMY     • CATARACT EXTRACTION WITH IOL Bilateral    • EYE SURGERY     • HAND SURGERY     • KNEE ARTHROSCOPY      x3   • TONSILLECTOMY             Exam:     /70 (BP Location: Left arm, Patient Position: Sitting, BP Cuff Size: Adult)   Pulse 78   Temp 37 °C (98.6 °F) (Temporal)   Resp 16   Ht 1.74 m (5' 8.5\")   Wt 73.5 kg (162 lb)   SpO2 98%  Body mass index is 24.27 kg/m².    Hearing excellent.    Dentition good  Alert, oriented in no acute distress.  Eye contact is good, speech goal directed, affect calm      Assessment and Plan. The following treatment and monitoring plan is recommended:      1. Medicare annual wellness visit, subsequent  Subsequent Annual Wellness Visit - Includes PPPS ()   2. Type 1 diabetes mellitus with stage 3 chronic kidney disease (HCC)  Comp Metabolic Panel   Patient is happy with his current insulin regimen.  His hemoglobin A1c has been near 8 on the last few checks.  He understands that this is not well controlled but he feels that these numbers are not severe and he declines any referral to endocrinology today. HEMOGLOBIN A1C   3. PVD (peripheral vascular disease) (HCC)   chronic well-controlled problem.  Sees vascular surgery yearly.   4. Hypertension associated with type 1 diabetes mellitus (HCC)  Comp Metabolic Panel  Well-controlled chronic problem.   5. Need for hepatitis C screening test  HCV Scrn ( [id number] [employer])         Services suggested: No services needed at this time  Health Care Screening recommendations as per orders if indicated.  Referrals offered: PT/OT/Nutrition counseling/Behavioral Health/Smoking cessation as per orders if indicated.    Discussion today about general wellness and lifestyle habits:    · Prevent falls and reduce trip " hazards; Cautioned about securing or removing rugs.  · Have a working fire alarm and carbon monoxide detector;   · Engage in regular physical activity and social activities       Follow-up: Return in about 6 months (around 8/25/2020), or if symptoms worsen or fail to improve.

## 2020-03-05 RX ORDER — LOSARTAN POTASSIUM 50 MG/1
50 TABLET ORAL DAILY
Qty: 100 TAB | Refills: 3 | Status: SHIPPED | OUTPATIENT
Start: 2020-03-05 | End: 2021-05-18 | Stop reason: SDUPTHER

## 2020-03-05 NOTE — TELEPHONE ENCOUNTER
Received request via: Pharmacy    Was the patient seen in the last year in this department? Yes    Does the patient have an active prescription (recently filled or refills available) for medication(s) requested? Pharmacy only covers 100 days for this prescription

## 2020-03-25 RX ORDER — LANCETS 30 GAUGE
EACH MISCELLANEOUS
Qty: 400 EACH | Refills: 1 | Status: SHIPPED | OUTPATIENT
Start: 2020-03-25 | End: 2021-01-07

## 2020-03-25 RX ORDER — TRAZODONE HYDROCHLORIDE 100 MG/1
100 TABLET ORAL NIGHTLY PRN
Qty: 90 TAB | Refills: 0 | Status: SHIPPED | OUTPATIENT
Start: 2020-03-25 | End: 2020-06-25 | Stop reason: SDUPTHER

## 2020-03-27 NOTE — TELEPHONE ENCOUNTER
Received request via: Pharmacy    Was the patient seen in the last year in this department? Yes 02/20/2020    Does the patient have an active prescription (recently filled or refills available) for medication(s) requested? No

## 2020-03-30 NOTE — TELEPHONE ENCOUNTER
Patient has recently been seen by PCP within the last 6 months per protocol (2/20). Will refill DM supplies for 12 months.  Lab Results   Component Value Date/Time    HBA1C 7.9 (H) 10/28/2019 10:17 AM      Lab Results   Component Value Date/Time    MALBCRT 36 (H) 10/28/2019 10:17 AM    MICROALBUR 6.0 10/28/2019 10:17 AM      Lab Results   Component Value Date/Time    ALKPHOSPHAT 71 10/23/2019 06:00 PM    ASTSGOT 32 10/23/2019 06:00 PM    ALTSGPT 34 10/23/2019 06:00 PM    TBILIRUBIN 0.7 10/23/2019 06:00 PM

## 2020-04-08 ENCOUNTER — HOSPITAL ENCOUNTER (OUTPATIENT)
Facility: MEDICAL CENTER | Age: 60
End: 2020-04-08
Attending: NEUROLOGICAL SURGERY | Admitting: NEUROLOGICAL SURGERY
Payer: MEDICARE

## 2020-05-19 DIAGNOSIS — Z86.19 HISTORY OF SHINGLES: ICD-10-CM

## 2020-05-21 RX ORDER — ACYCLOVIR 200 MG/1
CAPSULE ORAL
Qty: 90 CAP | Refills: 1 | Status: SHIPPED | OUTPATIENT
Start: 2020-05-21 | End: 2020-08-25 | Stop reason: SDUPTHER

## 2020-05-27 ENCOUNTER — TELEPHONE (OUTPATIENT)
Dept: MEDICAL GROUP | Facility: PHYSICIAN GROUP | Age: 60
End: 2020-05-27

## 2020-05-27 NOTE — TELEPHONE ENCOUNTER
ESTABLISHED PATIENT PRE-VISIT PLANNING     Patient was contacted to complete PVP.    1.  Reviewed notes from the last few office visits within the medical group: Yes    2.  If any orders were placed at last visit or intended to be done for this visit (i.e. 6 mos follow-up), do we have Results/Consult Notes?        •  Labs - Labs ordered, NOT completed. Patient advised to complete prior to next appointment.       •  Imaging - Imaging was not ordered at last office visit.       •  Referrals - No referrals were ordered at last office visit.    3. Is this appointment scheduled as a Hospital Follow-Up? No    4.  Immunizations were updated in [employer] using WebIZ?: Yes       •  Web Iz Recommendations: HEPATITIS A , MMR , TD, VARICELLA (Chicken Pox)  and SHINGRIX (Shingles)    5.  Patient is due for the following Health Maintenance Topics:   Health Maintenance Due   Topic Date Due   • HEPATITIS C SCREENING  1960   • IMM ZOSTER VACCINES (1 of 2) 06/27/2011   • RETINAL SCREENING  12/21/2016   • DIABETES MONOFILAMENT / LE EXAM  04/26/2019   • A1C SCREENING  04/28/2020       6. Orders for overdue Health Maintenance topics pended in Pre-Charting? NO    7.  AHA (MDX) form printed for Provider? No, already completed    8.  Patient was informed to arrive 15 min prior to their scheduled appointment and bring in their medication bottles.

## 2020-05-28 ENCOUNTER — OFFICE VISIT (OUTPATIENT)
Dept: MEDICAL GROUP | Facility: PHYSICIAN GROUP | Age: 60
End: 2020-05-28
Payer: MEDICARE

## 2020-05-28 ENCOUNTER — HOSPITAL ENCOUNTER (OUTPATIENT)
Dept: LAB | Facility: MEDICAL CENTER | Age: 60
End: 2020-05-28
Attending: FAMILY MEDICINE
Payer: MEDICARE

## 2020-05-28 VITALS
BODY MASS INDEX: 24.22 KG/M2 | WEIGHT: 163.5 LBS | DIASTOLIC BLOOD PRESSURE: 78 MMHG | OXYGEN SATURATION: 98 % | HEART RATE: 81 BPM | TEMPERATURE: 99 F | HEIGHT: 69 IN | SYSTOLIC BLOOD PRESSURE: 134 MMHG

## 2020-05-28 DIAGNOSIS — N18.30 TYPE 1 DIABETES MELLITUS WITH STAGE 3 CHRONIC KIDNEY DISEASE (HCC): ICD-10-CM

## 2020-05-28 DIAGNOSIS — Z01.89 PATIENT REQUEST FOR DIAGNOSTIC TESTING: ICD-10-CM

## 2020-05-28 DIAGNOSIS — N18.30 CKD (CHRONIC KIDNEY DISEASE) STAGE 3, GFR 30-59 ML/MIN: ICD-10-CM

## 2020-05-28 DIAGNOSIS — E16.2 HYPOGLYCEMIA: ICD-10-CM

## 2020-05-28 DIAGNOSIS — E10.59 HYPERTENSION ASSOCIATED WITH TYPE 1 DIABETES MELLITUS (HCC): ICD-10-CM

## 2020-05-28 DIAGNOSIS — E10.22 TYPE 1 DIABETES MELLITUS WITH STAGE 3 CHRONIC KIDNEY DISEASE (HCC): ICD-10-CM

## 2020-05-28 DIAGNOSIS — R55 SYNCOPE, UNSPECIFIED SYNCOPE TYPE: ICD-10-CM

## 2020-05-28 DIAGNOSIS — Z11.59 NEED FOR HEPATITIS C SCREENING TEST: ICD-10-CM

## 2020-05-28 DIAGNOSIS — I15.2 HYPERTENSION ASSOCIATED WITH TYPE 1 DIABETES MELLITUS (HCC): ICD-10-CM

## 2020-05-28 DIAGNOSIS — R40.20 LOSS OF CONSCIOUSNESS (HCC): ICD-10-CM

## 2020-05-28 LAB
ABO GROUP BLD: NORMAL
ALBUMIN SERPL BCP-MCNC: 4.4 G/DL (ref 3.2–4.9)
ALBUMIN/GLOB SERPL: 1.4 G/DL
ALP SERPL-CCNC: 75 U/L (ref 30–99)
ALT SERPL-CCNC: 24 U/L (ref 2–50)
ANION GAP SERPL CALC-SCNC: 11 MMOL/L (ref 7–16)
AST SERPL-CCNC: 31 U/L (ref 12–45)
BILIRUB SERPL-MCNC: 0.3 MG/DL (ref 0.1–1.5)
BUN SERPL-MCNC: 26 MG/DL (ref 8–22)
CALCIUM SERPL-MCNC: 9.5 MG/DL (ref 8.5–10.5)
CHLORIDE SERPL-SCNC: 101 MMOL/L (ref 96–112)
CO2 SERPL-SCNC: 25 MMOL/L (ref 20–33)
CREAT SERPL-MCNC: 1.53 MG/DL (ref 0.5–1.4)
ERYTHROCYTE [DISTWIDTH] IN BLOOD BY AUTOMATED COUNT: 47.3 FL (ref 35.9–50)
GLOBULIN SER CALC-MCNC: 3.1 G/DL (ref 1.9–3.5)
GLUCOSE SERPL-MCNC: 169 MG/DL (ref 65–99)
HCT VFR BLD AUTO: 39.3 % (ref 42–52)
HGB BLD-MCNC: 12.8 G/DL (ref 14–18)
MCH RBC QN AUTO: 30.1 PG (ref 27–33)
MCHC RBC AUTO-ENTMCNC: 32.6 G/DL (ref 33.7–35.3)
MCV RBC AUTO: 92.5 FL (ref 81.4–97.8)
PLATELET # BLD AUTO: 239 K/UL (ref 164–446)
PMV BLD AUTO: 10.7 FL (ref 9–12.9)
POTASSIUM SERPL-SCNC: 4.9 MMOL/L (ref 3.6–5.5)
PROT SERPL-MCNC: 7.5 G/DL (ref 6–8.2)
RBC # BLD AUTO: 4.25 M/UL (ref 4.7–6.1)
RH BLD: NORMAL
SODIUM SERPL-SCNC: 137 MMOL/L (ref 135–145)
WBC # BLD AUTO: 8 K/UL (ref 4.8–10.8)

## 2020-05-28 PROCEDURE — G0472 HEP C SCREEN HIGH RISK/OTHER: HCPCS

## 2020-05-28 PROCEDURE — 86900 BLOOD TYPING SEROLOGIC ABO: CPT

## 2020-05-28 PROCEDURE — 83036 HEMOGLOBIN GLYCOSYLATED A1C: CPT

## 2020-05-28 PROCEDURE — 99214 OFFICE O/P EST MOD 30 MIN: CPT | Performed by: FAMILY MEDICINE

## 2020-05-28 PROCEDURE — 36415 COLL VENOUS BLD VENIPUNCTURE: CPT

## 2020-05-28 PROCEDURE — 86901 BLOOD TYPING SEROLOGIC RH(D): CPT

## 2020-05-28 PROCEDURE — 85027 COMPLETE CBC AUTOMATED: CPT

## 2020-05-28 PROCEDURE — 80053 COMPREHEN METABOLIC PANEL: CPT

## 2020-05-28 RX ORDER — GLUCAGON HYDROCHLORIDE 1 MG
1 KIT INJECTION ONCE
Qty: 1 EACH | Refills: 0 | Status: SHIPPED | OUTPATIENT
Start: 2020-05-28 | End: 2020-05-28

## 2020-05-28 ASSESSMENT — FIBROSIS 4 INDEX: FIB4 SCORE: 1.4

## 2020-05-29 LAB
EST. AVERAGE GLUCOSE BLD GHB EST-MCNC: 183 MG/DL
HBA1C MFR BLD: 8 % (ref 0–5.6)
HCV AB SER QL: NORMAL

## 2020-06-03 ENCOUNTER — OFFICE VISIT (OUTPATIENT)
Dept: MEDICAL GROUP | Facility: PHYSICIAN GROUP | Age: 60
End: 2020-06-03
Payer: MEDICARE

## 2020-06-03 ENCOUNTER — HOSPITAL ENCOUNTER (OUTPATIENT)
Dept: RADIOLOGY | Facility: MEDICAL CENTER | Age: 60
End: 2020-06-03
Attending: FAMILY MEDICINE
Payer: MEDICARE

## 2020-06-03 ENCOUNTER — HOSPITAL ENCOUNTER (OUTPATIENT)
Dept: LAB | Facility: MEDICAL CENTER | Age: 60
End: 2020-06-03
Attending: FAMILY MEDICINE
Payer: MEDICARE

## 2020-06-03 VITALS
OXYGEN SATURATION: 97 % | TEMPERATURE: 98.4 F | SYSTOLIC BLOOD PRESSURE: 124 MMHG | BODY MASS INDEX: 24.04 KG/M2 | WEIGHT: 162.3 LBS | HEIGHT: 69 IN | DIASTOLIC BLOOD PRESSURE: 68 MMHG | HEART RATE: 82 BPM

## 2020-06-03 DIAGNOSIS — N18.30 TYPE 1 DIABETES MELLITUS WITH STAGE 3 CHRONIC KIDNEY DISEASE (HCC): ICD-10-CM

## 2020-06-03 DIAGNOSIS — R53.83 FATIGUE, UNSPECIFIED TYPE: ICD-10-CM

## 2020-06-03 DIAGNOSIS — R04.0 RECURRENT EPISTAXIS: ICD-10-CM

## 2020-06-03 DIAGNOSIS — R55 SYNCOPE, UNSPECIFIED SYNCOPE TYPE: ICD-10-CM

## 2020-06-03 DIAGNOSIS — E10.22 TYPE 1 DIABETES MELLITUS WITH STAGE 3 CHRONIC KIDNEY DISEASE (HCC): ICD-10-CM

## 2020-06-03 DIAGNOSIS — R40.20 LOSS OF CONSCIOUSNESS (HCC): ICD-10-CM

## 2020-06-03 DIAGNOSIS — Z12.5 SCREENING FOR PROSTATE CANCER: ICD-10-CM

## 2020-06-03 DIAGNOSIS — R94.31 ABNORMAL EKG: ICD-10-CM

## 2020-06-03 DIAGNOSIS — D64.9 ANEMIA, UNSPECIFIED TYPE: ICD-10-CM

## 2020-06-03 DIAGNOSIS — E16.2 HYPOGLYCEMIA: ICD-10-CM

## 2020-06-03 DIAGNOSIS — Z87.898 HISTORY OF SYNCOPE: ICD-10-CM

## 2020-06-03 DIAGNOSIS — R42 LIGHTHEADEDNESS: ICD-10-CM

## 2020-06-03 LAB
FERRITIN SERPL-MCNC: 74.7 NG/ML (ref 22–322)
GLUCOSE BLD-MCNC: 84 MG/DL (ref 70–100)
IRON SATN MFR SERPL: 19 % (ref 15–55)
IRON SERPL-MCNC: 63 UG/DL (ref 50–180)
PSA SERPL-MCNC: 0.44 NG/ML (ref 0–4)
TIBC SERPL-MCNC: 330 UG/DL (ref 250–450)
TROPONIN T SERPL-MCNC: 36 NG/L (ref 6–19)
UIBC SERPL-MCNC: 267 UG/DL (ref 110–370)
VIT B12 SERPL-MCNC: 957 PG/ML (ref 211–911)

## 2020-06-03 PROCEDURE — 84153 ASSAY OF PSA TOTAL: CPT

## 2020-06-03 PROCEDURE — 99214 OFFICE O/P EST MOD 30 MIN: CPT | Performed by: FAMILY MEDICINE

## 2020-06-03 PROCEDURE — 82728 ASSAY OF FERRITIN: CPT

## 2020-06-03 PROCEDURE — 93000 ELECTROCARDIOGRAM COMPLETE: CPT | Performed by: FAMILY MEDICINE

## 2020-06-03 PROCEDURE — 93880 EXTRACRANIAL BILAT STUDY: CPT

## 2020-06-03 PROCEDURE — 82962 GLUCOSE BLOOD TEST: CPT | Performed by: FAMILY MEDICINE

## 2020-06-03 PROCEDURE — 82607 VITAMIN B-12: CPT

## 2020-06-03 PROCEDURE — 83550 IRON BINDING TEST: CPT

## 2020-06-03 PROCEDURE — 83540 ASSAY OF IRON: CPT

## 2020-06-03 PROCEDURE — 36415 COLL VENOUS BLD VENIPUNCTURE: CPT

## 2020-06-03 PROCEDURE — 84484 ASSAY OF TROPONIN QUANT: CPT

## 2020-06-03 RX ORDER — GLUCAGON HYDROCHLORIDE 1 MG
KIT INJECTION
COMMUNITY
Start: 2020-05-29 | End: 2022-03-22

## 2020-06-03 RX ORDER — FLASH GLUCOSE SENSOR
KIT MISCELLANEOUS
COMMUNITY
Start: 2020-06-01 | End: 2020-06-11 | Stop reason: SDUPTHER

## 2020-06-03 ASSESSMENT — FIBROSIS 4 INDEX: FIB4 SCORE: 1.54

## 2020-06-03 NOTE — PROGRESS NOTES
cc: anemia      Subjective:     Dylon Santa is a 59 y.o. male presenting for the following:     Patient with recent syncopal episode thought to be due to hypoglycemia. He has decreased his insulin NPH (he takes this only in the morning) but he is unsure by how much and insulin R he decreased from 10 units to 6 units  But last night at about 3 am he started feeling very low. So he drank juice until he felt better.     He did have a small sandwich for dinner that evening about 6 hours before. He did not take insulin then.     Patient admits that over the last 6 months he has had some other episodes of lightheadedness that he did not report.  He denies any other spontaneous loss of consciousness.  He does deny chest pain, palpitations, shortness of breath. This is a lightheadedness that is worse with bending and can last for many minutes.  He has had this feeling in the setting of a normal blood sugar.    Anemia: Patient with hospitalization after injury and also patient with recurrent epistasis in October 2019.  Patient was found to be anemic at that time at 12.2.  However, on recent repeat patient is still anemic with hemoglobin of 12.8.  Patient reports that he is still having epistasis.  He is getting nosebleeds from either nostril about 3 times a week.  The bleeding does usually stop within just a few minutes with pressure.  He otherwise has had a stable weight, no new pain, night sweats.  He denies any regular alcohol use.  Denies any melena or blood in stool.    Review of systems:  All others reviewed and are negative.       Current Outpatient Medications:   •  CONTOUR NEXT TEST strip, , Disp: , Rfl:   •  glucose 4 GM chewable tablet, Take 4 Tabs by mouth as needed for Low Blood Sugar., Disp: 30 Tab, Rfl: 1  •  acyclovir (ZOVIRAX) 200 MG Cap, TAKE ONE CAPSULE BY MOUTH ONE TIME DAILY , Disp: 90 Cap, Rfl: 1  •  traZODone (DESYREL) 100 MG Tab, Take 1 Tab by mouth at bedtime as needed for Sleep., Disp: 90 Tab,  "Rfl: 0  •  Lancets, Lancets order: Lancets for meter covered by insurance. Sig: use QID and prn ssx high or low sugar., Disp: 400 Each, Rfl: 1  •  losartan (COZAAR) 50 MG Tab, Take 1 Tab by mouth every day., Disp: 100 Tab, Rfl: 3  •  Topiramate ER (TROKENDI XR) 25 MG CAPSULE SR 24 HR, Take  by mouth., Disp: , Rfl:   •  simvastatin (ZOCOR) 40 MG Tab, TAKE 1 TABLET BY MOUTH EVERY EVENING, Disp: 100 Tab, Rfl: 3  •  clopidogrel (PLAVIX) 75 MG Tab, Take 1 Tab by mouth every day., Disp: 90 Tab, Rfl: 3  •  insulin NPH (HUMULIN,NOVOLIN) 100 UNIT/ML Suspension, Inject 22 Units as instructed every day., Disp: 20 mL, Rfl: 3  •  insulin regular (HUMULIN R) 100 Unit/mL Solution, Inject 6-10 Units as instructed 3 times a day before meals., Disp: 10 mL, Rfl: 3  •  metoprolol SR (TOPROL XL) 25 MG TABLET SR 24 HR, TAKE ONE TABLET BY MOUTH ONE TIME DAILY, Disp: 90 Tab, Rfl: 3  •  OYSTER SHELL CALCIUM PO, Take 1 Tab by mouth every day., Disp: , Rfl:   •  ALFALFA PO, Take 1 Tab by mouth 3 times a day., Disp: , Rfl:   •  GLUCAGEN HYPOKIT 1 MG Recon Soln, INJECT 1 MG BY INTRAVENOUS ROUTE ONCE FOR 1 DOSE, Disp: , Rfl:   •  Continuous Blood Gluc Sensor (FREESTYLE CARLOS 14 DAY SENSOR) [facility] [place], USE ONE TESTING UNIT EVERY 2 WEEKS TO TEST BLOOD SUGAR 3 TIMES DAILY AS DIRECTED, Disp: , Rfl:     Allergies, past medical history, past surgical history, family history, social history reviewed and updated    Objective:     Vitals: /68 (BP Location: Left arm, Patient Position: Sitting, BP Cuff Size: Adult)   Pulse 82   Temp 36.9 °C (98.4 °F) (Temporal)   Ht 1.74 m (5' 8.5\")   Wt 73.6 kg (162 lb 4.8 oz)   SpO2 97%   BMI 24.32 kg/m²   General: Alert, pleasant, NAD  HEENT: Normocephalic.   EOMI, no icterus or pallor.    Heart: Regular rate and rhythm.  S1 and S2 normal.  No murmurs appreciated.  Respiratory: Normal respiratory effort.  Clear to auscultation bilaterally.  Psych:  Affect is normal, judgement is good, grooming is " appropriate.    POC glucose 84    EKG: sinus rhythm. Possible ST elevation    Assessment/Plan:     Dylon was seen today for follow-up.    Diagnoses and all orders for this visit:    Type 1 diabetes mellitus with stage 3 chronic kidney disease (HCC): Patient with history of type 1 diabetes and has recurrently declined referral to endocrinology in the past.  However, he is now having difficulty with hypoglycemia and does agree to be seen.    -Patient given phone number for endocrinology to schedule an appointment.  -In the meantime, patient did get a freestyle therese but is unsure how to use this.  Will refer to pharmacotherapy to assist with this.  -Patient instructed to look at his insulin vials to determine how much of NPH he is taking, as he is unsure how far he cut this back.  Patient does have difficulty with vision but claims that he prefers the vials of insulins over the pens.  I do have some concern that he may not be drying up the correct amount due to his poor vision.  -Also, patient is hesitant to change his insulin regimen although I did explain that NPH insulin is an intermediate acting insulin and he may get more even control switching to a 24-hour insulin.  -     POCT Glucose  -     EKG - Clinic Performed  -     REFERRAL TO PHARMACOTHERAPY SERVICE  Hypoglycemia  -     REFERRAL TO PHARMACOTHERAPY SERVICE    Fatigue, unspecified type  -     EKG - Clinic Performed    Patient with recurrent episodes of lightheadedness over the last few months.  Possibly due to hypoglycemia (see above) but as he is having some episodes seemingly with a normal blood sugar, will refer to cardiology.  Patient did have a hospitalization in October 2019 that did not show any significant coronary artery disease and patient with carotid ultrasound earlier today that did not show any stenosis.  But concern for arrhythmia.  Abnormal EKG: With possible ST elevation on EKG but I cannot see a distinct difference from EKG on 10/21/2019.   Will obtain stat troponin to ensure no elevation.  -     TROPONIN; Future  -     REFERRAL TO CARDIOLOGY  Lightheadedness  -     REFERRAL TO CARDIOLOGY  History of syncope  -     REFERRAL TO CARDIOLOGY      Patient still with an anemia initially found in the setting of recent accident and recurrent epistasis in October 2019.  Patient found to still be anemic but he reports that he is still getting nosebleeds on a very regular basis.  I do encourage him to use a moisturizing saline spray regularly and return to ENT.  Also, instructed to go to ER if he has nosebleed lasting longer than 5 minutes.  Otherwise, patient's weight has been stable and he is up-to-date on colonoscopies, and was informed today that he will be due in about 3 months.  Will obtain screening for prostate cancer with PSA as well to ensure no signs of carcinoma and will obtain FIT testing to ensure no blood in stool.  Anemia, unspecified type  -     EKG - Clinic Performed  -     IRON/TOTAL IRON BIND; Future  -     FERRITIN; Future  -     VITAMIN B12; Future  Recurrent epistaxis    Screening for prostate cancer  -     PROSTATE SPECIFIC AG SCREENING; Future      Return in about 2 months (around 8/3/2020), or if symptoms worsen or fail to improve.

## 2020-06-04 ENCOUNTER — OFFICE VISIT (OUTPATIENT)
Dept: MEDICAL GROUP | Facility: PHYSICIAN GROUP | Age: 60
End: 2020-06-04
Payer: MEDICARE

## 2020-06-04 DIAGNOSIS — N18.30 TYPE 1 DIABETES MELLITUS WITH STAGE 3 CHRONIC KIDNEY DISEASE (HCC): ICD-10-CM

## 2020-06-04 DIAGNOSIS — E10.22 TYPE 1 DIABETES MELLITUS WITH STAGE 3 CHRONIC KIDNEY DISEASE (HCC): ICD-10-CM

## 2020-06-04 PROCEDURE — 99403 PREV MED CNSL INDIV APPRX 45: CPT | Performed by: FAMILY MEDICINE

## 2020-06-04 RX ORDER — BLOOD SUGAR DIAGNOSTIC
1 STRIP MISCELLANEOUS DAILY
Qty: 100 EACH | Refills: 3 | Status: SHIPPED
Start: 2020-06-04 | End: 2020-06-19

## 2020-06-04 RX ORDER — INSULIN ASPART 100 [IU]/ML
3-10 INJECTION, SOLUTION INTRAVENOUS; SUBCUTANEOUS
Qty: 5 PEN | Refills: 5 | Status: SHIPPED
Start: 2020-06-04 | End: 2020-06-19

## 2020-06-04 RX ORDER — INSULIN GLARGINE 100 [IU]/ML
10-20 INJECTION, SOLUTION SUBCUTANEOUS EVERY EVENING
Qty: 5 PEN | Refills: 3 | Status: SHIPPED
Start: 2020-06-04 | End: 2020-06-19

## 2020-06-04 NOTE — Clinical Note
I placed an urgent referral to you today. Patient DM1 and has been using NPH and R vials and is now having complications.

## 2020-06-04 NOTE — PROGRESS NOTES
"Patient Consult Note    TIME IN: 908z  TIME OUT: 1002a    Primary care physician: Sandi Manning M.D.    Reason for consult: Management of Uncontrolled Type 1 Diabetes    HPI:  Dylon Santa is a 59 y.o. old patient who comes in today for evaluation of above stated problem.    Most Recent HbA1c:   Lab Results   Component Value Date/Time    HBA1C 8.0 (H) 05/28/2020 03:19 PM      Lab Results   Component Value Date/Time    CREATININE 1.53 (H) 05/28/2020 03:19 PM        Recent Labs     06/03/20  1425   POCGLUCOSE 84       Diabetes Medication History and Current Regimen  Basal Insulin: NPH 20 u QAM - vial and syringe  Prandial Insulin: Regular 10u AC - vial and syringe    Pt brought in FreeStyle Michelle monitor and sensors. We placed a sensor on his upper right arm and set up the meter. We were unable to get a reading during office visit at the meter still had 30 min left for set up.     Pt reports blood sugars:   Before Breakfast: 187 today    Hypoglycemia awareness - YES  Nocturnal hypoglycemia- YES  Hypoglycemia:  Symptomatic Hypoglycemia    Pt's treatment of Hypoglycemia - Drinks sugary drinks until he feels better    Dietary/Exercise goals - these were not addressed today at the visit due to time constraints.     Pt states that he's been DM1 and using insulin since he was 9yo. States that he has always had a good handle on his BG levels until recently, which he attributes to death of his father and the subsequent shift in his lifestyle. Although pt does admit to a toe amputation in the past, and that he's \"basically blind\" with only partial vision in one eye.    Reports having two scary episodes of hypoglycemia in the past few months. First one he was cleaning the bird cage around 630p, and then doesn't remember anything. He was found down in his home and [facility] was called. They administered \"sugar\" and his brother and sister in law he lives with assisted him to bed. He woke the next morning, but no memory of the " incident. However due to this incident he started to keep sugary drinks on his person and at his bedside. The second incident he awoke at 3a shaking and sweating, was unable to talk. He drank all the drinks around him and felt better, returned to bed.     In discussing his insulin regimen he states that he's been on this for quiet some time and is reluctant to change as this is what he's always done. He doesn't drive, so it's difficult for him to get places (pharmacy/grocery store) unless he pays for [employer]. States that he has a large supply of insulin and syringes at home. He does not have an Endocrinologist and hasn't been open to this idea in the past.     Preventative Management  THIS WAS NOT DISCUSSED DUE TO TIME CONSTRAINTS     Past Medical History:  Patient Active Problem List    Diagnosis Date Noted   • Coronary artery disease involving native coronary artery of native heart without angina pectoris 11/08/2019     Priority: Medium   • Atherosclerosis 10/29/2019     Priority: Medium   • Type 1 diabetes mellitus with diabetic chronic kidney disease ([facility]) 04/07/2017     Priority: Medium   • HLD (hyperlipidemia) 07/31/2015     Priority: Medium   • Hypertension associated with type 1 diabetes mellitus ([facility]) 12/31/2014     Priority: Medium   • Toe amputee ([facility]) 10/29/2019     Priority: Low   • Anxiety 10/28/2019     Priority: Low   • Neck pain 10/28/2019     Priority: Low   • Panic attacks 10/25/2019     Priority: Low   • Postconcussion syndrome 10/21/2019     Priority: Low   • Chronic cough 11/19/2018     Priority: Low   • Vitamin D deficiency 04/26/2018     Priority: Low   • Mononeuropathy 04/26/2018     Priority: Low   • PVD (peripheral vascular disease) ([facility]) 04/26/2018     Priority: Low   • Diabetic retinopathy of both eyes ([facility]) 12/09/2016     Priority: Low   • Blind left eye 06/17/2016     Priority: Low   • CKD (chronic kidney disease) stage 3, GFR 30-59 ml/min Dr. 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     Priority: Low   • Anemia 06/03/2020   • Cervical stenosis of spine 12/05/2019   • Migraine without aura and without status migrainosus, not intractable 11/19/2019   • Gastroesophageal reflux disease without esophagitis 11/19/2019   • Recurrent epistaxis 11/19/2019       Past Surgical History:  Past Surgical History:   Procedure Laterality Date   • IRRIGATION & DEBRIDEMENT GENERAL Left 5/3/2017    Procedure: IRRIGATION & DEBRIDEMENT GENERAL and Left Fourth Toe Amputation ;  Surgeon: Inessa Richard M.D.;  Location: SURGERY [facility];  Service:    • APPENDECTOMY     • CATARACT EXTRACTION WITH IOL Bilateral    • EYE SURGERY     • HAND SURGERY  1990's   • KNEE ARTHROSCOPY      x3   • TONSILLECTOMY         Allergies:  Patient has no known allergies.    Social History:  Social History     Socioeconomic History   • Marital status: Single     Spouse name: Not on file   • Number of children: Not on file   • Years of education: Not on file   • Highest education level: Not on file   Occupational History   • Not on file   Social Needs   • Financial resource strain: Not on file   • Food insecurity     Worry: Never true     Inability: Never true   • Transportation needs     Medical: Yes     Non-medical: Yes   Tobacco Use   • Smoking status: Former Smoker     Packs/day: 0.00   • Smokeless tobacco: Former User     Types: Chew   • Tobacco comment: tobacco cessation recommended   Substance and Sexual Activity   • Alcohol use: No     Alcohol/week: 0.0 oz     Comment: rare beer   • Drug use: No   • Sexual activity: Never     Comment: Never  , On disability due to lack of eye sight.   Lifestyle   • Physical activity     Days per week: Not on file     Minutes per session: Not on file   • Stress: Not on file   Relationships   • Social connections     Talks on phone: Not on file     Gets together: Not on file     Attends Cheondoism  service: Not on file     Active member of club or organization: Not on file     Attends meetings of clubs or organizations: Not on file     Relationship status: Not on file   • Intimate partner violence     Fear of current or ex partner: Not on file     Emotionally abused: Not on file     Physically abused: Not on file     Forced sexual activity: Not on file   Other Topics Concern   • Not on file   Social History Narrative   • Not on file       Family History:  Family History   Problem Relation Age of Onset   • Thyroid Mother    • Lung Disease Mother         COPD   • Cancer Mother         eyebrow    • Thyroid Sister    • Other Sister         mental health issues    • Thyroid Brother    • Lung Disease Brother         COPD   • Hypertension Brother    • Hyperlipidemia Brother    • Lung Disease Father         COPD   • Cancer Father    • Diabetes Maternal Uncle    • Cancer Paternal Aunt         type unknown   • Diabetes Maternal Grandfather    • Diabetes Paternal Grandmother    • Cancer Paternal Grandfather         prostate   • Stroke Neg Hx        Medications:    Current Outpatient Medications:   •  insulin glargine (LANTUS SOLOSTAR) 100 UNIT/ML Solution Pen-injector injection, Inject 10-20 Units as instructed every evening., Disp: 5 PEN, Rfl: 3  •  Insulin Pen Needle (PEN NEEDLES) 32G X 6 MM Misc, 1 Product by Does not apply route every day. Use daily with insulin pens, Disp: 100 Each, Rfl: 3  •  NOVOLOG, insulin aspart, (NOVOLOG FLEXPEN) 100 UNIT/ML injection PEN, Inject 3-10 Units as instructed 3 times a day before meals., Disp: 5 PEN, Rfl: 5  •  GLUCAGEN HYPOKIT 1 MG Recon Soln, INJECT 1 MG BY INTRAVENOUS ROUTE ONCE FOR 1 DOSE, Disp: , Rfl:   •  Continuous Blood Gluc Sensor (FREESTYLE CARLOS 14 DAY SENSOR) [facility] [place], USE ONE TESTING UNIT EVERY 2 WEEKS TO TEST BLOOD SUGAR 3 TIMES DAILY AS DIRECTED, Disp: , Rfl:   •  CONTOUR NEXT TEST strip, , Disp: , Rfl:   •  glucose 4 GM chewable tablet, Take 4 Tabs by mouth as needed  for Low Blood Sugar., Disp: 30 Tab, Rfl: 1  •  acyclovir (ZOVIRAX) 200 MG Cap, TAKE ONE CAPSULE BY MOUTH ONE TIME DAILY , Disp: 90 Cap, Rfl: 1  •  traZODone (DESYREL) 100 MG Tab, Take 1 Tab by mouth at bedtime as needed for Sleep., Disp: 90 Tab, Rfl: 0  •  Lancets, Lancets order: Lancets for meter covered by insurance. Sig: use QID and prn ssx high or low sugar., Disp: 400 Each, Rfl: 1  •  losartan (COZAAR) 50 MG Tab, Take 1 Tab by mouth every day., Disp: 100 Tab, Rfl: 3  •  Topiramate ER (TROKENDI XR) 25 MG CAPSULE SR 24 HR, Take  by mouth., Disp: , Rfl:   •  simvastatin (ZOCOR) 40 MG Tab, TAKE 1 TABLET BY MOUTH EVERY EVENING, Disp: 100 Tab, Rfl: 3  •  clopidogrel (PLAVIX) 75 MG Tab, Take 1 Tab by mouth every day., Disp: 90 Tab, Rfl: 3  •  metoprolol SR (TOPROL XL) 25 MG TABLET SR 24 HR, TAKE ONE TABLET BY MOUTH ONE TIME DAILY, Disp: 90 Tab, Rfl: 3  •  OYSTER SHELL CALCIUM PO, Take 1 Tab by mouth every day., Disp: , Rfl:   •  ALFALFA PO, Take 1 Tab by mouth 3 times a day., Disp: , Rfl:     Labs: Reviewed    Assessment and Plan:    1. DM1  Long discussion with pt regarding his current insulin regimen, which is obviously causing him major hypoglycemic events. Discussed at this point it's safer for high BG level rather than a low BG as he's been having. He was reluctant to change insulin however after discussing MOA and safety concerns, he was willing.     Due to his ride issues, he requests that his insulin be sent via mail order pharmacy. I ordered and will call pharmacy this afternoon to determine copay and delivery time frame. Based on that will call patient to set up appropriate f/u.     Freestyle Michelle sensor was placed and paired to handheld meter during visit. Again it was not reading BG levels yet during the visit, will ask today when I call the patient back.    231p update - spoke with [facility] pharmacy, they only deliver refrigerated items on Mondays, Tuesdays, and Wednesdays, as next day air. Thus pt will  not receive this order until at the earliest Tuesday 6/09/20. Due to this will call pt on Wednesday to check in.     So as pt won't be able to switch insulin products until next week, will have pt continue with NPH 20u daily however recommend decreasing the Regular insulin to 5 units before meals.     Spoke with pt and he is agreeable to this plan. Discussed diet some, he is eating mainly nonperishable items due to food storage issues. Advised to start by adding fresh vegetable to a meal a day.    Pt also willing to see endocrinology at this point, as he's having complications. Referral was already placed and I will CC to endocrinology.     F/U telephone call on 6/10/20  F/U appt on 6/18/20    Yanique Naqvi, XaviD, BCACP, CDCES    CC:   Sandi Manning M.D.

## 2020-06-07 NOTE — PROGRESS NOTES
New Patient Consult Note  Primary care physician: Sandi Manning M.D.    Reason for consult: Management of Uncontrolled Type 1 Diabetes with multiple complications including hypoglycemia unawareness, retinopathy with only partial vision in right eye left, amputation of left 4th toe.  He has been diabetic since age 8.  He hurt his back lifting a bird bath after his father's death.  He has passed out twice from hypoglycemia.  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    HPI:  Dylon Santa is a 59 y.o. old patient who comes in today for evaluation of above stated problem.    Most Recent HbA1c:   Lab Results   Component Value Date/Time    HBA1C 8.0 (H) 05/28/2020 03:19 PM        Current Diabetes Regimen:  Basal Insulin: NPH 20 u QAM - vial and syringe  Prandial Insulin: Regular 5 AC - vial and syringe    He was switched to Lantus and Novolog regimen at his last appointment.  He was also started on a Free Style Marcial sensor.    Hypoglycemia:  see marcial down load      ROS:  Constitutional: No weight loss  Cardiac: No palpitations or racing heart  Resp: No shortness of breath  Neuro: No numbness or tinging in feet  Endo: No heat or cold intolerance, no polyuria or polydipsia  All other systems were reviewed and were negative.    Past Medical History:  Patient Active Problem List    Diagnosis Date Noted   • Coronary artery disease involving native coronary artery of native heart without angina pectoris 11/08/2019     Priority: Medium   • Atherosclerosis 10/29/2019     Priority: Medium   • Type 1 diabetes mellitus with diabetic chronic kidney disease (HCC) 04/07/2017     Priority: Medium   • HLD (hyperlipidemia) 07/31/2015     Priority: Medium   • Hypertension associated with type 1 diabetes mellitus (HCC) 12/31/2014     Priority: Medium   • Toe amputee (HCC) 10/29/2019     Priority: Low   • Anxiety 10/28/2019     Priority: Low   • Neck pain 10/28/2019      Priority: Low   • Panic attacks 10/25/2019     Priority: Low   • Postconcussion syndrome 10/21/2019     Priority: Low   • Chronic cough 11/19/2018     Priority: Low   • Vitamin D deficiency 04/26/2018     Priority: Low   • Mononeuropathy 04/26/2018     Priority: Low   • PVD (peripheral vascular disease) ([facility]) 04/26/2018     Priority: Low   • Diabetic retinopathy of both eyes ([facility]) 12/09/2016     Priority: Low   • Blind left eye 06/17/2016     Priority: Low   • CKD (chronic kidney disease) stage 3, GFR 30-59 ml/min Dr. 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     Priority: Low   • Anemia 06/03/2020   • Cervical stenosis of spine 12/05/2019   • Migraine without aura and without status migrainosus, not intractable 11/19/2019   • Gastroesophageal reflux disease without esophagitis 11/19/2019   • Recurrent epistaxis 11/19/2019       Past Surgical History:  Past Surgical History:   Procedure Laterality Date   • IRRIGATION & DEBRIDEMENT GENERAL Left 5/3/2017    Procedure: IRRIGATION & DEBRIDEMENT GENERAL and Left Fourth Toe Amputation ;  Surgeon: Inessa Richard M.D.;  Location: SURGERY [facility];  Service:    • APPENDECTOMY     • CATARACT EXTRACTION WITH IOL Bilateral    • EYE SURGERY     • HAND SURGERY  1990's   • KNEE ARTHROSCOPY      x3   • TONSILLECTOMY         Allergies:  Patient has no known allergies.    Social History:  Social History     Socioeconomic History   • Marital status: Single     Spouse name: Not on file   • Number of children: Not on file   • Years of education: Not on file   • Highest education level: Not on file   Occupational History   • Not on file   Social Needs   • Financial resource strain: Not on file   • Food insecurity     Worry: Never true     Inability: Never true   • Transportation needs     Medical: Yes     Non-medical: Yes   Tobacco Use   • Smoking status: Former Smoker     Packs/day: 0.00   •  Smokeless tobacco: Former User     Types: Chew   • Tobacco comment: tobacco cessation recommended   Substance and Sexual Activity   • Alcohol use: No     Alcohol/week: 0.0 oz     Comment: rare beer   • Drug use: No   • Sexual activity: Never     Comment: Never  , On disability due to lack of eye sight.   Lifestyle   • Physical activity     Days per week: Not on file     Minutes per session: Not on file   • Stress: Not on file   Relationships   • Social connections     Talks on phone: Not on file     Gets together: Not on file     Attends Restorationism service: Not on file     Active member of club or organization: Not on file     Attends meetings of clubs or organizations: Not on file     Relationship status: Not on file   • Intimate partner violence     Fear of current or ex partner: Not on file     Emotionally abused: Not on file     Physically abused: Not on file     Forced sexual activity: Not on file   Other Topics Concern   • Not on file   Social History Narrative   • Not on file       Family History:  Family History   Problem Relation Age of Onset   • Thyroid Mother    • Lung Disease Mother         COPD   • Cancer Mother         eyebrow    • Thyroid Sister    • Other Sister         mental health issues    • Thyroid Brother    • Lung Disease Brother         COPD   • Hypertension Brother    • Hyperlipidemia Brother    • Lung Disease Father         COPD   • Cancer Father    • Diabetes Maternal Uncle    • Cancer Paternal Aunt         type unknown   • Diabetes Maternal Grandfather    • Diabetes Paternal Grandmother    • Cancer Paternal Grandfather         prostate   • Stroke Neg Hx        Medications:    Current Outpatient Medications:   •  insulin NPH (HUMULIN/NOVOLIN) 100 UNIT/ML Suspension, Inject 20 Units as instructed every morning., Disp: , Rfl:   •  GLUCAGEN HYPOKIT 1 MG Recon Soln, INJECT 1 MG BY INTRAVENOUS ROUTE ONCE FOR 1 DOSE, Disp: , Rfl:   •  Continuous Blood Gluc Sensor (FREESTYLE CARLOS 14 DAY  "SENSOR) [facility], USE ONE TESTING UNIT EVERY 2 WEEKS TO TEST BLOOD SUGAR 3 TIMES DAILY AS DIRECTED, Disp: , Rfl:   •  glucose 4 GM chewable tablet, Take 4 Tabs by mouth as needed for Low Blood Sugar., Disp: 30 Tab, Rfl: 1  •  acyclovir (ZOVIRAX) 200 MG Cap, TAKE ONE CAPSULE BY MOUTH ONE TIME DAILY , Disp: 90 Cap, Rfl: 1  •  traZODone (DESYREL) 100 MG Tab, Take 1 Tab by mouth at bedtime as needed for Sleep., Disp: 90 Tab, Rfl: 0  •  Lancets, Lancets order: Lancets for meter covered by insurance. Sig: use QID and prn ssx high or low sugar., Disp: 400 Each, Rfl: 1  •  losartan (COZAAR) 50 MG Tab, Take 1 Tab by mouth every day., Disp: 100 Tab, Rfl: 3  •  Topiramate ER (TROKENDI XR) 25 MG CAPSULE SR 24 HR, Take  by mouth., Disp: , Rfl:   •  simvastatin (ZOCOR) 40 MG Tab, TAKE 1 TABLET BY MOUTH EVERY EVENING, Disp: 100 Tab, Rfl: 3  •  clopidogrel (PLAVIX) 75 MG Tab, Take 1 Tab by mouth every day., Disp: 90 Tab, Rfl: 3  •  metoprolol SR (TOPROL XL) 25 MG TABLET SR 24 HR, TAKE ONE TABLET BY MOUTH ONE TIME DAILY, Disp: 90 Tab, Rfl: 3  •  OYSTER SHELL CALCIUM PO, Take 1 Tab by mouth every day., Disp: , Rfl:   •  ALFALFA PO, Take 1 Tab by mouth 3 times a day., Disp: , Rfl:   •  insulin glargine (LANTUS SOLOSTAR) 100 UNIT/ML Solution Pen-injector injection, Inject 10-20 Units as instructed every evening. (Patient not taking: Reported on 6/9/2020), Disp: 5 PEN, Rfl: 3  •  Insulin Pen Needle (PEN NEEDLES) 32G X 6 MM Misc, 1 Product by Does not apply route every day. Use daily with insulin pens (Patient not taking: Reported on 6/9/2020), Disp: 100 Each, Rfl: 3  •  NOVOLOG, insulin aspart, (NOVOLOG FLEXPEN) 100 UNIT/ML injection PEN, Inject 3-10 Units as instructed 3 times a day before meals. (Patient not taking: Reported on 6/9/2020), Disp: 5 PEN, Rfl: 5  •  CONTOUR NEXT TEST strip, , Disp: , Rfl:     Labs: Reviewed    Physical Examination:  Vital signs: /60   Pulse 84   Ht 1.74 m (5' 8.5\")   Wt 73 kg (161 lb)   SpO2 98%   " BMI 24.12 kg/m²  Body mass index is 24.12 kg/m².  General: No apparent distress, cooperative  Eyes: No scleral icterus or discharge  ENMT: Normal on external inspection of nose, lips, normal thyroid exam  Neck: No abnormal masses on inspection  Resp: Normal effort, clear to auscultation bilaterally   CVS: Regular rate and rhythm, S1 S2 normal, no murmur   Extremities: No edema  Abdomen: abdominal obesity present  Neuro: Alert and oriented  Skin: No rash  Psych: Normal mood and affect, intact memory and able to make informed decisions    Foot Exam:  Monofilament: done  Monofilament testing with a 10 gram force: sensation intact: decreased bilaterally  Visual Inspection: Feet without maceration, ulcers, fissures.  Right big toe red.  Pedal pulses: intact bilaterally    Assessment and Plan:    1. Uncontrolled type 1 diabetes mellitus with hypoglycemia and coma (HCC)  Continue current regimen; refractory to new changes; but advised to take regular insulin 145 minutes before eating to prevent spikes in blood sugars after eating; currently he takes it as correctional dose.  The therese sensor was down loaded and interpreted, the reports were reviewed with the patient in great detail.  - Discussed diabetic diet discussed in detail-plate method.  - He will walk for 20-30 minutes daily.  - Reviewed medications and advised how to take.  - Discussed importance of immunizations and yearly eye exams.   - Encouraged patient to attend diabetes education program.   - Advised daily foot exams. Educated on signs of infection.   - Educated on need to stay well hydrated with water.  - Educated to call with any questions or problems.    2. Proliferative diabetic retinopathy of both eyes without macular edema associated with type 1 diabetes mellitus (HCC)  Attempt good glycemic control    3. Vitamin D deficiency  Cont vit d with food.     4. Hypertension associated with type 1 diabetes mellitus (HCC)  Controlled.     5. Hyperlipidemia,  unspecified hyperlipidemia type  Continue statin.    Return in about 3 months (around 9/9/2020).    Thank you for allowing me to participate in the care of this patient.    Dr. Kody Roman    CC:   Sandi Manning M.D.    This note was created using voice recognition software (Dragon). The accuracy of the dictation is limited by the abilities of the software. I have reviewed the note prior to signing, however some errors in grammar and context are still possible. If you have any questions related to this note please do not hesitate to contact our office.

## 2020-06-09 ENCOUNTER — OFFICE VISIT (OUTPATIENT)
Dept: ENDOCRINOLOGY | Facility: MEDICAL CENTER | Age: 60
End: 2020-06-09
Payer: MEDICARE

## 2020-06-09 VITALS
HEIGHT: 69 IN | BODY MASS INDEX: 23.85 KG/M2 | DIASTOLIC BLOOD PRESSURE: 60 MMHG | WEIGHT: 161 LBS | OXYGEN SATURATION: 98 % | HEART RATE: 84 BPM | SYSTOLIC BLOOD PRESSURE: 124 MMHG

## 2020-06-09 DIAGNOSIS — E10.641 UNCONTROLLED TYPE 1 DIABETES MELLITUS WITH HYPOGLYCEMIA AND COMA (HCC): ICD-10-CM

## 2020-06-09 DIAGNOSIS — I15.2 HYPERTENSION ASSOCIATED WITH TYPE 1 DIABETES MELLITUS (HCC): ICD-10-CM

## 2020-06-09 DIAGNOSIS — E10.59 HYPERTENSION ASSOCIATED WITH TYPE 1 DIABETES MELLITUS (HCC): ICD-10-CM

## 2020-06-09 DIAGNOSIS — E10.3593 PROLIFERATIVE DIABETIC RETINOPATHY OF BOTH EYES WITHOUT MACULAR EDEMA ASSOCIATED WITH TYPE 1 DIABETES MELLITUS (HCC): ICD-10-CM

## 2020-06-09 DIAGNOSIS — E55.9 VITAMIN D DEFICIENCY: ICD-10-CM

## 2020-06-09 DIAGNOSIS — E78.5 HYPERLIPIDEMIA, UNSPECIFIED HYPERLIPIDEMIA TYPE: ICD-10-CM

## 2020-06-09 PROCEDURE — 99204 OFFICE O/P NEW MOD 45 MIN: CPT | Performed by: INTERNAL MEDICINE

## 2020-06-09 ASSESSMENT — FIBROSIS 4 INDEX: FIB4 SCORE: 1.54

## 2020-06-11 DIAGNOSIS — N18.30 TYPE 1 DIABETES MELLITUS WITH STAGE 3 CHRONIC KIDNEY DISEASE (HCC): ICD-10-CM

## 2020-06-11 DIAGNOSIS — E10.22 TYPE 1 DIABETES MELLITUS WITH STAGE 3 CHRONIC KIDNEY DISEASE (HCC): ICD-10-CM

## 2020-06-11 RX ORDER — FLASH GLUCOSE SENSOR
1 KIT MISCELLANEOUS
Qty: 6 EACH | Refills: 3 | Status: SHIPPED | OUTPATIENT
Start: 2020-06-11 | End: 2020-08-25 | Stop reason: SDUPTHER

## 2020-06-11 NOTE — PROGRESS NOTES
Spoke with pt.   Has established with Endocrinology.   Will see pt to teach Michelle sensor change next week.     Yanique Naqvi, PharmD

## 2020-06-18 ENCOUNTER — HOSPITAL ENCOUNTER (OUTPATIENT)
Facility: MEDICAL CENTER | Age: 60
End: 2020-06-18
Attending: FAMILY MEDICINE
Payer: MEDICARE

## 2020-06-18 PROCEDURE — 82274 ASSAY TEST FOR BLOOD FECAL: CPT

## 2020-06-19 ENCOUNTER — NON-PROVIDER VISIT (OUTPATIENT)
Dept: MEDICAL GROUP | Facility: PHYSICIAN GROUP | Age: 60
End: 2020-06-19
Payer: MEDICARE

## 2020-06-19 PROCEDURE — 99403 PREV MED CNSL INDIV APPRX 45: CPT | Performed by: FAMILY MEDICINE

## 2020-06-19 NOTE — PATIENT INSTRUCTIONS
If you experience symptoms of low blood sugar, check your sugar.  If your sugar is less than 70, take 15 grams of glucose (1/2 glass of juice/soda, 1 tablespoon of sugar/honey, or glucose tablets)  Wait 15 minutes, and check your sugars. If it continues to be less than 70, take 15 grams of glucose again and wait 15 min. Continue this until your sugar is OVER 70.    Continue taking 20 units of insulin NPH in the morning and 5 units of insulin regular before meals. You can skip mealtime insulin if you are increasing physical activity during the day.    Contact Dr. Roman's office if you continue to experience low blood sugars. [phone]

## 2020-06-19 NOTE — NON-PROVIDER
Patient Consult Note    TIME IN: 0800  TIME OUT: 0845    Primary care physician: Sandi Manning M.D.    Reason for consult: Management of Uncontrolled Type 1 Diabetes    HPI:  Dylon Santa is a 59 y.o. old patient who comes in today for evaluation of above stated problem.    Pt has seen Dr. Roman, who instructed him to return to his normal regimen of NPH / Regular. Pt informed me he feels low at night. Educated pt on hypoglycemia management when BG<70 and the 15:15 rule. Educated pt on BG goal (FBG ). Pt may skip mealtime insulin during lunch or dinner if he is planning on increasing physical activity during the day.    Replaced pt's Marcial sensor in clinic and provided pt with BG logs. Informed pt that refills for his sensor were sent to [facility].    Pt to defer to Endocrinology since our clinic cannot see T1DM pts per protocol.    Most Recent HbA1c:   Lab Results   Component Value Date/Time    HBA1C 8.0 (H) 05/28/2020 03:19 PM      Lab Results   Component Value Date/Time    CREATININE 1.53 (H) 05/28/2020 03:19 PM              Diabetes Medication History and Current Regimen  Basal Insulin: NPH 20 u QAM - vial and syringe  Prandial Insulin: Regular 5 AC - vial and syringe    Pt reports blood sugars: See Marcial download in Media Tab    Hypoglycemia:  Symptomatic Hypoglycemia    Pt's treatment of Hypoglycemia - utilizes glucose tabs  - 15:15 Rule    Past Medical History:  Patient Active Problem List    Diagnosis Date Noted   • Coronary artery disease involving native coronary artery of native heart without angina pectoris 11/08/2019     Priority: Medium   • Atherosclerosis 10/29/2019     Priority: Medium   • Type 1 diabetes mellitus with diabetic chronic kidney disease (HCC) 04/07/2017     Priority: Medium   • HLD (hyperlipidemia) 07/31/2015     Priority: Medium   • Hypertension associated with type 1 diabetes mellitus (HCC) 12/31/2014     Priority: Medium   • Toe amputee (HCC) 10/29/2019      Priority: Low   • Anxiety 10/28/2019     Priority: Low   • Neck pain 10/28/2019     Priority: Low   • Panic attacks 10/25/2019     Priority: Low   • Postconcussion syndrome 10/21/2019     Priority: Low   • Chronic cough 11/19/2018     Priority: Low   • Vitamin D deficiency 04/26/2018     Priority: Low   • Mononeuropathy 04/26/2018     Priority: Low   • PVD (peripheral vascular disease) ([facility]) 04/26/2018     Priority: Low   • Diabetic retinopathy of both eyes ([facility]) 12/09/2016     Priority: Low   • Blind left eye 06/17/2016     Priority: Low   • CKD (chronic kidney disease) stage 3, GFR 30-59 ml/min Dr. 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     Priority: Low   • Anemia 06/03/2020   • Cervical stenosis of spine 12/05/2019   • Migraine without aura and without status migrainosus, not intractable 11/19/2019   • Gastroesophageal reflux disease without esophagitis 11/19/2019   • Recurrent epistaxis 11/19/2019       Past Surgical History:  Past Surgical History:   Procedure Laterality Date   • IRRIGATION & DEBRIDEMENT GENERAL Left 5/3/2017    Procedure: IRRIGATION & DEBRIDEMENT GENERAL and Left Fourth Toe Amputation ;  Surgeon: Inessa Richard M.D.;  Location: SURGERY [facility];  Service:    • APPENDECTOMY     • CATARACT EXTRACTION WITH IOL Bilateral    • EYE SURGERY     • HAND SURGERY  1990's   • KNEE ARTHROSCOPY      x3   • TONSILLECTOMY         Allergies:  Patient has no known allergies.    Social History:  Social History     Socioeconomic History   • Marital status: Single     Spouse name: Not on file   • Number of children: Not on file   • Years of education: Not on file   • Highest education level: Not on file   Occupational History   • Not on file   Social Needs   • Financial resource strain: Not on file   • Food insecurity     Worry: Never true     Inability: Never true   • Transportation needs     Medical: Yes      Non-medical: Yes   Tobacco Use   • Smoking status: Former Smoker     Packs/day: 0.00   • Smokeless tobacco: Former User     Types: Chew   • Tobacco comment: tobacco cessation recommended   Substance and Sexual Activity   • Alcohol use: No     Alcohol/week: 0.0 oz     Comment: rare beer   • Drug use: No   • Sexual activity: Never     Comment: Never  , On disability due to lack of eye sight.   Lifestyle   • Physical activity     Days per week: Not on file     Minutes per session: Not on file   • Stress: Not on file   Relationships   • Social connections     Talks on phone: Not on file     Gets together: Not on file     Attends Sikhism service: Not on file     Active member of club or organization: Not on file     Attends meetings of clubs or organizations: Not on file     Relationship status: Not on file   • Intimate partner violence     Fear of current or ex partner: Not on file     Emotionally abused: Not on file     Physically abused: Not on file     Forced sexual activity: Not on file   Other Topics Concern   • Not on file   Social History Narrative   • Not on file       Family History:  Family History   Problem Relation Age of Onset   • Thyroid Mother    • Lung Disease Mother         COPD   • Cancer Mother         eyebrow    • Thyroid Sister    • Other Sister         mental health issues    • Thyroid Brother    • Lung Disease Brother         COPD   • Hypertension Brother    • Hyperlipidemia Brother    • Lung Disease Father         COPD   • Cancer Father    • Diabetes Maternal Uncle    • Cancer Paternal Aunt         type unknown   • Diabetes Maternal Grandfather    • Diabetes Paternal Grandmother    • Cancer Paternal Grandfather         prostate   • Stroke Neg Hx        Medications:    Current Outpatient Medications:   •  insulin regular (HUMULIN R) 100 Unit/mL Solution, Inject 5 Units as instructed 3 times a day before meals., Disp: , Rfl:   •  Continuous Blood Gluc Sensor (Center'd[employer] CARLOS 14 DAY SENSOR)  Misc, Inject 1 Units as instructed every 14 days., Disp: 6 Each, Rfl: 3  •  insulin NPH (HUMULIN/NOVOLIN) 100 UNIT/ML Suspension, Inject 20 Units as instructed every morning., Disp: , Rfl:   •  GLUCAGEN HYPOKIT 1 MG Recon Soln, INJECT 1 MG BY INTRAVENOUS ROUTE ONCE FOR 1 DOSE, Disp: , Rfl:   •  glucose 4 GM chewable tablet, Take 4 Tabs by mouth as needed for Low Blood Sugar., Disp: 30 Tab, Rfl: 1  •  acyclovir (ZOVIRAX) 200 MG Cap, TAKE ONE CAPSULE BY MOUTH ONE TIME DAILY , Disp: 90 Cap, Rfl: 1  •  traZODone (DESYREL) 100 MG Tab, Take 1 Tab by mouth at bedtime as needed for Sleep., Disp: 90 Tab, Rfl: 0  •  Lancets, Lancets order: Lancets for meter covered by insurance. Sig: use QID and prn ssx high or low sugar., Disp: 400 Each, Rfl: 1  •  losartan (COZAAR) 50 MG Tab, Take 1 Tab by mouth every day., Disp: 100 Tab, Rfl: 3  •  Topiramate ER (TROKENDI XR) 25 MG CAPSULE SR 24 HR, Take  by mouth., Disp: , Rfl:   •  simvastatin (ZOCOR) 40 MG Tab, TAKE 1 TABLET BY MOUTH EVERY EVENING, Disp: 100 Tab, Rfl: 3  •  clopidogrel (PLAVIX) 75 MG Tab, Take 1 Tab by mouth every day., Disp: 90 Tab, Rfl: 3  •  metoprolol SR (TOPROL XL) 25 MG TABLET SR 24 HR, TAKE ONE TABLET BY MOUTH ONE TIME DAILY, Disp: 90 Tab, Rfl: 3  •  OYSTER SHELL CALCIUM PO, Take 1 Tab by mouth every day., Disp: , Rfl:   •  ALFALFA PO, Take 1 Tab by mouth 3 times a day., Disp: , Rfl:     Labs: Reviewed    Physical Examination:  Vital signs: There were no vitals taken for this visit. There is no height or weight on file to calculate BMI.    Assessment and Plan:    1. DM1  Continue insulin regimen per Endocrinology  Pt to defer to Endocrinology since our clinic cannot see T1DM pts per protocol.    Becky Santiago, PharmD  06/19/20    CC:   Sandi Manning M.D.

## 2020-06-23 DIAGNOSIS — D64.9 ANEMIA, UNSPECIFIED TYPE: ICD-10-CM

## 2020-06-23 LAB — AMBIGUOUS DTTM AMBI4: NORMAL

## 2020-06-24 LAB — HEMOCCULT STL QL IA: NEGATIVE

## 2020-06-29 RX ORDER — TRAZODONE HYDROCHLORIDE 100 MG/1
100 TABLET ORAL NIGHTLY PRN
Qty: 90 TAB | Refills: 1 | Status: SHIPPED | OUTPATIENT
Start: 2020-06-29 | End: 2020-09-04 | Stop reason: SDUPTHER

## 2020-06-29 NOTE — PROGRESS NOTES
Called member  to wish a Happy Birthday. Member had no questions at this time, direct phone number given for future contact.

## 2020-07-02 ENCOUNTER — OFFICE VISIT (OUTPATIENT)
Dept: CARDIOLOGY | Facility: MEDICAL CENTER | Age: 60
End: 2020-07-02
Payer: MEDICARE

## 2020-07-02 VITALS
BODY MASS INDEX: 24.11 KG/M2 | HEART RATE: 72 BPM | WEIGHT: 162.8 LBS | OXYGEN SATURATION: 98 % | DIASTOLIC BLOOD PRESSURE: 58 MMHG | SYSTOLIC BLOOD PRESSURE: 116 MMHG | HEIGHT: 69 IN

## 2020-07-02 DIAGNOSIS — Z98.890 HISTORY OF CARDIAC CATHETERIZATION: Chronic | ICD-10-CM

## 2020-07-02 DIAGNOSIS — E10.59 HYPERTENSION ASSOCIATED WITH TYPE 1 DIABETES MELLITUS (HCC): ICD-10-CM

## 2020-07-02 DIAGNOSIS — I15.2 HYPERTENSION ASSOCIATED WITH TYPE 1 DIABETES MELLITUS (HCC): ICD-10-CM

## 2020-07-02 PROCEDURE — 99214 OFFICE O/P EST MOD 30 MIN: CPT | Performed by: INTERNAL MEDICINE

## 2020-07-02 RX ORDER — ATORVASTATIN CALCIUM 40 MG/1
40 TABLET, FILM COATED ORAL DAILY
Qty: 90 TAB | Refills: 3 | Status: SHIPPED | OUTPATIENT
Start: 2020-07-02 | End: 2020-07-06 | Stop reason: SDUPTHER

## 2020-07-02 ASSESSMENT — ENCOUNTER SYMPTOMS
BLURRED VISION: 0
BRUISES/BLEEDS EASILY: 0
WEAKNESS: 0
FEVER: 0
SORE THROAT: 0
FOCAL WEAKNESS: 0
ABDOMINAL PAIN: 0
FALLS: 0
NAUSEA: 0
SHORTNESS OF BREATH: 0
PALPITATIONS: 0
CHILLS: 0
COUGH: 0
CLAUDICATION: 0
PND: 0
DIZZINESS: 0

## 2020-07-02 ASSESSMENT — FIBROSIS 4 INDEX: FIB4 SCORE: 1.56

## 2020-07-02 NOTE — PROGRESS NOTES
"Chief Complaint   Patient presents with   • Follow-Up     Abnormal EKG       Subjective:   Dylon Santa is a 60 y.o. male who presents today for follow-up of his history of coronary disease based on coronary angiogram last year which was done in the setting of chest pain previously had equivocal stress test apparently    He is been doing well he is tolerating his medications well follows up with multiple priors he does have peripheral arterial disease and takes Plavix for this with Dr. Richard    Past Medical History:   Diagnosis Date   • Anesthesia     hx PONV   • Arthritis     L knee; hands   • Bronchitis 2016   • Cataract     nicole iol   • Cold 4/5/17    prod cough   • Coronary artery disease due to lipid rich plaque - mild to moderate on Cath 2019 Left Dominant    • Dental disorder     \"bad shape\"   • Diabetic neuropathy ([facility]) 12/31/2014    retinopathy   • Diabetic retinopathy of both eyes ([facility]) 12/9/2016   • Dyslipidemia    • Heart murmur    • Hemorrhagic disorder ([facility])     nose bleeds   • High cholesterol    • [facility]  [facility] - mild to moderate CAD    • History of shingles 12/31/2014   • Hyperlipidemia    • Hypertension    • Kidney stones    • Pain     left leg/foot   • Psychiatric problem     situational depression   • Type II or unspecified type diabetes mellitus without mention of complication, uncontrolled 12/31/2014     Past Surgical History:   Procedure Laterality Date   • IRRIGATION & DEBRIDEMENT GENERAL Left 5/3/2017    Procedure: IRRIGATION & DEBRIDEMENT GENERAL and Left Fourth Toe Amputation ;  Surgeon: Inessa Richard M.D.;  Location: [facility];  Service:    • APPENDECTOMY     • CATARACT EXTRACTION WITH IOL Bilateral    • EYE SURGERY     • HAND SURGERY  1990's   • KNEE ARTHROSCOPY      x3   • TONSILLECTOMY       Family History   Problem Relation Age of Onset   • Thyroid Mother    • Lung Disease Mother         COPD   • Cancer " Mother         eyebrow    • Thyroid Sister    • Other Sister         mental health issues    • Thyroid Brother    • Lung Disease Brother         COPD   • Hypertension Brother    • Hyperlipidemia Brother    • Lung Disease Father         COPD   • Cancer Father    • Diabetes Maternal Uncle    • Cancer Paternal Aunt         type unknown   • Diabetes Maternal Grandfather    • Diabetes Paternal Grandmother    • Cancer Paternal Grandfather         prostate   • Stroke Neg Hx      Social History     Socioeconomic History   • Marital status: Single     Spouse name: Not on file   • Number of children: Not on file   • Years of education: Not on file   • Highest education level: Not on file   Occupational History   • Not on file   Social Needs   • Financial resource strain: Not on file   • Food insecurity     Worry: Never true     Inability: Never true   • Transportation needs     Medical: Yes     Non-medical: Yes   Tobacco Use   • Smoking status: Former Smoker     Packs/day: 0.00   • Smokeless tobacco: Former User     Types: Chew   • Tobacco comment: tobacco cessation recommended   Substance and Sexual Activity   • Alcohol use: No     Alcohol/week: 0.0 oz     Comment: rare beer   • Drug use: No   • Sexual activity: Never     Comment: Never  , On disability due to lack of eye sight.   Lifestyle   • Physical activity     Days per week: Not on file     Minutes per session: Not on file   • Stress: Not on file   Relationships   • Social connections     Talks on phone: Not on file     Gets together: Not on file     Attends Rastafari service: Not on file     Active member of club or organization: Not on file     Attends meetings of clubs or organizations: Not on file     Relationship status: Not on file   • Intimate partner violence     Fear of current or ex partner: Not on file     Emotionally abused: Not on file     Physically abused: Not on file     Forced sexual activity: Not on file   Other Topics Concern   • Not on file    Social History Narrative   • Not on file     No Known Allergies  Outpatient Encounter Medications as of 7/2/2020   Medication Sig Dispense Refill   • atorvastatin (LIPITOR) 40 MG Tab Take 1 Tab by mouth every day. 90 Tab 3   • traZODone (DESYREL) 100 MG Tab Take 1 Tab by mouth at bedtime as needed for Sleep. 90 Tab 1   • insulin regular (HUMULIN R) 100 Unit/mL Solution Inject 5 Units as instructed 3 times a day before meals.     • Continuous Blood Gluc Sensor (FREESTYLE CARLOS 14 DAY SENSOR) Misc Inject 1 Units as instructed every 14 days. 6 Each 3   • insulin NPH (HUMULIN/NOVOLIN) 100 UNIT/ML Suspension Inject 20 Units as instructed every morning.     • GLUCAGEN HYPOKIT 1 MG Recon Soln INJECT 1 MG BY INTRAVENOUS ROUTE ONCE FOR 1 DOSE     • glucose 4 GM chewable tablet Take 4 Tabs by mouth as needed for Low Blood Sugar. 30 Tab 1   • acyclovir (ZOVIRAX) 200 MG Cap TAKE ONE CAPSULE BY MOUTH ONE TIME DAILY  90 Cap 1   • Lancets Lancets order: Lancets for meter covered by insurance. Sig: use QID and prn ssx high or low sugar. 400 Each 1   • losartan (COZAAR) 50 MG Tab Take 1 Tab by mouth every day. 100 Tab 3   • Topiramate ER (TROKENDI XR) 25 MG CAPSULE SR 24 HR Take  by mouth.     • clopidogrel (PLAVIX) 75 MG Tab Take 1 Tab by mouth every day. 90 Tab 3   • metoprolol SR (TOPROL XL) 25 MG TABLET SR 24 HR TAKE ONE TABLET BY MOUTH ONE TIME DAILY 90 Tab 3   • OYSTER SHELL CALCIUM PO Take 1 Tab by mouth every day.     • ALFALFA PO Take 1 Tab by mouth 3 times a day.     • [DISCONTINUED] simvastatin (ZOCOR) 40 MG Tab TAKE 1 TABLET BY MOUTH EVERY EVENING 100 Tab 3     No facility-administered encounter medications on file as of 7/2/2020.      Review of Systems   Constitutional: Negative for chills and fever.   HENT: Negative for sore throat.    Eyes: Negative for blurred vision.   Respiratory: Negative for cough and shortness of breath.    Cardiovascular: Negative for chest pain, palpitations, claudication, leg swelling and  "PND.   Gastrointestinal: Negative for abdominal pain and nausea.   Genitourinary:        Urinary leakage   Musculoskeletal: Negative for falls and joint pain.   Skin: Negative for rash.   Neurological: Negative for dizziness, focal weakness and weakness.   Endo/Heme/Allergies: Does not bruise/bleed easily.        Objective:   /58 (BP Location: Right arm, Patient Position: Sitting, BP Cuff Size: Adult)   Pulse 72   Ht 1.74 m (5' 8.5\")   Wt 73.8 kg (162 lb 12.8 oz)   SpO2 98%   BMI 24.39 kg/m²     Physical Exam   Constitutional: No distress.   HENT:   Mouth/Throat: Oropharynx is clear and moist.   Eyes: No scleral icterus.   Neck: No JVD present.   Cardiovascular: Normal rate and normal heart sounds. Exam reveals no gallop and no friction rub.   No murmur heard.  Pulmonary/Chest: No respiratory distress. He has no wheezes. He has no rales.   Abdominal: Soft. Bowel sounds are normal.   Musculoskeletal:         General: No edema.   Neurological: He is alert.   Skin: No rash noted. He is not diaphoretic.   Psychiatric: He has a normal mood and affect.     We reviewed in person the most recent labs  Recent Results (from the past 4032 hour(s))   ABO AND RH DETERMINATION    Collection Time: 05/28/20  3:00 PM   Result Value Ref Range    ABO Grouping Only A     Rh Grouping Only POS    Comp Metabolic Panel    Collection Time: 05/28/20  3:19 PM   Result Value Ref Range    Sodium 137 135 - 145 mmol/L    Potassium 4.9 3.6 - 5.5 mmol/L    Chloride 101 96 - 112 mmol/L    Co2 25 20 - 33 mmol/L    Anion Gap 11.0 7.0 - 16.0    Glucose 169 (H) 65 - 99 mg/dL    Bun 26 (H) 8 - 22 mg/dL    Creatinine 1.53 (H) 0.50 - 1.40 mg/dL    Calcium 9.5 8.5 - 10.5 mg/dL    AST(SGOT) 31 12 - 45 U/L    ALT(SGPT) 24 2 - 50 U/L    Alkaline Phosphatase 75 30 - 99 U/L    Total Bilirubin 0.3 0.1 - 1.5 mg/dL    Albumin 4.4 3.2 - 4.9 g/dL    Total Protein 7.5 6.0 - 8.2 g/dL    Globulin 3.1 1.9 - 3.5 g/dL    A-G Ratio 1.4 g/dL   HEMOGLOBIN A1C    " Collection Time: 20  3:19 PM   Result Value Ref Range    Glycohemoglobin 8.0 (H) 0.0 - 5.6 %    Est Avg Glucose 183 mg/dL   HCV Scrn ( 7069-0640 1xLife)    Collection Time: 20  3:19 PM   Result Value Ref Range    Hepatitis C Antibody Non-Reactive Non-Reactive   CBC WITHOUT DIFFERENTIAL    Collection Time: 20  3:19 PM   Result Value Ref Range    WBC 8.0 4.8 - 10.8 K/uL    RBC 4.25 (L) 4.70 - 6.10 M/uL    Hemoglobin 12.8 (L) 14.0 - 18.0 g/dL    Hematocrit 39.3 (L) 42.0 - 52.0 %    MCV 92.5 81.4 - 97.8 fL    MCH 30.1 27.0 - 33.0 pg    MCHC 32.6 (L) 33.7 - 35.3 g/dL    RDW 47.3 35.9 - 50.0 fL    Platelet Count 239 164 - 446 K/uL    MPV 10.7 9.0 - 12.9 fL   ESTIMATED GFR    Collection Time: 20  3:19 PM   Result Value Ref Range    GFR If  57 (A) >60 mL/min/1.73 m 2    GFR If Non  47 (A) >60 mL/min/1.73 m 2   POCT Glucose    Collection Time: 20  2:25 PM   Result Value Ref Range    Glucose - Accu-Ck 84 70 - 100 mg/dL   IRON/TOTAL IRON BIND    Collection Time: 20  3:06 PM   Result Value Ref Range    Iron 63 50 - 180 ug/dL    Total Iron Binding 330 250 - 450 ug/dL    Unsat Iron Binding 267 110 - 370 ug/dL    % Saturation 19 15 - 55 %   FERRITIN    Collection Time: 20  3:06 PM   Result Value Ref Range    Ferritin 74.7 22.0 - 322.0 ng/mL   PROSTATE SPECIFIC AG SCREENING    Collection Time: 20  3:06 PM   Result Value Ref Range    Prostatic Specific Antigen Tot 0.44 0.00 - 4.00 ng/mL   VITAMIN B12    Collection Time: 20  3:06 PM   Result Value Ref Range    Vitamin B12 -True Cobalamin 957 (H) 211 - 911 pg/mL   TROPONIN    Collection Time: 20  3:06 PM   Result Value Ref Range    Troponin T 36 (H) 6 - 19 ng/L   OCCULT BLOOD FECES IMMUNOASSAY    Collection Time: 20  1:52 PM   Result Value Ref Range    Occult Blood, IA Negative Negative   AMBIGUOUS DATE/TIME    Collection Time: 20  1:52 PM   Result Value Ref Range    Ambiguous  Date/Time See Below:        Assessment:     1. Hypertension associated with type 1 diabetes mellitus (HCC)     2. History of cardiac catheterization 2019 in [place] abnormal stress - mild to moderate CAD         Medical Decision Making:  Today's Assessment / Status / Plan:     It was my pleasure to meet with Mr. Santa.    Blood pressure is well controlled.  We specifically assessed the labs on hypertension treatment    I changed him to atorvastatin in accordance with the guidelines    He is on Plavix for peripheral arterial disease he would have to check in with vascular on appropriateness of stopping if needed for elective procedures    Encouraged him to establish with urology for his urinary leakage    I will see Mr. Santa back in 1 year time and encouraged him to follow up with us over the phone or electronically using my [employer] as issues arise.    It is my pleasure to participate in the care of Mr. Santa.  Please do not hesitate to contact me with questions or concerns.    Phil Patel MD PhD FACC  Cardiologist [facility]    Please note that this dictation was created using voice recognition software. There may be errors I did not discover before finalizing the note.

## 2020-07-06 DIAGNOSIS — E78.5 DYSLIPIDEMIA: Primary | Chronic | ICD-10-CM

## 2020-07-06 RX ORDER — ATORVASTATIN CALCIUM 40 MG/1
40 TABLET, FILM COATED ORAL DAILY
Qty: 100 TAB | Refills: 3 | Status: SHIPPED
Start: 2020-07-06 | End: 2020-08-25

## 2020-07-07 ENCOUNTER — TELEPHONE (OUTPATIENT)
Dept: MEDICAL GROUP | Facility: PHYSICIAN GROUP | Age: 60
End: 2020-07-07

## 2020-07-08 ENCOUNTER — HOSPITAL ENCOUNTER (OUTPATIENT)
Dept: LAB | Facility: MEDICAL CENTER | Age: 60
End: 2020-07-08
Attending: FAMILY MEDICINE
Payer: MEDICARE

## 2020-07-08 ENCOUNTER — OFFICE VISIT (OUTPATIENT)
Dept: MEDICAL GROUP | Facility: PHYSICIAN GROUP | Age: 60
End: 2020-07-08
Payer: MEDICARE

## 2020-07-08 VITALS
HEART RATE: 82 BPM | HEIGHT: 69 IN | BODY MASS INDEX: 24.19 KG/M2 | DIASTOLIC BLOOD PRESSURE: 72 MMHG | OXYGEN SATURATION: 98 % | WEIGHT: 163.3 LBS | TEMPERATURE: 98.2 F | SYSTOLIC BLOOD PRESSURE: 122 MMHG

## 2020-07-08 DIAGNOSIS — Z12.11 COLON CANCER SCREENING: ICD-10-CM

## 2020-07-08 DIAGNOSIS — N39.43 POST-VOID DRIBBLING: ICD-10-CM

## 2020-07-08 DIAGNOSIS — D64.9 ANEMIA, UNSPECIFIED TYPE: ICD-10-CM

## 2020-07-08 DIAGNOSIS — R04.0 RECURRENT EPISTAXIS: ICD-10-CM

## 2020-07-08 LAB
APPEARANCE UR: CLEAR
BASOPHILS # BLD AUTO: 0.7 % (ref 0–1.8)
BASOPHILS # BLD: 0.05 K/UL (ref 0–0.12)
BILIRUB UR QL STRIP.AUTO: NEGATIVE
COLOR UR: YELLOW
EOSINOPHIL # BLD AUTO: 0.14 K/UL (ref 0–0.51)
EOSINOPHIL NFR BLD: 1.9 % (ref 0–6.9)
ERYTHROCYTE [DISTWIDTH] IN BLOOD BY AUTOMATED COUNT: 44.3 FL (ref 35.9–50)
GLUCOSE UR STRIP.AUTO-MCNC: NEGATIVE MG/DL
HCT VFR BLD AUTO: 41.2 % (ref 42–52)
HGB BLD-MCNC: 13.5 G/DL (ref 14–18)
IMM GRANULOCYTES # BLD AUTO: 0.02 K/UL (ref 0–0.11)
IMM GRANULOCYTES NFR BLD AUTO: 0.3 % (ref 0–0.9)
KETONES UR STRIP.AUTO-MCNC: NEGATIVE MG/DL
LEUKOCYTE ESTERASE UR QL STRIP.AUTO: NEGATIVE
LYMPHOCYTES # BLD AUTO: 1.72 K/UL (ref 1–4.8)
LYMPHOCYTES NFR BLD: 23.3 % (ref 22–41)
MCH RBC QN AUTO: 30.1 PG (ref 27–33)
MCHC RBC AUTO-ENTMCNC: 32.8 G/DL (ref 33.7–35.3)
MCV RBC AUTO: 91.8 FL (ref 81.4–97.8)
MICRO URNS: NORMAL
MONOCYTES # BLD AUTO: 0.69 K/UL (ref 0–0.85)
MONOCYTES NFR BLD AUTO: 9.4 % (ref 0–13.4)
NEUTROPHILS # BLD AUTO: 4.75 K/UL (ref 1.82–7.42)
NEUTROPHILS NFR BLD: 64.4 % (ref 44–72)
NITRITE UR QL STRIP.AUTO: NEGATIVE
NRBC # BLD AUTO: 0 K/UL
NRBC BLD-RTO: 0 /100 WBC
PH UR STRIP.AUTO: 5.5 [PH] (ref 5–8)
PLATELET # BLD AUTO: 232 K/UL (ref 164–446)
PMV BLD AUTO: 10.4 FL (ref 9–12.9)
PROT UR QL STRIP: NEGATIVE MG/DL
RBC # BLD AUTO: 4.49 M/UL (ref 4.7–6.1)
RBC UR QL AUTO: NEGATIVE
SP GR UR STRIP.AUTO: 1.01
UROBILINOGEN UR STRIP.AUTO-MCNC: 0.2 MG/DL
WBC # BLD AUTO: 7.4 K/UL (ref 4.8–10.8)

## 2020-07-08 PROCEDURE — 87086 URINE CULTURE/COLONY COUNT: CPT

## 2020-07-08 PROCEDURE — 99214 OFFICE O/P EST MOD 30 MIN: CPT | Performed by: FAMILY MEDICINE

## 2020-07-08 PROCEDURE — 85025 COMPLETE CBC W/AUTO DIFF WBC: CPT

## 2020-07-08 PROCEDURE — 81003 URINALYSIS AUTO W/O SCOPE: CPT

## 2020-07-08 PROCEDURE — 36415 COLL VENOUS BLD VENIPUNCTURE: CPT

## 2020-07-08 ASSESSMENT — FIBROSIS 4 INDEX: FIB4 SCORE: 1.56

## 2020-07-08 NOTE — PROGRESS NOTES
cc: Urinary changes      Subjective:     Dylon Santa is a 60 y.o. male presenting for the following:     Urinary leakage: For the last 6 weeks, patient has had difficulty with leakage. He will urinate and then when he stands up he will have some leakage.  He does not have a weak stream. No nocturia. No back pain, no numbness. No difficulty with bowel control.     Anemia: Patient does continue to complain of regular nose bleeds.  He is having nosebleeds from 1 or the other nostril 1-2 times per week.  He says these do not last more than 5 minutes.  He has had 1 or 2 very severe ones but not in the last month.    No iron deficiency found on recent blood tests.  He does eat a balanced diet.  He denies any melena, shortness of breath, fatigue.  Claims he feels completely normal.  No weight changes.  No abdominal pain and appetite is normal.    DMI: denies hypoglycemia. However, when patient shows me his glucometer, it reads at Lo. So POC glucose was done-- at 65. Patient feels well today.  He is not seeing endocrinology does have follow-up scheduled.    Review of systems:  All others reviewed and are negative.       Current Outpatient Medications:   •  atorvastatin (LIPITOR) 40 MG Tab, Take 1 Tab by mouth every day., Disp: 100 Tab, Rfl: 3  •  traZODone (DESYREL) 100 MG Tab, Take 1 Tab by mouth at bedtime as needed for Sleep., Disp: 90 Tab, Rfl: 1  •  insulin regular (HUMULIN R) 100 Unit/mL Solution, Inject 5 Units as instructed 3 times a day before meals., Disp: , Rfl:   •  Continuous Blood Gluc Sensor (FREESTYLE CARLOS 14 DAY SENSOR) [facility], Inject 1 Units as instructed every 14 days., Disp: 6 Each, Rfl: 3  •  insulin NPH (HUMULIN/NOVOLIN) 100 UNIT/ML Suspension, Inject 20 Units as instructed every morning., Disp: , Rfl:   •  GLUCAGEN HYPOKIT 1 MG Recon Soln, INJECT 1 MG BY INTRAVENOUS ROUTE ONCE FOR 1 DOSE, Disp: , Rfl:   •  glucose 4 GM chewable tablet, Take 4 Tabs by mouth as needed for Low Blood Sugar., Disp: 30  "Tab, Rfl: 1  •  acyclovir (ZOVIRAX) 200 MG Cap, TAKE ONE CAPSULE BY MOUTH ONE TIME DAILY , Disp: 90 Cap, Rfl: 1  •  Lancets, Lancets order: Lancets for meter covered by insurance. Sig: use QID and prn ssx high or low sugar., Disp: 400 Each, Rfl: 1  •  losartan (COZAAR) 50 MG Tab, Take 1 Tab by mouth every day., Disp: 100 Tab, Rfl: 3  •  Topiramate ER (TROKENDI XR) 25 MG CAPSULE SR 24 HR, Take  by mouth., Disp: , Rfl:   •  clopidogrel (PLAVIX) 75 MG Tab, Take 1 Tab by mouth every day., Disp: 90 Tab, Rfl: 3  •  metoprolol SR (TOPROL XL) 25 MG TABLET SR 24 HR, TAKE ONE TABLET BY MOUTH ONE TIME DAILY, Disp: 90 Tab, Rfl: 3  •  OYSTER SHELL CALCIUM PO, Take 1 Tab by mouth every day., Disp: , Rfl:   •  ALFALFA PO, Take 1 Tab by mouth 3 times a day., Disp: , Rfl:     Allergies, past medical history, past surgical history, family history, social history reviewed and updated    Objective:     Vitals: /72 (BP Location: Left arm, Patient Position: Sitting, BP Cuff Size: Adult)   Pulse 82   Temp 36.8 °C (98.2 °F) (Temporal)   Ht 1.74 m (5' 8.5\")   Wt 74.1 kg (163 lb 4.8 oz)   SpO2 98%   BMI 24.47 kg/m²   General: Alert, pleasant, NAD  HEENT: Normocephalic.   EOMI, no icterus or pallor.  Neck supple.  No thyromegaly or masses palpated. No cervical or supraclavicular lymphadenopathy.  Heart: Regular rate and rhythm.  S1 and S2 normal.  No murmurs appreciated.  Respiratory: Normal respiratory effort.  Clear to auscultation bilaterally.  Skin: Warm, dry, no rashes in exposed areas.  Psych:  Affect is normal, judgement is good, grooming is appropriate.    Assessment/Plan:     Dylon was seen today for follow-up.    Diagnoses and all orders for this visit:    Anemia.  Thought to be due to recurrent nosebleeds but then with no iron deficiency found.  Patient did see ENT, however, he does continue to have regular nosebleeds.  -I do recommend patient call ENT and be seen again by them.  Recommend regular Vaseline-or nasal " saline.  -Patient most likely with anemia of chronic disease as he did have a borderline anemia in 2015 without cause.  He then did have a toe amputation in 2017 which explains the acute worsening of this.  Patient does have type 1 diabetes and CKD stage III.  -Patient is almost due for colonoscopy for screening.  Recent fit test negative, unlikely cause of anemia but will send referral for repeat colonoscopy now.  Otherwise patient does not have any symptoms or signs of a carcinoma.  PSA normal.  No smoking for more than 15 years.  Recurrent epistaxis  Anemia, unspecified type  -     REFERRAL TO GI FOR COLONOSCOPY  -     CBC WITH DIFFERENTIAL; Future  Colon cancer screening  -     REFERRAL TO GI FOR COLONOSCOPY      Post-void dribbling: Problem worsening in the last 6 weeks.  Patient does have a recent PSA that is normal.  Will obtain urinalysis and culture to ensure no acute infection or microscopic hematuria.  Will obtain ultrasound of bladder to evaluate postvoid residual, as most likely due to BPH.  No neurological symptoms otherwise.  Patient to go to ER if any acute urinary retention.  -     URINE CULTURE(NEW); Future  -     URINALYSIS; Future  -     US-RENAL; Future      Return in about 3 months (around 10/8/2020), or if symptoms worsen or fail to improve.

## 2020-07-10 LAB
BACTERIA UR CULT: NORMAL
SIGNIFICANT IND 70042: NORMAL
SITE SITE: NORMAL
SOURCE SOURCE: NORMAL

## 2020-07-13 ENCOUNTER — APPOINTMENT (OUTPATIENT)
Dept: RADIOLOGY | Facility: MEDICAL CENTER | Age: 60
End: 2020-07-13
Attending: FAMILY MEDICINE
Payer: MEDICARE

## 2020-07-13 DIAGNOSIS — N39.43 POST-VOID DRIBBLING: ICD-10-CM

## 2020-07-13 PROCEDURE — 76775 US EXAM ABDO BACK WALL LIM: CPT

## 2020-07-14 DIAGNOSIS — N21.0 BLADDER STONE: ICD-10-CM

## 2020-07-14 DIAGNOSIS — N39.43 POST-VOID DRIBBLING: ICD-10-CM

## 2020-07-20 ENCOUNTER — TELEPHONE (OUTPATIENT)
Dept: MEDICAL GROUP | Facility: PHYSICIAN GROUP | Age: 60
End: 2020-07-20

## 2020-07-20 NOTE — TELEPHONE ENCOUNTER
ESTABLISHED PATIENT PRE-VISIT PLANNING     Patient was NOT contacted to complete PVP.      1.  Reviewed notes from the last few office visits within the medical group: Yes    2.  If any orders were placed at last visit or intended to be done for this visit (i.e. 6 mos follow-up), do we have Results/Consult Notes?        •  Labs - Labs ordered, completed on 7/8/20 and results are in chart.       •  Imaging - Imaging ordered, completed and results are in chart.       •  Referrals - Referral ordered, patient has NOT been seen.    3. Is this appointment scheduled as a Hospital Follow-Up? No    4.  Immunizations were updated in Epic using WebIZ?:        •  Web Iz Recommendations:     5.  Patient is due for the following Health Maintenance Topics:   Health Maintenance Due   Topic Date Due   • IMM ZOSTER VACCINES (1 of 2) 06/27/2011   • RETINAL SCREENING  12/21/2016   • DIABETES MONOFILAMENT / LE EXAM  04/26/2019       6. Orders for overdue Health Maintenance topics pended in Pre-Charting? NO    7.  AHA (MDX) form printed for Provider? No, already completed    8.  Patient was NOT informed to arrive 15 min prior to their scheduled appointment and bring in their medication bottles.

## 2020-07-23 ENCOUNTER — OFFICE VISIT (OUTPATIENT)
Dept: MEDICAL GROUP | Facility: PHYSICIAN GROUP | Age: 60
End: 2020-07-23
Payer: MEDICARE

## 2020-07-23 VITALS
SYSTOLIC BLOOD PRESSURE: 126 MMHG | TEMPERATURE: 99 F | OXYGEN SATURATION: 96 % | WEIGHT: 160.8 LBS | DIASTOLIC BLOOD PRESSURE: 70 MMHG | BODY MASS INDEX: 23.82 KG/M2 | HEIGHT: 69 IN | HEART RATE: 75 BPM

## 2020-07-23 DIAGNOSIS — M54.2 NECK PAIN: ICD-10-CM

## 2020-07-23 DIAGNOSIS — E10.3593 PROLIFERATIVE DIABETIC RETINOPATHY OF BOTH EYES WITHOUT MACULAR EDEMA ASSOCIATED WITH TYPE 1 DIABETES MELLITUS (HCC): ICD-10-CM

## 2020-07-23 PROCEDURE — 99213 OFFICE O/P EST LOW 20 MIN: CPT | Performed by: FAMILY MEDICINE

## 2020-07-23 ASSESSMENT — FIBROSIS 4 INDEX: FIB4 SCORE: 1.61

## 2020-07-23 NOTE — PROGRESS NOTES
cc: chronic neck pain      Subjective:     Dylon Santa is a 60 y.o. male presenting for the following:     Chronic Neck Pain: Patient does have difficulty with chronic neck pain and was going to have surgery with Dr. Be.  However, this was canceled and he has not yet rescheduled.  He has been having difficulty with pain and muscle tightness on his left posterior neck.  He has not had any worsening of his arm weakness.  No changes to sensation or tingling.  No change in  strength.    Patient does have poor vision.  He has been on disability for some time for this.  His previous ophthalmologist in [place] has passed away.  He has not had any changes in his vision recently but he has not yet established with an ophthalmologist here in [place].    Review of systems:  All others reviewed and are negative.       Current Outpatient Medications:   •  atorvastatin (LIPITOR) 40 MG Tab, Take 1 Tab by mouth every day., Disp: 100 Tab, Rfl: 3  •  traZODone (DESYREL) 100 MG Tab, Take 1 Tab by mouth at bedtime as needed for Sleep., Disp: 90 Tab, Rfl: 1  •  insulin regular (HUMULIN R) 100 Unit/mL Solution, Inject 5 Units as instructed 3 times a day before meals., Disp: , Rfl:   •  Continuous Blood Gluc Sensor (FREESTYLE CARLOS 14 DAY SENSOR) [facility], Inject 1 Units as instructed every 14 days., Disp: 6 Each, Rfl: 3  •  insulin NPH (HUMULIN/NOVOLIN) 100 UNIT/ML Suspension, Inject 20 Units as instructed every morning., Disp: , Rfl:   •  GLUCAGEN HYPOKIT 1 MG Recon Soln, INJECT 1 MG BY INTRAVENOUS ROUTE ONCE FOR 1 DOSE, Disp: , Rfl:   •  glucose 4 GM chewable tablet, Take 4 Tabs by mouth as needed for Low Blood Sugar., Disp: 30 Tab, Rfl: 1  •  acyclovir (ZOVIRAX) 200 MG Cap, TAKE ONE CAPSULE BY MOUTH ONE TIME DAILY , Disp: 90 Cap, Rfl: 1  •  losartan (COZAAR) 50 MG Tab, Take 1 Tab by mouth every day., Disp: 100 Tab, Rfl: 3  •  Topiramate ER (TROKENDI XR) 25 MG CAPSULE SR 24 HR, Take  by mouth., Disp: , Rfl:   •  clopidogrel  "(PLAVIX) 75 MG Tab, Take 1 Tab by mouth every day., Disp: 90 Tab, Rfl: 3  •  metoprolol SR (TOPROL XL) 25 MG TABLET SR 24 HR, TAKE ONE TABLET BY MOUTH ONE TIME DAILY, Disp: 90 Tab, Rfl: 3  •  OYSTER SHELL CALCIUM PO, Take 1 Tab by mouth every day., Disp: , Rfl:   •  ALFALFA PO, Take 1 Tab by mouth 3 times a day., Disp: , Rfl:   •  Lancets, Lancets order: Lancets for meter covered by insurance. Sig: use QID and prn ssx high or low sugar., Disp: 400 Each, Rfl: 1    Allergies, past medical history, past surgical history, family history, social history reviewed and updated    Objective:     Vitals: /70 (BP Location: Left arm, Patient Position: Sitting, BP Cuff Size: Adult)   Pulse 75   Temp 37.2 °C (99 °F) (Temporal)   Ht 1.74 m (5' 8.5\")   Wt 72.9 kg (160 lb 12.8 oz)   SpO2 96%   BMI 24.09 kg/m²   General: Alert, pleasant, NAD  HEENT: Normocephalic.   EOMI, no icterus or pallor.  Conjunctivae and lids normal. External ears and nose normal. Oropharynx non-erythematous, mucous membranes moist.    Neck supple.  Muscle tightness over left paraspinal muscles and trapezius.  No bony tenderness.   Heart: Regular rate and rhythm.  S1 and S2 normal.  No murmurs appreciated.  Respiratory: Normal respiratory effort.  Clear to auscultation bilaterally.  Neurological:  Arm strength and sensation grossly normal. CN2-12 grossly intact  Psych:  Affect is normal, judgement is good, grooming is appropriate.    Assessment/Plan:     Dylon was seen today for follow-up.    Diagnoses and all orders for this visit:    Neck pain: Chronic, poorly controlled problem.  Patient given phone number for Dr. Be today to reschedule for surgery.  He is amenable to having this done.  No new traumas or new symptoms today.    Proliferative diabetic retinopathy of both eyes without macular edema associated with type 1 diabetes mellitus (HCC): Patient given phone number for ophthalmology and encouraged to establish for this.        Return in " about 3 months (around 10/23/2020), or if symptoms worsen or fail to improve.

## 2020-08-17 ENCOUNTER — APPOINTMENT (OUTPATIENT)
Dept: RADIOLOGY | Facility: IMAGING CENTER | Age: 60
End: 2020-08-17
Attending: NURSE PRACTITIONER
Payer: MEDICARE

## 2020-08-17 ENCOUNTER — OFFICE VISIT (OUTPATIENT)
Dept: MEDICAL GROUP | Facility: PHYSICIAN GROUP | Age: 60
End: 2020-08-17
Payer: MEDICARE

## 2020-08-17 ENCOUNTER — PATIENT OUTREACH (OUTPATIENT)
Dept: HEALTH INFORMATION MANAGEMENT | Facility: OTHER | Age: 60
End: 2020-08-17

## 2020-08-17 VITALS
HEIGHT: 69 IN | OXYGEN SATURATION: 98 % | BODY MASS INDEX: 23.85 KG/M2 | HEART RATE: 82 BPM | DIASTOLIC BLOOD PRESSURE: 70 MMHG | SYSTOLIC BLOOD PRESSURE: 130 MMHG | WEIGHT: 161 LBS | TEMPERATURE: 98.8 F

## 2020-08-17 DIAGNOSIS — F41.0 PANIC ATTACKS: ICD-10-CM

## 2020-08-17 DIAGNOSIS — S40.852A FOREIGN BODY OF LEFT UPPER ARM: ICD-10-CM

## 2020-08-17 DIAGNOSIS — S06.9X9S TRAUMATIC BRAIN INJURY WITH LOSS OF CONSCIOUSNESS, SEQUELA (HCC): ICD-10-CM

## 2020-08-17 DIAGNOSIS — F07.81 POSTCONCUSSION SYNDROME: ICD-10-CM

## 2020-08-17 PROCEDURE — 99214 OFFICE O/P EST MOD 30 MIN: CPT | Performed by: NURSE PRACTITIONER

## 2020-08-17 PROCEDURE — 73060 X-RAY EXAM OF HUMERUS: CPT | Mod: TC,LT | Performed by: NURSE PRACTITIONER

## 2020-08-17 ASSESSMENT — FIBROSIS 4 INDEX: FIB4 SCORE: 1.61
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
## 2020-08-20 ENCOUNTER — TELEPHONE (OUTPATIENT)
Dept: MEDICAL GROUP | Facility: PHYSICIAN GROUP | Age: 60
End: 2020-08-20

## 2020-08-20 NOTE — TELEPHONE ENCOUNTER
ESTABLISHED PATIENT PRE-VISIT PLANNING     Patient was NOT contacted to complete PVP.    1.  Reviewed notes from the last few office visits within the medical group: Yes    2.  If any orders were placed at last visit or intended to be done for this visit (i.e. 6 mos follow-up), do we have Results/Consult Notes?        •  Labs - Labs were not ordered at last office visit.       •  Imaging - Imaging was not ordered at last office visit.       •  Referrals - Referral ordered, patient has NOT been seen.    3. Is this appointment scheduled as a Hospital Follow-Up? No    4.  Immunizations were updated in [facility] using WebIZ?: Yes       •  Web Iz Recommendations: FLU, HEPATITIS A , MMR , TD, VARICELLA (Chicken Pox)  and SHINGRIX (Shingles)    5.  Patient is due for the following Health Maintenance Topics:   Health Maintenance Due   Topic Date Due   • IMM ZOSTER VACCINES (1 of 2) 06/27/2011   • RETINAL SCREENING  12/21/2016   • DIABETES MONOFILAMENT / LE EXAM  04/26/2019       6. Orders for overdue Health Maintenance topics pended in Pre-Charting? NO    7.  AHA (MDX) form printed for Provider? No, already completed    8.  Patient was NOT informed to arrive 15 min prior to their scheduled appointment and bring in their medication bottles.

## 2020-08-21 ENCOUNTER — TELEPHONE (OUTPATIENT)
Dept: MEDICAL GROUP | Facility: PHYSICIAN GROUP | Age: 60
End: 2020-08-21

## 2020-08-21 NOTE — TELEPHONE ENCOUNTER
----- Message from DAYO Parmar sent at 8/21/2020 12:15 PM PDT -----  Regarding: FW: CGM  Please call patient and give him this contact for assistance with his glucometer.  ----- Message -----  From: Christine Garcia R.N.  Sent: 8/21/2020   9:12 AM PDT  To: DAYO Parmar  Subject: RE: CGM                                          Have him make an appointment with Dolly Belle RN CDE at [facility] [phone].  Thanks, Christine  ----- Message -----  From: DAYO Parmar  Sent: 8/17/2020   2:45 PM PDT  To: Christine Garcia R.N.  Subject: CGM                                              Hey there,    I have a patient here who is establishing with you next month.  He is concerned part of his CGM broke in his arm.  I'm doing imaging to rule that out, but in the meantime he needs help connecting his new one.  I am not familiar with these enough to set it up.  Is it possible for him to meet with someone from your office sooner?  If not, any resources or recommendations?  He said his vision and neuropathy make it hard for him.  Let me know what you think.    Thanks!  DAYO Boyle

## 2020-08-25 ENCOUNTER — OFFICE VISIT (OUTPATIENT)
Dept: MEDICAL GROUP | Facility: PHYSICIAN GROUP | Age: 60
End: 2020-08-25
Payer: MEDICARE

## 2020-08-25 VITALS
TEMPERATURE: 98.8 F | BODY MASS INDEX: 24.4 KG/M2 | SYSTOLIC BLOOD PRESSURE: 126 MMHG | WEIGHT: 161 LBS | HEART RATE: 80 BPM | OXYGEN SATURATION: 96 % | DIASTOLIC BLOOD PRESSURE: 60 MMHG | HEIGHT: 68 IN

## 2020-08-25 DIAGNOSIS — F07.81 POSTCONCUSSION SYNDROME: ICD-10-CM

## 2020-08-25 DIAGNOSIS — N18.30 TYPE 1 DIABETES MELLITUS WITH STAGE 3 CHRONIC KIDNEY DISEASE (HCC): ICD-10-CM

## 2020-08-25 DIAGNOSIS — E10.59 HYPERTENSION ASSOCIATED WITH TYPE 1 DIABETES MELLITUS (HCC): ICD-10-CM

## 2020-08-25 DIAGNOSIS — Z23 NEED FOR VACCINATION: ICD-10-CM

## 2020-08-25 DIAGNOSIS — E10.22 TYPE 1 DIABETES MELLITUS WITH STAGE 3 CHRONIC KIDNEY DISEASE (HCC): ICD-10-CM

## 2020-08-25 DIAGNOSIS — I25.10 CORONARY ARTERY DISEASE DUE TO LIPID RICH PLAQUE: Chronic | ICD-10-CM

## 2020-08-25 DIAGNOSIS — I25.83 CORONARY ARTERY DISEASE DUE TO LIPID RICH PLAQUE: Chronic | ICD-10-CM

## 2020-08-25 DIAGNOSIS — Z86.19 HISTORY OF SHINGLES: ICD-10-CM

## 2020-08-25 DIAGNOSIS — I15.2 HYPERTENSION ASSOCIATED WITH TYPE 1 DIABETES MELLITUS (HCC): ICD-10-CM

## 2020-08-25 PROCEDURE — 99214 OFFICE O/P EST MOD 30 MIN: CPT | Mod: 25 | Performed by: FAMILY MEDICINE

## 2020-08-25 PROCEDURE — 90471 IMMUNIZATION ADMIN: CPT | Performed by: FAMILY MEDICINE

## 2020-08-25 PROCEDURE — 90750 HZV VACC RECOMBINANT IM: CPT | Performed by: FAMILY MEDICINE

## 2020-08-25 RX ORDER — ACYCLOVIR 200 MG/1
200 CAPSULE ORAL DAILY
Qty: 90 CAP | Refills: 1 | Status: SHIPPED | OUTPATIENT
Start: 2020-08-25 | End: 2021-01-12

## 2020-08-25 RX ORDER — SIMVASTATIN 40 MG
40 TABLET ORAL NIGHTLY
COMMUNITY
End: 2020-08-28

## 2020-08-25 RX ORDER — FLASH GLUCOSE SENSOR
1 KIT MISCELLANEOUS
Qty: 6 EACH | Refills: 3 | Status: SHIPPED | OUTPATIENT
Start: 2020-08-25 | End: 2020-11-24 | Stop reason: SDUPTHER

## 2020-08-25 SDOH — HEALTH STABILITY: MENTAL HEALTH: HOW OFTEN DO YOU HAVE A DRINK CONTAINING ALCOHOL?: MONTHLY OR LESS

## 2020-08-25 SDOH — HEALTH STABILITY: MENTAL HEALTH: HOW MANY STANDARD DRINKS CONTAINING ALCOHOL DO YOU HAVE ON A TYPICAL DAY?: 1 OR 2

## 2020-08-25 ASSESSMENT — FIBROSIS 4 INDEX: FIB4 SCORE: 1.61

## 2020-08-26 NOTE — PROGRESS NOTES
"CC: Postconcussive syndrome    HISTORY OF THE PRESENT ILLNESS: Patient is a 60 y.o. male. This pleasant patient is here today to establish care.    Patient is here primarily to establish care.  He has no acute concerns today.    He does follow with endocrinology for his type 1 diabetes which has historically been uncontrolled.  Last hemoglobin A1c was 8.0 in May 2020.  He has follow-up with endocrinology next month.  Is requesting refills on his freestyle carlos sensors.  Reports some confusion with how to use them, but has upcoming appointment with endocrinology nurse for help on that.    He does have a history of hypertension and is on metoprolol, losartan.  Blood pressure is well controlled at this time.    Patient does follow with cardiology for coronary artery disease.  He was recently switched from atorvastatin to simvastatin.  Reports tolerating the medication well.    Patient's biggest concern right now is that he has been having significant memory loss since a traumatic brain injury in October 2019 when apparently a 70 pound birdbath fell on his head.  He followed with neurology for some time, and reports that he no longer desires to do so.  States that neurologist told him that he had \"anger problems\" and tried to medicate him for that as opposed to his issues from his brain injury.  Previously referred to physiatry here (specialist in traumatic brain injury), and it looks like the referral was just processed today.    Allergies: Patient has no known allergies.    Current Outpatient Medications Ordered in Epic   Medication Sig Dispense Refill   • simvastatin (ZOCOR) 40 MG Tab Take 40 mg by mouth every evening.     • Continuous Blood Gluc Sensor (FREESTYLE CARLOS 14 DAY SENSOR) Misc Inject 1 Units as instructed every 14 days. 6 Each 3   • acyclovir (ZOVIRAX) 200 MG Cap Take 1 Cap by mouth every day. 90 Cap 1   • traZODone (DESYREL) 100 MG Tab Take 1 Tab by mouth at bedtime as needed for Sleep. 90 Tab 1   • " "insulin regular (HUMULIN R) 100 Unit/mL Solution Inject 5 Units as instructed 3 times a day before meals.     • insulin NPH (HUMULIN/NOVOLIN) 100 UNIT/ML Suspension Inject 20 Units as instructed every morning.     • GLUCAGEN HYPOKIT 1 MG Recon Soln INJECT 1 MG BY INTRAVENOUS ROUTE ONCE FOR 1 DOSE     • glucose 4 GM chewable tablet Take 4 Tabs by mouth as needed for Low Blood Sugar. 30 Tab 1   • Lancets Lancets order: Lancets for meter covered by insurance. Sig: use QID and prn ssx high or low sugar. 400 Each 1   • losartan (COZAAR) 50 MG Tab Take 1 Tab by mouth every day. 100 Tab 3   • clopidogrel (PLAVIX) 75 MG Tab Take 1 Tab by mouth every day. 90 Tab 3   • metoprolol SR (TOPROL XL) 25 MG TABLET SR 24 HR TAKE ONE TABLET BY MOUTH ONE TIME DAILY 90 Tab 3   • OYSTER SHELL CALCIUM PO Take 1 Tab by mouth every day.     • ALFALFA PO Take 1 Tab by mouth 3 times a day.       No current [facility]-ordered facility-administered medications on file.        Past Medical History:   Diagnosis Date   • Anesthesia     hx PONV   • Arthritis     L knee; hands   • Bronchitis 2016   • Cataract     nicole iol   • Cold 4/5/17    prod cough   • Coronary artery disease due to lipid rich plaque - mild to moderate on Cath 2019 Left Dominant    • Dental disorder     \"bad shape\"   • Diabetic neuropathy ([facility]) 12/31/2014    retinopathy   • Diabetic retinopathy of both eyes ([facility]) 12/9/2016   • Dyslipidemia    • Heart murmur    • Hemorrhagic disorder ([facility])     nose bleeds   • High cholesterol    • History of cardiac catheterization 2019 in settin [facility] abnormal stress - mild to moderate CAD    • History of shingles 12/31/2014   • Hyperlipidemia    • Hypertension    • Kidney stones    • Pain     left leg/foot   • Psychiatric problem     situational depression   • Type II or unspecified type diabetes mellitus without mention of complication, uncontrolled 12/31/2014       Past Surgical History:   Procedure Laterality Date   • IRRIGATION & DEBRIDEMENT " "GENERAL Left 5/3/2017    Procedure: IRRIGATION & DEBRIDEMENT GENERAL and Left Fourth Toe Amputation ;  Surgeon: Inessa Richard M.D.;  Location: SURGERY [facility];  Service:    • APPENDECTOMY     • CATARACT EXTRACTION WITH IOL Bilateral    • EYE SURGERY     • HAND SURGERY  1990's   • KNEE ARTHROSCOPY      x3   • TONSILLECTOMY         Social History     Tobacco Use   • Smoking status: Former Smoker     Packs/day: 0.00   • Smokeless tobacco: Former User     Types: Chew   • Tobacco comment: tobacco cessation recommended   Substance Use Topics   • Alcohol use: Yes     Alcohol/week: 0.0 oz     Frequency: Monthly or less     Drinks per session: 1 or 2     Comment: rare beer   • Drug use: No       Social History     Social History Narrative   • Not on file       Family History   Problem Relation Age of Onset   • Thyroid Mother    • Lung Disease Mother         COPD   • Cancer Mother         eyebrow    • Thyroid Sister    • Other Sister         mental health issues    • Thyroid Brother    • Lung Disease Brother         COPD   • Hypertension Brother    • Hyperlipidemia Brother    • Lung Disease Father         COPD   • Cancer Father    • Diabetes Maternal Uncle    • Cancer Paternal Aunt         type unknown   • Diabetes Maternal Grandfather    • Diabetes Paternal Grandmother    • Cancer Paternal Grandfather         prostate   • Stroke Neg Hx        ROS:     - Constitutional: Negative for fever, chills, unexpected weight change, and fatigue/generalized weakness.     - Respiratory: Negative for cough, sputum production, chest congestion, dyspnea, wheezing, and crackles.      - Cardiovascular: Negative for chest pain, palpitations, orthopnea, PND, and bilateral lower extremity edema.     Exam: /60 (BP Location: Left arm, Patient Position: Sitting, BP Cuff Size: Adult)   Pulse 80   Temp 37.1 °C (98.8 °F) (Temporal)   Ht 1.727 m (5' 8\")   Wt 73 kg (161 lb)   SpO2 96%  Body mass index is 24.48 kg/m².    General: " Well appearing, NAD  Neck: Supple without JVD. No thyromegaly or nodules  Pulmonary: Clear to ausculation.  Normal effort. No rales, ronchi, or wheezing.  Cardiovascular: Regular rate and rhythm without murmur, rubs or gallop.   Skin: Warm and dry.  No obvious lesions.  Musculoskeletal:  No extremity cyanosis, clubbing, or edema.  Psych: Tangential, difficult historian.  Normal mood and affect.   Please note that this dictation was created using voice recognition software. I have made every reasonable attempt to correct obvious errors, but I expect that there are errors of grammar and possibly content that I did not discover before finalizing the note.      Assessment/Plan  Dylon was seen today for establish care, diabetes and medication refill.    Diagnoses and all orders for this visit:    Type 1 diabetes mellitus with stage 3 chronic kidney disease (HCC)  Chronic issue, uncontrolled.  Managed per endocrinology.  Has follow-up with them next month.  -     Continuous Blood Gluc Sensor (FREESTYLE CARLOS 14 DAY SENSOR) Misc; Inject 1 Units as instructed every 14 days.    Hypertension associated with type 1 diabetes mellitus (HCC)  Chronic, well-controlled issue on current medications.    Postconcussion syndrome  Patient is quite tangential today and does exhibit some behaviors consistent with significant memory loss.  I think evaluation by physiatry is appropriate.  He has been given the number to get scheduled.    History of shingles  -     acyclovir (ZOVIRAX) 200 MG Cap; Take 1 Cap by mouth every day.    Need for vaccination  -     Shingles Vaccine (SHINGRIX)    Coronary artery disease due to lipid rich plaque - mild to moderate on Cath 2019 Left Dominant  Ongoing issue, on clopidogrel and simvastatin per cardiology.    Follow-up in about 3 months or sooner if needed.    Nesha Peralta,   [facility]

## 2020-08-28 ENCOUNTER — TELEPHONE (OUTPATIENT)
Dept: CARDIOLOGY | Facility: MEDICAL CENTER | Age: 60
End: 2020-08-28

## 2020-08-28 RX ORDER — ATORVASTATIN CALCIUM 40 MG/1
40 TABLET, FILM COATED ORAL DAILY
Qty: 100 TAB | Refills: 3 | Status: SHIPPED | OUTPATIENT
Start: 2020-08-28 | End: 2021-05-18 | Stop reason: SDUPTHER

## 2020-08-28 NOTE — TELEPHONE ENCOUNTER
Received letter from [employer] mail order asking to dc atorvastatin, as simvastatin was recently ordered by PCP on 8/25. PCP's note says cardiologist recently switched pt from atorvastatin to simvastatin. After reviewing CW's last office visit note, CW changed pt from Simvastatin to atorvastatin, not the other way around. Called pt to verify this with him, he is in agreement and figures PCP just got it backwards on accident. Changed rx from simvastatin back to atorvastatin per CW's last office visit note.

## 2020-09-04 RX ORDER — TRAZODONE HYDROCHLORIDE 100 MG/1
100 TABLET ORAL NIGHTLY PRN
Qty: 90 TAB | Refills: 1 | Status: SHIPPED | OUTPATIENT
Start: 2020-09-04 | End: 2020-11-24 | Stop reason: SDUPTHER

## 2020-09-04 NOTE — TELEPHONE ENCOUNTER
Was the patient seen in the last year in this department? Yes     Does patient have an active prescription for medications requested? No      Received Request Via: Phone call

## 2020-09-21 ENCOUNTER — APPOINTMENT (OUTPATIENT)
Dept: PHYSICAL MEDICINE AND REHAB | Facility: REHABILITATION | Age: 60
End: 2020-09-21
Payer: MEDICARE
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
## 2020-10-30 NOTE — PROGRESS NOTES
Member was transferred from [employer] and was fully verified.  Member called to verify his PCP's name, advised that her name was Dr Peralta and he has an appointment on 11/24/2020 at 2:30pm.. No further questions  Systems used: [employer]/Health Connect/Epic

## 2020-11-24 ENCOUNTER — OFFICE VISIT (OUTPATIENT)
Dept: MEDICAL GROUP | Facility: PHYSICIAN GROUP | Age: 60
End: 2020-11-24
Payer: MEDICARE

## 2020-11-24 VITALS
BODY MASS INDEX: 23.64 KG/M2 | TEMPERATURE: 98.4 F | OXYGEN SATURATION: 97 % | SYSTOLIC BLOOD PRESSURE: 132 MMHG | HEART RATE: 75 BPM | WEIGHT: 156 LBS | HEIGHT: 68 IN | DIASTOLIC BLOOD PRESSURE: 66 MMHG

## 2020-11-24 DIAGNOSIS — I15.2 HYPERTENSION ASSOCIATED WITH TYPE 1 DIABETES MELLITUS (HCC): ICD-10-CM

## 2020-11-24 DIAGNOSIS — R55 SYNCOPE, UNSPECIFIED SYNCOPE TYPE: ICD-10-CM

## 2020-11-24 DIAGNOSIS — Z23 NEED FOR VACCINATION: ICD-10-CM

## 2020-11-24 DIAGNOSIS — N18.30 TYPE 1 DIABETES MELLITUS WITH STAGE 3 CHRONIC KIDNEY DISEASE (HCC): ICD-10-CM

## 2020-11-24 DIAGNOSIS — E10.22 TYPE 1 DIABETES MELLITUS WITH STAGE 3 CHRONIC KIDNEY DISEASE, UNSPECIFIED WHETHER STAGE 3A OR 3B CKD (HCC): ICD-10-CM

## 2020-11-24 DIAGNOSIS — N18.30 TYPE 1 DIABETES MELLITUS WITH STAGE 3 CHRONIC KIDNEY DISEASE, UNSPECIFIED WHETHER STAGE 3A OR 3B CKD (HCC): ICD-10-CM

## 2020-11-24 DIAGNOSIS — D64.9 ANEMIA, UNSPECIFIED TYPE: ICD-10-CM

## 2020-11-24 DIAGNOSIS — E55.9 VITAMIN D DEFICIENCY: ICD-10-CM

## 2020-11-24 DIAGNOSIS — E10.22 TYPE 1 DIABETES MELLITUS WITH STAGE 3 CHRONIC KIDNEY DISEASE (HCC): ICD-10-CM

## 2020-11-24 DIAGNOSIS — E78.5 DYSLIPIDEMIA: Chronic | ICD-10-CM

## 2020-11-24 DIAGNOSIS — E10.59 HYPERTENSION ASSOCIATED WITH TYPE 1 DIABETES MELLITUS (HCC): ICD-10-CM

## 2020-11-24 PROCEDURE — 99214 OFFICE O/P EST MOD 30 MIN: CPT | Mod: 25 | Performed by: FAMILY MEDICINE

## 2020-11-24 PROCEDURE — 90686 IIV4 VACC NO PRSV 0.5 ML IM: CPT | Performed by: FAMILY MEDICINE

## 2020-11-24 PROCEDURE — 90750 HZV VACC RECOMBINANT IM: CPT | Performed by: FAMILY MEDICINE

## 2020-11-24 PROCEDURE — 90472 IMMUNIZATION ADMIN EACH ADD: CPT | Performed by: FAMILY MEDICINE

## 2020-11-24 PROCEDURE — G0008 ADMIN INFLUENZA VIRUS VAC: HCPCS | Performed by: FAMILY MEDICINE

## 2020-11-24 RX ORDER — FLASH GLUCOSE SENSOR
1 KIT MISCELLANEOUS
Qty: 6 EACH | Refills: 3 | Status: SHIPPED | OUTPATIENT
Start: 2020-11-24 | End: 2021-09-08 | Stop reason: SDUPTHER

## 2020-11-24 RX ORDER — TRAZODONE HYDROCHLORIDE 100 MG/1
100 TABLET ORAL NIGHTLY PRN
Qty: 90 TAB | Refills: 1 | Status: SHIPPED | OUTPATIENT
Start: 2020-11-24 | End: 2021-07-08

## 2020-11-24 ASSESSMENT — FIBROSIS 4 INDEX: FIB4 SCORE: 1.61

## 2020-11-24 NOTE — PROGRESS NOTES
"CC: Diabetes    HISTORY OF THE PRESENT ILLNESS: Patient is a 60 y.o. male. This pleasant patient is here today to discuss the following issues.    Patient is here for general follow-up.  He does have known type 1 diabetes with stage III chronic kidney disease.  He has previously been following with endocrinology, and last hemoglobin A1c was 8.0 in May 2020.  He last saw endocrinology in June 2020.  He reports that sugars have mostly been running \"normal\" does have a freestyle carlos and is in need of new sensors.      He reports today what is possibly a hypoglycemic episode.  States that last week he came in from getting the mail and out of nowhere down on a chair in his house and lost consciousness.  States that he was out for an unknown period of time, and brother gave him glucose drinks and he regained consciousness.  Glucose was then normal when he checked it.  Notes that he was essentially soaking wet, including close when he regained consciousness.  He actually has had issues with lightheadedness and syncope in the past per chart review.  He has had a cardiac cath which did not show any occlusive coronary artery disease.  He is also had carotid ultrasounds which did not show any blockage.  He denies any seizure-like activity.  He has also had echocardiogram which was noncontributory.  He has not yet had Holter monitor.    Hypertension does remain well controlled on current medications.  Denies side effects.    Patient is also due for lab work.  He previously has had hyperlipidemia, anemia and vitamin D deficiency.    Allergies: Patient has no known allergies.    Current Outpatient Medications Ordered in Epic   Medication Sig Dispense Refill   • Continuous Blood Gluc Sensor (FREESTYLE CARLOS 14 DAY SENSOR) Misc Inject 1 Units under the skin every 14 days. 6 Each 3   • traZODone (DESYREL) 100 MG Tab Take 1 Tab by mouth at bedtime as needed for Sleep. 90 Tab 1   • atorvastatin (LIPITOR) 40 MG Tab Take 1 Tab by " "mouth every day. 100 Tab 3   • acyclovir (ZOVIRAX) 200 MG Cap Take 1 Cap by mouth every day. 90 Cap 1   • insulin regular (HUMULIN R) 100 Unit/mL Solution Inject 5 Units as instructed 3 times a day before meals.     • insulin NPH (HUMULIN/NOVOLIN) 100 UNIT/ML Suspension Inject 20 Units as instructed every morning.     • GLUCAGEN HYPOKIT 1 MG Recon Soln INJECT 1 MG BY INTRAVENOUS ROUTE ONCE FOR 1 DOSE     • glucose 4 GM chewable tablet Take 4 Tabs by mouth as needed for Low Blood Sugar. 30 Tab 1   • Lancets Lancets order: Lancets for meter covered by insurance. Sig: use QID and prn ssx high or low sugar. 400 Each 1   • losartan (COZAAR) 50 MG Tab Take 1 Tab by mouth every day. 100 Tab 3   • clopidogrel (PLAVIX) 75 MG Tab Take 1 Tab by mouth every day. 90 Tab 3   • metoprolol SR (TOPROL XL) 25 MG TABLET SR 24 HR TAKE ONE TABLET BY MOUTH ONE TIME DAILY 90 Tab 3   • OYSTER SHELL CALCIUM PO Take 1 Tab by mouth every day.     • ALFALFA PO Take 1 Tab by mouth 3 times a day.       No current [facility]-ordered facility-administered medications on file.        Past Medical History:   Diagnosis Date   • Anesthesia     hx PONV   • Arthritis     L knee; hands   • Bronchitis 2016   • Cataract     nicole iol   • Cold 4/5/17    prod cough   • Coronary artery disease due to lipid rich plaque - mild to moderate on Cath 2019 Left Dominant    • Dental disorder     \"bad shape\"   • Diabetic neuropathy (HCC) 12/31/2014    retinopathy   • Diabetic retinopathy of both eyes (HCC) 12/9/2016   • Dyslipidemia    • Heart murmur    • Hemorrhagic disorder ([facility])     nose bleeds   • High cholesterol    • History of cardiac catheterization 2019 in settin of  abnormal stress - mild to moderate CAD    • History of shingles 12/31/2014   • Hyperlipidemia    • Hypertension    • Kidney stones    • Pain     left leg/foot   • Psychiatric problem     situational depression   • Type II or unspecified type diabetes mellitus without mention of complication, " uncontrolled 12/31/2014       Past Surgical History:   Procedure Laterality Date   • IRRIGATION & DEBRIDEMENT GENERAL Left 5/3/2017    Procedure: IRRIGATION & DEBRIDEMENT GENERAL and Left Fourth Toe Amputation ;  Surgeon: Inessa Richard M.D.;  Location: SURGERY [facility];  Service:    • APPENDECTOMY     • CATARACT EXTRACTION WITH IOL Bilateral    • EYE SURGERY     • HAND SURGERY  1990's   • KNEE ARTHROSCOPY      x3   • TONSILLECTOMY         Social History     Tobacco Use   • Smoking status: Former Smoker     Packs/day: 0.00   • Smokeless tobacco: Former User     Types: Chew   • Tobacco comment: tobacco cessation recommended   Substance Use Topics   • Alcohol use: Yes     Alcohol/week: 0.0 oz     Frequency: Monthly or less     Drinks per session: 1 or 2     Comment: rare beer   • Drug use: No       Social History     Social History Narrative   • Not on file       Family History   Problem Relation Age of Onset   • Thyroid Mother    • Lung Disease Mother         COPD   • Cancer Mother         eyebrow    • Thyroid Sister    • Other Sister         mental health issues    • Thyroid Brother    • Lung Disease Brother         COPD   • Hypertension Brother    • Hyperlipidemia Brother    • Lung Disease Father         COPD   • Cancer Father    • Diabetes Maternal Uncle    • Cancer Paternal Aunt         type unknown   • Diabetes Maternal Grandfather    • Diabetes Paternal Grandmother    • Cancer Paternal Grandfather         prostate   • Stroke Neg Hx        ROS:     - Constitutional: Negative for fever, chills, unexpected weight change, and fatigue/generalized weakness.     - HEENT: Negative for headaches, vision changes, hearing changes, ear pain, ear discharge, rhinorrhea, sinus congestion, sore throat, and neck pain.      - Respiratory: Negative for cough, sputum production, chest congestion, dyspnea, wheezing, and crackles.      - Cardiovascular: Negative for chest pain, palpitations, orthopnea, PND, and bilateral  "lower extremity edema.     - Gastrointestinal: Negative for heartburn, nausea, vomiting, abdominal pain, hematochezia, melena, diarrhea, constipation, and greasy/foul-smelling stools.     Exam: /66 (BP Location: Left arm, Patient Position: Sitting, BP Cuff Size: Adult)   Pulse 75   Temp 36.9 °C (98.4 °F) (Temporal)   Ht 1.727 m (5' 8\")   Wt 70.8 kg (156 lb)   SpO2 97%  Body mass index is 23.72 kg/m².    General: Well appearing, NAD  Pulmonary: Clear to ausculation.  Normal effort. No rales, ronchi, or wheezing.  Cardiovascular: Regular rate and rhythm without murmur, rubs or gallop.  Good  Skin: Warm and dry.  No obvious lesions.  Musculoskeletal:  No extremity cyanosis, clubbing, or edema.  Psych: Normal mood and affect. Alert and oriented.  Not on what judgment and insight is normal.    Please note that this dictation was created using voice recognition software. I have made every reasonable attempt to correct obvious errors, but I expect that there are errors of grammar and possibly content that I did not discover before finalizing the note.      Assessment/Plan  Dylon was seen today for diabetes follow-up, syncope and wound check.    Diagnoses and all orders for this visit:    Need for vaccination  -     Shingles Vaccine (Shingrix)  -     Influenza Vaccine Quad Injection (PF)    Syncope, unspecified syncope type  As per HPI, patient has had fairly significant work-up in the past.  I do suspect that this is most likely a hypoglycemic episode, but patient has not yet done a Holter monitor to rule out arrhythmia which I did order today.  -     Cardiac Event Monitor; Future    Type 1 diabetes mellitus with stage 3 chronic kidney disease (HCC)  Chronic issue, historically uncontrolled.  Will order labs as below.  I have recommended to patient that he get back in with endocrinology ASAP for follow-up.  -     Continuous Blood Gluc Sensor (FREESTYLE CARLOS 14 DAY SENSOR) Misc; Inject 1 Units under the skin " every 14 days.  -     Comp Metabolic Panel; Future  -     MICROALBUMIN CREAT RATIO URINE; Future  -     Lipid Profile; Future  -     HEMOGLOBIN A1C; Future    Hypertension associated with type 1 diabetes mellitus (HCC)  Chronic, well controlled on current medications.    Dyslipidemia  Chronic, on statin.  Due for lipid profile.  -     Lipid Profile; Future    Anemia, unspecified type  Chronic issue, I do not see that any further anemia work-up has been done.  If he continues to be anemic, will recommend FIT testing.  -     CBC WITH DIFFERENTIAL; Future  -     IRON/TOTAL IRON BIND; Future  -     FERRITIN; Future  -     VITAMIN B12; Future    Vitamin D deficiency  Chronic issue, on supplementation.  Due for lab work.  -     VITAMIN D,25 HYDROXY; Future    Other orders  -     traZODone (DESYREL) 100 MG Tab; Take 1 Tab by mouth at bedtime as needed for Sleep.    Follow up in three months or sooner if needed.      Nesha Peralta DO  [facility]

## 2020-12-03 ENCOUNTER — PATIENT OUTREACH (OUTPATIENT)
Dept: HEALTH INFORMATION MANAGEMENT | Facility: OTHER | Age: 60
End: 2020-12-03

## 2020-12-03 NOTE — PROGRESS NOTES
Member was transferred by customer service and HIPAA verified. I confirmed that Dr. Peralta does not have appointments available on Thursdays. Scheduled a follow up appointment with pcp. Used [employer].

## 2020-12-07 ENCOUNTER — OFFICE VISIT (OUTPATIENT)
Dept: MEDICAL GROUP | Facility: PHYSICIAN GROUP | Age: 60
End: 2020-12-07
Payer: MEDICARE

## 2020-12-07 ENCOUNTER — HOSPITAL ENCOUNTER (OUTPATIENT)
Dept: LAB | Facility: MEDICAL CENTER | Age: 60
End: 2020-12-07
Attending: FAMILY MEDICINE
Payer: MEDICARE

## 2020-12-07 VITALS
OXYGEN SATURATION: 97 % | SYSTOLIC BLOOD PRESSURE: 126 MMHG | HEART RATE: 81 BPM | BODY MASS INDEX: 24.25 KG/M2 | TEMPERATURE: 98.3 F | WEIGHT: 160 LBS | HEIGHT: 68 IN | DIASTOLIC BLOOD PRESSURE: 54 MMHG

## 2020-12-07 DIAGNOSIS — D64.9 ANEMIA, UNSPECIFIED TYPE: ICD-10-CM

## 2020-12-07 DIAGNOSIS — N18.30 TYPE 1 DIABETES MELLITUS WITH STAGE 3 CHRONIC KIDNEY DISEASE, UNSPECIFIED WHETHER STAGE 3A OR 3B CKD (HCC): ICD-10-CM

## 2020-12-07 DIAGNOSIS — E78.5 DYSLIPIDEMIA: Chronic | ICD-10-CM

## 2020-12-07 DIAGNOSIS — E10.42 DIABETIC POLYNEUROPATHY ASSOCIATED WITH TYPE 1 DIABETES MELLITUS (HCC): ICD-10-CM

## 2020-12-07 DIAGNOSIS — R32 URINARY INCONTINENCE, UNSPECIFIED TYPE: ICD-10-CM

## 2020-12-07 DIAGNOSIS — E55.9 VITAMIN D DEFICIENCY: ICD-10-CM

## 2020-12-07 DIAGNOSIS — E10.22 TYPE 1 DIABETES MELLITUS WITH STAGE 3 CHRONIC KIDNEY DISEASE, UNSPECIFIED WHETHER STAGE 3A OR 3B CKD (HCC): ICD-10-CM

## 2020-12-07 DIAGNOSIS — Z89.429 TOE AMPUTEE (HCC): ICD-10-CM

## 2020-12-07 LAB
25(OH)D3 SERPL-MCNC: 50 NG/ML (ref 30–100)
ALBUMIN SERPL BCP-MCNC: 4.4 G/DL (ref 3.2–4.9)
ALBUMIN/GLOB SERPL: 1.3 G/DL
ALP SERPL-CCNC: 77 U/L (ref 30–99)
ALT SERPL-CCNC: 23 U/L (ref 2–50)
ANION GAP SERPL CALC-SCNC: 6 MMOL/L (ref 7–16)
AST SERPL-CCNC: 23 U/L (ref 12–45)
BASOPHILS # BLD AUTO: 0.8 % (ref 0–1.8)
BASOPHILS # BLD: 0.06 K/UL (ref 0–0.12)
BILIRUB SERPL-MCNC: 0.2 MG/DL (ref 0.1–1.5)
BUN SERPL-MCNC: 22 MG/DL (ref 8–22)
CALCIUM SERPL-MCNC: 10.2 MG/DL (ref 8.5–10.5)
CHLORIDE SERPL-SCNC: 99 MMOL/L (ref 96–112)
CHOLEST SERPL-MCNC: 138 MG/DL (ref 100–199)
CO2 SERPL-SCNC: 30 MMOL/L (ref 20–33)
CREAT SERPL-MCNC: 1.42 MG/DL (ref 0.5–1.4)
CREAT UR-MCNC: 142.25 MG/DL
EOSINOPHIL # BLD AUTO: 0.15 K/UL (ref 0–0.51)
EOSINOPHIL NFR BLD: 2.1 % (ref 0–6.9)
ERYTHROCYTE [DISTWIDTH] IN BLOOD BY AUTOMATED COUNT: 44.4 FL (ref 35.9–50)
EST. AVERAGE GLUCOSE BLD GHB EST-MCNC: 232 MG/DL
FERRITIN SERPL-MCNC: 56.7 NG/ML (ref 22–322)
GLOBULIN SER CALC-MCNC: 3.4 G/DL (ref 1.9–3.5)
GLUCOSE SERPL-MCNC: 204 MG/DL (ref 65–99)
HBA1C MFR BLD: 9.7 % (ref 0–5.6)
HCT VFR BLD AUTO: 41.7 % (ref 42–52)
HDLC SERPL-MCNC: 55 MG/DL
HGB BLD-MCNC: 13.3 G/DL (ref 14–18)
IMM GRANULOCYTES # BLD AUTO: 0.02 K/UL (ref 0–0.11)
IMM GRANULOCYTES NFR BLD AUTO: 0.3 % (ref 0–0.9)
IRON SATN MFR SERPL: 25 % (ref 15–55)
IRON SERPL-MCNC: 74 UG/DL (ref 50–180)
LDLC SERPL CALC-MCNC: 63 MG/DL
LYMPHOCYTES # BLD AUTO: 1.3 K/UL (ref 1–4.8)
LYMPHOCYTES NFR BLD: 18.4 % (ref 22–41)
MCH RBC QN AUTO: 28.6 PG (ref 27–33)
MCHC RBC AUTO-ENTMCNC: 31.9 G/DL (ref 33.7–35.3)
MCV RBC AUTO: 89.7 FL (ref 81.4–97.8)
MICROALBUMIN UR-MCNC: 10.4 MG/DL
MICROALBUMIN/CREAT UR: 73 MG/G (ref 0–30)
MONOCYTES # BLD AUTO: 0.66 K/UL (ref 0–0.85)
MONOCYTES NFR BLD AUTO: 9.3 % (ref 0–13.4)
NEUTROPHILS # BLD AUTO: 4.87 K/UL (ref 1.82–7.42)
NEUTROPHILS NFR BLD: 69.1 % (ref 44–72)
NRBC # BLD AUTO: 0 K/UL
NRBC BLD-RTO: 0 /100 WBC
PLATELET # BLD AUTO: 269 K/UL (ref 164–446)
PMV BLD AUTO: 10.3 FL (ref 9–12.9)
POTASSIUM SERPL-SCNC: 5 MMOL/L (ref 3.6–5.5)
PROT SERPL-MCNC: 7.8 G/DL (ref 6–8.2)
RBC # BLD AUTO: 4.65 M/UL (ref 4.7–6.1)
SODIUM SERPL-SCNC: 135 MMOL/L (ref 135–145)
TIBC SERPL-MCNC: 293 UG/DL (ref 250–450)
TRIGL SERPL-MCNC: 98 MG/DL (ref 0–149)
UIBC SERPL-MCNC: 219 UG/DL (ref 110–370)
VIT B12 SERPL-MCNC: 1090 PG/ML (ref 211–911)
WBC # BLD AUTO: 7.1 K/UL (ref 4.8–10.8)

## 2020-12-07 PROCEDURE — 83550 IRON BINDING TEST: CPT

## 2020-12-07 PROCEDURE — 85025 COMPLETE CBC W/AUTO DIFF WBC: CPT

## 2020-12-07 PROCEDURE — 82306 VITAMIN D 25 HYDROXY: CPT

## 2020-12-07 PROCEDURE — 83036 HEMOGLOBIN GLYCOSYLATED A1C: CPT

## 2020-12-07 PROCEDURE — 82728 ASSAY OF FERRITIN: CPT

## 2020-12-07 PROCEDURE — 99214 OFFICE O/P EST MOD 30 MIN: CPT | Performed by: FAMILY MEDICINE

## 2020-12-07 PROCEDURE — 80061 LIPID PANEL: CPT

## 2020-12-07 PROCEDURE — 82043 UR ALBUMIN QUANTITATIVE: CPT

## 2020-12-07 PROCEDURE — 83540 ASSAY OF IRON: CPT

## 2020-12-07 PROCEDURE — 82607 VITAMIN B-12: CPT

## 2020-12-07 PROCEDURE — 82570 ASSAY OF URINE CREATININE: CPT

## 2020-12-07 PROCEDURE — 80053 COMPREHEN METABOLIC PANEL: CPT

## 2020-12-07 PROCEDURE — 36415 COLL VENOUS BLD VENIPUNCTURE: CPT

## 2020-12-07 ASSESSMENT — FIBROSIS 4 INDEX: FIB4 SCORE: 1.61

## 2020-12-07 NOTE — PROGRESS NOTES
CC: Diabetes    HISTORY OF THE PRESENT ILLNESS: Patient is a 60 y.o. male. This pleasant patient is here today for follow-up.    Patient is here today as he wants to follow-up on his diabetes.  He is a known type I diabetic, chronically uncontrolled with complications.  [facility], but has not seen them since June 2020.  Patient is extremely evasive in terms of answering questions regarding his diabetes but he does endorse that his sugars are continually dropping very low.  He notes that his freestyle carlos is currently broken and he is supposed to receive a new one so he cannot tell me exactly what sugars have been running though he estimates they are ranging from 20s to 400s.  He is currently taking his insulin NPH and mixing it with his Humulin R in the morning.  Again, he is evasive when telling me how much insulin he is actually using of each of those.  He does not use his Humulin R with meals currently.  He endorses frequently waking up in the middle the night with extremely low sugars, feeling weak and drenching the bed with sweat.  I did order lab work at his last visit, but he never did it.  He reports trouble doing fasting lab work as his sugars drop too low.    He is requesting referral to a podiatrist.  He has 1/5 toe amputation on his left foot.  He reports significant trouble with neuropathy type symptoms, and trouble clipping his toenails on his own.    He is also complaining of urinary incontinence.  He was actually referred to urology for this last summer by previous PCP.  He has not yet gone.    Allergies: Patient has no known allergies.    Current Outpatient Medications Ordered in Epic   Medication Sig Dispense Refill   • Continuous Blood Gluc Sensor (FREESTYLE CARLOS 14 DAY SENSOR) Misc Inject 1 Units under the skin every 14 days. 6 Each 3   • traZODone (DESYREL) 100 MG Tab Take 1 Tab by mouth at bedtime as needed for Sleep. 90 Tab 1   • atorvastatin  "(LIPITOR) 40 MG Tab Take 1 Tab by mouth every day. 100 Tab 3   • acyclovir (ZOVIRAX) 200 MG Cap Take 1 Cap by mouth every day. 90 Cap 1   • insulin regular (HUMULIN R) 100 Unit/mL Solution Inject 5 Units as instructed 3 times a day before meals.     • insulin NPH (HUMULIN/NOVOLIN) 100 UNIT/ML Suspension Inject 20 Units as instructed every morning.     • GLUCAGEN HYPOKIT 1 MG Recon Soln INJECT 1 MG BY INTRAVENOUS ROUTE ONCE FOR 1 DOSE     • glucose 4 GM chewable tablet Take 4 Tabs by mouth as needed for Low Blood Sugar. 30 Tab 1   • Lancets Lancets order: Lancets for meter covered by insurance. Sig: use QID and prn ssx high or low sugar. 400 Each 1   • losartan (COZAAR) 50 MG Tab Take 1 Tab by mouth every day. 100 Tab 3   • clopidogrel (PLAVIX) 75 MG Tab Take 1 Tab by mouth every day. 90 Tab 3   • metoprolol SR (TOPROL XL) 25 MG TABLET SR 24 HR TAKE ONE TABLET BY MOUTH ONE TIME DAILY 90 Tab 3   • OYSTER SHELL CALCIUM PO Take 1 Tab by mouth every day.     • ALFALFA PO Take 1 Tab by mouth 3 times a day.       No current [facility]-ordered facility-administered medications on file.        Past Medical History:   Diagnosis Date   • Anesthesia     hx PONV   • Arthritis     L knee; hands   • Bronchitis 2016   • Cataract     nicole iol   • Cold 4/5/17    prod cough   • Coronary artery disease due to lipid rich plaque - mild to moderate on Cath 2019 Left Dominant    • Dental disorder     \"bad shape\"   • Diabetic neuropathy ([facility]) 12/31/2014    retinopathy   • Diabetic retinopathy of both eyes ([facility]) 12/9/2016   • Dyslipidemia    • Heart murmur    • Hemorrhagic disorder ([facility])     nose bleeds   • High cholesterol    • History of cardiac catheterization 2019 in settin [facility] abnormal stress - mild to moderate CAD    • History of shingles 12/31/2014   • Hyperlipidemia    • Hypertension    • Kidney stones    • Pain     left leg/foot   • Psychiatric problem     situational depression   • Type II or unspecified type diabetes mellitus without " mention of complication, uncontrolled 12/31/2014       Past Surgical History:   Procedure Laterality Date   • IRRIGATION & DEBRIDEMENT GENERAL Left 5/3/2017    Procedure: IRRIGATION & DEBRIDEMENT GENERAL and Left Fourth Toe Amputation ;  Surgeon: Inessa Richard M.D.;  Location: SURGERY [facility];  Service:    • APPENDECTOMY     • CATARACT EXTRACTION WITH IOL Bilateral    • EYE SURGERY     • HAND SURGERY  1990's   • KNEE ARTHROSCOPY      x3   • TONSILLECTOMY         Social History     Tobacco Use   • Smoking status: Former Smoker     Packs/day: 0.00   • Smokeless tobacco: Former User     Types: Chew   • Tobacco comment: tobacco cessation recommended   Substance Use Topics   • Alcohol use: Yes     Alcohol/week: 0.0 oz     Frequency: Monthly or less     Drinks per session: 1 or 2     Comment: rare beer   • Drug use: No       Social History     Social History Narrative   • Not on file       Family History   Problem Relation Age of Onset   • Thyroid Mother    • Lung Disease Mother         COPD   • Cancer Mother         eyebrow    • Thyroid Sister    • Other Sister         mental health issues    • Thyroid Brother    • Lung Disease Brother         COPD   • Hypertension Brother    • Hyperlipidemia Brother    • Lung Disease Father         COPD   • Cancer Father    • Diabetes Maternal Uncle    • Cancer Paternal Aunt         type unknown   • Diabetes Maternal Grandfather    • Diabetes Paternal Grandmother    • Cancer Paternal Grandfather         prostate   • Stroke Neg Hx        ROS:     - Constitutional: Negative for fever, chills, unexpected weight change, and fatigue/generalized weakness.     - HEENT: Negative for headaches, vision changes, hearing changes, ear pain, ear discharge, rhinorrhea, sinus congestion, sore throat, and neck pain.      - Respiratory: Negative for cough, sputum production, chest congestion, dyspnea, wheezing, and crackles.      Exam: /54 (BP Location: Right arm, Patient Position:  "Sitting, BP Cuff Size: Adult)   Pulse 81   Temp 36.8 °C (98.3 °F) (Temporal)   Ht 1.727 m (5' 8\")   Wt 72.6 kg (160 lb)   SpO2 97%  Body mass index is 24.33 kg/m².    General: Well appearing, NAD  Pulmonary: Clear to ausculation.  Normal effort. No rales, ronchi, or wheezing.  Cardiovascular: Regular rate and rhythm without murmur, rubs or gallop.   Skin: Warm and dry.  No obvious lesions.  Musculoskeletal:  No extremity cyanosis, clubbing, or edema.  Psych: Normal mood and affect. Alert and oriented. Judgment and insight is normal.    Please note that this dictation was created using voice recognition software. I have made every reasonable attempt to correct obvious errors, but I expect that there are errors of grammar and possibly content that I did not discover before finalizing the note.      Assessment/Plan  Dylon was seen today for diabetes follow-up, referral needed and enuresis.    Diagnoses and all orders for this visit:    Type 1 diabetes mellitus with stage 3 chronic kidney disease, unspecified whether stage 3a or 3b CKD (HCC)  Chronic issue, uncontrolled.  Patient is not checking sugars.  It does not sound like he is using his insulin correctly either.  He is also experiencing what sounds like very significant hypoglycemic episodes.  He does not bring in any sugar log for me to be able to alter his insulin regimen (and in fact he is fairly evasive about what he actually does with his insulin) prior to getting back in with endocrinology.  I have strongly encouraged him to get back in with endocrinology.  Given that he has type 1 diabetes, this is extremely important.  He was given the number to get scheduled again.  I have also referred him to podiatry as below.  Given that he has not completed his lab work as fasting, I will go ahead and order all of his lab work is nonfasting.  I did let him know that prior to you seeing endocrinology, he will need to be checking his sugars on a regular basis as " directed and bring his sugar logs to the endocrinologist with him.  If he is unable to get in with endocrinology soon, I advised him to see me in the near future but with his sugar logs.  -     REFERRAL TO PODIATRY  -     HEMOGLOBIN A1C; Future  -     Lipid Profile; Future  -     MICROALBUMIN CREAT RATIO URINE; Future  -     Comp Metabolic Panel; Future    Toe amputee (HCC)  Diabetic polyneuropathy associated with type 1 diabetes mellitus (HCC)  Ongoing issues.  Patient requesting referral to podiatry.  -     REFERRAL TO PODIATRY    Dyslipidemia  Chronic issue, on atorvastatin.  Due for lipid profile.  I did go ahead and order this is nonfasting as above, so triglycerides may be elevated.  -     Lipid Profile; Future    Anemia, unspecified type  Noted on previous lab work, will check more detailed lab work at this time.  -     VITAMIN B12; Future  -     CBC WITH DIFFERENTIAL; Future  -     FERRITIN; Future  -     IRON/TOTAL IRON BIND; Future    Vitamin D deficiency  Chronic issue, recheck at this time.  -     VITAMIN D,25 HYDROXY; Future    Urinary incontinence, unspecified type  Ongoing issue for the patient.  He was given the number to get scheduled with urology, as referral had previously been placed.    Follow-up in about 3 months or sooner if needed.    Nesha Peralta, DO  [facility]

## 2020-12-08 ENCOUNTER — TELEPHONE (OUTPATIENT)
Dept: MEDICAL GROUP | Facility: PHYSICIAN GROUP | Age: 60
End: 2020-12-08

## 2020-12-08 NOTE — TELEPHONE ENCOUNTER
----- Message from Nesha Peralta D.O. sent at 12/8/2020 12:50 PM PST -----  Please call patient let him know the labs were most significant for uncontrolled diabetes.  His hemoglobin A1c was 9.7.  This is increased from 8.0 six months ago.  As discussed during his appointment, I do highly recommend that he get scheduled with endocrinology and it does not look like he has done so yet.

## 2021-01-05 ENCOUNTER — TELEPHONE (OUTPATIENT)
Dept: MEDICAL GROUP | Facility: PHYSICIAN GROUP | Age: 61
End: 2021-01-05

## 2021-01-05 DIAGNOSIS — R41.3 MEMORY LOSS: ICD-10-CM

## 2021-01-05 DIAGNOSIS — N18.30 TYPE 1 DIABETES MELLITUS WITH STAGE 3 CHRONIC KIDNEY DISEASE, UNSPECIFIED WHETHER STAGE 3A OR 3B CKD (HCC): ICD-10-CM

## 2021-01-05 DIAGNOSIS — E10.22 TYPE 1 DIABETES MELLITUS WITH STAGE 3 CHRONIC KIDNEY DISEASE, UNSPECIFIED WHETHER STAGE 3A OR 3B CKD (HCC): ICD-10-CM

## 2021-01-06 ENCOUNTER — HOME HEALTH ADMISSION (OUTPATIENT)
Dept: HOME HEALTH SERVICES | Facility: HOME HEALTHCARE | Age: 61
End: 2021-01-06
Payer: MEDICARE

## 2021-01-06 NOTE — TELEPHONE ENCOUNTER
Brother Pedro Pablo states that he needs home health to check on him and check his blood sugar and vitals, he also has problems with his memory. Unfortunately Pedro Pablo has to go take care of their mom periodically for a month at a time and needs someone to check up on him and make sure he is stable as his blood sugar is all over the place and he is passing out occasionally from low blood sugar.

## 2021-01-06 NOTE — TELEPHONE ENCOUNTER
Pedro Pablo notified via phone. Dylon's phone numbers updated, and Luis Carlos will drop off power of  paperwork this afternoon to be scanned into patient chart.

## 2021-01-07 ENCOUNTER — HOME CARE VISIT (OUTPATIENT)
Dept: HOME HEALTH SERVICES | Facility: HOME HEALTHCARE | Age: 61
End: 2021-01-07
Payer: MEDICARE

## 2021-01-07 VITALS
HEART RATE: 74 BPM | OXYGEN SATURATION: 97 % | WEIGHT: 161.13 LBS | TEMPERATURE: 97.4 F | RESPIRATION RATE: 18 BRPM | SYSTOLIC BLOOD PRESSURE: 120 MMHG | BODY MASS INDEX: 23.87 KG/M2 | HEIGHT: 69 IN | DIASTOLIC BLOOD PRESSURE: 74 MMHG

## 2021-01-07 PROCEDURE — G0493 RN CARE EA 15 MIN HH/HOSPICE: HCPCS

## 2021-01-07 PROCEDURE — 665001 SOC-HOME HEALTH

## 2021-01-07 SDOH — ECONOMIC STABILITY: HOUSING INSECURITY: HOME SAFETY: RECOMMENDED GETTING GRAB BARS FOR THE BATHROOM.

## 2021-01-07 ASSESSMENT — ENCOUNTER SYMPTOMS
DIFFICULTY THINKING: 1
POOR JUDGMENT: 1

## 2021-01-07 ASSESSMENT — PATIENT HEALTH QUESTIONNAIRE - PHQ9
1. LITTLE INTEREST OR PLEASURE IN DOING THINGS: 01
5. POOR APPETITE OR OVEREATING: 0 - NOT AT ALL
SUM OF ALL RESPONSES TO PHQ QUESTIONS 1-9: 7
CLINICAL INTERPRETATION OF PHQ2 SCORE: 2
2. FEELING DOWN, DEPRESSED, IRRITABLE, OR HOPELESS: 01

## 2021-01-07 ASSESSMENT — FIBROSIS 4 INDEX: FIB4 SCORE: 1.05

## 2021-01-08 ENCOUNTER — TELEPHONE (OUTPATIENT)
Dept: MEDICAL GROUP | Facility: PHYSICIAN GROUP | Age: 61
End: 2021-01-08

## 2021-01-08 DIAGNOSIS — Z86.19 HISTORY OF SHINGLES: ICD-10-CM

## 2021-01-08 DIAGNOSIS — I10 ESSENTIAL HYPERTENSION: ICD-10-CM

## 2021-01-08 NOTE — TELEPHONE ENCOUNTER
Josie Sandoval (ph [phone]), [facility] home health Nurse called yesterday. She states that Dylon's blood sugar was 386, and is concerned he is not taking his insulin correctly.

## 2021-01-08 NOTE — TELEPHONE ENCOUNTER
I did, Luis Carlos will be at the next appointment and will try to come to all of his appointments.

## 2021-01-11 ENCOUNTER — HOME CARE VISIT (OUTPATIENT)
Dept: HOME HEALTH SERVICES | Facility: HOME HEALTHCARE | Age: 61
End: 2021-01-11
Payer: MEDICARE

## 2021-01-11 ENCOUNTER — ANTICOAGULATION MONITORING (OUTPATIENT)
Dept: MEDICAL GROUP | Facility: PHYSICIAN GROUP | Age: 61
End: 2021-01-11

## 2021-01-11 VITALS
OXYGEN SATURATION: 100 % | DIASTOLIC BLOOD PRESSURE: 62 MMHG | RESPIRATION RATE: 18 BRPM | TEMPERATURE: 98.6 F | HEART RATE: 68 BPM | SYSTOLIC BLOOD PRESSURE: 128 MMHG

## 2021-01-11 PROCEDURE — G0495 RN CARE TRAIN/EDU IN HH: HCPCS

## 2021-01-11 ASSESSMENT — ENCOUNTER SYMPTOMS: DIFFICULTY THINKING: 1

## 2021-01-11 NOTE — PROGRESS NOTES
Medication chart review for [facility]    PCP:  Nesha Peralta D.O.  [address]  [place] [address]  Fax: [phone]    Current medication list     Current Outpatient Medications:   •  FreeStyle Marcial 14 Day Sensor, 1 Units, Subcutaneous, Q14 DAYS  •  traZODone, 100 mg, Oral, HS PRN  •  atorvastatin, 40 mg, Oral, DAILY  •  acyclovir, 200 mg, Oral, DAILY  •  insulin regular, 5 Units, Subcutaneous, TID AC  •  insulin NPH, 20 Units, Subcutaneous, QAM  •  GlucaGen HypoKit, INJECT 1 MG BY INTRAVENOUS ROUTE ONCE FOR 1 DOSE  •  glucose, 16 g, Oral, PRN  •  losartan, 50 mg, Oral, DAILY  •  clopidogrel, 75 mg, Oral, DAILY  •  metoprolol SR, TAKE ONE TABLET BY MOUTH ONE TIME DAILY  •  OYSTER SHELL CALCIUM PO, 1 Tab, Oral, DAILY  •  ALFALFA PO, 1 Tab, Oral, TID    No Known Allergies    Medications on discharge summary that are not on their home medication list (or the dosing interval differs from the discharge summary)     No recent hospital discharge summary.    Medications on home medication list not listed on their discharge summary     Not applicable    Labs / Images Reviewed:     Lab Results   Component Value Date/Time    SODIUM 135 12/07/2020 10:55 AM    POTASSIUM 5.0 12/07/2020 10:55 AM    CHLORIDE 99 12/07/2020 10:55 AM    CO2 30 12/07/2020 10:55 AM    GLUCOSE 204 (H) 12/07/2020 10:55 AM    BUN 22 12/07/2020 10:55 AM    CREATININE 1.42 (H) 12/07/2020 10:55 AM      Lab Results   Component Value Date/Time    ALKPHOSPHAT 77 12/07/2020 10:55 AM    ASTSGOT 23 12/07/2020 10:55 AM    ALTSGPT 23 12/07/2020 10:55 AM    TBILIRUBIN 0.2 12/07/2020 10:55 AM    ALBUMIN 4.4 12/07/2020 10:55 AM    INR 1.04 10/22/2019 02:23 AM          Potential drug interactions:         Assessment and Plan:   • Received referral from [facility]. Medications reviewed. No clinically significant interactions noted.             Alec Wilks, PharmD, MS, BCACP, LCC  [facility]  Phone [phone] fax [phone]    This note was created using voice recognition software (Dragon). The accuracy of the dictation is limited by the abilities of the software. I have reviewed the note prior to signing, however some errors in grammar and context are still possible. If you have any questions related to this note please do not hesitate to contact our office.

## 2021-01-12 ENCOUNTER — HOME CARE VISIT (OUTPATIENT)
Dept: HOME HEALTH SERVICES | Facility: HOME HEALTHCARE | Age: 61
End: 2021-01-12
Payer: MEDICARE

## 2021-01-12 RX ORDER — METOPROLOL SUCCINATE 25 MG/1
TABLET, EXTENDED RELEASE ORAL
Qty: 90 TAB | Refills: 1 | Status: SHIPPED | OUTPATIENT
Start: 2021-01-12 | End: 2021-07-08

## 2021-01-12 RX ORDER — ACYCLOVIR 200 MG/1
CAPSULE ORAL
Qty: 90 CAP | Refills: 1 | Status: SHIPPED | OUTPATIENT
Start: 2021-01-12 | End: 2021-11-19

## 2021-01-12 NOTE — TELEPHONE ENCOUNTER
Refill X 6 months, sent to pharmacy.Pt. Seen in the last 6 months per protocol.   Lab Results   Component Value Date/Time    SODIUM 135 12/07/2020 10:55 AM    POTASSIUM 5.0 12/07/2020 10:55 AM    CHLORIDE 99 12/07/2020 10:55 AM    CO2 30 12/07/2020 10:55 AM    GLUCOSE 204 (H) 12/07/2020 10:55 AM    BUN 22 12/07/2020 10:55 AM    CREATININE 1.42 (H) 12/07/2020 10:55 AM

## 2021-01-13 ENCOUNTER — HOME CARE VISIT (OUTPATIENT)
Dept: HOME HEALTH SERVICES | Facility: HOME HEALTHCARE | Age: 61
End: 2021-01-13
Payer: MEDICARE

## 2021-01-13 VITALS
TEMPERATURE: 98.4 F | HEART RATE: 74 BPM | DIASTOLIC BLOOD PRESSURE: 68 MMHG | OXYGEN SATURATION: 98 % | RESPIRATION RATE: 18 BRPM | SYSTOLIC BLOOD PRESSURE: 140 MMHG

## 2021-01-13 PROCEDURE — G0299 HHS/HOSPICE OF RN EA 15 MIN: HCPCS

## 2021-01-14 ENCOUNTER — HOME CARE VISIT (OUTPATIENT)
Dept: HOME HEALTH SERVICES | Facility: HOME HEALTHCARE | Age: 61
End: 2021-01-14
Payer: MEDICARE

## 2021-01-14 PROCEDURE — G0155 HHCP-SVS OF CSW,EA 15 MIN: HCPCS

## 2021-01-14 PROCEDURE — G0153 HHCP-SVS OF S/L PATH,EA 15MN: HCPCS

## 2021-01-14 ASSESSMENT — ENCOUNTER SYMPTOMS
DEPRESSED MOOD: 1
NAUSEA: DENIES
VOMITING: DENIES

## 2021-01-14 ASSESSMENT — ACTIVITIES OF DAILY LIVING (ADL): OASIS_M1830: 05

## 2021-01-15 ENCOUNTER — HOME CARE VISIT (OUTPATIENT)
Dept: HOME HEALTH SERVICES | Facility: HOME HEALTHCARE | Age: 61
End: 2021-01-15
Payer: MEDICARE

## 2021-01-15 VITALS
OXYGEN SATURATION: 97 % | HEART RATE: 79 BPM | DIASTOLIC BLOOD PRESSURE: 60 MMHG | SYSTOLIC BLOOD PRESSURE: 110 MMHG | RESPIRATION RATE: 16 BRPM | TEMPERATURE: 97.9 F

## 2021-01-15 PROCEDURE — G0299 HHS/HOSPICE OF RN EA 15 MIN: HCPCS

## 2021-01-15 PROCEDURE — G0180 MD CERTIFICATION HHA PATIENT: HCPCS | Performed by: FAMILY MEDICINE

## 2021-01-15 ASSESSMENT — ENCOUNTER SYMPTOMS
VOMITING: DENIES
MUSCLE WEAKNESS: 1
NAUSEA: DENIES

## 2021-01-17 VITALS
OXYGEN SATURATION: 100 % | SYSTOLIC BLOOD PRESSURE: 108 MMHG | TEMPERATURE: 97.9 F | HEART RATE: 98 BPM | DIASTOLIC BLOOD PRESSURE: 66 MMHG | RESPIRATION RATE: 18 BRPM

## 2021-01-17 SDOH — ECONOMIC STABILITY: HOUSING INSECURITY: HOME SAFETY: PT REPORTS NO CHANGES TO MEDICATIONS AND NO FALLS SINCE LAST HH VISIT.

## 2021-01-17 ASSESSMENT — ENCOUNTER SYMPTOMS: POOR JUDGMENT: 1

## 2021-01-18 ENCOUNTER — HOME CARE VISIT (OUTPATIENT)
Dept: HOME HEALTH SERVICES | Facility: HOME HEALTHCARE | Age: 61
End: 2021-01-18
Payer: MEDICARE

## 2021-01-18 PROCEDURE — G0299 HHS/HOSPICE OF RN EA 15 MIN: HCPCS

## 2021-01-19 ENCOUNTER — HOME CARE VISIT (OUTPATIENT)
Dept: HOME HEALTH SERVICES | Facility: HOME HEALTHCARE | Age: 61
End: 2021-01-19
Payer: MEDICARE

## 2021-01-19 VITALS
RESPIRATION RATE: 18 BRPM | HEART RATE: 74 BPM | TEMPERATURE: 98.2 F | DIASTOLIC BLOOD PRESSURE: 62 MMHG | SYSTOLIC BLOOD PRESSURE: 112 MMHG | OXYGEN SATURATION: 94 %

## 2021-01-19 VITALS
RESPIRATION RATE: 18 BRPM | DIASTOLIC BLOOD PRESSURE: 74 MMHG | SYSTOLIC BLOOD PRESSURE: 122 MMHG | OXYGEN SATURATION: 98 % | HEART RATE: 72 BPM | TEMPERATURE: 97.6 F

## 2021-01-19 PROCEDURE — G0153 HHCP-SVS OF S/L PATH,EA 15MN: HCPCS

## 2021-01-19 ASSESSMENT — ENCOUNTER SYMPTOMS
NAUSEA: DENIES
VOMITING: DENIES

## 2021-01-19 NOTE — PROGRESS NOTES
Just ARTHURI:  Pt's blood sugar this morning was 50. This is the first time it was way low.  I instructed him to eat a good snack at night before bedtime. He states he did, but cannot recall what he ate. He is keeping a log, but not always writing on it. He check his blood sugar after a glucose tab and breakfast and his blood sugar was 158.  While I was present, his blood sugar level was 148. He doing well at this time. Thank you, Elke Puri RN, CM

## 2021-01-20 ENCOUNTER — TELEPHONE (OUTPATIENT)
Dept: MEDICAL GROUP | Facility: PHYSICIAN GROUP | Age: 61
End: 2021-01-20

## 2021-01-20 ENCOUNTER — HOME CARE VISIT (OUTPATIENT)
Dept: HOME HEALTH SERVICES | Facility: HOME HEALTHCARE | Age: 61
End: 2021-01-20
Payer: MEDICARE

## 2021-01-20 VITALS
RESPIRATION RATE: 16 BRPM | OXYGEN SATURATION: 97 % | DIASTOLIC BLOOD PRESSURE: 60 MMHG | HEART RATE: 73 BPM | SYSTOLIC BLOOD PRESSURE: 118 MMHG | TEMPERATURE: 97.6 F

## 2021-01-20 PROCEDURE — G0299 HHS/HOSPICE OF RN EA 15 MIN: HCPCS

## 2021-01-20 SDOH — ECONOMIC STABILITY: HOUSING INSECURITY: HOME SAFETY: PT REPORTS NO CHANGES TO MEDICATIONS AND NO FALLS SINCE LAST HH VISIT.

## 2021-01-20 ASSESSMENT — ENCOUNTER SYMPTOMS
VOMITING: DENIES
NAUSEA: DENIES

## 2021-01-20 NOTE — TELEPHONE ENCOUNTER
Received a call from Elke, Pts Home Health RN. Elke states that pts blood sugars have been in the 50's, it is currently 57.  Does medication need to be changed? Please advise. Elke can be reached at [phone]

## 2021-01-21 ENCOUNTER — HOME CARE VISIT (OUTPATIENT)
Dept: HOME HEALTH SERVICES | Facility: HOME HEALTHCARE | Age: 61
End: 2021-01-21
Payer: MEDICARE

## 2021-01-21 ENCOUNTER — HOSPITAL ENCOUNTER (EMERGENCY)
Facility: MEDICAL CENTER | Age: 61
End: 2021-01-21
Attending: EMERGENCY MEDICINE
Payer: MEDICARE

## 2021-01-21 ENCOUNTER — APPOINTMENT (OUTPATIENT)
Dept: RADIOLOGY | Facility: MEDICAL CENTER | Age: 61
End: 2021-01-21
Attending: EMERGENCY MEDICINE
Payer: MEDICARE

## 2021-01-21 VITALS
HEART RATE: 70 BPM | TEMPERATURE: 98.5 F | SYSTOLIC BLOOD PRESSURE: 169 MMHG | HEIGHT: 68 IN | BODY MASS INDEX: 24.4 KG/M2 | RESPIRATION RATE: 22 BRPM | DIASTOLIC BLOOD PRESSURE: 88 MMHG | OXYGEN SATURATION: 98 % | WEIGHT: 161 LBS

## 2021-01-21 DIAGNOSIS — R73.9 HYPERGLYCEMIA: ICD-10-CM

## 2021-01-21 DIAGNOSIS — E86.0 DEHYDRATION: ICD-10-CM

## 2021-01-21 LAB
ALBUMIN SERPL BCP-MCNC: 4.4 G/DL (ref 3.2–4.9)
ALBUMIN/GLOB SERPL: 1.4 G/DL
ALP SERPL-CCNC: 78 U/L (ref 30–99)
ALT SERPL-CCNC: 23 U/L (ref 2–50)
ANION GAP SERPL CALC-SCNC: 7 MMOL/L (ref 7–16)
ANION GAP SERPL CALC-SCNC: 9 MMOL/L (ref 7–16)
AST SERPL-CCNC: 31 U/L (ref 12–45)
B-OH-BUTYR SERPL-MCNC: 0.21 MMOL/L (ref 0.02–0.27)
B-OH-BUTYR SERPL-MCNC: 0.46 MMOL/L (ref 0.02–0.27)
BASOPHILS # BLD AUTO: 0.6 % (ref 0–1.8)
BASOPHILS # BLD: 0.03 K/UL (ref 0–0.12)
BILIRUB SERPL-MCNC: 0.6 MG/DL (ref 0.1–1.5)
BUN SERPL-MCNC: 31 MG/DL (ref 8–22)
BUN SERPL-MCNC: 36 MG/DL (ref 8–22)
CALCIUM SERPL-MCNC: 8.9 MG/DL (ref 8.5–10.5)
CALCIUM SERPL-MCNC: 9.5 MG/DL (ref 8.5–10.5)
CHLORIDE SERPL-SCNC: 101 MMOL/L (ref 96–112)
CHLORIDE SERPL-SCNC: 94 MMOL/L (ref 96–112)
CO2 SERPL-SCNC: 23 MMOL/L (ref 20–33)
CO2 SERPL-SCNC: 25 MMOL/L (ref 20–33)
CREAT SERPL-MCNC: 1.41 MG/DL (ref 0.5–1.4)
CREAT SERPL-MCNC: 1.55 MG/DL (ref 0.5–1.4)
EOSINOPHIL # BLD AUTO: 0.06 K/UL (ref 0–0.51)
EOSINOPHIL NFR BLD: 1.1 % (ref 0–6.9)
ERYTHROCYTE [DISTWIDTH] IN BLOOD BY AUTOMATED COUNT: 44.4 FL (ref 35.9–50)
GLOBULIN SER CALC-MCNC: 3.2 G/DL (ref 1.9–3.5)
GLUCOSE BLD-MCNC: 237 MG/DL (ref 65–99)
GLUCOSE BLD-MCNC: 301 MG/DL (ref 65–99)
GLUCOSE SERPL-MCNC: 313 MG/DL (ref 65–99)
GLUCOSE SERPL-MCNC: 541 MG/DL (ref 65–99)
HCT VFR BLD AUTO: 40.5 % (ref 42–52)
HGB BLD-MCNC: 13.3 G/DL (ref 14–18)
IMM GRANULOCYTES # BLD AUTO: 0.03 K/UL (ref 0–0.11)
IMM GRANULOCYTES NFR BLD AUTO: 0.6 % (ref 0–0.9)
LYMPHOCYTES # BLD AUTO: 1.08 K/UL (ref 1–4.8)
LYMPHOCYTES NFR BLD: 20.3 % (ref 22–41)
MCH RBC QN AUTO: 29.2 PG (ref 27–33)
MCHC RBC AUTO-ENTMCNC: 32.8 G/DL (ref 33.7–35.3)
MCV RBC AUTO: 89 FL (ref 81.4–97.8)
MONOCYTES # BLD AUTO: 0.48 K/UL (ref 0–0.85)
MONOCYTES NFR BLD AUTO: 9 % (ref 0–13.4)
NEUTROPHILS # BLD AUTO: 3.63 K/UL (ref 1.82–7.42)
NEUTROPHILS NFR BLD: 68.4 % (ref 44–72)
NRBC # BLD AUTO: 0 K/UL
NRBC BLD-RTO: 0 /100 WBC
PLATELET # BLD AUTO: 219 K/UL (ref 164–446)
PMV BLD AUTO: 10.6 FL (ref 9–12.9)
POTASSIUM SERPL-SCNC: 4.5 MMOL/L (ref 3.6–5.5)
POTASSIUM SERPL-SCNC: 4.8 MMOL/L (ref 3.6–5.5)
PROT SERPL-MCNC: 7.6 G/DL (ref 6–8.2)
RBC # BLD AUTO: 4.55 M/UL (ref 4.7–6.1)
SODIUM SERPL-SCNC: 126 MMOL/L (ref 135–145)
SODIUM SERPL-SCNC: 133 MMOL/L (ref 135–145)
WBC # BLD AUTO: 5.3 K/UL (ref 4.8–10.8)

## 2021-01-21 PROCEDURE — 96374 THER/PROPH/DIAG INJ IV PUSH: CPT

## 2021-01-21 PROCEDURE — 80053 COMPREHEN METABOLIC PANEL: CPT

## 2021-01-21 PROCEDURE — G0153 HHCP-SVS OF S/L PATH,EA 15MN: HCPCS

## 2021-01-21 PROCEDURE — 99285 EMERGENCY DEPT VISIT HI MDM: CPT

## 2021-01-21 PROCEDURE — 36415 COLL VENOUS BLD VENIPUNCTURE: CPT

## 2021-01-21 PROCEDURE — 85025 COMPLETE CBC W/AUTO DIFF WBC: CPT

## 2021-01-21 PROCEDURE — 82962 GLUCOSE BLOOD TEST: CPT

## 2021-01-21 PROCEDURE — 82010 KETONE BODYS QUAN: CPT

## 2021-01-21 PROCEDURE — 71045 X-RAY EXAM CHEST 1 VIEW: CPT

## 2021-01-21 PROCEDURE — 700102 HCHG RX REV CODE 250 W/ 637 OVERRIDE(OP): Performed by: EMERGENCY MEDICINE

## 2021-01-21 PROCEDURE — 80048 BASIC METABOLIC PNL TOTAL CA: CPT

## 2021-01-21 PROCEDURE — 700105 HCHG RX REV CODE 258: Performed by: EMERGENCY MEDICINE

## 2021-01-21 RX ORDER — SODIUM CHLORIDE 9 MG/ML
1000 INJECTION, SOLUTION INTRAVENOUS ONCE
Status: COMPLETED | OUTPATIENT
Start: 2021-01-21 | End: 2021-01-21

## 2021-01-21 RX ADMIN — SODIUM CHLORIDE 1000 ML: 9 INJECTION, SOLUTION INTRAVENOUS at 13:30

## 2021-01-21 RX ADMIN — INSULIN HUMAN 5 UNITS: 100 INJECTION, SOLUTION PARENTERAL at 16:22

## 2021-01-21 RX ADMIN — SODIUM CHLORIDE 1000 ML: 9 INJECTION, SOLUTION INTRAVENOUS at 12:27

## 2021-01-21 ASSESSMENT — FIBROSIS 4 INDEX: FIB4 SCORE: 1.05

## 2021-01-21 NOTE — ED TRIAGE NOTES
Pt presents to ED via EMS with complaint of elevated blood sugar per home health RN. Pt denies pain or discomfort. Denies feeling ill. States he took his insulin this am. Speaking full and clear sentences without signs of distress. States hx of TBI 2 years ago.

## 2021-01-21 NOTE — ED NOTES
Med rec complete per interview with pt at bedside and with pt brother. Pt was unsure of the strength of the medications he was currently taking, clarified with reconcile outside meds. Per pt brother, pt was told to decrease his insulin NPH from 20 units qam to 10 units qam by his physician yesterday, however pt states he was unable to remember how much insulin he was to give himself this morning and instead gave himself an unknown amount of both insulin NPH and humulin R. allergies reviewed. Pt denies abx use in the past 14 days.

## 2021-01-21 NOTE — ED PROVIDER NOTES
"ED Provider Note    Scribed for Mirta Bland M.D. by Santiago Carter. 1/21/2021, 12:16 PM.    Primary care provider: Nesha Peralta D.O.  Means of arrival: Ambulance  History obtained from: Patient  History limited by: None    CHIEF COMPLAINT  Chief Complaint   Patient presents with   • Hyperglycemia     Home Health RN called due to BSFS of 500. Patient has Type 1 DM takes short and long acting insulin and took his dose this morning. Patient reports Diarrhea this morning, otherwise no illnesses   • Diarrhea     Starting this morning, no nausea or vomiting.     HPI  Dylon Santa is a 60 y.o. male with history of Type 1 Diabetes, who presents to the Emergency Department by ambulance for evaluation of severe hyperglycemia onset this morning. The patient's Home Health Nurse found the patient to have a fasting blood sugar of 500 this morning. He reports compliance with his prescribed short and long acting insulin, and says he took his doses today. He notes he has had labile diabetes recently. He states he follows a good diet. He is followed by Endocrinology. He is unsure what his A1C \"because I have the Freestyle Marcial\". He says he is feeling well at this time. He notes he did not wish to come to the ED today, but his brother convinced him to. He admits to additional symptoms of diarrhea (this morning) and thirst, but denies malaise, headache, chest pain, fever, lower extremity swelling, abdominal pain, nausea, or vomiting. He denies alleviating factors. 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 He denies history of hypertension or any other chronic medical history.     PPE Note: I personally donned PPE for all patient encounters during this visit, including being clean-shaven with a surgical mask, gloves, and eye protection.     Scribe remained outside the patient's room and did not have any contact with the patient for the " duration of patient encounter.       REVIEW OF SYSTEMS  Pertinent positives include hyperglycemia, diarrhea (this morning) and thirst.   Pertinent negatives include no malaise, headache, chest pain, fever, lower extremity swelling, abdominal pain, nausea, or vomiting.   All other systems reviewed and negative.     PAST MEDICAL HISTORY   has a past medical history of Anesthesia, Arthritis, Bronchitis (2016), Cataract, Cold (4/5/17), Coronary artery disease due to lipid rich plaque - mild to moderate on Cath 2019 Left Dominant, Dental disorder, Diabetic neuropathy (HCC) (12/31/2014), Diabetic retinopathy of both eyes (HCC) (12/9/2016), Dyslipidemia, Heart murmur, Hemorrhagic disorder (HCC), High cholesterol, History of cardiac catheterization 2019 in settin of  abnormal stress - mild to moderate CAD, History of shingles (12/31/2014), Hyperlipidemia, Hypertension, Kidney stones, Pain, Psychiatric problem, and Type II or unspecified type diabetes mellitus without mention of complication, uncontrolled (12/31/2014).    SURGICAL HISTORY   has a past surgical history that includes eye surgery; appendectomy; knee arthroscopy; tonsillectomy; cataract extraction with iol (Bilateral); irrigation & debridement general (Left, 5/3/2017); and hand surgery (1990's).    SOCIAL HISTORY  Social History     Tobacco Use   • Smoking status: Former Smoker     Packs/day: 0.00   • Smokeless tobacco: Former User     Types: Chew   • Tobacco comment: tobacco cessation recommended   Substance Use Topics   • Alcohol use: Yes     Alcohol/week: 0.0 oz     Frequency: Monthly or less     Drinks per session: 1 or 2     Comment: rare beer   • Drug use: No      Social History     Substance and Sexual Activity   Drug Use No       FAMILY HISTORY  Family History   Problem Relation Age of Onset   • Thyroid Mother    • Lung Disease Mother         COPD   • Cancer Mother         eyebrow    • Thyroid Sister    • Other Sister         mental health issues    •  "Thyroid Brother    • Lung Disease Brother         COPD   • Hypertension Brother    • Hyperlipidemia Brother    • Lung Disease Father         COPD   • Cancer Father    • Diabetes Maternal Uncle    • Cancer Paternal Aunt         type unknown   • Diabetes Maternal Grandfather    • Diabetes Paternal Grandmother    • Cancer Paternal Grandfather         prostate   • Stroke Neg Hx        CURRENT MEDICATIONS  Home Medications     Reviewed by Alexander Chan R.N. (Registered Nurse) on 01/21/21 at 1153  Med List Status: Not Addressed   Medication Last Dose Status   acyclovir (ZOVIRAX) 200 MG Cap  Active   ALFALFA PO  Active   atorvastatin (LIPITOR) 40 MG Tab  Active   clopidogrel (PLAVIX) 75 MG Tab  Active   Continuous Blood Gluc Sensor (FREESTYLE CARLOS 14 DAY SENSOR) Misc  Active   GLUCAGEN HYPOKIT 1 MG Recon Soln  Active   glucose 4 GM chewable tablet  Active   insulin NPH (HUMULIN/NOVOLIN) 100 UNIT/ML Suspension  Active   insulin regular (HUMULIN R) 100 Unit/mL Solution  Active   losartan (COZAAR) 50 MG Tab  Active   metoprolol SR (TOPROL XL) 25 MG TABLET SR 24 HR  Active   OYSTER SHELL CALCIUM PO  Active   traZODone (DESYREL) 100 MG Tab  Active              ALLERGIES  No Known Allergies    PHYSICAL EXAM  VITAL SIGNS: BP (!) 179/84   Pulse 79   Temp 36.9 °C (98.5 °F) (Temporal)   Resp 19   Ht 1.727 m (5' 8\")   Wt 73 kg (161 lb)   SpO2 98%   BMI 24.48 kg/m²     Constitutional: Lying in bed comfortably. Well developed, No acute distress, Non-toxic appearance.   HENT: Normocephalic, Atraumatic, Bilateral external ears normal, Nose normal.   Eyes: PERRL, EOMI, Conjunctiva normal.   Neck: Normal range of motion, No tenderness, Supple.   Cardiovascular: Normal heart rate, Normal rhythm.   Thorax & Lungs: Normal breath sounds, No respiratory distress.   Abdomen: Benign abdominal exam, no tenderness, no distention  Skin: Warm, Dry, No erythema, No rash.   Back: No tenderness, No CVA tenderness.   Extremities: Intact " distal pulses, No edema, No tenderness   Neurologic: Alert & oriented x 3, Normal motor function, Normal sensory function, No focal deficits noted.   Psychiatric: Appropriate                                            DIAGNOSTIC STUDIES / PROCEDURES    LABS  Results for orders placed or performed during the hospital encounter of 01/21/21   CBC w/ Differential   Result Value Ref Range    WBC 5.3 4.8 - 10.8 K/uL    RBC 4.55 (L) 4.70 - 6.10 M/uL    Hemoglobin 13.3 (L) 14.0 - 18.0 g/dL    Hematocrit 40.5 (L) 42.0 - 52.0 %    MCV 89.0 81.4 - 97.8 fL    MCH 29.2 27.0 - 33.0 pg    MCHC 32.8 (L) 33.7 - 35.3 g/dL    RDW 44.4 35.9 - 50.0 fL    Platelet Count 219 164 - 446 K/uL    MPV 10.6 9.0 - 12.9 fL    Neutrophils-Polys 68.40 44.00 - 72.00 %    Lymphocytes 20.30 (L) 22.00 - 41.00 %    Monocytes 9.00 0.00 - 13.40 %    Eosinophils 1.10 0.00 - 6.90 %    Basophils 0.60 0.00 - 1.80 %    Immature Granulocytes 0.60 0.00 - 0.90 %    Nucleated RBC 0.00 /100 WBC    Neutrophils (Absolute) 3.63 1.82 - 7.42 K/uL    Lymphs (Absolute) 1.08 1.00 - 4.80 K/uL    Monos (Absolute) 0.48 0.00 - 0.85 K/uL    Eos (Absolute) 0.06 0.00 - 0.51 K/uL    Baso (Absolute) 0.03 0.00 - 0.12 K/uL    Immature Granulocytes (abs) 0.03 0.00 - 0.11 K/uL    NRBC (Absolute) 0.00 K/uL   Complete Metabolic Panel (CMP)   Result Value Ref Range    Sodium 126 (L) 135 - 145 mmol/L    Potassium 4.8 3.6 - 5.5 mmol/L    Chloride 94 (L) 96 - 112 mmol/L    Co2 23 20 - 33 mmol/L    Anion Gap 9.0 7.0 - 16.0    Glucose 541 (HH) 65 - 99 mg/dL    Bun 36 (H) 8 - 22 mg/dL    Creatinine 1.55 (H) 0.50 - 1.40 mg/dL    Calcium 9.5 8.5 - 10.5 mg/dL    AST(SGOT) 31 12 - 45 U/L    ALT(SGPT) 23 2 - 50 U/L    Alkaline Phosphatase 78 30 - 99 U/L    Total Bilirubin 0.6 0.1 - 1.5 mg/dL    Albumin 4.4 3.2 - 4.9 g/dL    Total Protein 7.6 6.0 - 8.2 g/dL    Globulin 3.2 1.9 - 3.5 g/dL    A-G Ratio 1.4 g/dL   BETA-HYDROXYBUTYRIC ACID   Result Value Ref Range    beta-Hydroxybutyric Acid 0.46 (H)  0.02 - 0.27 mmol/L   ESTIMATED GFR   Result Value Ref Range    GFR If  56 (A) >60 mL/min/1.73 m 2    GFR If Non  46 (A) >60 mL/min/1.73 m 2   ACCU-CHEK GLUCOSE   Result Value Ref Range    Glucose - Accu-Ck 301 (H) 65 - 99 mg/dL      All labs reviewed by me.    RADIOLOGY  DX-CHEST-PORTABLE (1 VIEW)   Final Result      No acute cardiopulmonary disease.        The radiologist's interpretation of all radiological studies have been reviewed by me.    COURSE & MEDICAL DECISION MAKING  Nursing notes, VS, PMSFHx reviewed in chart.     Patient presents emergency department for hyperglycemia.  Patient overall looks well here and does not really have any complaints.  Nonfocal physical exam.  No recent fevers to suggest an infectious etiology.  Plan is to check labs to evaluate for electrolyte abnormalities.  Patient has had one episode of diarrhea this morning but has not had any further episodes here in the ER.  Denies any vomiting.  No recent antibiotic use.    12:16 PM Patient seen and examined at bedside. The patient presents with hyperglycemia and the differential diagnosis includes but is not limited to DKA, Dehydration, Electrolyte Abnormalities. Ordered for DX-Chest, CBC with diff, CMP, Beta-Hydroxybutyrate Acid to evaluate. Patient was treated with NS Infusion 1,000 mL for his symptoms.    1:03 PM - The patient will be resuscitated with NS Infusion 1,000 mL.     Laboratory studies have returned and show normal white count without evidence of bandemia.  Mild anemia.  Patient has normal potassium.  Sodium is 126 but his glucose is 541.  Patient CO2 is 23 and there is no anion gap acidosis.  Patient does have a BUN of 36 and a creatinine of 1.55.  Last creatinine was 1.42.  Patient does have a slightly elevated beta hydroxybutyric acid.  Plan at this time is to continue giving IV fluids.  After 2 L of fluid will recheck a glucose.  We will give insulin if it remains elevated.  3:54 PM -  Nursing staff informed me the patient's blood glucose has improved to 301. He will be medicated with insulin regular injection 5 units. Ordered BMP and Beta-Hydroxybutyric Acid.     HYDRATION: Based on the patient's presentation of Hyperglycemia the patient was given IV fluids. IV Hydration was used because oral hydration was not adequate alone. Upon recheck following hydration, the patient was slightly improved.      Laboratory studies have been repeated after insulin and fluids.  He has had improvement.  He has a normal beta hydroxybutyric acid.  Glucose is now 313.  Sodium is 133.  There is no evidence of a gap acidosis.  Creatinine is now 1.41 which appears to be close to his baseline.  I believe this point he stable for outpatient management.  He is advised to continue to monitor his blood sugars closely and treat with a sliding scale insulin.  He is encouraged to continue hydration.  Return precautions were given.    FINAL IMPRESSION  1. Hyperglycemia    2. Dehydration          Santiago ROSE (Catalinoibchavo), am scribing for, and in the presence of, Mirta Bland M.D..    Electronically signed by: Santiago Carter (Darlene), 1/21/2021    Mirta ROSE M.D. personally performed the services described in this documentation, as scribed by Santiago Carter in my presence, and it is both accurate and complete.    The note accurately reflects work and decisions made by me.  Mirta Bland M.D.  1/21/2021  5:37 PM

## 2021-01-21 NOTE — ED TRIAGE NOTES
Chief Complaint   Patient presents with   • Hyperglycemia     Home Health RN called due to BSFS of 500. Patient has Type 1 DM takes short and long acting insulin and took his dose this morning. Patient reports Diarrhea this morning, otherwise no illnesses   • Diarrhea     Starting this morning, no nausea or vomiting.     Patient came from home by EMS for above complaint. Patient also has a history of TBI 2 years ago with baseline orientation of x 3 with short term memory issues. The patient initially did not want to come to the ER, however, the patient's brother convinced the patient to come in.      Brother: Luis Carlos Santa [phone]

## 2021-01-21 NOTE — PROGRESS NOTES
I was contacted by Ana MALLORY.  Pt's blood sugar is currently at 493, symptomatic. Instructed her to contact [facility]. I called his brother Luis Carlos and let him know we were contacting the ambulance. Pt may be taken to [facility]. His brother Luis Carlos has been updated. He will call Ana. We are unsure if he took his insulin this morning or not. /Supervisor aware.  Thank you,  Elke

## 2021-01-21 NOTE — ED NOTES
Assist RN: Tech from Lab called with critical result of GLU at 1255. Critical lab result read back to Tech.   Dr. Bland notified of critical lab result at 1255.  Critical lab result read back by Dr. Bland.

## 2021-01-22 ENCOUNTER — HOME CARE VISIT (OUTPATIENT)
Dept: HOME HEALTH SERVICES | Facility: HOME HEALTHCARE | Age: 61
End: 2021-01-22
Payer: MEDICARE

## 2021-01-22 VITALS
DIASTOLIC BLOOD PRESSURE: 82 MMHG | TEMPERATURE: 98.4 F | RESPIRATION RATE: 18 BRPM | HEART RATE: 87 BPM | OXYGEN SATURATION: 95 % | SYSTOLIC BLOOD PRESSURE: 152 MMHG

## 2021-01-22 VITALS
DIASTOLIC BLOOD PRESSURE: 60 MMHG | SYSTOLIC BLOOD PRESSURE: 118 MMHG | TEMPERATURE: 97.6 F | RESPIRATION RATE: 18 BRPM | HEART RATE: 72 BPM | OXYGEN SATURATION: 98 %

## 2021-01-22 PROCEDURE — G0299 HHS/HOSPICE OF RN EA 15 MIN: HCPCS

## 2021-01-22 SDOH — ECONOMIC STABILITY: HOUSING INSECURITY: HOME SAFETY: PT REPORTS NO FALLS SINCE LAST HH VISIT.

## 2021-01-22 ASSESSMENT — ENCOUNTER SYMPTOMS
VOMITING: DENIES
DIFFICULTY THINKING: 1
NAUSEA: DENIES
MUSCLE WEAKNESS: 1
POOR JUDGMENT: 1

## 2021-01-22 NOTE — DISCHARGE PLANNING
MSW received call from bedside RN. Pt has no ride home and pt's brother is out of town. MSW provided pt with cab voucher #[id number] to get home.

## 2021-01-22 NOTE — DISCHARGE INSTRUCTIONS
Make sure you follow-up with your regular doctor to get your kidney function rechecked.  Also continue to monitor your blood sugars closely.  Any persistent elevated levels or you start feeling headache or vomiting or develop a fever return immediately.  Thank you for being patient during your stay.

## 2021-01-25 ENCOUNTER — HOME CARE VISIT (OUTPATIENT)
Dept: HOME HEALTH SERVICES | Facility: HOME HEALTHCARE | Age: 61
End: 2021-01-25
Payer: MEDICARE

## 2021-01-25 VITALS
TEMPERATURE: 98 F | SYSTOLIC BLOOD PRESSURE: 138 MMHG | RESPIRATION RATE: 18 BRPM | OXYGEN SATURATION: 99 % | HEART RATE: 83 BPM

## 2021-01-25 PROCEDURE — G0299 HHS/HOSPICE OF RN EA 15 MIN: HCPCS

## 2021-01-25 ASSESSMENT — ENCOUNTER SYMPTOMS: DIFFICULTY THINKING: 1

## 2021-01-27 ENCOUNTER — HOME CARE VISIT (OUTPATIENT)
Dept: HOME HEALTH SERVICES | Facility: HOME HEALTHCARE | Age: 61
End: 2021-01-27
Payer: MEDICARE

## 2021-01-28 ENCOUNTER — TELEPHONE (OUTPATIENT)
Dept: MEDICAL GROUP | Facility: PHYSICIAN GROUP | Age: 61
End: 2021-01-28

## 2021-01-28 ENCOUNTER — PATIENT OUTREACH (OUTPATIENT)
Dept: HEALTH INFORMATION MANAGEMENT | Facility: OTHER | Age: 61
End: 2021-01-28

## 2021-01-28 NOTE — PROGRESS NOTES
Outcome: Spoke to brother Luis Carlos. He stated he was out of town but requested to speak to PCP MA.   Please transfer to Patient Outreach Team at [phone] when patient returns call.    [facility] Verified: yes

## 2021-01-29 ENCOUNTER — HOME CARE VISIT (OUTPATIENT)
Dept: HOME HEALTH SERVICES | Facility: HOME HEALTHCARE | Age: 61
End: 2021-01-29
Payer: MEDICARE

## 2021-01-29 PROCEDURE — G0299 HHS/HOSPICE OF RN EA 15 MIN: HCPCS

## 2021-01-31 VITALS
TEMPERATURE: 97.6 F | HEART RATE: 82 BPM | DIASTOLIC BLOOD PRESSURE: 72 MMHG | RESPIRATION RATE: 16 BRPM | SYSTOLIC BLOOD PRESSURE: 138 MMHG | OXYGEN SATURATION: 98 %

## 2021-01-31 ASSESSMENT — ENCOUNTER SYMPTOMS
VOMITING: DENIES
MUSCLE WEAKNESS: 1
NAUSEA: DENIES

## 2021-02-01 ENCOUNTER — HOME CARE VISIT (OUTPATIENT)
Dept: HOME HEALTH SERVICES | Facility: HOME HEALTHCARE | Age: 61
End: 2021-02-01
Payer: MEDICARE

## 2021-02-01 PROCEDURE — G0299 HHS/HOSPICE OF RN EA 15 MIN: HCPCS

## 2021-02-01 NOTE — Clinical Note
TY Dr. Peralta,  Please keep me posted.  Just FYI:  I'll be on vacation from Feb 12 to Feb 24.    Stay Safe,  Elke  ----- Message -----  From: Nesha Peralta D.O.  Sent: 2/2/2021   9:59 AM PST  To: Elke Puri R.N.      Okay, thank you.  I may need to see if I can refer him to another endocrinologist outside of [facility] that can get him in sooner as he really cannot wait until June to be seen.  ----- Message -----  From: Elke Puri R.N.  Sent: 2/2/2021   9:51 AM PST  To: Nesha Peralta D.O.    Just FYI:  Pt's blood sugar level improved but still very labile. From 65 to 199 this afternoon. He is taking 15 units of NPH in the morning, and 5 Units of R (before meals). His endocrinologist appt. is not until June. Otherwise, he's doing well.  Thank you, Elke

## 2021-02-02 VITALS
TEMPERATURE: 97.6 F | RESPIRATION RATE: 18 BRPM | OXYGEN SATURATION: 98 % | DIASTOLIC BLOOD PRESSURE: 80 MMHG | SYSTOLIC BLOOD PRESSURE: 128 MMHG | HEART RATE: 68 BPM

## 2021-02-02 DIAGNOSIS — E10.22 TYPE 1 DIABETES MELLITUS WITH STAGE 3 CHRONIC KIDNEY DISEASE, UNSPECIFIED WHETHER STAGE 3A OR 3B CKD (HCC): ICD-10-CM

## 2021-02-02 DIAGNOSIS — R73.09 LABILE BLOOD GLUCOSE: ICD-10-CM

## 2021-02-02 DIAGNOSIS — E16.2 HYPOGLYCEMIA: ICD-10-CM

## 2021-02-02 DIAGNOSIS — N18.30 TYPE 1 DIABETES MELLITUS WITH STAGE 3 CHRONIC KIDNEY DISEASE, UNSPECIFIED WHETHER STAGE 3A OR 3B CKD (HCC): ICD-10-CM

## 2021-02-02 ASSESSMENT — ENCOUNTER SYMPTOMS
VOMITING: DENIES
MUSCLE WEAKNESS: 1
NAUSEA: DENIES

## 2021-02-02 NOTE — PROGRESS NOTES
Just MAUREEN:  Pt's blood sugar level improved but still very labile. From 65 to 199 this afternoon. He is taking 15 units of NPH in the morning, and 5 Units of R (before meals). His endocrinologist appt. is not until June. Otherwise, he's doing well.  Thank you, Elke

## 2021-02-03 ENCOUNTER — HOME CARE VISIT (OUTPATIENT)
Dept: HOME HEALTH SERVICES | Facility: HOME HEALTHCARE | Age: 61
End: 2021-02-03
Payer: MEDICARE

## 2021-02-03 VITALS
SYSTOLIC BLOOD PRESSURE: 140 MMHG | DIASTOLIC BLOOD PRESSURE: 78 MMHG | OXYGEN SATURATION: 99 % | TEMPERATURE: 98.6 F | RESPIRATION RATE: 18 BRPM | HEART RATE: 73 BPM

## 2021-02-03 PROCEDURE — G0495 RN CARE TRAIN/EDU IN HH: HCPCS

## 2021-02-03 SDOH — ECONOMIC STABILITY: HOUSING INSECURITY: HOME SAFETY: PT WEARING MASK UPON SN ENTRY

## 2021-02-03 ASSESSMENT — ENCOUNTER SYMPTOMS
MUSCLE WEAKNESS: 1
DIFFICULTY THINKING: 1
NAUSEA: DENIES
VOMITING: DENIES

## 2021-02-04 ENCOUNTER — TELEPHONE (OUTPATIENT)
Dept: CARDIOLOGY | Facility: MEDICAL CENTER | Age: 61
End: 2021-02-04

## 2021-02-04 ENCOUNTER — NON-PROVIDER VISIT (OUTPATIENT)
Dept: CARDIOLOGY | Facility: MEDICAL CENTER | Age: 61
End: 2021-02-04
Payer: MEDICARE

## 2021-02-04 DIAGNOSIS — R55 SYNCOPE, UNSPECIFIED SYNCOPE TYPE: ICD-10-CM

## 2021-02-04 NOTE — TELEPHONE ENCOUNTER
Patient enrolled in the 14 day Zio patch program per Nesha Peralta DO.   In-Clinic hookup.   >Currently pending EOS.

## 2021-02-05 ENCOUNTER — HOME CARE VISIT (OUTPATIENT)
Dept: HOME HEALTH SERVICES | Facility: HOME HEALTHCARE | Age: 61
End: 2021-02-05
Payer: MEDICARE

## 2021-02-05 PROCEDURE — G0299 HHS/HOSPICE OF RN EA 15 MIN: HCPCS

## 2021-02-06 ASSESSMENT — ENCOUNTER SYMPTOMS
VOMITING: DENIES
NAUSEA: DENIES
MUSCLE WEAKNESS: 1

## 2021-02-08 ENCOUNTER — HOME CARE VISIT (OUTPATIENT)
Dept: HOME HEALTH SERVICES | Facility: HOME HEALTHCARE | Age: 61
End: 2021-02-08
Payer: MEDICARE

## 2021-02-08 PROCEDURE — G0299 HHS/HOSPICE OF RN EA 15 MIN: HCPCS

## 2021-02-08 PROCEDURE — 665001 SOC-HOME HEALTH

## 2021-02-09 VITALS
RESPIRATION RATE: 16 BRPM | HEART RATE: 72 BPM | OXYGEN SATURATION: 98 % | SYSTOLIC BLOOD PRESSURE: 128 MMHG | DIASTOLIC BLOOD PRESSURE: 70 MMHG | TEMPERATURE: 97.9 F

## 2021-02-09 ASSESSMENT — ENCOUNTER SYMPTOMS
NAUSEA: DENIES
VOMITING: DENIES

## 2021-02-10 ENCOUNTER — HOME CARE VISIT (OUTPATIENT)
Dept: HOME HEALTH SERVICES | Facility: HOME HEALTHCARE | Age: 61
End: 2021-02-10
Payer: MEDICARE

## 2021-02-10 PROCEDURE — G0299 HHS/HOSPICE OF RN EA 15 MIN: HCPCS

## 2021-02-11 VITALS
HEART RATE: 75 BPM | RESPIRATION RATE: 18 BRPM | TEMPERATURE: 98.1 F | DIASTOLIC BLOOD PRESSURE: 82 MMHG | OXYGEN SATURATION: 96 % | SYSTOLIC BLOOD PRESSURE: 130 MMHG

## 2021-02-11 ASSESSMENT — ENCOUNTER SYMPTOMS
POOR JUDGMENT: 1
VOMITING: DENIES
LIMITED RANGE OF MOTION: 1
NAUSEA: DENIES

## 2021-02-11 NOTE — PROGRESS NOTES
Good Morning Dylon Campos continues to have very labile blood sugar readings.  Lowest is 55. I know he has an appointment with you on 2/23/21. I'm hoping you are able to get him in to see an Endocrinologist before June. I will be on vacation until 2/24/21.  If you have any questions you can reach me on my personal phone [phone].  Thank you, Elke

## 2021-02-12 ENCOUNTER — HOME CARE VISIT (OUTPATIENT)
Dept: HOME HEALTH SERVICES | Facility: HOME HEALTHCARE | Age: 61
End: 2021-02-12
Payer: MEDICARE

## 2021-02-12 VITALS
DIASTOLIC BLOOD PRESSURE: 78 MMHG | TEMPERATURE: 98.4 F | OXYGEN SATURATION: 99 % | SYSTOLIC BLOOD PRESSURE: 128 MMHG | HEART RATE: 74 BPM | RESPIRATION RATE: 16 BRPM

## 2021-02-12 PROCEDURE — G0495 RN CARE TRAIN/EDU IN HH: HCPCS

## 2021-02-12 ASSESSMENT — ENCOUNTER SYMPTOMS
DIFFICULTY THINKING: 1
VOMITING: DENIES
NAUSEA: DENIES

## 2021-02-15 ENCOUNTER — HOME CARE VISIT (OUTPATIENT)
Dept: HOME HEALTH SERVICES | Facility: HOME HEALTHCARE | Age: 61
End: 2021-02-15
Payer: MEDICARE

## 2021-02-15 VITALS
RESPIRATION RATE: 18 BRPM | HEART RATE: 75 BPM | SYSTOLIC BLOOD PRESSURE: 138 MMHG | TEMPERATURE: 97.6 F | OXYGEN SATURATION: 99 % | DIASTOLIC BLOOD PRESSURE: 64 MMHG

## 2021-02-15 PROCEDURE — G0299 HHS/HOSPICE OF RN EA 15 MIN: HCPCS

## 2021-02-15 ASSESSMENT — ENCOUNTER SYMPTOMS: DIFFICULTY THINKING: 1

## 2021-02-17 ENCOUNTER — HOME CARE VISIT (OUTPATIENT)
Dept: HOME HEALTH SERVICES | Facility: HOME HEALTHCARE | Age: 61
End: 2021-02-17
Payer: MEDICARE

## 2021-02-17 VITALS
HEART RATE: 72 BPM | TEMPERATURE: 98.5 F | RESPIRATION RATE: 18 BRPM | OXYGEN SATURATION: 99 % | DIASTOLIC BLOOD PRESSURE: 64 MMHG | SYSTOLIC BLOOD PRESSURE: 138 MMHG

## 2021-02-17 PROCEDURE — G0495 RN CARE TRAIN/EDU IN HH: HCPCS

## 2021-02-17 SDOH — ECONOMIC STABILITY: HOUSING INSECURITY: HOME SAFETY: DISCUSSED HAND WASHING ESPECIALLY WHILE CLEANING BIRD CAGES

## 2021-02-17 ASSESSMENT — ENCOUNTER SYMPTOMS
DIFFICULTY THINKING: 1
VOMITING: DENIES
NAUSEA: DENIES

## 2021-02-17 NOTE — PROGRESS NOTES
Pt has been having nose bleeds on/off for 3 days (per pt). Denies headaches or dizziness. Using cool washcloth to face and neck.     Pt has NOT been keeping accurate BG logs. Numbers are sporadically placed on chart on different days. Contacted and informed brother (primary cg).

## 2021-02-19 ENCOUNTER — HOME CARE VISIT (OUTPATIENT)
Dept: HOME HEALTH SERVICES | Facility: HOME HEALTHCARE | Age: 61
End: 2021-02-19
Payer: MEDICARE

## 2021-02-19 ENCOUNTER — TELEPHONE (OUTPATIENT)
Dept: MEDICAL GROUP | Facility: PHYSICIAN GROUP | Age: 61
End: 2021-02-19

## 2021-02-19 VITALS
OXYGEN SATURATION: 98 % | TEMPERATURE: 99 F | HEART RATE: 80 BPM | DIASTOLIC BLOOD PRESSURE: 78 MMHG | RESPIRATION RATE: 18 BRPM | SYSTOLIC BLOOD PRESSURE: 138 MMHG

## 2021-02-19 PROCEDURE — G0299 HHS/HOSPICE OF RN EA 15 MIN: HCPCS

## 2021-02-19 ASSESSMENT — ENCOUNTER SYMPTOMS
DIFFICULTY THINKING: 1
NAUSEA: DENIES
VOMITING: DENIES

## 2021-02-19 NOTE — TELEPHONE ENCOUNTER
Per brother Luis Carlos both Dr Serrano and Dr Mirta Walters are not taking new Medicare/Medicaid patients. Is there anyone else we can try?

## 2021-02-19 NOTE — PROGRESS NOTES
Reviewed medication set up and insulin draws with pt today, Pt was unable to set up mediset for the week and required assistance. Requested pt draw up 5 units of R insulin. Pt was able to demonstrate proper mechanics but did not draw any insulin into syringe. When I took syringe from pt and withdrew 10 units, pt was able to instruct me to depress plunger to decrease dosage to 5. It is uncertain to this nurse if pt is capable of managing his medications or insulin. Pt's brother, Luis Carlos, notified. Pt may have to start using pre-packaging for medications as well as dial pens for insulin.

## 2021-02-22 ENCOUNTER — HOME CARE VISIT (OUTPATIENT)
Dept: HOME HEALTH SERVICES | Facility: HOME HEALTHCARE | Age: 61
End: 2021-02-22
Payer: MEDICARE

## 2021-02-22 VITALS
HEART RATE: 81 BPM | RESPIRATION RATE: 18 BRPM | DIASTOLIC BLOOD PRESSURE: 68 MMHG | TEMPERATURE: 97.7 F | SYSTOLIC BLOOD PRESSURE: 132 MMHG | OXYGEN SATURATION: 98 %

## 2021-02-22 PROCEDURE — G0299 HHS/HOSPICE OF RN EA 15 MIN: HCPCS

## 2021-02-22 ASSESSMENT — ENCOUNTER SYMPTOMS: DIFFICULTY THINKING: 1

## 2021-02-24 ENCOUNTER — HOME CARE VISIT (OUTPATIENT)
Dept: HOME HEALTH SERVICES | Facility: HOME HEALTHCARE | Age: 61
End: 2021-02-24
Payer: MEDICARE

## 2021-02-24 PROCEDURE — G0299 HHS/HOSPICE OF RN EA 15 MIN: HCPCS

## 2021-02-25 VITALS
DIASTOLIC BLOOD PRESSURE: 60 MMHG | HEART RATE: 82 BPM | SYSTOLIC BLOOD PRESSURE: 110 MMHG | RESPIRATION RATE: 18 BRPM | TEMPERATURE: 97.8 F | OXYGEN SATURATION: 96 %

## 2021-02-25 ASSESSMENT — ENCOUNTER SYMPTOMS
NAUSEA: DENIES
VOMITING: DENIES
MUSCLE WEAKNESS: 1

## 2021-02-26 ENCOUNTER — HOME CARE VISIT (OUTPATIENT)
Dept: HOME HEALTH SERVICES | Facility: HOME HEALTHCARE | Age: 61
End: 2021-02-26
Payer: MEDICARE

## 2021-02-26 ENCOUNTER — OFFICE VISIT (OUTPATIENT)
Dept: MEDICAL GROUP | Facility: PHYSICIAN GROUP | Age: 61
End: 2021-02-26
Payer: MEDICARE

## 2021-02-26 VITALS
HEART RATE: 83 BPM | BODY MASS INDEX: 26.98 KG/M2 | HEIGHT: 68 IN | DIASTOLIC BLOOD PRESSURE: 64 MMHG | TEMPERATURE: 98.5 F | WEIGHT: 178 LBS | OXYGEN SATURATION: 97 % | SYSTOLIC BLOOD PRESSURE: 122 MMHG

## 2021-02-26 DIAGNOSIS — R41.3 MEMORY LOSS: ICD-10-CM

## 2021-02-26 DIAGNOSIS — N18.30 TYPE 1 DIABETES MELLITUS WITH STAGE 3 CHRONIC KIDNEY DISEASE, UNSPECIFIED WHETHER STAGE 3A OR 3B CKD (HCC): ICD-10-CM

## 2021-02-26 DIAGNOSIS — E10.22 TYPE 1 DIABETES MELLITUS WITH STAGE 3 CHRONIC KIDNEY DISEASE, UNSPECIFIED WHETHER STAGE 3A OR 3B CKD (HCC): ICD-10-CM

## 2021-02-26 PROCEDURE — 99215 OFFICE O/P EST HI 40 MIN: CPT | Performed by: FAMILY MEDICINE

## 2021-02-26 RX ORDER — INSULIN HUMAN 100 [IU]/ML
15 INJECTION, SUSPENSION SUBCUTANEOUS EVERY MORNING
Qty: 12 EACH | Refills: 3 | Status: SHIPPED
Start: 2021-02-26 | End: 2021-03-02

## 2021-02-26 ASSESSMENT — FIBROSIS 4 INDEX: FIB4 SCORE: 1.74

## 2021-02-26 NOTE — PROGRESS NOTES
Annual Health Assessment Questions:     1.  Are you currently engaging in any exercise or physical activity? No    2.  How would you describe your mood or emotional well-being today? good    3.  Have you had any falls in the last year? Yes    4.  Have you noticed any problems with your balance or had difficulty walking? No    5.  In the last six months have you experienced any leakage of urine? No    6. DPA/Advanced Directive: Patient has Durable Power of  on file.

## 2021-02-26 NOTE — PROGRESS NOTES
CC: Diabetes, memory loss    HISTORY OF THE PRESENT ILLNESS: Patient is a 60 y.o. male.     Patient is present here today with his brother.  This is the first time I have met his brother, as recently there have been significant concerns for likely dementia.  Brother is his power of  and does live here in [place] and take care of patient most of the time, but he does have to commute back and forth between here in [place] as he also helps take care of his ailing mother who lives in [place].    Patient does endorse that memory issues are a problem though he cannot really quantify.  He is often making no sense when discussing his past medical history.  Brother present notes that memory loss has been there for about 3 years but has been rapidly progressive in the past year or so.  Patient currently has home health helping him with his insulin dosing.  I have received numerous concerning messages about the fact that patient is unable to appropriately use his insulin and has trouble remembering appropriate dosages.  He cannot tell me what dose of insulin he is supposed to be on today.    He has had very severe episodes of hypoglycemia to the point where he has been nonresponsive with sugars in the 30s.  Sugars are also very labile, up into the 500s as well.  He does bring in his sugar log today.  Again he is noted to have sugars down into the 30s and up into the 400s to 500s.  There does not seem to be any specific pattern to these labile sugars.    Allergies: Patient has no known allergies.    Current Outpatient Medications Ordered in Epic   Medication Sig Dispense Refill   • Insulin NPH, Human,, Isophane, (HUMULIN N KWIKPEN) 100 UNIT/ML Suspension Pen-injector Inject 15 Units under the skin every morning. 12 Each 3   • Insulin Regular Human 100 UNIT/ML Solution Pen-injector Inject 5 Units as directed 3 times a day before meals. 10 Each 3   • Insulin Pen Needle 32 G x 4 mm Use QID with insulin. 1000 Each 2   •  "acyclovir (ZOVIRAX) 200 MG Cap TAKE ONE CAPSULE BY MOUTH ONE TIME DAILY  90 Cap 1   • metoprolol SR (TOPROL XL) 25 MG TABLET SR 24 HR TAKE ONE TABLET BY MOUTH ONE TIME DAILY 90 Tab 1   • Continuous Blood Gluc Sensor (FREESTYLE CARLOS 14 DAY SENSOR) Misc Inject 1 Units under the skin every 14 days. 6 Each 3   • traZODone (DESYREL) 100 MG Tab Take 1 Tab by mouth at bedtime as needed for Sleep. 90 Tab 1   • atorvastatin (LIPITOR) 40 MG Tab Take 1 Tab by mouth every day. 100 Tab 3   • GLUCAGEN HYPOKIT 1 MG Recon Soln INJECT 1 MG BY INTRAVENOUS ROUTE ONCE FOR 1 DOSE     • glucose 4 GM chewable tablet Take 4 Tabs by mouth as needed for Low Blood Sugar. 30 Tab 1   • losartan (COZAAR) 50 MG Tab Take 1 Tab by mouth every day. 100 Tab 3   • clopidogrel (PLAVIX) 75 MG Tab Take 1 Tab by mouth every day. 90 Tab 3   • OYSTER SHELL CALCIUM PO Take 1 Tab by mouth every day.     • ALFALFA PO Take 1 Tab by mouth every morning.       No current [facility]-ordered facility-administered medications on file.       Past Medical History:   Diagnosis Date   • Anesthesia     hx PONV   • Arthritis     L knee; hands   • Bronchitis 2016   • Cataract     nicole iol   • Cold 4/5/17    prod cough   • Coronary artery disease due to lipid rich plaque - mild to moderate on Cath 2019 Left Dominant    • Dental disorder     \"bad shape\"   • Diabetic neuropathy (HCC) 12/31/2014    retinopathy   • Diabetic retinopathy of both eyes (HCC) 12/9/2016   • Dyslipidemia    • Heart murmur    • Hemorrhagic disorder ([facility])     nose bleeds   • High cholesterol    • History of cardiac catheterization 2019 in settin of  abnormal stress - mild to moderate CAD    • History of shingles 12/31/2014   • Hyperlipidemia    • Hypertension    • Kidney stones    • Pain     left leg/foot   • Psychiatric problem     situational depression   • Type II or unspecified type diabetes mellitus without mention of complication, uncontrolled 12/31/2014       Past Surgical History:   Procedure " "Laterality Date   • IRRIGATION & DEBRIDEMENT GENERAL Left 5/3/2017    Procedure: IRRIGATION & DEBRIDEMENT GENERAL and Left Fourth Toe Amputation ;  Surgeon: Inessa Richard M.D.;  Location: SURGERY [facility];  Service:    • APPENDECTOMY     • CATARACT EXTRACTION WITH IOL Bilateral    • EYE SURGERY     • HAND SURGERY  1990's   • KNEE ARTHROSCOPY      x3   • TONSILLECTOMY         Social History     Tobacco Use   • Smoking status: Former Smoker     Packs/day: 0.00   • Smokeless tobacco: Former User     Types: Chew   • Tobacco comment: tobacco cessation recommended   Substance Use Topics   • Alcohol use: Yes     Alcohol/week: 0.0 oz     Comment: rare beer   • Drug use: No       Social History     Social History Narrative   • Not on file       Family History   Problem Relation Age of Onset   • Thyroid Mother    • Lung Disease Mother         COPD   • Cancer Mother         eyebrow    • Thyroid Sister    • Other Sister         mental health issues    • Thyroid Brother    • Lung Disease Brother         COPD   • Hypertension Brother    • Hyperlipidemia Brother    • Lung Disease Father         COPD   • Cancer Father    • Diabetes Maternal Uncle    • Cancer Paternal Aunt         type unknown   • Diabetes Maternal Grandfather    • Diabetes Paternal Grandmother    • Cancer Paternal Grandfather         prostate   • Stroke Neg Hx        ROS:   Denies fever, chest pain, shortness of breath      Labs: Last hemoglobin A1c was 9.7 on December 7, 2020.    Exam: /64 (BP Location: Right arm, Patient Position: Sitting, BP Cuff Size: Adult)   Pulse 83   Temp 36.9 °C (98.5 °F) (Temporal)   Ht 1.727 m (5' 8\")   Wt 80.7 kg (178 lb)   SpO2 97%  Body mass index is 27.06 kg/m².    General: Well appearing, NAD  Pulmonary: Clear to ausculation.  Normal effort. No rales, ronchi, or wheezing.  Cardiovascular: Regular rate and rhythm without murmur, rubs or gallop.   Skin: Warm and dry.  No obvious lesions.  Musculoskeletal:  No " extremity cyanosis, clubbing, or edema.  Psych: Pleasant and interactive, but very poor historian.    Please note that this dictation was created using voice recognition software. I have made every reasonable attempt to correct obvious errors, but I expect that there are errors of grammar and possibly content that I did not discover before finalizing the note.      Assessment/Plan  Dylon was seen today for diabetes follow-up and medication refill.    Diagnoses and all orders for this visit:    Type 1 diabetes mellitus with stage 3 chronic kidney disease, unspecified whether stage 3a or 3b CKD (HCC)  Chronic issue, last A1c of 9.7.  Blood sugars are dangerously labile.  He is currently using insulin solution and syringes.  I am going to go ahead and switch him to the insulin pens to see if there is better compliance and if this is easier for him to use.  I really like to get him in with endocrinology sooner than June.  Part of his noncompliance is obviously part of his memory loss, as below.  He does have home health right now in addition to his brother currently helping him dose his medication as well.  -     Insulin NPH, Human,, Isophane, (HUMULIN N KWIKPEN) 100 UNIT/ML Suspension Pen-injector; Inject 15 Units under the skin every morning.  -     Insulin Regular Human 100 UNIT/ML Solution Pen-injector; Inject 5 Units as directed 3 times a day before meals.  -     HEMOGLOBIN A1C; Future  -     Insulin Pen Needle 32 G x 4 mm; Use QID with insulin.    Memory loss  Chronic but rapidly progressing over the past year.  I am going to go ahead and refer to neurology.  We will pursue work-up with labs and MRI as below until that time.  I suspect possible Alzheimer's dementia.  This is very much leading to severe consequences in terms of his diabetic control and ability to care for himself.  -     REFERRAL TO NEUROLOGY  -     CBC WITH DIFFERENTIAL; Future  -     Comp Metabolic Panel; Future  -     TSH+FREE T4  -     VITAMIN  B12; Future  -     MR-BRAIN-W/O; Future    Follow-up in about 1 month for lab and MRI review.    My total time spent caring for the patient on the day of the encounter was 40 minutes.   This does not include time spent on separately billable procedures/tests.      Nesha Peralta DO  [facility]

## 2021-03-01 ENCOUNTER — HOME CARE VISIT (OUTPATIENT)
Dept: HOME HEALTH SERVICES | Facility: HOME HEALTHCARE | Age: 61
End: 2021-03-01
Payer: MEDICARE

## 2021-03-01 ENCOUNTER — TELEPHONE (OUTPATIENT)
Dept: MEDICAL GROUP | Facility: PHYSICIAN GROUP | Age: 61
End: 2021-03-01

## 2021-03-01 ENCOUNTER — HOSPITAL ENCOUNTER (OUTPATIENT)
Dept: RADIOLOGY | Facility: MEDICAL CENTER | Age: 61
End: 2021-03-01
Attending: FAMILY MEDICINE | Admitting: FAMILY MEDICINE
Payer: MEDICARE

## 2021-03-01 DIAGNOSIS — R55 SYNCOPE, UNSPECIFIED SYNCOPE TYPE: ICD-10-CM

## 2021-03-01 DIAGNOSIS — R41.3 MEMORY LOSS: ICD-10-CM

## 2021-03-01 PROCEDURE — 70551 MRI BRAIN STEM W/O DYE: CPT | Mod: MG

## 2021-03-02 ENCOUNTER — OFFICE VISIT (OUTPATIENT)
Dept: ENDOCRINOLOGY | Facility: MEDICAL CENTER | Age: 61
End: 2021-03-02
Attending: INTERNAL MEDICINE
Payer: MEDICARE

## 2021-03-02 ENCOUNTER — HOME CARE VISIT (OUTPATIENT)
Dept: HOME HEALTH SERVICES | Facility: HOME HEALTHCARE | Age: 61
End: 2021-03-02
Payer: MEDICARE

## 2021-03-02 ENCOUNTER — TELEPHONE (OUTPATIENT)
Dept: MEDICAL GROUP | Facility: PHYSICIAN GROUP | Age: 61
End: 2021-03-02

## 2021-03-02 ENCOUNTER — HOSPITAL ENCOUNTER (OUTPATIENT)
Dept: LAB | Facility: MEDICAL CENTER | Age: 61
End: 2021-03-02
Attending: FAMILY MEDICINE
Payer: MEDICARE

## 2021-03-02 VITALS
WEIGHT: 161.1 LBS | BODY MASS INDEX: 24.41 KG/M2 | HEIGHT: 68 IN | HEART RATE: 80 BPM | SYSTOLIC BLOOD PRESSURE: 130 MMHG | OXYGEN SATURATION: 97 % | DIASTOLIC BLOOD PRESSURE: 64 MMHG

## 2021-03-02 DIAGNOSIS — R41.3 MEMORY LOSS: ICD-10-CM

## 2021-03-02 DIAGNOSIS — E10.22 TYPE 1 DIABETES MELLITUS WITH STAGE 3 CHRONIC KIDNEY DISEASE, UNSPECIFIED WHETHER STAGE 3A OR 3B CKD (HCC): ICD-10-CM

## 2021-03-02 DIAGNOSIS — N18.30 TYPE 1 DIABETES MELLITUS WITH STAGE 3 CHRONIC KIDNEY DISEASE, UNSPECIFIED WHETHER STAGE 3A OR 3B CKD (HCC): ICD-10-CM

## 2021-03-02 DIAGNOSIS — E16.2 HYPOGLYCEMIA: ICD-10-CM

## 2021-03-02 DIAGNOSIS — E10.3593 PROLIFERATIVE DIABETIC RETINOPATHY OF BOTH EYES WITHOUT MACULAR EDEMA ASSOCIATED WITH TYPE 1 DIABETES MELLITUS (HCC): ICD-10-CM

## 2021-03-02 DIAGNOSIS — Z79.4 LONG-TERM INSULIN USE (HCC): ICD-10-CM

## 2021-03-02 DIAGNOSIS — E10.641 UNCONTROLLED TYPE 1 DIABETES MELLITUS WITH HYPOGLYCEMIA AND COMA (HCC): ICD-10-CM

## 2021-03-02 DIAGNOSIS — E78.5 DYSLIPIDEMIA: ICD-10-CM

## 2021-03-02 LAB
ALBUMIN SERPL BCP-MCNC: 4.2 G/DL (ref 3.2–4.9)
ALBUMIN/GLOB SERPL: 1.2 G/DL
ALP SERPL-CCNC: 74 U/L (ref 30–99)
ALT SERPL-CCNC: 27 U/L (ref 2–50)
ANION GAP SERPL CALC-SCNC: 11 MMOL/L (ref 7–16)
AST SERPL-CCNC: 30 U/L (ref 12–45)
BASOPHILS # BLD AUTO: 0.6 % (ref 0–1.8)
BASOPHILS # BLD: 0.03 K/UL (ref 0–0.12)
BILIRUB SERPL-MCNC: 0.6 MG/DL (ref 0.1–1.5)
BUN SERPL-MCNC: 28 MG/DL (ref 8–22)
CALCIUM SERPL-MCNC: 9.9 MG/DL (ref 8.5–10.5)
CHLORIDE SERPL-SCNC: 105 MMOL/L (ref 96–112)
CO2 SERPL-SCNC: 27 MMOL/L (ref 20–33)
CREAT SERPL-MCNC: 1.58 MG/DL (ref 0.5–1.4)
EOSINOPHIL # BLD AUTO: 0.17 K/UL (ref 0–0.51)
EOSINOPHIL NFR BLD: 3.3 % (ref 0–6.9)
ERYTHROCYTE [DISTWIDTH] IN BLOOD BY AUTOMATED COUNT: 46.6 FL (ref 35.9–50)
EST. AVERAGE GLUCOSE BLD GHB EST-MCNC: 235 MG/DL
GLOBULIN SER CALC-MCNC: 3.5 G/DL (ref 1.9–3.5)
GLUCOSE SERPL-MCNC: 150 MG/DL (ref 65–99)
HBA1C MFR BLD: 9.8 % (ref 4–5.6)
HCT VFR BLD AUTO: 43.1 % (ref 42–52)
HGB BLD-MCNC: 14 G/DL (ref 14–18)
IMM GRANULOCYTES # BLD AUTO: 0.02 K/UL (ref 0–0.11)
IMM GRANULOCYTES NFR BLD AUTO: 0.4 % (ref 0–0.9)
LYMPHOCYTES # BLD AUTO: 1.37 K/UL (ref 1–4.8)
LYMPHOCYTES NFR BLD: 26.9 % (ref 22–41)
MCH RBC QN AUTO: 29.5 PG (ref 27–33)
MCHC RBC AUTO-ENTMCNC: 32.5 G/DL (ref 33.7–35.3)
MCV RBC AUTO: 90.7 FL (ref 81.4–97.8)
MONOCYTES # BLD AUTO: 0.54 K/UL (ref 0–0.85)
MONOCYTES NFR BLD AUTO: 10.6 % (ref 0–13.4)
NEUTROPHILS # BLD AUTO: 2.97 K/UL (ref 1.82–7.42)
NEUTROPHILS NFR BLD: 58.2 % (ref 44–72)
NRBC # BLD AUTO: 0 K/UL
NRBC BLD-RTO: 0 /100 WBC
PLATELET # BLD AUTO: 262 K/UL (ref 164–446)
PMV BLD AUTO: 11.1 FL (ref 9–12.9)
POTASSIUM SERPL-SCNC: 4.7 MMOL/L (ref 3.6–5.5)
PROT SERPL-MCNC: 7.7 G/DL (ref 6–8.2)
RBC # BLD AUTO: 4.75 M/UL (ref 4.7–6.1)
SODIUM SERPL-SCNC: 143 MMOL/L (ref 135–145)
T4 FREE SERPL-MCNC: 1.25 NG/DL (ref 0.93–1.7)
TSH SERPL DL<=0.005 MIU/L-ACNC: 1.27 UIU/ML (ref 0.38–5.33)
VIT B12 SERPL-MCNC: 984 PG/ML (ref 211–911)
WBC # BLD AUTO: 5.1 K/UL (ref 4.8–10.8)

## 2021-03-02 PROCEDURE — 83036 HEMOGLOBIN GLYCOSYLATED A1C: CPT

## 2021-03-02 PROCEDURE — 99214 OFFICE O/P EST MOD 30 MIN: CPT | Performed by: INTERNAL MEDICINE

## 2021-03-02 PROCEDURE — 95251 CONT GLUC MNTR ANALYSIS I&R: CPT | Performed by: INTERNAL MEDICINE

## 2021-03-02 PROCEDURE — 99211 OFF/OP EST MAY X REQ PHY/QHP: CPT | Performed by: INTERNAL MEDICINE

## 2021-03-02 PROCEDURE — 84439 ASSAY OF FREE THYROXINE: CPT

## 2021-03-02 PROCEDURE — 80053 COMPREHEN METABOLIC PANEL: CPT

## 2021-03-02 PROCEDURE — 36415 COLL VENOUS BLD VENIPUNCTURE: CPT

## 2021-03-02 PROCEDURE — 82607 VITAMIN B-12: CPT

## 2021-03-02 PROCEDURE — 84443 ASSAY THYROID STIM HORMONE: CPT

## 2021-03-02 PROCEDURE — 85025 COMPLETE CBC W/AUTO DIFF WBC: CPT

## 2021-03-02 RX ORDER — INSULIN GLARGINE 300 U/ML
18 INJECTION, SOLUTION SUBCUTANEOUS DAILY
Qty: 4.5 ML | Refills: 11 | Status: SHIPPED | OUTPATIENT
Start: 2021-03-02 | End: 2021-12-07 | Stop reason: SDUPTHER

## 2021-03-02 RX ORDER — INSULIN ASPART 100 [IU]/ML
4 INJECTION, SOLUTION INTRAVENOUS; SUBCUTANEOUS
Qty: 15 ML | Refills: 11 | Status: SHIPPED | OUTPATIENT
Start: 2021-03-02 | End: 2021-12-07 | Stop reason: SDUPTHER

## 2021-03-02 RX ORDER — INSULIN LISPRO 100 [IU]/ML
4 INJECTION, SOLUTION INTRAVENOUS; SUBCUTANEOUS
Qty: 15 ML | Refills: 11 | Status: SHIPPED | OUTPATIENT
Start: 2021-03-02 | End: 2021-03-02

## 2021-03-02 ASSESSMENT — FIBROSIS 4 INDEX: FIB4 SCORE: 1.74

## 2021-03-02 NOTE — TELEPHONE ENCOUNTER
----- Message from Nesha Peralta D.O. sent at 3/1/2021  5:38 PM PST -----  Please call patient's brother and let him know that his Holter monitor/cardiac event monitor was overall normal.  We ordered this due to his syncopal episodes but I am certainly more concerned now that they are more related to hypoglycemic events.

## 2021-03-02 NOTE — PATIENT INSTRUCTIONS
Toujeo 18 every night  Humalog 4u with breakfast and lunch and supper ( all meals should have  Max 45 grams of carbs per meal)    protein only for snacks  Cheese, nuts or beef jerky    Scan with glucose sensor at least 8 times a day

## 2021-03-02 NOTE — PROGRESS NOTES
CHIEF COMPLAINT: Patient is here for follow up of Type 1 Diabetes Mellitus    HPI:     Dylon Santa is a 60 y.o. male with Type 1 Diabetes Mellitus here for follow up.    Labs from 12/7/2021 HbA1c is elevated at 9.7%    He was reportedly diagnosed with type 1 diabetes at the age of 7.  Baseline C-peptide levels and jose guadalupe 65 antibody levels are not available.   He was previously seen by another endocrinologist and this is my first time meeting him    His comorbid conditions include dementia.  His brother is his power of .  He was accompanied by his brother's girlfriend today Lexy who is also helping to take care of him.  He is not a good candidate for an insulin pump.  He does have a therapeutic CGM which she has been wearing for over 6 months.      He is currently on a nonphysiologic insulin regimen consisting of Novolin 15 units every day and Humulin R 5 units 3 times a day with each meal.    He eats 3 square meals a day but does not watch his carb intake and he eats snacks in between meals    His food intake is variable.    He is not very active.    He has very bad memory problems    Download of his Marcial gen 1 glucose sensor today shows that he is not scanning frequently.  His average blood sugar is 176 with time in range of 35%    There is a pattern of multiple episodes of hypoglycemia followed by rebound hyperglycemia from overtreatment      He does have diabetic kidney disease and microalbuminuria but he is on an ARB/ACE inhibitor    He has hyperlipidemia and is on atorvastatin last LDL cholesterol was 63 on December 7, 2020            BG Diary:03/02/21  CGM was downloaded and reviewed today    Weight has been stable    Diabetes Complications   Retinopathy: No known retinopathy.  Last eye exam: Patient is overdue for an eye exam   Neuropathy: Denies paresthesias or numbness in hands or feet. Denies any foot wounds.  Exercise: Minimal.  Diet: Fair.  Patient's medications, allergies, and social  histories were reviewed and updated as appropriate.    ROS:     CONS:     No fever, no chills   EYES:     No diplopia, no blurry vision   CV:           No chest pain, no palpitations   PULM:     No SOB, no cough, no hemoptysis.   GI:            No nausea, no vomiting, no diarrhea, no constipation   ENDO:     No polyuria, no polydipsia, no heat intolerance, no cold intolerance       Past Medical History:  Problem List:  2021-03: Long-term insulin use ([facility])  2021-03: Hypoglycemia  2020-06: Anemia  2019-12: Cervical stenosis of spine  2019-11: Migraine without aura and without status migrainosus, not   intractable  2019-11: Gastroesophageal reflux disease without esophagitis  2019-11: Recurrent epistaxis  2019-10: Atherosclerosis  2019-10: Toe amputee ([facility])  2019-10: Anxiety  2019-10: Neck pain  2019-10: Panic attacks  2019-10: Chest pain  2019-10: Dizziness  2019-10: Postconcussion syndrome  2018-11: Chronic cough  2018-10: Skin lesion, superficial  2018-10: Viral URI with cough  2018-04: Vitamin D deficiency  2018-04: Mononeuropathy  2018-04: PVD (peripheral vascular disease) ([facility])  2017-11: Acute non-recurrent sinusitis  2017-11: Burning with urination  2017-11: Strain of latissimus dorsi muscle  2017-11: Bone spur  2017-04: Wound infection  2017-04: Uncontrolled type 1 diabetes mellitus with hypoglycemia and   coma ([facility])  2017-03: Diabetic ulcer of left foot associated with type 1 diabetes   mellitus ([facility])  2017-03: Diminished pulses in lower extremity  2016-12: Diabetic retinopathy of both eyes ([facility])  2016-06: Blind left eye  2016-05: CKD (chronic kidney disease) stage 3, GFR 30-59 ml/min Dr. Pascual  2016-01: Diabetic nephropathy associated with type 1 diabetes   mellitus ([facility])  2015-06: Elevated serum creatinine  2015-06: Flank pain  2014-12: Hypertension associated with type 1 diabetes mellitus ([facility])  2014-12: History of shingles  2014-12: Diabetic neuropathy ([facility])  2014-12: Dawna-Hunt  "syndrome  Dyslipidemia  Coronary artery disease due to lipid rich plaque - mild to moderate   on Cath 2019 Left Dominant  History of cardiac catheterization 2019 in [facility] abnormal   stress - mild to moderate CAD      Past Surgical History:  Past Surgical History:   Procedure Laterality Date   • IRRIGATION & DEBRIDEMENT GENERAL Left 5/3/2017    Procedure: IRRIGATION & DEBRIDEMENT GENERAL and Left Fourth Toe Amputation ;  Surgeon: Inessa Richard M.D.;  Location: SURGERY [facility];  Service:    • APPENDECTOMY     • CATARACT EXTRACTION WITH IOL Bilateral    • EYE SURGERY     • HAND SURGERY  1990's   • KNEE ARTHROSCOPY      x3   • TONSILLECTOMY          Allergies:  Patient has no known allergies.     Social History:  Social History     Tobacco Use   • Smoking status: Former Smoker     Packs/day: 0.00   • Smokeless tobacco: Former User     Types: Chew   • Tobacco comment: tobacco cessation recommended   Substance Use Topics   • Alcohol use: Yes     Alcohol/week: 0.0 oz     Comment: rare beer   • Drug use: No        Family History:   family history includes Cancer in his father, mother, paternal aunt, and paternal grandfather; Diabetes in his maternal grandfather, maternal uncle, and paternal grandmother; Hyperlipidemia in his brother; Hypertension in his brother; Lung Disease in his brother, father, and mother; Other in his sister; Thyroid in his brother, mother, and sister.      PHYSICAL EXAM:   OBJECTIVE:  Vital signs: /64 (BP Location: Left arm, Patient Position: Sitting, BP Cuff Size: Adult)   Pulse 80   Ht 1.727 m (5' 8\")   Wt 73.1 kg (161 lb 1.6 oz)   SpO2 97%   BMI 24.50 kg/m²   GENERAL: Well-developed, well-nourished in no apparent distress.   EYE:  No ocular asymmetry, PERRLA  HENT: Pink, moist mucous membranes.    NECK: No thyromegaly.   CARDIOVASCULAR:  No murmurs  LUNGS: Clear breath sounds  ABDOMEN: Soft, nontender   EXTREMITIES: No clubbing, cyanosis, or edema.   NEUROLOGICAL: No " gross focal motor abnormalities   LYMPH: No cervical adenopathy palpated.   SKIN: No rashes, lesions.   Monofilament testing with a 10 gram force: sensation: decreased bilaterally  Visual Inspection: Feet without maceration, ulcers, or fissures.  Pedal pulses: intact bilaterally    Labs:  Lab Results   Component Value Date/Time    HBA1C 9.7 (H) 12/07/2020 10:55 AM        Lab Results   Component Value Date/Time    WBC 5.3 01/21/2021 11:50 AM    RBC 4.55 (L) 01/21/2021 11:50 AM    HEMOGLOBIN 13.3 (L) 01/21/2021 11:50 AM    MCV 89.0 01/21/2021 11:50 AM    MCH 29.2 01/21/2021 11:50 AM    MCHC 32.8 (L) 01/21/2021 11:50 AM    RDW 44.4 01/21/2021 11:50 AM    MPV 10.6 01/21/2021 11:50 AM       Lab Results   Component Value Date/Time    SODIUM 133 (L) 01/21/2021 04:20 PM    POTASSIUM 4.5 01/21/2021 04:20 PM    CHLORIDE 101 01/21/2021 04:20 PM    CO2 25 01/21/2021 04:20 PM    ANION 7.0 01/21/2021 04:20 PM    GLUCOSE 313 (H) 01/21/2021 04:20 PM    BUN 31 (H) 01/21/2021 04:20 PM    CREATININE 1.41 (H) 01/21/2021 04:20 PM    CALCIUM 8.9 01/21/2021 04:20 PM    ASTSGOT 31 01/21/2021 11:50 AM    ALTSGPT 23 01/21/2021 11:50 AM    TBILIRUBIN 0.6 01/21/2021 11:50 AM    ALBUMIN 4.4 01/21/2021 11:50 AM    TOTPROTEIN 7.6 01/21/2021 11:50 AM    GLOBULIN 3.2 01/21/2021 11:50 AM    AGRATIO 1.4 01/21/2021 11:50 AM       Lab Results   Component Value Date/Time    CHOLSTRLTOT 138 12/07/2020 1055    TRIGLYCERIDE 98 12/07/2020 1055    HDL 55 12/07/2020 1055    LDL 63 12/07/2020 1055       Lab Results   Component Value Date/Time    MALBCRT 73 (H) 12/07/2020 10:55 AM    MICROALBUR 10.4 12/07/2020 10:55 AM        Lab Results   Component Value Date/Time    TSHULTRASEN 1.340 10/22/2019 0156     No results found for: FREEDIR  No results found for: FREET3  No results found for: THYSTIMIG        ASSESSMENT/PLAN:     1. Uncontrolled type 1 diabetes mellitus with hypoglycemia and coma (HCC)  Uncontrolled secondary to hyperglycemia and hypoglycemia  I am  discontinuing NPH and regular insulin  I am starting him on physiologic insulin regimen of Toujeo 18 units daily and NovoLog 4 units 3 times a day with each meal  He needs to eat 45 g of carbs per meal  He should avoid carbohydrate snacks  I am referring him for diabetes education classes for meal planning I recommend that his caregivers attend the classes  I recommend that he start writing down his sugars and insulin dosing to help with his memory and so that he establishes definite regimented pattern  I recommend that he scan with his therese glucose sensor 8 times a day so that there is no data loss  I will have him follow-up with the diabetes nurse in 4 weeks    2. Proliferative diabetic retinopathy of both eyes without macular edema associated with type 1 diabetes mellitus (HCC)  Recommend that he get an annual eye exam.  We also recommend providing us copies of his eye exam    3. Hypoglycemia  Uncontrolled reviewed  Rule of 15 for proper treatment of hypoglycemia    4. Dyslipidemia  Controlled continue atorvastatin repeat fasting lipids in 12 months    5. Long-term insulin use (HCC)  Patient is on long-term insulin therapy due to type 1 diabetes      Return in about 4 weeks (around 3/30/2021).      This patient during there office visit today was started on a new medication.  Side effects of the new medication were discussed with the patient today in the office.     Thank you kindly for allowing me to participate in the diabetes care plan for this patient.    Alexander Booker MD, [facility], [facility]  03/02/21    CC:   Nesha Peralta D.O.

## 2021-03-02 NOTE — TELEPHONE ENCOUNTER
----- Message from Nesha Peralta D.O. sent at 3/1/2021  5:14 PM PST -----  Please call patient's brother listed in contacts (not patient as he has very significant memory loss) and let him know that his MRI was normal with the exception of moderate brain shrinkage which can be consistent with dementia.  We will await the results of the lab work in addition to the opinion of the neurologist as well.

## 2021-03-03 ASSESSMENT — ACTIVITIES OF DAILY LIVING (ADL)
HOME_HEALTH_OASIS: 00
OASIS_M1830: 00

## 2021-03-03 ASSESSMENT — PATIENT HEALTH QUESTIONNAIRE - PHQ9: CLINICAL INTERPRETATION OF PHQ2 SCORE: 0

## 2021-03-04 ENCOUNTER — OFFICE VISIT (OUTPATIENT)
Dept: NEUROLOGY | Facility: MEDICAL CENTER | Age: 61
End: 2021-03-04
Attending: PSYCHIATRY & NEUROLOGY
Payer: MEDICARE

## 2021-03-04 ENCOUNTER — HOSPITAL ENCOUNTER (OUTPATIENT)
Dept: LAB | Facility: MEDICAL CENTER | Age: 61
End: 2021-03-04
Attending: PSYCHIATRY & NEUROLOGY
Payer: MEDICARE

## 2021-03-04 VITALS
WEIGHT: 163.36 LBS | HEART RATE: 78 BPM | TEMPERATURE: 97.9 F | BODY MASS INDEX: 24.76 KG/M2 | HEIGHT: 68 IN | SYSTOLIC BLOOD PRESSURE: 120 MMHG | DIASTOLIC BLOOD PRESSURE: 80 MMHG | OXYGEN SATURATION: 96 %

## 2021-03-04 DIAGNOSIS — G30.9 ALZHEIMER'S DISEASE, UNSPECIFIED (CODE) (HCC): ICD-10-CM

## 2021-03-04 DIAGNOSIS — F03.91 DEMENTIA WITH BEHAVIORAL DISTURBANCE, UNSPECIFIED DEMENTIA TYPE: ICD-10-CM

## 2021-03-04 DIAGNOSIS — F03.91 DEMENTIA WITH BEHAVIORAL DISTURBANCE, UNSPECIFIED DEMENTIA TYPE: Primary | ICD-10-CM

## 2021-03-04 PROBLEM — F03.918 DEMENTIA WITH BEHAVIORAL DISTURBANCE (HCC): Status: ACTIVE | Noted: 2021-03-04

## 2021-03-04 LAB — FOLATE SERPL-MCNC: 15.8 NG/ML

## 2021-03-04 PROCEDURE — 36415 COLL VENOUS BLD VENIPUNCTURE: CPT

## 2021-03-04 PROCEDURE — 85652 RBC SED RATE AUTOMATED: CPT

## 2021-03-04 PROCEDURE — 99211 OFF/OP EST MAY X REQ PHY/QHP: CPT | Performed by: PSYCHIATRY & NEUROLOGY

## 2021-03-04 PROCEDURE — 99205 OFFICE O/P NEW HI 60 MIN: CPT | Performed by: PSYCHIATRY & NEUROLOGY

## 2021-03-04 PROCEDURE — 82746 ASSAY OF FOLIC ACID SERUM: CPT

## 2021-03-04 PROCEDURE — 84165 PROTEIN E-PHORESIS SERUM: CPT

## 2021-03-04 PROCEDURE — 86038 ANTINUCLEAR ANTIBODIES: CPT

## 2021-03-04 PROCEDURE — 84155 ASSAY OF PROTEIN SERUM: CPT

## 2021-03-04 ASSESSMENT — ENCOUNTER SYMPTOMS
SEIZURES: 0
SENSORY CHANGE: 1
MEMORY LOSS: 1
FALLS: 1
LOSS OF CONSCIOUSNESS: 0
INSOMNIA: 0
TINGLING: 1
NERVOUS/ANXIOUS: 1

## 2021-03-04 ASSESSMENT — FIBROSIS 4 INDEX: FIB4 SCORE: 1.3

## 2021-03-04 NOTE — PROGRESS NOTES
Subjective:      Dylon Santa is a 60 y.o. male who presents with his brother Luis Carlos, who provides additional history, from the office of Dr. Nesha Peralta DO, with a history of cognitive impairment suggestive of dementia.    HPI    Dylon is a pleasant 60-year-old right-handed gentleman who recognizes that he has some memory loss problems, he thinks it is mostly the short-term and recent events.  According to Luis Carlos, the problems have been ongoing for several years, but which seem to have been getting worse more recently.    He has been having difficulty with speech and language, having difficulty expressing himself but also understanding what is being said.  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  Language itself has become more simplistic, complexity in conversation and ability to explain himself curtailed.  Learning and processing speed also is impaired.  They have been to get him a new phone because he has been having difficulty using the one he has; Luis Carlos is simply trying to simplify matters.    Dylon repeats himself quite frequently, recollection of recent events curtailed, and if recall, details definitely are not.  He recognizes people with whom he is familiar easily.    He is having difficulty using things around the house such as the TV controller.  There has been more clutter to his own personal affairs as well.  Luis Carlos is finding he has to be with his brother 24/7 to make sure things are safe.  He has put TV cameras and monitors in every room of the house to keep an eye on Dylon.    When Luis Carlos leaves the area, he stocks the house with supplies, and yet he finds his brother going to the store to buy the very same things that have been supplied for him.  Fortunately the patient is not been wandering out of the house, has not left the house with the place unlocked, stove on, water running, etc.    He seems to be independent  "with his ADLs, personal presentation is being maintained.  He is much more unsteady, he complains of not being able to feel his feet or his hands.  He has had some urinary \"leakage\", he is seeing urology for this.  There has been no actual bowel change in habit with incontinence.    Emotionally, they lost their father recently, this has had an effect on Dylon's mood.  He has never expressed suicidal thoughts.  Evidently there was a diagnosis of a \"sociopathic\" disorder when he was a teenager.  Luis Carlos could not be more specific.    He did undergo MRI scan of the brain this last January, 2021, revealing only mild chronic microvascular ischemic changes bilaterally, but a moderate degree of atrophy beyond his chronologic age and ventriculomegaly ex vacuo.  Blood work within the last year has included B12 and TSH values, both unremarkable.  Treatment has not been offered.    He has a history of diabetes, childhood onset, control has been very brittle.  He has recurrent episodes of severe hypoglycemia, he has been brought to the hospital on multiple occasions as a result.  There is also history of hypertension, mild renal insufficiency, no history of CVA, CAD, PVD, malignancy, thyroid disease, autoimmune disease, seizure, MS, neurodegenerative disease, pulmonary disease, liver disease, or blood dyscrasia.    There is no surgical history of note.    His mother suffers from dementia, his maternal grandfather suffered from paranoid schizophrenia.  No one else in the family has a history of neurologic disease that either are aware of.    He does not smoke, rarely drinks alcohol.  He is on trazodone, vitamin D and calcium, Cozaar 50 mg daily, Plavix 75 mg daily, Toujeo, Toprol-XL, Zovirax, insulin, the rest as per the electronic health record, noncontributory from my standpoint.    Review of Systems   Musculoskeletal: Positive for falls.   Neurological: Positive for tingling and sensory change. Negative for seizures and loss of " "consciousness.   Psychiatric/Behavioral: Positive for memory loss. The patient is nervous/anxious. The patient does not have insomnia.    All other systems reviewed and are negative.       Objective:     /80   Pulse 78   Temp 36.6 °C (97.9 °F)   Ht 1.727 m (5' 8\")   Wt 74.1 kg (163 lb 5.8 oz)   SpO2 96%   BMI 24.84 kg/m²      Physical Exam    He appears in no acute distress.  He is quite pleasant.  He is clean and appropriately dressed.  He is cooperative.  Vital signs are stable.  There is no malar rash or jaw claudication.  The neck is supple.  Cardiac evaluation is unremarkable.  There are some mild vascular insufficiency changes seen in the distal lower extremities, there is no edema.  Distal pulses are diminished.    He is partially oriented only to the date and the month as well as the place.  MoCA is severely curtailed at 5/30.  He shows difficulty with all cognitive domains assessed.  Throughout the interview he does repeat himself, he is field dependent and at times perseverative.  Language complexity is diminished.  He follows three-step commands accurately, but he needs repetition.  There is no agnosia, apraxia, or inattention.  Frontal release signs are absent.    PERRLA/EOMI, visual fields OD are intact with movement detection, there is severe peripheral constraint OS, especially inferiorly.  Facial movements are symmetric, there is no dysarthria, sensory exam is intact to temperature and pinprick.  Shoulder shrug is symmetric, head rotation is intact bilaterally.    Musculoskeletal exam reveals normal tone throughout, there is no tremor, asterixis, or drift.  Strength is intact throughout.  Reflexes are diminished throughout, ankle jerks are absent, the right toe is equivocal, the left downgoing on plantar stimulation.    He walks with a steppage quality, station is widened, he looks at the ground consistently.  Armswing is symmetric.  He can stand on his heels and toes but needs " assistance.  Tandem walking was not assessed.  There is no appendicular dystaxia.  Repetitive movements are minimally slowed in the feet, in the hands these are symmetric.    Sensory exam reveals a stocking pattern decrement of vibration distal to the midshin bilaterally, pinprick suggest a hyperpathia in the feet, temperature seems to be lost below the ankles.  All modalities are intact of the upper extremities.  Romberg is absent.     Assessment/Plan:     1. Dementia with behavioral disturbance, unspecified dementia type (HCC)  An unusual presentation given the early onset, as well as what seems to be is a more rapid progression, but he clearly fits diagnostic criteria for dementia, the work-up still needs to be completed.  CT PET scan as well as EEG studies are critical, as routine imaging reveals no obvious structural pathology.  He seems to have more selective language as well as some frontal lobe dysfunction that suggests a frontotemporal process versus temporoparietal that would be classic for Alzheimer's disease.  In either case these are degenerative.    Cerebrovascular disease has been ruled out based on imaging, as has NPH, mass, tumor, or inflammation.  EEG can be done to rule out nonconvulsive seizure activity.  Some additional blood work as well should be drawn.  Symptomatic relief will be discussed at his follow-up appointment.    - CT-PET METABOLIC EVALUATION - BRAIN; Future  - Sed Rate; Future  - VIRGILIO W/REFLEX  - FOLATE; Future  - SPEP W/REFLEX TO SERGEY, A, G, M; Future  - REFERRAL TO NEURODIAGNOSTICS (EEG,EP,EMG/NCS/DBS)    Time: 60 minutes spent face-to-face for exam, review, discussion, and education, of this over 50% of the time spent counseling and coordinating care.

## 2021-03-05 ENCOUNTER — HOME CARE VISIT (OUTPATIENT)
Dept: HOME HEALTH SERVICES | Facility: HOME HEALTHCARE | Age: 61
End: 2021-03-05
Payer: MEDICARE

## 2021-03-05 LAB — ERYTHROCYTE [SEDIMENTATION RATE] IN BLOOD BY WESTERGREN METHOD: 7 MM/HOUR (ref 0–20)

## 2021-03-05 NOTE — PROGRESS NOTES
Quality Review for 3/1/21 PA OASIS by BETH Sargent RN on  March 5, 2021    Edits completed by BETH Sargent RN:  1.  E is NA

## 2021-03-05 NOTE — Clinical Note
I agree with these changes.  Elke Puri RN,   ----- Message -----  From: Maribel Sargent R.N.  Sent: 3/5/2021  10:15 AM PST  To: Elke Puri R.N.      Quality Review for 3/1/21 DC OASIS by BETH Sargent RN on  March 5, 2021    Edits completed by BETH Sargent RN:  1.  E is NA

## 2021-03-06 LAB — NUCLEAR IGG SER QL IA: NORMAL

## 2021-03-08 LAB
ALBUMIN SERPL ELPH-MCNC: 4.21 G/DL (ref 3.75–5.01)
ALPHA1 GLOB SERPL ELPH-MCNC: 0.23 G/DL (ref 0.19–0.46)
ALPHA2 GLOB SERPL ELPH-MCNC: 0.67 G/DL (ref 0.48–1.05)
B-GLOBULIN SERPL ELPH-MCNC: 0.59 G/DL (ref 0.48–1.1)
GAMMA GLOB SERPL ELPH-MCNC: 1.39 G/DL (ref 0.62–1.51)
INTERPRETATION SERPL IFE-IMP: NORMAL
MONOCLON BAND OBS SERPL: NORMAL
PATHOLOGY STUDY: NORMAL
PROT SERPL-MCNC: 7.1 G/DL (ref 6.3–8.2)

## 2021-03-08 PROCEDURE — 93248 EXT ECG>7D<15D REV&INTERPJ: CPT | Performed by: INTERNAL MEDICINE

## 2021-03-08 PROCEDURE — 93246 EXT ECG>7D<15D RECORDING: CPT | Performed by: INTERNAL MEDICINE

## 2021-03-15 DIAGNOSIS — Z23 NEED FOR VACCINATION: ICD-10-CM

## 2021-03-16 ENCOUNTER — TELEPHONE (OUTPATIENT)
Dept: MEDICAL GROUP | Facility: PHYSICIAN GROUP | Age: 61
End: 2021-03-16

## 2021-03-16 NOTE — TELEPHONE ENCOUNTER
ESTABLISHED PATIENT PRE-VISIT PLANNING     Patient was NOT contacted to complete PVP.     Note: Patient will not be contacted if there is no indication to call.     1.  Reviewed notes from the last few office visits within the medical group: Yes    2.  If any orders were placed at last visit or intended to be done for this visit (i.e. 6 mos follow-up), do we have Results/Consult Notes?         •  Labs - Labs ordered, completed on 3/4/21 and results are in chart.  Note: If patient appointment is for lab review and patient did not complete labs, check with provider if OK to reschedule patient until labs completed.       •  Imaging - Imaging ordered, completed and results are in chart.       •  Referrals - Referral ordered, patient was seen and consult notes are in chart. Care Teams updated  YES.    3. Is this appointment scheduled as a Hospital Follow-Up? No    4.  Immunizations were updated in Epic using Reconcile Outside Information activity? Yes    5.  Patient is due for the following Health Maintenance Topics:   Health Maintenance Due   Topic Date Due   • COVID-19 Vaccine (1 of 2) Never done   • RETINAL SCREENING  12/21/2016   • COLONOSCOPY  09/24/2020   • Annual Wellness Visit  02/25/2021       6.  AHA (Pulse8) form printed for Provider? Yes

## 2021-03-18 ENCOUNTER — TELEPHONE (OUTPATIENT)
Dept: MEDICAL GROUP | Facility: PHYSICIAN GROUP | Age: 61
End: 2021-03-18

## 2021-03-18 NOTE — PROGRESS NOTES
Agree with current changes.    Thank you,   Elke Puri RN, CM  ----- Message -----  From: Maribel Sargent R.N.  Sent: 3/12/2021   3:13 PM PDT  To: Elke Puri R.N.  Subject: RE:                                                Osvaldo Bedoya,  You need to save the response to the chart please. Thanks!  ----- Message -----  From: Elke Puri R.N.  Sent: 3/9/2021  To: Maribel Sargent R.N.  Subject: RE:                                              I agree with these changes.  Elke Puri RN,   ----- Message -----  From: Maribel Sargent R.N.  Sent: 3/5/2021  10:15 AM PST  To: Elke Puri R.N.      Quality Review for 3/1/21 DC OASIS by BETH Sargent RN on  March 5, 2021    Edits completed by BETH Sargent RN:  1.  E is NA

## 2021-03-18 NOTE — TELEPHONE ENCOUNTER
Patient called asking for you to write a letter stating he has underlying conditions so he can get his Covid-19 vaccine.  Please advise

## 2021-03-24 ENCOUNTER — HOSPITAL ENCOUNTER (OUTPATIENT)
Dept: RADIOLOGY | Facility: MEDICAL CENTER | Age: 61
End: 2021-03-24
Attending: PSYCHIATRY & NEUROLOGY
Payer: MEDICARE

## 2021-03-24 DIAGNOSIS — G30.9 ALZHEIMER'S DISEASE, UNSPECIFIED (CODE) (HCC): ICD-10-CM

## 2021-03-24 DIAGNOSIS — F03.91 DEMENTIA WITH BEHAVIORAL DISTURBANCE, UNSPECIFIED DEMENTIA TYPE: ICD-10-CM

## 2021-03-24 PROCEDURE — A9552 F18 FDG: HCPCS

## 2021-03-26 ENCOUNTER — OFFICE VISIT (OUTPATIENT)
Dept: MEDICAL GROUP | Facility: PHYSICIAN GROUP | Age: 61
End: 2021-03-26
Payer: MEDICARE

## 2021-03-26 VITALS
OXYGEN SATURATION: 100 % | BODY MASS INDEX: 23.79 KG/M2 | DIASTOLIC BLOOD PRESSURE: 58 MMHG | WEIGHT: 157 LBS | HEART RATE: 68 BPM | SYSTOLIC BLOOD PRESSURE: 116 MMHG | HEIGHT: 68 IN | TEMPERATURE: 97.8 F

## 2021-03-26 DIAGNOSIS — E10.641 UNCONTROLLED TYPE 1 DIABETES MELLITUS WITH HYPOGLYCEMIA AND COMA (HCC): ICD-10-CM

## 2021-03-26 DIAGNOSIS — F03.91 DEMENTIA WITH BEHAVIORAL DISTURBANCE, UNSPECIFIED DEMENTIA TYPE: ICD-10-CM

## 2021-03-26 PROCEDURE — 99213 OFFICE O/P EST LOW 20 MIN: CPT | Performed by: FAMILY MEDICINE

## 2021-03-26 ASSESSMENT — FIBROSIS 4 INDEX: FIB4 SCORE: 1.3

## 2021-03-26 NOTE — PROGRESS NOTES
Annual Health Assessment Questions:     1.  Are you currently engaging in any exercise or physical activity? Yes    2.  How would you describe your mood or emotional well-being today? fair    3.  Have you had any falls in the last year? Yes    4.  Have you noticed any problems with your balance or had difficulty walking? Yes    5.  In the last six months have you experienced any leakage of urine? No    6. DPA/Advanced Directive: Patient has Advanced Directive on file.

## 2021-03-26 NOTE — PROGRESS NOTES
CC: Diabetes, dementia    HISTORY OF THE PRESENT ILLNESS: Patient is a 60 y.o. male.     Patient is here today with his brother for follow-up.    Since her last visit, there has been some progress and that he was able to see Dr. Barajas in endocrinology.  His diabetes medications were changed.  He does bring in his sugar logs today.  He has had a couple of lows around 60, and some spikes into the 200s, rarely 300s, but overall sugars are mostly within the 100s range.  This is a considerable improvement from previous lability in blood sugars.  Brother is now essentially with patient full-time, administering insulin as patient is unable to do it himself safely.  Brother does split time between here and [place] with his mom.  He is planning to just take his brother with him from here on out due to safety concerns with his brother.    Safety concerns come from dementia.  He was also able to see neurology since her last visit.  He has been diagnosed with dementia.  Further testing was done, possibly suggestive of vascular dementia.    Allergies: Patient has no known allergies.    Current Outpatient Medications Ordered in Epic   Medication Sig Dispense Refill   • Insulin Glargine, 1 Unit Dial, (TOUJEO SOLOSTAR) 300 UNIT/ML Solution Pen-injector Inject 18 Units under the skin every day. 4.5 mL 11   • insulin aspart (NOVOLOG FLEXPEN) 100 UNIT/ML injection PEN Inject 4 Units under the skin 3 times a day before meals. 15 mL 11   • Insulin Pen Needle 32 G x 4 mm Use 4 times a day with insulin pens 200 Each 3   • acyclovir (ZOVIRAX) 200 MG Cap TAKE ONE CAPSULE BY MOUTH ONE TIME DAILY  90 Cap 1   • metoprolol SR (TOPROL XL) 25 MG TABLET SR 24 HR TAKE ONE TABLET BY MOUTH ONE TIME DAILY 90 Tab 1   • Continuous Blood Gluc Sensor (FREESTYLE CARLOS 14 DAY SENSOR) Misc Inject 1 Units under the skin every 14 days. 6 Each 3   • traZODone (DESYREL) 100 MG Tab Take 1 Tab by mouth at bedtime as needed for Sleep. 90 Tab 1   •  "atorvastatin (LIPITOR) 40 MG Tab Take 1 Tab by mouth every day. 100 Tab 3   • GLUCAGEN HYPOKIT 1 MG Recon Soln INJECT 1 MG BY INTRAVENOUS ROUTE ONCE FOR 1 DOSE     • glucose 4 GM chewable tablet Take 4 Tabs by mouth as needed for Low Blood Sugar. 30 Tab 1   • losartan (COZAAR) 50 MG Tab Take 1 Tab by mouth every day. 100 Tab 3   • clopidogrel (PLAVIX) 75 MG Tab Take 1 Tab by mouth every day. 90 Tab 3   • OYSTER SHELL CALCIUM PO Take 1 Tab by mouth every day.     • ALFALFA PO Take 1 Tab by mouth every morning.       No current [facility]-ordered facility-administered medications on file.       Past Medical History:   Diagnosis Date   • Anesthesia     hx PONV   • Arthritis     L knee; hands   • Bronchitis 2016   • Cataract     nicole iol   • Cold 4/5/17    prod cough   • Coronary artery disease due to lipid rich plaque - mild to moderate on Cath 2019 Left Dominant    • Dental disorder     \"bad shape\"   • Diabetic neuropathy ([facility]) 12/31/2014    retinopathy   • Diabetic retinopathy of both eyes ([facility]) 12/9/2016   • Dyslipidemia    • Heart murmur    • Hemorrhagic disorder ([facility])     nose bleeds   • High cholesterol    • History of cardiac catheterization 2019 in [facility] abnormal stress - mild to moderate CAD    • History of shingles 12/31/2014   • Hyperlipidemia    • Hypertension    • Kidney stones    • Pain     left leg/foot   • Psychiatric problem     situational depression   • Type II or unspecified type diabetes mellitus without mention of complication, uncontrolled 12/31/2014       Past Surgical History:   Procedure Laterality Date   • IRRIGATION & DEBRIDEMENT GENERAL Left 5/3/2017    Procedure: IRRIGATION & DEBRIDEMENT GENERAL and Left Fourth Toe Amputation ;  Surgeon: Inessa Richard M.D.;  Location: SURGERY [facility];  Service:    • APPENDECTOMY     • CATARACT EXTRACTION WITH IOL Bilateral    • EYE SURGERY     • HAND SURGERY  1990's   • KNEE ARTHROSCOPY      x3   • TONSILLECTOMY         Social History " "    Tobacco Use   • Smoking status: Former Smoker     Packs/day: 0.00   • Smokeless tobacco: Former User     Types: Chew   • Tobacco comment: tobacco cessation recommended   Substance Use Topics   • Alcohol use: Yes     Alcohol/week: 0.0 oz     Comment: rare beer   • Drug use: No       Social History     Social History Narrative   • Not on file       Family History   Problem Relation Age of Onset   • Thyroid Mother    • Lung Disease Mother         COPD   • Cancer Mother         eyebrow    • Thyroid Sister    • Other Sister         mental health issues    • Thyroid Brother    • Lung Disease Brother         COPD   • Hypertension Brother    • Hyperlipidemia Brother    • Lung Disease Father         COPD   • Cancer Father    • Diabetes Maternal Uncle    • Cancer Paternal Aunt         type unknown   • Diabetes Maternal Grandfather    • Diabetes Paternal Grandmother    • Cancer Paternal Grandfather         prostate   • Stroke Neg Hx        ROS:   Denies fever,  shortness of breath      Labs: Labs reviewed from March 4, 2021.  Imaging: CT PET scan brain reviewed from March 24, 2021.    Exam: /58 (BP Location: Left arm, Patient Position: Sitting, BP Cuff Size: Adult)   Pulse 68   Temp 36.6 °C (97.8 °F) (Temporal)   Ht 1.727 m (5' 8\")   Wt 71.2 kg (157 lb)   SpO2 100%  Body mass index is 23.87 kg/m².    General: Well appearing, NAD  Pulmonary: Clear to ausculation.  Normal effort. No rales, ronchi, or wheezing.  Cardiovascular: Regular rate and rhythm without murmur, rubs or gallop.   Skin: Warm and dry.  No obvious lesions.  Musculoskeletal:  No extremity cyanosis, clubbing, or edema.  Psych: Normal mood and affect. Alert and oriented. Judgment and insight is normal.    Please note that this dictation was created using voice recognition software. I have made every reasonable attempt to correct obvious errors, but I expect that there are errors of grammar and possibly content that I did not discover before " finalizing the note.      Assessment/Plan  Dylon was seen today for diabetes follow-up.    Diagnoses and all orders for this visit:    Uncontrolled type 1 diabetes mellitus with hypoglycemia and coma (HCC)  Chronic issue, uncontrolled though sugars drastically improved since seeing endocrinology with medication changes.  They are planning to schedule follow-up around May with endocrinology.    Dementia with behavioral disturbance, unspecified dementia type (HCC)  Chronic and worsening issue.  Findings possibly consistent with vascular dementia.  Patient essentially dependent on brother at this point.  To neurology for further recommendations.    Follow-up in about 3 to 4 months or sooner if needed.    Nesha Peralta DO  [facility]

## 2021-04-05 ENCOUNTER — PATIENT MESSAGE (OUTPATIENT)
Dept: HEALTH INFORMATION MANAGEMENT | Facility: OTHER | Age: 61
End: 2021-04-05

## 2021-04-14 ENCOUNTER — PATIENT OUTREACH (OUTPATIENT)
Dept: HEALTH INFORMATION MANAGEMENT | Facility: OTHER | Age: 61
End: 2021-04-14

## 2021-04-14 NOTE — PROGRESS NOTES
Outcome: Left Message    Please transfer to Patient Outreach Team at [phone] when patient returns call.    WebIZ Checked & Epic Updated:  no    [facility] Verified: no    Attempt # 1

## 2021-05-12 ENCOUNTER — PATIENT MESSAGE (OUTPATIENT)
Dept: HEALTH INFORMATION MANAGEMENT | Facility: OTHER | Age: 61
End: 2021-05-12

## 2021-05-18 ENCOUNTER — OFFICE VISIT (OUTPATIENT)
Dept: ENDOCRINOLOGY | Facility: MEDICAL CENTER | Age: 61
End: 2021-05-18
Attending: INTERNAL MEDICINE
Payer: MEDICARE

## 2021-05-18 VITALS
OXYGEN SATURATION: 98 % | HEART RATE: 64 BPM | SYSTOLIC BLOOD PRESSURE: 130 MMHG | BODY MASS INDEX: 23.6 KG/M2 | DIASTOLIC BLOOD PRESSURE: 70 MMHG | WEIGHT: 155.7 LBS | HEIGHT: 68 IN

## 2021-05-18 DIAGNOSIS — E10.3593 PROLIFERATIVE DIABETIC RETINOPATHY OF BOTH EYES WITHOUT MACULAR EDEMA ASSOCIATED WITH TYPE 1 DIABETES MELLITUS (HCC): ICD-10-CM

## 2021-05-18 DIAGNOSIS — E10.59 HYPERTENSION ASSOCIATED WITH TYPE 1 DIABETES MELLITUS (HCC): ICD-10-CM

## 2021-05-18 DIAGNOSIS — N18.31 STAGE 3A CHRONIC KIDNEY DISEASE: ICD-10-CM

## 2021-05-18 DIAGNOSIS — E78.5 DYSLIPIDEMIA: ICD-10-CM

## 2021-05-18 DIAGNOSIS — I15.2 HYPERTENSION ASSOCIATED WITH TYPE 1 DIABETES MELLITUS (HCC): ICD-10-CM

## 2021-05-18 PROCEDURE — 99214 OFFICE O/P EST MOD 30 MIN: CPT | Performed by: NURSE PRACTITIONER

## 2021-05-18 PROCEDURE — 99213 OFFICE O/P EST LOW 20 MIN: CPT | Performed by: NURSE PRACTITIONER

## 2021-05-18 RX ORDER — ATORVASTATIN CALCIUM 40 MG/1
40 TABLET, FILM COATED ORAL DAILY
Qty: 100 TABLET | Refills: 3 | Status: SHIPPED | OUTPATIENT
Start: 2021-05-18 | End: 2022-03-28 | Stop reason: SDUPTHER

## 2021-05-18 RX ORDER — LOSARTAN POTASSIUM 50 MG/1
50 TABLET ORAL DAILY
Qty: 100 TABLET | Refills: 3 | Status: SHIPPED | OUTPATIENT
Start: 2021-05-18 | End: 2022-03-28 | Stop reason: SDUPTHER

## 2021-05-18 ASSESSMENT — FIBROSIS 4 INDEX: FIB4 SCORE: 1.3

## 2021-05-18 NOTE — PROGRESS NOTES
"RN-CDE Note    Subjective:   Endocrinology Clinic Progress Note  PCP: Nesha Peralta D.O.    HPI:  Dylon Santa is a 60 y.o. old patient who is seen today for review of Type 1 Diabetes.  Brother is main caregiver.    DM:   Last A1c:   Lab Results   Component Value Date/Time    HBA1C 9.8 (H) 03/02/2021 08:09 AM      Previous A1c was 9.8 on 3/2/21  A1C GOAL: < 7    Diabetes Medications:   Novolog 4 units before meals  Taking above medications as prescribed: yes  Taking daily ASA: No    Exercise: walking  Diet: \"healthy\" diet  in general  Patient's body mass index is 23.67 kg/m². Exercise and nutrition counseling were performed at this visit.    Glucose monitoring frequency: See blood glucose logs.  Wearing Marcial    Hypoglycemic episodes: yes - has had a couple 59 blood sugars.  Last Retinal Exam: States will have to let us know his eye doctors name.  Daily Foot Exam: Yes   Foot Exam:  Monofilament: done  Monofilament testing with a 10 gram force: sensation intact: decreased on left  Visual Inspection: Feet without maceration, ulcers, fissures.    Right 4th toe amputated and has healed well.  Pedal pulses: intact bilaterally   Lab Results   Component Value Date/Time    MALBCRT 73 (H) 12/07/2020 10:55 AM    MICROALBUR 10.4 12/07/2020 10:55 AM      ACR Albumin/Creatinine Ratio goal <30   Currently Rx ACE/ARB: Yes   Dyslipidemia:  Lab Results   Component Value Date/Time    CHOLSTRLTOT 138 12/07/2020 10:55 AM    LDL 63 12/07/2020 10:55 AM    HDL 55 12/07/2020 10:55 AM    TRIGLYCERIDE 98 12/07/2020 10:55 AM       Currently Rx Statin: Yes     He  reports that he has quit smoking. He smoked 0.00 packs per day. He has quit using smokeless tobacco.  His smokeless tobacco use included chew.      Plan:     Discussed and educated on:   - All medications, side effects and compliance (discussed carefully)  - Annual eye examinations at Ophthalmology  - Home glucose monitoring emphasized  - Weight control and daily " exercise    Recommended medication changes: We will switch to a 1:10 carbohydrate ratio and 1:50> 150 correction.  Increase Toujeo 20 units daily.  Adjust insulin as needed.

## 2021-05-18 NOTE — PROGRESS NOTES
CHIEF COMPLAINT: Patient is here for follow up of Type 1 Diabetes Mellitus.    HPI:     Dylon Santa is a 60 y.o. male for continued evaluation & treatment of the followin.  Type 1 Diabetes Mellitus   Mr. Santa is here with his brother for follow-up appointment.  Patient has been diagnosed with dementia and his brother has been caring for him and is the power of .  Patient is alert oriented x 3.  Very pleasant and receptive to all questions.  History of type 1 diabetes diagnosed at the age of 7.    Current diabetes regimen:  Toujeo 18 units daily  NovoLog 4 units before meals    Serum A1c 2021: 9.8%  Labs from 2020 HbA1c is elevated at 9.7%    Blood sugar logs 2021: Patient has freestyle therese 14-day CGM.  Data has been downloaded and scanned under media tab.  Details of report discussed with patient and his brother.    Fasting glucose:100s-240s  Dinnertime glucose: 80's-300's    Patient and his brother deny any hypoglycemic events.  Patient is hypoglycemic aware however requires caregiver assistance to be notified of these low occurrences and how to treat.    Patient notes 3 meals a day and occasionally has some snacks.    Patient and his brother are very active, walking and/or busy throughout the day.    Diabetes Complications   Retinopathy: No known retinopathy.  Last eye exam: Patient is overdue for an eye exam   Neuropathy: Denies paresthesias or numbness in hands or feet. Denies any foot wounds.  Positive for left foot metatarsal amputation.  Incisions are well-healed.  Exercise: Minimal.  Diet: Fair.    History of Chronic Kidney Disease, Stage 3.  Currently taking losartan 50 mg daily.  /70.     Ref. Range 2020 10:55   Micro Alb Creat Ratio Latest Ref Range: 0 - 30 mg/g 73 (H)   Creatinine, Urine Latest Units: mg/dL 142.25   Microalbumin, Urine Random Latest Units: mg/dL 10.4       2.  Dyslipidemia  Currently taking Lipitor 40 mg daily.  Patient denies  myalgias or muscle cramps.     Ref. Range 12/7/2020 10:55   Cholesterol,Tot Latest Ref Range: 100 - 199 mg/dL 138   Triglycerides Latest Ref Range: 0 - 149 mg/dL 98   HDL Latest Ref Range: >=40 mg/dL 55   LDL Latest Ref Range: <100 mg/dL 63         Patient's medications, allergies, and social histories were reviewed and updated as appropriate.    ROS:     CONS:     No fever, no chills   EYES:     No diplopia, no blurry vision   CV:           No chest pain, no palpitations   PULM:     No SOB, no cough, no hemoptysis.   GI:            No nausea, no vomiting, no diarrhea, no constipation   ENDO:     No polyuria, no polydipsia, no heat intolerance, no cold intolerance       Past Medical History:  Problem List:  2021-03: Dementia with behavioral disturbance ([facility])  2021-03: Long-term insulin use ([facility])  2021-03: Hypoglycemia  2020-06: Anemia  2019-12: Cervical stenosis of spine  2019-11: Migraine without aura and without status migrainosus, not   intractable  2019-11: Gastroesophageal reflux disease without esophagitis  2019-11: Recurrent epistaxis  2019-10: Atherosclerosis  2019-10: Toe amputee ([facility])  2019-10: Anxiety  2019-10: Neck pain  2019-10: Panic attacks  2019-10: Chest pain  2019-10: Dizziness  2019-10: Postconcussion syndrome  2018-11: Chronic cough  2018-10: Skin lesion, superficial  2018-10: Viral URI with cough  2018-04: Vitamin D deficiency  2018-04: Mononeuropathy  2018-04: PVD (peripheral vascular disease) ([facility])  2017-11: Acute non-recurrent sinusitis  2017-11: Burning with urination  2017-11: Strain of latissimus dorsi muscle  2017-11: Bone spur  2017-04: Wound infection  2017-04: Uncontrolled type 1 diabetes mellitus with hypoglycemia and   coma ([facility])  2017-03: Diabetic ulcer of left foot associated with type 1 diabetes   mellitus ([facility])  2017-03: Diminished pulses in lower extremity  2016-12: Diabetic retinopathy of both eyes ([facility])  2016-06: Blind left eye  2016-05: CKD (chronic kidney disease) stage 3,  "GFR 30-59 ml/min Dr. 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;  Service:    • APPENDECTOMY     • CATARACT EXTRACTION WITH IOL Bilateral    • EYE SURGERY     • HAND SURGERY  1990's   • KNEE ARTHROSCOPY      x3   • TONSILLECTOMY          Allergies:  Patient has no known allergies.     Social History:  Social History     Tobacco Use   • Smoking status: Former Smoker     Packs/day: 0.00   • Smokeless tobacco: Former User     Types: Chew   • Tobacco comment: tobacco cessation recommended   Vaping Use   • Vaping Use: Never used   Substance Use Topics   • Alcohol use: Yes     Alcohol/week: 0.0 oz     Comment: rare beer   • Drug use: No        Family History:   family history includes Cancer in his father, mother, paternal aunt, and paternal grandfather; Diabetes in his maternal grandfather, maternal uncle, and paternal grandmother; Hyperlipidemia in his brother; Hypertension in his brother; Lung Disease in his brother, father, and mother; Other in his sister; Thyroid in his brother, mother, and sister.      PHYSICAL EXAM:   OBJECTIVE:  Vital signs: /70   Pulse 64   Ht 1.727 m (5' 8\")   Wt 70.6 kg (155 lb 11.2 oz)   SpO2 98%   BMI 23.67 kg/m²   GENERAL: " Well-developed, well-nourished in no apparent distress.   EYE:  No ocular asymmetry, PERRLA  HENT: Pink, moist mucous membranes.    NECK: No thyromegaly.   CARDIOVASCULAR:  No murmurs  LUNGS: Clear breath sounds  ABDOMEN: Soft, nontender   EXTREMITIES: No clubbing, cyanosis, or edema.   NEUROLOGICAL: No gross focal motor abnormalities   LYMPH: No cervical adenopathy palpated.   SKIN: No rashes, lesions.     ASSESSMENT/PLAN:     1. Uncontrolled type 1 diabetes mellitus with hypoglycemia and coma (HCC)  Unstable.  Continue diabetes regimen:  Increase Toujeo to 20 units daily.  Further instructed to increase Toujeo by 2 units if a.m. blood sugars are consistently above 140.  NovoLog-CHO ratio 1:10, correction dosing 1:50 > 150.   These instructions were written out in detail for the program with patient.    Detailed discussion about hypoglycemic events and what signs and symptoms to watch for.  Patient's brother understands how to treat these lows.  Patient's brother and patient also understand how to use the freestyle therese device.  Recommend continuing meal planning with staff timing for meals throughout the day.  This will allow us to better titrate insulins around food intake.  Recommend drinking 2 L to 3 L of water each day.  Daily foot inspections are always recommended.  Patient is overdue for dilated eye exam and further states stable make this appointment as soon as they can.    2. Proliferative diabetic retinopathy of both eyes without macular edema associated with type 1 diabetes mellitus (HCC)  Unstable.  Recommend that he get an annual eye exam.  We also recommend providing us copies of his eye exam    3.  Chronic kidney disease Stage 3  Stable.  Continue taking losartan 50 mg daily.    3. Dyslipidemia  Stable.  Continue Lipitor 40 mg daily    4. Long-term insulin use (HCC)  Patient is on long-term insulin therapy due to type 1 diabetes.  As per plan #1.      Complete point-of-care A1c at next  appointment.  Next appointment in 2 months.      Thank you kindly for allowing me to participate in the diabetes care plan for this patient.    Nuha Parish, APRN  05/18/2020    CC:   Nesha Peralta D.O.

## 2021-05-25 ENCOUNTER — TELEPHONE (OUTPATIENT)
Dept: MEDICAL GROUP | Facility: PHYSICIAN GROUP | Age: 61
End: 2021-05-25

## 2021-05-25 NOTE — TELEPHONE ENCOUNTER
ESTABLISHED PATIENT PRE-VISIT PLANNING     Patient was NOT contacted to complete PVP.     Note: Patient will not be contacted if there is no indication to call.     1.  Reviewed notes from the last few office visits within the medical group: Yes    2.  If any orders were placed at last visit or intended to be done for this visit (i.e. 6 mos follow-up), do we have Results/Consult Notes?         •  Labs - Labs were not ordered at last office visit.  Note: If patient appointment is for lab review and patient did not complete labs, check with provider if OK to reschedule patient until labs completed.       •  Imaging - Imaging was not ordered at last office visit.       •  Referrals - No referrals were ordered at last office visit.    3. Is this appointment scheduled as a Hospital Follow-Up? No    4.  Immunizations were updated in Epic using Reconcile Outside Information activity? Yes, nothing to reconcile    5.  Patient is due for the following Health Maintenance Topics:   Health Maintenance Due   Topic Date Due   • RETINAL SCREENING  12/21/2016   • COLONOSCOPY  09/24/2020   • Annual Wellness Visit  02/25/2021       6.  AHA (Pulse8) form printed for Provider? Yes

## 2021-06-02 ENCOUNTER — OFFICE VISIT (OUTPATIENT)
Dept: MEDICAL GROUP | Facility: PHYSICIAN GROUP | Age: 61
End: 2021-06-02
Payer: MEDICARE

## 2021-06-02 VITALS
TEMPERATURE: 98.3 F | HEART RATE: 69 BPM | BODY MASS INDEX: 23.04 KG/M2 | WEIGHT: 152 LBS | DIASTOLIC BLOOD PRESSURE: 54 MMHG | SYSTOLIC BLOOD PRESSURE: 116 MMHG | HEIGHT: 68 IN | OXYGEN SATURATION: 97 %

## 2021-06-02 DIAGNOSIS — F03.91 DEMENTIA WITH BEHAVIORAL DISTURBANCE, UNSPECIFIED DEMENTIA TYPE: ICD-10-CM

## 2021-06-02 DIAGNOSIS — E10.641 UNCONTROLLED TYPE 1 DIABETES MELLITUS WITH HYPOGLYCEMIA AND COMA (HCC): ICD-10-CM

## 2021-06-02 DIAGNOSIS — J98.8 RESPIRATORY INFECTION: ICD-10-CM

## 2021-06-02 PROCEDURE — 99214 OFFICE O/P EST MOD 30 MIN: CPT | Performed by: FAMILY MEDICINE

## 2021-06-02 RX ORDER — DOXYCYCLINE HYCLATE 100 MG
100 TABLET ORAL 2 TIMES DAILY
Qty: 10 TABLET | Refills: 0 | Status: SHIPPED | OUTPATIENT
Start: 2021-06-02 | End: 2021-06-07

## 2021-06-02 ASSESSMENT — FIBROSIS 4 INDEX: FIB4 SCORE: 1.3

## 2021-06-02 NOTE — PROGRESS NOTES
CC: Respiratory infection    HISTORY OF THE PRESENT ILLNESS: Patient is a 60 y.o. male.     Patient is here today with brother who is present for follow-up.    Since her last visit, patient has full-time been living with his brother.  They commute between [place] and here as his brother also takes care of his mom in [place].  They are following with endocrinology now for his type 1 diabetes.  Sugars were doing fairly well until recently when he came down with a respiratory infection.  Since that time they have been in the 200s to 300s based on the glucose log that he brings in today.    Brother also reports that his memory loss seems to be getting worse.  He had some additional testing done through neurology, but in reviewing his chart it was essentially unremarkable.  PET CT scan consistent with possible vascular dementia.  They have not yet followed up with neurology to discuss symptomatic treatment.    With regard to his respiratory infection, he has been sick for about 5 to 6 days now.  Started with nasal congestion and runny nose.  Now has persistent frontal headache.  He denies any fevers or chills.  He does note cough.  No shortness of breath.  This respiratory illness went through his entire family, and they are all fully vaccinated against COVID-19.  Patient is fully vaccinated with Pfizer vaccine.    Allergies: Patient has no known allergies.    Current Outpatient Medications Ordered in Epic   Medication Sig Dispense Refill   • doxycycline (VIBRAMYCIN) 100 MG Tab Take 1 tablet by mouth 2 times a day for 5 days. 10 tablet 0   • Apoaequorin (PREVAGEN EXTRA STRENGTH) 20 MG Cap Take  by mouth.     • losartan (COZAAR) 50 MG Tab Take 1 tablet by mouth every day. 100 tablet 3   • atorvastatin (LIPITOR) 40 MG Tab Take 1 tablet by mouth every day. 100 tablet 3   • Insulin Glargine, 1 Unit Dial, (TOUJEO SOLOSTAR) 300 UNIT/ML Solution Pen-injector Inject 18 Units under the skin every day. (Patient taking  "differently: Inject 20 Units under the skin every day.) 4.5 mL 11   • insulin aspart (NOVOLOG FLEXPEN) 100 UNIT/ML injection PEN Inject 4 Units under the skin 3 times a day before meals. (Patient taking differently: Inject 10 Units under the skin 3 times a day before meals.) 15 mL 11   • Insulin Pen Needle 32 G x 4 mm Use 4 times a day with insulin pens 200 Each 3   • acyclovir (ZOVIRAX) 200 MG Cap TAKE ONE CAPSULE BY MOUTH ONE TIME DAILY  90 Cap 1   • metoprolol SR (TOPROL XL) 25 MG TABLET SR 24 HR TAKE ONE TABLET BY MOUTH ONE TIME DAILY 90 Tab 1   • Continuous Blood Gluc Sensor (FREESTYLE CARLOS 14 DAY SENSOR) Misc Inject 1 Units under the skin every 14 days. 6 Each 3   • traZODone (DESYREL) 100 MG Tab Take 1 Tab by mouth at bedtime as needed for Sleep. 90 Tab 1   • GLUCAGEN HYPOKIT 1 MG Recon Soln INJECT 1 MG BY INTRAVENOUS ROUTE ONCE FOR 1 DOSE     • glucose 4 GM chewable tablet Take 4 Tabs by mouth as needed for Low Blood Sugar. 30 Tab 1   • clopidogrel (PLAVIX) 75 MG Tab Take 1 Tab by mouth every day. 90 Tab 3   • OYSTER SHELL CALCIUM PO Take 1 Tab by mouth every day.     • ALFALFA PO Take 1 Tab by mouth every morning.       No current [facility]-ordered facility-administered medications on file.       Past Medical History:   Diagnosis Date   • Anesthesia     hx PONV   • Arthritis     L knee; hands   • Bronchitis 2016   • Cataract     nicole iol   • Cold 4/5/17    prod cough   • Coronary artery disease due to lipid rich plaque - mild to moderate on Cath 2019 Left Dominant    • Dental disorder     \"bad shape\"   • Diabetic neuropathy ([facility]) 12/31/2014    retinopathy   • Diabetic retinopathy of both eyes ([facility]) 12/9/2016   • Dyslipidemia    • Heart murmur    • Hemorrhagic disorder ([facility])     nose bleeds   • High cholesterol    • History of cardiac catheterization 2019 in [place]  abnormal stress - mild to moderate CAD    • History of shingles 12/31/2014   • Hyperlipidemia    • Hypertension    • Kidney stones    • Pain     " "left leg/foot   • Psychiatric problem     situational depression   • Type II or unspecified type diabetes mellitus without mention of complication, uncontrolled 12/31/2014       Past Surgical History:   Procedure Laterality Date   • IRRIGATION & DEBRIDEMENT GENERAL Left 5/3/2017    Procedure: IRRIGATION & DEBRIDEMENT GENERAL and Left Fourth Toe Amputation ;  Surgeon: Inessa Richard M.D.;  Location: SURGERY [facility];  Service:    • APPENDECTOMY     • CATARACT EXTRACTION WITH IOL Bilateral    • EYE SURGERY     • HAND SURGERY  1990's   • KNEE ARTHROSCOPY      x3   • TONSILLECTOMY         Social History     Tobacco Use   • Smoking status: Former Smoker     Packs/day: 0.00   • Smokeless tobacco: Former User     Types: Chew   • Tobacco comment: tobacco cessation recommended   Vaping Use   • Vaping Use: Never used   Substance Use Topics   • Alcohol use: Yes     Alcohol/week: 0.0 oz     Comment: rare beer   • Drug use: No       Social History     Social History Narrative   • Not on file       Family History   Problem Relation Age of Onset   • Thyroid Mother    • Lung Disease Mother         COPD   • Cancer Mother         eyebrow    • Thyroid Sister    • Other Sister         mental health issues    • Thyroid Brother    • Lung Disease Brother         COPD   • Hypertension Brother    • Hyperlipidemia Brother    • Lung Disease Father         COPD   • Cancer Father    • Diabetes Maternal Uncle    • Cancer Paternal Aunt         type unknown   • Diabetes Maternal Grandfather    • Diabetes Paternal Grandmother    • Cancer Paternal Grandfather         prostate   • Stroke Neg Hx        ROS:   Denies fever, chest pain, shortness of breath.    Exam: /54 (BP Location: Left arm, Patient Position: Sitting, BP Cuff Size: Adult)   Pulse 69   Temp 36.8 °C (98.3 °F) (Temporal)   Ht 1.727 m (5' 8\")   Wt 68.9 kg (152 lb)   SpO2 97%  Body mass index is 23.11 kg/m².    General: Well appearing, NAD  HEENT: Normocephalic. " Conjunctiva clear, lids without ptosis, pupils equal and reactive to light accommodation, ears normal shape and contour, canals are clear bilaterally, tympanic membranes without bulging or erythema and normal cone of light, oropharynx is without erythema, edema or exudates.   Neck: Supple without JVD. No thyromegaly or nodules  Pulmonary: Clear to ausculation.  Normal effort. No rales, ronchi, or wheezing.  Cardiovascular: Regular rate and rhythm without murmur, rubs or gallop.   Lymph: No cervical, supraclavicular back in eighth grade EOMI good, good eye locum lymph nodes are palpable  Skin: Warm and dry.  No obvious lesions.  Musculoskeletal:  No extremity cyanosis, clubbing, or edema.  Psych: Normal mood and affect. Alert and oriented. Judgment and insight is normal.    Please note that this dictation was created using voice recognition software. I have made every reasonable attempt to correct obvious errors, but I expect that there are errors of grammar and possibly content that I did not discover before finalizing the note.      Assessment/Plan  Dylon was seen today for follow-up and uri.    Diagnoses and all orders for this visit:    Respiratory infection  Patient here with what sounds like viral respiratory infection.  Given persistent headache, possibly turning into sinusitis.  We will go ahead and treat with doxycycline x5 days.  Very much doubt COVID-19 given that this illness has run through her entire family and they are all fully vaccinated including patient.  -     doxycycline (VIBRAMYCIN) 100 MG Tab; Take 1 tablet by mouth 2 times a day for 5 days.    Uncontrolled type 1 diabetes mellitus with hypoglycemia and coma (HCC)  Ongoing issue managed per endocrinology.  Sugars elevated right now secondary to acute illness.  I do suspect he will improve and go back to his baseline as he recovers from this respiratory illness.    Dementia with behavioral disturbance, unspecified dementia type (HCC)  Recommend  follow-up with neurology to go over additional work-up and discuss treatment from a symptomatic standpoint.    Follow-up in 4 to 6 months or sooner if needed.    Nesha Peralta DO  [facility]

## 2021-07-06 DIAGNOSIS — I10 ESSENTIAL HYPERTENSION: ICD-10-CM

## 2021-07-08 ENCOUNTER — OFFICE VISIT (OUTPATIENT)
Dept: NEUROLOGY | Facility: MEDICAL CENTER | Age: 61
End: 2021-07-08
Attending: PSYCHIATRY & NEUROLOGY
Payer: MEDICARE

## 2021-07-08 VITALS
DIASTOLIC BLOOD PRESSURE: 66 MMHG | WEIGHT: 156.09 LBS | SYSTOLIC BLOOD PRESSURE: 118 MMHG | HEART RATE: 67 BPM | HEIGHT: 68 IN | BODY MASS INDEX: 23.66 KG/M2 | OXYGEN SATURATION: 97 %

## 2021-07-08 DIAGNOSIS — F03.91 DEMENTIA WITH BEHAVIORAL DISTURBANCE, UNSPECIFIED DEMENTIA TYPE: Primary | ICD-10-CM

## 2021-07-08 PROCEDURE — 99213 OFFICE O/P EST LOW 20 MIN: CPT | Performed by: PSYCHIATRY & NEUROLOGY

## 2021-07-08 PROCEDURE — 99212 OFFICE O/P EST SF 10 MIN: CPT | Performed by: PSYCHIATRY & NEUROLOGY

## 2021-07-08 RX ORDER — TRAZODONE HYDROCHLORIDE 100 MG/1
TABLET ORAL
Qty: 90 TABLET | Refills: 0 | Status: SHIPPED | OUTPATIENT
Start: 2021-07-08 | End: 2021-10-01 | Stop reason: SDUPTHER

## 2021-07-08 RX ORDER — METOPROLOL SUCCINATE 25 MG/1
TABLET, EXTENDED RELEASE ORAL
Qty: 90 TABLET | Refills: 0 | Status: SHIPPED | OUTPATIENT
Start: 2021-07-08 | End: 2021-08-31

## 2021-07-08 ASSESSMENT — FIBROSIS 4 INDEX: FIB4 SCORE: 1.32

## 2021-07-09 NOTE — PROGRESS NOTES
"Subjective:      Dylon Santa is a 61 y.o. male who presents with his brother Luis Carlos, who provides most of the history, for follow-up, with a history of moderate to severe dementia, likely Alzheimer's disease.    HPI    Since last seen, there has been some worsening with his short-term memory and attention.  As before, he has to stay with his brother all the time, Luis Carlos can no longer leave him alone to fend for himself at home.  Because they have to visit their mother who lives a distance away because of her own medical frailties, Luis Carlos has to pack the whole family up in his motor home and travel back and forth.  This leaves a very little time for his own personal and medical affairs.    Luis Carlos still gives his brother all of his medications, has to provide his insulin injections as part of this.  Luis Carlos does all the cooking, Dylon does eat consistently.  He is still to a degree independent with his ADLs though there is some assistance required with showering.  Communication is now more curtailed, it has become more of a game of charFreebeepay so that Luis Carlos understands what his brother wants.  He is not wandering, has not had problems with paranoid delirium or agitation.    PET scan did reveal a pattern of generalized atrophy that was nonspecific in nature.  The way I wanted an EEG study to be done, they were never able to schedule this.  He has been on Preva gin, we have not added cholinesterase inhibitor or Namenda treatments given the severity of his disease.    Medical, surgical and family histories are reviewed, there are no new drug allergies.  He is on Preva gin, Plavix, trazodone, Toujeo, NovoLog insulin, Zovirax, Cozaar, Lipitor and atorvastatin.    Review of Systems   Unable to perform ROS: Dementia        Objective:     /66 (BP Location: Right arm)   Pulse 67   Ht 1.727 m (5' 8\")   Wt 70.8 kg (156 lb 1.4 oz)   SpO2 97%   BMI 23.73 kg/m²      Physical Exam    He appears in no acute distress.  He is clean and " appropriately dressed.  He is cooperative.  There is no malar rash.  His neck is supple.  Cardiac evaluation reveals a regular rhythm.    Neurological Exam    He knows he is in the doctor's office, beyond that he has no clue as to date or orientation.  He is perseverative, answers to questions are very redundant and simplified with little content.  He does follow commands, there was some difficulty with demonstrating use of a toothbrush.  Naming is normal, he does follow commands though he is slow to do so.    Otherwise, in quick and cursory fashion, cranial nerve, musculoskeletal and coordination evaluations are unchanged.  Reflexes are diminished.     Assessment/Plan:     1. Dementia with behavioral disturbance, unspecified dementia type (HCC)  We will continue his present regimen.  We will have an evaluation for a home nursing assistant to be available for medications.  Given his very young age at onset, an EEG does need to be done to be thorough.  I reviewed the results of the PET scan with Luis Carlos.  The results are nonspecific, consistent with 2 possible diagnoses, 1 of which made based on clinical presentation and evolution over time.  For now we can follow-up in 6 months.    - REFERRAL TO CARE MANAGEMENT    Time: 20 minutes spent face-to-face for exam, review, discussion, and education, of this over 50% of the time spent counseling and coordinating care.

## 2021-07-12 ENCOUNTER — TELEPHONE (OUTPATIENT)
Dept: MEDICAL GROUP | Facility: PHYSICIAN GROUP | Age: 61
End: 2021-07-12

## 2021-07-12 ENCOUNTER — PATIENT OUTREACH (OUTPATIENT)
Dept: HEALTH INFORMATION MANAGEMENT | Facility: OTHER | Age: 61
End: 2021-07-12

## 2021-07-12 DIAGNOSIS — F03.91 DEMENTIA WITH BEHAVIORAL DISTURBANCE, UNSPECIFIED DEMENTIA TYPE: ICD-10-CM

## 2021-07-12 DIAGNOSIS — E10.641 UNCONTROLLED TYPE 1 DIABETES MELLITUS WITH HYPOGLYCEMIA AND COMA (HCC): ICD-10-CM

## 2021-07-12 NOTE — TELEPHONE ENCOUNTER
Patient's brother Luis Carlos called and is concerned about what can happen to Dylon if Luis Carlos is incapacitated or otherwise not able to care for him. Apparently he is dealing with kidney stones currently and is still able to care for Dylon but is having difficulty. He is wondering if we have any contact information for social work or nursing that can give Dylon his insulin if he can't do it.    Additionally it sounds like Neurology wanted them to ask you if Dylon can discontinue the atorvastatin.

## 2021-07-12 NOTE — PROGRESS NOTES
Outgoing call to patient's brother, Luis Carlos, who is concerned about how to make sure patient gets insulin injections if brother cannot be home for the 3 daily injections. Is also concerned about who will care for patient if brother is unable to for any reason. Discussed having a nurse visit the home for injections and long term placement for patient at some point. This SW will send Luis Carlos a list of home health agencies and ltc facilities. Encourage Luis Carlos to call for any assistance or questions.

## 2021-07-13 NOTE — TELEPHONE ENCOUNTER
The social workers actually reached out to Luis Carlos yesterday and are sending him some literature. It sounds like they said they did not have any way to administer his insulin if Luis Carlos is incapacitated. Would it be possible to do a [facility] home health order so Dylon could get his insulin if Luis Carlos is in the hospital for a few days? Not sure how that works.

## 2021-07-19 ENCOUNTER — PATIENT MESSAGE (OUTPATIENT)
Dept: HEALTH INFORMATION MANAGEMENT | Facility: OTHER | Age: 61
End: 2021-07-19

## 2021-07-19 ENCOUNTER — NON-PROVIDER VISIT (OUTPATIENT)
Dept: NEUROLOGY | Facility: MEDICAL CENTER | Age: 61
End: 2021-07-19
Attending: PSYCHIATRY & NEUROLOGY
Payer: MEDICARE

## 2021-07-19 ENCOUNTER — PATIENT OUTREACH (OUTPATIENT)
Dept: HEALTH INFORMATION MANAGEMENT | Facility: OTHER | Age: 61
End: 2021-07-19

## 2021-07-19 DIAGNOSIS — F03.91 DEMENTIA WITH BEHAVIORAL DISTURBANCE, UNSPECIFIED DEMENTIA TYPE: ICD-10-CM

## 2021-07-19 PROCEDURE — 95819 EEG AWAKE AND ASLEEP: CPT | Performed by: PSYCHIATRY & NEUROLOGY

## 2021-07-19 PROCEDURE — 95819 EEG AWAKE AND ASLEEP: CPT | Mod: 26 | Performed by: PSYCHIATRY & NEUROLOGY

## 2021-07-19 PROCEDURE — 95816 EEG AWAKE AND DROWSY: CPT | Performed by: PSYCHIATRY & NEUROLOGY

## 2021-07-19 NOTE — PROCEDURES
VIDEO ELECTROENCEPHALOGRAM REPORT      Referring provider: Dr. Roldan    DOS: 07/19/21 (total recording of 25 minutes).     INDICATION:  Dylon Santa 61 y.o. male presenting with dementia with behavorial disturbance.      CURRENT ANTIEPILEPTIC REGIMEN: none     TECHNIQUE: 30 channel video electroencephalogram (EEG) was performed in accordance with the international 10-20 system. The study was reviewed in bipolar and referential montages. The recording examined the patient during   awake, drowsy and sleep states    DESCRIPTION OF THE RECORD:  During the wakefulness, the background showed a symmetrical 10 Hz alpha activity posteriorly with amplitude of 70 mV.  There was reactivity to eye closure/opening.  A normal anterior-posterior gradient was noted with faster beta frequencies seen anteriorly.  During drowsiness, theta/delta frequencies were seen.    During the sleep state, symmetrical high-amplitude sleep spindles and vertex sharps were seen in the leads over the central regions.     ACTIVATION PROCEDURES:     Hyperventilation was performed by the patient for a total of 3 minutes. The technician performing the test noted good effort. No physiological build up seen.     Intermittent Photic stimulation was performed in a stepwise fashion from 1 to 30 Hz and elicited a normal response (photic driving), most noticeable in the posterior leads.    ICTAL AND/OR INTERICTAL FINDINGS:   No focal or generalized epileptiform activity noted. No regional slowing was seen during this routine study.  No clinical events or seizures were reported or recorded during the study.     EKG: sampling of the EKG recording demonstrated sinus rhythm.     EVENTS: none     INTERPRETATION:    This is a normal video EEG recording in the awake, drowsy/sleep state(s).      Note: A normal EEG does not rule out epilepsy.  If the clinical suspicion remains high for seizures, a prolonged recording to capture clinical or subclinical events may be  helpful.      Malcolm Barnett MD  Diplomate in Neurology&Epilepsy  Office: [phone]  Fax: [phone]

## 2021-07-19 NOTE — PROGRESS NOTES
Outgoing call to patient's brother, Luis Carlos, to determine if he has been able to fill out the state waiver form sent to him. He stated he has not had a chance but will call if he needs any help with it. Also discussed becoming a guardian for the patient. This SW will put link for information about guardianship in patient's my chart.

## 2021-07-27 ENCOUNTER — OFFICE VISIT (OUTPATIENT)
Dept: ENDOCRINOLOGY | Facility: MEDICAL CENTER | Age: 61
End: 2021-07-27
Attending: NURSE PRACTITIONER
Payer: MEDICARE

## 2021-07-27 VITALS
BODY MASS INDEX: 23.49 KG/M2 | RESPIRATION RATE: 14 BRPM | HEART RATE: 71 BPM | OXYGEN SATURATION: 98 % | DIASTOLIC BLOOD PRESSURE: 54 MMHG | SYSTOLIC BLOOD PRESSURE: 104 MMHG | HEIGHT: 68 IN | WEIGHT: 155 LBS

## 2021-07-27 DIAGNOSIS — E10.641 UNCONTROLLED TYPE 1 DIABETES MELLITUS WITH HYPOGLYCEMIA AND COMA (HCC): ICD-10-CM

## 2021-07-27 DIAGNOSIS — N18.30 TYPE 1 DIABETES MELLITUS WITH STAGE 3 CHRONIC KIDNEY DISEASE, UNSPECIFIED WHETHER STAGE 3A OR 3B CKD (HCC): ICD-10-CM

## 2021-07-27 DIAGNOSIS — E10.3553 STABLE PROLIFERATIVE DIABETIC RETINOPATHY OF BOTH EYES ASSOCIATED WITH TYPE 1 DIABETES MELLITUS (HCC): ICD-10-CM

## 2021-07-27 DIAGNOSIS — N18.31 STAGE 3A CHRONIC KIDNEY DISEASE: ICD-10-CM

## 2021-07-27 DIAGNOSIS — E78.5 DYSLIPIDEMIA: ICD-10-CM

## 2021-07-27 DIAGNOSIS — Z79.4 LONG-TERM INSULIN USE (HCC): ICD-10-CM

## 2021-07-27 DIAGNOSIS — E10.22 TYPE 1 DIABETES MELLITUS WITH STAGE 3 CHRONIC KIDNEY DISEASE, UNSPECIFIED WHETHER STAGE 3A OR 3B CKD (HCC): ICD-10-CM

## 2021-07-27 LAB
HBA1C MFR BLD: 8.7 % (ref 0–5.6)
INT CON NEG: NEGATIVE
INT CON POS: POSITIVE

## 2021-07-27 PROCEDURE — 95251 CONT GLUC MNTR ANALYSIS I&R: CPT | Performed by: NURSE PRACTITIONER

## 2021-07-27 PROCEDURE — 83036 HEMOGLOBIN GLYCOSYLATED A1C: CPT | Performed by: NURSE PRACTITIONER

## 2021-07-27 PROCEDURE — 99213 OFFICE O/P EST LOW 20 MIN: CPT | Performed by: NURSE PRACTITIONER

## 2021-07-27 PROCEDURE — 99214 OFFICE O/P EST MOD 30 MIN: CPT | Performed by: NURSE PRACTITIONER

## 2021-07-27 ASSESSMENT — FIBROSIS 4 INDEX: FIB4 SCORE: 1.32

## 2021-07-27 NOTE — PROGRESS NOTES
CHIEF COMPLAINT: Patient is here for follow up of Type 1 Diabetes Mellitus.    HPI:     Dylon Santa is a 61 y.o. male for continued evaluation & treatment of the followin.  Type 1 Diabetes Mellitus   Mr. Santa is here with his brother for follow-up appointment.  Patient has been diagnosed with dementia and his brother has been caring for him and is the power of .  Patient is alert oriented x 3.  Very pleasant and receptive to all questions.  History of type 1 diabetes diagnosed at the age of 7.    Current diabetes regimen:  Toujeo 20 units daily  NovoLog 1:10 CHO ratio, correction dosing 1:50 > 150.     Point-of-care A1c 2021: 8.7%  Serum A1c 2021: 9.8%  Labs from 2020 HbA1c is elevated at 9.7%    Blood sugar logs 2021: Patient has esolidare 14-day CGM.  Data has been downloaded and scanned under media tab.  Details of report discussed with patient and his brother.    Average glucose 173.  Time in target range 56%.  Fasting glucose:100s-180s  Dinnertime glucose: 100's-200's    Patient and his brother deny any hypoglycemic events.  Patient is hypoglycemic aware however requires caregiver assistance to be notified of these low occurrences and how to treat.    Patient notes 3 meals a day and occasionally has some snacks.    Patient and his brother are very active, walking and/or busy throughout the day.    Diabetes Complications   Retinopathy: No known retinopathy.  Last eye exam: Patient is overdue for an eye exam   Neuropathy: Denies paresthesias or numbness in hands or feet. Denies any foot wounds.  Positive for left foot metatarsal amputation.  Incisions are well-healed.  Exercise: Minimal.  Diet: Fair.    History of Chronic Kidney Disease, Stage 3.  Currently taking losartan 50 mg daily.  /54.     Ref. Range 2020 10:55   Micro Alb Creat Ratio Latest Ref Range: 0 - 30 mg/g 73 (H)   Creatinine, Urine Latest Units: mg/dL 142.25   Microalbumin, Urine Random  Latest Units: mg/dL 10.4       2.  Dyslipidemia  Currently taking Lipitor 40 mg daily.  Patient denies myalgias or muscle cramps.     Ref. Range 12/7/2020 10:55   Cholesterol,Tot Latest Ref Range: 100 - 199 mg/dL 138   Triglycerides Latest Ref Range: 0 - 149 mg/dL 98   HDL Latest Ref Range: >=40 mg/dL 55   LDL Latest Ref Range: <100 mg/dL 63         Patient's medications, allergies, and social histories were reviewed and updated as appropriate.    ROS:     CONS:     No fever, no chills   EYES:     No diplopia, no blurry vision   CV:           No chest pain, no palpitations   PULM:     No SOB, no cough, no hemoptysis.   GI:            No nausea, no vomiting, no diarrhea, no constipation   ENDO:     No polyuria, no polydipsia, no heat intolerance, no cold intolerance       Past Medical History:  Problem List:  2021-03: Dementia with behavioral disturbance ([facility])  2021-03: Long-term insulin use ([facility])  2021-03: Hypoglycemia  2020-06: Anemia  2019-12: Cervical stenosis of spine  2019-11: Migraine without aura and without status migrainosus, not   intractable  2019-11: Gastroesophageal reflux disease without esophagitis  2019-11: Recurrent epistaxis  2019-10: Atherosclerosis  2019-10: Toe amputee ([facility])  2019-10: Anxiety  2019-10: Neck pain  2019-10: Panic attacks  2019-10: Chest pain  2019-10: Dizziness  2019-10: Postconcussion syndrome  2018-11: Chronic cough  2018-10: Skin lesion, superficial  2018-10: Viral URI with cough  2018-04: Vitamin D deficiency  2018-04: Mononeuropathy  2018-04: PVD (peripheral vascular disease) ([facility])  2017-11: Acute non-recurrent sinusitis  2017-11: Burning with urination  2017-11: Strain of latissimus dorsi muscle  2017-11: Bone spur  2017-04: Wound infection  2017-04: Uncontrolled type 1 diabetes mellitus with hypoglycemia and   coma ([facility])  2017-03: Diabetic ulcer of left foot associated with type 1 diabetes   mellitus ([facility])  2017-03: Diminished pulses in lower extremity  2016-12: Diabetic  retinopathy of both eyes ([facility])  2016-06: Blind left eye  2016-05: CKD (chronic kidney disease) stage 3, GFR 30-59 ml/min Dr. Pascual  2016-01: Diabetic nephropathy associated with type 1 diabetes   mellitus ([facility])  2015-06: Elevated serum creatinine  2015-06: Flank pain  2014-12: Hypertension associated with type 1 diabetes mellitus ([facility])  2014-12: History of shingles  2014-12: Diabetic neuropathy (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  Dyslipidemia  Coronary artery disease due to lipid rich plaque - mild to moderate   on Cath 2019 Left Dominant  History of cardiac catheterization 2019 in [facility] abnormal   stress - mild to moderate CAD      Past Surgical History:  Past Surgical History:   Procedure Laterality Date   • IRRIGATION & DEBRIDEMENT GENERAL Left 5/3/2017    Procedure: IRRIGATION & DEBRIDEMENT GENERAL and Left Fourth Toe Amputation ;  Surgeon: Inessa Richard M.D.;  Location: SURGERY [facility];  Service:    • APPENDECTOMY     • CATARACT EXTRACTION WITH IOL Bilateral    • EYE SURGERY     • HAND SURGERY  1990's   • KNEE ARTHROSCOPY      x3   • TONSILLECTOMY          Allergies:  Patient has no known allergies.     Social History:  Social History     Tobacco Use   • Smoking status: Former Smoker     Packs/day: 0.00   • Smokeless tobacco: Former User     Types: Chew   • Tobacco comment: tobacco cessation recommended   Vaping Use   • Vaping Use: Never used   Substance Use Topics   • Alcohol use: Yes     Alcohol/week: 0.0 oz     Comment: rare beer   • Drug use: No        Family History:   family history includes Cancer in his father, mother, paternal aunt, and paternal grandfather; Diabetes in his maternal grandfather, maternal uncle, and paternal grandmother; Hyperlipidemia in his brother; Hypertension in his brother; Lung Disease in his brother, father, and mother; Other in his sister; Thyroid in his brother, mother, and sister.      PHYSICAL EXAM:   OBJECTIVE:  Vital signs: /54 (BP  "Location: Left arm, Patient Position: Sitting, BP Cuff Size: Adult)   Pulse 71   Resp 14   Ht 1.727 m (5' 8\")   Wt 70.3 kg (155 lb)   SpO2 98%   BMI 23.57 kg/m²   GENERAL: Well-developed, well-nourished in no apparent distress.   EYE:  No ocular asymmetry, PERRLA  HENT: Pink, moist mucous membranes.    NECK: No thyromegaly.   CARDIOVASCULAR:  No murmurs  LUNGS: Clear breath sounds  ABDOMEN: Soft, nontender   EXTREMITIES: No clubbing, cyanosis, or edema.   NEUROLOGICAL: No gross focal motor abnormalities   LYMPH: No cervical adenopathy palpated.   SKIN: No rashes, lesions.     ASSESSMENT/PLAN:     1. Uncontrolled type 1 diabetes mellitus with hypoglycemia and coma (HCC)  Unstable  Continue diabetes regimen:  Increase Toujeo to 22 units daily.  Further instructed to increase Toujeo by 2 units if a.m. blood sugars are consistently above 140.  NovoLog-CHO ratio 1:10, correction dosing 1:50 > 150.     Continue use of freestyle therese 2 CGM.    Continue balanced meal planning such as my [email or web address]  Recommend drinking 2 L to 3 L of water each day.  Daily foot inspections are always recommended.  Patient is overdue for dilated eye exam and further states stable make this appointment as soon as they can.    2. Proliferative diabetic retinopathy of both eyes without macular edema associated with type 1 diabetes mellitus (HCC)  Unstable.  Recommend that he get an annual eye exam.  We also recommend providing us copies of his eye exam    3.  Chronic kidney disease Stage 3  Stable.  Continue taking losartan 50 mg daily.    4. Dyslipidemia  Stable.  Continue Lipitor 40 mg daily    5. Long-term insulin use (HCC)  Patient is on long-term insulin therapy due to type 1 diabetes.  As per plan #1.      Complete point-of-care A1c at next appointment.  Next appointment in 2 months.      Thank you kindly for allowing me to participate in the diabetes care plan for this patient.    Nuha Parish, APRN  07/26/2021    CC: "   Nesha Peralta D.O.

## 2021-08-17 ENCOUNTER — PATIENT OUTREACH (OUTPATIENT)
Dept: HEALTH INFORMATION MANAGEMENT | Facility: OTHER | Age: 61
End: 2021-08-17

## 2021-08-24 ENCOUNTER — PATIENT MESSAGE (OUTPATIENT)
Dept: ENDOCRINOLOGY | Facility: MEDICAL CENTER | Age: 61
End: 2021-08-24

## 2021-08-25 ENCOUNTER — PATIENT OUTREACH (OUTPATIENT)
Dept: HEALTH INFORMATION MANAGEMENT | Facility: OTHER | Age: 61
End: 2021-08-25

## 2021-08-25 NOTE — TELEPHONE ENCOUNTER
From: Dylon Santa  To: Nurse Practitioner Nuha Parish  Sent: 8/24/2021 4:39 PM PDT  Subject: Dylon Breen,  I dropped off medical certification for Dylon Santa last week and was wondering if you have had a chance to fill that out yet.    Also I need to reschedule his appointment with you on the 19th of October, unless we can do it by video appointment over the computer. Please let me know if this can be done.    Thank you and stay safe.    Pedro Pablo Santa

## 2021-08-25 NOTE — PROGRESS NOTES
[facility] SW outgoing call to pts brother. Luis Carlos states he found out he has cancer and is having surgery tomorrow and he has not found anyone to give pt his insulin injections. This SW does not believe of any agencies or nurses that will provide that service.

## 2021-08-31 DIAGNOSIS — I10 ESSENTIAL HYPERTENSION: ICD-10-CM

## 2021-08-31 RX ORDER — METOPROLOL SUCCINATE 25 MG/1
TABLET, EXTENDED RELEASE ORAL
Qty: 90 TABLET | Refills: 3 | Status: SHIPPED | OUTPATIENT
Start: 2021-08-31 | End: 2021-10-05 | Stop reason: SDUPTHER

## 2021-09-03 ENCOUNTER — TELEPHONE (OUTPATIENT)
Dept: ENDOCRINOLOGY | Facility: MEDICAL CENTER | Age: 61
End: 2021-09-03

## 2021-09-03 NOTE — TELEPHONE ENCOUNTER
Pt brother Luis Carlos called and LVM to see if the paperwork for Dylon was ready for  ..please call him at [phone].. js

## 2021-09-08 ENCOUNTER — OFFICE VISIT (OUTPATIENT)
Dept: MEDICAL GROUP | Facility: PHYSICIAN GROUP | Age: 61
End: 2021-09-08
Payer: MEDICARE

## 2021-09-08 VITALS
TEMPERATURE: 98 F | HEIGHT: 68 IN | WEIGHT: 162 LBS | DIASTOLIC BLOOD PRESSURE: 54 MMHG | BODY MASS INDEX: 24.55 KG/M2 | SYSTOLIC BLOOD PRESSURE: 112 MMHG | HEART RATE: 69 BPM | OXYGEN SATURATION: 97 %

## 2021-09-08 DIAGNOSIS — E10.22 TYPE 1 DIABETES MELLITUS WITH STAGE 3 CHRONIC KIDNEY DISEASE, UNSPECIFIED WHETHER STAGE 3A OR 3B CKD (HCC): ICD-10-CM

## 2021-09-08 DIAGNOSIS — F03.91 DEMENTIA WITH BEHAVIORAL DISTURBANCE, UNSPECIFIED DEMENTIA TYPE: ICD-10-CM

## 2021-09-08 DIAGNOSIS — N18.30 TYPE 1 DIABETES MELLITUS WITH STAGE 3 CHRONIC KIDNEY DISEASE, UNSPECIFIED WHETHER STAGE 3A OR 3B CKD (HCC): ICD-10-CM

## 2021-09-08 DIAGNOSIS — Z02.89 ENCOUNTER FOR COMPLETION OF FORM WITH PATIENT: ICD-10-CM

## 2021-09-08 PROCEDURE — 99214 OFFICE O/P EST MOD 30 MIN: CPT | Performed by: FAMILY MEDICINE

## 2021-09-08 RX ORDER — FLASH GLUCOSE SENSOR
1 KIT MISCELLANEOUS
Qty: 6 EACH | Refills: 3 | Status: SHIPPED | OUTPATIENT
Start: 2021-09-08 | End: 2021-12-21 | Stop reason: SDUPTHER

## 2021-09-08 ASSESSMENT — FIBROSIS 4 INDEX: FIB4 SCORE: 1.32

## 2021-09-08 NOTE — PROGRESS NOTES
CC: Paperwork    HISTORY OF THE PRESENT ILLNESS: Patient is a 61 y.o. male.     Patient is here today with his legal guardian, his brother for follow-up. The primary concern today is getting paperwork filled out so that his brother can access his bank accounts more easily. His brother is already currently managing his finances and paying his bills for him. Patient has severe dementia and type 1 diabetes and is not able to care for himself.    He has followed with neurology and right now it is recommended just to continue the current treatment regimen and monitor. He is also had follow-up with his endocrinologist since our last visit and A1c has improved somewhat to 8.7.    Allergies: Patient has no known allergies.    Current Outpatient Medications Ordered in Epic   Medication Sig Dispense Refill   • Continuous Blood Gluc Sensor ([employer]YLE CARLOS 14 DAY SENSOR) Misc Inject 1 Units under the skin every 14 days. 6 Each 3   • metoprolol SR (TOPROL XL) 25 MG TABLET SR 24 HR TAKE ONE TABLET BY MOUTH ONE TIME DAILY 90 Tablet 3   • traZODone (DESYREL) 100 MG Tab TAKE ONE TABLET BY MOUTH AT BEDTIME AS NEEDED FOR SLEEP (GENERIC FOR DESYREL) 90 tablet 0   • losartan (COZAAR) 50 MG Tab Take 1 tablet by mouth every day. 100 tablet 3   • atorvastatin (LIPITOR) 40 MG Tab Take 1 tablet by mouth every day. 100 tablet 3   • Insulin Glargine, 1 Unit Dial, (TOUJEO SOLOSTAR) 300 UNIT/ML Solution Pen-injector Inject 18 Units under the skin every day. (Patient taking differently: Inject 20 Units under the skin every day.) 4.5 mL 11   • insulin aspart (NOVOLOG FLEXPEN) 100 UNIT/ML injection PEN Inject 4 Units under the skin 3 times a day before meals. (Patient taking differently: Inject 10 Units under the skin 3 times a day before meals.) 15 mL 11   • Insulin Pen Needle 32 G x 4 mm Use 4 times a day with insulin pens 200 Each 3   • acyclovir (ZOVIRAX) 200 MG Cap TAKE ONE CAPSULE BY MOUTH ONE TIME DAILY  90 Cap 1   • GLUCAGEN HYPOKIT 1 MG  "Recon Soln INJECT 1 MG BY INTRAVENOUS ROUTE ONCE FOR 1 DOSE     • clopidogrel (PLAVIX) 75 MG Tab Take 1 Tab by mouth every day. 90 Tab 3     No current Epic-ordered facility-administered medications on file.       Past Medical History:   Diagnosis Date   • Anesthesia     hx PONV   • Arthritis     L knee; hands   • Bronchitis 2016   • Cataract     nicole iol   • Cold 4/5/17    prod cough   • Coronary artery disease due to lipid rich plaque - mild to moderate on Cath 2019 Left Dominant    • Dental disorder     \"bad shape\"   • Diabetic neuropathy ([facility]) 12/31/2014    retinopathy   • Diabetic retinopathy of both eyes ([facility]) 12/9/2016   • Dyslipidemia    • Heart murmur    • Hemorrhagic disorder ([facility])     nose bleeds   • High cholesterol    • History of cardiac catheterization 2019 in [facility] abnormal stress - mild to moderate CAD    • History of shingles 12/31/2014   • Hyperlipidemia    • Hypertension    • Kidney stones    • Pain     left leg/foot   • Psychiatric problem     situational depression   • Type II or unspecified type diabetes mellitus without mention of complication, uncontrolled 12/31/2014       Past Surgical History:   Procedure Laterality Date   • IRRIGATION & DEBRIDEMENT GENERAL Left 5/3/2017    Procedure: IRRIGATION & DEBRIDEMENT GENERAL and Left Fourth Toe Amputation ;  Surgeon: Inessa Richard M.D.;  Location: SURGERY [facility];  Service:    • APPENDECTOMY     • CATARACT EXTRACTION WITH IOL Bilateral    • EYE SURGERY     • HAND SURGERY  1990's   • KNEE ARTHROSCOPY      x3   • TONSILLECTOMY         Social History     Tobacco Use   • Smoking status: Former Smoker     Packs/day: 0.00   • Smokeless tobacco: Former User     Types: Chew   • Tobacco comment: tobacco cessation recommended   Vaping Use   • Vaping Use: Never used   Substance Use Topics   • Alcohol use: Yes     Alcohol/week: 0.0 oz     Comment: rare beer   • Drug use: No       Social History     Social History Narrative   • Not on file " "      Family History   Problem Relation Age of Onset   • Thyroid Mother    • Lung Disease Mother         COPD   • Cancer Mother         eyebrow    • Thyroid Sister    • Other Sister         mental health issues    • Thyroid Brother    • Lung Disease Brother         COPD   • Hypertension Brother    • Hyperlipidemia Brother    • Lung Disease Father         COPD   • Cancer Father    • Diabetes Maternal Uncle    • Cancer Paternal Aunt         type unknown   • Diabetes Maternal Grandfather    • Diabetes Paternal Grandmother    • Cancer Paternal Grandfather         prostate   • Stroke Neg Hx          Labs: Labs reviewed from March 2 and July 27, 2021.    Exam: /54 (BP Location: Right arm, Patient Position: Sitting, BP Cuff Size: Adult)   Pulse 69   Temp 36.7 °C (98 °F) (Temporal)   Ht 1.727 m (5' 8\")   Wt 73.5 kg (162 lb)   SpO2 97%  Body mass index is 24.63 kg/m².    General: Well appearing, NAD  Pulmonary: Clear to ausculation.  Normal effort. No rales, ronchi, or wheezing.  Cardiovascular: Regular rate and rhythm without murmur, rubs or gallop.   Skin: Warm and dry.  No obvious lesions.  Musculoskeletal:  No extremity cyanosis, clubbing, or edema.  Psych: Pleasant and interactive.    Please note that this dictation was created using voice recognition software. I have made every reasonable attempt to correct obvious errors, but I expect that there are errors of grammar and possibly content that I did not discover before finalizing the note.      Assessment/Plan  Dylon was seen today for paperwork and medication refill.    Diagnoses and all orders for this visit:    Encounter for completion of form with patient  Dementia with behavioral disturbance, unspecified dementia type (HCC)  Type 1 diabetes mellitus with stage 3 chronic kidney disease, unspecified whether stage 3a or 3b CKD (HCC)  Paperwork filled out for patient and brother today, so that it is easier for her brother to help manage his finances, which " she is already doing. Refill sent in for his freestyle carlos sensors.  -     Continuous Blood Gluc Sensor (FREESTYLE CARLOS 14 DAY SENSOR) Misc; Inject 1 Units under the skin every 14 days.    My total time spent caring for the patient on the day of the encounter was 30 minutes.   This does not include time spent on separately billable procedures/tests.      Nesha Peralta,   [facility]

## 2021-10-01 RX ORDER — TRAZODONE HYDROCHLORIDE 100 MG/1
TABLET ORAL
Qty: 90 TABLET | Refills: 0 | Status: SHIPPED | OUTPATIENT
Start: 2021-10-01 | End: 2021-10-05 | Stop reason: SDUPTHER

## 2021-10-01 NOTE — TELEPHONE ENCOUNTER
Patient last seen 9/2021. Requested to [facility] in [place], [place] as patient is currently on vacation.

## 2021-11-28 DIAGNOSIS — N18.30 TYPE 1 DIABETES MELLITUS WITH STAGE 3 CHRONIC KIDNEY DISEASE, UNSPECIFIED WHETHER STAGE 3A OR 3B CKD (HCC): ICD-10-CM

## 2021-11-28 DIAGNOSIS — E10.22 TYPE 1 DIABETES MELLITUS WITH STAGE 3 CHRONIC KIDNEY DISEASE, UNSPECIFIED WHETHER STAGE 3A OR 3B CKD (HCC): ICD-10-CM

## 2021-11-29 RX ORDER — PEN NEEDLE, DIABETIC 32GX 5/32"
NEEDLE, DISPOSABLE MISCELLANEOUS
Qty: 200 EACH | Refills: 0 | Status: SHIPPED | OUTPATIENT
Start: 2021-11-29 | End: 2021-12-07 | Stop reason: SDUPTHER

## 2021-12-07 ENCOUNTER — OFFICE VISIT (OUTPATIENT)
Dept: ENDOCRINOLOGY | Facility: MEDICAL CENTER | Age: 61
End: 2021-12-07
Attending: NURSE PRACTITIONER
Payer: MEDICARE

## 2021-12-07 ENCOUNTER — TELEPHONE (OUTPATIENT)
Dept: MEDICAL GROUP | Facility: PHYSICIAN GROUP | Age: 61
End: 2021-12-07

## 2021-12-07 VITALS
WEIGHT: 153.2 LBS | SYSTOLIC BLOOD PRESSURE: 116 MMHG | DIASTOLIC BLOOD PRESSURE: 74 MMHG | OXYGEN SATURATION: 98 % | HEART RATE: 72 BPM | HEIGHT: 68 IN | BODY MASS INDEX: 23.22 KG/M2 | RESPIRATION RATE: 18 BRPM

## 2021-12-07 DIAGNOSIS — N18.31 STAGE 3A CHRONIC KIDNEY DISEASE: ICD-10-CM

## 2021-12-07 DIAGNOSIS — E10.641 UNCONTROLLED TYPE 1 DIABETES MELLITUS WITH HYPOGLYCEMIA AND COMA (HCC): ICD-10-CM

## 2021-12-07 DIAGNOSIS — Z79.4 LONG-TERM INSULIN USE (HCC): ICD-10-CM

## 2021-12-07 DIAGNOSIS — E10.22 TYPE 1 DIABETES MELLITUS WITH STAGE 3 CHRONIC KIDNEY DISEASE, UNSPECIFIED WHETHER STAGE 3A OR 3B CKD (HCC): ICD-10-CM

## 2021-12-07 DIAGNOSIS — N18.30 TYPE 1 DIABETES MELLITUS WITH STAGE 3 CHRONIC KIDNEY DISEASE, UNSPECIFIED WHETHER STAGE 3A OR 3B CKD (HCC): ICD-10-CM

## 2021-12-07 DIAGNOSIS — E78.5 DYSLIPIDEMIA: ICD-10-CM

## 2021-12-07 LAB
HBA1C MFR BLD: 10.7 % (ref 0–5.6)
INT CON NEG: NEGATIVE
INT CON POS: POSITIVE

## 2021-12-07 PROCEDURE — 83036 HEMOGLOBIN GLYCOSYLATED A1C: CPT | Performed by: NURSE PRACTITIONER

## 2021-12-07 PROCEDURE — 99214 OFFICE O/P EST MOD 30 MIN: CPT | Mod: 25 | Performed by: NURSE PRACTITIONER

## 2021-12-07 PROCEDURE — 95251 CONT GLUC MNTR ANALYSIS I&R: CPT | Performed by: NURSE PRACTITIONER

## 2021-12-07 RX ORDER — PEN NEEDLE, DIABETIC 32GX 5/32"
NEEDLE, DISPOSABLE MISCELLANEOUS
Qty: 200 EACH | Refills: 3 | Status: SHIPPED | OUTPATIENT
Start: 2021-12-07 | End: 2021-12-21 | Stop reason: SDUPTHER

## 2021-12-07 RX ORDER — INSULIN ASPART 100 [IU]/ML
10 INJECTION, SOLUTION INTRAVENOUS; SUBCUTANEOUS
Qty: 18 ML | Refills: 6 | Status: SHIPPED | OUTPATIENT
Start: 2021-12-07 | End: 2021-12-21 | Stop reason: SDUPTHER

## 2021-12-07 RX ORDER — INSULIN GLARGINE 300 U/ML
25 INJECTION, SOLUTION SUBCUTANEOUS DAILY
Qty: 18 ML | Refills: 11 | Status: SHIPPED | OUTPATIENT
Start: 2021-12-07 | End: 2021-12-21 | Stop reason: SDUPTHER

## 2021-12-07 ASSESSMENT — FIBROSIS 4 INDEX: FIB4 SCORE: 1.32

## 2021-12-07 NOTE — TELEPHONE ENCOUNTER
ESTABLISHED PATIENT PRE-VISIT PLANNING     Patient was NOT contacted to complete PVP.     Note: Patient will not be contacted if there is no indication to call.     1.  Reviewed notes from the last few office visits within the medical group: Yes    2.  If any orders were placed at last visit or intended to be done for this visit (i.e. 6 mos follow-up), do we have Results/Consult Notes?         •  Labs - Labs were not ordered at last office visit.  Note: If patient appointment is for lab review and patient did not complete labs, check with provider if OK to reschedule patient until labs completed.       •  Imaging - Imaging was not ordered at last office visit.       •  Referrals - No referrals were ordered at last office visit.    3. Is this appointment scheduled as a Hospital Follow-Up? No    4.  Immunizations were updated in Epic using Reconcile Outside Information activity? Yes    5.  Patient is due for the following Health Maintenance Topics:   Health Maintenance Due   Topic Date Due   • RETINAL SCREENING  12/21/2016   • COLORECTAL CANCER SCREENING  09/24/2020   • Annual Wellness Visit  02/25/2021   • IMM INFLUENZA (1) 09/01/2021   • A1C SCREENING  10/27/2021   • FASTING LIPID PROFILE  12/07/2021   • URINE ACR / MICROALBUMIN  12/07/2021       6.  AHA (Pulse8) form printed for Provider? Yes

## 2021-12-07 NOTE — PROGRESS NOTES
CHIEF COMPLAINT: Patient is here for follow up of Type 1 Diabetes Mellitus.    HPI:     Dylon Santa is a 61 y.o. male for continued evaluation & treatment of the followin.  Type 1 Diabetes Mellitus   History of type 1 diabetes diagnosed at the age of 7.  Mr. Santa is accompanied by his brother who is his primary caregiver.  Patient has been diagnosed with dementia and his brother is power of .  Patient is alert oriented x 3.  Very pleasant and receptive to all questions.  Patient and his brother have been traveling last couple months down to [place] to help care for their mother.  Within that time meals and timing of medication have varied.      Current diabetes regimen:  Toujeo 20 units daily  NovoLog 1:10 CHO ratio, correction dosing 1:50 > 150.   NovoLog dosing has been limited secondary to if glucose levels are considered within a normal value per the brothers assessment he will not dose the patient with NovoLog.    Point-of-care A1c 2021: 10.7%  Point-of-care A1c 2021: 8.7%       Blood sugar logs 2021: Patient has freestyle therese 14-day CGM.  Data has been downloaded and scanned under media tab.  Details of report discussed with patient and his brother.    Average glucose 223.  Time in target range 52%.  Fasting glucose:100s-180s  Dinnertime glucose: 100's-200's    Patient and his brother deny any hypoglycemic events.  Patient is hypoglycemic aware however requires caregiver assistance to be notified of these low occurrences and how to treat.    Patient notes 3 meals a day and occasionally has some snacks.    Patient and his brother are very active, walking and/or busy throughout the day.    Diabetes Complications   Retinopathy: No known retinopathy.  Last eye exam: 2021-[facility]  Neuropathy: Denies paresthesias or numbness in hands or feet. Denies any foot wounds.  Positive for left foot metatarsal amputation.  Incisions are well-healed.  Exercise:  Minimal.  Diet: Fair.    History of Chronic Kidney Disease, Stage 3.  Currently taking losartan 50 mg daily.  /64.     Ref. Range 12/7/2020 10:55   Micro Alb Creat Ratio Latest Ref Range: 0 - 30 mg/g 73 (H)   Creatinine, Urine Latest Units: mg/dL 142.25   Microalbumin, Urine Random Latest Units: mg/dL 10.4       2.  Dyslipidemia  Currently taking Lipitor 40 mg daily.  Patient has tolerated statin therapy well for over 2 years.  Patient denies myalgias or muscle cramps.     Ref. Range 12/7/2020 10:55   Cholesterol,Tot Latest Ref Range: 100 - 199 mg/dL 138   Triglycerides Latest Ref Range: 0 - 149 mg/dL 98   HDL Latest Ref Range: >=40 mg/dL 55   LDL Latest Ref Range: <100 mg/dL 63       3.  Vitamin D deficiency  Currently taking vitamin D 2000 IU daily.     Ref. Range 12/7/2020 10:55   25-Hydroxy   Vitamin D 25 Latest Ref Range: 30 - 100 ng/mL 50       Patient's medications, allergies, and social histories were reviewed and updated as appropriate.    ROS:     CONS:     No fever, no chills   EYES:     No diplopia, no blurry vision   CV:           No chest pain, no palpitations   PULM:     No SOB, no cough, no hemoptysis.   GI:            No nausea, no vomiting, no diarrhea, no constipation   ENDO:     No polyuria, no polydipsia, no heat intolerance, no cold intolerance       Past Medical History:  Problem List:  2021-03: Dementia with behavioral disturbance ([facility])  2021-03: Long-term insulin use ([facility])  2021-03: Hypoglycemia  2020-06: Anemia  2019-12: Cervical stenosis of spine  2019-11: Migraine without aura and without status migrainosus, not   intractable  2019-11: Gastroesophageal reflux disease without esophagitis  2019-11: Recurrent epistaxis  2019-10: Atherosclerosis  2019-10: Toe amputee ([facility])  2019-10: Anxiety  2019-10: Neck pain  2019-10: Panic attacks  2019-10: Chest pain  2019-10: Dizziness  2019-10: Postconcussion syndrome  2018-11: Chronic cough  2018-10: Skin lesion, superficial  2018-10: Viral URI  with cough  2018-04: Vitamin D deficiency  2018-04: Mononeuropathy  2018-04: PVD (peripheral vascular disease) ([facility])  2017-11: Acute non-recurrent sinusitis  2017-11: Burning with urination  2017-11: Strain of latissimus dorsi muscle  2017-11: Bone spur  2017-04: Wound infection  2017-04: Uncontrolled type 1 diabetes mellitus with hypoglycemia and   coma ([facility])  2017-03: Diabetic ulcer of left foot associated with type 1 diabetes   mellitus ([facility])  2017-03: Diminished pulses in lower extremity  2016-12: Diabetic retinopathy of both eyes ([facility])  2016-06: Blind left eye  2016-05: CKD (chronic kidney disease) stage 3, GFR 30-59 ml/min Dr. 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   stress - mild to moderate CAD      Past Surgical History:  Past Surgical History:   Procedure Laterality Date   • IRRIGATION & DEBRIDEMENT GENERAL Left 5/3/2017    Procedure: IRRIGATION & DEBRIDEMENT GENERAL and Left Fourth Toe Amputation ;  Surgeon: Inessa Richard M.D.;  Location: SURGERY [facility];  Service:    • APPENDECTOMY     • CATARACT EXTRACTION WITH IOL Bilateral    • EYE SURGERY     • HAND SURGERY  1990's   • KNEE ARTHROSCOPY      x3   • TONSILLECTOMY          Allergies:  Patient has no known allergies.     Social History:  Social History     Tobacco Use   • Smoking status: Former Smoker     Packs/day: 0.00   • Smokeless tobacco: Former User     Types: Chew   • Tobacco comment: tobacco cessation recommended   Vaping Use   • Vaping Use: Never used   Substance Use Topics   • Alcohol  "use: Yes     Alcohol/week: 0.0 oz     Comment: rare beer   • Drug use: No        Family History:   family history includes Cancer in his father, mother, paternal aunt, and paternal grandfather; Diabetes in his maternal grandfather, maternal uncle, and paternal grandmother; Hyperlipidemia in his brother; Hypertension in his brother; Lung Disease in his brother, father, and mother; Other in his sister; Thyroid in his brother, mother, and sister.      PHYSICAL EXAM:   OBJECTIVE:  Vital signs: /74 (BP Location: Right arm, Patient Position: Sitting, BP Cuff Size: Adult)   Pulse 72   Resp 18   Ht 1.727 m (5' 8\")   Wt 69.5 kg (153 lb 3.2 oz)   SpO2 98%   BMI 23.29 kg/m²   GENERAL: Well-developed, well-nourished in no apparent distress.   EYE:  No ocular asymmetry, PERRLA  HENT: Pink, moist mucous membranes.    NECK: No thyromegaly.   CARDIOVASCULAR:  No murmurs  LUNGS: Clear breath sounds  ABDOMEN: Soft, nontender   EXTREMITIES: No clubbing, cyanosis, or edema.   NEUROLOGICAL: No gross focal motor abnormalities   LYMPH: No cervical adenopathy palpated.   SKIN: No rashes, lesions.     ASSESSMENT/PLAN:     1. Uncontrolled type 1 diabetes mellitus with hypoglycemia and coma (HCC)  Unstable.  Continue diabetes regimen:  Increase Toujeo to 22 units daily.  Further instructed to increase Toujeo by 2 units if a.m. blood sugars are consistently above 140.  NovoLog-CHO ratio 1:10, correction dosing 1:50 > 150.   Detailed discussion with patient and his brother regarding the dosing and timing of the NovoLog.  Patient requires fast acting insulin before each meal every day.  This is regardless of value of glucose level going into a meal.    Continue use of freestyle therese 2 CGM.    Continue balanced meal planning such as [email or web address]  Recommend drinking 2 L to 3 L of water each day.  Daily foot inspections are always recommended.  Patient is overdue for dilated eye exam and further states stable make this appointment as " soon as they can.    2. Proliferative diabetic retinopathy of both eyes without macular edema associated with type 1 diabetes mellitus (HCC)  Unstable.  Recommend that he get an annual eye exam.  We also recommend providing us copies of his eye exam    3.  Chronic kidney disease Stage 3  Stable.  Continue taking losartan 50 mg daily.    4. Dyslipidemia  Stable.  Continue Lipitor 40 mg daily    5. Long-term insulin use (HCC)  Patient is on long-term insulin therapy due to type 1 diabetes.  As per plan #1.      Complete point-of-care A1c at next appointment.  Next appointment in 2 months.      Thank you kindly for allowing me to participate in the diabetes care plan for this patient.    Nuha Parish, APRN  12/07/2021    CC:   Nesha Peralta D.O.

## 2021-12-11 ENCOUNTER — HOSPITAL ENCOUNTER (EMERGENCY)
Facility: MEDICAL CENTER | Age: 61
End: 2021-12-11
Attending: EMERGENCY MEDICINE
Payer: MEDICARE

## 2021-12-11 ENCOUNTER — APPOINTMENT (OUTPATIENT)
Dept: RADIOLOGY | Facility: MEDICAL CENTER | Age: 61
End: 2021-12-11
Attending: EMERGENCY MEDICINE
Payer: MEDICARE

## 2021-12-11 VITALS
DIASTOLIC BLOOD PRESSURE: 96 MMHG | TEMPERATURE: 97.1 F | OXYGEN SATURATION: 98 % | BODY MASS INDEX: 23.32 KG/M2 | HEIGHT: 68 IN | WEIGHT: 153.88 LBS | RESPIRATION RATE: 18 BRPM | HEART RATE: 67 BPM | SYSTOLIC BLOOD PRESSURE: 172 MMHG

## 2021-12-11 DIAGNOSIS — K59.00 CONSTIPATION, UNSPECIFIED CONSTIPATION TYPE: ICD-10-CM

## 2021-12-11 DIAGNOSIS — R10.32 LEFT LOWER QUADRANT ABDOMINAL PAIN: ICD-10-CM

## 2021-12-11 LAB
ALBUMIN SERPL BCP-MCNC: 4.4 G/DL (ref 3.2–4.9)
ALBUMIN/GLOB SERPL: 1.5 G/DL
ALP SERPL-CCNC: 73 U/L (ref 30–99)
ALT SERPL-CCNC: 32 U/L (ref 2–50)
ANION GAP SERPL CALC-SCNC: 10 MMOL/L (ref 7–16)
APPEARANCE UR: CLEAR
AST SERPL-CCNC: 31 U/L (ref 12–45)
BASOPHILS # BLD AUTO: 0.7 % (ref 0–1.8)
BASOPHILS # BLD: 0.04 K/UL (ref 0–0.12)
BILIRUB SERPL-MCNC: 0.4 MG/DL (ref 0.1–1.5)
BILIRUB UR QL STRIP.AUTO: NEGATIVE
BUN SERPL-MCNC: 22 MG/DL (ref 8–22)
CALCIUM SERPL-MCNC: 9.4 MG/DL (ref 8.5–10.5)
CHLORIDE SERPL-SCNC: 102 MMOL/L (ref 96–112)
CO2 SERPL-SCNC: 22 MMOL/L (ref 20–33)
COLOR UR: YELLOW
CREAT SERPL-MCNC: 1.37 MG/DL (ref 0.5–1.4)
EOSINOPHIL # BLD AUTO: 0.08 K/UL (ref 0–0.51)
EOSINOPHIL NFR BLD: 1.4 % (ref 0–6.9)
ERYTHROCYTE [DISTWIDTH] IN BLOOD BY AUTOMATED COUNT: 43 FL (ref 35.9–50)
GLOBULIN SER CALC-MCNC: 3 G/DL (ref 1.9–3.5)
GLUCOSE SERPL-MCNC: 226 MG/DL (ref 65–99)
GLUCOSE UR STRIP.AUTO-MCNC: 500 MG/DL
HCT VFR BLD AUTO: 37.6 % (ref 42–52)
HGB BLD-MCNC: 12.8 G/DL (ref 14–18)
IMM GRANULOCYTES # BLD AUTO: 0.02 K/UL (ref 0–0.11)
IMM GRANULOCYTES NFR BLD AUTO: 0.3 % (ref 0–0.9)
KETONES UR STRIP.AUTO-MCNC: NEGATIVE MG/DL
LEUKOCYTE ESTERASE UR QL STRIP.AUTO: NEGATIVE
LIPASE SERPL-CCNC: 27 U/L (ref 11–82)
LYMPHOCYTES # BLD AUTO: 1.54 K/UL (ref 1–4.8)
LYMPHOCYTES NFR BLD: 26.7 % (ref 22–41)
MCH RBC QN AUTO: 30 PG (ref 27–33)
MCHC RBC AUTO-ENTMCNC: 34 G/DL (ref 33.7–35.3)
MCV RBC AUTO: 88.3 FL (ref 81.4–97.8)
MICRO URNS: ABNORMAL
MONOCYTES # BLD AUTO: 0.5 K/UL (ref 0–0.85)
MONOCYTES NFR BLD AUTO: 8.7 % (ref 0–13.4)
NEUTROPHILS # BLD AUTO: 3.58 K/UL (ref 1.82–7.42)
NEUTROPHILS NFR BLD: 62.2 % (ref 44–72)
NITRITE UR QL STRIP.AUTO: NEGATIVE
NRBC # BLD AUTO: 0 K/UL
NRBC BLD-RTO: 0 /100 WBC
PH UR STRIP.AUTO: 6.5 [PH] (ref 5–8)
PLATELET # BLD AUTO: 236 K/UL (ref 164–446)
PMV BLD AUTO: 10.3 FL (ref 9–12.9)
POTASSIUM SERPL-SCNC: 4.3 MMOL/L (ref 3.6–5.5)
PROT SERPL-MCNC: 7.4 G/DL (ref 6–8.2)
PROT UR QL STRIP: NEGATIVE MG/DL
RBC # BLD AUTO: 4.26 M/UL (ref 4.7–6.1)
RBC UR QL AUTO: NEGATIVE
SODIUM SERPL-SCNC: 134 MMOL/L (ref 135–145)
SP GR UR STRIP.AUTO: 1.01
UROBILINOGEN UR STRIP.AUTO-MCNC: 0.2 MG/DL
WBC # BLD AUTO: 5.8 K/UL (ref 4.8–10.8)

## 2021-12-11 PROCEDURE — 80053 COMPREHEN METABOLIC PANEL: CPT

## 2021-12-11 PROCEDURE — 700117 HCHG RX CONTRAST REV CODE 255: Performed by: EMERGENCY MEDICINE

## 2021-12-11 PROCEDURE — 83690 ASSAY OF LIPASE: CPT

## 2021-12-11 PROCEDURE — 74177 CT ABD & PELVIS W/CONTRAST: CPT | Mod: ME

## 2021-12-11 PROCEDURE — 99284 EMERGENCY DEPT VISIT MOD MDM: CPT

## 2021-12-11 PROCEDURE — 81003 URINALYSIS AUTO W/O SCOPE: CPT

## 2021-12-11 PROCEDURE — 85025 COMPLETE CBC W/AUTO DIFF WBC: CPT

## 2021-12-11 RX ADMIN — IOHEXOL 100 ML: 350 INJECTION, SOLUTION INTRAVENOUS at 19:01

## 2021-12-11 ASSESSMENT — FIBROSIS 4 INDEX: FIB4 SCORE: 1.32

## 2021-12-12 NOTE — ED TRIAGE NOTES
"Chief Complaint   Patient presents with   • Abdominal Pain     Pt has been having left sided lower abd pain x 3 days. Pain has gotten worse since. Pt denies any nausea and vomiting. Pt is having diarrhea.       BP (!) 161/80   Pulse 73   Temp 36.6 °C (97.8 °F) (Oral)   Resp 18   Ht 1.727 m (5' 8\")   Wt 69.8 kg (153 lb 14.1 oz)   SpO2 98% Comment: RA  BMI 23.40 kg/m²     Patient arrived to ED for the above complaint. Triage process explained to patient. Patient placed in lobby and told to notify staff of any changes.   "

## 2021-12-12 NOTE — ED PROVIDER NOTES
ED Provider Note    CHIEF COMPLAINT  Chief Complaint   Patient presents with   • Abdominal Pain     Pt has been having left sided lower abd pain x 3 days. Pain has gotten worse since. Pt denies any nausea and vomiting. Pt is having diarrhea.         HPI  Dylon Santa is a 61 y.o. male who presents to the emergency department complaining of left lower quadrant abdominal pain. Past medical history as documented below. History is primarily provided from brother at bedside given patient's underlying dementia which the brother states is rapidly progressing. Over the last couple days the patient is been complaining of focal left lower quadrant pain. He has had loose stool but no blood noted. No nausea vomiting. No fever chills. No prior known history of colonoscopy. No prior abdominal surgeries. No notable aggravated leaving factors. Pain is currently mild. He did get his COVID booster approximately 1 1/2 weeks ago.    REVIEW OF SYSTEMS  See HPI for further details. All other systems are negative.     PAST MEDICAL HISTORY   has a past medical history of Anesthesia, Arthritis, Bronchitis (2016), Cataract, Cold (4/5/17), Coronary artery disease due to lipid rich plaque - mild to moderate on Cath 2019 Left Dominant, Dental disorder, Diabetic neuropathy (HCC) (12/31/2014), Diabetic retinopathy of both eyes (HCC) (12/9/2016), Dyslipidemia, Heart murmur, Hemorrhagic disorder ([facility] High cholesterol, History of cardiac catheterization 2019 in sett[place] of  abnormal stress - mild to moderate CAD, History of shingles (12/31/2014), Hyperlipidemia, Hypertension, Kidney stones, Pain, Psychiatric problem, and Type II or unspecified type diabetes mellitus without mention of complication, uncontrolled (12/31/2014).    SOCIAL HISTORY  Social History     Tobacco Use   • Smoking status: Former Smoker     Packs/day: 0.00   • Smokeless tobacco: Former User     Types: Chew   • Tobacco comment: tobacco cessation recommended   Vaping Use   •  "Vaping Use: Never used   Substance and Sexual Activity   • Alcohol use: Yes     Alcohol/week: 0.0 oz     Comment: rare beer   • Drug use: No   • Sexual activity: Never     Comment: Never  , On disability due to lack of eye sight.       SURGICAL HISTORY   has a past surgical history that includes eye surgery; appendectomy; knee arthroscopy; tonsillectomy; cataract extraction with iol (Bilateral); irrigation & debridement general (Left, 5/3/2017); and hand surgery (1990's).    CURRENT MEDICATIONS  Home Medications     Reviewed by Sharon Catherine R.N. (Registered Nurse) on 12/11/21 at 1701  Med List Status: Not Addressed   Medication Last Dose Status   acyclovir (ZOVIRAX) 200 MG Cap  Active   atorvastatin (LIPITOR) 40 MG Tab  Active   clopidogrel (PLAVIX) 75 MG Tab  Active   Continuous Blood Gluc Sensor (FREESTYLE CARLOS 14 DAY SENSOR) Misc  Active   GLUCAGEN HYPOKIT 1 MG Recon Soln  Active   insulin aspart (NOVOLOG FLEXPEN) 100 UNIT/ML injection PEN  Active   Insulin Glargine, 1 Unit Dial, (TOUJEO SOLOSTAR) 300 UNIT/ML Solution Pen-injector  Active   Insulin Pen Needle 32 G x 4 mm (BD PEN NEEDLE TENA 2ND GEN)  Active   losartan (COZAAR) 50 MG Tab  Active   metoprolol SR (TOPROL XL) 25 MG TABLET SR 24 HR  Active   traZODone (DESYREL) 100 MG Tab  Active                ALLERGIES  No Known Allergies    PHYSICAL EXAM  VITAL SIGNS: BP (!) 172/96   Pulse 67   Temp 36.2 °C (97.1 °F) (Temporal)   Resp 18   Ht 1.727 m (5' 8\")   Wt 69.8 kg (153 lb 14.1 oz)   SpO2 98%   BMI 23.40 kg/m²  @SUSHMA[[id number]::@   Pulse ox interpretation: I interpret this pulse ox as normal.  Constitutional: Alert in no apparent distress.  HENT: No signs of trauma, Bilateral external ears normal, Nose normal.   Eyes: Pupils are equal and reactive  Neck: Normal range of motion, No tenderness, Supple  Cardiovascular: Regular rate and rhythm, no murmurs.   Thorax & Lungs: Normal breath sounds, No respiratory distress, No wheezing, No chest " tenderness.   Abdomen: Bowel sounds normal, Soft, focal left lower quadrant tenderness with deep palpation. No CVA tenderness.  Skin: Warm, Dry, No erythema, No rash.   Extremities: Intact distal pulses.   Musculoskeletal: Good range of motion in all major joints. No tenderness to palpation or major deformities noted.   Neurologic: Alert , Normal motor function, Normal sensory function, No focal deficits noted.   Psychiatric: Affect normal, Judgment normal, Mood normal.       DIAGNOSTIC STUDIES / PROCEDURES      LABS  Results for orders placed or performed during the hospital encounter of 12/11/21   CBC WITH DIFFERENTIAL   Result Value Ref Range    WBC 5.8 4.8 - 10.8 K/uL    RBC 4.26 (L) 4.70 - 6.10 M/uL    Hemoglobin 12.8 (L) 14.0 - 18.0 g/dL    Hematocrit 37.6 (L) 42.0 - 52.0 %    MCV 88.3 81.4 - 97.8 fL    MCH 30.0 27.0 - 33.0 pg    MCHC 34.0 33.7 - 35.3 g/dL    RDW 43.0 35.9 - 50.0 fL    Platelet Count 236 164 - 446 K/uL    MPV 10.3 9.0 - 12.9 fL    Neutrophils-Polys 62.20 44.00 - 72.00 %    Lymphocytes 26.70 22.00 - 41.00 %    Monocytes 8.70 0.00 - 13.40 %    Eosinophils 1.40 0.00 - 6.90 %    Basophils 0.70 0.00 - 1.80 %    Immature Granulocytes 0.30 0.00 - 0.90 %    Nucleated RBC 0.00 /100 WBC    Neutrophils (Absolute) 3.58 1.82 - 7.42 K/uL    Lymphs (Absolute) 1.54 1.00 - 4.80 K/uL    Monos (Absolute) 0.50 0.00 - 0.85 K/uL    Eos (Absolute) 0.08 0.00 - 0.51 K/uL    Baso (Absolute) 0.04 0.00 - 0.12 K/uL    Immature Granulocytes (abs) 0.02 0.00 - 0.11 K/uL    NRBC (Absolute) 0.00 K/uL   COMP METABOLIC PANEL   Result Value Ref Range    Sodium 134 (L) 135 - 145 mmol/L    Potassium 4.3 3.6 - 5.5 mmol/L    Chloride 102 96 - 112 mmol/L    Co2 22 20 - 33 mmol/L    Anion Gap 10.0 7.0 - 16.0    Glucose 226 (H) 65 - 99 mg/dL    Bun 22 8 - 22 mg/dL    Creatinine 1.37 0.50 - 1.40 mg/dL    Calcium 9.4 8.5 - 10.5 mg/dL    AST(SGOT) 31 12 - 45 U/L    ALT(SGPT) 32 2 - 50 U/L    Alkaline Phosphatase 73 30 - 99 U/L    Total  Bilirubin 0.4 0.1 - 1.5 mg/dL    Albumin 4.4 3.2 - 4.9 g/dL    Total Protein 7.4 6.0 - 8.2 g/dL    Globulin 3.0 1.9 - 3.5 g/dL    A-G Ratio 1.5 g/dL   LIPASE   Result Value Ref Range    Lipase 27 11 - 82 U/L   URINALYSIS    Specimen: Urine   Result Value Ref Range    Color Yellow     Character Clear     Specific Gravity 1.011 <1.035    Ph 6.5 5.0 - 8.0    Glucose 500 (A) Negative mg/dL    Ketones Negative Negative mg/dL    Protein Negative Negative mg/dL    Bilirubin Negative Negative    Urobilinogen, Urine 0.2 Negative    Nitrite Negative Negative    Leukocyte Esterase Negative Negative    Occult Blood Negative Negative    Micro Urine Req see below    ESTIMATED GFR   Result Value Ref Range    GFR If African American >60 >60 mL/min/1.73 m 2    GFR If Non  53 (A) >60 mL/min/1.73 m 2         RADIOLOGY  CT-ABDOMEN-PELVIS WITH   Final Result      1.  There is cholelithiasis.      2.  Otherwise no acute abnormalities are noted in the abdomen or pelvis.              COURSE & MEDICAL DECISION MAKING  Pertinent Labs & Imaging studies reviewed. (See chart for details)  61-year-old male presented emergency department with the above presentation. Patient with focal left lower quadrant discomfort. CT imaging showing cholelithiasis without evidence of cholecystitis. Low suspicion for this as well. Laboratory evaluation is reassuring also. Patient is somewhat hyperglycemic today at 226. Family at bedside explained that he has had some recent larger fluctuations to mild hyperglycemia as well as some hypoglycemia and clearance morning where he had blood sugars in the 50s and 60s. At this point with review of the CT imaging myself I do believe that a lot of the patient's discomfort may be secondary to gas and constipation as he does have significant stool and gas burden on my evaluation of the imaging. This point I will have the family continue with the bowel regimen at home or return with any changes or worsening in  the interim.       The patient will return for worsening symptoms and is stable at the time of discharge. The patient verbalizes understanding and will comply.    FINAL IMPRESSION  1. Left lower quadrant abdominal pain    2. Constipation, unspecified constipation type            Electronically signed by: James Pastrana M.D., 12/11/2021 6:46 PM

## 2021-12-12 NOTE — ED NOTES
Discharge teaching and paperwork provided regarding chronic constipation and all questions/concerns answered. VSS, pain assessment stable and PIV removed. Given information regarding home care and reasons to follow up with ED or primary MD. Patient discharged to the care of brother and ambulated out of the ED.

## 2021-12-13 ENCOUNTER — APPOINTMENT (OUTPATIENT)
Dept: MEDICAL GROUP | Facility: PHYSICIAN GROUP | Age: 61
End: 2021-12-13
Payer: MEDICARE

## 2021-12-16 ENCOUNTER — TELEPHONE (OUTPATIENT)
Dept: MEDICAL GROUP | Facility: PHYSICIAN GROUP | Age: 61
End: 2021-12-16

## 2021-12-16 NOTE — TELEPHONE ENCOUNTER
Phone Number Called: [phone]    Call outcome: Left a detailed message for patient    Message: Called to follow up with the patient after her ED visit on 12/11/2021 for abdominal pain.

## 2021-12-21 DIAGNOSIS — N18.30 TYPE 1 DIABETES MELLITUS WITH STAGE 3 CHRONIC KIDNEY DISEASE, UNSPECIFIED WHETHER STAGE 3A OR 3B CKD (HCC): ICD-10-CM

## 2021-12-21 DIAGNOSIS — E10.22 TYPE 1 DIABETES MELLITUS WITH STAGE 3 CHRONIC KIDNEY DISEASE, UNSPECIFIED WHETHER STAGE 3A OR 3B CKD (HCC): ICD-10-CM

## 2021-12-21 DIAGNOSIS — E10.641 UNCONTROLLED TYPE 1 DIABETES MELLITUS WITH HYPOGLYCEMIA AND COMA (HCC): ICD-10-CM

## 2021-12-21 RX ORDER — PEN NEEDLE, DIABETIC 32GX 5/32"
NEEDLE, DISPOSABLE MISCELLANEOUS
Qty: 200 EACH | Refills: 3 | Status: SHIPPED | OUTPATIENT
Start: 2021-12-21 | End: 2022-01-10 | Stop reason: SDUPTHER

## 2021-12-21 RX ORDER — INSULIN GLARGINE 300 U/ML
25 INJECTION, SOLUTION SUBCUTANEOUS DAILY
Qty: 18 ML | Refills: 11 | Status: SHIPPED | OUTPATIENT
Start: 2021-12-21 | End: 2022-03-28 | Stop reason: SDUPTHER

## 2021-12-21 RX ORDER — FLASH GLUCOSE SENSOR
1 KIT MISCELLANEOUS
Qty: 6 EACH | Refills: 3 | Status: SHIPPED | OUTPATIENT
Start: 2021-12-21 | End: 2022-03-28 | Stop reason: SDUPTHER

## 2021-12-21 RX ORDER — INSULIN ASPART 100 [IU]/ML
10 INJECTION, SOLUTION INTRAVENOUS; SUBCUTANEOUS
Qty: 18 ML | Refills: 6 | Status: SHIPPED | OUTPATIENT
Start: 2021-12-21 | End: 2022-03-14

## 2021-12-21 RX ORDER — TRAZODONE HYDROCHLORIDE 100 MG/1
TABLET ORAL
Qty: 90 TABLET | Refills: 3 | Status: SHIPPED | OUTPATIENT
Start: 2021-12-21 | End: 2022-03-22

## 2022-01-01 NOTE — TELEPHONE ENCOUNTER
ESTABLISHED PATIENT PRE-VISIT PLANNING     Patient was NOT contacted to complete PVP    1.  Reviewed notes from the last few office visits within the medical group: Yes    2.  If any orders were placed at last visit or intended to be done for this visit (i.e. 6 mos follow-up), do we have Results/Consult Notes?        •  Labs - Labs ordered, completed on 6/23/20 and results are in chart.       •  Imaging - Imaging ordered, completed and results are in chart.       •  Referrals - Referral ordered, patient was seen and consult notes are in chart. Care Teams updated  YES.    3. Is this appointment scheduled as a Hospital Follow-Up? No    4.  Immunizations were updated in [employer] using WebIZ?: Yes       •  Web [employer] Recommendations: FLU, HEPATITIS A , MMR , TD, VARICELLA (Chicken Pox)  and SHINGRIX (Shingles)    5.  Patient is due for the following Health Maintenance Topics:   Health Maintenance Due   Topic Date Due   • IMM ZOSTER VACCINES (1 of 2) 06/27/2011   • RETINAL SCREENING  12/21/2016   • DIABETES MONOFILAMENT / LE EXAM  04/26/2019       6. Orders for overdue Health Maintenance topics pended in Pre-Charting? NO    7.  AHA (MDX) form printed for Provider? No, already completed    8.  Patient was NOT informed to arrive 15 min prior to their scheduled appointment and bring in their medication bottles.  
PROVIDER:[TOKEN:[[id number]],FOLLOWUP:[1-3 days]]

## 2022-01-07 ENCOUNTER — TELEPHONE (OUTPATIENT)
Dept: ENDOCRINOLOGY | Facility: MEDICAL CENTER | Age: 62
End: 2022-01-07
Payer: MEDICARE

## 2022-01-07 NOTE — TELEPHONE ENCOUNTER
Received message from pharmacy that needle rx has to be 100 day supply per insurance.     Please write a new prescription and send to [facility] mail order.     Thank you

## 2022-01-10 ENCOUNTER — TELEPHONE (OUTPATIENT)
Dept: ENDOCRINOLOGY | Facility: MEDICAL CENTER | Age: 62
End: 2022-01-10

## 2022-01-10 DIAGNOSIS — N18.30 TYPE 1 DIABETES MELLITUS WITH STAGE 3 CHRONIC KIDNEY DISEASE, UNSPECIFIED WHETHER STAGE 3A OR 3B CKD (HCC): ICD-10-CM

## 2022-01-10 DIAGNOSIS — E10.22 TYPE 1 DIABETES MELLITUS WITH STAGE 3 CHRONIC KIDNEY DISEASE, UNSPECIFIED WHETHER STAGE 3A OR 3B CKD (HCC): ICD-10-CM

## 2022-01-10 DIAGNOSIS — E10.641 UNCONTROLLED TYPE 1 DIABETES MELLITUS WITH HYPOGLYCEMIA AND COMA (HCC): ICD-10-CM

## 2022-01-10 RX ORDER — PEN NEEDLE, DIABETIC 32GX 5/32"
NEEDLE, DISPOSABLE MISCELLANEOUS
Qty: 400 EACH | Refills: 2 | Status: SHIPPED | OUTPATIENT
Start: 2022-01-10 | End: 2022-03-28 | Stop reason: SDUPTHER

## 2022-01-11 NOTE — TELEPHONE ENCOUNTER
[facility] called to get clarification on the quantity on the pen needles. They said that insurance wouldn't approve a 50 day supply but they would approve a 100 days supply so we can either call it in at [phone] or fax a new prescription

## 2022-02-22 ENCOUNTER — OFFICE VISIT (OUTPATIENT)
Dept: NEUROLOGY | Facility: MEDICAL CENTER | Age: 62
End: 2022-02-22
Attending: PSYCHIATRY & NEUROLOGY
Payer: MEDICARE

## 2022-02-22 VITALS
BODY MASS INDEX: 23.49 KG/M2 | DIASTOLIC BLOOD PRESSURE: 62 MMHG | HEART RATE: 70 BPM | TEMPERATURE: 97.4 F | WEIGHT: 154.98 LBS | RESPIRATION RATE: 16 BRPM | HEIGHT: 68 IN | SYSTOLIC BLOOD PRESSURE: 122 MMHG | OXYGEN SATURATION: 97 %

## 2022-02-22 DIAGNOSIS — G30.0 EARLY ONSET ALZHEIMER'S DEMENTIA WITH BEHAVIORAL DISTURBANCE (HCC): ICD-10-CM

## 2022-02-22 DIAGNOSIS — F02.818 EARLY ONSET ALZHEIMER'S DEMENTIA WITH BEHAVIORAL DISTURBANCE (HCC): ICD-10-CM

## 2022-02-22 PROCEDURE — 99212 OFFICE O/P EST SF 10 MIN: CPT | Performed by: PSYCHIATRY & NEUROLOGY

## 2022-02-22 PROCEDURE — 99214 OFFICE O/P EST MOD 30 MIN: CPT | Performed by: PSYCHIATRY & NEUROLOGY

## 2022-02-22 ASSESSMENT — PAIN SCALES - GENERAL: PAINLEVEL: 6=MODERATE PAIN

## 2022-02-22 ASSESSMENT — PATIENT HEALTH QUESTIONNAIRE - PHQ9: CLINICAL INTERPRETATION OF PHQ2 SCORE: 0

## 2022-02-22 ASSESSMENT — FIBROSIS 4 INDEX: FIB4 SCORE: 1.39

## 2022-02-22 NOTE — PROGRESS NOTES
"Subjective     Dylon Santa is a 61 y.o. male who presents with his brother Luis Carlos, as always, for follow-up, with a history of moderate dementia, likely Alzheimer's disease.  The history is gotten from his brother.    HPI    Dylon states that he feels fine.  He is concerned about the loss of muscle size, he feels that he is getting weaker and \"smaller\".  He states that he is eating well.    According to his brother, his need for assistance with all his ADLs continues to worsen slightly.  Though there are no urinary or bowel accidents, he does wear Depends diaper.  Luis Carlos make sure that his medications are taken, his insulin as well, his sugars have been fairly well controlled.  Dylon is not proving problematic in this regard.  He is traveling well, they spend time with his mother, Dylon stays in the fifth wheel that they have for him.  He is comfortable there.  On only one occasion has he wandered off.  He sleeps well, there have been no problems with delirium, hallucinosis or even severe nightmares that awaken him.    His weight is stable.  He eats regularly.  Language has become more constrained, content compromised.  It has become a bit of a charades and he wants to let his wishes be known.    They enjoyed their trip to see their mother last year as well as a camping trip just this recent December, 2021.  He is scheduled to see his first [employer] race in [facility] later this spring.  He did have a problem with severe constipation, bordering on obstipation.  He was hospitalized for couple of days.  Luis Carlos is now keeping track of his bowel regularity.  There have been no further issues so far.    Medical, surgical and family histories are reviewed, there are no new drug allergies.  He is on no medications from my standpoint, but does take trazodone, Plavix, Toujeo, NovoLog insulin, Cozaar, Zovirax, Toprol-XL and Lipitor.    Review of Systems   Unable to perform ROS: Dementia     Objective     /62 (BP Location: Right arm, " "Patient Position: Sitting, BP Cuff Size: Adult)   Pulse 70   Temp 36.3 °C (97.4 °F) (Temporal)   Resp 16   Ht 1.727 m (5' 8\")   Wt 70.3 kg (154 lb 15.7 oz)   SpO2 97%   BMI 23.57 kg/m²      Physical Exam    He appears in no acute distress.  Vital signs are stable.  He is very cooperative.  There is no malar rash.  His neck is supple.  Cardiac evaluation is unremarkable.     Neurological Exam    He has no idea what his birthday is, let alone the date itself.  He seems to recognize who I am, but then he says \"I know who you are\" or \"I know that\" every time he is asked a question.  His answers to questions are very simple, there is no content.  He repeats himself continuously.  There is clear apraxia, he has right/left confusional issues as well.  He can cross the midline but he is inconsistent.  There is some rostrocaudal extinction.  Naming is curtailed, he will correctly name an object once but then repeatedly uses that name for other objects.  He identifies his brother.  He is clearly oblivious to the problems that he is having.    PERRLA/EOMI, visual fields are grossly full, facial movements are symmetric.  There is no bulbar dysfunction.  The atrophy of the hand intrinsic musculatures, right greater than left, is unchanged.  There are no fasciculations.  He moves all 4 extremities without obvious asymmetry.  There is no appendicular dystaxia with any of the extremities.  He stands slowly but does so independently, station is normal.  Fine motor control of the hands and fingers is symmetric.  Sensory exam is deferred.    Assessment & Plan     1. Early onset Alzheimer's dementia with behavioral disturbance (HCC)  Things on the home front are complicated to say the least, but Luis Carlos is doing an amazing job. Things are changing slowly and steadily, he is having good and bad days, all of his not unexpected.  Luis Carlos has a clear idea of what will be required moving forwards.  We can simply follow-up in 6 " months.    Time: 30 minutes in total spent on patient care including precharting, record review, discussions with healthcare staff and documentation.  This includes face-to-face time with the patient for exam, review, discussion, as well as education, counseling and coordinating care with his brother.

## 2022-03-11 ENCOUNTER — TELEPHONE (OUTPATIENT)
Dept: MEDICAL GROUP | Facility: LAB | Age: 62
End: 2022-03-11
Payer: MEDICARE

## 2022-03-11 PROBLEM — G47.09 OTHER INSOMNIA: Status: ACTIVE | Noted: 2022-03-11

## 2022-03-11 PROBLEM — R80.9 MICROALBUMINURIA: Status: ACTIVE | Noted: 2022-03-11

## 2022-03-14 ENCOUNTER — TELEPHONE (OUTPATIENT)
Dept: SCHEDULING | Facility: IMAGING CENTER | Age: 62
End: 2022-03-14

## 2022-03-20 SDOH — ECONOMIC STABILITY: FOOD INSECURITY: WITHIN THE PAST 12 MONTHS, YOU WORRIED THAT YOUR FOOD WOULD RUN OUT BEFORE YOU GOT MONEY TO BUY MORE.: NEVER TRUE

## 2022-03-20 SDOH — ECONOMIC STABILITY: HOUSING INSECURITY

## 2022-03-20 SDOH — ECONOMIC STABILITY: HOUSING INSECURITY
IN THE LAST 12 MONTHS, WAS THERE A TIME WHEN YOU DID NOT HAVE A STEADY PLACE TO SLEEP OR SLEPT IN A SHELTER (INCLUDING NOW)?: PATIENT REFUSED

## 2022-03-20 SDOH — HEALTH STABILITY: PHYSICAL HEALTH: ON AVERAGE, HOW MANY MINUTES DO YOU ENGAGE IN EXERCISE AT THIS LEVEL?: 0 MIN

## 2022-03-20 SDOH — ECONOMIC STABILITY: TRANSPORTATION INSECURITY
IN THE PAST 12 MONTHS, HAS LACK OF TRANSPORTATION KEPT YOU FROM MEETINGS, WORK, OR FROM GETTING THINGS NEEDED FOR DAILY LIVING?: PATIENT DECLINED

## 2022-03-20 SDOH — HEALTH STABILITY: MENTAL HEALTH
STRESS IS WHEN SOMEONE FEELS TENSE, NERVOUS, ANXIOUS, OR CAN'T SLEEP AT NIGHT BECAUSE THEIR MIND IS TROUBLED. HOW STRESSED ARE YOU?: TO SOME EXTENT

## 2022-03-20 SDOH — ECONOMIC STABILITY: INCOME INSECURITY: IN THE LAST 12 MONTHS, WAS THERE A TIME WHEN YOU WERE NOT ABLE TO PAY THE MORTGAGE OR RENT ON TIME?: PATIENT REFUSED

## 2022-03-20 SDOH — HEALTH STABILITY: PHYSICAL HEALTH: ON AVERAGE, HOW MANY DAYS PER WEEK DO YOU ENGAGE IN MODERATE TO STRENUOUS EXERCISE (LIKE A BRISK WALK)?: 0 DAYS

## 2022-03-20 SDOH — ECONOMIC STABILITY: TRANSPORTATION INSECURITY
IN THE PAST 12 MONTHS, HAS THE LACK OF TRANSPORTATION KEPT YOU FROM MEDICAL APPOINTMENTS OR FROM GETTING MEDICATIONS?: NO

## 2022-03-20 SDOH — ECONOMIC STABILITY: FOOD INSECURITY: WITHIN THE PAST 12 MONTHS, THE FOOD YOU BOUGHT JUST DIDN'T LAST AND YOU DIDN'T HAVE MONEY TO GET MORE.: NEVER TRUE

## 2022-03-20 SDOH — ECONOMIC STABILITY: INCOME INSECURITY: HOW HARD IS IT FOR YOU TO PAY FOR THE VERY BASICS LIKE FOOD, HOUSING, MEDICAL CARE, AND HEATING?: NOT HARD AT ALL

## 2022-03-20 SDOH — ECONOMIC STABILITY: TRANSPORTATION INSECURITY
IN THE PAST 12 MONTHS, HAS LACK OF RELIABLE TRANSPORTATION KEPT YOU FROM MEDICAL APPOINTMENTS, MEETINGS, WORK OR FROM GETTING THINGS NEEDED FOR DAILY LIVING?: PATIENT DECLINED

## 2022-03-20 ASSESSMENT — SOCIAL DETERMINANTS OF HEALTH (SDOH)
HOW OFTEN DO YOU HAVE SIX OR MORE DRINKS ON ONE OCCASION: NEVER
ARE YOU MARRIED, WIDOWED, DIVORCED, SEPARATED, NEVER MARRIED, OR LIVING WITH A PARTNER?: NEVER MARRIED
DO YOU BELONG TO ANY CLUBS OR ORGANIZATIONS SUCH AS CHURCH GROUPS UNIONS, FRATERNAL OR ATHLETIC GROUPS, OR SCHOOL GROUPS?: NO
HOW OFTEN DO YOU ATTEND CHURCH OR RELIGIOUS SERVICES?: MORE THAN 4 TIMES PER YEAR
HOW OFTEN DO YOU ATTENT MEETINGS OF THE CLUB OR ORGANIZATION YOU BELONG TO?: PATIENT DECLINED
HOW OFTEN DO YOU GET TOGETHER WITH FRIENDS OR RELATIVES?: MORE THAN THREE TIMES A WEEK
HOW OFTEN DO YOU ATTEND CHURCH OR RELIGIOUS SERVICES?: MORE THAN 4 TIMES PER YEAR
WITHIN THE PAST 12 MONTHS, YOU WORRIED THAT YOUR FOOD WOULD RUN OUT BEFORE YOU GOT THE MONEY TO BUY MORE: NEVER TRUE
IN A TYPICAL WEEK, HOW MANY TIMES DO YOU TALK ON THE PHONE WITH FAMILY, FRIENDS, OR NEIGHBORS?: MORE THAN THREE TIMES A WEEK
HOW OFTEN DO YOU GET TOGETHER WITH FRIENDS OR RELATIVES?: MORE THAN THREE TIMES A WEEK
ARE YOU MARRIED, WIDOWED, DIVORCED, SEPARATED, NEVER MARRIED, OR LIVING WITH A PARTNER?: NEVER MARRIED
HOW HARD IS IT FOR YOU TO PAY FOR THE VERY BASICS LIKE FOOD, HOUSING, MEDICAL CARE, AND HEATING?: NOT HARD AT ALL
HOW OFTEN DO YOU HAVE A DRINK CONTAINING ALCOHOL: 2-3 TIMES A WEEK
HOW MANY DRINKS CONTAINING ALCOHOL DO YOU HAVE ON A TYPICAL DAY WHEN YOU ARE DRINKING: 1 OR 2
HOW OFTEN DO YOU ATTENT MEETINGS OF THE CLUB OR ORGANIZATION YOU BELONG TO?: PATIENT DECLINED
IN A TYPICAL WEEK, HOW MANY TIMES DO YOU TALK ON THE PHONE WITH FAMILY, FRIENDS, OR NEIGHBORS?: MORE THAN THREE TIMES A WEEK
DO YOU BELONG TO ANY CLUBS OR ORGANIZATIONS SUCH AS CHURCH GROUPS UNIONS, FRATERNAL OR ATHLETIC GROUPS, OR SCHOOL GROUPS?: NO

## 2022-03-20 ASSESSMENT — LIFESTYLE VARIABLES
HOW OFTEN DO YOU HAVE A DRINK CONTAINING ALCOHOL: 2-3 TIMES A WEEK
HOW MANY STANDARD DRINKS CONTAINING ALCOHOL DO YOU HAVE ON A TYPICAL DAY: 1 OR 2
HOW OFTEN DO YOU HAVE SIX OR MORE DRINKS ON ONE OCCASION: NEVER

## 2022-03-22 ENCOUNTER — HOSPITAL ENCOUNTER (OUTPATIENT)
Dept: RADIOLOGY | Facility: MEDICAL CENTER | Age: 62
End: 2022-03-22
Attending: PHYSICIAN ASSISTANT
Payer: MEDICARE

## 2022-03-22 ENCOUNTER — OFFICE VISIT (OUTPATIENT)
Dept: MEDICAL GROUP | Facility: LAB | Age: 62
End: 2022-03-22
Payer: MEDICARE

## 2022-03-22 VITALS
RESPIRATION RATE: 16 BRPM | HEART RATE: 76 BPM | BODY MASS INDEX: 23.79 KG/M2 | DIASTOLIC BLOOD PRESSURE: 58 MMHG | HEIGHT: 68 IN | OXYGEN SATURATION: 97 % | TEMPERATURE: 98.4 F | SYSTOLIC BLOOD PRESSURE: 124 MMHG | WEIGHT: 157 LBS

## 2022-03-22 DIAGNOSIS — E10.22 TYPE 1 DIABETES MELLITUS WITH STAGE 3 CHRONIC KIDNEY DISEASE, UNSPECIFIED WHETHER STAGE 3A OR 3B CKD (HCC): ICD-10-CM

## 2022-03-22 DIAGNOSIS — M25.511 ACUTE PAIN OF RIGHT SHOULDER: Primary | ICD-10-CM

## 2022-03-22 DIAGNOSIS — I15.2 HYPERTENSION ASSOCIATED WITH TYPE 1 DIABETES MELLITUS (HCC): ICD-10-CM

## 2022-03-22 DIAGNOSIS — N18.30 TYPE 1 DIABETES MELLITUS WITH STAGE 3 CHRONIC KIDNEY DISEASE, UNSPECIFIED WHETHER STAGE 3A OR 3B CKD (HCC): ICD-10-CM

## 2022-03-22 DIAGNOSIS — M25.511 ACUTE PAIN OF RIGHT SHOULDER: ICD-10-CM

## 2022-03-22 DIAGNOSIS — E10.59 HYPERTENSION ASSOCIATED WITH TYPE 1 DIABETES MELLITUS (HCC): ICD-10-CM

## 2022-03-22 DIAGNOSIS — Z12.5 SCREENING PSA (PROSTATE SPECIFIC ANTIGEN): ICD-10-CM

## 2022-03-22 DIAGNOSIS — E10.3593 PROLIFERATIVE DIABETIC RETINOPATHY OF BOTH EYES WITHOUT MACULAR EDEMA ASSOCIATED WITH TYPE 1 DIABETES MELLITUS (HCC): ICD-10-CM

## 2022-03-22 DIAGNOSIS — G47.00 INSOMNIA, UNSPECIFIED TYPE: ICD-10-CM

## 2022-03-22 DIAGNOSIS — I25.10 CORONARY ARTERY DISEASE DUE TO LIPID RICH PLAQUE: ICD-10-CM

## 2022-03-22 DIAGNOSIS — R53.83 FATIGUE, UNSPECIFIED TYPE: ICD-10-CM

## 2022-03-22 DIAGNOSIS — I25.83 CORONARY ARTERY DISEASE DUE TO LIPID RICH PLAQUE: ICD-10-CM

## 2022-03-22 DIAGNOSIS — I73.9 PVD (PERIPHERAL VASCULAR DISEASE) (HCC): ICD-10-CM

## 2022-03-22 DIAGNOSIS — S06.9X9S TRAUMATIC BRAIN INJURY WITH LOSS OF CONSCIOUSNESS, SEQUELA (HCC): ICD-10-CM

## 2022-03-22 PROCEDURE — 73030 X-RAY EXAM OF SHOULDER: CPT | Mod: RT

## 2022-03-22 PROCEDURE — 99215 OFFICE O/P EST HI 40 MIN: CPT | Performed by: PHYSICIAN ASSISTANT

## 2022-03-22 ASSESSMENT — FIBROSIS 4 INDEX: FIB4 SCORE: 1.39

## 2022-03-22 NOTE — ASSESSMENT & PLAN NOTE
New to me; acute R shoulder pain without injury x2 days.  Denies any swelling or redness to the joint.  Reports that it is very tender to the touch.  Decreased range of motion in the right shoulder per patient.

## 2022-03-23 ENCOUNTER — TELEPHONE (OUTPATIENT)
Dept: MEDICAL GROUP | Facility: LAB | Age: 62
End: 2022-03-23
Payer: MEDICARE

## 2022-03-23 NOTE — TELEPHONE ENCOUNTER
Pt has been referred to eye doctor.   Pt has not been seen at [facility] since 2015 and needs colonoscopy.     Referrals for care gaps placed 3/22/22. Check in about a month to see if pt has been seen

## 2022-03-24 RX ORDER — TRAZODONE HYDROCHLORIDE 150 MG/1
150 TABLET ORAL NIGHTLY
Qty: 90 TABLET | Refills: 1 | Status: SHIPPED | OUTPATIENT
Start: 2022-03-24 | End: 2022-09-25 | Stop reason: SDUPTHER

## 2022-03-24 NOTE — PROGRESS NOTES
Subjective:   CC: Dylon Santa is a 61 y.o. male here today to establish care with new provider; chronic care management; acute right shoulder pain x2 days    Patient presents today with his brother Luis Carlos, his primary caregiver.  Dylon lives with his brother.    HPI:  Acute pain of right shoulder  New to me; acute R shoulder pain without injury x2 days.  Denies any swelling or redness to the joint.  Reports that it is very tender to the touch.  Decreased range of motion in the right shoulder per patient.       **Patient has a very extensive past medical history that is documented extensively in his chart.  Most complications stem from chronic type 1 diabetes and status post traumatic brain injury.  He is under the care of several specialists currently, they are requesting new referrals today.      Current medicines (including changes today)  Current Outpatient Medications   Medication Sig Dispense Refill   • traZODone (DESYREL) 150 MG Tab Take 1 Tablet by mouth every evening. 90 Tablet 1   • NOVOLOG FLEXPEN 100 UNIT/ML solution for injection Inject 10 Units under the skin 3 times a day before meals. 15 mL 6   • Insulin Pen Needle 32 G x 4 mm (BD PEN NEEDLE TENA 2ND GEN) USE  1 NEEDLE 4 TIMES DAILY 400 Each 2   • Insulin Glargine, 1 Unit Dial, (TOUJEO SOLOSTAR) 300 UNIT/ML Solution Pen-injector Inject 25 Units under the skin every day. 18 mL 11   • Continuous Blood Gluc Sensor (FREESTYLE CARLOS 14 DAY SENSOR) Misc Inject 1 Units under the skin every 14 days. 6 Each 3   • acyclovir (ZOVIRAX) 200 MG Cap TAKE ONE CAPSULE BY MOUTH ONE TIME DAILY 90 Capsule 3   • metoprolol SR (TOPROL XL) 25 MG TABLET SR 24 HR Take 1 Tablet by mouth every day. 90 Tablet 3   • losartan (COZAAR) 50 MG Tab Take 1 tablet by mouth every day. 100 tablet 3   • atorvastatin (LIPITOR) 40 MG Tab Take 1 tablet by mouth every day. 100 tablet 3   • clopidogrel (PLAVIX) 75 MG Tab Take 1 Tab by mouth every day. 90 Tab 3     No current  "facility-administered medications for this visit.     He  has a past medical history of Anesthesia, Arthritis, Bronchitis (2016), Cataract, Cold (4/5/17), Coronary artery disease due to lipid rich plaque - mild to moderate on Cath 2019 Left Dominant, Dental disorder, Diabetic neuropathy (HCC) (12/31/2014), Diabetic retinopathy of both eyes (HCC) (12/9/2016), Dyslipidemia, Heart murmur, Hemorrhagic disorder (HCC), High cholesterol, History of cardiac catheterization 2019 in [facility] abnormal stress - mild to moderate CAD, History of shingles (12/31/2014), Hyperlipidemia, Hypertension, Kidney stones, Pain, Psychiatric problem, and Type II or unspecified type diabetes mellitus without mention of complication, uncontrolled (12/31/2014).    ROS   No chest pain, no shortness of breath, no abdominal pain       Objective:     /58 (BP Location: Left arm, Patient Position: Sitting, BP Cuff Size: Adult)   Pulse 76   Temp 36.9 °C (98.4 °F)   Resp 16   Ht 1.727 m (5' 8\")   Wt 71.2 kg (157 lb)   SpO2 97%  Body mass index is 23.87 kg/m².   Physical Exam:  Constitutional: Alert, no distress.  Skin: Warm, dry, good turgor, no rashes in visible areas.  Eye: Equal, round, conjunctiva clear, lids normal.  Neck: Trachea midline, no masses, no thyromegaly. No cervical or supraclavicular lymphadenopathy  Respiratory: Unlabored respiratory effort, lungs clear to auscultation, no wheezes, no ronchi.  Cardiovascular: Normal S1, S2, no murmur, no edema.  Right shoulder: No step-offs or deformity of the right shoulder.  Severe tenderness palpation at the SC joint, AC joint and the long clavicle.  Decreased range of motion with shoulder abduction.  Psych: Alert and oriented x3, normal affect and mood.    Assessment and Plan:   The following treatment plan was discussed    1. Proliferative diabetic retinopathy of both eyes without macular edema associated with type 1 diabetes mellitus (HCC)  Chronic and stable, no changes to " current regimen.  To establish care with new ophthalmologist for further management.  Follow-up with me as needed for this  - Referral to Ophthalmology    2. Type 1 diabetes mellitus with stage 3 chronic kidney disease, unspecified whether stage 3a or 3b CKD (HCC)  New to me, chronic and stable.  No changes to current regimen.  In need of updated blood work.  New referral placed to nephrology  - Referral to Nephrology  - CBC WITH DIFFERENTIAL; Future  - Comp Metabolic Panel; Future  - Lipid Profile; Future  - HEMOGLOBIN A1C; Future  - Referral to Podiatry    3. Acute pain of right shoulder  New to me, acute right shoulder pain x2 days without known injury or trauma.  Recommend starting with right shoulder x-ray.  Encouraged him to ice the shoulder joint.  Okay to use Tylenol as needed pain.  To avoid NSAIDs as he is currently using Plavix.  Continue to monitor, encouraged Luis Carlos to take him to an orthopedic specific urgent care if symptoms acutely get worse.  Consider physical therapy if no improvement in 1 week  - DX-SHOULDER 2+ RIGHT; Future    4. Hypertension associated with type 1 diabetes mellitus (HCC)  New to me, chronic and stable  Blood pressures well controlled today.  No changes to regimen.  Referral to cardiology  - REFERRAL TO CARDIOLOGY    5. Coronary artery disease due to lipid rich plaque - mild to moderate on Cath 2019 Left Dominant  - REFERRAL TO CARDIOLOGY    6. PVD (peripheral vascular disease) (HCC)  New to me, chronic and stable.  No changes to current regimen.  New referral has been generated to cardiology  - REFERRAL TO CARDIOLOGY    7. Insomnia, unspecified type  Patient has been recently using trazodone 100 mg p.o. nightly.  Continues to have insomnia.  We will do a trial of trazodone 150 mg p.o. nightly.  To new to monitor, follow-up in 1 to 2 weeks if no improvement with sleep    8. Screening PSA (prostate specific antigen)  - PROSTATE SPECIFIC AG DIAGNOSTIC; Future    9. Fatigue,  unspecified type  Ongoing and chronic fatigue.  Rule out thyroid disease versus vitamin D deficiency  - TSH WITH REFLEX TO FT4; Future  - VITAMIN D,25 HYDROXY; Future    My total time spent caring for the patient on the day of the encounter was 45 minutes.   This does not include time spent on separately billable procedures/tests.    Followup: Return in about 6 months (around 9/22/2022), or if symptoms worsen or fail to improve.       Cira Ortega P.A.-C.  Supervising MD: Dr. Rubens Eugene MD  03/24/22

## 2022-03-28 ENCOUNTER — OFFICE VISIT (OUTPATIENT)
Dept: ENDOCRINOLOGY | Facility: MEDICAL CENTER | Age: 62
End: 2022-03-28
Attending: NURSE PRACTITIONER
Payer: MEDICARE

## 2022-03-28 VITALS
WEIGHT: 156 LBS | HEART RATE: 80 BPM | OXYGEN SATURATION: 100 % | BODY MASS INDEX: 23.64 KG/M2 | HEIGHT: 68 IN | DIASTOLIC BLOOD PRESSURE: 80 MMHG | SYSTOLIC BLOOD PRESSURE: 122 MMHG

## 2022-03-28 DIAGNOSIS — I15.2 HYPERTENSION ASSOCIATED WITH TYPE 1 DIABETES MELLITUS (HCC): ICD-10-CM

## 2022-03-28 DIAGNOSIS — E10.59 HYPERTENSION ASSOCIATED WITH TYPE 1 DIABETES MELLITUS (HCC): ICD-10-CM

## 2022-03-28 DIAGNOSIS — E10.22 TYPE 1 DIABETES MELLITUS WITH STAGE 3 CHRONIC KIDNEY DISEASE, UNSPECIFIED WHETHER STAGE 3A OR 3B CKD (HCC): ICD-10-CM

## 2022-03-28 DIAGNOSIS — E78.5 DYSLIPIDEMIA: ICD-10-CM

## 2022-03-28 DIAGNOSIS — E10.3593 PROLIFERATIVE DIABETIC RETINOPATHY OF BOTH EYES WITHOUT MACULAR EDEMA ASSOCIATED WITH TYPE 1 DIABETES MELLITUS (HCC): ICD-10-CM

## 2022-03-28 DIAGNOSIS — E10.641 UNCONTROLLED TYPE 1 DIABETES MELLITUS WITH HYPOGLYCEMIA AND COMA (HCC): ICD-10-CM

## 2022-03-28 DIAGNOSIS — N18.30 TYPE 1 DIABETES MELLITUS WITH STAGE 3 CHRONIC KIDNEY DISEASE, UNSPECIFIED WHETHER STAGE 3A OR 3B CKD (HCC): ICD-10-CM

## 2022-03-28 DIAGNOSIS — N18.31 STAGE 3A CHRONIC KIDNEY DISEASE: ICD-10-CM

## 2022-03-28 DIAGNOSIS — Z79.4 INSULIN LONG-TERM USE (HCC): ICD-10-CM

## 2022-03-28 LAB
HBA1C MFR BLD: 8.9 % (ref 0–5.6)
INT CON NEG: ABNORMAL
INT CON POS: ABNORMAL

## 2022-03-28 PROCEDURE — 83036 HEMOGLOBIN GLYCOSYLATED A1C: CPT | Performed by: NURSE PRACTITIONER

## 2022-03-28 PROCEDURE — 99213 OFFICE O/P EST LOW 20 MIN: CPT | Performed by: NURSE PRACTITIONER

## 2022-03-28 PROCEDURE — 99214 OFFICE O/P EST MOD 30 MIN: CPT | Performed by: NURSE PRACTITIONER

## 2022-03-28 RX ORDER — ATORVASTATIN CALCIUM 40 MG/1
40 TABLET, FILM COATED ORAL DAILY
Qty: 100 TABLET | Refills: 3 | Status: SHIPPED | OUTPATIENT
Start: 2022-03-28 | End: 2022-08-17

## 2022-03-28 RX ORDER — PEN NEEDLE, DIABETIC 32GX 5/32"
NEEDLE, DISPOSABLE MISCELLANEOUS
Qty: 400 EACH | Refills: 2 | Status: SHIPPED | OUTPATIENT
Start: 2022-03-28 | End: 2022-06-26

## 2022-03-28 RX ORDER — FLASH GLUCOSE SENSOR
1 KIT MISCELLANEOUS
Qty: 6 EACH | Refills: 3 | Status: SHIPPED | OUTPATIENT
Start: 2022-03-28 | End: 2023-02-22

## 2022-03-28 RX ORDER — INSULIN ASPART 100 [IU]/ML
10 INJECTION, SOLUTION INTRAVENOUS; SUBCUTANEOUS
Qty: 30 ML | Refills: 3 | Status: SHIPPED | OUTPATIENT
Start: 2022-03-28 | End: 2023-01-01

## 2022-03-28 RX ORDER — LOSARTAN POTASSIUM 50 MG/1
50 TABLET ORAL DAILY
Qty: 100 TABLET | Refills: 3 | Status: SHIPPED | OUTPATIENT
Start: 2022-03-28 | End: 2022-08-17

## 2022-03-28 RX ORDER — INSULIN GLARGINE 300 U/ML
25 INJECTION, SOLUTION SUBCUTANEOUS DAILY
Qty: 18 ML | Refills: 3 | Status: SHIPPED | OUTPATIENT
Start: 2022-03-28 | End: 2023-01-01 | Stop reason: SDUPTHER

## 2022-03-28 ASSESSMENT — FIBROSIS 4 INDEX: FIB4 SCORE: 1.39

## 2022-03-28 NOTE — PROGRESS NOTES
RN-CDE Note    Subjective:   Endocrinology Clinic Progress Note  PCP: Cira Ortega P.A.-C.    HPI:  Dylon Santa is a 61 y.o. old patient who is seen today for review of Type 1 Diabetes.  Recent changes in health: Bother is his caregiver and states that he is more confused.    DM:   Last A1c:   Lab Results   Component Value Date/Time    HBA1C 10.7 (A) 12/07/2021 03:00 PM      Previous A1c was 10.7 on 12/7/21  A1C GOAL: < 7    Diabetes Medications:   Toujeo 22 units daily  Novolog 1:10 gram carbohydrates and 1:50>150 correction.      Exercise: Walking around the house.  Balance off and pain in right shoulder.  Diet: Having around 45 grams of carbohydrates before meals.  Patient's body mass index is unknown because there is no height or weight on file. Exercise and nutrition counseling were performed at this visit.    Glucose monitoring frequency: See Marcial down down.    Hypoglycemic episodes: yes - during the night  Last Retinal Exam: Will schedule his eye exam  Daily Foot Exam: Yes   Foot Exam:  Monofilament: done  Monofilament testing with a 10 gram force: sensation intact: decreased bilaterally  Visual Inspection: Feet without maceration, ulcers, fissures.  Pedal pulses: intact bilaterally   Lab Results   Component Value Date/Time    MALBCRT 73 (H) 12/07/2020 10:55 AM    MICROALBUR 10.4 12/07/2020 10:55 AM     He  reports that he has quit smoking. He smoked 0.00 packs per day. He has quit using smokeless tobacco.  His smokeless tobacco use included chew.      Plan:     Discussed and educated on:   - All medications, side effects and compliance (discussed carefully)  - Annual eye examinations at Ophthalmology  - Home glucose monitoring emphasized  - Weight control and daily exercise    Recommended medication changes: Brother will check blood sugar at night if the Marcial is reading low to make sure it is not from him sleeping on the sensor.  No changes at this time.

## 2022-03-28 NOTE — PROGRESS NOTES
CHIEF COMPLAINT: Patient is here for follow up of Type 1 Diabetes Mellitus.    HPI:     Dylon Santa is a very pleasant 61 y.o. male for continued evaluation & treatment of the following:       Current labs weren't completed prior to this appointment.    1.  Type 1 Diabetes Mellitus   History of type 1 diabetes diagnosed at the age of 7.  Mr. Santa is accompanied by his brother who is his primary caregiver.  Patient has been diagnosed with dementia and his brother is power of .  Patient is alert oriented x 3.  Very pleasant and receptive to all questions.    No new issues or concerns at this time.  Blood sugar levels have settled down a bit since being back home from traveling in the winter months.    Current diabetes regimen:  Toujeo 22 units daily  NovoLog 1:10 CHO ratio, correction dosing 1:50 > 150.   NovoLog dosing has been limited secondary to if glucose levels are considered within a normal value per the brothers assessment he will not dose the patient with NovoLog.    Point-of-care A1c 03/28/2022: 8.9%  Point-of-care A1c 12/07/2021: 10.7%  Point-of-care A1c 07/27/2021: 8.7%       Blood sugar logs 03/28/2022: Patient has [employer] 14-day CGM.  Data has been downloaded and scanned under media tab.  Details of report discussed with patient and his brother.    Average glucose 153.  Time in target range 64 %.      Patient and his brother deny any hypoglycemic events.  Patient is hypoglycemic aware however requires caregiver assistance to be notified of these low occurrences and how to treat.    Patient notes 3 meals a day and occasionally has some snacks.    Patient and his brother are very active, walking and/or busy throughout the day.    Diabetes Complications   Retinopathy: No known retinopathy.  Last eye exam: 11/17/2021-[facility]  Neuropathy: Denies paresthesias or numbness in hands or feet. Denies any foot wounds.  Positive for left foot metatarsal amputation.  Incisions are  well-healed.  Exercise: Minimal.  Diet: Fair.    History of Chronic Kidney Disease, Stage 3.  Currently taking losartan 50 mg daily.  /80.     Ref. Range 12/7/2020 10:55   Micro Alb Creat Ratio Latest Ref Range: 0 - 30 mg/g 73 (H)   Creatinine, Urine Latest Units: mg/dL 142.25   Microalbumin, Urine Random Latest Units: mg/dL 10.4       Dyslipidemia  Currently taking Lipitor 40 mg daily.  Patient has tolerated statin therapy well for over 2 years.  Patient denies myalgias or muscle cramps.     Ref. Range 12/7/2020 10:55   Cholesterol,Tot Latest Ref Range: 100 - 199 mg/dL 138   Triglycerides Latest Ref Range: 0 - 149 mg/dL 98   HDL Latest Ref Range: >=40 mg/dL 55   LDL Latest Ref Range: <100 mg/dL 63        Vitamin D deficiency  Currently taking vitamin D 2000 IU daily.     Ref. Range 12/7/2020 10:55   25-Hydroxy   Vitamin D 25 Latest Ref Range: 30 - 100 ng/mL 50       Patient's medications, allergies, and social histories were reviewed and updated as appropriate.    ROS:     CONS:     No fever, no chills   EYES:     No diplopia, no blurry vision   CV:           No chest pain, no palpitations   PULM:     No SOB, no cough, no hemoptysis.   GI:            No nausea, no vomiting, no diarrhea, no constipation   ENDO:     No polyuria, no polydipsia, no heat intolerance, no cold intolerance       Past Medical History:  Problem List:  2022-03: Acute pain of right shoulder  2022-03: BMI 24.0-24.9, adult  2022-03: Microalbuminuria  2022-03: Other insomnia  2021-03: Dementia with behavioral disturbance ([facility])  2021-03: Insulin long-term use ([facility])  2021-03: Hypoglycemia  2020-06: Anemia  2019-12: Cervical stenosis of spine  2019-11: Migraine without aura and without status migrainosus, not   intractable  2019-11: Gastroesophageal reflux disease without esophagitis  2019-11: Recurrent epistaxis  2019-10: Atherosclerosis  2019-10: Toe amputee ([facility])  2019-10: Anxiety  2019-10: Neck pain  2019-10: Panic attacks  2019-10: Chest  pain  2019-10: Dizziness  2019-10: Postconcussion syndrome  2018-11: Chronic cough  2018-10: Skin lesion, superficial  2018-10: Viral URI with cough  2018-04: Vitamin D deficiency  2018-04: Mononeuropathy  2018-04: PVD (peripheral vascular disease) ([facility])  2017-11: Acute non-recurrent sinusitis  2017-11: Burning with urination  2017-11: Strain of latissimus dorsi muscle  2017-11: Bone spur  2017-04: Wound infection  2017-04: Uncontrolled type 1 diabetes mellitus with hypoglycemia and   coma ([facility])  2017-03: Diabetic ulcer of left foot associated with type 1 diabetes   mellitus ([facility])  2017-03: Diminished pulses in lower extremity  2016-12: Diabetic retinopathy of both eyes ([facility])  2016-06: Blind left eye  2016-05: Chronic kidney disease (CKD) stage G3a/A1, moderately   decreased glomerular filtration rate (GFR) between 45-59 mL/min/1.73   square meter and albuminuria creatinine ratio less than 30 mg/g ([facility])  2016-01: Diabetic nephropathy associated with type 1 diabetes   mellitus ([facility])  2015-06: Elevated serum creatinine  2015-06: Flank pain  2014-12: Hypertension associated with type 1 diabetes mellitus ([facility])  2014-12: History of shingles  2014-12: Diabetic neuropathy ([facility])  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  Dyslipidemia  Coronary artery disease due to lipid rich plaque - mild to moderate   on Cath 2019 Left Dominant  History of cardiac catheterization 2019 in [facility] abnormal   stress - mild to moderate CAD      Past Surgical History:  Past Surgical History:   Procedure Laterality Date   • IRRIGATION & DEBRIDEMENT GENERAL Left 5/3/2017    Procedure: IRRIGATION & DEBRIDEMENT GENERAL and Left Fourth Toe Amputation ;  Surgeon: Inessa Richard M.D.;  Location: SURGERY [facility];  Service:    • APPENDECTOMY     • CATARACT EXTRACTION WITH IOL Bilateral    • EYE SURGERY     • HAND SURGERY  1990's   • KNEE ARTHROSCOPY      x3   • TONSILLECTOMY          Allergies:  Patient has no known allergies.     Social  "History:  Social History     Tobacco Use   • Smoking status: Former Smoker     Packs/day: 0.00   • Smokeless tobacco: Former User     Types: Chew   • Tobacco comment: tobacco cessation recommended   Vaping Use   • Vaping Use: Never used   Substance Use Topics   • Alcohol use: Yes     Alcohol/week: 0.0 oz     Comment: rare beer   • Drug use: No        Family History:   family history includes Cancer in his father, mother, paternal aunt, and paternal grandfather; Diabetes in his maternal grandfather, maternal uncle, and paternal grandmother; Hyperlipidemia in his brother; Hypertension in his brother; Lung Disease in his brother, father, and mother; Other in his sister; Thyroid in his brother, mother, and sister.      PHYSICAL EXAM:   OBJECTIVE:  Vital signs: Ht 1.727 m (5' 8\")   Wt 70.8 kg (156 lb)   SpO2 100%   BMI 23.72 kg/m²   GENERAL: Well-developed, well-nourished in no apparent distress.   EYE:  No ocular asymmetry, PERRLA  HENT: Pink, moist mucous membranes.    NECK: No thyromegaly.   CARDIOVASCULAR:  No murmurs  LUNGS: Clear breath sounds  ABDOMEN: Soft, nontender   EXTREMITIES: No clubbing, cyanosis, or edema.   NEUROLOGICAL: No gross focal motor abnormalities   LYMPH: No cervical adenopathy palpated.   SKIN: No rashes, lesions.     ASSESSMENT/PLAN:     1. Uncontrolled type 1 diabetes mellitus with hypoglycemia and coma (HCC)  Unstable.  Improved.  Continue diabetes regimen:  Toujeo to 22 units daily.  Further instructed to increase Toujeo by 2 units if a.m. blood sugars are consistently above 140.  NovoLog-CHO ratio 1:10, correction dosing 1:50 > 150.   Detailed discussion with patient and his brother regarding the dosing and timing of the NovoLog.  Patient requires fast acting insulin before each meal every day.  This is regardless of value of glucose level going into a meal.    Continue use of freestyle therese 2 CGM.    Continue balanced meal planning such as my [email or web address]  Recommend drinking 2 L to 3 L " of water each day.  Daily foot inspections are always recommended.  Patient is overdue for dilated eye exam and further states stable make this appointment as soon as they can.    2. Proliferative diabetic retinopathy of both eyes without macular edema associated with type 1 diabetes mellitus (HCC)  Unstable.  Recommend that he get an annual eye exam.  We also recommend providing us copies of his eye exam    3.  Chronic kidney disease Stage 3  Stable.  Continue taking losartan 50 mg daily.    4. Dyslipidemia  Stable.  Continue Lipitor 40 mg daily    5. Long-term insulin use (HCC)  Patient is on long-term insulin therapy due to type 1 diabetes.  As per plan #1.      Complete point-of-care A1c at next appointment.  Next appointment in 2 months.      Thank you kindly for allowing me to participate in the diabetes care plan for this patient.    Nuha Parish, APRN  03/28/2022    CC:   Nesha Peralta D.O.

## 2022-03-29 ENCOUNTER — TELEPHONE (OUTPATIENT)
Dept: MEDICAL GROUP | Facility: LAB | Age: 62
End: 2022-03-29
Payer: MEDICARE

## 2022-03-29 DIAGNOSIS — N18.30 TYPE 1 DIABETES MELLITUS WITH STAGE 3 CHRONIC KIDNEY DISEASE, UNSPECIFIED WHETHER STAGE 3A OR 3B CKD (HCC): ICD-10-CM

## 2022-03-29 DIAGNOSIS — E10.22 TYPE 1 DIABETES MELLITUS WITH STAGE 3 CHRONIC KIDNEY DISEASE, UNSPECIFIED WHETHER STAGE 3A OR 3B CKD (HCC): ICD-10-CM

## 2022-03-29 NOTE — TELEPHONE ENCOUNTER
Endocrinology renewal needed    ----- Message from Dylon Santa sent at 3/29/2022  3:27 PM PDT -----  Regarding: Dylon Santa Prescriptions [facility]  Joseline Bhandari,    Dylon had his appointment with endocrinology the 28th, can you please submit a new referral for his endocrinology for 2022 with Nurse Christine TERRY R.N. his next appointment with her will be Tuesday August 2nd 2:00pm    Thank you,  Pedro Pablo Santa

## 2022-03-29 NOTE — TELEPHONE ENCOUNTER
1. Type 1 diabetes mellitus with stage 3 chronic kidney disease, unspecified whether stage 3a or 3b CKD (HCC)  Referral to Endocrinology     Cira Ortega P.A.-C.

## 2022-03-30 ENCOUNTER — HOSPITAL ENCOUNTER (OUTPATIENT)
Dept: LAB | Facility: MEDICAL CENTER | Age: 62
End: 2022-03-30
Attending: NURSE PRACTITIONER
Payer: MEDICARE

## 2022-03-30 DIAGNOSIS — N18.30 TYPE 1 DIABETES MELLITUS WITH STAGE 3 CHRONIC KIDNEY DISEASE, UNSPECIFIED WHETHER STAGE 3A OR 3B CKD (HCC): ICD-10-CM

## 2022-03-30 DIAGNOSIS — E10.22 TYPE 1 DIABETES MELLITUS WITH STAGE 3 CHRONIC KIDNEY DISEASE, UNSPECIFIED WHETHER STAGE 3A OR 3B CKD (HCC): ICD-10-CM

## 2022-03-30 LAB
25(OH)D3 SERPL-MCNC: 51 NG/ML (ref 30–100)
ALBUMIN SERPL BCP-MCNC: 4.1 G/DL (ref 3.2–4.9)
ALBUMIN/GLOB SERPL: 1.5 G/DL
ALP SERPL-CCNC: 74 U/L (ref 30–99)
ALT SERPL-CCNC: 22 U/L (ref 2–50)
ANION GAP SERPL CALC-SCNC: 8 MMOL/L (ref 7–16)
AST SERPL-CCNC: 24 U/L (ref 12–45)
BILIRUB SERPL-MCNC: 0.2 MG/DL (ref 0.1–1.5)
BUN SERPL-MCNC: 27 MG/DL (ref 8–22)
CALCIUM SERPL-MCNC: 9.4 MG/DL (ref 8.5–10.5)
CHLORIDE SERPL-SCNC: 105 MMOL/L (ref 96–112)
CHOLEST SERPL-MCNC: 116 MG/DL (ref 100–199)
CO2 SERPL-SCNC: 25 MMOL/L (ref 20–33)
CREAT SERPL-MCNC: 1.36 MG/DL (ref 0.5–1.4)
CREAT UR-MCNC: 103.44 MG/DL
FASTING STATUS PATIENT QL REPORTED: NORMAL
GFR SERPLBLD CREATININE-BSD FMLA CKD-EPI: 59 ML/MIN/1.73 M 2
GLOBULIN SER CALC-MCNC: 2.8 G/DL (ref 1.9–3.5)
GLUCOSE SERPL-MCNC: 212 MG/DL (ref 65–99)
HDLC SERPL-MCNC: 42 MG/DL
LDLC SERPL CALC-MCNC: 63 MG/DL
MICROALBUMIN UR-MCNC: 5.5 MG/DL
MICROALBUMIN/CREAT UR: 53 MG/G (ref 0–30)
POTASSIUM SERPL-SCNC: 4.7 MMOL/L (ref 3.6–5.5)
PROT SERPL-MCNC: 6.9 G/DL (ref 6–8.2)
SODIUM SERPL-SCNC: 138 MMOL/L (ref 135–145)
T3FREE SERPL-MCNC: 2.67 PG/ML (ref 2–4.4)
T4 FREE SERPL-MCNC: 1.38 NG/DL (ref 0.93–1.7)
TRIGL SERPL-MCNC: 55 MG/DL (ref 0–149)
TSH SERPL DL<=0.005 MIU/L-ACNC: 1.28 UIU/ML (ref 0.38–5.33)
VIT B12 SERPL-MCNC: 865 PG/ML (ref 211–911)

## 2022-03-30 PROCEDURE — 80053 COMPREHEN METABOLIC PANEL: CPT

## 2022-03-30 PROCEDURE — 82043 UR ALBUMIN QUANTITATIVE: CPT

## 2022-03-30 PROCEDURE — 84439 ASSAY OF FREE THYROXINE: CPT

## 2022-03-30 PROCEDURE — 84481 FREE ASSAY (FT-3): CPT

## 2022-03-30 PROCEDURE — 36415 COLL VENOUS BLD VENIPUNCTURE: CPT

## 2022-03-30 PROCEDURE — 84443 ASSAY THYROID STIM HORMONE: CPT

## 2022-03-30 PROCEDURE — 82306 VITAMIN D 25 HYDROXY: CPT

## 2022-03-30 PROCEDURE — 82607 VITAMIN B-12: CPT

## 2022-03-30 PROCEDURE — 82570 ASSAY OF URINE CREATININE: CPT

## 2022-03-30 PROCEDURE — 80061 LIPID PANEL: CPT

## 2022-04-08 RX ORDER — CLOPIDOGREL BISULFATE 75 MG/1
75 TABLET ORAL DAILY
Qty: 90 TABLET | Refills: 3 | Status: SHIPPED | OUTPATIENT
Start: 2022-04-08 | End: 2023-02-22

## 2022-04-08 NOTE — TELEPHONE ENCOUNTER
Received request via: Pharmacy    Was the patient seen in the last year in this department? Yes  3/22/2022  Does the patient have an active prescription (recently filled or refills available) for medication(s) requested? No

## 2022-05-26 ENCOUNTER — TELEPHONE (OUTPATIENT)
Dept: MEDICAL GROUP | Facility: LAB | Age: 62
End: 2022-05-26
Payer: MEDICARE

## 2022-05-26 NOTE — TELEPHONE ENCOUNTER
I spoke with Luis Carlos, Dylon's caregiver/brother and he said they have been out of state for family matters.   Dylon has not seen eye doctor, or followed through with any of the referrals Cira placed 3/22/22 because they have been out of town.   I gave Luis Carlos number to call to [facility] to schedule office visit and/or colonoscopy.  I let him know if he is unable to see referrals and numbers to call in [facility], to call me back and I can help him.

## 2022-06-07 NOTE — ADDENDUM NOTE
Addended by: SULAIMAN DUARTE on: 4/14/2017 10:29 AM     Modules accepted: Orders    
Addended by: SULAIMAN DUARTE on: 6/15/2017 07:04 AM     Modules accepted: Level of Service    
MED

## 2022-06-16 ENCOUNTER — TELEPHONE (OUTPATIENT)
Dept: NEPHROLOGY | Facility: MEDICAL CENTER | Age: 62
End: 2022-06-16
Payer: MEDICARE

## 2022-07-01 ENCOUNTER — APPOINTMENT (OUTPATIENT)
Dept: NEPHROLOGY | Facility: MEDICAL CENTER | Age: 62
End: 2022-07-01
Payer: MEDICARE

## 2022-07-18 ENCOUNTER — TELEMEDICINE (OUTPATIENT)
Dept: MEDICAL GROUP | Facility: LAB | Age: 62
End: 2022-07-18
Payer: MEDICARE

## 2022-07-18 VITALS — HEIGHT: 68 IN | BODY MASS INDEX: 23.64 KG/M2 | WEIGHT: 156 LBS

## 2022-07-18 DIAGNOSIS — U07.1 COVID-19: ICD-10-CM

## 2022-07-18 PROCEDURE — 99213 OFFICE O/P EST LOW 20 MIN: CPT | Mod: 95 | Performed by: PHYSICIAN ASSISTANT

## 2022-07-18 ASSESSMENT — FIBROSIS 4 INDEX: FIB4 SCORE: 1.32

## 2022-07-18 NOTE — PROGRESS NOTES
This evaluation was conducted via Zoom using secure and encrypted videoconferencing technology. The patient was in their home in the state [facility].    The patient's identity was confirmed and verbal consent was obtained for this virtual visit.    Subjective:     CC: COVID 19 infection, speaking with his brother, Pedro Pablo Santa is a 62 y.o. male presenting to discuss the evaluation and management of COVID 19     COVID-19  New to me; tested positive 07/15/2022.  Per Brother, having increasing sore throat. Large mucus production.   Having increased coughing.     Using DayQuil/NyQuil.   Increasing fluids, chicken soups/broths.         ROS  See HPI  Constitutional: Negative for fever, chills and + fatigue   Respiratory: +cough and SOB   Cardiovascular: Negative for leg swelling.   Skin: Negative for rash.   Psychiatric/Behavioral: Negative for depression.  The patient is not nervous/anxious.      No Known Allergies    Current medicines (including changes today)  Current Outpatient Medications   Medication Sig Dispense Refill   • Nirmatrelvir & Ritonavir 20 x 150 MG & 10 x 100MG Tablet Therapy Pack Take 150 mg nirmatrelvir (one 150 mg tablet) with 100 mg ritonavir (one 100 mg tablet) by mouth, with both tablets taken together twice daily for 5 days. 20 Each 0   • clopidogrel (PLAVIX) 75 MG Tab Take 1 Tablet by mouth every day. 90 Tablet 3   • Continuous Blood Gluc Sensor (FREESTYLE CARLOS 14 DAY SENSOR) Misc Inject 1 Units under the skin every 14 days. 6 Each 3   • Insulin Glargine, 1 Unit Dial, (TOUJEO SOLOSTAR) 300 UNIT/ML Solution Pen-injector Inject 25 Units under the skin every day. 18 mL 3   • NOVOLOG FLEXPEN 100 UNIT/ML solution for injection Inject 10 Units under the skin 3 times a day before meals. 30 mL 3   • losartan (COZAAR) 50 MG Tab Take 1 Tablet by mouth every day. 100 Tablet 3   • atorvastatin (LIPITOR) 40 MG Tab Take 1 Tablet by mouth every day. 100 Tablet 3   • traZODone (DESYREL) 150 MG Tab  Take 1 Tablet by mouth every evening. 90 Tablet 1   • acyclovir (ZOVIRAX) 200 MG Cap TAKE ONE CAPSULE BY MOUTH ONE TIME DAILY 90 Capsule 3   • metoprolol SR (TOPROL XL) 25 MG TABLET SR 24 HR Take 1 Tablet by mouth every day. 90 Tablet 3     No current facility-administered medications for this visit.       He  has a past medical history of Anesthesia, Arthritis, Bronchitis (), Cataract, Cold (17), Coronary artery disease due to lipid rich plaque - mild to moderate on Cath 2019 Left Dominant, Dental disorder, Diabetic neuropathy (HCC) (2014), Diabetic retinopathy of both eyes (HCC) (2016), Dyslipidemia, Heart murmur, Hemorrhagic disorder (HCC), High cholesterol, History of cardiac catheterization 2019 in settin of  abnormal stress - mild to moderate CAD, History of shingles (2014), Hyperlipidemia, Hypertension, Kidney stones, Pain, Psychiatric problem, and Type II or unspecified type diabetes mellitus without mention of complication, uncontrolled (2014).  He  has a past surgical history that includes eye surgery; appendectomy; knee arthroscopy; tonsillectomy; cataract extraction with iol (Bilateral); irrigation & debridement general (Left, 5/3/2017); and hand surgery ().      Family History   Problem Relation Age of Onset   • Thyroid Mother    • Lung Disease Mother         COPD   • Cancer Mother         eyebrow    • Thyroid Sister    • Other Sister         mental health issues    • Thyroid Brother    • Lung Disease Brother         COPD   • Hypertension Brother    • Hyperlipidemia Brother    • Lung Disease Father         COPD   • Cancer Father    • Diabetes Maternal Uncle    • Cancer Paternal Aunt         type unknown   • Diabetes Maternal Grandfather    • Diabetes Paternal Grandmother    • Cancer Paternal Grandfather         prostate   • Stroke Neg Hx      Family Status   Relation Name Status   • Mo copd Alive   • Sis  Alive   • Mustapha Aldridge Alive   • Fa     • Mustapha Evans  "        MVA   • MUnc  (Not Specified)   • PAunt  (Not Specified)   • MGFa  (Not Specified)   • PGMo  (Not Specified)   • PGFa  (Not Specified)   • Bro Lloyd Alive   • Neg Hx  (Not Specified)       Patient Active Problem List    Diagnosis Date Noted   • COVID-19 2022   • Acute pain of right shoulder 2022   • BMI 24.0-24.9, adult 2022   • Microalbuminuria 2022   • Other insomnia 2022   • Dementia with behavioral disturbance ([facility]) 2021   • Insulin long-term use ([facility]) 2021   • Hypoglycemia 2021   • History of cardiac catheterization 2019 in settin of  abnormal stress - mild to moderate CAD    • Anemia 2020   • Cervical stenosis of spine 2019   • Migraine without aura and without status migrainosus, not intractable 2019   • Gastroesophageal reflux disease without esophagitis 2019   • Recurrent epistaxis 2019   • Coronary artery disease due to lipid rich plaque - mild to moderate on Cath  Left Dominant    • Atherosclerosis 10/29/2019   • Toe amputee ([facility]) 10/29/2019   • Anxiety 10/28/2019   • Neck pain 10/28/2019   • Panic attacks 10/25/2019   • Postconcussion syndrome 10/21/2019   • Chronic cough 2018   • Vitamin D deficiency 2018   • Mononeuropathy 2018   • PVD (peripheral vascular disease) ([facility]) 2018   • Uncontrolled type 1 diabetes mellitus with hypoglycemia and coma ([facility]) 2017   • Diabetic retinopathy of both eyes ([facility]) 2016   • Blind left eye 2016   • Chronic kidney disease (CKD) stage G3a/A1, moderately decreased glomerular filtration rate (GFR) between 45-59 mL/min/1.73 square meter and albuminuria creatinine ratio less than 30 mg/g ([facility]) 2016   • Dyslipidemia    • Hypertension associated with type 1 diabetes mellitus ([facility]) 2014   • History of shingles 2014   • Dawna-Martinez syndrome 2014          Objective:   Ht 1.727 m (5' 8\")   Wt 70.8 kg (156 lb)   BMI " 23.72 kg/m²     Physical Exam:  Constitutional: Alert, no distress, well-groomed.  Skin: No rashes in visible areas.  Eye: Round. Conjunctiva clear, lids normal. No icterus.   ENMT: Lips pink without lesions, good dentition, moist mucous membranes. Phonation normal.  Neck: No masses, no thyromegaly.   Respiratory: Unlabored respiratory effort, no cough or audible wheeze  Psych: Alert and oriented x3, normal affect and mood.       Assessment and Plan:   The following treatment plan was discussed:     1. COVID-19  New to me; new infection since 07/15/2022.   Discussed pro/con of antiviral. I recommend using it due to chronic medication condition. Submitted lower dose - most recent GFR was 59.  Patient and his brother were instructed to hold atorvastatin for the duration of using the antiviral.  Pt was educated on use and SEs of medication.  OK to continue Day/NyQuil. OK to use Mucinex.   Strict ED precautions if symptoms acutely worsen.   Pt's brother, Pedro Pablo agrees with the above plan.   - Nirmatrelvir & Ritonavir 20 x 150 MG & 10 x 100MG Tablet Therapy Pack; Take 150 mg nirmatrelvir (one 150 mg tablet) with 100 mg ritonavir (one 100 mg tablet) by mouth, with both tablets taken together twice daily for 5 days.  Dispense: 20 Each; Refill: 0        Follow-up: Return if symptoms worsen or fail to improve.    Cira Ortega P.A.-C.  Supervising MD: Dr. Rubens Eugene MD  07/18/22

## 2022-07-18 NOTE — ASSESSMENT & PLAN NOTE
New to me; tested positive 07/15/2022.  Per Brother, having increasing sore throat. Large mucus production.   Having increased coughing.     Using DayQuil/NyQuil.   Increasing fluids, chicken soups/broths.

## 2022-08-02 ENCOUNTER — NON-PROVIDER VISIT (OUTPATIENT)
Dept: ENDOCRINOLOGY | Facility: MEDICAL CENTER | Age: 62
End: 2022-08-02
Attending: INTERNAL MEDICINE
Payer: MEDICARE

## 2022-08-02 VITALS — BODY MASS INDEX: 23.64 KG/M2 | OXYGEN SATURATION: 95 % | WEIGHT: 156 LBS | HEIGHT: 68 IN

## 2022-08-02 DIAGNOSIS — N18.30 TYPE 1 DIABETES MELLITUS WITH STAGE 3 CHRONIC KIDNEY DISEASE, UNSPECIFIED WHETHER STAGE 3A OR 3B CKD (HCC): ICD-10-CM

## 2022-08-02 DIAGNOSIS — E10.22 TYPE 1 DIABETES MELLITUS WITH STAGE 3 CHRONIC KIDNEY DISEASE, UNSPECIFIED WHETHER STAGE 3A OR 3B CKD (HCC): ICD-10-CM

## 2022-08-02 LAB
HBA1C MFR BLD: 9.4 % (ref 0–5.6)
INT CON NEG: ABNORMAL
INT CON POS: ABNORMAL

## 2022-08-02 PROCEDURE — 99213 OFFICE O/P EST LOW 20 MIN: CPT | Performed by: INTERNAL MEDICINE

## 2022-08-02 PROCEDURE — 83036 HEMOGLOBIN GLYCOSYLATED A1C: CPT

## 2022-08-02 ASSESSMENT — FIBROSIS 4 INDEX: FIB4 SCORE: 1.32

## 2022-08-02 NOTE — PROGRESS NOTES
RN-CDE Note    Subjective:   Endocrinology Clinic Progress Note  PCP: Cira Ortega P.A.-C.    HPI:  Dylon Santa is a 62 y.o. old patient who is seen today for review of Type 1 Diabetes.  He has dementia and is cared for by his brother.  Recent changes in health: He had COVID and his blood sugars were high during that time.  Having problems with constipation and his stomach hurting.  Taking stool softeners that is helping.  They have been traveling to [place].  His blood sugars are much better now that he is home.  DM:   Last A1c:   Lab Results   Component Value Date/Time    HBA1C 9.4 (A) 08/02/2022 02:17 PM      Previous A1c was 8.9 on 3/28/22  A1C GOAL: < 7    Diabetes Medications:   Toujeo 22 units daily  Novolog 1:10 carbohydrate ratio plus 1:50>150 ( 4-8 units before meals)      Exercise: walking around the house  Diet: traveling has made it hard recently to eat healthy  Patient's body mass index is 23.72 kg/m². Exercise and nutrition counseling were performed at this visit.    Glucose monitoring frequency: see Marcial download    Hypoglycemic episodes: yes - after meals for a time if he gives too much of a correction.  Last Retinal Exam: Needs- he will have it in the office today.  Daily Foot Exam: Yes   Foot Exam:  Monofilament: done  Monofilament testing with a 10 gram force: sensation intact: decreased bilaterally  Visual Inspection: Feet without maceration, ulcers, fissures.  4th toe amputation healed.  Pedal pulses: intact bilaterally   Lab Results   Component Value Date/Time    MALBCRT 53 (H) 03/30/2022 08:26 AM    MICROALBUR 5.5 03/30/2022 08:26 AM     He  reports that he has quit smoking. He smoked 0.00 packs per day. He has quit using smokeless tobacco.  His smokeless tobacco use included chew.      Plan:     Discussed and educated on:   - All medications, side effects and compliance (discussed carefully)  - Annual eye examinations at Ophthalmology  - Home glucose monitoring emphasized  -  Weight control and daily exercise    Recommended medication changes: His blood sugars have been much better the last 2 weeks since he has been home with an average of 150 .  No changes at this time.  He will follow up with Dr. Booker in 6 months since they will be traveling to [facility] to see their mother.  They will call with any questions or problems.

## 2022-08-03 ENCOUNTER — OFFICE VISIT (OUTPATIENT)
Dept: NEPHROLOGY | Facility: MEDICAL CENTER | Age: 62
End: 2022-08-03
Payer: MEDICARE

## 2022-08-03 VITALS
SYSTOLIC BLOOD PRESSURE: 138 MMHG | BODY MASS INDEX: 23.49 KG/M2 | HEIGHT: 68 IN | OXYGEN SATURATION: 97 % | DIASTOLIC BLOOD PRESSURE: 70 MMHG | HEART RATE: 67 BPM | TEMPERATURE: 97.9 F | WEIGHT: 155 LBS

## 2022-08-03 DIAGNOSIS — N18.31 CHRONIC KIDNEY DISEASE (CKD) STAGE G3A/A1, MODERATELY DECREASED GLOMERULAR FILTRATION RATE (GFR) BETWEEN 45-59 ML/MIN/1.73 SQUARE METER AND ALBUMINURIA CREATININE RATIO LESS THAN 30 MG/G: ICD-10-CM

## 2022-08-03 DIAGNOSIS — I10 PRIMARY HYPERTENSION: ICD-10-CM

## 2022-08-03 DIAGNOSIS — E10.641 UNCONTROLLED TYPE 1 DIABETES MELLITUS WITH HYPOGLYCEMIA AND COMA (HCC): ICD-10-CM

## 2022-08-03 PROCEDURE — 99204 OFFICE O/P NEW MOD 45 MIN: CPT | Performed by: INTERNAL MEDICINE

## 2022-08-03 RX ORDER — INSULIN LISPRO 100 [IU]/ML
INJECTION, SOLUTION INTRAVENOUS; SUBCUTANEOUS
COMMUNITY
End: 2022-08-17

## 2022-08-03 ASSESSMENT — ENCOUNTER SYMPTOMS
HYPERTENSION: 1
SHORTNESS OF BREATH: 0

## 2022-08-03 ASSESSMENT — FIBROSIS 4 INDEX: FIB4 SCORE: 1.32

## 2022-08-03 NOTE — PROGRESS NOTES
"Subjective     Dylon Santa is a 62 y.o. male who presents with Chronic Kidney Disease and Hypertension            Patient is an unfortunate 62-year-old gentleman with a past medical history significant for diabetes mellitus type 1, chronic kidney disease stage III, creatinine has been fairly stable.  Patient is poor historian as he has a history of Alzheimer, most of the story was through discussed the case with his brother who is his caregiver.    Chronic Kidney Disease  This is a chronic problem. The current episode started more than 1 year ago. The problem occurs constantly. The problem has been unchanged. Pertinent negatives include no urinary symptoms.   Hypertension  This is a chronic problem. The current episode started more than 1 year ago. The problem is unchanged. Pertinent negatives include no peripheral edema or shortness of breath. Risk factors for coronary artery disease include diabetes mellitus and male gender. Past treatments include beta blockers. The current treatment provides significant improvement. There are no compliance problems.  Hypertensive end-organ damage includes kidney disease. Identifiable causes of hypertension include chronic renal disease.       Review of Systems   Unable to perform ROS: Dementia   Respiratory: Negative for shortness of breath.               Objective     /70 (BP Location: Right arm, Patient Position: Sitting, BP Cuff Size: Adult)   Pulse 67   Temp 36.6 °C (97.9 °F) (Temporal)   Ht 1.727 m (5' 8\")   Wt 70.3 kg (155 lb)   SpO2 97%   BMI 23.57 kg/m²      Physical Exam  Vitals and nursing note reviewed.   Constitutional:       General: He is awake.      Appearance: He is not ill-appearing.   HENT:      Head: Normocephalic and atraumatic.      Right Ear: External ear normal.      Left Ear: External ear normal.      Nose: Nose normal.      Mouth/Throat:      Pharynx: No oropharyngeal exudate or posterior oropharyngeal erythema.   Eyes:      General:    " "     Right eye: No discharge.         Left eye: No discharge.      Conjunctiva/sclera: Conjunctivae normal.   Cardiovascular:      Rate and Rhythm: Normal rate and regular rhythm.   Pulmonary:      Effort: Pulmonary effort is normal. No respiratory distress.      Breath sounds: Normal breath sounds. No wheezing.   Abdominal:      General: Abdomen is flat. Bowel sounds are normal.   Musculoskeletal:         General: No tenderness.      Cervical back: No rigidity. No muscular tenderness.      Right lower leg: No edema.      Left lower leg: No edema.   Skin:     General: Skin is warm and dry.      Coloration: Skin is not jaundiced.      Findings: No lesion or rash.   Neurological:      General: No focal deficit present.      Mental Status: He is alert and oriented to person, place, and time. Mental status is at baseline.   Psychiatric:         Mood and Affect: Mood normal.         Behavior: Behavior normal.         Thought Content: Thought content normal.       Past Medical History:   Diagnosis Date   • Anesthesia     hx PONV   • Arthritis     L knee; hands   • Bronchitis 2016   • Cataract     nicole iol   • Cold 4/5/17    prod cough   • Coronary artery disease due to lipid rich plaque - mild to moderate on Cath 2019 Left Dominant    • Dental disorder     \"bad shape\"   • Diabetic neuropathy (HCC) 12/31/2014    retinopathy   • Diabetic retinopathy of both eyes (HCC) 12/9/2016   • Dyslipidemia    • Heart murmur    • Hemorrhagic disorder ([facility])     nose bleeds   • High cholesterol    • [facility] abnormal stress - mild to moderate CAD    • History of shingles 12/31/2014   • Hyperlipidemia    • Hypertension    • Kidney stones    • Pain     left leg/foot   • Psychiatric problem     situational depression   • Type II or unspecified type diabetes mellitus without mention of complication, uncontrolled 12/31/2014     Social History     Socioeconomic History   • Marital status: Single     " Spouse name: Not on file   • Number of children: Not on file   • Years of education: Not on file   • Highest education level: 12th grade   Occupational History   • Not on file   Tobacco Use   • Smoking status: Former Smoker     Packs/day: 0.00   • Smokeless tobacco: Former User     Types: Chew   • Tobacco comment: tobacco cessation recommended   Vaping Use   • Vaping Use: Never used   Substance and Sexual Activity   • Alcohol use: Yes     Alcohol/week: 0.0 oz     Comment: rare beer   • Drug use: No   • Sexual activity: Never     Comment: Never  , On disability due to lack of eye sight.   Other Topics Concern   • Not on file   Social History Narrative   • Not on file     Social Determinants of Health     Financial Resource Strain: Low Risk    • Difficulty of Paying Living Expenses: Not hard at all   Food Insecurity: No Food Insecurity   • Worried About Running Out of Food in the Last Year: Never true   • Ran Out of Food in the Last Year: Never true   Transportation Needs: Unknown   • Lack of Transportation (Medical): No   • Lack of Transportation (Non-Medical): Patient refused   Physical Activity: Inactive   • Days of Exercise per Week: 0 days   • Minutes of Exercise per Session: 0 min   Stress: Stress Concern Present   • Feeling of Stress : To some extent   Social Connections: Moderately Isolated   • Frequency of Communication with Friends and Family: More than three times a week   • Frequency of Social Gatherings with Friends and Family: More than three times a week   • Attends [employer] Services: More than 4 times per year   • Active Member of Clubs or Organizations: No   • Attends Club or Organization Meetings: Patient refused   • Marital Status: Never    Intimate Partner Violence: Not on file   Housing Stability: Unknown   • Unable to Pay for Housing in the Last Year: Patient refused   • Number of Places Lived in the Last Year: Not on file   • Unstable Housing in the Last Year: Patient refused      Family History   Problem Relation Age of Onset   • Thyroid Mother    • Lung Disease Mother         COPD   • Cancer Mother         eyebrow    • Thyroid Sister    • Other Sister         mental health issues    • Thyroid Brother    • Lung Disease Brother         COPD   • Hypertension Brother    • Hyperlipidemia Brother    • Lung Disease Father         COPD   • Cancer Father    • Diabetes Maternal Uncle    • Cancer Paternal Aunt         type unknown   • Diabetes Maternal Grandfather    • Diabetes Paternal Grandmother    • Cancer Paternal Grandfather         prostate   • Stroke Neg Hx      Recent Labs     12/11/21  1735 03/30/22  0825   ALBUMIN 4.4 4.1   HDL  --  42   TRIGLYCERIDE  --  55   SODIUM 134* 138   POTASSIUM 4.3 4.7   CHLORIDE 102 105   CO2 22 25   BUN 22 27*   CREATININE 1.37 1.36                             Assessment & Plan        1. Chronic kidney disease (CKD) stage G3a/A1  Stable  No uremic symptoms  Renal dose of medication  Avoid nephrotoxins  Continue same medication regimen  Check labs annually   follow-up in the office on as-needed basis      2. Primary hypertension  Controlled  Continue same medication regimen  Continue low-sodium diet    3. Uncontrolled type 1 diabetes mellitus with hypoglycemia and coma (HCC)  Continue to optimize diabetes control

## 2022-08-10 ENCOUNTER — OFFICE VISIT (OUTPATIENT)
Dept: MEDICAL GROUP | Facility: PHYSICIAN GROUP | Age: 62
End: 2022-08-10
Payer: MEDICARE

## 2022-08-10 VITALS
HEIGHT: 68 IN | BODY MASS INDEX: 23.31 KG/M2 | DIASTOLIC BLOOD PRESSURE: 62 MMHG | HEART RATE: 69 BPM | SYSTOLIC BLOOD PRESSURE: 140 MMHG | WEIGHT: 153.8 LBS | OXYGEN SATURATION: 97 % | TEMPERATURE: 98.7 F

## 2022-08-10 DIAGNOSIS — E10.641 UNCONTROLLED TYPE 1 DIABETES MELLITUS WITH HYPOGLYCEMIA AND COMA (HCC): ICD-10-CM

## 2022-08-10 DIAGNOSIS — F02.818 EARLY ONSET ALZHEIMER'S DEMENTIA WITH BEHAVIORAL DISTURBANCE (HCC): ICD-10-CM

## 2022-08-10 DIAGNOSIS — Z12.11 ENCOUNTER FOR SCREENING FOR MALIGNANT NEOPLASM OF COLON: ICD-10-CM

## 2022-08-10 DIAGNOSIS — N18.31 CHRONIC KIDNEY DISEASE (CKD) STAGE G3A/A1, MODERATELY DECREASED GLOMERULAR FILTRATION RATE (GFR) BETWEEN 45-59 ML/MIN/1.73 SQUARE METER AND ALBUMINURIA CREATININE RATIO LESS THAN 30 MG/G: ICD-10-CM

## 2022-08-10 DIAGNOSIS — Z00.00 ENCOUNTER FOR MEDICAL EXAMINATION TO ESTABLISH CARE: ICD-10-CM

## 2022-08-10 DIAGNOSIS — H49.40 TOLOSA-HUNT SYNDROME: ICD-10-CM

## 2022-08-10 DIAGNOSIS — I15.2 HYPERTENSION ASSOCIATED WITH TYPE 1 DIABETES MELLITUS (HCC): ICD-10-CM

## 2022-08-10 DIAGNOSIS — E10.59 HYPERTENSION ASSOCIATED WITH TYPE 1 DIABETES MELLITUS (HCC): ICD-10-CM

## 2022-08-10 DIAGNOSIS — G47.09 OTHER INSOMNIA: ICD-10-CM

## 2022-08-10 DIAGNOSIS — A60.00 GENITAL HERPES SIMPLEX, UNSPECIFIED SITE: ICD-10-CM

## 2022-08-10 DIAGNOSIS — G30.0 EARLY ONSET ALZHEIMER'S DEMENTIA WITH BEHAVIORAL DISTURBANCE (HCC): ICD-10-CM

## 2022-08-10 DIAGNOSIS — E78.5 DYSLIPIDEMIA DUE TO TYPE 1 DIABETES MELLITUS (HCC): ICD-10-CM

## 2022-08-10 DIAGNOSIS — Z23 NEED FOR VACCINATION: ICD-10-CM

## 2022-08-10 DIAGNOSIS — E10.69 DYSLIPIDEMIA DUE TO TYPE 1 DIABETES MELLITUS (HCC): ICD-10-CM

## 2022-08-10 PROBLEM — F02.80 ALZHEIMER DISEASE (HCC): Status: ACTIVE | Noted: 2022-08-10

## 2022-08-10 PROBLEM — G30.9 ALZHEIMER DISEASE (HCC): Status: ACTIVE | Noted: 2022-08-10

## 2022-08-10 PROCEDURE — 90677 PCV20 VACCINE IM: CPT

## 2022-08-10 PROCEDURE — 99214 OFFICE O/P EST MOD 30 MIN: CPT | Mod: 25

## 2022-08-10 PROCEDURE — 90471 IMMUNIZATION ADMIN: CPT

## 2022-08-10 RX ORDER — MULTIVIT WITH MINERALS/LUTEIN
TABLET ORAL DAILY
COMMUNITY
End: 2023-01-01

## 2022-08-10 ASSESSMENT — FIBROSIS 4 INDEX: FIB4 SCORE: 1.32

## 2022-08-10 NOTE — PATIENT INSTRUCTIONS
I recommend checking your blood pressure twice a day for three days:  2 times (back-to-back) first thing in the morning and again 2 times (back to back) in the evening.    You can send me your readings and I will calculate your average blood pressure. We can use this to guide our care.      When you take your blood pressure there are a couple things to keep in mind.  It is best to use an arm cuff rather than a wrist cuff.  2. Place both feet on the floor.  Do not cross your legs.  3. Rest your arm on a table, so it is supported. Also try to have you arm at the same level as your heart (not too high or too low).

## 2022-08-10 NOTE — ASSESSMENT & PLAN NOTE
Chronic, controlled.  Longstanding history of insomnia, which is being treated effectively with trazodone therapy.

## 2022-08-10 NOTE — ASSESSMENT & PLAN NOTE
Chronic, not controlled.  140/62 in clinic today.  Patient does not typically have his blood pressure monitored at home, however, his brother states he will start doing this.  He will send me his readings via [employer] and we will assess if a medication change/dose change is needed.

## 2022-08-10 NOTE — ASSESSMENT & PLAN NOTE
Has 20% vision left.  His eye doctor passed away.  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

## 2022-08-10 NOTE — ASSESSMENT & PLAN NOTE
This is a new problem.  Patient complains of acute right shoulder pain without injury.  Patient's brother states it is primarily nerve pain.  Much of his pain is nerve pain per his brother.

## 2022-08-10 NOTE — ASSESSMENT & PLAN NOTE
Chronic, controlled.  Managed on Atorvastatin 40 mg. LDL stable. Continue current management.     Latest Reference Range & Units 3/30/22 08:25   Cholesterol,Tot 100 - 199 mg/dL 116   Triglycerides 0 - 149 mg/dL 55   HDL >=40 mg/dL 42   LDL <100 mg/dL 63

## 2022-08-10 NOTE — ASSESSMENT & PLAN NOTE
Chronic, stable.  Follwed by nephrology.  Patient had an appointment a couple days ago and was told everything looks good.  I will continue to trend labs every 6 months.

## 2022-08-10 NOTE — ASSESSMENT & PLAN NOTE
Chronic, not controlled.  Continue close follow-up with endocrinologist.  His brother states he does have neuropathy on the bottom of his feet with minimal feeling on the top.  He also has occasional pain in his legs. Will discuss gabapentin at follow-up appointment.

## 2022-08-11 PROBLEM — M25.511 ACUTE PAIN OF RIGHT SHOULDER: Status: RESOLVED | Noted: 2022-03-22 | Resolved: 2022-08-11

## 2022-08-11 NOTE — ASSESSMENT & PLAN NOTE
Patient's brother states he thinks this was a misdiagnosis but came about after he had a loss of his peripheral vision.  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  He currently has 20% of his vision left.  He was followed by an ophthalmologist, however his provider passed away and so he is never reestablished.

## 2022-08-11 NOTE — ASSESSMENT & PLAN NOTE
Chronic, ongoing.  Patient is followed by Dr. Luis, neurology.  Patient was first beginning to have symptoms of rapidly progressive Alzheimer's disease in 2019 and formally diagnosed in early 2020.  In addition to his cognitive function, he has been previously diagnosed with some behavioral disturbances associated with the progression of the disease.  He is not currently on medication.  He lives with his older brother, who is his caretaker.  He does have a follow-up with Dr. Luis on 8/23/2022.

## 2022-08-11 NOTE — PROGRESS NOTES
"Subjective:     CC:    Chief Complaint   Patient presents with    Establish Care       HISTORY OF THE PRESENT ILLNESS: Dylon is a pleasant 62 y.o. male here today to establish care.  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  He lives with his older brother Luis Carlos, who is his caretaker.  Luis Carlos is present at today's appointment and provided the history.  Patient does have word finding difficulty but does communicate, mostly in word salad sentences.  He is prior PCP was SAMMI Tan.    Patient also has a diagnosis of type 1 diabetes, for which she is followed by endocrinology.  He is additionally followed by nephrology for stage III kidney disease.    Overall, there are no concerns today.  He was last seen by his PCP 5 months ago.    Health Maintenance: Completed    ROS:   All systems negative except as addressed in assessment and plan.   ROS      Objective:     Exam: BP (!) 140/62 (BP Location: Left arm, Patient Position: Sitting, BP Cuff Size: Adult)   Pulse 69   Temp 37.1 °C (98.7 °F) (Temporal)   Ht 1.727 m (5' 8\")   Wt 69.8 kg (153 lb 12.8 oz)   SpO2 97%  Body mass index is 23.39 kg/m².    Physical Exam  Constitutional:       Appearance: Normal appearance.   Cardiovascular:      Rate and Rhythm: Normal rate.   Pulmonary:      Effort: Pulmonary effort is normal.   Neurological:      Mental Status: He is alert. Mental status is at baseline.   Psychiatric:         Mood and Affect: Mood normal.         Behavior: Behavior normal.     Labs: Reviewed from 03/30/22.      Assessment & Plan:   62 y.o. male with the following -    1. Encounter for medical examination to establish care  Health conditions and medications reviewed and updated. All screenings discussed and up-to-date. Health maintenance completed.     2. Early onset Alzheimer's dementia with behavioral disturbance (HCC)  Chronic, ongoing.  Patient is followed by Dr. Luis, neurology.  Patient was " first beginning to have symptoms of rapidly progressive Alzheimer's disease in 2019 and formally diagnosed in early 2020.  In addition to his cognitive function, he has been previously diagnosed with some behavioral disturbances associated with the progression of the disease.  He is not currently on medication.  He lives with his older brother, who is his caretaker.  He does have a follow-up with Dr. Luis on 8/23/2022.    3. Uncontrolled type 1 diabetes mellitus with hypoglycemia and coma (HCC)  Chronic, not controlled.  Continue close follow-up with endocrinologist.  His brother states he does have neuropathy on the bottom of his feet with minimal feeling on the top.  He also has occasional pain in his legs. Will discuss gabapentin at follow-up appointment.    4. Dyslipidemia due to type 1 diabetes mellitus (HCC)  Chronic, controlled.  Managed on Atorvastatin 40 mg. LDL stable. Continue current management.     Latest Reference Range & Units 3/30/22 08:25   Cholesterol,Tot 100 - 199 mg/dL 116   Triglycerides 0 - 149 mg/dL 55   HDL >=40 mg/dL 42   LDL <100 mg/dL 63     5. Hypertension associated with type 1 diabetes mellitus (HCC)  Chronic, not controlled.  140/62 in clinic today.  Patient does not typically have his blood pressure monitored at home, however, his brother states he will start doing this.  He will send me his readings via [employer] and we will assess if a medication change/dose change is needed.    6. Chronic kidney disease (CKD) stage G3a/A1, moderately decreased glomerular filtration rate (GFR) between 45-59 mL/min/1.73 square meter and albuminuria creatinine ratio less than 30 mg/g (HCC)  Chronic, stable.  Follwed by nephrology.  Patient had an appointment a couple days ago and was told everything looks good.  I will continue to trend labs every 6 months.    7. Other insomnia  Chronic, controlled.  Longstanding history of insomnia, which is being treated effectively with trazodone therapy.     8.  Genital herpes simplex, unspecified site  Chronic, controlled with suppresive therapy - acyclovir 200 mg daily. Refill requested today.     9. Dawna-Martinez syndrome  Patient's brother states he thinks this was a misdiagnosis but came about after he had a loss of his peripheral vision.  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  He currently has 20% of his vision left.  He was followed by an ophthalmologist, however his provider passed away and so he is never reestablished.    10. Encounter for screening for malignant neoplasm of colon  - COLOGUARD (FIT DNA)    11. Need for vaccination  - Pneumococcal Conjugate Vaccine 20-Valent (19 yrs+)    Other orders  - OYSTER SHELL PO; Take  by mouth.  - ALFALFA PO; Take  by mouth.  - POTASSIUM PO; Take  by mouth.  - Ascorbic Acid (VITAMIN C) 1000 MG Tab; Take  by mouth.  - Multiple Vitamins-Minerals (CENTRUM SILVER 50+MEN PO); Take  by mouth.    Patient was educated in proper administration of medication(s) ordered today including safety, possible SE, risks, benefits, rationale and alternatives to therapy.   Supportive care, differential diagnoses, and indications for immediate follow-up discussed with patient.    Pathogenesis of diagnosis discussed including typical length and natural progression.    Instructed to return to clinic or nearest emergency department for any change in condition, further concerns, or worsening of symptoms.  Patient states understanding of the plan of care and discharge instructions.    Return in about 6 months (around 2/10/2023) for Follow-up.    I spent a total of 33 minutes with record review, exam, and communication with the patient, communication with other providers, and documentation of this encounter. This does not include time spent on separately billable procedures/tests.    I have placed the above orders and discussed them with an approved delegating provider.  The MA is performing the below  orders under the direction of Dr. Parker.    Please note that this dictation was created using voice recognition software. I have worked with consultants from the vendor as well as technical experts from [facility] to optimize the interface. I have made every reasonable attempt to correct obvious errors, but I expect that there are errors of grammar and possibly content that I did not discover before finalizing the note.

## 2022-08-17 ENCOUNTER — OFFICE VISIT (OUTPATIENT)
Dept: CARDIOLOGY | Facility: MEDICAL CENTER | Age: 62
End: 2022-08-17
Attending: PHYSICIAN ASSISTANT
Payer: MEDICARE

## 2022-08-17 VITALS
BODY MASS INDEX: 23.55 KG/M2 | OXYGEN SATURATION: 96 % | RESPIRATION RATE: 14 BRPM | HEART RATE: 72 BPM | DIASTOLIC BLOOD PRESSURE: 52 MMHG | SYSTOLIC BLOOD PRESSURE: 110 MMHG | WEIGHT: 155.4 LBS | HEIGHT: 68 IN

## 2022-08-17 DIAGNOSIS — Z98.890 HISTORY OF CARDIAC CATHETERIZATION: Chronic | ICD-10-CM

## 2022-08-17 DIAGNOSIS — I10 ESSENTIAL HYPERTENSION: ICD-10-CM

## 2022-08-17 DIAGNOSIS — I25.83 CORONARY ARTERY DISEASE DUE TO LIPID RICH PLAQUE: Chronic | ICD-10-CM

## 2022-08-17 DIAGNOSIS — I25.10 CORONARY ARTERY DISEASE DUE TO LIPID RICH PLAQUE: Chronic | ICD-10-CM

## 2022-08-17 DIAGNOSIS — E78.5 DYSLIPIDEMIA: ICD-10-CM

## 2022-08-17 DIAGNOSIS — I73.9 PVD (PERIPHERAL VASCULAR DISEASE) (HCC): ICD-10-CM

## 2022-08-17 DIAGNOSIS — Z89.429 TOE AMPUTEE (HCC): ICD-10-CM

## 2022-08-17 DIAGNOSIS — I70.90 ATHEROSCLEROSIS: ICD-10-CM

## 2022-08-17 DIAGNOSIS — E10.59 CORONARY ARTERY DISEASE DUE TO TYPE 1 DIABETES MELLITUS (HCC): Chronic | ICD-10-CM

## 2022-08-17 DIAGNOSIS — I25.10 CORONARY ARTERY DISEASE DUE TO TYPE 1 DIABETES MELLITUS (HCC): Chronic | ICD-10-CM

## 2022-08-17 DIAGNOSIS — E10.59 HYPERTENSION ASSOCIATED WITH TYPE 1 DIABETES MELLITUS (HCC): ICD-10-CM

## 2022-08-17 DIAGNOSIS — I15.2 HYPERTENSION ASSOCIATED WITH TYPE 1 DIABETES MELLITUS (HCC): ICD-10-CM

## 2022-08-17 PROCEDURE — 99214 OFFICE O/P EST MOD 30 MIN: CPT | Performed by: INTERNAL MEDICINE

## 2022-08-17 RX ORDER — ATORVASTATIN CALCIUM 80 MG/1
80 TABLET, FILM COATED ORAL DAILY
Qty: 100 TABLET | Refills: 3 | Status: SHIPPED | OUTPATIENT
Start: 2022-08-17 | End: 2023-01-01 | Stop reason: SDUPTHER

## 2022-08-17 RX ORDER — LOSARTAN POTASSIUM 100 MG/1
100 TABLET ORAL DAILY
Qty: 90 TABLET | Refills: 3 | Status: SHIPPED | OUTPATIENT
Start: 2022-08-17 | End: 2022-08-25 | Stop reason: SDUPTHER

## 2022-08-17 RX ORDER — METOPROLOL SUCCINATE 25 MG/1
25 TABLET, EXTENDED RELEASE ORAL DAILY
Qty: 100 TABLET | Refills: 3 | Status: SHIPPED | OUTPATIENT
Start: 2022-08-17 | End: 2023-01-01 | Stop reason: SDUPTHER

## 2022-08-17 ASSESSMENT — ENCOUNTER SYMPTOMS
BLURRED VISION: 0
SHORTNESS OF BREATH: 0
CHILLS: 0
WEAKNESS: 0
ABDOMINAL PAIN: 0
BRUISES/BLEEDS EASILY: 0
FEVER: 0
PALPITATIONS: 0
FOCAL WEAKNESS: 0
NAUSEA: 0
SORE THROAT: 0
CLAUDICATION: 0
FALLS: 0
DIZZINESS: 0
COUGH: 0
PND: 0

## 2022-08-17 ASSESSMENT — FIBROSIS 4 INDEX: FIB4 SCORE: 1.32

## 2022-08-17 NOTE — TELEPHONE ENCOUNTER
Patient currently is managed on losartan 50 mg and metoprolol 25 mg daily.  His blood pressure has been on the higher side and so I asked his brother, who is also his caretaker, to monitor his blood pressure at home.  His brother did send me daily readings, twice in the morning and twice in the evening over 4 days.  The average blood pressure is 133/64, with the high blood pressure 152/55.  I will increase his losartan to 100 mg daily.

## 2022-08-17 NOTE — PROGRESS NOTES
"Chief Complaint   Patient presents with    Coronary Artery Disease     F/V Dx: Coronary artery disease due to lipid rich plaque - mild to moderate on Cath 2019 Left Dominant    Dyslipidemia     F/V Dx: Dyslipidemia due to type 1 diabetes mellitus (HCC)       Subjective     Dylon Santa is a 62 y.o. male who presents today for follow-up of his history of coronary disease NSTEMI type 1 diabetes moderate on angiogram    He is done well over the past year tolerating his medications well he was in the ER for abdominal pain in December he follows closely with multiple specialist    Past Medical History:   Diagnosis Date    Acute pain of right shoulder 3/22/2022    Anesthesia     hx PONV    Arthritis     L knee; hands    Bronchitis 2016    Cataract     nicole iol    Cold 4/5/17    prod cough    Coronary artery disease due to lipid rich plaque - mild to moderate on Cath 2019 Left Dominant     Coronary artery disease due to type 1 diabetes mellitus (HCC)     Dental disorder     \"bad shape\"    Diabetic neuropathy ([facility]) 12/31/2014    retinopathy    Diabetic retinopathy of both eyes ([facility]) 12/9/2016    Dyslipidemia     Heart murmur     Hemorrhagic disorder ([facility])     nose bleeds    High cholesterol     History of cardiac catheterization 2019 in [facility] abnormal stress - mild to moderate CAD     History of shingles 12/31/2014    Hyperlipidemia     Hypertension     Kidney stones     Pain     left leg/foot    Psychiatric problem     situational depression    Type II or unspecified type diabetes mellitus without mention of complication, uncontrolled 12/31/2014     Past Surgical History:   Procedure Laterality Date    IRRIGATION & DEBRIDEMENT GENERAL Left 5/3/2017    Procedure: IRRIGATION & DEBRIDEMENT GENERAL and Left Fourth Toe Amputation ;  Surgeon: Inessa Richard M.D.;  Location: SURGERY [facility];  Service:     APPENDECTOMY      CATARACT EXTRACTION WITH IOL Bilateral     EYE SURGERY      HAND SURGERY  1990's    " KNEE ARTHROSCOPY      x3    TONSILLECTOMY       Family History   Problem Relation Age of Onset    Thyroid Mother     Lung Disease Mother         COPD    Cancer Mother         eyebrow     Thyroid Sister     Other Sister         mental health issues     Thyroid Brother     Lung Disease Brother         COPD    Hypertension Brother     Hyperlipidemia Brother     Lung Disease Father         COPD    Cancer Father     Diabetes Maternal Uncle     Cancer Paternal Aunt         type unknown    Diabetes Maternal Grandfather     Diabetes Paternal Grandmother     Cancer Paternal Grandfather         prostate    Stroke Neg Hx      Social History     Socioeconomic History    Marital status: Single     Spouse name: Not on file    Number of children: Not on file    Years of education: Not on file    Highest education level: 12th grade   Occupational History    Not on file   Tobacco Use    Smoking status: Former     Packs/day: 0.00     Types: Cigarettes    Smokeless tobacco: Former     Types: Chew    Tobacco comments:     tobacco cessation recommended   Vaping Use    Vaping Use: Never used   Substance and Sexual Activity    Alcohol use: Yes     Alcohol/week: 0.0 oz     Comment: rare beer    Drug use: No    Sexual activity: Never     Comment: Never  , On disability due to lack of eye sight.   Other Topics Concern    Not on file   Social History Narrative    Not on file     Social Determinants of Health     Financial Resource Strain: Low Risk     Difficulty of Paying Living Expenses: Not hard at all   Food Insecurity: No Food Insecurity    Worried About Running Out of Food in the Last Year: Never true    Ran Out of Food in the Last Year: Never true   Transportation Needs: Unknown    Lack of Transportation (Medical): No    Lack of Transportation (Non-Medical): Patient refused   Physical Activity: Inactive    Days of Exercise per Week: 0 days    Minutes of Exercise per Session: 0 min   Stress: Stress Concern Present    Feeling of  Stress : To some extent   Social Connections: Moderately Isolated    Frequency of Communication with Friends and Family: More than three times a week    Frequency of Social Gatherings with Friends and Family: More than three times a week    Attends Zoroastrianism Services: More than 4 times per year    Active Member of Clubs or Organizations: No    Attends Club or Organization Meetings: Patient refused    Marital Status: Never    Intimate Partner Violence: Not on file   Housing Stability: Unknown    Unable to Pay for Housing in the Last Year: Patient refused    Number of Places Lived in the Last Year: Not on file    Unstable Housing in the Last Year: Patient refused     No Known Allergies  Outpatient Encounter Medications as of 8/17/2022   Medication Sig Dispense Refill    OYSTER SHELL PO Take  by mouth every day.      ALFALFA PO Take  by mouth every day.      POTASSIUM PO Take  by mouth every day.      Ascorbic Acid (VITAMIN C) 1000 MG Tab Take  by mouth every day.      Multiple Vitamins-Minerals (CENTRUM SILVER 50+MEN PO) Take  by mouth every day.      clopidogrel (PLAVIX) 75 MG Tab Take 1 Tablet by mouth every day. 90 Tablet 3    Continuous Blood Gluc Sensor (FREESTYLE CARLOS 14 DAY SENSOR) Misc Inject 1 Units under the skin every 14 days. 6 Each 3    Insulin Glargine, 1 Unit Dial, (TOUJEO SOLOSTAR) 300 UNIT/ML Solution Pen-injector Inject 25 Units under the skin every day. 18 mL 3    NOVOLOG FLEXPEN 100 UNIT/ML solution for injection Inject 10 Units under the skin 3 times a day before meals. 30 mL 3    losartan (COZAAR) 50 MG Tab Take 1 Tablet by mouth every day. 100 Tablet 3    atorvastatin (LIPITOR) 40 MG Tab Take 1 Tablet by mouth every day. 100 Tablet 3    traZODone (DESYREL) 150 MG Tab Take 1 Tablet by mouth every evening. 90 Tablet 1    acyclovir (ZOVIRAX) 200 MG Cap TAKE ONE CAPSULE BY MOUTH ONE TIME DAILY (Patient taking differently: Take 200 mg by mouth every day.) 90 Capsule 3    metoprolol SR (TOPROL  "XL) 25 MG TABLET SR 24 HR Take 1 Tablet by mouth every day. 90 Tablet 3    insulin lispro (HUMALOG) 100 UNIT/ML Inject  under the skin 3 times a day before meals. (Patient not taking: Reported on 8/17/2022)       No facility-administered encounter medications on file as of 8/17/2022.     Review of Systems   Constitutional:  Negative for chills and fever.   HENT:  Negative for sore throat.    Eyes:  Negative for blurred vision.   Respiratory:  Negative for cough and shortness of breath.    Cardiovascular:  Negative for chest pain, palpitations, claudication, leg swelling and PND.   Gastrointestinal:  Negative for abdominal pain and nausea.   Musculoskeletal:  Negative for falls and joint pain.   Skin:  Negative for rash.   Neurological:  Negative for dizziness, focal weakness and weakness.   Endo/Heme/Allergies:  Does not bruise/bleed easily.            Objective     /52 (BP Location: Left arm, Patient Position: Sitting, BP Cuff Size: Adult)   Pulse 72   Resp 14   Ht 1.727 m (5' 8\")   Wt 70.5 kg (155 lb 6.4 oz)   SpO2 96%   BMI 23.63 kg/m²     Physical Exam  Constitutional:       General: He is not in acute distress.     Appearance: He is not diaphoretic.   Eyes:      General: No scleral icterus.  Neck:      Vascular: No JVD.   Cardiovascular:      Rate and Rhythm: Normal rate.      Heart sounds: Normal heart sounds. No murmur heard.    No friction rub. No gallop.   Pulmonary:      Effort: No respiratory distress.      Breath sounds: No wheezing or rales.   Abdominal:      General: Bowel sounds are normal.      Palpations: Abdomen is soft.   Skin:     Findings: No rash.   Neurological:      Mental Status: He is alert.        We reviewed in person the most recent labs  Recent Results (from the past 5040 hour(s))   POCT Hemoglobin A1C    Collection Time: 03/28/22  3:47 PM   Result Value Ref Range    Glycohemoglobin 8.9 (A) 0.0 - 5.6 %    Internal Control Negative      Internal Control Positive     Comp " Metabolic Panel    Collection Time: 03/30/22  8:25 AM   Result Value Ref Range    Sodium 138 135 - 145 mmol/L    Potassium 4.7 3.6 - 5.5 mmol/L    Chloride 105 96 - 112 mmol/L    Co2 25 20 - 33 mmol/L    Anion Gap 8.0 7.0 - 16.0    Glucose 212 (H) 65 - 99 mg/dL    Bun 27 (H) 8 - 22 mg/dL    Creatinine 1.36 0.50 - 1.40 mg/dL    Calcium 9.4 8.5 - 10.5 mg/dL    AST(SGOT) 24 12 - 45 U/L    ALT(SGPT) 22 2 - 50 U/L    Alkaline Phosphatase 74 30 - 99 U/L    Total Bilirubin 0.2 0.1 - 1.5 mg/dL    Albumin 4.1 3.2 - 4.9 g/dL    Total Protein 6.9 6.0 - 8.2 g/dL    Globulin 2.8 1.9 - 3.5 g/dL    A-G Ratio 1.5 g/dL   Lipid Profile    Collection Time: 03/30/22  8:25 AM   Result Value Ref Range    Cholesterol,Tot 116 100 - 199 mg/dL    Triglycerides 55 0 - 149 mg/dL    HDL 42 >=40 mg/dL    LDL 63 <100 mg/dL   VITAMIN D,25 HYDROXY    Collection Time: 03/30/22  8:25 AM   Result Value Ref Range    25-Hydroxy   Vitamin D 25 51 30 - 100 ng/mL   VITAMIN B12    Collection Time: 03/30/22  8:25 AM   Result Value Ref Range    Vitamin B12 -True Cobalamin 865 211 - 911 pg/mL   TSH    Collection Time: 03/30/22  8:25 AM   Result Value Ref Range    TSH 1.280 0.380 - 5.330 uIU/mL   FREE THYROXINE    Collection Time: 03/30/22  8:25 AM   Result Value Ref Range    Free T-4 1.38 0.93 - 1.70 ng/dL   T3 FREE    Collection Time: 03/30/22  8:25 AM   Result Value Ref Range    T3,Free 2.67 2.00 - 4.40 pg/mL   FASTING STATUS    Collection Time: 03/30/22  8:25 AM   Result Value Ref Range    Fasting Status Fasting    ESTIMATED GFR    Collection Time: 03/30/22  8:25 AM   Result Value Ref Range    GFR (CKD-EPI) 59 (A) >60 mL/min/1.73 m 2   MICROALBUMIN CREAT RATIO URINE    Collection Time: 03/30/22  8:26 AM   Result Value Ref Range    Creatinine, Urine 103.44 mg/dL    Microalbumin, Urine Random 5.5 mg/dL    Micro Alb Creat Ratio 53 (H) 0 - 30 mg/g   POCT Hemoglobin A1C    Collection Time: 08/02/22  2:17 PM   Result Value Ref Range    Glycohemoglobin 9.4 (A) 0.0  - 5.6 %    Internal Control Negative      Internal Control Positive             Assessment & Plan     1. Coronary artery disease due to lipid rich plaque - mild to moderate on Cath 2019 Left Dominant        2. Coronary artery disease due to type 1 diabetes mellitus (HCC)        3. PVD (peripheral vascular disease) ([facility])        4. History of cardiac catheterization 2019 in [place] abnormal stress - mild to moderate CAD            Medical Decision Making: Today's Assessment/Status/Plan:          It was my pleasure to meet with Mr. Santa.  We addressed the management of hypertension at today's visit. Blood pressure is well controlled.  We specifically assessed the labs on hypertension treatment    We addressed the management of dyslipidemia at today's visit. He is on appropriate statin. Increased atorva to 80 daily    We addressed the management of antiplatelet therapy at today's visit. We specifically discussed and he understands the importance of antiplatelet therapy as well as the risk.  Plavix indefinite given risk    Encouraged vein and vascular for issues but would like to screen     I will see Mr. Santa back in 1 year time and encouraged him to follow up with us over the phone or electronically using my [employer] as issues arise.    It is my pleasure to participate in the care of Mr. Santa.  Please do not hesitate to contact me with questions or concerns.    Phil Patel MD PhD FAC  Cardiologist [facility]    Please note that this dictation was created using voice recognition software. There may be errors I did not discover before finalizing the note.

## 2022-08-17 NOTE — ASSESSMENT & PLAN NOTE
Chronic, not controlled.  Patient currently is managed on losartan 50 mg and metoprolol 25 mg daily.  His blood pressure has been on the higher side and so I asked his brother, who is also his caretaker, to monitor his blood pressure at home.  His brother did send me daily readings, twice in the morning and twice in the evening over 4 days.  The average blood pressure is 133/64, with the high blood pressure 152/55.  I will increase his losartan to 100 mg daily.

## 2022-08-23 ENCOUNTER — TELEPHONE (OUTPATIENT)
Dept: MEDICAL GROUP | Facility: PHYSICIAN GROUP | Age: 62
End: 2022-08-23
Payer: MEDICARE

## 2022-08-23 ENCOUNTER — APPOINTMENT (OUTPATIENT)
Dept: NEUROLOGY | Facility: MEDICAL CENTER | Age: 62
End: 2022-08-23
Attending: PSYCHIATRY & NEUROLOGY
Payer: MEDICARE

## 2022-08-24 NOTE — TELEPHONE ENCOUNTER
Per pharmacy patient's insurance does not cover 90 day supply. Has to be 100 day supply. Can you re send new rx for Losartan 100mg tablet please. Thank you!

## 2022-08-25 DIAGNOSIS — E10.59 HYPERTENSION ASSOCIATED WITH TYPE 1 DIABETES MELLITUS (HCC): ICD-10-CM

## 2022-08-25 DIAGNOSIS — I15.2 HYPERTENSION ASSOCIATED WITH TYPE 1 DIABETES MELLITUS (HCC): ICD-10-CM

## 2022-08-25 RX ORDER — LOSARTAN POTASSIUM 100 MG/1
100 TABLET ORAL DAILY
Qty: 100 TABLET | Refills: 3 | Status: SHIPPED | OUTPATIENT
Start: 2022-08-25 | End: 2022-11-25 | Stop reason: SDUPTHER

## 2022-09-25 DIAGNOSIS — G47.00 INSOMNIA, UNSPECIFIED TYPE: ICD-10-CM

## 2022-09-25 RX ORDER — TRAZODONE HYDROCHLORIDE 150 MG/1
150 TABLET ORAL NIGHTLY
Qty: 90 TABLET | Refills: 1 | Status: SHIPPED | OUTPATIENT
Start: 2022-09-25 | End: 2022-09-26 | Stop reason: SDUPTHER

## 2022-09-26 DIAGNOSIS — G47.00 INSOMNIA, UNSPECIFIED TYPE: ICD-10-CM

## 2022-09-26 RX ORDER — TRAZODONE HYDROCHLORIDE 150 MG/1
150 TABLET ORAL NIGHTLY
Qty: 90 TABLET | Refills: 1 | Status: SHIPPED | OUTPATIENT
Start: 2022-09-26 | End: 2022-11-25 | Stop reason: SDUPTHER

## 2022-11-25 DIAGNOSIS — G47.00 INSOMNIA, UNSPECIFIED TYPE: ICD-10-CM

## 2022-11-25 DIAGNOSIS — I15.2 HYPERTENSION ASSOCIATED WITH TYPE 1 DIABETES MELLITUS (HCC): ICD-10-CM

## 2022-11-25 DIAGNOSIS — E10.59 HYPERTENSION ASSOCIATED WITH TYPE 1 DIABETES MELLITUS (HCC): ICD-10-CM

## 2022-11-25 DIAGNOSIS — I10 ESSENTIAL HYPERTENSION: ICD-10-CM

## 2022-11-25 DIAGNOSIS — E78.5 DYSLIPIDEMIA: ICD-10-CM

## 2022-11-26 DIAGNOSIS — Z86.19 HISTORY OF SHINGLES: ICD-10-CM

## 2022-11-26 NOTE — TELEPHONE ENCOUNTER
Received request via: Patient    Was the patient seen in the last year in this department? Yes    Does the patient have an active prescription (recently filled or refills available) for medication(s) requested? No    Does the patient have long term Plus and need 100 day supply (blood pressure, diabetes and cholesterol meds only)? Yes, quantity updated to 100 days

## 2022-11-26 NOTE — TELEPHONE ENCOUNTER
Received request via: Pharmacy    Was the patient seen in the last year in this department? Yes    Does the patient have an active prescription (recently filled or refills available) for medication(s) requested? No    Does the patient have correction Plus and need 100 day supply (blood pressure, diabetes and cholesterol meds only)? Yes, quantity updated to 100 days

## 2022-11-28 RX ORDER — ACYCLOVIR 200 MG/1
200 CAPSULE ORAL DAILY
Qty: 100 CAPSULE | Refills: 3 | Status: SHIPPED | OUTPATIENT
Start: 2022-11-28 | End: 2023-02-22 | Stop reason: SDUPTHER

## 2022-11-28 RX ORDER — LOSARTAN POTASSIUM 100 MG/1
100 TABLET ORAL DAILY
Qty: 100 TABLET | Refills: 3 | Status: SHIPPED | OUTPATIENT
Start: 2022-11-28 | End: 2023-02-22 | Stop reason: SDUPTHER

## 2022-11-28 RX ORDER — TRAZODONE HYDROCHLORIDE 150 MG/1
150 TABLET ORAL NIGHTLY
Qty: 90 TABLET | Refills: 3 | Status: SHIPPED | OUTPATIENT
Start: 2022-11-28 | End: 2023-02-22 | Stop reason: SDUPTHER

## 2022-11-29 NOTE — TELEPHONE ENCOUNTER
Is the patient due for a refill? No    Was the patient seen the past year? Yes    Date of last office visit: 8/17/22    Does the patient have an upcoming appointment?  No    Provider to refill: CW    Does the patients insurance require a 100 day supply?  Yes

## 2022-11-30 RX ORDER — ATORVASTATIN CALCIUM 80 MG/1
80 TABLET, FILM COATED ORAL DAILY
Qty: 100 TABLET | Refills: 2 | Status: CANCELLED | OUTPATIENT
Start: 2022-11-30

## 2022-11-30 RX ORDER — METOPROLOL SUCCINATE 25 MG/1
25 TABLET, EXTENDED RELEASE ORAL DAILY
Qty: 100 TABLET | Refills: 2 | Status: CANCELLED | OUTPATIENT
Start: 2022-11-30

## 2022-12-14 DIAGNOSIS — E10.22 TYPE 1 DIABETES MELLITUS WITH STAGE 3 CHRONIC KIDNEY DISEASE, UNSPECIFIED WHETHER STAGE 3A OR 3B CKD (HCC): ICD-10-CM

## 2022-12-14 DIAGNOSIS — N18.30 TYPE 1 DIABETES MELLITUS WITH STAGE 3 CHRONIC KIDNEY DISEASE, UNSPECIFIED WHETHER STAGE 3A OR 3B CKD (HCC): ICD-10-CM

## 2022-12-14 RX ORDER — PEN NEEDLE, DIABETIC 32GX 5/32"
NEEDLE, DISPOSABLE MISCELLANEOUS
Qty: 200 EACH | Refills: 0 | Status: SHIPPED | OUTPATIENT
Start: 2022-12-14 | End: 2023-01-01

## 2022-12-27 ENCOUNTER — OFFICE VISIT (OUTPATIENT)
Dept: URGENT CARE | Facility: PHYSICIAN GROUP | Age: 62
End: 2022-12-27
Payer: MEDICARE

## 2022-12-27 ENCOUNTER — OFFICE VISIT (OUTPATIENT)
Dept: NEUROLOGY | Facility: MEDICAL CENTER | Age: 62
End: 2022-12-27
Attending: PSYCHIATRY & NEUROLOGY
Payer: MEDICARE

## 2022-12-27 VITALS
OXYGEN SATURATION: 90 % | HEIGHT: 69 IN | DIASTOLIC BLOOD PRESSURE: 68 MMHG | RESPIRATION RATE: 16 BRPM | HEART RATE: 88 BPM | WEIGHT: 153 LBS | TEMPERATURE: 100.2 F | SYSTOLIC BLOOD PRESSURE: 150 MMHG | BODY MASS INDEX: 22.66 KG/M2

## 2022-12-27 VITALS
BODY MASS INDEX: 22.69 KG/M2 | SYSTOLIC BLOOD PRESSURE: 144 MMHG | HEART RATE: 97 BPM | DIASTOLIC BLOOD PRESSURE: 72 MMHG | WEIGHT: 153.22 LBS | OXYGEN SATURATION: 91 % | HEIGHT: 69 IN | TEMPERATURE: 100.4 F | RESPIRATION RATE: 18 BRPM

## 2022-12-27 DIAGNOSIS — J06.9 VIRAL URI WITH COUGH: ICD-10-CM

## 2022-12-27 DIAGNOSIS — G30.9 ALZHEIMER DISEASE (HCC): ICD-10-CM

## 2022-12-27 DIAGNOSIS — F02.80 ALZHEIMER DISEASE (HCC): ICD-10-CM

## 2022-12-27 LAB
EXTERNAL QUALITY CONTROL: NORMAL
FLUAV+FLUBV AG SPEC QL IA: NEGATIVE
INT CON NEG: NORMAL
INT CON NEG: NORMAL
INT CON POS: NORMAL
INT CON POS: NORMAL
SARS-COV+SARS-COV-2 AG RESP QL IA.RAPID: NEGATIVE

## 2022-12-27 PROCEDURE — 87804 INFLUENZA ASSAY W/OPTIC: CPT

## 2022-12-27 PROCEDURE — 99214 OFFICE O/P EST MOD 30 MIN: CPT | Performed by: PSYCHIATRY & NEUROLOGY

## 2022-12-27 PROCEDURE — 99213 OFFICE O/P EST LOW 20 MIN: CPT | Mod: CS

## 2022-12-27 PROCEDURE — 87426 SARSCOV CORONAVIRUS AG IA: CPT

## 2022-12-27 PROCEDURE — 99212 OFFICE O/P EST SF 10 MIN: CPT | Performed by: PSYCHIATRY & NEUROLOGY

## 2022-12-27 ASSESSMENT — FIBROSIS 4 INDEX
FIB4 SCORE: 1.32
FIB4 SCORE: 1.32

## 2022-12-28 NOTE — PROGRESS NOTES
Subjective:   Dylon Santa is a 62 y.o. male who presents for Cough (Chest congestion, fever, x2 days )      HPI: This is a 62-year-old male who was brought in today by his brother for evaluation of URI symptoms.  History obtained from patient's brother who is his caretaker.  Patient's brother reports cough, runny nose, and fevers that developed 2 days ago.  Patient's brother reports administering over-the-counter cough medications which have been somewhat helpful.  He reports negative home COVID testing.  Patient does have underlying history of dementia.  His brother denies history of of lung disease to include asthma and COPD. He denies wheezing, sputum production, blood in sputum, labored breathing.    ROS per HPI    Medications:    Current Outpatient Medications on File Prior to Visit   Medication Sig Dispense Refill    BD PEN NEEDLE TENA 2ND GEN USE 1 NEEDLE 4 TIMES DAILY 200 Each 0    losartan (COZAAR) 100 MG Tab Take 1 Tablet by mouth every day. 100 Tablet 3    traZODone (DESYREL) 150 MG Tab Take 1 Tablet by mouth every evening. 90 Tablet 3    acyclovir (ZOVIRAX) 200 MG Cap Take 1 Capsule by mouth every day. 100 Capsule 3    atorvastatin (LIPITOR) 80 MG tablet Take 1 Tablet by mouth every day. 100 Tablet 3    metoprolol SR (TOPROL XL) 25 MG TABLET SR 24 HR Take 1 Tablet by mouth every day. 100 Tablet 3    OYSTER SHELL PO Take  by mouth every day.      ALFALFA PO Take  by mouth every day.      POTASSIUM PO Take  by mouth every day.      Ascorbic Acid (VITAMIN C) 1000 MG Tab Take  by mouth every day.      Multiple Vitamins-Minerals (CENTRUM SILVER 50+MEN PO) Take  by mouth every day.      clopidogrel (PLAVIX) 75 MG Tab Take 1 Tablet by mouth every day. 90 Tablet 3    Continuous Blood Gluc Sensor (FREESTYLE CARLOS 14 DAY SENSOR) Misc Inject 1 Units under the skin every 14 days. 6 Each 3    Insulin Glargine, 1 Unit Dial, (TOUJEO SOLOSTAR) 300 UNIT/ML Solution Pen-injector Inject 25 Units under the skin every  day. 18 mL 3    NOVOLOG FLEXPEN 100 UNIT/ML solution for injection Inject 10 Units under the skin 3 times a day before meals. 30 mL 3     No current facility-administered medications on file prior to visit.        Allergies:   Patient has no known allergies.    Problem List:   Patient Active Problem List   Diagnosis    Hypertension associated with type 1 diabetes mellitus ([facility])    History of shingles    Dawna-Martinez syndrome    Dyslipidemia due to type 1 diabetes mellitus ([facility])    Chronic kidney disease (CKD) stage G3a/A1, moderately decreased glomerular filtration rate (GFR) between 45-59 mL/min/1.73 square meter and albuminuria creatinine ratio less than 30 mg/g ([facility])    Blind left eye    Diabetic retinopathy of both eyes ([facility])    Uncontrolled type 1 diabetes mellitus with hypoglycemia and coma ([facility])    Vitamin D deficiency    Mononeuropathy    PVD (peripheral vascular disease) ([facility])    Chronic cough    Postconcussion syndrome    Panic attacks    Anxiety    Neck pain    Atherosclerosis    Toe amputee ([facility])    Coronary artery disease due to lipid rich plaque - mild to moderate on Cath 2019 Left Dominant    Migraine without aura and without status migrainosus, not intractable    Gastroesophageal reflux disease without esophagitis    Recurrent epistaxis    Cervical stenosis of spine    Anemia    History of cardiac catheterization 2019 in [facility] abnormal stress - mild to moderate CAD    Insulin long-term use ([facility])    Hypoglycemia    Dementia with behavioral disturbance    BMI 24.0-24.9, adult    Microalbuminuria    Other insomnia    COVID-19    Alzheimer disease ([facility])    Genital herpes    Coronary artery disease due to type 1 diabetes mellitus ([facility])        Surgical History:  Past Surgical History:   Procedure Laterality Date    IRRIGATION & DEBRIDEMENT GENERAL Left 5/3/2017    Procedure: IRRIGATION & DEBRIDEMENT GENERAL and Left Fourth Toe Amputation ;  Surgeon: Inessa Richard M.D.;  Location: SURGERY [facility]  "[facility];  Service:     APPENDECTOMY      CATARACT EXTRACTION WITH IOL Bilateral     EYE SURGERY      HAND SURGERY  1990's    KNEE ARTHROSCOPY      x3    TONSILLECTOMY         Past Social Hx:   Social History     Tobacco Use    Smoking status: Former     Packs/day: 0.00     Types: Cigarettes    Smokeless tobacco: Former     Types: Chew    Tobacco comments:     tobacco cessation recommended   Vaping Use    Vaping Use: Never used   Substance Use Topics    Alcohol use: Yes     Alcohol/week: 0.0 oz     Comment: rare beer    Drug use: No          Problem list, medications, and allergies reviewed by myself today in Epic.     Objective:     BP (!) 150/68 (BP Location: Right arm, Patient Position: Sitting, BP Cuff Size: Adult)   Pulse 88   Temp 37.9 °C (100.2 °F) (Temporal)   Resp 16   Ht 1.753 m (5' 9\")   Wt 69.4 kg (153 lb)   SpO2 90%   BMI 22.59 kg/m²     Physical Exam  Vitals and nursing note reviewed.   Constitutional:       General: He is not in acute distress.     Appearance: Normal appearance. He is normal weight. He is not ill-appearing, toxic-appearing or diaphoretic.   HENT:      Head: Normocephalic and atraumatic.      Right Ear: Tympanic membrane, ear canal and external ear normal. There is no impacted cerumen.      Left Ear: Tympanic membrane, ear canal and external ear normal. There is no impacted cerumen.      Nose: Rhinorrhea present.      Mouth/Throat:      Mouth: Mucous membranes are moist.      Pharynx: Oropharynx is clear. No oropharyngeal exudate or posterior oropharyngeal erythema.   Cardiovascular:      Rate and Rhythm: Normal rate and regular rhythm.      Pulses: Normal pulses.      Heart sounds: Normal heart sounds. No murmur heard.    No friction rub. No gallop.   Pulmonary:      Effort: Pulmonary effort is normal. No respiratory distress.      Breath sounds: Normal breath sounds. No stridor. No wheezing, rhonchi or rales.      Comments: +cough  Chest:      Chest wall: No tenderness. "   Musculoskeletal:      Cervical back: Normal range of motion and neck supple. No tenderness.   Lymphadenopathy:      Cervical: No cervical adenopathy.   Skin:     General: Skin is warm and dry.      Capillary Refill: Capillary refill takes less than 2 seconds.   Neurological:      General: No focal deficit present.      Mental Status: He is alert. Mental status is at baseline.   Psychiatric:         Mood and Affect: Mood normal.         Behavior: Behavior normal.         Thought Content: Thought content normal.         Judgment: Judgment normal.       Assessment/Plan:     Diagnosis and associated orders:   1. Viral URI with cough  POCT Influenza A/B    POCT SARS-COV Antigen PEDRITO Manual Result         Results for orders placed or performed in visit on 12/27/22   POCT Influenza A/B   Result Value Ref Range    Rapid Influenza A-B negative     Internal Control Positive Valid     Internal Control Negative Valid    POCT SARS-COV Antigen PEDRITO Manual Result   Result Value Ref Range    Internal  Valid     SARS-COV ANTIGEN PEDRITO Negative Negative, Indeterminate, None Detected, Not Detected, Detected, NotDetected, Valid, Invalid, Pass    Internal Control Positive Valid     Internal Control Negative Valid           Comments/MDM:   Pt is clinically stable at today's acute urgent care visit.  No acute distress noted. Appropriate for outpatient management at this time.     Acute problem.  Vital signs stable in clinic today.  Rapid influenza and COVID both negative in clinic.I have discussed with patient's brother who is the patient's caretaker that symptoms are viral in etiology. I have recommended supportive care to include; Increased fluids, rest, over-the-counter cold and flu medications, alternating Tylenol and/or ibuprofen per manufactures instructions for symptomatic relief and fevers, and the use of a humidifier. Patient is to return to  or go to ER for any new or worsening s/s, and follow up with PCP for  recheck. Patient's brother is agreeable with plan of care and verbalized good understanding.              Discussed DDx, management options (risks,benefits, and alternatives to planned treatment), natural progression and supportive care.  Expressed understanding and the treatment plan was agreed upon. Questions were encouraged and answered   Return to urgent care prn if new or worsening sx or if there is no improvement in condition prn.    Educated in Red flags and indications to immediately call 911 or present to the Emergency Department.   Advised the patient to follow-up with the primary care physician for recheck, reevaluation, and consideration of further management.    I personally reviewed prior external notes and test results pertinent to today's visit.  I have independently reviewed and interpreted all diagnostics ordered during this urgent care acute visit.     Please note that this dictation was created using voice recognition software. I have made a reasonable attempt to correct obvious errors, but I expect that there are errors of grammar and possibly content that I did not discover before finalizing the note.    This note was electronically signed by SAMMI Berg

## 2022-12-28 NOTE — PROGRESS NOTES
Subjective     Dylon Santa is a 62 y.o. male who presents with his brother Luis Carlos, who provides all history, for follow-up with a history of moderate Alzheimer's disease but who has had a more noticeable and steady deterioration over the last couple of months.     HPI    At this time: Is incapable of giving any type of cogent history.  He seems to identify every morning but not by name know why he is seeing me.  Language is severely curtailed.    According to Luis Carlos, things have been getting worse more recently.  Already having some difficulty with communication, he has essentially lost any meaningful ability to communicate.  There seem to have been an acute change since yesterday.  Even Luis Carlos has had difficulty understanding what his brother wants.  With all of this, he has had apparent difficulty breathing, his balance is more severely curtailed.  This seems to have had a loss of energy.  Appetite has been more curtailed, he has thus lost weight.  Bowel and bladder control have been variable, he seems to be having more difficulty with bladder control.  He has not been sleeping well.  Vision already curtailed, Luis Carlos thinks this may be getting worse.    Review of the records indicates with his last office visit in February, 2022, he was able to communicate though his responses were somewhat simple and devoid of content.  He was able to follow some commands with consistency.  In the interim, he was diagnosed with COVID-19 on July 15, 2022, he was given standard antiviral treatment for 5 days.  He continued to have problems with chronic cough dating back to October 6, 2022.  During this time there has been no major change in his medication regimen otherwise.    Medical, surgical and family histories are reviewed, there are no new drug allergies.  He remains on Plavix, insulin, Lipitor, metoprolol SR, Zovirax, Cozaar, and trazodone.    Review of Systems   Unable to perform ROS: Dementia     Objective     BP (!) 144/72 (BP  "Location: Right arm, Patient Position: Sitting, BP Cuff Size: Adult)   Pulse 97   Temp 38 °C (100.4 °F) (Temporal)   Resp 18   Ht 1.753 m (5' 9\")   Wt 69.5 kg (153 lb 3.5 oz)   SpO2 91%   BMI 22.63 kg/m²      Physical Exam    He appears in some mild distress.  He is short of breath.  He coughs repetitively, at times productive.  Vital signs reveal low-grade temperature of 100.4, blood pressure 144/72, pulse is 97 and regular.  Saturation is ranging from 89-91%.  There is no malar rash or nuchal rigidity.  The neck is supple.  Carotid pulses are present without asymmetry.  Cardiac evaluation reveals a regular rhythm.  Good pulmonary exam could not be done because of coughing and poor cooperation.  There seem to be some bilateral basilar rales.     Neurological Exam    He is oriented to person, attention span is clearly curtailed.  He knows he is in a doctor's office, has no idea who I am or why he sees me.  He is quite perseverative and field dependent.  Word finding difficulties are much more profound, hesitant speech this associated with complete absence of speech content.  He can name simple objects but becomes distracted easily.  He does not repeat.  He will occasionally follow a simple command.    PERRLA/EOMI, visual fields cannot be well and accurately assessed.  There is no hypophonia..  Facial movements are symmetric.  He rotates the head easily.    Musculoskeletal exam reveals normal tone, there is no tremor or drift.  Strength assessment cannot be accurately done due to poor cooperation.  He seems to move all 4 extremities without obvious asymmetry between right and left sides.  The toes are downgoing.  Reflexes are diminished but present elsewhere except at the ankle jerks where they are absent.    Coordination exam reveals a profound gait difficulty with shortened stride length, widened station, he requires assistance from his brother.  He walks very slowly, there is normal skin apractic quality.  " He grabs examiner's finger with both hands without clear dystaxia.  Repetitive movements are not performed consistently to command.    Sensory exam is intact to painful stimulus.    Assessment & Plan     1. Alzheimer disease (HCC)  Though he has Alzheimer's disease of moderate severity, these more subacute changes I am quite concerned about.  They are typically going to be due to some superimposed process, I am concerned about a pulmonary process that has nothing to do with COVID-19 which she tested negative for last night.  Encouraged brother to get him to an urgent care center ASAP.  The sudden onset suggest a possible underlying ischemic process, in patients with persistent infection can become Prothrombogenic, even in the setting of being on an anticoagulant.  Imaging of the brain probably should be done.  Blood work also should be drawn.  I will follow-up with him otherwise in 6 months.    Time: 30 minutes in total spent on patient care including precharting, record review, discussion with healthcare staff and documentation.  This includes face-to-face time for exam, review, discussion, as well as counseling and coordinating care with the patient's brother.

## 2023-01-01 ENCOUNTER — OFFICE VISIT (OUTPATIENT)
Dept: WOUND CARE | Facility: MEDICAL CENTER | Age: 63
End: 2023-01-01
Attending: EMERGENCY MEDICINE
Payer: MEDICARE

## 2023-01-01 ENCOUNTER — APPOINTMENT (OUTPATIENT)
Dept: NEUROLOGY | Facility: MEDICAL CENTER | Age: 63
End: 2023-01-01
Attending: PSYCHIATRY & NEUROLOGY
Payer: MEDICARE

## 2023-01-01 ENCOUNTER — OFFICE VISIT (OUTPATIENT)
Dept: MEDICAL GROUP | Facility: PHYSICIAN GROUP | Age: 63
End: 2023-01-01
Payer: MEDICARE

## 2023-01-01 ENCOUNTER — TELEPHONE (OUTPATIENT)
Dept: NEUROLOGY | Facility: MEDICAL CENTER | Age: 63
End: 2023-01-01
Payer: MEDICARE

## 2023-01-01 ENCOUNTER — OFFICE VISIT (OUTPATIENT)
Dept: CARDIOLOGY | Facility: MEDICAL CENTER | Age: 63
End: 2023-01-01
Attending: INTERNAL MEDICINE
Payer: MEDICARE

## 2023-01-01 ENCOUNTER — OFFICE VISIT (OUTPATIENT)
Dept: URGENT CARE | Facility: PHYSICIAN GROUP | Age: 63
End: 2023-01-01
Payer: MEDICARE

## 2023-01-01 ENCOUNTER — HOSPITAL ENCOUNTER (EMERGENCY)
Facility: MEDICAL CENTER | Age: 63
End: 2023-08-04
Attending: EMERGENCY MEDICINE
Payer: MEDICARE

## 2023-01-01 ENCOUNTER — PATIENT MESSAGE (OUTPATIENT)
Dept: MEDICAL GROUP | Facility: PHYSICIAN GROUP | Age: 63
End: 2023-01-01
Payer: MEDICARE

## 2023-01-01 ENCOUNTER — TELEPHONE (OUTPATIENT)
Dept: HEALTH INFORMATION MANAGEMENT | Facility: OTHER | Age: 63
End: 2023-01-01
Payer: MEDICARE

## 2023-01-01 ENCOUNTER — PATIENT OUTREACH (OUTPATIENT)
Dept: HEALTH INFORMATION MANAGEMENT | Facility: OTHER | Age: 63
End: 2023-01-01
Payer: MEDICARE

## 2023-01-01 ENCOUNTER — PHARMACY VISIT (OUTPATIENT)
Dept: PHARMACY | Facility: MEDICAL CENTER | Age: 63
End: 2023-01-01
Payer: COMMERCIAL

## 2023-01-01 ENCOUNTER — NON-PROVIDER VISIT (OUTPATIENT)
Dept: ENDOCRINOLOGY | Facility: MEDICAL CENTER | Age: 63
End: 2023-01-01
Attending: INTERNAL MEDICINE
Payer: MEDICARE

## 2023-01-01 ENCOUNTER — HOSPITAL ENCOUNTER (OUTPATIENT)
Dept: LAB | Facility: MEDICAL CENTER | Age: 63
End: 2023-08-16
Attending: INTERNAL MEDICINE
Payer: MEDICARE

## 2023-01-01 VITALS
WEIGHT: 153.88 LBS | HEART RATE: 67 BPM | TEMPERATURE: 97.6 F | OXYGEN SATURATION: 97 % | SYSTOLIC BLOOD PRESSURE: 155 MMHG | BODY MASS INDEX: 23.4 KG/M2 | RESPIRATION RATE: 16 BRPM | DIASTOLIC BLOOD PRESSURE: 76 MMHG

## 2023-01-01 VITALS
HEART RATE: 70 BPM | OXYGEN SATURATION: 97 % | HEIGHT: 68 IN | SYSTOLIC BLOOD PRESSURE: 126 MMHG | TEMPERATURE: 97.4 F | WEIGHT: 156 LBS | BODY MASS INDEX: 23.64 KG/M2 | RESPIRATION RATE: 16 BRPM | DIASTOLIC BLOOD PRESSURE: 76 MMHG

## 2023-01-01 VITALS
TEMPERATURE: 97.8 F | HEART RATE: 61 BPM | DIASTOLIC BLOOD PRESSURE: 71 MMHG | RESPIRATION RATE: 17 BRPM | SYSTOLIC BLOOD PRESSURE: 137 MMHG | OXYGEN SATURATION: 98 %

## 2023-01-01 VITALS
HEART RATE: 65 BPM | SYSTOLIC BLOOD PRESSURE: 120 MMHG | BODY MASS INDEX: 23.19 KG/M2 | WEIGHT: 153 LBS | OXYGEN SATURATION: 97 % | RESPIRATION RATE: 16 BRPM | HEIGHT: 68 IN | DIASTOLIC BLOOD PRESSURE: 70 MMHG

## 2023-01-01 VITALS
HEIGHT: 69 IN | WEIGHT: 157 LBS | BODY MASS INDEX: 23.25 KG/M2 | OXYGEN SATURATION: 97 % | HEART RATE: 71 BPM | SYSTOLIC BLOOD PRESSURE: 120 MMHG | DIASTOLIC BLOOD PRESSURE: 74 MMHG

## 2023-01-01 VITALS
TEMPERATURE: 97.7 F | OXYGEN SATURATION: 98 % | HEIGHT: 68 IN | BODY MASS INDEX: 23.66 KG/M2 | HEART RATE: 71 BPM | SYSTOLIC BLOOD PRESSURE: 118 MMHG | WEIGHT: 156.09 LBS | DIASTOLIC BLOOD PRESSURE: 72 MMHG

## 2023-01-01 VITALS
SYSTOLIC BLOOD PRESSURE: 122 MMHG | WEIGHT: 158 LBS | OXYGEN SATURATION: 97 % | TEMPERATURE: 97.9 F | HEIGHT: 68 IN | RESPIRATION RATE: 14 BRPM | BODY MASS INDEX: 23.95 KG/M2 | HEART RATE: 67 BPM | DIASTOLIC BLOOD PRESSURE: 62 MMHG

## 2023-01-01 VITALS
SYSTOLIC BLOOD PRESSURE: 122 MMHG | OXYGEN SATURATION: 98 % | DIASTOLIC BLOOD PRESSURE: 64 MMHG | HEART RATE: 72 BPM | WEIGHT: 156 LBS | BODY MASS INDEX: 23.64 KG/M2 | HEIGHT: 68 IN

## 2023-01-01 VITALS
OXYGEN SATURATION: 98 % | HEART RATE: 70 BPM | TEMPERATURE: 96.7 F | RESPIRATION RATE: 17 BRPM | DIASTOLIC BLOOD PRESSURE: 73 MMHG | SYSTOLIC BLOOD PRESSURE: 138 MMHG

## 2023-01-01 VITALS
HEART RATE: 65 BPM | SYSTOLIC BLOOD PRESSURE: 148 MMHG | DIASTOLIC BLOOD PRESSURE: 78 MMHG | OXYGEN SATURATION: 97 % | RESPIRATION RATE: 17 BRPM | TEMPERATURE: 97 F

## 2023-01-01 DIAGNOSIS — F02.80 ALZHEIMER DISEASE (HCC): ICD-10-CM

## 2023-01-01 DIAGNOSIS — E10.42 DIABETIC POLYNEUROPATHY ASSOCIATED WITH TYPE 1 DIABETES MELLITUS (HCC): ICD-10-CM

## 2023-01-01 DIAGNOSIS — N18.30 TYPE 1 DIABETES MELLITUS WITH STAGE 3 CHRONIC KIDNEY DISEASE, UNSPECIFIED WHETHER STAGE 3A OR 3B CKD (HCC): ICD-10-CM

## 2023-01-01 DIAGNOSIS — I25.10 CORONARY ARTERY DISEASE DUE TO LIPID RICH PLAQUE: ICD-10-CM

## 2023-01-01 DIAGNOSIS — E78.5 DYSLIPIDEMIA: ICD-10-CM

## 2023-01-01 DIAGNOSIS — F39 MOOD DISORDER (HCC): ICD-10-CM

## 2023-01-01 DIAGNOSIS — G31.9 CEREBRAL ATROPHY (HCC): ICD-10-CM

## 2023-01-01 DIAGNOSIS — L97.522 DIABETIC ULCER OF TOE OF LEFT FOOT ASSOCIATED WITH TYPE 1 DIABETES MELLITUS, WITH FAT LAYER EXPOSED (HCC): ICD-10-CM

## 2023-01-01 DIAGNOSIS — E10.641 UNCONTROLLED TYPE 1 DIABETES MELLITUS WITH HYPOGLYCEMIA AND COMA (HCC): ICD-10-CM

## 2023-01-01 DIAGNOSIS — I25.83 CORONARY ARTERY DISEASE DUE TO LIPID RICH PLAQUE: Chronic | ICD-10-CM

## 2023-01-01 DIAGNOSIS — F03.90 DEMENTIA, UNSPECIFIED DEMENTIA SEVERITY, UNSPECIFIED DEMENTIA TYPE, UNSPECIFIED WHETHER BEHAVIORAL, PSYCHOTIC, OR MOOD DISTURBANCE OR ANXIETY (HCC): ICD-10-CM

## 2023-01-01 DIAGNOSIS — E10.22 TYPE 1 DIABETES MELLITUS WITH STAGE 3 CHRONIC KIDNEY DISEASE, UNSPECIFIED WHETHER STAGE 3A OR 3B CKD (HCC): ICD-10-CM

## 2023-01-01 DIAGNOSIS — Z78.9 DEFICIT IN ACTIVITIES OF DAILY LIVING (ADL): ICD-10-CM

## 2023-01-01 DIAGNOSIS — I10 ESSENTIAL HYPERTENSION: ICD-10-CM

## 2023-01-01 DIAGNOSIS — E10.3593 TYPE 1 DIABETES MELLITUS WITH PROLIFERATIVE RETINOPATHY OF BOTH EYES, MACULAR EDEMA PRESENCE UNSPECIFIED, UNSPECIFIED PROLIFERATIVE RETINOPATHY TYPE (HCC): ICD-10-CM

## 2023-01-01 DIAGNOSIS — I25.83 CORONARY ARTERY DISEASE DUE TO LIPID RICH PLAQUE: ICD-10-CM

## 2023-01-01 DIAGNOSIS — F41.0 PANIC ATTACKS: ICD-10-CM

## 2023-01-01 DIAGNOSIS — E10.621 DIABETIC ULCER OF TOE OF LEFT FOOT ASSOCIATED WITH TYPE 1 DIABETES MELLITUS, WITH FAT LAYER EXPOSED (HCC): ICD-10-CM

## 2023-01-01 DIAGNOSIS — G30.9 ALZHEIMER DISEASE (HCC): ICD-10-CM

## 2023-01-01 DIAGNOSIS — Z86.39 HISTORY OF TYPE 1 DIABETES MELLITUS: ICD-10-CM

## 2023-01-01 DIAGNOSIS — R53.81 DEBILITY: ICD-10-CM

## 2023-01-01 DIAGNOSIS — Z74.1 REQUIRES ASSISTANCE WITH ACTIVITIES OF DAILY LIVING (ADL): ICD-10-CM

## 2023-01-01 DIAGNOSIS — F07.81 POSTCONCUSSION SYNDROME: ICD-10-CM

## 2023-01-01 DIAGNOSIS — S98.132A AMPUTATION OF TOE OF LEFT FOOT (HCC): ICD-10-CM

## 2023-01-01 DIAGNOSIS — F03.911 AGITATION DUE TO DEMENTIA (HCC): ICD-10-CM

## 2023-01-01 DIAGNOSIS — S91.102A OPEN WOUND OF LEFT GREAT TOE, INITIAL ENCOUNTER: ICD-10-CM

## 2023-01-01 DIAGNOSIS — G30.0 EARLY ONSET ALZHEIMER'S DEMENTIA WITH BEHAVIORAL DISTURBANCE (HCC): ICD-10-CM

## 2023-01-01 DIAGNOSIS — I15.2 HYPERTENSION ASSOCIATED WITH TYPE 1 DIABETES MELLITUS (HCC): ICD-10-CM

## 2023-01-01 DIAGNOSIS — E10.59 HYPERTENSION ASSOCIATED WITH TYPE 1 DIABETES MELLITUS (HCC): ICD-10-CM

## 2023-01-01 DIAGNOSIS — F02.818 EARLY ONSET ALZHEIMER'S DEMENTIA WITH BEHAVIORAL DISTURBANCE (HCC): ICD-10-CM

## 2023-01-01 DIAGNOSIS — F41.9 ANXIETY: ICD-10-CM

## 2023-01-01 DIAGNOSIS — E10.3593 PROLIFERATIVE DIABETIC RETINOPATHY OF BOTH EYES WITHOUT MACULAR EDEMA ASSOCIATED WITH TYPE 1 DIABETES MELLITUS (HCC): ICD-10-CM

## 2023-01-01 DIAGNOSIS — S91.109A OPEN WOUND OF TOE, INITIAL ENCOUNTER: ICD-10-CM

## 2023-01-01 DIAGNOSIS — I25.10 CORONARY ARTERY DISEASE DUE TO LIPID RICH PLAQUE: Chronic | ICD-10-CM

## 2023-01-01 DIAGNOSIS — G47.00 INSOMNIA, UNSPECIFIED TYPE: ICD-10-CM

## 2023-01-01 LAB
ALBUMIN SERPL BCP-MCNC: 4.4 G/DL (ref 3.2–4.9)
ALBUMIN/GLOB SERPL: 1.3 G/DL
ALP SERPL-CCNC: 71 U/L (ref 30–99)
ALT SERPL-CCNC: 37 U/L (ref 2–50)
ANION GAP SERPL CALC-SCNC: 9 MMOL/L (ref 7–16)
AST SERPL-CCNC: 35 U/L (ref 12–45)
BILIRUB SERPL-MCNC: 0.3 MG/DL (ref 0.1–1.5)
BUN SERPL-MCNC: 27 MG/DL (ref 8–22)
CALCIUM ALBUM COR SERPL-MCNC: 9.6 MG/DL (ref 8.5–10.5)
CALCIUM SERPL-MCNC: 9.9 MG/DL (ref 8.5–10.5)
CHLORIDE SERPL-SCNC: 99 MMOL/L (ref 96–112)
CO2 SERPL-SCNC: 28 MMOL/L (ref 20–33)
CREAT SERPL-MCNC: 1.46 MG/DL (ref 0.5–1.4)
GFR SERPLBLD CREATININE-BSD FMLA CKD-EPI: 54 ML/MIN/1.73 M 2
GLOBULIN SER CALC-MCNC: 3.3 G/DL (ref 1.9–3.5)
GLUCOSE SERPL-MCNC: 197 MG/DL (ref 65–99)
HBA1C MFR BLD: 8.4 % (ref ?–5.8)
HBA1C MFR BLD: 8.6 % (ref ?–5.8)
POCT INT CON NEG: NEGATIVE
POCT INT CON NEG: NEGATIVE
POCT INT CON POS: POSITIVE
POCT INT CON POS: POSITIVE
POTASSIUM SERPL-SCNC: 5 MMOL/L (ref 3.6–5.5)
PROT SERPL-MCNC: 7.7 G/DL (ref 6–8.2)
SODIUM SERPL-SCNC: 136 MMOL/L (ref 135–145)

## 2023-01-01 PROCEDURE — 3078F DIAST BP <80 MM HG: CPT | Performed by: STUDENT IN AN ORGANIZED HEALTH CARE EDUCATION/TRAINING PROGRAM

## 2023-01-01 PROCEDURE — 99214 OFFICE O/P EST MOD 30 MIN: CPT | Mod: 25 | Performed by: STUDENT IN AN ORGANIZED HEALTH CARE EDUCATION/TRAINING PROGRAM

## 2023-01-01 PROCEDURE — 99213 OFFICE O/P EST LOW 20 MIN: CPT | Performed by: INTERNAL MEDICINE

## 2023-01-01 PROCEDURE — 3075F SYST BP GE 130 - 139MM HG: CPT | Performed by: NURSE PRACTITIONER

## 2023-01-01 PROCEDURE — 3078F DIAST BP <80 MM HG: CPT | Performed by: NURSE PRACTITIONER

## 2023-01-01 PROCEDURE — 3078F DIAST BP <80 MM HG: CPT | Performed by: INTERNAL MEDICINE

## 2023-01-01 PROCEDURE — 3074F SYST BP LT 130 MM HG: CPT | Performed by: FAMILY MEDICINE

## 2023-01-01 PROCEDURE — 99211 OFF/OP EST MAY X REQ PHY/QHP: CPT

## 2023-01-01 PROCEDURE — 83036 HEMOGLOBIN GLYCOSYLATED A1C: CPT | Performed by: INTERNAL MEDICINE

## 2023-01-01 PROCEDURE — 97597 DBRDMT OPN WND 1ST 20 CM/<: CPT

## 2023-01-01 PROCEDURE — 99214 OFFICE O/P EST MOD 30 MIN: CPT | Performed by: NURSE PRACTITIONER

## 2023-01-01 PROCEDURE — 99212 OFFICE O/P EST SF 10 MIN: CPT | Performed by: INTERNAL MEDICINE

## 2023-01-01 PROCEDURE — 11042 DBRDMT SUBQ TIS 1ST 20SQCM/<: CPT

## 2023-01-01 PROCEDURE — 99212 OFFICE O/P EST SF 10 MIN: CPT | Performed by: PSYCHIATRY & NEUROLOGY

## 2023-01-01 PROCEDURE — RXMED WILLOW AMBULATORY MEDICATION CHARGE: Performed by: INTERNAL MEDICINE

## 2023-01-01 PROCEDURE — 3074F SYST BP LT 130 MM HG: CPT | Performed by: NURSE PRACTITIONER

## 2023-01-01 PROCEDURE — 11042 DBRDMT SUBQ TIS 1ST 20SQCM/<: CPT | Performed by: NURSE PRACTITIONER

## 2023-01-01 PROCEDURE — 99284 EMERGENCY DEPT VISIT MOD MDM: CPT

## 2023-01-01 PROCEDURE — 3078F DIAST BP <80 MM HG: CPT | Performed by: PSYCHIATRY & NEUROLOGY

## 2023-01-01 PROCEDURE — 80053 COMPREHEN METABOLIC PANEL: CPT

## 2023-01-01 PROCEDURE — 99213 OFFICE O/P EST LOW 20 MIN: CPT | Mod: 25

## 2023-01-01 PROCEDURE — 3074F SYST BP LT 130 MM HG: CPT | Performed by: PSYCHIATRY & NEUROLOGY

## 2023-01-01 PROCEDURE — 83036 HEMOGLOBIN GLYCOSYLATED A1C: CPT

## 2023-01-01 PROCEDURE — 99213 OFFICE O/P EST LOW 20 MIN: CPT | Mod: 25 | Performed by: NURSE PRACTITIONER

## 2023-01-01 PROCEDURE — 97602 WOUND(S) CARE NON-SELECTIVE: CPT

## 2023-01-01 PROCEDURE — 36415 COLL VENOUS BLD VENIPUNCTURE: CPT

## 2023-01-01 PROCEDURE — 3077F SYST BP >= 140 MM HG: CPT | Performed by: STUDENT IN AN ORGANIZED HEALTH CARE EDUCATION/TRAINING PROGRAM

## 2023-01-01 PROCEDURE — 99214 OFFICE O/P EST MOD 30 MIN: CPT

## 2023-01-01 PROCEDURE — 3074F SYST BP LT 130 MM HG: CPT | Performed by: INTERNAL MEDICINE

## 2023-01-01 PROCEDURE — 99214 OFFICE O/P EST MOD 30 MIN: CPT | Performed by: INTERNAL MEDICINE

## 2023-01-01 PROCEDURE — 3078F DIAST BP <80 MM HG: CPT | Performed by: FAMILY MEDICINE

## 2023-01-01 PROCEDURE — 99214 OFFICE O/P EST MOD 30 MIN: CPT | Performed by: FAMILY MEDICINE

## 2023-01-01 PROCEDURE — 95251 CONT GLUC MNTR ANALYSIS I&R: CPT | Performed by: INTERNAL MEDICINE

## 2023-01-01 PROCEDURE — 11042 DBRDMT SUBQ TIS 1ST 20SQCM/<: CPT | Performed by: STUDENT IN AN ORGANIZED HEALTH CARE EDUCATION/TRAINING PROGRAM

## 2023-01-01 PROCEDURE — 99214 OFFICE O/P EST MOD 30 MIN: CPT | Performed by: PSYCHIATRY & NEUROLOGY

## 2023-01-01 RX ORDER — INSULIN GLARGINE 300 U/ML
25 INJECTION, SOLUTION SUBCUTANEOUS DAILY
Qty: 18 ML | Refills: 3 | Status: SHIPPED | OUTPATIENT
Start: 2023-01-01 | End: 2023-01-01

## 2023-01-01 RX ORDER — INSULIN GLARGINE 300 U/ML
20 INJECTION, SOLUTION SUBCUTANEOUS
Status: ON HOLD | COMMUNITY
End: 2024-01-01

## 2023-01-01 RX ORDER — INSULIN ASPART 100 [IU]/ML
INJECTION, SOLUTION INTRAVENOUS; SUBCUTANEOUS
Qty: 15 ML | Refills: 0 | Status: SHIPPED | OUTPATIENT
Start: 2023-01-01 | End: 2023-01-01

## 2023-01-01 RX ORDER — INSULIN ASPART 100 [IU]/ML
INJECTION, SOLUTION INTRAVENOUS; SUBCUTANEOUS
Qty: 15 ML | Refills: 0 | Status: SHIPPED | OUTPATIENT
Start: 2023-01-01 | End: 2023-01-01 | Stop reason: SDUPTHER

## 2023-01-01 RX ORDER — TRAZODONE HYDROCHLORIDE 150 MG/1
150 TABLET ORAL NIGHTLY
Qty: 90 TABLET | Refills: 3 | Status: CANCELLED | OUTPATIENT
Start: 2023-01-01

## 2023-01-01 RX ORDER — LOSARTAN POTASSIUM 100 MG/1
100 TABLET ORAL DAILY
Qty: 100 TABLET | Refills: 3 | Status: ON HOLD
Start: 2023-01-01 | End: 2024-01-01

## 2023-01-01 RX ORDER — CLOPIDOGREL BISULFATE 75 MG/1
75 TABLET ORAL DAILY
Qty: 100 TABLET | Refills: 3 | Status: ON HOLD
Start: 2023-01-01 | End: 2024-01-01

## 2023-01-01 RX ORDER — METOPROLOL SUCCINATE 25 MG/1
25 TABLET, EXTENDED RELEASE ORAL DAILY
Qty: 100 TABLET | Refills: 3 | Status: ON HOLD
Start: 2023-01-01 | End: 2024-01-01

## 2023-01-01 RX ORDER — TRAZODONE HYDROCHLORIDE 150 MG/1
150 TABLET ORAL NIGHTLY
Qty: 90 TABLET | Refills: 3 | Status: SHIPPED
Start: 2023-01-01 | End: 2024-03-23

## 2023-01-01 RX ORDER — ATORVASTATIN CALCIUM 80 MG/1
80 TABLET, FILM COATED ORAL DAILY
Qty: 100 TABLET | Refills: 3 | Status: ON HOLD
Start: 2023-01-01 | End: 2024-01-01

## 2023-01-01 RX ORDER — INSULIN ASPART 100 [IU]/ML
INJECTION, SOLUTION INTRAVENOUS; SUBCUTANEOUS
Qty: 15 ML | Refills: 5 | Status: SHIPPED | OUTPATIENT
Start: 2023-01-01 | End: 2024-01-01

## 2023-01-01 RX ORDER — METOPROLOL SUCCINATE 25 MG/1
25 TABLET, EXTENDED RELEASE ORAL DAILY
Qty: 100 TABLET | Refills: 3 | Status: SHIPPED | OUTPATIENT
Start: 2023-01-01 | End: 2023-01-01 | Stop reason: SDUPTHER

## 2023-01-01 RX ORDER — BLOOD-GLUCOSE SENSOR
EACH MISCELLANEOUS
Qty: 4 EACH | Refills: 11 | Status: SHIPPED | OUTPATIENT
Start: 2023-01-01 | End: 2024-03-23

## 2023-01-01 SDOH — ECONOMIC STABILITY: FOOD INSECURITY: WITHIN THE PAST 12 MONTHS, YOU WORRIED THAT YOUR FOOD WOULD RUN OUT BEFORE YOU GOT MONEY TO BUY MORE.: NEVER TRUE

## 2023-01-01 SDOH — HEALTH STABILITY: PHYSICAL HEALTH: ON AVERAGE, HOW MANY MINUTES DO YOU ENGAGE IN EXERCISE AT THIS LEVEL?: 10 MIN

## 2023-01-01 SDOH — ECONOMIC STABILITY: INCOME INSECURITY: IN THE LAST 12 MONTHS, WAS THERE A TIME WHEN YOU WERE NOT ABLE TO PAY THE MORTGAGE OR RENT ON TIME?: NO

## 2023-01-01 SDOH — ECONOMIC STABILITY: FOOD INSECURITY: WITHIN THE PAST 12 MONTHS, THE FOOD YOU BOUGHT JUST DIDN'T LAST AND YOU DIDN'T HAVE MONEY TO GET MORE.: NEVER TRUE

## 2023-01-01 SDOH — ECONOMIC STABILITY: HOUSING INSECURITY
IN THE LAST 12 MONTHS, WAS THERE A TIME WHEN YOU DID NOT HAVE A STEADY PLACE TO SLEEP OR SLEPT IN A SHELTER (INCLUDING NOW)?: NO

## 2023-01-01 SDOH — ECONOMIC STABILITY: TRANSPORTATION INSECURITY
IN THE PAST 12 MONTHS, HAS LACK OF TRANSPORTATION KEPT YOU FROM MEETINGS, WORK, OR FROM GETTING THINGS NEEDED FOR DAILY LIVING?: NO

## 2023-01-01 SDOH — ECONOMIC STABILITY: HOUSING INSECURITY: IN THE LAST 12 MONTHS, HOW MANY PLACES HAVE YOU LIVED?: 2

## 2023-01-01 SDOH — HEALTH STABILITY: PHYSICAL HEALTH: ON AVERAGE, HOW MANY DAYS PER WEEK DO YOU ENGAGE IN MODERATE TO STRENUOUS EXERCISE (LIKE A BRISK WALK)?: 1 DAY

## 2023-01-01 SDOH — ECONOMIC STABILITY: INCOME INSECURITY: HOW HARD IS IT FOR YOU TO PAY FOR THE VERY BASICS LIKE FOOD, HOUSING, MEDICAL CARE, AND HEATING?: NOT HARD AT ALL

## 2023-01-01 SDOH — ECONOMIC STABILITY: TRANSPORTATION INSECURITY
IN THE PAST 12 MONTHS, HAS LACK OF RELIABLE TRANSPORTATION KEPT YOU FROM MEDICAL APPOINTMENTS, MEETINGS, WORK OR FROM GETTING THINGS NEEDED FOR DAILY LIVING?: NO

## 2023-01-01 ASSESSMENT — SOCIAL DETERMINANTS OF HEALTH (SDOH)
ARE YOU MARRIED, WIDOWED, DIVORCED, SEPARATED, NEVER MARRIED, OR LIVING WITH A PARTNER?: NEVER MARRIED
DO YOU BELONG TO ANY CLUBS OR ORGANIZATIONS SUCH AS CHURCH GROUPS UNIONS, FRATERNAL OR ATHLETIC GROUPS, OR SCHOOL GROUPS?: NO
ARE YOU MARRIED, WIDOWED, DIVORCED, SEPARATED, NEVER MARRIED, OR LIVING WITH A PARTNER?: NEVER MARRIED
HOW HARD IS IT FOR YOU TO PAY FOR THE VERY BASICS LIKE FOOD, HOUSING, MEDICAL CARE, AND HEATING?: NOT HARD AT ALL
HOW OFTEN DO YOU ATTENT MEETINGS OF THE CLUB OR ORGANIZATION YOU BELONG TO?: NEVER
IN A TYPICAL WEEK, HOW MANY TIMES DO YOU TALK ON THE PHONE WITH FAMILY, FRIENDS, OR NEIGHBORS?: MORE THAN THREE TIMES A WEEK
HOW OFTEN DO YOU HAVE SIX OR MORE DRINKS ON ONE OCCASION: NEVER
HOW OFTEN DO YOU ATTENT MEETINGS OF THE CLUB OR ORGANIZATION YOU BELONG TO?: NEVER
DO YOU BELONG TO ANY CLUBS OR ORGANIZATIONS SUCH AS CHURCH GROUPS UNIONS, FRATERNAL OR ATHLETIC GROUPS, OR SCHOOL GROUPS?: NO
IN A TYPICAL WEEK, HOW MANY TIMES DO YOU TALK ON THE PHONE WITH FAMILY, FRIENDS, OR NEIGHBORS?: MORE THAN THREE TIMES A WEEK
HOW OFTEN DO YOU ATTEND CHURCH OR RELIGIOUS SERVICES?: MORE THAN 4 TIMES PER YEAR
HOW MANY DRINKS CONTAINING ALCOHOL DO YOU HAVE ON A TYPICAL DAY WHEN YOU ARE DRINKING: 1 OR 2
HOW OFTEN DO YOU GET TOGETHER WITH FRIENDS OR RELATIVES?: MORE THAN THREE TIMES A WEEK
HOW OFTEN DO YOU GET TOGETHER WITH FRIENDS OR RELATIVES?: MORE THAN THREE TIMES A WEEK
HOW OFTEN DO YOU ATTEND CHURCH OR RELIGIOUS SERVICES?: MORE THAN 4 TIMES PER YEAR
HOW OFTEN DO YOU HAVE A DRINK CONTAINING ALCOHOL: 2-3 TIMES A WEEK
WITHIN THE PAST 12 MONTHS, YOU WORRIED THAT YOUR FOOD WOULD RUN OUT BEFORE YOU GOT THE MONEY TO BUY MORE: NEVER TRUE

## 2023-01-01 ASSESSMENT — LIFESTYLE VARIABLES
HOW OFTEN DO YOU HAVE A DRINK CONTAINING ALCOHOL: 2-3 TIMES A WEEK
HOW MANY STANDARD DRINKS CONTAINING ALCOHOL DO YOU HAVE ON A TYPICAL DAY: 1 OR 2
SKIP TO QUESTIONS 9-10: 1
AUDIT-C TOTAL SCORE: 3
HOW OFTEN DO YOU HAVE SIX OR MORE DRINKS ON ONE OCCASION: NEVER

## 2023-01-01 ASSESSMENT — ENCOUNTER SYMPTOMS
CONSTITUTIONAL NEGATIVE: 1
ROS SKIN COMMENTS: PER HPI

## 2023-01-01 ASSESSMENT — FIBROSIS 4 INDEX
FIB4 SCORE: 1.56
FIB4 SCORE: 1.51
FIB4 SCORE: 1.53
FIB4 SCORE: 1.56
FIB4 SCORE: 1.51

## 2023-01-01 ASSESSMENT — VISUAL ACUITY: OU: 1

## 2023-02-03 ENCOUNTER — OFFICE VISIT (OUTPATIENT)
Dept: ENDOCRINOLOGY | Facility: MEDICAL CENTER | Age: 63
End: 2023-02-03
Attending: INTERNAL MEDICINE
Payer: MEDICARE

## 2023-02-03 VITALS
HEART RATE: 72 BPM | HEIGHT: 69 IN | SYSTOLIC BLOOD PRESSURE: 106 MMHG | WEIGHT: 151 LBS | OXYGEN SATURATION: 100 % | DIASTOLIC BLOOD PRESSURE: 60 MMHG | BODY MASS INDEX: 22.36 KG/M2

## 2023-02-03 DIAGNOSIS — E78.5 DYSLIPIDEMIA: ICD-10-CM

## 2023-02-03 DIAGNOSIS — E10.22 TYPE 1 DIABETES MELLITUS WITH STAGE 3 CHRONIC KIDNEY DISEASE, UNSPECIFIED WHETHER STAGE 3A OR 3B CKD (HCC): ICD-10-CM

## 2023-02-03 DIAGNOSIS — Z79.4 LONG-TERM INSULIN USE (HCC): ICD-10-CM

## 2023-02-03 DIAGNOSIS — E55.9 VITAMIN D DEFICIENCY: ICD-10-CM

## 2023-02-03 DIAGNOSIS — N18.30 TYPE 1 DIABETES MELLITUS WITH STAGE 3 CHRONIC KIDNEY DISEASE, UNSPECIFIED WHETHER STAGE 3A OR 3B CKD (HCC): ICD-10-CM

## 2023-02-03 LAB
HBA1C MFR BLD: 10.1 % (ref ?–5.8)
POCT INT CON NEG: NEGATIVE
POCT INT CON POS: POSITIVE

## 2023-02-03 PROCEDURE — 83036 HEMOGLOBIN GLYCOSYLATED A1C: CPT | Performed by: INTERNAL MEDICINE

## 2023-02-03 PROCEDURE — 99214 OFFICE O/P EST MOD 30 MIN: CPT | Performed by: INTERNAL MEDICINE

## 2023-02-03 PROCEDURE — 95251 CONT GLUC MNTR ANALYSIS I&R: CPT | Performed by: INTERNAL MEDICINE

## 2023-02-03 PROCEDURE — 99212 OFFICE O/P EST SF 10 MIN: CPT | Performed by: INTERNAL MEDICINE

## 2023-02-03 ASSESSMENT — PATIENT HEALTH QUESTIONNAIRE - PHQ9: CLINICAL INTERPRETATION OF PHQ2 SCORE: 0

## 2023-02-03 ASSESSMENT — FIBROSIS 4 INDEX: FIB4 SCORE: 1.32

## 2023-02-03 NOTE — PROGRESS NOTES
RN-CDE Note    Subjective:   Endocrinology Clinic Progress Note  PCP: DAYO Sloan    HPI:  Dylon Santa is a 62 y.o. old patient who is seen today by the Diabetes Nurse Specialist for review of Type 1 Diabetes.  Recent changes in health: He got sick with increased blood sugars.  His alzheimer's is getting worse and his brother is caring for him.  DM:   Last A1c:   Lab Results   Component Value Date/Time    HBA1C 9.4 (A) 08/02/2022 02:17 PM      Previous A1c was 9.4 on 8/2/22  A1C GOAL: < 7    Diabetes Medications:   Toujeo 18-24 units daily  Novolog 1:10 plus 1:50>150      Exercise: Walking as tolerated   Diet: appetite good  Patient's body mass index is unknown because there is no height or weight on file. Exercise and nutrition counseling were performed at this visit.    Glucose monitoring frequency: See Marcial download    Hypoglycemic episodes: yes - during the night  Last Retinal Exam:  Unable to do it at his last appointment  Daily Foot Exam: Yes   Foot Exam:  Monofilament: done  Monofilament testing with a 10 gram force: sensation intact: intact bilaterally  Visual Inspection: Feet without maceration, ulcers, fissures.  Pedal pulses: intact bilaterally   Lab Results   Component Value Date/Time    MALBCRT 53 (H) 03/30/2022 08:26 AM    MICROALBUR 5.5 03/30/2022 08:26 AM        ACR Albumin/Creatinine Ratio goal <30     HTN:   Blood pressure goal <130/<80 .   Currently Rx ACE/ARB: Yes     Dyslipidemia:    Lab Results   Component Value Date/Time    CHOLSTRLTOT 116 03/30/2022 08:25 AM    LDL 63 03/30/2022 08:25 AM    HDL 42 03/30/2022 08:25 AM    TRIGLYCERIDE 55 03/30/2022 08:25 AM         Currently Rx Statin: Yes     He  reports that he has quit smoking. His smoking use included cigarettes. He has quit using smokeless tobacco.  His smokeless tobacco use included chew.      Plan:     Discussed and educated on:   - All medications, side effects and compliance (discussed carefully)  - Annual eye  examinations at Ophthalmology  - Home glucose monitoring emphasized  - Weight control and daily exercise    Recommended medication changes: Adjust insulin as needed.

## 2023-02-03 NOTE — PROGRESS NOTES
CHIEF COMPLAINT: Patient is here for follow up of Type 1 Diabetes Mellitus    HPI:     Dylon Santa is a 62 y.o. male with Type 1 Diabetes Mellitus here for follow up.    Labs from 2/3/2023 HbA1c is 10.1%    Patient was previously seen by the nurse practitioner this is my first time meeting him    He has a medical history of Alzheimer's dementia  Unfortunately he cannot  self manage his diabetes  His brother Luis Carlos helps him with his insulin  And unfortunately because of his dementia he sometimes does not watch his diet and he eats without telling his brother  He has been wearing a therese CGM for over 6 months      Currently he is on Toujeo 18 to 24 units daily  NovoLog carb ratio 1:10 averaging 4 to 6 units per meal  Correction scale 1 unit for every 50 above 150      He denies severe hypoglycemia  He reports frequent hypoglycemic episodes especially during the day    Download of his CGM shows an average sugar of 181 with time in range of 50%  There is a pattern of hyperglycemia after breakfast, after lunch and after dinner      He has hypertension and is on losartan 100 mg daily  He does have diabetic kidney disease  U ACR was 53 on March 2022      He has hyperlipidemia and is on Lipitor 80 mg daily  LDL cholesterol was 63 on March 2022      He is overdue for an eye exam            BG Diary:02/03/23  CGM was downloaded and reviewed      Weight has been stable    Diabetes Complications   Retinopathy: No known retinopathy.  Last eye exam: He is overdue for an eye exam  Neuropathy: Denies paresthesias or numbness in hands or feet. Denies any foot wounds.  Exercise: Minimal.  Diet: Fair.  Patient's medications, allergies, and social histories were reviewed and updated as appropriate.    ROS:     CONS:     No fever, no chills   EYES:     No diplopia, no blurry vision   CV:           No chest pain, no palpitations   PULM:     No SOB, no cough, no hemoptysis.   GI:            No nausea, no vomiting, no diarrhea, no  constipation   ENDO:     No polyuria, no polydipsia, no heat intolerance, no cold intolerance       Past Medical History:  Problem List:  2022-08: Alzheimer disease ([facility])  2022-08: Genital herpes  2022-07: COVID-19  2022-03: Acute pain of right shoulder  2022-03: BMI 24.0-24.9, adult  2022-03: Microalbuminuria  2022-03: Other insomnia  2021-03: Dementia with behavioral disturbance  2021-03: Long-term insulin use ([facility])  2021-03: Hypoglycemia  2020-06: Anemia  2019-12: Cervical stenosis of spine  2019-11: Migraine without aura and without status migrainosus, not   intractable  2019-11: Gastroesophageal reflux disease without esophagitis  2019-11: Recurrent epistaxis  2019-10: Atherosclerosis  2019-10: Toe amputee ([facility])  2019-10: Anxiety  2019-10: Neck pain  2019-10: Panic attacks  2019-10: Chest pain  2019-10: Dizziness  2019-10: Postconcussion syndrome  2018-11: Chronic cough  2018-10: Skin lesion, superficial  2018-10: Viral URI with cough  2018-04: Vitamin D deficiency  2018-04: Mononeuropathy  2018-04: PVD (peripheral vascular disease) ([facility])  2017-11: Acute non-recurrent sinusitis  2017-11: Burning with urination  2017-11: Strain of latissimus dorsi muscle  2017-11: Bone spur  2017-04: Wound infection  2017-04: Uncontrolled type 1 diabetes mellitus with hypoglycemia and   coma ([facility])  2017-03: Diabetic ulcer of left foot associated with type 1 diabetes   mellitus ([facility])  2017-03: Diminished pulses in lower extremity  2016-12: Diabetic retinopathy of both eyes ([facility])  2016-06: Blind left eye  2016-05: Chronic kidney disease (CKD) stage G3a/A1, moderately   decreased glomerular filtration rate (GFR) between 45-59 mL/min/1.73   square meter and albuminuria creatinine ratio less than 30 mg/g ([facility])  2016-01: Diabetic nephropathy associated with type 1 diabetes   mellitus ([facility])  2015-06: Elevated serum creatinine  2015-06: Flank pain  2014-12: Hypertension associated with type 1 diabetes mellitus ([facility])  2014-12: History of  "shingles  2014-12: Diabetic neuropathy (HCC)  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  Dyslipidemia  Coronary artery disease due to lipid rich plaque - mild to moderate   on Cath 2019 Left Dominant  History of cardiac catheterization 2019 in [facility] abnormal   stress - mild to moderate CAD  Coronary artery disease due to type 1 diabetes mellitus (HCC)      Past Surgical History:  Past Surgical History:   Procedure Laterality Date    IRRIGATION & DEBRIDEMENT GENERAL Left 5/3/2017    Procedure: IRRIGATION & DEBRIDEMENT GENERAL and Left Fourth Toe Amputation ;  Surgeon: Inessa Richard M.D.;  Location: SURGERY [facility];  Service:     APPENDECTOMY      CATARACT EXTRACTION WITH IOL Bilateral     EYE SURGERY      HAND SURGERY  1990's    KNEE ARTHROSCOPY      x3    TONSILLECTOMY          Allergies:  Patient has no known allergies.     Social History:  Social History     Tobacco Use    Smoking status: Former     Packs/day: 0.00     Types: Cigarettes    Smokeless tobacco: Former     Types: Chew    Tobacco comments:     tobacco cessation recommended   Vaping Use    Vaping Use: Never used   Substance Use Topics    Alcohol use: Yes     Alcohol/week: 0.0 oz     Comment: rare beer    Drug use: No        Family History:   family history includes Cancer in his father, mother, paternal aunt, and paternal grandfather; Diabetes in his maternal grandfather, maternal uncle, and paternal grandmother; Hyperlipidemia in his brother; Hypertension in his brother; Lung Disease in his brother, father, and mother; Other in his sister; Thyroid in his brother, mother, and sister.      PHYSICAL EXAM:   OBJECTIVE:  Vital signs: /60   Pulse 72   Ht 1.753 m (5' 9\")   Wt 68.5 kg (151 lb)   SpO2 100%   BMI 22.30 kg/m²   GENERAL: Well-developed, well-nourished in no apparent distress.   EYE:  No ocular asymmetry, PERRLA  HENT: Pink, moist mucous membranes.    NECK: No thyromegaly.   CARDIOVASCULAR:  No murmurs  LUNGS: Clear breath " sounds  ABDOMEN: Soft, nontender   EXTREMITIES: No clubbing, cyanosis, or edema.   NEUROLOGICAL: No gross focal motor abnormalities   LYMPH: No cervical adenopathy palpated.   SKIN: No rashes, lesions.     Labs:  Lab Results   Component Value Date/Time    HBA1C 9.4 (A) 08/02/2022 02:17 PM        Lab Results   Component Value Date/Time    WBC 5.8 12/11/2021 05:35 PM    RBC 4.26 (L) 12/11/2021 05:35 PM    HEMOGLOBIN 12.8 (L) 12/11/2021 05:35 PM    MCV 88.3 12/11/2021 05:35 PM    MCH 30.0 12/11/2021 05:35 PM    MCHC 34.0 12/11/2021 05:35 PM    RDW 43.0 12/11/2021 05:35 PM    MPV 10.3 12/11/2021 05:35 PM       Lab Results   Component Value Date/Time    SODIUM 138 03/30/2022 08:25 AM    POTASSIUM 4.7 03/30/2022 08:25 AM    CHLORIDE 105 03/30/2022 08:25 AM    CO2 25 03/30/2022 08:25 AM    ANION 8.0 03/30/2022 08:25 AM    GLUCOSE 212 (H) 03/30/2022 08:25 AM    BUN 27 (H) 03/30/2022 08:25 AM    CREATININE 1.36 03/30/2022 08:25 AM    CALCIUM 9.4 03/30/2022 08:25 AM    ASTSGOT 24 03/30/2022 08:25 AM    ALTSGPT 22 03/30/2022 08:25 AM    TBILIRUBIN 0.2 03/30/2022 08:25 AM    ALBUMIN 4.1 03/30/2022 08:25 AM    ALBUMIN 4.21 03/04/2021 03:19 PM    TOTPROTEIN 6.9 03/30/2022 08:25 AM    TOTPROTEIN 7.1 03/04/2021 03:19 PM    GLOBULIN 2.8 03/30/2022 08:25 AM    AGRATIO 1.5 03/30/2022 08:25 AM       Lab Results   Component Value Date/Time    CHOLSTRLTOT 116 03/30/2022 0825    TRIGLYCERIDE 55 03/30/2022 0825    HDL 42 03/30/2022 0825    LDL 63 03/30/2022 0825       Lab Results   Component Value Date/Time    MALBCRT 53 (H) 03/30/2022 08:26 AM    MICROALBUR 5.5 03/30/2022 08:26 AM        Lab Results   Component Value Date/Time    TSHULTRASEN 1.280 03/30/2022 0825     No results found for: FREEDIR  Lab Results   Component Value Date/Time    FREET3 2.67 03/30/2022 0825     No results found for: THYSTIMIG        ASSESSMENT/PLAN:     1. Type 1 diabetes mellitus with stage 3 chronic kidney disease, unspecified whether stage 3a or 3b CKD  (HCC)  Uncontrolled secondary to hyperglycemia  Change Toujeo to 20 units daily  Continue NovoLog 7 units per meal  Change correction scale to 1 unit for every 40 above 120  Recommend that he get updated labs prior to next visit  Follow-up in 3 to 4 months with labs    2. Dyslipidemia  Controlled  Continue Lipitor 80 mg daily  Repeat lipids in 3 to 4 months    3. Vitamin D deficiency  Stable   Vitamin D labs were reviewed with patient  Continue current supplements  Continue monitoring levels       4. Long-term insulin use (HCC)  Patient is on long-term insulin for type 1 diabetes        Return in about 4 months (around 6/3/2023).      This patient during there office visit today was started on a new medication.  Side effects of the new medication were discussed with the patient today in the office.     Thank you kindly for allowing me to participate in the diabetes care plan for this patient.    Alexander Booker MD, [facility]  02/03/23    CC:   DAYO Sloan

## 2023-02-07 ENCOUNTER — HOSPITAL ENCOUNTER (OUTPATIENT)
Dept: LAB | Facility: MEDICAL CENTER | Age: 63
End: 2023-02-07
Attending: INTERNAL MEDICINE
Payer: MEDICARE

## 2023-02-07 DIAGNOSIS — E10.22 TYPE 1 DIABETES MELLITUS WITH STAGE 3 CHRONIC KIDNEY DISEASE, UNSPECIFIED WHETHER STAGE 3A OR 3B CKD (HCC): ICD-10-CM

## 2023-02-07 DIAGNOSIS — E55.9 VITAMIN D DEFICIENCY: ICD-10-CM

## 2023-02-07 DIAGNOSIS — Z79.4 LONG-TERM INSULIN USE (HCC): ICD-10-CM

## 2023-02-07 DIAGNOSIS — E78.5 DYSLIPIDEMIA: ICD-10-CM

## 2023-02-07 DIAGNOSIS — N18.30 TYPE 1 DIABETES MELLITUS WITH STAGE 3 CHRONIC KIDNEY DISEASE, UNSPECIFIED WHETHER STAGE 3A OR 3B CKD (HCC): ICD-10-CM

## 2023-02-07 LAB
25(OH)D3 SERPL-MCNC: 31 NG/ML (ref 30–100)
ALBUMIN SERPL BCP-MCNC: 4.2 G/DL (ref 3.2–4.9)
ALBUMIN/GLOB SERPL: 1.3 G/DL
ALP SERPL-CCNC: 62 U/L (ref 30–99)
ALT SERPL-CCNC: 31 U/L (ref 2–50)
ANION GAP SERPL CALC-SCNC: 10 MMOL/L (ref 7–16)
AST SERPL-CCNC: 33 U/L (ref 12–45)
BILIRUB SERPL-MCNC: 0.3 MG/DL (ref 0.1–1.5)
BUN SERPL-MCNC: 32 MG/DL (ref 8–22)
CALCIUM ALBUM COR SERPL-MCNC: 9.5 MG/DL (ref 8.5–10.5)
CALCIUM SERPL-MCNC: 9.7 MG/DL (ref 8.5–10.5)
CHLORIDE SERPL-SCNC: 104 MMOL/L (ref 96–112)
CHOLEST SERPL-MCNC: 134 MG/DL (ref 100–199)
CO2 SERPL-SCNC: 25 MMOL/L (ref 20–33)
CREAT SERPL-MCNC: 1.67 MG/DL (ref 0.5–1.4)
FASTING STATUS PATIENT QL REPORTED: NORMAL
GFR SERPLBLD CREATININE-BSD FMLA CKD-EPI: 46 ML/MIN/1.73 M 2
GLOBULIN SER CALC-MCNC: 3.3 G/DL (ref 1.9–3.5)
GLUCOSE SERPL-MCNC: 104 MG/DL (ref 65–99)
HDLC SERPL-MCNC: 43 MG/DL
LDLC SERPL CALC-MCNC: 80 MG/DL
POTASSIUM SERPL-SCNC: 4.8 MMOL/L (ref 3.6–5.5)
PROT SERPL-MCNC: 7.5 G/DL (ref 6–8.2)
SODIUM SERPL-SCNC: 139 MMOL/L (ref 135–145)
T4 FREE SERPL-MCNC: 1.24 NG/DL (ref 0.93–1.7)
TRIGL SERPL-MCNC: 54 MG/DL (ref 0–149)
TSH SERPL DL<=0.005 MIU/L-ACNC: 1.94 UIU/ML (ref 0.38–5.33)

## 2023-02-07 PROCEDURE — 82570 ASSAY OF URINE CREATININE: CPT

## 2023-02-07 PROCEDURE — 84681 ASSAY OF C-PEPTIDE: CPT

## 2023-02-07 PROCEDURE — 36415 COLL VENOUS BLD VENIPUNCTURE: CPT

## 2023-02-07 PROCEDURE — 86341 ISLET CELL ANTIBODY: CPT

## 2023-02-07 PROCEDURE — 80053 COMPREHEN METABOLIC PANEL: CPT

## 2023-02-07 PROCEDURE — 82043 UR ALBUMIN QUANTITATIVE: CPT

## 2023-02-07 PROCEDURE — 82306 VITAMIN D 25 HYDROXY: CPT

## 2023-02-07 PROCEDURE — 84439 ASSAY OF FREE THYROXINE: CPT

## 2023-02-07 PROCEDURE — 80061 LIPID PANEL: CPT

## 2023-02-07 PROCEDURE — 84443 ASSAY THYROID STIM HORMONE: CPT

## 2023-02-08 LAB
CREAT UR-MCNC: 66.1 MG/DL
MICROALBUMIN UR-MCNC: 6.8 MG/DL
MICROALBUMIN/CREAT UR: 103 MG/G (ref 0–30)

## 2023-02-09 LAB — C PEPTIDE SERPL-MCNC: <0.1 NG/ML (ref 0.5–3.3)

## 2023-02-10 ENCOUNTER — OFFICE VISIT (OUTPATIENT)
Dept: MEDICAL GROUP | Facility: PHYSICIAN GROUP | Age: 63
End: 2023-02-10
Payer: MEDICARE

## 2023-02-10 VITALS
BODY MASS INDEX: 22.51 KG/M2 | TEMPERATURE: 98.6 F | HEART RATE: 76 BPM | OXYGEN SATURATION: 95 % | WEIGHT: 152 LBS | DIASTOLIC BLOOD PRESSURE: 58 MMHG | SYSTOLIC BLOOD PRESSURE: 116 MMHG | HEIGHT: 69 IN

## 2023-02-10 DIAGNOSIS — I15.2 HYPERTENSION ASSOCIATED WITH TYPE 1 DIABETES MELLITUS (HCC): ICD-10-CM

## 2023-02-10 DIAGNOSIS — F02.80 ALZHEIMER DISEASE (HCC): ICD-10-CM

## 2023-02-10 DIAGNOSIS — E10.641 UNCONTROLLED TYPE 1 DIABETES MELLITUS WITH HYPOGLYCEMIA AND COMA (HCC): ICD-10-CM

## 2023-02-10 DIAGNOSIS — E10.59 HYPERTENSION ASSOCIATED WITH TYPE 1 DIABETES MELLITUS (HCC): ICD-10-CM

## 2023-02-10 DIAGNOSIS — G30.9 ALZHEIMER DISEASE (HCC): ICD-10-CM

## 2023-02-10 DIAGNOSIS — G47.09 OTHER INSOMNIA: ICD-10-CM

## 2023-02-10 DIAGNOSIS — E78.5 DYSLIPIDEMIA: ICD-10-CM

## 2023-02-10 DIAGNOSIS — Z00.00 PREVENTATIVE HEALTH CARE: ICD-10-CM

## 2023-02-10 PROCEDURE — 99214 OFFICE O/P EST MOD 30 MIN: CPT

## 2023-02-10 ASSESSMENT — FIBROSIS 4 INDEX: FIB4 SCORE: 1.53

## 2023-02-10 NOTE — PROGRESS NOTES
"Subjective:     CC:   Chief Complaint   Patient presents with    Diabetes Follow-up       HISTORY OF THE PRESENT ILLNESS: Dylon is a pleasant 62 y.o. male here today for 6-month follow-up.  His brother, Luis Carlos, accompanies him today to provide history.  Patient recently saw Dr. Luis, from neurology for follow-up on his Alzheimer's disease of moderate severity.  He also recently  saw Dr. Barajas from endocrinology for his type 1 diabetes.  His A1c was elevated at 10.1.  Patient's brother states some changes in insulin were made that have proven to be really good for his blood sugars lately.    He continues to maintain a stable blood pressure and cholesterol.  His insomnia is also well controlled on trazodone 150 mg nightly.  Patient will sometimes wake up early, however, overall he does okay.  Patient's brother states he gets enough uninterrupted sleep and they are happy with this medication and current dose.    Patient's brother states they are getting ready to head to [facility] for a couple months so they can be near his mother.  They also plan to see ANDREEA in [place] in March.  He is requesting medication refills prior to them leaving town.    Health Maintenance: Completed    ROS:  All systems negative expect as addressed in assessment and plan.     Objective:     Exam:  /58 (BP Location: Left arm, Patient Position: Sitting, BP Cuff Size: Adult)   Pulse 76   Temp 37 °C (98.6 °F) (Temporal)   Ht 1.753 m (5' 9\")   Wt 68.9 kg (152 lb)   SpO2 95%   BMI 22.45 kg/m²  Body mass index is 22.45 kg/m².    Physical Exam  Constitutional:       Appearance: Normal appearance.   HENT:      Right Ear: Tympanic membrane normal.      Left Ear: Tympanic membrane normal.   Cardiovascular:      Rate and Rhythm: Normal rate.   Pulmonary:      Effort: Pulmonary effort is normal.      Breath sounds: Normal breath sounds.   Abdominal:      General: Bowel sounds are normal.      Palpations: Abdomen is soft. "   Musculoskeletal:         General: Normal range of motion.   Neurological:      Mental Status: He is alert. Mental status is at baseline.   Psychiatric:         Mood and Affect: Mood normal.         Behavior: Behavior normal.       Labs: Results reviewed from 02/07/23    Assessment & Plan:     62 y.o. male with the following -    1. Hypertension associated with type 1 diabetes mellitus (HCC)  Chronic, stable on losartan 100 mg daily and metoprolol 25 mg daily.  Blood pressure in clinic today is 116/58.  Continue current management.  Refills provided.    2. Dyslipidemia  Chronic, stable.  LDL 80.  Continue current management with atorvastatin 80 mg daily refill provided.    3. Other insomnia  Chronic, stable on trazodone 150 mg nightly.  Continue current management.    4. Uncontrolled type 1 diabetes mellitus with hypoglycemia and coma (HCC)  Chronic, not controlled.  Current A1c 10.1.  Continue close follow-up with endocrinology and current medication regimen of insulin glargine 25 units daily    5. Alzheimer disease (HCC)  Chronic, ongoing.  Moderate severity.  Continue close follow-up with Dr. Luis.    6. Preventative health care  Patient up-to-date on all labs.  CBC has not been checked since 2021.  Order placed.  - CBC WITH DIFFERENTIAL; Future    Patient was educated in proper administration of medication(s) ordered today including safety, possible SE, risks, benefits, rationale and alternatives to therapy.   Supportive care, differential diagnoses, and indications for immediate follow-up discussed with patient.    Pathogenesis of diagnosis discussed including typical length and natural progression.    Instructed to return to clinic or nearest emergency department for any change in condition, further concerns, or worsening of symptoms.  Patient states understanding of the plan of care and discharge instructions.    Return in about 1 year (around 2/10/2024) for Wellness Visit.    I have placed the above  orders and discussed them with an approved delegating provider.  The MA is performing the below orders under the direction of Dr. Parker.    Please note that this dictation was created using voice recognition software. I have worked with consultants from the vendor as well as technical experts from [facility] to optimize the interface. I have made every reasonable attempt to correct obvious errors, but I expect that there are errors of grammar and possibly content that I did not discover before finalizing the note.

## 2023-02-13 LAB — GAD65 AB SER IA-ACNC: <5 IU/ML (ref 0–5)

## 2023-02-22 DIAGNOSIS — Z86.19 HISTORY OF SHINGLES: ICD-10-CM

## 2023-02-22 DIAGNOSIS — E10.22 TYPE 1 DIABETES MELLITUS WITH STAGE 3 CHRONIC KIDNEY DISEASE, UNSPECIFIED WHETHER STAGE 3A OR 3B CKD (HCC): ICD-10-CM

## 2023-02-22 DIAGNOSIS — E10.59 HYPERTENSION ASSOCIATED WITH TYPE 1 DIABETES MELLITUS (HCC): ICD-10-CM

## 2023-02-22 DIAGNOSIS — G47.00 INSOMNIA, UNSPECIFIED TYPE: ICD-10-CM

## 2023-02-22 DIAGNOSIS — N18.30 TYPE 1 DIABETES MELLITUS WITH STAGE 3 CHRONIC KIDNEY DISEASE, UNSPECIFIED WHETHER STAGE 3A OR 3B CKD (HCC): ICD-10-CM

## 2023-02-22 DIAGNOSIS — I15.2 HYPERTENSION ASSOCIATED WITH TYPE 1 DIABETES MELLITUS (HCC): ICD-10-CM

## 2023-02-22 RX ORDER — ACYCLOVIR 200 MG/1
200 CAPSULE ORAL DAILY
Qty: 100 CAPSULE | Refills: 3 | Status: ON HOLD | OUTPATIENT
Start: 2023-02-22 | End: 2024-01-01

## 2023-02-22 RX ORDER — CLOPIDOGREL BISULFATE 75 MG/1
TABLET ORAL
Qty: 90 TABLET | Refills: 3 | Status: SHIPPED | OUTPATIENT
Start: 2023-02-22 | End: 2023-01-01 | Stop reason: SDUPTHER

## 2023-02-22 RX ORDER — LOSARTAN POTASSIUM 100 MG/1
100 TABLET ORAL DAILY
Qty: 100 TABLET | Refills: 3 | Status: SHIPPED | OUTPATIENT
Start: 2023-02-22 | End: 2023-01-01 | Stop reason: SDUPTHER

## 2023-02-22 RX ORDER — TRAZODONE HYDROCHLORIDE 150 MG/1
150 TABLET ORAL NIGHTLY
Qty: 90 TABLET | Refills: 3 | Status: SHIPPED | OUTPATIENT
Start: 2023-02-22 | End: 2023-01-01 | Stop reason: SDUPTHER

## 2023-02-22 RX ORDER — BLOOD-GLUCOSE SENSOR
1 EACH MISCELLANEOUS
Qty: 2 EACH | Refills: 11 | Status: SHIPPED | OUTPATIENT
Start: 2023-02-22 | End: 2023-01-01

## 2023-06-08 NOTE — PROGRESS NOTES
CHIEF COMPLAINT: Patient is here for follow up of Type 1 Diabetes Mellitus    HPI:     Dylon Santa is a 62 y.o. male with Type 1 Diabetes Mellitus here for follow up.    Labs from 6/8/2023 show a1c is 8.6%  Labs from 2/3/2023 HbA1c is 10.1%    Patient was previously seen by the nurse practitioner   He has Alzheimer's dementia  Unfortunately he cannot  self manage his diabetes  His brother Luis Carlos helps him with his insulin  And unfortunately because of his dementia he sometimes does not watch his diet and he eats without telling his brother  He has been wearing a Marcial 3 CGM for over 6 months      Currently he is on Toujeo 20 units daily  NovoLog carb ratio 1:10 averaging 7 units per meal  Correction scale 1 unit for every 40 above 120      He denies severe hypoglycemia  He reports frequent hypoglycemic episodes especially during the day      He has to swtich from Marcial 3 CGM to Marcial 2 CGM due to updates with Apple IOS    Download of his CGM shows an average sugar of 168 with time in range of 62% (50%)  There is a pattern of sporadic highs (unpredictable)      He has hypertension and is on losartan 100 mg daily  He does have diabetic kidney disease  U ACR was 103 on 2/2023      He has hyperlipidemia and is on Lipitor 80 mg daily  LDL cholesterol was  80 on 2/2023      He cant stay still to complete to an eye exam           BG Diary:02/03/23  CGM was downloaded and reviewed      Weight has been stable    Diabetes Complications   Retinopathy: No known retinopathy.  Last eye exam: He is no longer getting eye exam due to Alzheimer's (?)  Neuropathy: Denies paresthesias or numbness in hands or feet. Denies any foot wounds.  Exercise: Minimal.  Diet: Fair.  Patient's medications, allergies, and social histories were reviewed and updated as appropriate.    ROS:     CONS:     No fever, no chills   EYES:     No diplopia, no blurry vision   CV:           No chest pain, no palpitations   PULM:     No SOB, no cough, no  hemoptysis.   GI:            No nausea, no vomiting, no diarrhea, no constipation   ENDO:     No polyuria, no polydipsia, no heat intolerance, no cold intolerance       Past Medical History:  Problem List:  2022-08: Alzheimer disease ([facility])  2022-08: Genital herpes  2022-07: COVID-19  2022-03: Acute pain of right shoulder  2022-03: BMI 24.0-24.9, adult  2022-03: Microalbuminuria  2022-03: Other insomnia  2021-03: Dementia with behavioral disturbance ([facility])  2021-03: Long-term insulin use ([facility])  2021-03: Hypoglycemia  2020-06: Anemia  2019-12: Cervical stenosis of spine  2019-11: Migraine without aura and without status migrainosus, not   intractable  2019-11: Gastroesophageal reflux disease without esophagitis  2019-11: Recurrent epistaxis  2019-10: Atherosclerosis  2019-10: Anxiety  2019-10: Neck pain  2019-10: Panic attacks  2019-10: Chest pain  2019-10: Dizziness  2019-10: Postconcussion syndrome  2018-11: Chronic cough  2018-10: Skin lesion, superficial  2018-10: Viral URI with cough  2018-04: Vitamin D deficiency  2018-04: Mononeuropathy  2018-04: PVD (peripheral vascular disease) ([facility])  2017-11: Acute non-recurrent sinusitis  2017-11: Burning with urination  2017-11: Strain of latissimus dorsi muscle  2017-11: Bone spur  2017-04: Wound infection  2017-04: Uncontrolled type 1 diabetes mellitus with hypoglycemia and   coma ([facility])  2017-03: Diabetic ulcer of left foot associated with type 1 diabetes   mellitus ([facility])  2017-03: Diminished pulses in lower extremity  2016-12: Diabetic retinopathy of both eyes ([facility])  2016-06: Blind left eye  2016-05: Chronic kidney disease (CKD) stage G3a/A1, moderately   decreased glomerular filtration rate (GFR) between 45-59 mL/min/1.73   square meter and albuminuria creatinine ratio less than 30 mg/g ([facility])  2016-01: Diabetic nephropathy associated with type 1 diabetes   mellitus ([facility])  2015-06: Elevated serum creatinine  2015-06: Flank pain  2014-12: Hypertension associated with type  "1 diabetes mellitus (HCC)  2014-12: History of shingles  2014-12: Diabetic neuropathy (HCC)  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  Dyslipidemia  Toe amputee (HCC)  Coronary artery disease due to lipid rich plaque - mild to moderate   on Cath 2019 Left Dominant  History of cardiac catheterization 2019 in [facility] abnormal   stress - mild to moderate CAD  Coronary artery disease due to type 1 diabetes mellitus (HCC)      Past Surgical History:  Past Surgical History:   Procedure Laterality Date    IRRIGATION & DEBRIDEMENT GENERAL Left 5/3/2017    Procedure: IRRIGATION & DEBRIDEMENT GENERAL and Left Fourth Toe Amputation ;  Surgeon: Inessa Richard M.D.;  Location: SURGERY [facility];  Service:     APPENDECTOMY      CATARACT EXTRACTION WITH IOL Bilateral     EYE SURGERY      HAND SURGERY  1990's    KNEE ARTHROSCOPY      x3    TONSILLECTOMY          Allergies:  Patient has no known allergies.     Social History:  Social History     Tobacco Use    Smoking status: Former     Packs/day: 0.00     Types: Cigarettes    Smokeless tobacco: Former     Types: Chew    Tobacco comments:     tobacco cessation recommended   Vaping Use    Vaping Use: Never used   Substance Use Topics    Alcohol use: Yes     Alcohol/week: 0.0 oz     Comment: rare beer    Drug use: No        Family History:   family history includes Cancer in his father, mother, paternal aunt, and paternal grandfather; Diabetes in his maternal grandfather, maternal uncle, and paternal grandmother; Hyperlipidemia in his brother; Hypertension in his brother; Lung Disease in his brother, father, and mother; Other in his sister; Thyroid in his brother, mother, and sister.      PHYSICAL EXAM:   OBJECTIVE:  Vital signs: /74 (BP Location: Left arm, Patient Position: Sitting)   Pulse 71   Ht 1.753 m (5' 9\")   Wt 71.2 kg (157 lb)   SpO2 97%   BMI 23.18 kg/m²   GENERAL: Well-developed, well-nourished in no apparent distress.   EYE:  No ocular asymmetry, " PERRLA  HENT: Pink, moist mucous membranes.    NECK: No thyromegaly.   CARDIOVASCULAR:  No murmurs  LUNGS: Clear breath sounds  ABDOMEN: Soft, nontender   EXTREMITIES: No clubbing, cyanosis, or edema.   NEUROLOGICAL: No gross focal motor abnormalities   LYMPH: No cervical adenopathy palpated.   SKIN: No rashes, lesions.     Labs:  Lab Results   Component Value Date/Time    HBA1C 8.6 (A) 06/08/2023 10:06 AM        Lab Results   Component Value Date/Time    WBC 5.8 12/11/2021 05:35 PM    RBC 4.26 (L) 12/11/2021 05:35 PM    HEMOGLOBIN 12.8 (L) 12/11/2021 05:35 PM    MCV 88.3 12/11/2021 05:35 PM    MCH 30.0 12/11/2021 05:35 PM    MCHC 34.0 12/11/2021 05:35 PM    RDW 43.0 12/11/2021 05:35 PM    MPV 10.3 12/11/2021 05:35 PM       Lab Results   Component Value Date/Time    SODIUM 139 02/07/2023 06:28 AM    POTASSIUM 4.8 02/07/2023 06:28 AM    CHLORIDE 104 02/07/2023 06:28 AM    CO2 25 02/07/2023 06:28 AM    ANION 10.0 02/07/2023 06:28 AM    GLUCOSE 104 (H) 02/07/2023 06:28 AM    BUN 32 (H) 02/07/2023 06:28 AM    CREATININE 1.67 (H) 02/07/2023 06:28 AM    CALCIUM 9.7 02/07/2023 06:28 AM    ASTSGOT 33 02/07/2023 06:28 AM    ALTSGPT 31 02/07/2023 06:28 AM    TBILIRUBIN 0.3 02/07/2023 06:28 AM    ALBUMIN 4.2 02/07/2023 06:28 AM    ALBUMIN 4.21 03/04/2021 03:19 PM    TOTPROTEIN 7.5 02/07/2023 06:28 AM    TOTPROTEIN 7.1 03/04/2021 03:19 PM    GLOBULIN 3.3 02/07/2023 06:28 AM    AGRATIO 1.3 02/07/2023 06:28 AM       Lab Results   Component Value Date/Time    CHOLSTRLTOT 116 03/30/2022 0825    TRIGLYCERIDE 55 03/30/2022 0825    HDL 42 03/30/2022 0825    LDL 63 03/30/2022 0825       Lab Results   Component Value Date/Time    MALBCRT 103 (H) 02/07/2023 06:28 AM    MICROALBUR 6.8 02/07/2023 06:28 AM        Lab Results   Component Value Date/Time    TSHULTRASEN 1.280 03/30/2022 0825     No results found for: FREEDIR  Lab Results   Component Value Date/Time    FREET3 2.67 03/30/2022 0825     No results found for:  THYSTIMIG        ASSESSMENT/PLAN:     1. Type 1 diabetes mellitus with stage 3 chronic kidney disease, unspecified whether stage 3a or 3b CKD (HCC)  Uncontrolled secondary to hyperglycemia  A1c is slightly better  Ordered Marcial 2 CGM  Change Toujeo to 20 units daily  Continue NovoLog 7 units per meal  Change correction scale to 1 unit for every 40 above 120  Recommend that he get updated labs prior to next visit  Follow-up in 3 to 4 months with Christine with no labs    2. Dyslipidemia  Controlled  Continue Lipitor 80 mg daily  Repeat lipids in 12 months    3. Vitamin D deficiency  Stable   Vitamin D labs were reviewed with patient  Continue current supplements  Continue monitoring levels       4. Long-term insulin use (HCC)  Patient is on long-term insulin for type 1 diabetes      Return in about 3 months (around 9/8/2023).        Thank you kindly for allowing me to participate in the diabetes care plan for this patient.    Alexander Booker MD, FACE, [facility]      CC:   DAYO Sloan

## 2023-06-08 NOTE — PROGRESS NOTES
RN-CDE Note    Subjective:   Endocrinology Clinic Progress Note  PCP: DAYO Sloan    HPI:  Dylon Santa is a 62 y.o. old patient who is seen today by the Diabetes Nurse Specialist for review of his type 1 diabetes.    Patient has alzheimers and his brother takes care of him.   States can not use therese 3 on the phone at this time due to system update by [employer].  Needs some Therese 2 sensors to use with the old reader and Therese frank fixed.   DM:   Last A1c:   Lab Results   Component Value Date/Time    HBA1C 8.6 (A) 06/08/2023 10:06 AM      Previous A1c was 10.1 on 2/3/23  A1C GOAL: < 7    Diabetes Medications:   Toujeo 20 units per day  Novolog 7 unit base plus correction scalejof 1:40 over 120  (Usually between 7-9 units with meals)      Patient's body mass index is 23.18 kg/m². Exercise and nutrition counseling were performed at this visit.    Glucose monitoring frequency:  using Therese CGM    Hypoglycemic episodes: yes - on occasion  Last Retinal Exam: on file and up-to-date  Daily Foot Exam: Yes   Foot Exam:  Monofilament: current  Lab Results   Component Value Date/Time    MALBCRT 103 (H) 02/07/2023 06:28 AM    MICROALBUR 6.8 02/07/2023 06:28 AM        ACR Albumin/Creatinine Ratio goal <30     HTN:   Blood pressure goal <130/<80 .   Currently Rx ACE/ARB: Yes     Dyslipidemia:    Lab Results   Component Value Date/Time    CHOLSTRLTOT 134 02/07/2023 06:28 AM    LDL 80 02/07/2023 06:28 AM    HDL 43 02/07/2023 06:28 AM    TRIGLYCERIDE 54 02/07/2023 06:28 AM         Currently Rx Statin: Not Indicated     He  reports that he has quit smoking. His smoking use included cigarettes. He has quit using smokeless tobacco.  His smokeless tobacco use included chew.      Plan:     Discussed and educated on:   - All medications, side effects and compliance (discussed carefully)  - HbA1C: target  - Home glucose monitoring emphasized    Recommended medication changes: none

## 2023-06-22 NOTE — TELEPHONE ENCOUNTER
NEUROLOGY PATIENT PRE-VISIT PLANNING     Patient was NOT contacted to complete PVP.    Patient Appointment is scheduled as: Established Patient     Is visit type and length scheduled correctly? Yes    [facility] is checked in Patient Demographics? Yes    3.   Is referral attached to visit? Yes    4. Were records received from referring provider? Yes    4. Patient was NOT contacted to have someone accompany them to visit.     5. Is this appointment scheduled as a Hospital Follow-Up?  No    6. Does the patient require any pre procedure or post procedure follow up? No    7. If any orders were placed at last visit or intended to be done for this visit do we have Results/Consult Notes? Yes  Labs - Labs were not ordered at last office visit.  Imaging - Imaging was not ordered at last office visit.  Referrals - No referrals were ordered at last office visit.    8. If patient appointment is for Botox - is order pended for provider? N/A

## 2023-06-23 NOTE — TELEPHONE ENCOUNTER
06/22/23  Called and left a message for patient to call us back to reschedule his appointment with Dr Roldan as he will be out of the office on the day of his appointment. HL

## 2023-07-26 PROBLEM — I70.0 ATHEROSCLEROSIS OF AORTA (HCC): Status: ACTIVE | Noted: 2019-10-29

## 2023-07-26 PROBLEM — F39 MOOD DISORDER (HCC): Status: ACTIVE | Noted: 2023-01-01

## 2023-07-26 PROBLEM — G31.9 CEREBRAL ATROPHY (HCC): Status: ACTIVE | Noted: 2023-07-26

## 2023-07-26 PROBLEM — F03.90 DEMENTIA WITHOUT BEHAVIORAL DISTURBANCE, PSYCHOTIC DISTURBANCE, MOOD DISTURBANCE, OR ANXIETY (HCC): Status: ACTIVE | Noted: 2021-03-04

## 2023-07-26 PROBLEM — E10.3593 TYPE 1 DIABETES MELLITUS WITH PROLIFERATIVE RETINOPATHY OF BOTH EYES (HCC): Status: ACTIVE | Noted: 2017-04-07

## 2023-07-26 PROBLEM — E10.39 TYPE 1 DIABETES MELLITUS WITH OPHTHALMIC COMPLICATION (HCC): Status: ACTIVE | Noted: 2017-04-07

## 2023-07-26 PROBLEM — E10.42 DIABETIC POLYNEUROPATHY ASSOCIATED WITH TYPE 1 DIABETES MELLITUS (HCC): Status: ACTIVE | Noted: 2023-01-01

## 2023-07-26 PROBLEM — F03.93 DEMENTIA WITH MOOD DISTURBANCE (HCC): Status: ACTIVE | Noted: 2021-03-04

## 2023-08-04 NOTE — ED NOTES
Discharge education provided. Patient verbalizes understanding. Patient ambulatory to the [facility] with a steady gait. Patient discharged home with his brother.

## 2023-08-04 NOTE — DISCHARGE INSTRUCTIONS
The Aquacel is a clot dressing that turns into a gel, it can be left on the wound for 48 hours, though certainly you can change it earlier than that.  It does have antibiotic properties.  Please do not use peroxide or iodine on open wounds.

## 2023-08-04 NOTE — ED TRIAGE NOTES
.  Chief Complaint   Patient presents with    Sent from Urgent Care    Foot Pain    Wound Check     Left foot great toe ulcer hx diabetes.    To triage with brother. Pt has alzheimer's difficult to communicate brother Luis Carlos assisting with medical hx. Luis Carlos is caregiver has been doing wound care on wound since injury on July 21

## 2023-08-04 NOTE — ED PROVIDER NOTES
ED Provider Note    Primary care provider: DAYO Sloan    CHIEF COMPLAINT  Chief Complaint   Patient presents with    Sent from Urgent Care    Foot Pain    Wound Check     Left foot great toe ulcer hx diabetes.        Additional history obtained from: All history is obtained from the brother who is his primary caretaker as the patient has Alzheimer's dementia  Limitation to History:  Select: Dementia    HPI  Dylon Santa is a 63 y.o. male with history of diabetes, has already lost a toe due to diabetic wound infection who presents to the Emergency Department for wound of the left great toe.  The brother takes care of him, the patient lives with the brother full-time.  The brother monitors his insulin and keeps his glucose between 90 and 160.  The toe was scraped on the deck of a pool about 3 weeks ago, the brother has pictures of the wound progression, initially it started as a superficial abrasion, he has been diligently cleaning it with peroxide followed by topical antibiotic.  The wound has steadily turned into an ulcer, they went to the urgent care and was referred here for further evaluation.  No recent fevers.  The patient is predominantly insensate on his feet.      External Record Review: History of IDDM, dementia, the patient was brought into the urgent care for a toe injury or toe wound and sent here for further evaluation.  No recent labs or imaging.    REVIEW OF SYSTEMS  See HPI.     PAST MEDICAL HISTORY   has a past medical history of Acute pain of right shoulder (3/22/2022), Anesthesia, Arthritis, Bronchitis (2016), Cataract, Cold (4/5/17), Coronary artery disease due to lipid rich plaque - mild to moderate on Cath 2019 Left Dominant, Coronary artery disease due to type 1 diabetes mellitus (HCC), Dental disorder, Diabetic neuropathy ([facility]) (12/31/2014), Diabetic retinopathy of both eyes ([facility]) (12/9/2016), Dyslipidemia, Heart murmur, Hemorrhagic disorder ([facility]), High cholesterol,  History of cardiac catheterization 2019 in sett[place] of  abnormal stress - mild to moderate CAD, History of shingles (12/31/2014), Hyperlipidemia, Hypertension, Kidney stones, Pain, Psychiatric problem, and Type II or unspecified type diabetes mellitus without mention of complication, uncontrolled (12/31/2014).    SURGICAL HISTORY   has a past surgical history that includes eye surgery; appendectomy; knee arthroscopy; tonsillectomy; cataract extraction with iol (Bilateral); irrigation & debridement general (Left, 5/3/2017); and hand surgery (1990's).    SOCIAL HISTORY  Social History     Tobacco Use    Smoking status: Never    Smokeless tobacco: Former     Types: Chew     Quit date: 2016    Tobacco comments:     Chewing Tobacco - 1-2 times per month x15 years   Vaping Use    Vaping Use: Never used   Substance Use Topics    Alcohol use: Yes     Alcohol/week: 0.0 oz     Comment: rare beer    Drug use: No      Social History     Substance and Sexual Activity   Drug Use No       FAMILY HISTORY  Family History   Problem Relation Age of Onset    Thyroid Mother     Lung Disease Mother         COPD    Cancer Mother         eyebrow     Thyroid Sister     Other Sister         mental health issues     Thyroid Brother     Lung Disease Brother         COPD    Hypertension Brother     Hyperlipidemia Brother     Lung Disease Father         COPD    Cancer Father     Diabetes Maternal Uncle     Cancer Paternal Aunt         type unknown    Diabetes Maternal Grandfather     Diabetes Paternal Grandmother     Cancer Paternal Grandfather         prostate    Stroke Neg Hx        CURRENT MEDICATIONS  Reviewed.  See Encounter Summary.     ALLERGIES  No Known Allergies    PHYSICAL EXAM  VITAL SIGNS: BP (!) 167/78   Pulse 68   Temp 36.1 °C (96.9 °F) (Temporal)   Resp 16   Wt 69.8 kg (153 lb 14.1 oz)   SpO2 97%   BMI 23.40 kg/m²   Constitutional: Awake, alert in no apparent distress.  Well-appearing, cooperative.  Well kept.  HENT:  Normocephalic, Bilateral external ears normal. Nose normal.   Eyes: Conjunctiva normal, non-icteric, EOMI.    Thorax & Lungs: Easy unlabored respirations, Clear to ascultation bilaterally.  Cardiovascular: Regular rate, Regular rhythm, No murmurs, rubs or gallops. Bilateral pulses symmetrical.   Abdomen:  Soft, nontender, nondistended, normal active bowel sounds.   :    Skin: Visualized skin is  Dry, No erythema, No rash.   Musculoskeletal:   No cyanosis, clubbing or edema. No leg asymmetry.  See photograph below, there is a 1.5 cm circular ulcer on the plantar aspect of the great toe, I unroofed the lesion, no active bleeding, no purulence.  Neurologic: Alert, Grossly non-focal.   Psychiatric: Normal affect, Normal mood  Lymphatic:              COURSE & MEDICAL DECISION MAKING  Pertinent Labs & Imaging studies reviewed. (See chart for details)    COURSE & MEDICAL DECISION MAKING  Pertinent Labs & Imaging studies reviewed. (See chart for details)    Differential diagnoses include but are not limited to: Effects of daily peroxide use.    12:54 PM - Nursing notes reviewed, patient seen and examined at bedside.    Escalation of care considered, and ultimately not performed: blood analysis, diagnostic imaging, and acute inpatient care management, however at this time, the patient is most appropriate for outpatient management.      Decision Making:  This is a pleasant 63 y.o. year old male who presents with an open wound of the left great toe.  He unfortunately has early onset Alzheimer's dementia and does not provide much history.  He is also a diabetic, though well controlled per the brother.  The wound is fairly superficial, only about a quarter of a centimeter in depth, I do not feel that this represents osteomyelitis at this time.  I am concerned about the use of peroxide, the initial injury was quite minimal and I believe the increasing size and lack of healing is secondary to daily peroxide use.  The brother was  counseled not to use this or iodine in the future, topical antibiotics are appropriate, he was given ample Aquacel to use for the next few days, directed how to clean the wound and follow-up with the wound care clinic is recommended.  I have also placed a referral in the system.       The patient was discharged home (see d/c instructions) was told to return immediately for any signs or symptoms listed, or any worsening at all.  The patient verbally agreed to the discharge precautions and follow-up plan which is documented in EPIC.    Discharge Medications:  New Prescriptions    No medications on file       FINAL IMPRESSION  1. Open wound of toe, initial encounter

## 2023-08-04 NOTE — PROGRESS NOTES
Subjective:     Dylon Santa is a 63 y.o. male who presents for Foot Problem (X2weeks went swimming but scraped left foot big toe and now has turned white and getting worse )       Foot Problem  This is a new problem. The problem has been gradually worsening.     BIB caregiver who provides hx. Patient has baseline dementia.    CC of worsening wound at left great toe. Was swimming at a hotel pool when patient scraped the bottom of his left great toe. Caregiver has been performing wound care and using warm soaks. However, yesterday, started to notice a white discoloration around the wound. Sensation decreased. Patient has baseline neuropathy.    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    Hx of type 1 diabetes on insulin. Sees endocrinology.    Tdap received 12/31/2014 per chart review.    Review of Systems   Constitutional: Negative.    Skin:         Per HPI   All other systems reviewed and are negative.    Refer to HPI for additional details.    During this visit, appropriate PPE was worn, and hand hygiene was performed.    PMH:  has a past medical history of Acute pain of right shoulder (3/22/2022), Anesthesia, Arthritis, Bronchitis (2016), Cataract, Cold (4/5/17), Coronary artery disease due to lipid rich plaque - mild to moderate on Cath 2019 Left Dominant, Coronary artery disease due to type 1 diabetes mellitus (HCC), Dental disorder, Diabetic neuropathy ([facility]) (12/31/2014), Diabetic retinopathy of both eyes ([facility]) (12/9/2016), Dyslipidemia, Heart murmur, Hemorrhagic disorder ([facility]), High cholesterol, History of cardiac catheterization 2019 in [facility] abnormal stress - mild to moderate CAD, History of shingles (12/31/2014), Hyperlipidemia, Hypertension, Kidney stones, Pain, Psychiatric problem, and Type II or unspecified type diabetes mellitus without mention of complication, uncontrolled (12/31/2014).    MEDS:   Current Outpatient Medications:     NOVOLOG FLEXPEN 100 UNIT/ML solution for injection,  INJECT 10 UNITS SUBCUTANEOUSLY THREE TIMES DAILY BEFORE MEAL(S), Disp: 15 mL, Rfl: 0    Continuous Blood Gluc Sensor (FREESTYLE CARLOS 2 SENSOR) Misc, 1 Each every 14 days., Disp: 2 Each, Rfl: 11    Insulin Glargine, 1 Unit Dial, (TOUJEO SOLOSTAR) 300 UNIT/ML Solution Pen-injector, Inject 25 Units under the skin every day. (Patient taking differently: Inject 20 Units under the skin every day.), Disp: 18 mL, Rfl: 3    clopidogrel (PLAVIX) 75 MG Tab, TAKE ONE TABLET BY MOUTH ONE TIME DAILY, Disp: 90 Tablet, Rfl: 3    losartan (COZAAR) 100 MG Tab, Take 1 Tablet by mouth every day., Disp: 100 Tablet, Rfl: 3    traZODone (DESYREL) 150 MG Tab, Take 1 Tablet by mouth every evening., Disp: 90 Tablet, Rfl: 3    acyclovir (ZOVIRAX) 200 MG Cap, Take 1 Capsule by mouth every day., Disp: 100 Capsule, Rfl: 3    BD PEN NEEDLE TENA 2ND GEN, USE 1 NEEDLE 4 TIMES DAILY, Disp: 200 Each, Rfl: 0    atorvastatin (LIPITOR) 80 MG tablet, Take 1 Tablet by mouth every day., Disp: 100 Tablet, Rfl: 3    metoprolol SR (TOPROL XL) 25 MG TABLET SR 24 HR, Take 1 Tablet by mouth every day., Disp: 100 Tablet, Rfl: 3    OYSTER SHELL PO, Take  by mouth every day., Disp: , Rfl:     ALFALFA PO, Take  by mouth every day., Disp: , Rfl:     POTASSIUM PO, Take  by mouth every day., Disp: , Rfl:     Ascorbic Acid (VITAMIN C) 1000 MG Tab, Take  by mouth every day., Disp: , Rfl:     Multiple Vitamins-Minerals (CENTRUM SILVER 50+MEN PO), Take  by mouth every day., Disp: , Rfl:     Continuous Blood Gluc Sensor (FREESTYLE CARLOS 2 SENSOR) Misc, 1 Each every 14 days., Disp: 2 Each, Rfl: 6    Insulin Pen Needle 32 G x 4 mm, 1 Each 4 Times a Day,Before Meals and at Bedtime., Disp: 400 Each, Rfl: 3    ALLERGIES: No Known Allergies  SURGHX:   Past Surgical History:   Procedure Laterality Date    IRRIGATION & DEBRIDEMENT GENERAL Left 5/3/2017    Procedure: IRRIGATION & DEBRIDEMENT GENERAL and Left Fourth Toe Amputation ;  Surgeon: Inessa Richard M.D.;  Location:  "SURGERY [facility];  Service:     APPENDECTOMY      CATARACT EXTRACTION WITH IOL Bilateral     EYE SURGERY      HAND SURGERY  1990's    KNEE ARTHROSCOPY      x3    TONSILLECTOMY       SOCHX:  reports that he has never smoked. He quit smokeless tobacco use about 7 years ago.  His smokeless tobacco use included chew. He reports current alcohol use. He reports that he does not use drugs.    FH: Per HPI as applicable/pertinent.      Objective:     /76 (BP Location: Right arm, Patient Position: Sitting, BP Cuff Size: Adult)   Pulse 70   Temp 36.3 °C (97.4 °F) (Temporal)   Resp 16   Ht 1.727 m (5' 8\")   Wt 70.8 kg (156 lb)   SpO2 97%   BMI 23.72 kg/m²     Physical Exam  Nursing note reviewed.   Constitutional:       General: He is not in acute distress.     Appearance: He is well-developed. He is not ill-appearing or toxic-appearing.   Eyes:      General: Vision grossly intact.   Cardiovascular:      Rate and Rhythm: Normal rate.   Pulmonary:      Effort: Pulmonary effort is normal. No respiratory distress.   Musculoskeletal:         General: No deformity. Normal range of motion.      Left foot: Normal range of motion and normal capillary refill. Laceration and tenderness present. No swelling or deformity.        Feet:       Comments: Approx 1 x 1 cm open wound at plantar aspect of left great toe with surrounding pallor   Skin:     General: Skin is warm and dry.      Capillary Refill: Capillary refill takes less than 2 seconds.      Coloration: Skin is pale.   Neurological:      Mental Status: He is alert and oriented to person, place, and time. Mental status is at baseline.      Motor: No weakness.   Psychiatric:         Behavior: Behavior normal. Behavior is cooperative.       Assessment/Plan:     1. Open wound of left great toe, initial encounter    2. History of type 1 diabetes mellitus    Advise further evaluation and treatment in the emergency department at this time. Patient's caregiver amenable. " Patient will go to the [facility] ER.

## 2023-08-08 NOTE — TELEPHONE ENCOUNTER
NEUROLOGY PATIENT PRE-VISIT PLANNING     Patient was NOT contacted to complete PVP.    Patient Appointment is scheduled as: Established Patient     Is visit type and length scheduled correctly? Yes    [facility] Patient is checked in Patient Demographics? Yes    3.   Is referral attached to visit? Yes    4. Were records received from referring provider? Yes    4. Patient was NOT contacted to have someone accompany them to visit.     5. Is this appointment scheduled as a Hospital Follow-Up?  No    6. Does the patient require any pre procedure or post procedure follow up? No    7. If any orders were placed at last visit or intended to be done for this visit do we have Results/Consult Notes? Yes  Labs -  Recent labs in [facility].  Imaging - Imaging was not ordered at last office visit.  Referrals - No referrals were ordered at last office visit.    8. If patient appointment is for Botox - is order pended for provider? N/A

## 2023-08-09 NOTE — PROGRESS NOTES
Subjective     Dylon Santa is a 63 y.o. male who presents with his brother Luis Carlos, for follow-up, with a history of moderate to severe Alzheimer's dementia.  History is gotten completely from discussions with the patient's brother, the patient incapable of communicating anything meaningful.     HPI    Since last seen, the language deficits have worsened.  Responses are usually 1 or 2 word phrases, he is perseverative.  Though he does seemingly interact much more easily and completely with his brother, it is still limited.  He can follow some commands, usually requires action as well as giving the patient to command to do things.  There is more noticeable apraxia, Luis Carlos has had to participate in all of the patient's routines.  Even eating has required Luis Carlos assisting the patient.    He always repeats himself in conversation, it is usually again which she rates when it comes to figuring out what he needs.    He is becoming a little more unsteady as he walks, Luis Carlos notes that he tends to shuffle.  He now has a wheelchair to use with him when they are out and about going longer distances.  He has not fallen.  Bowel and bladder incontinence is now a daily occurrence, even with depends, mistakes are made.  He has no means of communicating his needs to go to the bathroom.  Luis Carlos has already tried timed voiding with limited benefit.    He does take his medications when given to him though this is becoming difficult.  He eats regularly.  He sleeps the night through though he can get up and pace, has wandered out of the house, Luis Carlos is installed alarms on the patient's bedroom to give him a heads up with the patient gets up.    There have been no real issues with agitation or delirium.    Medical, surgical and family histories are reviewed, there are no new drug allergies.  He has had a problem with a poorly healing toe ulcer, his brothers brought him in to urgent care and is now on a wait list for the wound care clinic.  He is on  "Lipitor, Toprol-XL, Zovirax, Cozaar, trazodone, insulin, potassium, Plavix, and several vitamin and mineral supplements.    Review of Systems   Unable to perform ROS: Dementia     Objective     /72 (BP Location: Right arm, Patient Position: Sitting, BP Cuff Size: Adult)   Pulse 71   Temp 36.5 °C (97.7 °F) (Temporal)   Ht 1.727 m (5' 8\")   Wt 70.8 kg (156 lb 1.4 oz)   SpO2 98%   BMI 23.73 kg/m²      Physical Exam    He appears in no acute distress.  He is clean and appropriately dressed, he is cooperative to a limited degree.  Vital signs are stable.  There is no malar rash.  His neck is supple.  Cardiac evaluation is unremarkable.     Neurological Exam    There is a paucity of spontaneous speech, when directly questioned, the answers are simple phrases, at times completely out of context.  When shown at home he cannot demonstrate his use.  When asked to brush his teeth, he also has difficulty doing so.  Though he cannot name a body part when pointed to, when asked to move it, he does so consistently.  He seems to be able to identify his brother but when asked directly, he cannot state his name.  Given a list of names, he seems to choose appropriately.    PERRLA/EOMI, visual fields are full to threat, facial movements are symmetric.  Sensory exam cannot be done.  The tongue and uvula are midline.  Shoulder shrug and head rotation are symmetric.    Musculoskeletal exam reveals normal tone, there is no tremor.  Grasp is symmetric, he moves all 4 extremities in a grossly symmetric pattern.    He stands slowly, there is slight hesitancy on initiation, he does not pick his feet up consistently.  Station is not increased, but he does look unsteady.  Armswing is absent.  There is no appendicular dystaxia with the upper extremities.  He wiggles both hands and the fingers, copying the examiner, showing no clear slowing or asymmetry of amplitude.     Sensory exam cannot be consistently done.    Assessment & Plan "     1. Alzheimer disease (HCC)  Moderate to severe in nature, Luis Carlos was told what to expect moving forward.  He has looked into the community, there is a facility that can take the patient on, even with his diabetes.  Luis Carlos is now cautious and has something covering the patient's feet 24/7 to avoid further injury and ulceration.  If behavior changes, medications can be used to help with some agitation if it occurs.  Essentially we treat symptomatically, reacting to circumstance when it presents.  We can follow-up in 1 year otherwise, if indication via MyChart in the interim if needed.    Time: 40 minutes in total spent on patient care including pre-charting, record review, discussion with healthcare staff and documentation.  This includes face-to-face time for exam, review, discussion, as well as counseling and coordinating care.

## 2023-08-11 NOTE — WOUND TEAM
Non Provider Encounter- Diabetic Foot Ulcer      HISTORY OF PRESENT ILLNESS  Wound History:    START OF CARE IN CLINIC: 08/10/2023    REFERRING PROVIDER: Roman Jacobs MD   WOUND- Diabetic foot ulcer   LOCATION: Left plantar hallux   HISTORY: Started approximately mid July 2023, rubbed on the bottom of a swimming pool.    Pertinent Medical History: 64 y/o male with IDDM-type 1, diagnosed age 8 years old.  Also has alzheimers, lives with brother Luis Carlos who is is caregiver.  History CAD, and angiogram with Dr. Richard same side as wound (left) in 2017, with 4th toe amp.     DIABETES HX: Diagnosed with type 1 diabetes 55 years ago, and is currently managing with novolog and toujeo.  Checks blood sugars ac and hs.  Has had previous diabetes education.  Usually wears regular shoes. Does check feet routinely.  Has had previous foot ulcers or foot surgery.  Current occupation: disabled.  Offloading: due to instability with ambulation, komprex horseshoe applied at start of care.        TOBACCO USE: none     OFFLOADING: komprex horseshoe (unsteady on his feet)    Pertinent Labs and Diagnostics:    Labs:     A1c:   Lab Results   Component Value Date/Time    HBA1C 8.6 (A) 06/08/2023 10:06 AM          IMAGING: none    VASCULAR STUDIES: none recent.  VASCULAR ASSESSMENT AT START OF CARE 8/10/23: Left DP and PT pulses biphasic per handheld doppler.      LAST  WOUND CULTURE:  DATE : none             Patient allergies and medications reviewed via Epic.     WOUND ASSESSMENT-      Wound 08/11/23 Full Thickness Wound Left Left plantar hallux (Active)   Wound Image (POST debridement)   08/11/23 0800   Site Assessment Red;Yellow 08/11/23 0800   Periwound Assessment Callused 08/11/23 0800   Margins Attached edges 08/11/23 0800   Closure Secondary intention 08/11/23 0800   Drainage Amount Moderate 08/11/23 0800   Drainage Description Serosanguineous 08/11/23 0800   Treatments Cleansed;Topical Lidocaine;CSWD 08/11/23 0800   Offloading/DME  Komprex horseshoe 08/11/23 0800   Wound Cleansing Normal Saline Irrigation 08/11/23 0800   Periwound Protectant Skin Protectant Wipes to Periwound 08/11/23 0800   Dressing Changed New 08/11/23 0800   Dressing Cleansing/Solutions Not Applicable 08/11/23 0800   Dressing Options Hydrofiber Silver;Nonadhesive Foam;Hypafix Tape;Komprex Horseshoe 08/11/23 0800   Dressing Change/Treatment Frequency Every 72 hrs, and As Needed 08/11/23 0800   Wound Team Following Weekly 08/11/23 0800   Non-staged Wound Description Full thickness 08/11/23 0800   Post-Procedure Length (cm) 1 cm 08/11/23 0800   Post-Procedure Width (cm) 0.9 cm 08/11/23 0800   Post-Procedure Depth (cm) 0.1 cm 08/11/23 0800   Post-Procedure Surface Area (cm^2) 0.9 cm^2 08/11/23 0800   Post-Procedure Volume (cm^3) 0.09 cm^3 08/11/23 0800   Tunneling (cm) 0 cm 08/11/23 0800   Undermining (cm) 0 cm 08/11/23 0800   Wound Odor None 08/11/23 0800   Pulses See vascular assessment section above. 08/11/23 0800   Exposed Structures None 08/11/23 0800       Procedures:     -2% viscous lidocaine applied topically to wound bed for approximately 5 minutes prior to debridement  -Curette used to debride wound bed and periwound callus. Entire surface of wound, 1cm2 debrided.  Periwound callus, ~ 3cm2, debrided to skin level      PATIENT EDUCATION  - Importance of tight glucose control for wound healing   - Implications of loss of protective sensation (LOPS) discussed with patient- including increased risk for amputation.  - Advised to check feet at least daily, moisturize feet, and to always wear protective foot wear.   -  Importance of offloading foot to assist with wound healing  - Advised pt not to trim nails or calluses, seek foot/nail care from podiatrist or certified foot/nail nurse  - Importance of adequate nutrition for wound healing

## 2023-08-11 NOTE — CERTIFICATION
Duplicate to send as certification.    Non Provider Encounter- Diabetic Foot Ulcer        HISTORY OF PRESENT ILLNESS  Wound History:               START OF CARE IN CLINIC: 08/10/2023               REFERRING PROVIDER: Roman Jacobs MD              WOUND- Diabetic foot ulcer              LOCATION: Left plantar hallux              HISTORY: Started approximately mid July 2023, rubbed on the bottom of a swimming pool.     Pertinent Medical History: 64 y/o male with IDDM-type 1, diagnosed age 8 years old.  Also has alzheimers, lives with brother Luis Carlos who is is caregiver.  History CAD, and angiogram with Dr. Richard same side as wound (left) in 2017, with 4th toe amp.      DIABETES HX: Diagnosed with type 1 diabetes 55 years ago, and is currently managing with novolog and toujeo.  Checks blood sugars ac and hs.  Has had previous diabetes education.  Usually wears regular shoes. Does check feet routinely.  Has had previous foot ulcers or foot surgery.  Current occupation: disabled.  Offloading: due to instability with ambulation, komprex horseshoe applied at start of care.         TOBACCO USE: none                 OFFLOADING: komprex horseshoe (unsteady on his feet)     Pertinent Labs and Diagnostics:     Labs:     A1c:         Lab Results   Component Value Date/Time     HBA1C 8.6 (A) 06/08/2023 10:06 AM            IMAGING: none     VASCULAR STUDIES: none recent.  VASCULAR ASSESSMENT AT START OF CARE 8/10/23: Left DP and PT pulses biphasic per handheld doppler.       LAST  WOUND CULTURE:  DATE : none               Patient allergies and medications reviewed via Epic.      WOUND ASSESSMENT-           Wound 08/11/23 Full Thickness Wound Left Left plantar hallux (Active)   Wound Image (POST debridement)   08/11/23 0800   Site Assessment Red;Yellow 08/11/23 0800   Periwound Assessment Callused 08/11/23 0800   Margins Attached edges 08/11/23 0800   Closure Secondary intention 08/11/23 0800   Drainage Amount Moderate 08/11/23 0800    Drainage Description Serosanguineous 08/11/23 0800   Treatments Cleansed;Topical Lidocaine;CSWD 08/11/23 0800   Offloading/DME Komprex horseshoe 08/11/23 0800   Wound Cleansing Normal Saline Irrigation 08/11/23 0800   Periwound Protectant Skin Protectant Wipes to Periwound 08/11/23 0800   Dressing Changed New 08/11/23 0800   Dressing Cleansing/Solutions Not Applicable 08/11/23 0800   Dressing Options Hydrofiber Silver;Nonadhesive Foam;Hypafix Tape;Komprex Horseshoe 08/11/23 0800   Dressing Change/Treatment Frequency Every 72 hrs, and As Needed 08/11/23 0800   Wound Team Following Weekly 08/11/23 0800   Non-staged Wound Description Full thickness 08/11/23 0800   Post-Procedure Length (cm) 1 cm 08/11/23 0800   Post-Procedure Width (cm) 0.9 cm 08/11/23 0800   Post-Procedure Depth (cm) 0.1 cm 08/11/23 0800   Post-Procedure Surface Area (cm^2) 0.9 cm^2 08/11/23 0800   Post-Procedure Volume (cm^3) 0.09 cm^3 08/11/23 0800   Tunneling (cm) 0 cm 08/11/23 0800   Undermining (cm) 0 cm 08/11/23 0800   Wound Odor None 08/11/23 0800   Pulses See vascular assessment section above. 08/11/23 0800   Exposed Structures None 08/11/23 0800         Procedures:                -2% viscous lidocaine applied topically to wound bed for approximately 5 minutes prior to debridement  -Curette used to debride wound bed and periwound callus. Entire surface of wound, 1cm2 debrided.  Periwound callus, ~ 3cm2, debrided to skin level        PATIENT EDUCATION  - Importance of tight glucose control for wound healing   - Implications of loss of protective sensation (LOPS) discussed with patient- including increased risk for amputation.  - Advised to check feet at least daily, moisturize feet, and to always wear protective foot wear.   -  Importance of offloading foot to assist with wound healing  - Advised pt not to trim nails or calluses, seek foot/nail care from podiatrist or certified foot/nail nurse  - Importance of adequate nutrition for wound  healing

## 2023-08-11 NOTE — PATIENT INSTRUCTIONS
-Keep your wound dressing clean, dry, and intact.    -Change your dressing every 72 hours and if it becomes soiled, soaked, or falls off.    -Should you experience any significant changes in your wound(s), such as infection (redness, swelling, localized heat, increased pain, fever > 101 F, chills) or have any questions regarding your home care instructions, please contact the wound center at [phone]. If after hours, contact your primary care physician or go to the hospital emergency room.

## 2023-08-16 NOTE — PROGRESS NOTES
"Chief Complaint   Patient presents with    Coronary Artery Disease     F/v dx: Coronary artery disease due to lipid rich plaque - mild to moderate on Cath 2019 Left Dominant    Hypertension    Dyslipidemia       Subjective     Dylon Santa is a 63 y.o. male who presents today with moderate CAD with DM        Past Medical History:   Diagnosis Date    Acute pain of right shoulder 3/22/2022    Anesthesia     hx PONV    Arthritis     L knee; hands    Bronchitis 2016    Cataract     nicole iol    Cold 4/5/17    prod cough    Coronary artery disease due to lipid rich plaque - mild to moderate on Cath 2019 Left Dominant     Coronary artery disease due to type 1 diabetes mellitus (HCC)     Dental disorder     \"bad shape\"    Diabetic neuropathy ([facility]) 12/31/2014    retinopathy    Diabetic retinopathy of both eyes ([facility]) 12/9/2016    Dyslipidemia     Heart murmur     Hemorrhagic disorder ([facility])     nose bleeds    High cholesterol     History of cardiac catheterization 2019 in [facility] abnormal stress - mild to moderate CAD     History of shingles 12/31/2014    Hyperlipidemia     Hypertension     Kidney stones     Pain     left leg/foot    Psychiatric problem     situational depression    Type II or unspecified type diabetes mellitus without mention of complication, uncontrolled 12/31/2014     Past Surgical History:   Procedure Laterality Date    IRRIGATION & DEBRIDEMENT GENERAL Left 5/3/2017    Procedure: IRRIGATION & DEBRIDEMENT GENERAL and Left Fourth Toe Amputation ;  Surgeon: Inessa Richard M.D.;  Location: SURGERY [facility];  Service:     APPENDECTOMY      CATARACT EXTRACTION WITH IOL Bilateral     EYE SURGERY      HAND SURGERY  1990's    KNEE ARTHROSCOPY      x3    TONSILLECTOMY       Family History   Problem Relation Age of Onset    Thyroid Mother     Lung Disease Mother         COPD    Cancer Mother         eyebrow     Thyroid Sister     Other Sister         mental health issues     Thyroid Brother     Lung " Disease Brother         COPD    Hypertension Brother     Hyperlipidemia Brother     Lung Disease Father         COPD    Cancer Father     Diabetes Maternal Uncle     Cancer Paternal Aunt         type unknown    Diabetes Maternal Grandfather     Diabetes Paternal Grandmother     Cancer Paternal Grandfather         prostate    Stroke Neg Hx      Social History     Socioeconomic History    Marital status: Single     Spouse name: Not on file    Number of children: Not on file    Years of education: Not on file    Highest education level: 12th grade   Occupational History    Not on file   Tobacco Use    Smoking status: Never    Smokeless tobacco: Former     Types: Chew     Quit date: 2016    Tobacco comments:     Chewing Tobacco - 1-2 times per month x15 years   Vaping Use    Vaping Use: Never used   Substance and Sexual Activity    Alcohol use: Yes     Alcohol/week: 0.0 oz     Comment: rare beer    Drug use: No    Sexual activity: Never     Comment: Never  , On disability due to lack of eye sight.   Other Topics Concern    Not on file   Social History Narrative    Not on file     Social Determinants of Health     Financial Resource Strain: Low Risk  (3/20/2022)    Overall Financial Resource Strain (CARDIA)     Difficulty of Paying Living Expenses: Not hard at all   Food Insecurity: No Food Insecurity (3/20/2022)    Hunger Vital Sign     Worried About Running Out of Food in the Last Year: Never true     Ran Out of Food in the Last Year: Never true   Transportation Needs: Unknown (3/20/2022)    PRAPARE - Transportation     Lack of Transportation (Medical): No     Lack of Transportation (Non-Medical): Patient refused   Physical Activity: Inactive (3/20/2022)    Exercise Vital Sign     Days of Exercise per Week: 0 days     Minutes of Exercise per Session: 0 min   Stress: Stress Concern Present (3/20/2022)    [employer] - Occupational Stress Questionnaire     Feeling of Stress : To some  extent   Social Connections: Moderately Isolated (3/20/2022)    Social Connection and Isolation Panel [NHANES]     Frequency of Communication with Friends and Family: More than three times a week     Frequency of Social Gatherings with Friends and Family: More than three times a week     Attends [employer] Services: More than 4 times per year     Active Member of Clubs or Organizations: No     Attends Club or Organization Meetings: Patient refused     Marital Status: Never    Intimate Partner Violence: Not on file   Housing Stability: Unknown (3/20/2022)    Housing Stability Vital Sign     Unable to Pay for Housing in the Last Year: Patient refused     Number of Places Lived in the Last Year: Not on file     Unstable Housing in the Last Year: Patient refused     No Known Allergies  Outpatient Encounter Medications as of 8/16/2023   Medication Sig Dispense Refill    clopidogrel (PLAVIX) 75 MG Tab Take 1 Tablet by mouth every day. 100 Tablet 3    losartan (COZAAR) 100 MG Tab Take 1 Tablet by mouth every day. 100 Tablet 3    metoprolol SR (TOPROL XL) 25 MG TABLET SR 24 HR Take 1 Tablet by mouth every day. 100 Tablet 3    atorvastatin (LIPITOR) 80 MG tablet Take 1 Tablet by mouth every day. 100 Tablet 3    NOVOLOG FLEXPEN 100 UNIT/ML solution for injection INJECT 10 UNITS SUBCUTANEOUSLY THREE TIMES DAILY BEFORE MEAL(S) 15 mL 0    Insulin Glargine, 1 Unit Dial, (TOUJEO SOLOSTAR) 300 UNIT/ML Solution Pen-injector Inject 20 Units under the skin at bedtime. Indications: Insulin-Dependent Diabetes      Continuous Blood Gluc Sensor (FREESTYLE CARLOS 2 SENSOR) Misc 1 Each every 14 days. 2 Each 11    traZODone (DESYREL) 150 MG Tab Take 1 Tablet by mouth every evening. 90 Tablet 3    acyclovir (ZOVIRAX) 200 MG Cap Take 1 Capsule by mouth every day. 100 Capsule 3    Insulin Pen Needle 32 G x 4 mm 1 Each 4 Times a Day,Before Meals and at Bedtime. 400 Each 3    OYSTER SHELL PO Take  by mouth every day.      ALFALFA PO Take  by  "mouth every day.      POTASSIUM PO Take  by mouth every day.      Ascorbic Acid (VITAMIN C) 1000 MG Tab Take  by mouth every day.      Multiple Vitamins-Minerals (CENTRUM SILVER 50+MEN PO) Take  by mouth every day.      [DISCONTINUED] clopidogrel (PLAVIX) 75 MG Tab TAKE ONE TABLET BY MOUTH ONE TIME DAILY 90 Tablet 3    [DISCONTINUED] losartan (COZAAR) 100 MG Tab Take 1 Tablet by mouth every day. 100 Tablet 3    [DISCONTINUED] atorvastatin (LIPITOR) 80 MG tablet Take 1 Tablet by mouth every day. 100 Tablet 3    [DISCONTINUED] metoprolol SR (TOPROL XL) 25 MG TABLET SR 24 HR Take 1 Tablet by mouth every day. 100 Tablet 3     No facility-administered encounter medications on file as of 8/16/2023.     ROS           Objective     /70 (BP Location: Left arm, Patient Position: Sitting, BP Cuff Size: Adult)   Pulse 65   Resp 16   Ht 1.727 m (5' 8\")   Wt 69.4 kg (153 lb)   SpO2 97%   BMI 23.26 kg/m²     Physical Exam  Constitutional:       General: He is not in acute distress.     Appearance: He is not diaphoretic.   Eyes:      General: No scleral icterus.  Neck:      Vascular: No JVD.   Cardiovascular:      Rate and Rhythm: Normal rate.      Heart sounds: Normal heart sounds. No murmur heard.     No friction rub. No gallop.   Pulmonary:      Effort: No respiratory distress.      Breath sounds: No wheezing or rales.   Abdominal:      General: Bowel sounds are normal.      Palpations: Abdomen is soft.   Musculoskeletal:      Right lower leg: No edema.      Left lower leg: No edema.   Skin:     Findings: No rash.   Neurological:      Mental Status: He is alert. Mental status is at baseline.   Psychiatric:         Mood and Affect: Mood normal.            We reviewed in person the most recent labs  Recent Results (from the past 5040 hour(s))   POCT Hemoglobin A1C    Collection Time: 02/03/23  4:58 PM   Result Value Ref Range    Glycohemoglobin 10.1 (A) 5.8 %    Internal Control Positive Positive     Internal Control " Negative Negative    C-PEPTIDE    Collection Time: 02/07/23  6:28 AM   Result Value Ref Range    C-Peptide <0.1 (L) 0.5 - 3.3 ng/mL   ALVIN-65    Collection Time: 02/07/23  6:28 AM   Result Value Ref Range    ALVIN Antibody <5.0 0.0 - 5.0 IU/mL   VITAMIN D,25 HYDROXY (DEFICIENCY)    Collection Time: 02/07/23  6:28 AM   Result Value Ref Range    25-Hydroxy   Vitamin D 25 31 30 - 100 ng/mL   FREE THYROXINE    Collection Time: 02/07/23  6:28 AM   Result Value Ref Range    Free T-4 1.24 0.93 - 1.70 ng/dL   TSH    Collection Time: 02/07/23  6:28 AM   Result Value Ref Range    TSH 1.940 0.380 - 5.330 uIU/mL   MICROALBUMIN CREAT RATIO URINE    Collection Time: 02/07/23  6:28 AM   Result Value Ref Range    Creatinine, Urine 66.10 mg/dL    Microalbumin, Urine Random 6.8 mg/dL    Micro Alb Creat Ratio 103 (H) 0 - 30 mg/g   Lipid Profile    Collection Time: 02/07/23  6:28 AM   Result Value Ref Range    Cholesterol,Tot 134 100 - 199 mg/dL    Triglycerides 54 0 - 149 mg/dL    HDL 43 >=40 mg/dL    LDL 80 <100 mg/dL   Comp Metabolic Panel    Collection Time: 02/07/23  6:28 AM   Result Value Ref Range    Sodium 139 135 - 145 mmol/L    Potassium 4.8 3.6 - 5.5 mmol/L    Chloride 104 96 - 112 mmol/L    Co2 25 20 - 33 mmol/L    Anion Gap 10.0 7.0 - 16.0    Glucose 104 (H) 65 - 99 mg/dL    Bun 32 (H) 8 - 22 mg/dL    Creatinine 1.67 (H) 0.50 - 1.40 mg/dL    Calcium 9.7 8.5 - 10.5 mg/dL    AST(SGOT) 33 12 - 45 U/L    ALT(SGPT) 31 2 - 50 U/L    Alkaline Phosphatase 62 30 - 99 U/L    Total Bilirubin 0.3 0.1 - 1.5 mg/dL    Albumin 4.2 3.2 - 4.9 g/dL    Total Protein 7.5 6.0 - 8.2 g/dL    Globulin 3.3 1.9 - 3.5 g/dL    A-G Ratio 1.3 g/dL   FASTING STATUS    Collection Time: 02/07/23  6:28 AM   Result Value Ref Range    Fasting Status Fasting    CORRECTED CALCIUM    Collection Time: 02/07/23  6:28 AM   Result Value Ref Range    Correct Calcium 9.5 8.5 - 10.5 mg/dL   ESTIMATED GFR    Collection Time: 02/07/23  6:28 AM   Result Value Ref Range     GFR (CKD-EPI) 46 (A) >60 mL/min/1.73 m 2   POCT Hemoglobin A1C    Collection Time: 06/08/23 10:06 AM   Result Value Ref Range    Glycohemoglobin 8.6 (A) 5.8 %    Internal Control Positive Positive     Internal Control Negative Negative          Assessment & Plan     1. Essential hypertension  Comp Metabolic Panel    metoprolol SR (TOPROL XL) 25 MG TABLET SR 24 HR      2. Hypertension associated with type 1 diabetes mellitus (HCC)  losartan (COZAAR) 100 MG Tab      3. Dyslipidemia  atorvastatin (LIPITOR) 80 MG tablet      4. Coronary artery disease due to lipid rich plaque  clopidogrel (PLAVIX) 75 MG Tab          Medical Decision Making: Today's Assessment/Status/Plan:        It was my pleasure to meet with Mr. Santa.    We addressed the management of hypertension at today's visit. Blood pressure is well controlled.  We specifically assessed the labs on hypertension treatment    We addressed the management of dyslipidemia and atherosclerosis at today's visit. He is on appropriate statin.    We addressed the management of atherosclerotic artery disease.  He is on proper antiplatelet, cholesterol management and beta-blockers as appropriate.  We addressed the potential side effects and laboratory follow-up for these medications.    I will see Mr. Santa back in 1 year time and encouraged him to follow up with us over the phone or electronically using my MyChart as issues arise.    It is my pleasure to participate in the care of Mr. Santa.  Please do not hesitate to contact me with questions or concerns.    Phil Patel MD PhD [facility]  Cardiologist [facility]    Please note that this dictation was created using voice recognition software. There may be errors I did not discover before finalizing the note.

## 2023-08-16 NOTE — PATIENT INSTRUCTIONS
A - Antiplatelet - Aspirin 81 mg daily or other antiplatelets (clopidogrel (PLAVIX), prasrugrel (EFFIENT), ticagrelor (BRILINTA)) reduce your risk.  B - Blood Pressure Control - reduces your risk or heart attack and stroke.  C - Cholesterol Management - statins reduce your risk; for those that are intolerant to statins, there are alternatives.  D - Diet - Cardiac rehab diets, [employer]lying [email or web address].  E - Exercise - at least 5 hours of moderate exercise weekly  (typical brisk walking or similar activity).  F - Fats - VASCEPA,  or Fish oil (if Vascepa too expensive) for elevated triglycerides (REDUCE IT trial showed reduction from 22% 5 year MACE to 17%).  G - Good Vibes - meditation, exercise, yoga, Pilates, mindfulness, Jorge Alberto-Chi, stress reduction.  H - Heart Failure - betablockers, sucubatril (ENTRESTO) 16% less risk of dying over 3 years, spironolactone, dapagliflozin (FARXIGA) 17% less risk of dying over 2 years, CRT +/- ICD.  I - Inflammation - Colchicine in the LoDoCo2 study in 2020 reduced the risk of heart attack by 30% in 2.5 year follow up.  R - Rehab - Cardiac rehab reduces risk of dying by 13-24% and need to go to the hospital by 30% within the first year.  S - Smoking - never smoke, if you do smoke ask for help to quit for good. Patients who quit smoking after heart attack have 36% less likely risk of dying.  Resources are 1-800-QUIT-NOW [employer] in addition to Chantix, Zyban or nicotine replacement  T - Type II Diabetes - pills empagliflozin (JARDIANCE) 38% less risk of dying over 4 years, and/or weekly injections liraglutide (Victoza), semaglutide (Ozempic), dulaglutide (Trulicity) ~26% less risk of MACE in 2 years.  V - Vaccines - Annual flu shot and COVID vaccine reduces the risk of serious cardiovascular complications from these deadly infections.  W - Weight - maintain a healthy weight.      Work on at least 1.5 - 5 hours a week of moderate exercise    Please look into the following diets  and incorporate them into your diet  LOW SALT DIET   KEEP YOUR SODIUM EQUAL TO CALORIES AND NO MORE THAN DOUBLE THE CALORIES FOR A LOW SALT DIET    Cardiosmart.org - great resource for American College of Cardiology on heart disease prevention and treatment    FOR TREATMENT OR PREVENTION OF CORONARY ARTERY DISEASE  These three programs are approved by Medicare/Insurers for those with heart disease  [place] - [facility]  Dr. Mason's Program for Reversing Heart Disease - [place] [facility]'s Cardiologist vegetarian-based  [facility] - [place]-based mind-body Program    This is a commonly referenced Program  Dr Canales - Segundo over [place] (book and documentary) - vegetarian-based    FOR TREATMENT OF BLOOD PRESSURE  DASH DIET - American Heart Association for treatment of HYPERTENSION    FOR TREATMENT OF BAD CHOLESTEROL/FATS  REDUCE PROCESSED SUGAR AS MUCH AS POSSIBLE  INCREASE WHOLE GRAINS/VEGETABLES  INCREASE FIBER    Lowering total cholesterol and LDL (bad) cholesterol:  - Eat leaner cuts of meat, or eliminate altogether if possible red meat, and frequently substitute fish or chicken.  - Limit saturated fat to no more than 7-10% of total calories no more than 10 g per day is recommended. Some sources of saturated fat include butter, animal fats, hydrogenated vegetable fats and oils, many desserts, whole milk dairy products.  - Replaced saturated fats with polyunsaturated fats and monounsaturated fats. Foods high in monounsaturated fat include nuts, canola oil, avocados, and olives.  - Limit trans fat (processed foods) and replaced with fresh fruits and vegetables  - Recommend nonfat dairy products  - Increase substantially the amount of soluble fiber intake (legumes such as beans, fruit, whole grains).  - Consider nutritional supplements: plant sterile spreads such as Benecol, fish oil,  flaxseed oil, omega-3 acids capsules 1000 mg twice a day, or viscous fiber  such as Metamucil  - Attain ideal weight and regular exercise (at least 30 minutes per day of moderate exercise)  ASK ABOUT STATIN OR NON STATIN MEDICATION TO REDUCE YOUR LDL AND HEART RISK    Lowering triglycerides:  - Reduce intake of simple sugar: Desserts, candy, pastries, honey, sodas, sugared cereals, yogurt, Gatorade, sports bars, canned fruit, smoothies, fruit juice, coffee drinks  - Reduced intake of refined starches: Refined Pasta, most bread  - Reduce or abstain from alcohol  - Increase omega-3 fatty acids: [place], Trout, Mackerel, Herring, Albacore tuna and supplements  - Attain ideal weight and regular exercise (at least 30 minutes per day of moderate exercise)  ASK ABOUT PURIFIED OMEGA 3 EPA or FISH OIL TO REDUCE YOUR TG AND HEART RISK    Elevating HDL (good) cholesterol:  - Increase physical activity  - Increase omega-3 fatty acids and supplements as listed above  - Incorporating appropriate amounts of monounsaturated fats such as nuts, olive oil, canola oil, avocados, olives  - Stop smoking  - Attain ideal weight and regular exercise (at least 30 minutes per day of moderate exercise)

## 2023-08-18 NOTE — PATIENT INSTRUCTIONS
-Keep your wound dressing clean, dry, and intact.    -Change your dressing if it becomes soiled, soaked, or falls off.    -Should you experience any significant changes in your wound(s), such as infection (redness, swelling, localized heat, increased pain, fever > 101 F, chills) or have any questions regarding your home care instructions, please contact the wound center at [phone]. If after hours, contact your primary care physician or go to the hospital emergency room.

## 2023-08-18 NOTE — PROCEDURES
Non-selective debridement to wound and kenney-wound using normal saline and gauze to remove biofilm and non-viable tissue. [place] board to pare down periwound callus.

## 2023-08-24 NOTE — PROGRESS NOTES
"CHIEF COMPLAINT / REASON FOR VISIT  Dylon Santa is a 63 y.o. male that presents today to establish care.    HISTORY OF PRESENT ILLNESS  Due to Alzheimers with decreased ability to complete basic ADLs, he lives with his brother who provides cares and takes him to appointments    S/p amputation left 2nd toe, infected diabetic foot ulcer. Now has recurrent ulceration and is seeing wound care weekly    Blindness one eye    CAD -f/u with cardiology yearly    Type 1 diabetes mellitus - follows with endocrinology Dr. Barajas     Social History     Tobacco Use    Smoking status: Never    Smokeless tobacco: Former     Types: Chew     Quit date: 2016    Tobacco comments:     Chewing Tobacco - 1-2 times per month x15 years   Vaping Use    Vaping Use: Never used   Substance Use Topics    Alcohol use: Yes     Alcohol/week: 0.0 oz     Comment: rare beer    Drug use: No     OBJECTIVE    /62 (BP Location: Left arm, Patient Position: Sitting, BP Cuff Size: Adult)   Pulse 67   Temp 36.6 °C (97.9 °F) (Temporal)   Resp 14   Ht 1.727 m (5' 8\")   Wt 71.7 kg (158 lb)   SpO2 97%   BMI 24.02 kg/m²      PHYSICAL EXAM  Constitutional: Sitting comfortably, in no acute distress, responds minimally with short phrases to some questions  Heart: Regular S1 S2, no murmurs, rub, or gallops  Lungs: Clear to auscultation bilaterally, no wheezes, rales, or rhonchi  Skin: Warm and dry, no rashes or lesions on face or exposed upper extremities    ASSESSMENT & PLAN  1. Alzheimer disease (HCC)  2. Cerebral atrophy (HCC)  3. Debility  4. Deficit in activities of daily living (ADL)  5. Requires assistance with activities of daily living (ADL)  Progressively worsening dementia, now with inability to perform even basic activities of daily living.  Needs assistance with these, lives with his brother who is his caretaker.  Recommend home health referral for intermittent assistance with ADLs.  I did broach the discussion of goals of care, namely " whether to choose comfort based cares versus life-prolonging measures.  The patient's family will have this discussion, and will get back to me regarding this.  Specifically, some of his medications such as the statin may not be necessary for a comfort based approach.  - Referral to Home Health    6. Type 1 diabetes mellitus with proliferative retinopathy of both eyes, macular edema presence unspecified, unspecified proliferative retinopathy type (HCC)  7. Diabetic polyneuropathy associated with type 1 diabetes mellitus (HCC)  8. Proliferative diabetic retinopathy of both eyes without macular edema associated with type 1 diabetes mellitus (HCC)  Chronic, relatively uncontrolled with last A1c 8.6 on 6/8/2023, following with endocrinology for management.  Given his advanced dementia having a more liberal A1c goal is reasonable  - Continuous Blood Gluc Sensor (GeoPalzYLE CARLOS 3 SENSOR) [facility]; Use 1 Carlos 3 sensor every 14 days for blood glucose monitoring. For type 1 diabetes mellitus with hyperglycemia.  Dispense: 4 Each; Refill: 11    9. Amputation of toe of left foot ([facility])  S/p amputation left 2nd toe, infected diabetic foot ulcer. Now has recurrent ulceration and is seeing wound care weekly    10. Coronary artery disease due to lipid rich plaque - mild to moderate on Cath 2019 Left Dominant  Chronic, stable, follows with cardiology.  Currently taking atorvastatin 80 mg daily, clopidogrel 75 mg daily, losartan 100 mg daily, metoprolol succinate 25 mg daily.    In a follow-up visit, or sooner as needed

## 2023-08-25 NOTE — PROGRESS NOTES
Wound Care Procedures 8/25/2023:  Selective debridement with curette to remove ~0.7cm² slough from left plantar hallux wound, and ~2cm² periwound callus.    Patient's brother Luis Carlos asked about options for offloading. He and the patient are going on a trip to [place] in October, and they are hoping that the wound can be healed by that time. Discussed different options for offloading (Total Contact Cast, Offloading boot, offloading shoe, komprex horseshoe). Advised patient's brother that one of the providers at the clinic could prescribe and offloading boot. Requested that patient be scheduled with a provider next week if possible via check-out sheet.

## 2023-08-25 NOTE — PATIENT INSTRUCTIONS
-Keep dressings clean and dry. Change dressings every 3-4 days, and if the dressings become saturated, soiled, or fall off.    -If you need to change your dressings at home: Wash your wound with normal saline, wound cleanser, or unscented soap and water prior to applying your new dressings. Please avoid cleansing with hydrogen peroxide or rubbing alcohol.    -Avoid prolonged standing or sitting without elevating your legs.    -Never walk around the house barefoot. Always wear a rubber soled slipper when walking around the house.    -Should you experience any significant changes in your wound, such as signs of infection (increasing redness, swelling, localized heat, increased pain, fever > 101 F, chills) or have any questions regarding your home care instructions, please contact the wound center at [phone]. If after hours, contact your primary care physician or go to the hospital emergency room.     -If you are 5 or more minutes late for an appointment, we reserve the right to cancel and reschedule that appointment. Additionally, if you are habitually late or not showing (3 late cancellations and/or no shows), we reserve the right to cancel your remaining appointments and it will be your responsibility to obtain a new referral if services are still needed.

## 2023-09-01 NOTE — PROGRESS NOTES
Pt's brother Luis Carlos states he thought pt was getting the offloading boot this appointment. Informed them of pt's next appointment is with Dr. Garcia and pt will be able to get the prescription for the offloading boot or shoe. Luis Carlos states he will purchase offloading boot online and try it in the meantime. Also switched from komprex horseshoe to komprex diving board to see if it will help better.

## 2023-09-01 NOTE — PATIENT INSTRUCTIONS
Apply Aquacel Ag to wound bed as directed by provider. Cover wound with foam and change dressing every 3~4 days. Secure dressing with komprex diving board and tape.    Should you experience any significant changes in your wound(s), such as infection (redness, swelling, localized heat, increased pain, fever > 101 F, chills) or have any questions regarding your home care instructions, please contact the wound center at [phone]. If after hours, contact your primary care physician or go to the hospital emergency room.   Keep dressing clean, dry and covered while bathing. Only change dressing if it becomes over saturated, soiled or falls off.

## 2023-09-01 NOTE — PROCEDURES
CSWD using curette to remove approx. ~0.72cm2 of nonviable tissue from wound bed and ~2cm2 of periwound callus.

## 2023-09-08 NOTE — PATIENT INSTRUCTIONS
-Keep your wound dressing clean, dry, and intact.    -Change your dressing if it becomes soiled, soaked, or falls off.    -Should you experience any significant changes in your wound(s), such as infection (redness, swelling, localized heat, increased pain, fever > 101 F, chills) or have any questions regarding your home care instructions, please contact the wound center at [phone]. If after hours, contact your primary care physician or go to the hospital emergency room.

## 2023-09-08 NOTE — PROGRESS NOTES
Patients brother given script for offloading boot. He states they are going over to  after this visit to get offloading boot.

## 2023-09-08 NOTE — PROGRESS NOTES
Provider Encounter- Diabetic Foot Ulcer      HISTORY OF PRESENT ILLNESS  Wound History:    START OF CARE IN CLINIC: 8/18/2023    REFERRING PROVIDER: Roman Jacobs      WOUND- Diabetic foot ulcer   LOCATION: Left Plantar Hallux   HISTORY:  63M with PMHx of IDDM-1, early dementia reliant on brother for all ADLs. According to brother he developed wound after going barefoot in pool in July. Wound did not resolve with care at home. He was referred to [facility] for further wound care.    Pertinent Medical History: IDDM1, early dementia.     TOBACCO USE:   Former use    Patient's problem list, allergies, and current medications reviewed and updated in Epic    Interval History:  9/8/2023: Clinic visit with Jesse Garcia MD. Patient with dementia, unable to provide any history. Brother is primary care taker. Denies any current issues. He is interested in patient obtaining offloading boot. Brother reports that while unsteady on feet, he is never alone and only ambulates with assistance. Discussed risk of fall, however brother is confident that patient will not ambulate without supervision / assistance. Wound appears slightly improved.      REVIEW OF SYSTEMS:   Review of Systems   Unable to perform ROS: Dementia       PHYSICAL EXAMINATION:   BP (!) 148/78   Pulse 65   Temp 36.1 °C (97 °F) (Temporal)   Resp 17   SpO2 97%     Physical Exam  Constitutional:       General: He is not in acute distress.  Cardiovascular:      Rate and Rhythm: Normal rate.      Comments: Palpable pedal pulses  Pulmonary:      Effort: Pulmonary effort is normal. No respiratory distress.   Skin:     Comments: Left plantar hallux diabetic wound - Wound bed with thin slough, some fibrosis, periwound callus. No evidence of infection.   Neurological:      Mental Status: He is alert.         Monofilament testing with a 10 gram force: sensation intact: decreased bilaterally  Visual Inspection: Feet with ulcer.  Pedal pulses: intact bilaterally     WOUND  ASSESSMENT  Wound 08/11/23 Full Thickness Wound Left Left plantar hallux (Active)   Wound Image    09/08/23 1100   Site Assessment Pink;Red;Yellow 09/08/23 1100   Periwound Assessment Callused 09/08/23 1100   Margins Defined edges 09/08/23 1100   Closure Secondary intention 08/11/23 0800   Drainage Amount Moderate 09/08/23 1100   Drainage Description Serosanguineous;Thick 09/08/23 1100   Treatments Cleansed;Topical Lidocaine;Provider debridement;Site care 09/08/23 1100   Offloading/DME Other (comment) 09/01/23 1030   Wound Cleansing Hypochlorus Acid 09/08/23 1100   Periwound Protectant Skin Protectant Wipes to Periwound 09/08/23 1100   Dressing Status Old drainage 08/18/23 1030   Dressing Changed Changed 09/08/23 1100   Dressing Cleansing/Solutions Not Applicable 09/08/23 1100   Dressing Options Collagen Dressing;Hydrofiber Silver;Nonadhesive Foam;Hypafix Tape 09/08/23 1100   Dressing Change/Treatment Frequency Every 72 hrs, and As Needed 09/08/23 1100   Wound Team Following Weekly 09/08/23 1100   Non-staged Wound Description Full thickness 09/08/23 1100   Wound Length (cm) 0.8 cm 09/08/23 1100   Wound Width (cm) 0.6 cm 09/08/23 1100   Wound Depth (cm) 0.2 cm 09/08/23 1100   Wound Surface Area (cm^2) 0.48 cm^2 09/08/23 1100   Wound Volume (cm^3) 0.096 cm^3 09/08/23 1100   Post-Procedure Length (cm) 1.1 cm 09/08/23 1100   Post-Procedure Width (cm) 0.7 cm 09/08/23 1100   Post-Procedure Depth (cm) 0.2 cm 09/08/23 1100   Post-Procedure Surface Area (cm^2) 0.77 cm^2 09/08/23 1100   Post-Procedure Volume (cm^3) 0.154 cm^3 09/08/23 1100   Wound Healing % -20 09/08/23 1100   Tunneling (cm) 0 cm 09/08/23 1100   Undermining (cm) 0 cm 09/08/23 1100   Wound Odor None 09/08/23 1100   Pulses DP;1+ 08/25/23 1030   Exposed Structures None 09/08/23 1100   Number of days: 28         PROCEDURE:   -2% viscous lidocaine applied topically to wound bed for approximately 5 minutes prior to debridement  -Curette used to debride wound bed  and periwound callus.  Excisional debridement was performed to remove devitalized tissue until healthy, bleeding tissue was visualized.   Entire surface of wound, 0.77 cm2 debrided.  Tissue debrided into the subcutaneous layer.  Periwound callus, ~ 2 cm2, debrided to skin level, excising hyperkeratinized tissue.   -Bleeding controlled with manual pressure.    -Wound care completed by wound RN, refer to flowsheet  -Patient tolerated the procedure well, without c/o pain or discomfort.       Pertinent Labs and Diagnostics:    Labs:     A1c:   Lab Results   Component Value Date/Time    HBA1C 8.6 (A) 06/08/2023 10:06 AM          IMAGING: No results found.    VASCULAR STUDIES: No results found.    LAST  WOUND CULTURE:  DATE :        Lab Results   Component Value Date/Time    CULTRSULT No growth at 48 hours. 07/08/2020 03:06 PM           ASSESSMENT AND PLAN:     1. Diabetic ulcer of toe of left foot associated with type 1 diabetes mellitus, with fat layer exposed (HCC)    9/8/2023  - Wound measuring slightly larger following debridement of periwound callus  - Excisional debridement was performed in clinic, medically necessary to promote wound healing.  - Counseled about offloading. Komprex has been mildly effective. Brother would like to proceed with offloading boot. While patient is somewhat unsteady on feet, brother reports that he is supervised 24/7 and only ambulates with supervision and assistance. Rx provided for offloading boot  - Return to clinic for assessment and debridement  - Brother reports they will be traveling to [place] in October for prolonged period   Wound Care: Hydrofiber silver, foam, tape    2. Type 1 diabetes mellitus with proliferative retinopathy of both eyes, macular edema presence unspecified, unspecified proliferative retinopathy type (HCC)    9/8/2023  - Patient with long standing type 1 DM.  - Most recent A1c 8.6  - Due to underlying advanced dementia, they are not being aggressive with  glucose control.    3. Diabetic polyneuropathy associated with type 1 diabetes mellitus ([facility])    9/8/2023  - Implications of loss of protective sensation (LOPS) discussed with patient- including increased risk for amputation.  Advised to check feet at least daily, moisturize feet, and to always wear protective foot wear.   - Will consider Rx for diabetic shoes and inserts once wound near resolution if within goals of care    4. Dementia, unspecified dementia severity, unspecified dementia type, unspecified whether behavioral, psychotic, or mood disturbance or anxiety ([facility])  9/8/2023  - Advanced early dementia  - Brother is primary caregiver and reports that patient requires assistance with all ADLs  - Brother reports that goal is to limit aggressive or invasive treatments. He has been having goals of care discussions with PCP.    PATIENT EDUCATION  - Importance of tight glucose control for wound healing   - Implications of loss of protective sensation (LOPS) discussed with patient- including increased risk for amputation.  - Advised to check feet at least daily, moisturize feet, and to always wear protective foot wear.   -  Importance of offloading foot to assist with wound healing  - Advised pt not to trim nails or calluses, seek foot/nail care from podiatrist or certified foot/nail nurse  - Importance of adequate nutrition for wound healing    My total time spent caring for the patient on the day of the encounter was 30 minutes.   This does not include time spent on separately billable procedures/tests.       Please note that this note may have been created using voice recognition software. I have worked with technical experts from [facility] to optimize the interface.  I have made every reasonable attempt to correct obvious errors, but there may be errors of grammar and possibly content that I did not discover before finalizing the note.    N

## 2023-09-11 NOTE — PROGRESS NOTES
RN-CDE Note    Subjective:   Endocrinology Clinic Progress Note  PCP: Alexander Patel M.D.    HPI:  Dylon Santa is a 63 y.o. old patient who is seen today by the Diabetes Nurse Specialist for review of Type 1 Diabetes.  Recent changes in health: He has Alzheimer's and is cared for by his brother.  He is having problems with the Marcial running 60 points off.  He is having problems with his left toe healing up.  He is going to wound care every week.  DM:   Last A1c:   Lab Results   Component Value Date/Time    HBA1C 8.4 (A) 09/11/2023 10:51 AM      Previous A1c was 8.6 on 6/8/23  A1C GOAL: < 7    Diabetes Medications:   Toujeo 20 units daily  Novolog 7 units pus 1:40>120      Exercise: Trying to keep him off his left foot.  Diet: 3 meals and snacks if needed.  Sugar free pudding before bed.  Patient's body mass index is 23.72 kg/m². Exercise and nutrition counseling were performed at this visit.    Glucose monitoring frequency: See Marcial download    Hypoglycemic episodes: yes - at night but he is laying on the sensor  Last Retinal Exam:  Not able to sit for exam  Daily Foot Exam: Yes   Foot Exam:  Monofilament: done on right foot only.  Left foot with soft shoe and bandaged.  Monofilament testing with a 10 gram force: sensation intact: intact bilaterally  Visual Inspection: Right foot without maceration, ulcers, fissures.  Pedal pulses: intact bilaterally   Lab Results   Component Value Date/Time    MALBCRT 103 (H) 02/07/2023 06:28 AM    MICROALBUR 6.8 02/07/2023 06:28 AM        ACR Albumin/Creatinine Ratio goal <30     HTN:   Blood pressure goal <130/<80 .   Currently Rx ACE/ARB: Yes     Dyslipidemia:    Lab Results   Component Value Date/Time    CHOLSTRLTOT 134 02/07/2023 06:28 AM    LDL 80 02/07/2023 06:28 AM    HDL 43 02/07/2023 06:28 AM    TRIGLYCERIDE 54 02/07/2023 06:28 AM         Currently Rx Statin: Yes     He  reports that he has never smoked. He quit smokeless tobacco use about 7 years ago.  His  smokeless tobacco use included chew.      Plan:     Discussed and educated on:   - All medications, side effects and compliance (discussed carefully)  - Annual eye examinations at Ophthalmology  - Home glucose monitoring emphasized  - Weight control and daily exercise    Recommended medication changes: No changes at this time.  He will contact Marcial with the problems he is having with the cgm being so far off the blood sugar.  He will follow up with Dr. Booker in 6 months and call with any questions.

## 2023-09-15 NOTE — PROGRESS NOTES
Wound Care Procedures 9/15/2023:  Selective debridement with curette to remove ~0.4cm² slough from left hallux wound. Patient picked up his offloading shoe 9/12/2023 and is wearing the offloading shoe for today's visit.

## 2023-09-15 NOTE — PATIENT INSTRUCTIONS
-Keep dressings clean and dry. Change dressings every 3-4 days, and if the dressings become saturated, soiled, or fall off.    -If you need to change your dressings at home: Wash your wound with normal saline, wound cleanser, or unscented soap and water prior to applying your new dressings. Please avoid cleansing with hydrogen peroxide or rubbing alcohol.    -Avoid prolonged standing or sitting without elevating your legs.    -Never walk around the house barefoot. Wear your offloading shoe any time you are up walking, even in your home.    -Should you experience any significant changes in your wound, such as signs of infection (increasing redness, swelling, localized heat, increased pain, fever > 101 F, chills) or have any questions regarding your home care instructions, please contact the wound center at [phone]. If after hours, contact your primary care physician or go to the hospital emergency room.     -If you are 5 or more minutes late for an appointment, we reserve the right to cancel and reschedule that appointment. Additionally, if you are habitually late or not showing (3 late cancellations and/or no shows), we reserve the right to cancel your remaining appointments and it will be your responsibility to obtain a new referral if services are still needed.

## 2023-09-22 NOTE — PROGRESS NOTES
Provider Encounter- Diabetic Foot Ulcer      HISTORY OF PRESENT ILLNESS  Wound History:    START OF CARE IN CLINIC: 8/18/2023    REFERRING PROVIDER: Roman Jacobs      WOUND- Diabetic foot ulcer   LOCATION: Left Plantar Hallux   HISTORY:  63M with PMHx of IDDM-1, early dementia reliant on brother for all ADLs. According to brother he developed wound after going barefoot in pool in July. Wound did not resolve with care at home. He was referred to [facility] for further wound care.    Pertinent Medical History: IDDM1, early dementia.     TOBACCO USE:   Former use    Patient's problem list, allergies, and current medications reviewed and updated in Epic    Interval History:  9/8/2023: Clinic visit with Jesse Garcia MD. Patient with dementia, unable to provide any history. Brother is primary care taker. Denies any current issues. He is interested in patient obtaining offloading boot. Brother reports that while unsteady on feet, he is never alone and only ambulates with assistance. Discussed risk of fall, however brother is confident that patient will not ambulate without supervision / assistance. Wound appears slightly improved.      9/22/2023 : Clinic visit with SAMMI Alicea, MARTIN-BC, DESHAWN, CFJARED.   Patient presents to clinic today accompanied by his brother/caregiver.  States he is feeling well.  Blood sugars averaging 167 (CGM).  His wound is improving, nearly resolved.  I provided patient's brother with prescription for orthotic shoes and inserts, advised to begin process soon.  Patient and his brother will be going to [facility] in mid October, and will be staying there for several months.    REVIEW OF SYSTEMS:   Review of Systems   Unable to perform ROS: Dementia       PHYSICAL EXAMINATION:   /73   Pulse 70   Temp 35.9 °C (96.7 °F) (Temporal)   Resp 17   SpO2 98%     Physical Exam  Constitutional:       General: He is not in acute distress.  Cardiovascular:      Rate and Rhythm: Normal rate.       Comments: Palpable pedal pulses  Pulmonary:      Effort: Pulmonary effort is normal. No respiratory distress.   Musculoskeletal:      Comments: Positive Silfverskiold bilaterally   Skin:     Comments: Left plantar hallux diabetic wound -wound area has decreased, thin layer of slough to wound bed, minimal to moderate serosanguineous drainage, thin periwound callus.  No evidence of infection   Neurological:      Mental Status: He is alert.         Monofilament testing with a 10 gram force: sensation intact: decreased bilaterally  Visual Inspection: Feet with ulcer.  Pedal pulses: intact bilaterally     WOUND ASSESSMENT  Wound 08/11/23 Full Thickness Wound Left Left plantar hallux (Active)   Wound Image    09/22/23 1050   Site Assessment Pink;Yellow 09/22/23 1050   Periwound Assessment Callused 09/22/23 1050   Margins Epibole (rolled edges) 09/22/23 1050   Closure Secondary intention 08/11/23 0800   Drainage Amount Small 09/22/23 1050   Drainage Description Serosanguineous 09/22/23 1050   Treatments Cleansed;Provider debridement 09/22/23 1050   Offloading/DME Offloading Shoe 09/22/23 1050   Wound Cleansing Hypochlorus Acid 09/22/23 1050   Periwound Protectant Skin Protectant Wipes to Periwound 09/22/23 1050   Dressing Status Old drainage 08/18/23 1030   Dressing Changed Changed 09/22/23 1050   Dressing Cleansing/Solutions Normal Saline 09/22/23 1050   Dressing Options Collagen Dressing;Hydrofiber Silver;Nonadhesive Foam;Hypafix Tape 09/22/23 1050   Dressing Change/Treatment Frequency Every 72 hrs, and As Needed 09/22/23 1050   Wound Team Following Weekly 09/22/23 1050   Non-staged Wound Description Full thickness 09/22/23 1050   Wound Length (cm) 0.3 cm 09/22/23 1050   Wound Width (cm) 0.2 cm 09/22/23 1050   Wound Depth (cm) 0.1 cm 09/22/23 1050   Wound Surface Area (cm^2) 0.06 cm^2 09/22/23 1050   Wound Volume (cm^3) 0.006 cm^3 09/22/23 1050   Post-Procedure Length (cm) 0.4 cm 09/22/23 1050   Post-Procedure Width  (cm) 0.2 cm 09/22/23 1050   Post-Procedure Depth (cm) 0.1 cm 09/22/23 1050   Post-Procedure Surface Area (cm^2) 0.08 cm^2 09/22/23 1050   Post-Procedure Volume (cm^3) 0.008 cm^3 09/22/23 1050   Wound Healing % 93 09/22/23 1050   Tunneling (cm) 0 cm 09/22/23 1050   Undermining (cm) 0 cm 09/22/23 1050   Wound Odor None 09/22/23 1050   Pulses DP;1+ 09/22/23 1050   Exposed Structures None 09/22/23 1050   Number of days: 42         PROCEDURE:   -2% viscous lidocaine applied topically to wound bed for approximately 5 minutes prior to debridement  -Curette used to debride wound bed and periwound callus.  Excisional debridement was performed to remove devitalized tissue until healthy, bleeding tissue was visualized.   Entire surface of wound, 0.08 cm² debrided.  Tissue debrided into the subcutaneous layer.  Periwound callus, ~2.0 cm², debrided to skin level, excising hyperkeratinized tissue.   -Bleeding controlled with manual pressure.    -Wound care completed by wound RN, refer to flowsheet  -Patient tolerated the procedure well, without c/o pain or discomfort.       Pertinent Labs and Diagnostics:    Labs:     A1c:   Lab Results   Component Value Date/Time    HBA1C 8.4 (A) 09/11/2023 10:51 AM            IMAGING: No results found.    VASCULAR STUDIES: No results found.    LAST  WOUND CULTURE:  DATE :        Lab Results   Component Value Date/Time    CULTRSULT No growth at 48 hours. 07/08/2020 03:06 PM           ASSESSMENT AND PLAN:     1. Diabetic ulcer of toe of left foot associated with type 1 diabetes mellitus, with fat layer exposed (HCC)    9/22/2023: Wound is currently progressing well, area has decreased  - Excisional debridement was performed in clinic, medically necessary to promote wound healing.  -Patient is wearing offloading boot, prescribed last visit.  -Wound nearly resolved.  I provided patient's brother with prescription for diabetic shoes and inserts.  Informed him that these would need to be replaced  yearly  -Patient to return to clinic weekly for assessment and debridement.  - Brother reports they will be traveling to [place] in October for prolonged period and if wound still present, will need referral to wound clinic in [place]   Wound Care: Hydrofiber silver, foam, tape    2. 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  - Most recent A1c 8.6  - Due to underlying advanced dementia, they are not being aggressive with glucose control.    3. Diabetic polyneuropathy associated with type 1 diabetes mellitus ([facility])    9/22/2023:  - Implications of loss of protective sensation (LOPS) discussed with patient- including increased risk for amputation.  Advised to check feet at least daily, moisturize feet, and to always wear protective foot wear.   -Rx for diabetic shoes and inserts provided to patient's brother today in clinic.  Wound is nearly resolved, patient should begin process for shoes    4. Dementia, unspecified dementia severity, unspecified dementia type, unspecified whether behavioral, psychotic, or mood disturbance or anxiety ([facility])  9 9/22/2023:  - Advanced early dementia  - Brother is primary caregiver and reports that patient requires assistance with all ADLs  - Brother reports that goal is to limit aggressive or invasive treatments. He has been having goals of care discussions with PCP.    PATIENT EDUCATION  - Importance of tight glucose control for wound healing   - Implications of loss of protective sensation (LOPS) discussed with patient- including increased risk for amputation.  - Advised to check feet at least daily, moisturize feet, and to always wear protective foot wear.   -  Importance of offloading foot to assist with wound healing  - Advised pt not to trim nails or  calluses, seek foot/nail care from podiatrist or certified foot/nail nurse  - Importance of adequate nutrition for wound healing    My total time spent caring for the patient on the day of the encounter was 20 minutes.   This does not include time spent on separately billable procedures/tests.       Please note that this note may have been created using voice recognition software. I have worked with technical experts from [facility] to optimize the interface.  I have made every reasonable attempt to correct obvious errors, but there may be errors of grammar and possibly content that I did not discover before finalizing the note.    N

## 2023-09-22 NOTE — PATIENT INSTRUCTIONS
-Keep dressings clean and dry. Change dressings every 3-4 days, and if they become over saturated, soiled or fall off.     -If you need to change your dressings at home: Wash your wound with normal saline, wound cleanser, or unscented soap and water prior to applying your new dressings. Please avoid cleansing with hydrogen peroxide or rubbing alcohol.    -Avoid prolonged standing or sitting without elevating your legs.    -Should you experience any significant changes in your wound, such as signs of infection (increasing redness, swelling, localized heat, increased pain, fever > 101 F, chills) or have any questions regarding your home care instructions, please contact the wound center at [phone]. If after hours, contact your primary care physician or go to the hospital emergency room.     -If you are 5 or more minutes late for an appointment, we reserve the right to cancel and reschedule that appointment. Additionally, if you are habitually late or not showing (3 late cancellations and/or no shows), we reserve the right to cancel your remaining appointments and it will be your responsibility to obtain a new referral if services are still needed.

## 2023-09-29 NOTE — PATIENT INSTRUCTIONS
Apply silver hydrofiber to wound bed as directed by provider. Cover wound with foam and change dressing every 3~4days. Secure dressing with tape.    Should you experience any significant changes in your wound(s), such as infection (redness, swelling, localized heat, increased pain, fever > 101 F, chills) or have any questions regarding your home care instructions, please contact the wound center at [phone]. If after hours, contact your primary care physician or go to the hospital emergency room.   Keep dressing clean, dry and covered while bathing. Only change dressing if it becomes over saturated, soiled or falls off.

## 2023-10-06 NOTE — PATIENT INSTRUCTIONS
- Resolved wound be fragile for a few days, bathe and dry area gently, only ever regains a maximum of 80% of the tensile strength of the surrounding skin, remodeling of scar can continue for 6mo - a year. Contact PCP for a referral back to wound care if any problems with area opening and draining again.    -Should you experience any significant changes in your wound(s), such as infection (redness, swelling, localized heat, increased pain, fever > 101 F, chills) or have any questions regarding your home care instructions, please contact the wound center at [phone]. If after hours, contact your primary care physician or go to the hospital emergency room.

## 2023-10-13 NOTE — PROGRESS NOTES
"CHW Brannon spoke to pt's POA to discuss needs. POA met CHW at [facility] event and is in need of some resources for pt's care. CHW is going to gather information regarding: in home care assistance, Alzheimer's care, group homes and other forms of placement, respite care, in home assistance to administer insulin in case POA is not available to do so in an emergency situation.     POA mentioned that they were leaving on a roadtrip for roughly 45 days and would be back to [place] around then (beginning of December). With amount of information being requested CHW will begin to gather information and have a \"starter\" list of resources available for pt by 10/20/2023 and e-mail to OPAL @ [email or web address]. CHW will follow up further with POA once he returns to [place].     CHW provided POA with contact information in case he has further questions or concerns.   "

## 2023-11-14 NOTE — TELEPHONE ENCOUNTER
VOICEMAIL  1. Caller Name: Luis Carlos Santa                      Call Back Number: [phone] (home)       2. Message: . Brother patient is calling reporting that Dylon is having issues crying due to his Alzheimer's. He is looking to get a refill on his trazodone to help with this.    3. Patient approves office to leave a detailed voicemail/MyChart message: yes

## 2024-01-01 ENCOUNTER — APPOINTMENT (OUTPATIENT)
Dept: RADIOLOGY | Facility: MEDICAL CENTER | Age: 64
DRG: 638 | End: 2024-01-01
Attending: HOSPITALIST
Payer: MEDICARE

## 2024-01-01 ENCOUNTER — APPOINTMENT (OUTPATIENT)
Dept: RADIOLOGY | Facility: MEDICAL CENTER | Age: 64
DRG: 638 | End: 2024-01-01
Attending: INTERNAL MEDICINE
Payer: MEDICARE

## 2024-01-01 ENCOUNTER — TELEPHONE (OUTPATIENT)
Dept: NEUROLOGY | Facility: MEDICAL CENTER | Age: 64
End: 2024-01-01
Payer: MEDICARE

## 2024-01-01 ENCOUNTER — HOSPITAL ENCOUNTER (INPATIENT)
Facility: MEDICAL CENTER | Age: 64
LOS: 43 days | DRG: 638 | End: 2024-02-26
Attending: EMERGENCY MEDICINE | Admitting: HOSPITALIST
Payer: MEDICARE

## 2024-01-01 ENCOUNTER — TELEPHONE (OUTPATIENT)
Dept: ENDOCRINOLOGY | Facility: MEDICAL CENTER | Age: 64
End: 2024-01-01

## 2024-01-01 ENCOUNTER — APPOINTMENT (OUTPATIENT)
Dept: RADIOLOGY | Facility: MEDICAL CENTER | Age: 64
DRG: 638 | End: 2024-01-01
Attending: EMERGENCY MEDICINE
Payer: MEDICARE

## 2024-01-01 ENCOUNTER — OFFICE VISIT (OUTPATIENT)
Dept: MEDICAL GROUP | Facility: PHYSICIAN GROUP | Age: 64
End: 2024-01-01
Payer: MEDICARE

## 2024-01-01 ENCOUNTER — PATIENT OUTREACH (OUTPATIENT)
Dept: SCHEDULING | Facility: IMAGING CENTER | Age: 64
End: 2024-01-01
Payer: MEDICARE

## 2024-01-01 VITALS
BODY MASS INDEX: 22.58 KG/M2 | OXYGEN SATURATION: 97 % | RESPIRATION RATE: 18 BRPM | HEART RATE: 65 BPM | DIASTOLIC BLOOD PRESSURE: 72 MMHG | SYSTOLIC BLOOD PRESSURE: 142 MMHG | HEIGHT: 68 IN | WEIGHT: 149 LBS | TEMPERATURE: 98.6 F

## 2024-01-01 VITALS
WEIGHT: 137.57 LBS | OXYGEN SATURATION: 97 % | SYSTOLIC BLOOD PRESSURE: 91 MMHG | BODY MASS INDEX: 20.85 KG/M2 | RESPIRATION RATE: 17 BRPM | DIASTOLIC BLOOD PRESSURE: 42 MMHG | TEMPERATURE: 97.7 F | HEART RATE: 86 BPM | HEIGHT: 68 IN

## 2024-01-01 DIAGNOSIS — E10.3593 TYPE 1 DIABETES MELLITUS WITH PROLIFERATIVE RETINOPATHY OF BOTH EYES, MACULAR EDEMA PRESENCE UNSPECIFIED, UNSPECIFIED PROLIFERATIVE RETINOPATHY TYPE (HCC): ICD-10-CM

## 2024-01-01 DIAGNOSIS — K59.00 CONSTIPATION, UNSPECIFIED CONSTIPATION TYPE: ICD-10-CM

## 2024-01-01 DIAGNOSIS — R73.9 HYPERGLYCEMIA: ICD-10-CM

## 2024-01-01 DIAGNOSIS — E10.69 TYPE 1 DIABETES MELLITUS WITH OTHER SPECIFIED COMPLICATION (HCC): ICD-10-CM

## 2024-01-01 DIAGNOSIS — G30.0 EARLY ONSET ALZHEIMER'S DEMENTIA WITH BEHAVIORAL DISTURBANCE (HCC): ICD-10-CM

## 2024-01-01 DIAGNOSIS — Z79.4 TYPE 2 DIABETES MELLITUS WITH HYPERGLYCEMIA, WITH LONG-TERM CURRENT USE OF INSULIN (HCC): ICD-10-CM

## 2024-01-01 DIAGNOSIS — G30.9 ALZHEIMER'S DISEASE (HCC): ICD-10-CM

## 2024-01-01 DIAGNOSIS — F02.80 ALZHEIMER'S DISEASE (HCC): ICD-10-CM

## 2024-01-01 DIAGNOSIS — Z74.09 IMPAIRED MOBILITY AND ADLS: ICD-10-CM

## 2024-01-01 DIAGNOSIS — Z78.9 IMPAIRED MOBILITY AND ADLS: ICD-10-CM

## 2024-01-01 DIAGNOSIS — E11.65 TYPE 2 DIABETES MELLITUS WITH HYPERGLYCEMIA, WITH LONG-TERM CURRENT USE OF INSULIN (HCC): ICD-10-CM

## 2024-01-01 DIAGNOSIS — F02.818 EARLY ONSET ALZHEIMER'S DEMENTIA WITH BEHAVIORAL DISTURBANCE (HCC): ICD-10-CM

## 2024-01-01 LAB
ALBUMIN SERPL BCP-MCNC: 3.5 G/DL (ref 3.2–4.9)
ALBUMIN SERPL BCP-MCNC: 3.6 G/DL (ref 3.2–4.9)
ALBUMIN SERPL BCP-MCNC: 4 G/DL (ref 3.2–4.9)
ALBUMIN SERPL BCP-MCNC: 4 G/DL (ref 3.2–4.9)
ALBUMIN/GLOB SERPL: 1.1 G/DL
ALBUMIN/GLOB SERPL: 1.2 G/DL
ALBUMIN/GLOB SERPL: 1.3 G/DL
ALBUMIN/GLOB SERPL: 1.3 G/DL
ALP SERPL-CCNC: 57 U/L (ref 30–99)
ALP SERPL-CCNC: 65 U/L (ref 30–99)
ALP SERPL-CCNC: 69 U/L (ref 30–99)
ALP SERPL-CCNC: 72 U/L (ref 30–99)
ALT SERPL-CCNC: 19 U/L (ref 2–50)
ALT SERPL-CCNC: 29 U/L (ref 2–50)
ALT SERPL-CCNC: 31 U/L (ref 2–50)
ALT SERPL-CCNC: 35 U/L (ref 2–50)
ANION GAP SERPL CALC-SCNC: 11 MMOL/L (ref 7–16)
ANION GAP SERPL CALC-SCNC: 12 MMOL/L (ref 7–16)
ANION GAP SERPL CALC-SCNC: 13 MMOL/L (ref 7–16)
ANION GAP SERPL CALC-SCNC: 14 MMOL/L (ref 7–16)
ANION GAP SERPL CALC-SCNC: 7 MMOL/L (ref 7–16)
ANION GAP SERPL CALC-SCNC: 8 MMOL/L (ref 7–16)
ANION GAP SERPL CALC-SCNC: 8 MMOL/L (ref 7–16)
APPEARANCE UR: CLEAR
AST SERPL-CCNC: 29 U/L (ref 12–45)
AST SERPL-CCNC: 35 U/L (ref 12–45)
AST SERPL-CCNC: 41 U/L (ref 12–45)
AST SERPL-CCNC: 44 U/L (ref 12–45)
B-OH-BUTYR SERPL-MCNC: 0.79 MMOL/L (ref 0.02–0.27)
BASE EXCESS BLDV CALC-SCNC: -3 MMOL/L
BASOPHILS # BLD AUTO: 0.5 % (ref 0–1.8)
BASOPHILS # BLD AUTO: 0.6 % (ref 0–1.8)
BASOPHILS # BLD AUTO: 0.6 % (ref 0–1.8)
BASOPHILS # BLD: 0.03 K/UL (ref 0–0.12)
BASOPHILS # BLD: 0.04 K/UL (ref 0–0.12)
BASOPHILS # BLD: 0.05 K/UL (ref 0–0.12)
BILIRUB SERPL-MCNC: 0.3 MG/DL (ref 0.1–1.5)
BILIRUB SERPL-MCNC: 0.4 MG/DL (ref 0.1–1.5)
BILIRUB SERPL-MCNC: 0.5 MG/DL (ref 0.1–1.5)
BILIRUB SERPL-MCNC: 0.7 MG/DL (ref 0.1–1.5)
BILIRUB UR QL STRIP.AUTO: NEGATIVE
BODY TEMPERATURE: 36.1 CENTIGRADE
BUN SERPL-MCNC: 24 MG/DL (ref 8–22)
BUN SERPL-MCNC: 26 MG/DL (ref 8–22)
BUN SERPL-MCNC: 27 MG/DL (ref 8–22)
BUN SERPL-MCNC: 28 MG/DL (ref 8–22)
BUN SERPL-MCNC: 33 MG/DL (ref 8–22)
BUN SERPL-MCNC: 40 MG/DL (ref 8–22)
BUN SERPL-MCNC: 45 MG/DL (ref 8–22)
CALCIUM ALBUM COR SERPL-MCNC: 9.1 MG/DL (ref 8.5–10.5)
CALCIUM ALBUM COR SERPL-MCNC: 9.3 MG/DL (ref 8.5–10.5)
CALCIUM SERPL-MCNC: 8.8 MG/DL (ref 8.5–10.5)
CALCIUM SERPL-MCNC: 8.8 MG/DL (ref 8.5–10.5)
CALCIUM SERPL-MCNC: 8.9 MG/DL (ref 8.5–10.5)
CALCIUM SERPL-MCNC: 9 MG/DL (ref 8.5–10.5)
CALCIUM SERPL-MCNC: 9 MG/DL (ref 8.5–10.5)
CALCIUM SERPL-MCNC: 9.1 MG/DL (ref 8.5–10.5)
CALCIUM SERPL-MCNC: 9.2 MG/DL (ref 8.5–10.5)
CALCIUM SERPL-MCNC: 9.3 MG/DL (ref 8.5–10.5)
CALCIUM SERPL-MCNC: 9.5 MG/DL (ref 8.5–10.5)
CHLORIDE SERPL-SCNC: 101 MMOL/L (ref 96–112)
CHLORIDE SERPL-SCNC: 101 MMOL/L (ref 96–112)
CHLORIDE SERPL-SCNC: 102 MMOL/L (ref 96–112)
CHLORIDE SERPL-SCNC: 91 MMOL/L (ref 96–112)
CHLORIDE SERPL-SCNC: 95 MMOL/L (ref 96–112)
CHLORIDE SERPL-SCNC: 95 MMOL/L (ref 96–112)
CHLORIDE SERPL-SCNC: 97 MMOL/L (ref 96–112)
CO2 SERPL-SCNC: 20 MMOL/L (ref 20–33)
CO2 SERPL-SCNC: 22 MMOL/L (ref 20–33)
CO2 SERPL-SCNC: 22 MMOL/L (ref 20–33)
CO2 SERPL-SCNC: 23 MMOL/L (ref 20–33)
CO2 SERPL-SCNC: 25 MMOL/L (ref 20–33)
CO2 SERPL-SCNC: 28 MMOL/L (ref 20–33)
CO2 SERPL-SCNC: 28 MMOL/L (ref 20–33)
COLOR UR: YELLOW
CREAT SERPL-MCNC: 1.12 MG/DL (ref 0.5–1.4)
CREAT SERPL-MCNC: 1.4 MG/DL (ref 0.5–1.4)
CREAT SERPL-MCNC: 1.45 MG/DL (ref 0.5–1.4)
CREAT SERPL-MCNC: 1.47 MG/DL (ref 0.5–1.4)
CREAT SERPL-MCNC: 1.6 MG/DL (ref 0.5–1.4)
CREAT SERPL-MCNC: 1.63 MG/DL (ref 0.5–1.4)
CREAT SERPL-MCNC: 1.65 MG/DL (ref 0.5–1.4)
CREAT SERPL-MCNC: 1.67 MG/DL (ref 0.5–1.4)
CREAT SERPL-MCNC: 1.9 MG/DL (ref 0.5–1.4)
EKG IMPRESSION: NORMAL
EOSINOPHIL # BLD AUTO: 0.07 K/UL (ref 0–0.51)
EOSINOPHIL # BLD AUTO: 0.09 K/UL (ref 0–0.51)
EOSINOPHIL # BLD AUTO: 0.09 K/UL (ref 0–0.51)
EOSINOPHIL NFR BLD: 0.9 % (ref 0–6.9)
EOSINOPHIL NFR BLD: 1.4 % (ref 0–6.9)
EOSINOPHIL NFR BLD: 1.4 % (ref 0–6.9)
ERYTHROCYTE [DISTWIDTH] IN BLOOD BY AUTOMATED COUNT: 40.1 FL (ref 35.9–50)
ERYTHROCYTE [DISTWIDTH] IN BLOOD BY AUTOMATED COUNT: 41.3 FL (ref 35.9–50)
ERYTHROCYTE [DISTWIDTH] IN BLOOD BY AUTOMATED COUNT: 42 FL (ref 35.9–50)
ERYTHROCYTE [DISTWIDTH] IN BLOOD BY AUTOMATED COUNT: 42.1 FL (ref 35.9–50)
ERYTHROCYTE [DISTWIDTH] IN BLOOD BY AUTOMATED COUNT: 42.8 FL (ref 35.9–50)
ERYTHROCYTE [DISTWIDTH] IN BLOOD BY AUTOMATED COUNT: 43.3 FL (ref 35.9–50)
GAMMA INTERFERON BACKGROUND BLD IA-ACNC: 0.02 IU/ML
GFR SERPLBLD CREATININE-BSD FMLA CKD-EPI: 39 ML/MIN/1.73 M 2
GFR SERPLBLD CREATININE-BSD FMLA CKD-EPI: 46 ML/MIN/1.73 M 2
GFR SERPLBLD CREATININE-BSD FMLA CKD-EPI: 47 ML/MIN/1.73 M 2
GFR SERPLBLD CREATININE-BSD FMLA CKD-EPI: 47 ML/MIN/1.73 M 2
GFR SERPLBLD CREATININE-BSD FMLA CKD-EPI: 48 ML/MIN/1.73 M 2
GFR SERPLBLD CREATININE-BSD FMLA CKD-EPI: 53 ML/MIN/1.73 M 2
GFR SERPLBLD CREATININE-BSD FMLA CKD-EPI: 54 ML/MIN/1.73 M 2
GFR SERPLBLD CREATININE-BSD FMLA CKD-EPI: 57 ML/MIN/1.73 M 2
GFR SERPLBLD CREATININE-BSD FMLA CKD-EPI: 74 ML/MIN/1.73 M 2
GLOBULIN SER CALC-MCNC: 3 G/DL (ref 1.9–3.5)
GLOBULIN SER CALC-MCNC: 3.2 G/DL (ref 1.9–3.5)
GLOBULIN SER CALC-MCNC: 3.2 G/DL (ref 1.9–3.5)
GLOBULIN SER CALC-MCNC: 3.4 G/DL (ref 1.9–3.5)
GLUCOSE BLD STRIP.AUTO-MCNC: 100 MG/DL (ref 65–99)
GLUCOSE BLD STRIP.AUTO-MCNC: 101 MG/DL (ref 65–99)
GLUCOSE BLD STRIP.AUTO-MCNC: 102 MG/DL (ref 65–99)
GLUCOSE BLD STRIP.AUTO-MCNC: 103 MG/DL (ref 65–99)
GLUCOSE BLD STRIP.AUTO-MCNC: 104 MG/DL (ref 65–99)
GLUCOSE BLD STRIP.AUTO-MCNC: 106 MG/DL (ref 65–99)
GLUCOSE BLD STRIP.AUTO-MCNC: 107 MG/DL (ref 65–99)
GLUCOSE BLD STRIP.AUTO-MCNC: 110 MG/DL (ref 65–99)
GLUCOSE BLD STRIP.AUTO-MCNC: 120 MG/DL (ref 65–99)
GLUCOSE BLD STRIP.AUTO-MCNC: 121 MG/DL (ref 65–99)
GLUCOSE BLD STRIP.AUTO-MCNC: 121 MG/DL (ref 65–99)
GLUCOSE BLD STRIP.AUTO-MCNC: 122 MG/DL (ref 65–99)
GLUCOSE BLD STRIP.AUTO-MCNC: 125 MG/DL (ref 65–99)
GLUCOSE BLD STRIP.AUTO-MCNC: 125 MG/DL (ref 65–99)
GLUCOSE BLD STRIP.AUTO-MCNC: 126 MG/DL (ref 65–99)
GLUCOSE BLD STRIP.AUTO-MCNC: 127 MG/DL (ref 65–99)
GLUCOSE BLD STRIP.AUTO-MCNC: 128 MG/DL (ref 65–99)
GLUCOSE BLD STRIP.AUTO-MCNC: 128 MG/DL (ref 65–99)
GLUCOSE BLD STRIP.AUTO-MCNC: 130 MG/DL (ref 65–99)
GLUCOSE BLD STRIP.AUTO-MCNC: 130 MG/DL (ref 65–99)
GLUCOSE BLD STRIP.AUTO-MCNC: 132 MG/DL (ref 65–99)
GLUCOSE BLD STRIP.AUTO-MCNC: 132 MG/DL (ref 65–99)
GLUCOSE BLD STRIP.AUTO-MCNC: 133 MG/DL (ref 65–99)
GLUCOSE BLD STRIP.AUTO-MCNC: 134 MG/DL (ref 65–99)
GLUCOSE BLD STRIP.AUTO-MCNC: 136 MG/DL (ref 65–99)
GLUCOSE BLD STRIP.AUTO-MCNC: 136 MG/DL (ref 65–99)
GLUCOSE BLD STRIP.AUTO-MCNC: 137 MG/DL (ref 65–99)
GLUCOSE BLD STRIP.AUTO-MCNC: 138 MG/DL (ref 65–99)
GLUCOSE BLD STRIP.AUTO-MCNC: 150 MG/DL (ref 65–99)
GLUCOSE BLD STRIP.AUTO-MCNC: 155 MG/DL (ref 65–99)
GLUCOSE BLD STRIP.AUTO-MCNC: 155 MG/DL (ref 65–99)
GLUCOSE BLD STRIP.AUTO-MCNC: 157 MG/DL (ref 65–99)
GLUCOSE BLD STRIP.AUTO-MCNC: 159 MG/DL (ref 65–99)
GLUCOSE BLD STRIP.AUTO-MCNC: 160 MG/DL (ref 65–99)
GLUCOSE BLD STRIP.AUTO-MCNC: 160 MG/DL (ref 65–99)
GLUCOSE BLD STRIP.AUTO-MCNC: 161 MG/DL (ref 65–99)
GLUCOSE BLD STRIP.AUTO-MCNC: 161 MG/DL (ref 65–99)
GLUCOSE BLD STRIP.AUTO-MCNC: 163 MG/DL (ref 65–99)
GLUCOSE BLD STRIP.AUTO-MCNC: 164 MG/DL (ref 65–99)
GLUCOSE BLD STRIP.AUTO-MCNC: 166 MG/DL (ref 65–99)
GLUCOSE BLD STRIP.AUTO-MCNC: 167 MG/DL (ref 65–99)
GLUCOSE BLD STRIP.AUTO-MCNC: 168 MG/DL (ref 65–99)
GLUCOSE BLD STRIP.AUTO-MCNC: 169 MG/DL (ref 65–99)
GLUCOSE BLD STRIP.AUTO-MCNC: 169 MG/DL (ref 65–99)
GLUCOSE BLD STRIP.AUTO-MCNC: 171 MG/DL (ref 65–99)
GLUCOSE BLD STRIP.AUTO-MCNC: 175 MG/DL (ref 65–99)
GLUCOSE BLD STRIP.AUTO-MCNC: 175 MG/DL (ref 65–99)
GLUCOSE BLD STRIP.AUTO-MCNC: 178 MG/DL (ref 65–99)
GLUCOSE BLD STRIP.AUTO-MCNC: 179 MG/DL (ref 65–99)
GLUCOSE BLD STRIP.AUTO-MCNC: 181 MG/DL (ref 65–99)
GLUCOSE BLD STRIP.AUTO-MCNC: 181 MG/DL (ref 65–99)
GLUCOSE BLD STRIP.AUTO-MCNC: 184 MG/DL (ref 65–99)
GLUCOSE BLD STRIP.AUTO-MCNC: 184 MG/DL (ref 65–99)
GLUCOSE BLD STRIP.AUTO-MCNC: 187 MG/DL (ref 65–99)
GLUCOSE BLD STRIP.AUTO-MCNC: 189 MG/DL (ref 65–99)
GLUCOSE BLD STRIP.AUTO-MCNC: 189 MG/DL (ref 65–99)
GLUCOSE BLD STRIP.AUTO-MCNC: 191 MG/DL (ref 65–99)
GLUCOSE BLD STRIP.AUTO-MCNC: 191 MG/DL (ref 65–99)
GLUCOSE BLD STRIP.AUTO-MCNC: 192 MG/DL (ref 65–99)
GLUCOSE BLD STRIP.AUTO-MCNC: 193 MG/DL (ref 65–99)
GLUCOSE BLD STRIP.AUTO-MCNC: 195 MG/DL (ref 65–99)
GLUCOSE BLD STRIP.AUTO-MCNC: 196 MG/DL (ref 65–99)
GLUCOSE BLD STRIP.AUTO-MCNC: 196 MG/DL (ref 65–99)
GLUCOSE BLD STRIP.AUTO-MCNC: 197 MG/DL (ref 65–99)
GLUCOSE BLD STRIP.AUTO-MCNC: 197 MG/DL (ref 65–99)
GLUCOSE BLD STRIP.AUTO-MCNC: 200 MG/DL (ref 65–99)
GLUCOSE BLD STRIP.AUTO-MCNC: 200 MG/DL (ref 65–99)
GLUCOSE BLD STRIP.AUTO-MCNC: 201 MG/DL (ref 65–99)
GLUCOSE BLD STRIP.AUTO-MCNC: 205 MG/DL (ref 65–99)
GLUCOSE BLD STRIP.AUTO-MCNC: 208 MG/DL (ref 65–99)
GLUCOSE BLD STRIP.AUTO-MCNC: 210 MG/DL (ref 65–99)
GLUCOSE BLD STRIP.AUTO-MCNC: 212 MG/DL (ref 65–99)
GLUCOSE BLD STRIP.AUTO-MCNC: 212 MG/DL (ref 65–99)
GLUCOSE BLD STRIP.AUTO-MCNC: 213 MG/DL (ref 65–99)
GLUCOSE BLD STRIP.AUTO-MCNC: 215 MG/DL (ref 65–99)
GLUCOSE BLD STRIP.AUTO-MCNC: 223 MG/DL (ref 65–99)
GLUCOSE BLD STRIP.AUTO-MCNC: 224 MG/DL (ref 65–99)
GLUCOSE BLD STRIP.AUTO-MCNC: 225 MG/DL (ref 65–99)
GLUCOSE BLD STRIP.AUTO-MCNC: 226 MG/DL (ref 65–99)
GLUCOSE BLD STRIP.AUTO-MCNC: 227 MG/DL (ref 65–99)
GLUCOSE BLD STRIP.AUTO-MCNC: 227 MG/DL (ref 65–99)
GLUCOSE BLD STRIP.AUTO-MCNC: 228 MG/DL (ref 65–99)
GLUCOSE BLD STRIP.AUTO-MCNC: 228 MG/DL (ref 65–99)
GLUCOSE BLD STRIP.AUTO-MCNC: 230 MG/DL (ref 65–99)
GLUCOSE BLD STRIP.AUTO-MCNC: 234 MG/DL (ref 65–99)
GLUCOSE BLD STRIP.AUTO-MCNC: 237 MG/DL (ref 65–99)
GLUCOSE BLD STRIP.AUTO-MCNC: 238 MG/DL (ref 65–99)
GLUCOSE BLD STRIP.AUTO-MCNC: 239 MG/DL (ref 65–99)
GLUCOSE BLD STRIP.AUTO-MCNC: 243 MG/DL (ref 65–99)
GLUCOSE BLD STRIP.AUTO-MCNC: 243 MG/DL (ref 65–99)
GLUCOSE BLD STRIP.AUTO-MCNC: 244 MG/DL (ref 65–99)
GLUCOSE BLD STRIP.AUTO-MCNC: 247 MG/DL (ref 65–99)
GLUCOSE BLD STRIP.AUTO-MCNC: 247 MG/DL (ref 65–99)
GLUCOSE BLD STRIP.AUTO-MCNC: 249 MG/DL (ref 65–99)
GLUCOSE BLD STRIP.AUTO-MCNC: 249 MG/DL (ref 65–99)
GLUCOSE BLD STRIP.AUTO-MCNC: 252 MG/DL (ref 65–99)
GLUCOSE BLD STRIP.AUTO-MCNC: 252 MG/DL (ref 65–99)
GLUCOSE BLD STRIP.AUTO-MCNC: 253 MG/DL (ref 65–99)
GLUCOSE BLD STRIP.AUTO-MCNC: 255 MG/DL (ref 65–99)
GLUCOSE BLD STRIP.AUTO-MCNC: 256 MG/DL (ref 65–99)
GLUCOSE BLD STRIP.AUTO-MCNC: 257 MG/DL (ref 65–99)
GLUCOSE BLD STRIP.AUTO-MCNC: 258 MG/DL (ref 65–99)
GLUCOSE BLD STRIP.AUTO-MCNC: 259 MG/DL (ref 65–99)
GLUCOSE BLD STRIP.AUTO-MCNC: 263 MG/DL (ref 65–99)
GLUCOSE BLD STRIP.AUTO-MCNC: 263 MG/DL (ref 65–99)
GLUCOSE BLD STRIP.AUTO-MCNC: 272 MG/DL (ref 65–99)
GLUCOSE BLD STRIP.AUTO-MCNC: 274 MG/DL (ref 65–99)
GLUCOSE BLD STRIP.AUTO-MCNC: 276 MG/DL (ref 65–99)
GLUCOSE BLD STRIP.AUTO-MCNC: 285 MG/DL (ref 65–99)
GLUCOSE BLD STRIP.AUTO-MCNC: 288 MG/DL (ref 65–99)
GLUCOSE BLD STRIP.AUTO-MCNC: 291 MG/DL (ref 65–99)
GLUCOSE BLD STRIP.AUTO-MCNC: 295 MG/DL (ref 65–99)
GLUCOSE BLD STRIP.AUTO-MCNC: 295 MG/DL (ref 65–99)
GLUCOSE BLD STRIP.AUTO-MCNC: 296 MG/DL (ref 65–99)
GLUCOSE BLD STRIP.AUTO-MCNC: 299 MG/DL (ref 65–99)
GLUCOSE BLD STRIP.AUTO-MCNC: 300 MG/DL (ref 65–99)
GLUCOSE BLD STRIP.AUTO-MCNC: 302 MG/DL (ref 65–99)
GLUCOSE BLD STRIP.AUTO-MCNC: 303 MG/DL (ref 65–99)
GLUCOSE BLD STRIP.AUTO-MCNC: 307 MG/DL (ref 65–99)
GLUCOSE BLD STRIP.AUTO-MCNC: 308 MG/DL (ref 65–99)
GLUCOSE BLD STRIP.AUTO-MCNC: 311 MG/DL (ref 65–99)
GLUCOSE BLD STRIP.AUTO-MCNC: 314 MG/DL (ref 65–99)
GLUCOSE BLD STRIP.AUTO-MCNC: 317 MG/DL (ref 65–99)
GLUCOSE BLD STRIP.AUTO-MCNC: 321 MG/DL (ref 65–99)
GLUCOSE BLD STRIP.AUTO-MCNC: 322 MG/DL (ref 65–99)
GLUCOSE BLD STRIP.AUTO-MCNC: 323 MG/DL (ref 65–99)
GLUCOSE BLD STRIP.AUTO-MCNC: 326 MG/DL (ref 65–99)
GLUCOSE BLD STRIP.AUTO-MCNC: 327 MG/DL (ref 65–99)
GLUCOSE BLD STRIP.AUTO-MCNC: 330 MG/DL (ref 65–99)
GLUCOSE BLD STRIP.AUTO-MCNC: 334 MG/DL (ref 65–99)
GLUCOSE BLD STRIP.AUTO-MCNC: 334 MG/DL (ref 65–99)
GLUCOSE BLD STRIP.AUTO-MCNC: 340 MG/DL (ref 65–99)
GLUCOSE BLD STRIP.AUTO-MCNC: 343 MG/DL (ref 65–99)
GLUCOSE BLD STRIP.AUTO-MCNC: 346 MG/DL (ref 65–99)
GLUCOSE BLD STRIP.AUTO-MCNC: 348 MG/DL (ref 65–99)
GLUCOSE BLD STRIP.AUTO-MCNC: 35 MG/DL (ref 65–99)
GLUCOSE BLD STRIP.AUTO-MCNC: 35 MG/DL (ref 65–99)
GLUCOSE BLD STRIP.AUTO-MCNC: 355 MG/DL (ref 65–99)
GLUCOSE BLD STRIP.AUTO-MCNC: 366 MG/DL (ref 65–99)
GLUCOSE BLD STRIP.AUTO-MCNC: 380 MG/DL (ref 65–99)
GLUCOSE BLD STRIP.AUTO-MCNC: 381 MG/DL (ref 65–99)
GLUCOSE BLD STRIP.AUTO-MCNC: 384 MG/DL (ref 65–99)
GLUCOSE BLD STRIP.AUTO-MCNC: 386 MG/DL (ref 65–99)
GLUCOSE BLD STRIP.AUTO-MCNC: 388 MG/DL (ref 65–99)
GLUCOSE BLD STRIP.AUTO-MCNC: 393 MG/DL (ref 65–99)
GLUCOSE BLD STRIP.AUTO-MCNC: 398 MG/DL (ref 65–99)
GLUCOSE BLD STRIP.AUTO-MCNC: 400 MG/DL (ref 65–99)
GLUCOSE BLD STRIP.AUTO-MCNC: 400 MG/DL (ref 65–99)
GLUCOSE BLD STRIP.AUTO-MCNC: 402 MG/DL (ref 65–99)
GLUCOSE BLD STRIP.AUTO-MCNC: 403 MG/DL (ref 65–99)
GLUCOSE BLD STRIP.AUTO-MCNC: 417 MG/DL (ref 65–99)
GLUCOSE BLD STRIP.AUTO-MCNC: 430 MG/DL (ref 65–99)
GLUCOSE BLD STRIP.AUTO-MCNC: 44 MG/DL (ref 65–99)
GLUCOSE BLD STRIP.AUTO-MCNC: 440 MG/DL (ref 65–99)
GLUCOSE BLD STRIP.AUTO-MCNC: 491 MG/DL (ref 65–99)
GLUCOSE BLD STRIP.AUTO-MCNC: 50 MG/DL (ref 65–99)
GLUCOSE BLD STRIP.AUTO-MCNC: 517 MG/DL (ref 65–99)
GLUCOSE BLD STRIP.AUTO-MCNC: 532 MG/DL (ref 65–99)
GLUCOSE BLD STRIP.AUTO-MCNC: 546 MG/DL (ref 65–99)
GLUCOSE BLD STRIP.AUTO-MCNC: 59 MG/DL (ref 65–99)
GLUCOSE BLD STRIP.AUTO-MCNC: 60 MG/DL (ref 65–99)
GLUCOSE BLD STRIP.AUTO-MCNC: 63 MG/DL (ref 65–99)
GLUCOSE BLD STRIP.AUTO-MCNC: 65 MG/DL (ref 65–99)
GLUCOSE BLD STRIP.AUTO-MCNC: 66 MG/DL (ref 65–99)
GLUCOSE BLD STRIP.AUTO-MCNC: 70 MG/DL (ref 65–99)
GLUCOSE BLD STRIP.AUTO-MCNC: 75 MG/DL (ref 65–99)
GLUCOSE BLD STRIP.AUTO-MCNC: 76 MG/DL (ref 65–99)
GLUCOSE BLD STRIP.AUTO-MCNC: 76 MG/DL (ref 65–99)
GLUCOSE BLD STRIP.AUTO-MCNC: 78 MG/DL (ref 65–99)
GLUCOSE BLD STRIP.AUTO-MCNC: 80 MG/DL (ref 65–99)
GLUCOSE BLD STRIP.AUTO-MCNC: 81 MG/DL (ref 65–99)
GLUCOSE BLD STRIP.AUTO-MCNC: 82 MG/DL (ref 65–99)
GLUCOSE BLD STRIP.AUTO-MCNC: 86 MG/DL (ref 65–99)
GLUCOSE BLD STRIP.AUTO-MCNC: 86 MG/DL (ref 65–99)
GLUCOSE BLD STRIP.AUTO-MCNC: 87 MG/DL (ref 65–99)
GLUCOSE BLD STRIP.AUTO-MCNC: 88 MG/DL (ref 65–99)
GLUCOSE BLD STRIP.AUTO-MCNC: 93 MG/DL (ref 65–99)
GLUCOSE BLD STRIP.AUTO-MCNC: 94 MG/DL (ref 65–99)
GLUCOSE BLD STRIP.AUTO-MCNC: 96 MG/DL (ref 65–99)
GLUCOSE SERPL-MCNC: 142 MG/DL (ref 65–99)
GLUCOSE SERPL-MCNC: 166 MG/DL (ref 65–99)
GLUCOSE SERPL-MCNC: 169 MG/DL (ref 65–99)
GLUCOSE SERPL-MCNC: 173 MG/DL (ref 65–99)
GLUCOSE SERPL-MCNC: 201 MG/DL (ref 65–99)
GLUCOSE SERPL-MCNC: 416 MG/DL (ref 65–99)
GLUCOSE SERPL-MCNC: 544 MG/DL (ref 65–99)
GLUCOSE SERPL-MCNC: 56 MG/DL (ref 65–99)
GLUCOSE SERPL-MCNC: 84 MG/DL (ref 65–99)
GLUCOSE UR STRIP.AUTO-MCNC: NEGATIVE MG/DL
HBA1C MFR BLD: 8.7 % (ref ?–5.8)
HCO3 BLDV-SCNC: 22 MMOL/L (ref 24–28)
HCT VFR BLD AUTO: 34.5 % (ref 42–52)
HCT VFR BLD AUTO: 34.9 % (ref 42–52)
HCT VFR BLD AUTO: 37.5 % (ref 42–52)
HCT VFR BLD AUTO: 37.6 % (ref 42–52)
HCT VFR BLD AUTO: 39 % (ref 42–52)
HCT VFR BLD AUTO: 39.3 % (ref 42–52)
HGB BLD-MCNC: 11.8 G/DL (ref 14–18)
HGB BLD-MCNC: 11.9 G/DL (ref 14–18)
HGB BLD-MCNC: 12.6 G/DL (ref 14–18)
HGB BLD-MCNC: 13 G/DL (ref 14–18)
HGB BLD-MCNC: 13.4 G/DL (ref 14–18)
HGB BLD-MCNC: 13.4 G/DL (ref 14–18)
IMM GRANULOCYTES # BLD AUTO: 0.01 K/UL (ref 0–0.11)
IMM GRANULOCYTES # BLD AUTO: 0.01 K/UL (ref 0–0.11)
IMM GRANULOCYTES # BLD AUTO: 0.02 K/UL (ref 0–0.11)
IMM GRANULOCYTES NFR BLD AUTO: 0.1 % (ref 0–0.9)
IMM GRANULOCYTES NFR BLD AUTO: 0.2 % (ref 0–0.9)
IMM GRANULOCYTES NFR BLD AUTO: 0.3 % (ref 0–0.9)
INHALED O2 FLOW RATE: ABNORMAL L/MIN
KETONES UR STRIP.AUTO-MCNC: NEGATIVE MG/DL
LACTATE SERPL-SCNC: 1.6 MMOL/L (ref 0.5–2)
LEUKOCYTE ESTERASE UR QL STRIP.AUTO: NEGATIVE
LYMPHOCYTES # BLD AUTO: 1.21 K/UL (ref 1–4.8)
LYMPHOCYTES # BLD AUTO: 1.47 K/UL (ref 1–4.8)
LYMPHOCYTES # BLD AUTO: 1.57 K/UL (ref 1–4.8)
LYMPHOCYTES NFR BLD: 19.3 % (ref 22–41)
LYMPHOCYTES NFR BLD: 19.7 % (ref 22–41)
LYMPHOCYTES NFR BLD: 23.6 % (ref 22–41)
M TB IFN-G BLD-IMP: NEGATIVE
M TB IFN-G CD4+ BCKGRND COR BLD-ACNC: 0 IU/ML
MAGNESIUM SERPL-MCNC: 1.9 MG/DL (ref 1.5–2.5)
MAGNESIUM SERPL-MCNC: 2 MG/DL (ref 1.5–2.5)
MCH RBC QN AUTO: 29.8 PG (ref 27–33)
MCH RBC QN AUTO: 30.1 PG (ref 27–33)
MCH RBC QN AUTO: 30.2 PG (ref 27–33)
MCH RBC QN AUTO: 30.3 PG (ref 27–33)
MCH RBC QN AUTO: 30.4 PG (ref 27–33)
MCH RBC QN AUTO: 30.7 PG (ref 27–33)
MCHC RBC AUTO-ENTMCNC: 33.3 G/DL (ref 32.3–36.5)
MCHC RBC AUTO-ENTMCNC: 33.6 G/DL (ref 32.3–36.5)
MCHC RBC AUTO-ENTMCNC: 34.1 G/DL (ref 32.3–36.5)
MCHC RBC AUTO-ENTMCNC: 34.1 G/DL (ref 32.3–36.5)
MCHC RBC AUTO-ENTMCNC: 34.2 G/DL (ref 32.3–36.5)
MCHC RBC AUTO-ENTMCNC: 35.6 G/DL (ref 32.3–36.5)
MCV RBC AUTO: 86.2 FL (ref 81.4–97.8)
MCV RBC AUTO: 88.4 FL (ref 81.4–97.8)
MCV RBC AUTO: 88.5 FL (ref 81.4–97.8)
MCV RBC AUTO: 88.7 FL (ref 81.4–97.8)
MCV RBC AUTO: 89.4 FL (ref 81.4–97.8)
MCV RBC AUTO: 90.4 FL (ref 81.4–97.8)
MICRO URNS: NORMAL
MITOGEN IGNF BCKGRD COR BLD-ACNC: 3.9 IU/ML
MONOCYTES # BLD AUTO: 0.66 K/UL (ref 0–0.85)
MONOCYTES # BLD AUTO: 0.82 K/UL (ref 0–0.85)
MONOCYTES # BLD AUTO: 0.91 K/UL (ref 0–0.85)
MONOCYTES NFR BLD AUTO: 10.5 % (ref 0–13.4)
MONOCYTES NFR BLD AUTO: 11.4 % (ref 0–13.4)
MONOCYTES NFR BLD AUTO: 13.2 % (ref 0–13.4)
NEUTROPHILS # BLD AUTO: 3.8 K/UL (ref 1.82–7.42)
NEUTROPHILS # BLD AUTO: 4.27 K/UL (ref 1.82–7.42)
NEUTROPHILS # BLD AUTO: 5.34 K/UL (ref 1.82–7.42)
NEUTROPHILS NFR BLD: 61 % (ref 44–72)
NEUTROPHILS NFR BLD: 67.3 % (ref 44–72)
NEUTROPHILS NFR BLD: 68 % (ref 44–72)
NITRITE UR QL STRIP.AUTO: NEGATIVE
NRBC # BLD AUTO: 0 K/UL
NRBC BLD-RTO: 0 /100 WBC (ref 0–0.2)
PCO2 BLDV: 41.6 MMHG (ref 41–51)
PH BLDV: 7.35 [PH] (ref 7.31–7.45)
PH UR STRIP.AUTO: 6 [PH] (ref 5–8)
PHOSPHATE SERPL-MCNC: 3 MG/DL (ref 2.5–4.5)
PLATELET # BLD AUTO: 202 K/UL (ref 164–446)
PLATELET # BLD AUTO: 208 K/UL (ref 164–446)
PLATELET # BLD AUTO: 221 K/UL (ref 164–446)
PLATELET # BLD AUTO: 226 K/UL (ref 164–446)
PLATELET # BLD AUTO: 312 K/UL (ref 164–446)
PLATELET # BLD AUTO: 314 K/UL (ref 164–446)
PMV BLD AUTO: 10.1 FL (ref 9–12.9)
PMV BLD AUTO: 10.1 FL (ref 9–12.9)
PMV BLD AUTO: 10.3 FL (ref 9–12.9)
PMV BLD AUTO: 10.3 FL (ref 9–12.9)
PMV BLD AUTO: 10.4 FL (ref 9–12.9)
PMV BLD AUTO: 9.9 FL (ref 9–12.9)
PO2 BLDV: 35.7 MMHG (ref 25–40)
POCT INT CON NEG: NEGATIVE
POCT INT CON POS: POSITIVE
POTASSIUM SERPL-SCNC: 4 MMOL/L (ref 3.6–5.5)
POTASSIUM SERPL-SCNC: 4 MMOL/L (ref 3.6–5.5)
POTASSIUM SERPL-SCNC: 4.3 MMOL/L (ref 3.6–5.5)
POTASSIUM SERPL-SCNC: 4.5 MMOL/L (ref 3.6–5.5)
POTASSIUM SERPL-SCNC: 4.5 MMOL/L (ref 3.6–5.5)
POTASSIUM SERPL-SCNC: 4.8 MMOL/L (ref 3.6–5.5)
POTASSIUM SERPL-SCNC: 4.8 MMOL/L (ref 3.6–5.5)
POTASSIUM SERPL-SCNC: 4.9 MMOL/L (ref 3.6–5.5)
POTASSIUM SERPL-SCNC: 5.1 MMOL/L (ref 3.6–5.5)
PROT SERPL-MCNC: 6.5 G/DL (ref 6–8.2)
PROT SERPL-MCNC: 7 G/DL (ref 6–8.2)
PROT SERPL-MCNC: 7.2 G/DL (ref 6–8.2)
PROT SERPL-MCNC: 7.2 G/DL (ref 6–8.2)
PROT UR QL STRIP: NEGATIVE MG/DL
QFT TB2 - NIL TBQ2: 0.01 IU/ML
RBC # BLD AUTO: 3.9 M/UL (ref 4.7–6.1)
RBC # BLD AUTO: 3.95 M/UL (ref 4.7–6.1)
RBC # BLD AUTO: 4.15 M/UL (ref 4.7–6.1)
RBC # BLD AUTO: 4.36 M/UL (ref 4.7–6.1)
RBC # BLD AUTO: 4.36 M/UL (ref 4.7–6.1)
RBC # BLD AUTO: 4.43 M/UL (ref 4.7–6.1)
RBC UR QL AUTO: NEGATIVE
SAO2 % BLDV: 61.3 %
SODIUM SERPL-SCNC: 128 MMOL/L (ref 135–145)
SODIUM SERPL-SCNC: 130 MMOL/L (ref 135–145)
SODIUM SERPL-SCNC: 131 MMOL/L (ref 135–145)
SODIUM SERPL-SCNC: 131 MMOL/L (ref 135–145)
SODIUM SERPL-SCNC: 132 MMOL/L (ref 135–145)
SODIUM SERPL-SCNC: 133 MMOL/L (ref 135–145)
SODIUM SERPL-SCNC: 134 MMOL/L (ref 135–145)
SODIUM SERPL-SCNC: 134 MMOL/L (ref 135–145)
SODIUM SERPL-SCNC: 136 MMOL/L (ref 135–145)
SP GR UR STRIP.AUTO: 1.01
TSH SERPL DL<=0.005 MIU/L-ACNC: 0.87 UIU/ML (ref 0.38–5.33)
UROBILINOGEN UR STRIP.AUTO-MCNC: 0.2 MG/DL
VIT B12 SERPL-MCNC: 1162 PG/ML (ref 211–911)
WBC # BLD AUTO: 5.7 K/UL (ref 4.8–10.8)
WBC # BLD AUTO: 6.2 K/UL (ref 4.8–10.8)
WBC # BLD AUTO: 6.3 K/UL (ref 4.8–10.8)
WBC # BLD AUTO: 6.4 K/UL (ref 4.8–10.8)
WBC # BLD AUTO: 8 K/UL (ref 4.8–10.8)
WBC # BLD AUTO: 8.5 K/UL (ref 4.8–10.8)

## 2024-01-01 PROCEDURE — 700102 HCHG RX REV CODE 250 W/ 637 OVERRIDE(OP): Performed by: HOSPITALIST

## 2024-01-01 PROCEDURE — 99233 SBSQ HOSP IP/OBS HIGH 50: CPT | Performed by: INTERNAL MEDICINE

## 2024-01-01 PROCEDURE — 82962 GLUCOSE BLOOD TEST: CPT | Mod: 91

## 2024-01-01 PROCEDURE — 700102 HCHG RX REV CODE 250 W/ 637 OVERRIDE(OP): Performed by: INTERNAL MEDICINE

## 2024-01-01 PROCEDURE — 99231 SBSQ HOSP IP/OBS SF/LOW 25: CPT | Performed by: GENERAL PRACTICE

## 2024-01-01 PROCEDURE — 99239 HOSP IP/OBS DSCHRG MGMT >30: CPT | Performed by: GENERAL PRACTICE

## 2024-01-01 PROCEDURE — 770006 HCHG ROOM/CARE - MED/SURG/GYN SEMI*

## 2024-01-01 PROCEDURE — 80053 COMPREHEN METABOLIC PANEL: CPT

## 2024-01-01 PROCEDURE — 700111 HCHG RX REV CODE 636 W/ 250 OVERRIDE (IP): Mod: JZ | Performed by: INTERNAL MEDICINE

## 2024-01-01 PROCEDURE — 700102 HCHG RX REV CODE 250 W/ 637 OVERRIDE(OP): Performed by: STUDENT IN AN ORGANIZED HEALTH CARE EDUCATION/TRAINING PROGRAM

## 2024-01-01 PROCEDURE — 74018 RADEX ABDOMEN 1 VIEW: CPT

## 2024-01-01 PROCEDURE — 99232 SBSQ HOSP IP/OBS MODERATE 35: CPT | Performed by: HOSPITALIST

## 2024-01-01 PROCEDURE — A9270 NON-COVERED ITEM OR SERVICE: HCPCS | Performed by: EMERGENCY MEDICINE

## 2024-01-01 PROCEDURE — A9270 NON-COVERED ITEM OR SERVICE: HCPCS | Performed by: HOSPITALIST

## 2024-01-01 PROCEDURE — 82962 GLUCOSE BLOOD TEST: CPT

## 2024-01-01 PROCEDURE — A9270 NON-COVERED ITEM OR SERVICE: HCPCS | Performed by: STUDENT IN AN ORGANIZED HEALTH CARE EDUCATION/TRAINING PROGRAM

## 2024-01-01 PROCEDURE — 700102 HCHG RX REV CODE 250 W/ 637 OVERRIDE(OP): Performed by: EMERGENCY MEDICINE

## 2024-01-01 PROCEDURE — A9270 NON-COVERED ITEM OR SERVICE: HCPCS | Performed by: INTERNAL MEDICINE

## 2024-01-01 PROCEDURE — 700102 HCHG RX REV CODE 250 W/ 637 OVERRIDE(OP)

## 2024-01-01 PROCEDURE — 700111 HCHG RX REV CODE 636 W/ 250 OVERRIDE (IP): Mod: JZ | Performed by: HOSPITALIST

## 2024-01-01 PROCEDURE — 700111 HCHG RX REV CODE 636 W/ 250 OVERRIDE (IP): Performed by: HOSPITALIST

## 2024-01-01 PROCEDURE — 96374 THER/PROPH/DIAG INJ IV PUSH: CPT

## 2024-01-01 PROCEDURE — 85025 COMPLETE CBC W/AUTO DIFF WBC: CPT

## 2024-01-01 PROCEDURE — 83735 ASSAY OF MAGNESIUM: CPT

## 2024-01-01 PROCEDURE — 82010 KETONE BODYS QUAN: CPT

## 2024-01-01 PROCEDURE — 99232 SBSQ HOSP IP/OBS MODERATE 35: CPT | Performed by: GENERAL PRACTICE

## 2024-01-01 PROCEDURE — 85027 COMPLETE CBC AUTOMATED: CPT

## 2024-01-01 PROCEDURE — 71045 X-RAY EXAM CHEST 1 VIEW: CPT

## 2024-01-01 PROCEDURE — 81003 URINALYSIS AUTO W/O SCOPE: CPT

## 2024-01-01 PROCEDURE — 99233 SBSQ HOSP IP/OBS HIGH 50: CPT | Performed by: HOSPITALIST

## 2024-01-01 PROCEDURE — 700111 HCHG RX REV CODE 636 W/ 250 OVERRIDE (IP): Mod: JZ

## 2024-01-01 PROCEDURE — 80048 BASIC METABOLIC PNL TOTAL CA: CPT

## 2024-01-01 PROCEDURE — 36415 COLL VENOUS BLD VENIPUNCTURE: CPT

## 2024-01-01 PROCEDURE — 700111 HCHG RX REV CODE 636 W/ 250 OVERRIDE (IP): Mod: JZ | Performed by: STUDENT IN AN ORGANIZED HEALTH CARE EDUCATION/TRAINING PROGRAM

## 2024-01-01 PROCEDURE — 700111 HCHG RX REV CODE 636 W/ 250 OVERRIDE (IP): Performed by: EMERGENCY MEDICINE

## 2024-01-01 PROCEDURE — 51798 US URINE CAPACITY MEASURE: CPT

## 2024-01-01 PROCEDURE — 99232 SBSQ HOSP IP/OBS MODERATE 35: CPT | Performed by: INTERNAL MEDICINE

## 2024-01-01 PROCEDURE — 96372 THER/PROPH/DIAG INJ SC/IM: CPT

## 2024-01-01 PROCEDURE — 93971 EXTREMITY STUDY: CPT | Mod: LT

## 2024-01-01 PROCEDURE — 700105 HCHG RX REV CODE 258: Performed by: HOSPITALIST

## 2024-01-01 PROCEDURE — 700105 HCHG RX REV CODE 258: Performed by: EMERGENCY MEDICINE

## 2024-01-01 PROCEDURE — 99233 SBSQ HOSP IP/OBS HIGH 50: CPT | Mod: 25 | Performed by: INTERNAL MEDICINE

## 2024-01-01 PROCEDURE — A9270 NON-COVERED ITEM OR SERVICE: HCPCS

## 2024-01-01 PROCEDURE — 83605 ASSAY OF LACTIC ACID: CPT

## 2024-01-01 PROCEDURE — 99214 OFFICE O/P EST MOD 30 MIN: CPT | Performed by: FAMILY MEDICINE

## 2024-01-01 PROCEDURE — 93005 ELECTROCARDIOGRAM TRACING: CPT

## 2024-01-01 PROCEDURE — 3077F SYST BP >= 140 MM HG: CPT | Performed by: FAMILY MEDICINE

## 2024-01-01 PROCEDURE — 97162 PT EVAL MOD COMPLEX 30 MIN: CPT

## 2024-01-01 PROCEDURE — 99497 ADVNCD CARE PLAN 30 MIN: CPT | Performed by: INTERNAL MEDICINE

## 2024-01-01 PROCEDURE — 84100 ASSAY OF PHOSPHORUS: CPT

## 2024-01-01 PROCEDURE — 99285 EMERGENCY DEPT VISIT HI MDM: CPT

## 2024-01-01 PROCEDURE — 99223 1ST HOSP IP/OBS HIGH 75: CPT | Mod: AI | Performed by: HOSPITALIST

## 2024-01-01 PROCEDURE — 700101 HCHG RX REV CODE 250: Performed by: GENERAL PRACTICE

## 2024-01-01 PROCEDURE — 82803 BLOOD GASES ANY COMBINATION: CPT

## 2024-01-01 PROCEDURE — 700105 HCHG RX REV CODE 258

## 2024-01-01 PROCEDURE — 700111 HCHG RX REV CODE 636 W/ 250 OVERRIDE (IP): Mod: JZ | Performed by: EMERGENCY MEDICINE

## 2024-01-01 PROCEDURE — 86480 TB TEST CELL IMMUN MEASURE: CPT

## 2024-01-01 PROCEDURE — 99205 OFFICE O/P NEW HI 60 MIN: CPT | Performed by: STUDENT IN AN ORGANIZED HEALTH CARE EDUCATION/TRAINING PROGRAM

## 2024-01-01 PROCEDURE — 700105 HCHG RX REV CODE 258: Performed by: INTERNAL MEDICINE

## 2024-01-01 PROCEDURE — 700111 HCHG RX REV CODE 636 W/ 250 OVERRIDE (IP): Performed by: STUDENT IN AN ORGANIZED HEALTH CARE EDUCATION/TRAINING PROGRAM

## 2024-01-01 PROCEDURE — 84443 ASSAY THYROID STIM HORMONE: CPT

## 2024-01-01 PROCEDURE — 82607 VITAMIN B-12: CPT

## 2024-01-01 PROCEDURE — 97166 OT EVAL MOD COMPLEX 45 MIN: CPT

## 2024-01-01 PROCEDURE — 3078F DIAST BP <80 MM HG: CPT | Performed by: FAMILY MEDICINE

## 2024-01-01 PROCEDURE — 83036 HEMOGLOBIN GLYCOSYLATED A1C: CPT | Performed by: FAMILY MEDICINE

## 2024-01-01 PROCEDURE — 97602 WOUND(S) CARE NON-SELECTIVE: CPT

## 2024-01-01 RX ORDER — ATORVASTATIN CALCIUM 40 MG/1
80 TABLET, FILM COATED ORAL DAILY
Status: DISCONTINUED | OUTPATIENT
Start: 2024-01-01 | End: 2024-01-01

## 2024-01-01 RX ORDER — LORAZEPAM 0.5 MG/1
0.5 TABLET ORAL EVERY 4 HOURS PRN
Status: DISCONTINUED | OUTPATIENT
Start: 2024-01-01 | End: 2024-01-01 | Stop reason: HOSPADM

## 2024-01-01 RX ORDER — LACTULOSE 10 G/15ML
300 SOLUTION ORAL ONCE
Status: DISPENSED | OUTPATIENT
Start: 2024-01-01 | End: 2024-01-01

## 2024-01-01 RX ORDER — INSULIN GLARGINE 100 [IU]/ML
10 INJECTION, SOLUTION SUBCUTANEOUS EVERY EVENING
Status: ACTIVE | DISCHARGE
Start: 2024-01-01 | End: 2024-01-01

## 2024-01-01 RX ORDER — HALOPERIDOL 5 MG/ML
5 INJECTION INTRAMUSCULAR ONCE
Status: COMPLETED | OUTPATIENT
Start: 2024-01-01 | End: 2024-01-01

## 2024-01-01 RX ORDER — DONEPEZIL HYDROCHLORIDE 5 MG/1
5 TABLET, FILM COATED ORAL EVERY EVENING
Status: DISCONTINUED | OUTPATIENT
Start: 2024-01-01 | End: 2024-01-01 | Stop reason: HOSPADM

## 2024-01-01 RX ORDER — METOPROLOL SUCCINATE 25 MG/1
25 TABLET, EXTENDED RELEASE ORAL DAILY
Status: DISCONTINUED | OUTPATIENT
Start: 2024-01-01 | End: 2024-01-01

## 2024-01-01 RX ORDER — QUETIAPINE FUMARATE 100 MG/1
100 TABLET, FILM COATED ORAL NIGHTLY
Status: SHIPPED
Start: 2024-01-01 | End: 2024-01-01

## 2024-01-01 RX ORDER — QUETIAPINE FUMARATE 100 MG/1
100 TABLET, FILM COATED ORAL 2 TIMES DAILY
Status: DISCONTINUED | OUTPATIENT
Start: 2024-01-01 | End: 2024-01-01

## 2024-01-01 RX ORDER — LORAZEPAM 2 MG/ML
1 INJECTION INTRAMUSCULAR ONCE
Status: COMPLETED | OUTPATIENT
Start: 2024-01-01 | End: 2024-01-01

## 2024-01-01 RX ORDER — DEXTROSE MONOHYDRATE 25 G/50ML
25 INJECTION, SOLUTION INTRAVENOUS
Status: DISCONTINUED | OUTPATIENT
Start: 2024-01-01 | End: 2024-01-01

## 2024-01-01 RX ORDER — DONEPEZIL HYDROCHLORIDE 5 MG/1
5 TABLET, FILM COATED ORAL EVERY EVENING
Status: SHIPPED
Start: 2024-01-01 | End: 2024-03-23

## 2024-01-01 RX ORDER — ONDANSETRON 4 MG/1
4 TABLET, ORALLY DISINTEGRATING ORAL EVERY 4 HOURS PRN
Status: DISCONTINUED | OUTPATIENT
Start: 2024-01-01 | End: 2024-01-01

## 2024-01-01 RX ORDER — HALOPERIDOL 5 MG/ML
3 INJECTION INTRAMUSCULAR
Status: DISCONTINUED | OUTPATIENT
Start: 2024-01-01 | End: 2024-01-01

## 2024-01-01 RX ORDER — MORPHINE SULFATE 10 MG/5ML
5 SOLUTION ORAL
Status: DISCONTINUED | OUTPATIENT
Start: 2024-01-01 | End: 2024-01-01 | Stop reason: HOSPADM

## 2024-01-01 RX ORDER — HALOPERIDOL 5 MG/ML
5 INJECTION INTRAMUSCULAR EVERY 4 HOURS PRN
Status: DISCONTINUED | OUTPATIENT
Start: 2024-01-01 | End: 2024-01-01

## 2024-01-01 RX ORDER — POLYETHYLENE GLYCOL 3350 17 G/17G
1 POWDER, FOR SOLUTION ORAL DAILY
Status: DISCONTINUED | OUTPATIENT
Start: 2024-01-01 | End: 2024-01-01 | Stop reason: HOSPADM

## 2024-01-01 RX ORDER — SODIUM CHLORIDE 9 MG/ML
1000 INJECTION, SOLUTION INTRAVENOUS ONCE
Status: COMPLETED | OUTPATIENT
Start: 2024-01-01 | End: 2024-01-01

## 2024-01-01 RX ORDER — SERTRALINE HYDROCHLORIDE 100 MG/1
100 TABLET, FILM COATED ORAL DAILY
Status: SHIPPED
Start: 2024-01-01 | End: 2024-03-23

## 2024-01-01 RX ORDER — GLYBURIDE 5 MG/1
5 TABLET ORAL 2 TIMES DAILY WITH MEALS
Status: DISCONTINUED | OUTPATIENT
Start: 2024-01-01 | End: 2024-01-01

## 2024-01-01 RX ORDER — AMOXICILLIN 250 MG
2 CAPSULE ORAL 2 TIMES DAILY
Status: DISCONTINUED | OUTPATIENT
Start: 2024-01-01 | End: 2024-01-01

## 2024-01-01 RX ORDER — AMOXICILLIN 250 MG
1 CAPSULE ORAL DAILY
Status: DISCONTINUED | OUTPATIENT
Start: 2024-01-01 | End: 2024-01-01 | Stop reason: HOSPADM

## 2024-01-01 RX ORDER — QUETIAPINE FUMARATE 50 MG/1
50 TABLET, FILM COATED ORAL 2 TIMES DAILY PRN
Qty: 30 TABLET | Refills: 0 | Status: ON HOLD | OUTPATIENT
Start: 2024-01-01 | End: 2024-01-01

## 2024-01-01 RX ORDER — SODIUM CHLORIDE 9 MG/ML
500 INJECTION, SOLUTION INTRAVENOUS ONCE
Status: COMPLETED | OUTPATIENT
Start: 2024-01-01 | End: 2024-01-01

## 2024-01-01 RX ORDER — LOSARTAN POTASSIUM 50 MG/1
100 TABLET ORAL DAILY
Status: DISCONTINUED | OUTPATIENT
Start: 2024-01-01 | End: 2024-01-01

## 2024-01-01 RX ORDER — CHOLECALCIFEROL (VITAMIN D3) 125 MCG
5 CAPSULE ORAL NIGHTLY PRN
Status: DISCONTINUED | OUTPATIENT
Start: 2024-01-01 | End: 2024-01-01 | Stop reason: HOSPADM

## 2024-01-01 RX ORDER — BISACODYL 10 MG
10 SUPPOSITORY, RECTAL RECTAL
Status: DISCONTINUED | OUTPATIENT
Start: 2024-01-01 | End: 2024-01-01 | Stop reason: HOSPADM

## 2024-01-01 RX ORDER — VALPROIC ACID 250 MG/5ML
125 SOLUTION ORAL 2 TIMES DAILY
Status: DISCONTINUED | OUTPATIENT
Start: 2024-01-01 | End: 2024-01-01 | Stop reason: HOSPADM

## 2024-01-01 RX ORDER — INSULIN GLARGINE 100 [IU]/ML
10 INJECTION, SOLUTION SUBCUTANEOUS 2 TIMES DAILY
Status: ACTIVE | DISCHARGE
Start: 2024-01-01 | End: 2024-01-01

## 2024-01-01 RX ORDER — CLOPIDOGREL BISULFATE 75 MG/1
75 TABLET ORAL DAILY
Status: DISCONTINUED | OUTPATIENT
Start: 2024-01-01 | End: 2024-01-01

## 2024-01-01 RX ORDER — GLYBURIDE 5 MG/1
5 TABLET ORAL 2 TIMES DAILY WITH MEALS
Status: SHIPPED
Start: 2024-01-01 | End: 2024-03-23

## 2024-01-01 RX ORDER — DOXAZOSIN MESYLATE 1 MG/1
2 TABLET ORAL
Status: DISCONTINUED | OUTPATIENT
Start: 2024-01-01 | End: 2024-01-01 | Stop reason: HOSPADM

## 2024-01-01 RX ORDER — CEPHALEXIN 500 MG/1
500 CAPSULE ORAL EVERY 8 HOURS
Status: COMPLETED | OUTPATIENT
Start: 2024-01-01 | End: 2024-01-01

## 2024-01-01 RX ORDER — POLYETHYLENE GLYCOL 3350 17 G/17G
1 POWDER, FOR SOLUTION ORAL
Status: DISCONTINUED | OUTPATIENT
Start: 2024-01-01 | End: 2024-01-01 | Stop reason: HOSPADM

## 2024-01-01 RX ORDER — TAMSULOSIN HYDROCHLORIDE 0.4 MG/1
0.4 CAPSULE ORAL
Status: DISCONTINUED | OUTPATIENT
Start: 2024-01-01 | End: 2024-01-01

## 2024-01-01 RX ORDER — QUETIAPINE FUMARATE 100 MG/1
100 TABLET, FILM COATED ORAL NIGHTLY
Status: DISCONTINUED | OUTPATIENT
Start: 2024-01-01 | End: 2024-01-01

## 2024-01-01 RX ORDER — VALPROIC ACID 250 MG/5ML
125 SOLUTION ORAL 2 TIMES DAILY
Status: SHIPPED
Start: 2024-01-01 | End: 2024-03-23

## 2024-01-01 RX ORDER — INSULIN GLARGINE 100 [IU]/ML
5 INJECTION, SOLUTION SUBCUTANEOUS EVERY MORNING
Status: ACTIVE | DISCHARGE
Start: 2024-01-01 | End: 2024-01-01

## 2024-01-01 RX ORDER — BENZONATATE 100 MG/1
100 CAPSULE ORAL 3 TIMES DAILY PRN
Status: DISCONTINUED | OUTPATIENT
Start: 2024-01-01 | End: 2024-01-01

## 2024-01-01 RX ORDER — LORAZEPAM 2 MG/ML
0.5 INJECTION INTRAMUSCULAR EVERY 4 HOURS PRN
Status: DISCONTINUED | OUTPATIENT
Start: 2024-01-01 | End: 2024-01-01

## 2024-01-01 RX ORDER — QUETIAPINE FUMARATE 25 MG/1
50 TABLET, FILM COATED ORAL 2 TIMES DAILY
Status: DISCONTINUED | OUTPATIENT
Start: 2024-01-01 | End: 2024-01-01

## 2024-01-01 RX ORDER — POLYETHYLENE GLYCOL 3350 17 G/17G
1 POWDER, FOR SOLUTION ORAL
Status: DISCONTINUED | OUTPATIENT
Start: 2024-01-01 | End: 2024-01-01

## 2024-01-01 RX ORDER — BISACODYL 5 MG
5 TABLET, DELAYED RELEASE (ENTERIC COATED) ORAL
Status: DISCONTINUED | OUTPATIENT
Start: 2024-01-01 | End: 2024-01-01

## 2024-01-01 RX ORDER — BACITRACIN ZINC 500 [USP'U]/G
OINTMENT TOPICAL 2 TIMES DAILY
Status: DISCONTINUED | OUTPATIENT
Start: 2024-01-01 | End: 2024-01-01

## 2024-01-01 RX ORDER — DIAZEPAM 5 MG/ML
5 INJECTION, SOLUTION INTRAMUSCULAR; INTRAVENOUS
Status: DISCONTINUED | OUTPATIENT
Start: 2024-01-01 | End: 2024-01-01

## 2024-01-01 RX ORDER — HALOPERIDOL 5 MG/ML
1 INJECTION INTRAMUSCULAR
Status: DISCONTINUED | OUTPATIENT
Start: 2024-01-01 | End: 2024-01-01

## 2024-01-01 RX ORDER — CLONIDINE HYDROCHLORIDE 0.1 MG/1
0.1 TABLET ORAL DAILY
Status: DISCONTINUED | OUTPATIENT
Start: 2024-01-01 | End: 2024-01-01

## 2024-01-01 RX ORDER — BISACODYL 5 MG
10 TABLET, DELAYED RELEASE (ENTERIC COATED) ORAL ONCE
Status: COMPLETED | OUTPATIENT
Start: 2024-01-01 | End: 2024-01-01

## 2024-01-01 RX ORDER — LORAZEPAM 2 MG/ML
0.5 INJECTION INTRAMUSCULAR EVERY 8 HOURS PRN
Status: DISCONTINUED | OUTPATIENT
Start: 2024-01-01 | End: 2024-01-01 | Stop reason: HOSPADM

## 2024-01-01 RX ORDER — TAMSULOSIN HYDROCHLORIDE 0.4 MG/1
0.8 CAPSULE ORAL
Status: DISCONTINUED | OUTPATIENT
Start: 2024-01-01 | End: 2024-01-01

## 2024-01-01 RX ORDER — GLYBURIDE 5 MG/1
10 TABLET ORAL 2 TIMES DAILY WITH MEALS
Status: DISCONTINUED | OUTPATIENT
Start: 2024-01-01 | End: 2024-01-01

## 2024-01-01 RX ORDER — INSULIN ASPART 100 [IU]/ML
INJECTION, SOLUTION INTRAVENOUS; SUBCUTANEOUS
Status: ON HOLD | COMMUNITY
End: 2024-01-01

## 2024-01-01 RX ORDER — GLYBURIDE 5 MG/1
5 TABLET ORAL 2 TIMES DAILY WITH MEALS
Status: SHIPPED
Start: 2024-01-01 | End: 2024-01-01

## 2024-01-01 RX ORDER — LOPERAMIDE HYDROCHLORIDE 2 MG/1
2 CAPSULE ORAL 4 TIMES DAILY PRN
Status: DISCONTINUED | OUTPATIENT
Start: 2024-01-01 | End: 2024-01-01

## 2024-01-01 RX ORDER — LORAZEPAM 2 MG/ML
2 INJECTION INTRAMUSCULAR ONCE
Status: COMPLETED | OUTPATIENT
Start: 2024-01-01 | End: 2024-01-01

## 2024-01-01 RX ORDER — DEXTROSE MONOHYDRATE 25 G/50ML
25 INJECTION, SOLUTION INTRAVENOUS
Status: DISCONTINUED | OUTPATIENT
Start: 2024-01-01 | End: 2024-01-01 | Stop reason: HOSPADM

## 2024-01-01 RX ORDER — MORPHINE SULFATE 100 MG/5ML
10 SOLUTION ORAL
Status: DISCONTINUED | OUTPATIENT
Start: 2024-01-01 | End: 2024-01-01 | Stop reason: HOSPADM

## 2024-01-01 RX ORDER — DOXAZOSIN 2 MG/1
2 TABLET ORAL
Status: SHIPPED
Start: 2024-01-01 | End: 2024-03-23

## 2024-01-01 RX ORDER — FINASTERIDE 5 MG/1
5 TABLET, FILM COATED ORAL DAILY
Status: DISCONTINUED | OUTPATIENT
Start: 2024-01-01 | End: 2024-01-01

## 2024-01-01 RX ORDER — MEMANTINE HYDROCHLORIDE 10 MG/1
5 TABLET ORAL DAILY
Status: DISCONTINUED | OUTPATIENT
Start: 2024-01-01 | End: 2024-01-01 | Stop reason: HOSPADM

## 2024-01-01 RX ORDER — SERTRALINE HYDROCHLORIDE 100 MG/1
100 TABLET, FILM COATED ORAL DAILY
Status: DISCONTINUED | OUTPATIENT
Start: 2024-01-01 | End: 2024-01-01 | Stop reason: HOSPADM

## 2024-01-01 RX ORDER — BISACODYL 10 MG
10 SUPPOSITORY, RECTAL RECTAL
Status: DISCONTINUED | OUTPATIENT
Start: 2024-01-01 | End: 2024-01-01

## 2024-01-01 RX ORDER — TRAZODONE HYDROCHLORIDE 150 MG/1
150 TABLET ORAL NIGHTLY
Status: DISCONTINUED | OUTPATIENT
Start: 2024-01-01 | End: 2024-01-01 | Stop reason: HOSPADM

## 2024-01-01 RX ORDER — MEMANTINE HYDROCHLORIDE 5 MG/1
5 TABLET ORAL DAILY
Status: SHIPPED
Start: 2024-01-01 | End: 2024-03-23

## 2024-01-01 RX ORDER — ACETAMINOPHEN 325 MG/1
650 TABLET ORAL EVERY 4 HOURS PRN
Status: DISCONTINUED | OUTPATIENT
Start: 2024-01-01 | End: 2024-01-01 | Stop reason: HOSPADM

## 2024-01-01 RX ORDER — GLYBURIDE 5 MG/1
5 TABLET ORAL
Status: DISCONTINUED | OUTPATIENT
Start: 2024-01-01 | End: 2024-01-01

## 2024-01-01 RX ORDER — LORAZEPAM 0.5 MG/1
0.25 TABLET ORAL 2 TIMES DAILY
Status: DISCONTINUED | OUTPATIENT
Start: 2024-01-01 | End: 2024-01-01 | Stop reason: HOSPADM

## 2024-01-01 RX ORDER — SODIUM CHLORIDE 9 MG/ML
INJECTION, SOLUTION INTRAVENOUS CONTINUOUS
Status: DISCONTINUED | OUTPATIENT
Start: 2024-01-01 | End: 2024-01-01

## 2024-01-01 RX ADMIN — LORAZEPAM 0.5 MG: 0.5 TABLET ORAL at 13:25

## 2024-01-01 RX ADMIN — INSULIN HUMAN 3 UNITS: 100 INJECTION, SOLUTION PARENTERAL at 08:26

## 2024-01-01 RX ADMIN — DOXAZOSIN MESYLATE 2 MG: 1 TABLET ORAL at 22:05

## 2024-01-01 RX ADMIN — DONEPEZIL HYDROCHLORIDE 5 MG: 5 TABLET, FILM COATED ORAL at 19:30

## 2024-01-01 RX ADMIN — POLYETHYLENE GLYCOL 3350 1 PACKET: 17 POWDER, FOR SOLUTION ORAL at 05:37

## 2024-01-01 RX ADMIN — INSULIN HUMAN 3 UNITS: 100 INJECTION, SOLUTION PARENTERAL at 17:24

## 2024-01-01 RX ADMIN — HALOPERIDOL LACTATE 1 MG: 5 INJECTION, SOLUTION INTRAMUSCULAR at 22:22

## 2024-01-01 RX ADMIN — INSULIN GLARGINE-YFGN 10 UNITS: 100 INJECTION, SOLUTION SUBCUTANEOUS at 04:05

## 2024-01-01 RX ADMIN — QUETIAPINE FUMARATE 50 MG: 25 TABLET ORAL at 19:08

## 2024-01-01 RX ADMIN — GLYBURIDE 5 MG: 5 TABLET ORAL at 17:30

## 2024-01-01 RX ADMIN — FINASTERIDE 5 MG: 5 TABLET, FILM COATED ORAL at 13:35

## 2024-01-01 RX ADMIN — CEPHALEXIN 500 MG: 500 CAPSULE ORAL at 14:08

## 2024-01-01 RX ADMIN — METOPROLOL SUCCINATE 25 MG: 25 TABLET, EXTENDED RELEASE ORAL at 06:00

## 2024-01-01 RX ADMIN — METOPROLOL TARTRATE 12.5 MG: 25 TABLET, FILM COATED ORAL at 19:20

## 2024-01-01 RX ADMIN — LORAZEPAM 0.5 MG: 2 INJECTION INTRAMUSCULAR; INTRAVENOUS at 10:33

## 2024-01-01 RX ADMIN — LOSARTAN POTASSIUM 100 MG: 50 TABLET, FILM COATED ORAL at 04:59

## 2024-01-01 RX ADMIN — INSULIN GLARGINE-YFGN 10 UNITS: 100 INJECTION, SOLUTION SUBCUTANEOUS at 08:43

## 2024-01-01 RX ADMIN — CLONIDINE HYDROCHLORIDE 0.1 MG: 0.1 TABLET ORAL at 18:21

## 2024-01-01 RX ADMIN — MEMANTINE HYDROCHLORIDE 5 MG: 10 TABLET ORAL at 04:55

## 2024-01-01 RX ADMIN — METOPROLOL SUCCINATE 25 MG: 25 TABLET, EXTENDED RELEASE ORAL at 05:46

## 2024-01-01 RX ADMIN — GLYBURIDE 5 MG: 5 TABLET ORAL at 08:52

## 2024-01-01 RX ADMIN — GLYBURIDE 5 MG: 5 TABLET ORAL at 09:00

## 2024-01-01 RX ADMIN — INSULIN HUMAN 7 UNITS: 100 INJECTION, SOLUTION PARENTERAL at 13:53

## 2024-01-01 RX ADMIN — QUETIAPINE FUMARATE 100 MG: 100 TABLET ORAL at 05:15

## 2024-01-01 RX ADMIN — HALOPERIDOL LACTATE 1 MG: 5 INJECTION, SOLUTION INTRAMUSCULAR at 01:09

## 2024-01-01 RX ADMIN — VALPROIC ACID 125 MG: 250 SOLUTION ORAL at 04:37

## 2024-01-01 RX ADMIN — LORAZEPAM 0.5 MG: 2 INJECTION INTRAMUSCULAR; INTRAVENOUS at 19:49

## 2024-01-01 RX ADMIN — DONEPEZIL HYDROCHLORIDE 5 MG: 5 TABLET, FILM COATED ORAL at 18:10

## 2024-01-01 RX ADMIN — VALPROIC ACID 125 MG: 250 SOLUTION ORAL at 05:15

## 2024-01-01 RX ADMIN — INSULIN HUMAN 7 UNITS: 100 INJECTION, SOLUTION PARENTERAL at 16:25

## 2024-01-01 RX ADMIN — CEPHALEXIN 500 MG: 500 CAPSULE ORAL at 04:15

## 2024-01-01 RX ADMIN — INSULIN HUMAN 4 UNITS: 100 INJECTION, SOLUTION PARENTERAL at 20:55

## 2024-01-01 RX ADMIN — BACITRACIN ZINC: 500 OINTMENT TOPICAL at 06:25

## 2024-01-01 RX ADMIN — DONEPEZIL HYDROCHLORIDE 5 MG: 5 TABLET, FILM COATED ORAL at 17:33

## 2024-01-01 RX ADMIN — DONEPEZIL HYDROCHLORIDE 5 MG: 5 TABLET, FILM COATED ORAL at 17:58

## 2024-01-01 RX ADMIN — TRAZODONE HYDROCHLORIDE 150 MG: 150 TABLET ORAL at 21:01

## 2024-01-01 RX ADMIN — FINASTERIDE 5 MG: 5 TABLET, FILM COATED ORAL at 05:35

## 2024-01-01 RX ADMIN — GLYBURIDE 5 MG: 5 TABLET ORAL at 09:08

## 2024-01-01 RX ADMIN — QUETIAPINE FUMARATE 50 MG: 25 TABLET ORAL at 04:52

## 2024-01-01 RX ADMIN — INSULIN HUMAN 3 UNITS: 100 INJECTION, SOLUTION PARENTERAL at 17:07

## 2024-01-01 RX ADMIN — BACITRACIN ZINC: 500 OINTMENT TOPICAL at 18:19

## 2024-01-01 RX ADMIN — VALPROIC ACID 125 MG: 250 SOLUTION ORAL at 05:27

## 2024-01-01 RX ADMIN — INSULIN HUMAN 7 UNITS: 100 INJECTION, SOLUTION PARENTERAL at 12:43

## 2024-01-01 RX ADMIN — VALPROIC ACID 125 MG: 250 SOLUTION ORAL at 16:46

## 2024-01-01 RX ADMIN — METOPROLOL TARTRATE 12.5 MG: 25 TABLET, FILM COATED ORAL at 05:10

## 2024-01-01 RX ADMIN — LORAZEPAM 0.5 MG: 2 INJECTION INTRAMUSCULAR; INTRAVENOUS at 02:25

## 2024-01-01 RX ADMIN — TRAZODONE HYDROCHLORIDE 150 MG: 150 TABLET ORAL at 21:06

## 2024-01-01 RX ADMIN — QUETIAPINE FUMARATE 50 MG: 25 TABLET ORAL at 05:31

## 2024-01-01 RX ADMIN — SODIUM CHLORIDE 500 ML: 9 INJECTION, SOLUTION INTRAVENOUS at 22:25

## 2024-01-01 RX ADMIN — INSULIN HUMAN 3 UNITS: 100 INJECTION, SOLUTION PARENTERAL at 23:16

## 2024-01-01 RX ADMIN — QUETIAPINE FUMARATE 50 MG: 25 TABLET ORAL at 21:41

## 2024-01-01 RX ADMIN — POLYETHYLENE GLYCOL 3350 1 PACKET: 17 POWDER, FOR SOLUTION ORAL at 05:27

## 2024-01-01 RX ADMIN — SERTRALINE HYDROCHLORIDE 50 MG: 50 TABLET ORAL at 05:10

## 2024-01-01 RX ADMIN — METOPROLOL TARTRATE 12.5 MG: 25 TABLET, FILM COATED ORAL at 16:15

## 2024-01-01 RX ADMIN — INSULIN HUMAN 9 UNITS: 100 INJECTION, SOLUTION PARENTERAL at 06:11

## 2024-01-01 RX ADMIN — TRAZODONE HYDROCHLORIDE 150 MG: 150 TABLET ORAL at 20:33

## 2024-01-01 RX ADMIN — QUETIAPINE FUMARATE 50 MG: 25 TABLET ORAL at 06:04

## 2024-01-01 RX ADMIN — TRAZODONE HYDROCHLORIDE 150 MG: 150 TABLET ORAL at 20:29

## 2024-01-01 RX ADMIN — CEPHALEXIN 500 MG: 500 CAPSULE ORAL at 21:09

## 2024-01-01 RX ADMIN — FINASTERIDE 5 MG: 5 TABLET, FILM COATED ORAL at 05:37

## 2024-01-01 RX ADMIN — INSULIN HUMAN 4 UNITS: 100 INJECTION, SOLUTION PARENTERAL at 21:06

## 2024-01-01 RX ADMIN — INSULIN HUMAN 3 UNITS: 100 INJECTION, SOLUTION PARENTERAL at 13:38

## 2024-01-01 RX ADMIN — DONEPEZIL HYDROCHLORIDE 5 MG: 5 TABLET, FILM COATED ORAL at 18:19

## 2024-01-01 RX ADMIN — SERTRALINE HYDROCHLORIDE 50 MG: 50 TABLET ORAL at 06:04

## 2024-01-01 RX ADMIN — CLOPIDOGREL BISULFATE 75 MG: 75 TABLET ORAL at 05:39

## 2024-01-01 RX ADMIN — INSULIN HUMAN 14 UNITS: 100 INJECTION, SOLUTION PARENTERAL at 08:56

## 2024-01-01 RX ADMIN — CLONIDINE HYDROCHLORIDE 0.1 MG: 0.1 TABLET ORAL at 17:31

## 2024-01-01 RX ADMIN — CLONIDINE HYDROCHLORIDE 0.1 MG: 0.1 TABLET ORAL at 17:05

## 2024-01-01 RX ADMIN — MEMANTINE HYDROCHLORIDE 5 MG: 10 TABLET ORAL at 05:46

## 2024-01-01 RX ADMIN — VALPROIC ACID 125 MG: 250 SOLUTION ORAL at 04:40

## 2024-01-01 RX ADMIN — INSULIN HUMAN 4 UNITS: 100 INJECTION, SOLUTION PARENTERAL at 09:04

## 2024-01-01 RX ADMIN — MEMANTINE HYDROCHLORIDE 5 MG: 10 TABLET ORAL at 05:19

## 2024-01-01 RX ADMIN — MEMANTINE HYDROCHLORIDE 5 MG: 10 TABLET ORAL at 05:15

## 2024-01-01 RX ADMIN — QUETIAPINE FUMARATE 50 MG: 25 TABLET ORAL at 05:15

## 2024-01-01 RX ADMIN — VALPROIC ACID 125 MG: 250 SOLUTION ORAL at 17:31

## 2024-01-01 RX ADMIN — INSULIN HUMAN 4 UNITS: 100 INJECTION, SOLUTION PARENTERAL at 13:59

## 2024-01-01 RX ADMIN — VALPROIC ACID 125 MG: 250 SOLUTION ORAL at 18:41

## 2024-01-01 RX ADMIN — SERTRALINE 100 MG: 100 TABLET, FILM COATED ORAL at 05:32

## 2024-01-01 RX ADMIN — DOCUSATE SODIUM 50 MG AND SENNOSIDES 8.6 MG 1 TABLET: 8.6; 5 TABLET, FILM COATED ORAL at 05:27

## 2024-01-01 RX ADMIN — BACITRACIN ZINC: 500 OINTMENT TOPICAL at 05:57

## 2024-01-01 RX ADMIN — VALPROIC ACID 125 MG: 250 SOLUTION ORAL at 17:06

## 2024-01-01 RX ADMIN — MEMANTINE HYDROCHLORIDE 5 MG: 10 TABLET ORAL at 05:37

## 2024-01-01 RX ADMIN — MORPHINE SULFATE 5 MG: 10 SOLUTION ORAL at 11:13

## 2024-01-01 RX ADMIN — INSULIN HUMAN 3 UNITS: 100 INJECTION, SOLUTION PARENTERAL at 08:48

## 2024-01-01 RX ADMIN — LORAZEPAM 1 MG: 2 INJECTION INTRAMUSCULAR; INTRAVENOUS at 05:47

## 2024-01-01 RX ADMIN — METOPROLOL TARTRATE 12.5 MG: 25 TABLET, FILM COATED ORAL at 05:13

## 2024-01-01 RX ADMIN — DONEPEZIL HYDROCHLORIDE 5 MG: 5 TABLET, FILM COATED ORAL at 16:43

## 2024-01-01 RX ADMIN — METOPROLOL TARTRATE 12.5 MG: 25 TABLET, FILM COATED ORAL at 04:07

## 2024-01-01 RX ADMIN — TRAZODONE HYDROCHLORIDE 150 MG: 150 TABLET ORAL at 20:02

## 2024-01-01 RX ADMIN — BENZONATATE 100 MG: 100 CAPSULE ORAL at 01:51

## 2024-01-01 RX ADMIN — VALPROIC ACID 125 MG: 250 SOLUTION ORAL at 16:22

## 2024-01-01 RX ADMIN — POLYETHYLENE GLYCOL 3350 1 PACKET: 17 POWDER, FOR SOLUTION ORAL at 05:47

## 2024-01-01 RX ADMIN — DONEPEZIL HYDROCHLORIDE 5 MG: 5 TABLET, FILM COATED ORAL at 17:57

## 2024-01-01 RX ADMIN — POLYETHYLENE GLYCOL 3350 1 PACKET: 17 POWDER, FOR SOLUTION ORAL at 04:51

## 2024-01-01 RX ADMIN — VALPROIC ACID 125 MG: 250 SOLUTION ORAL at 05:23

## 2024-01-01 RX ADMIN — SERTRALINE 100 MG: 100 TABLET, FILM COATED ORAL at 04:07

## 2024-01-01 RX ADMIN — QUETIAPINE FUMARATE 50 MG: 25 TABLET ORAL at 04:59

## 2024-01-01 RX ADMIN — MEMANTINE HYDROCHLORIDE 5 MG: 10 TABLET ORAL at 05:26

## 2024-01-01 RX ADMIN — QUETIAPINE FUMARATE 50 MG: 25 TABLET ORAL at 05:19

## 2024-01-01 RX ADMIN — QUETIAPINE FUMARATE 50 MG: 25 TABLET ORAL at 05:27

## 2024-01-01 RX ADMIN — MEMANTINE HYDROCHLORIDE 5 MG: 10 TABLET ORAL at 05:48

## 2024-01-01 RX ADMIN — VALPROIC ACID 125 MG: 250 SOLUTION ORAL at 20:56

## 2024-01-01 RX ADMIN — VALPROIC ACID 125 MG: 250 SOLUTION ORAL at 18:48

## 2024-01-01 RX ADMIN — MEMANTINE HYDROCHLORIDE 5 MG: 10 TABLET ORAL at 05:31

## 2024-01-01 RX ADMIN — VALPROIC ACID 125 MG: 250 SOLUTION ORAL at 05:57

## 2024-01-01 RX ADMIN — INSULIN HUMAN 10 UNITS: 100 INJECTION, SOLUTION PARENTERAL at 12:55

## 2024-01-01 RX ADMIN — TRAZODONE HYDROCHLORIDE 150 MG: 150 TABLET ORAL at 20:03

## 2024-01-01 RX ADMIN — SERTRALINE HYDROCHLORIDE 50 MG: 50 TABLET ORAL at 04:45

## 2024-01-01 RX ADMIN — INSULIN HUMAN 7 UNITS: 100 INJECTION, SOLUTION PARENTERAL at 18:34

## 2024-01-01 RX ADMIN — HALOPERIDOL LACTATE 3 MG: 5 INJECTION, SOLUTION INTRAMUSCULAR at 00:59

## 2024-01-01 RX ADMIN — INSULIN HUMAN 3 UNITS: 100 INJECTION, SOLUTION PARENTERAL at 19:16

## 2024-01-01 RX ADMIN — QUETIAPINE FUMARATE 50 MG: 25 TABLET ORAL at 04:04

## 2024-01-01 RX ADMIN — GLYBURIDE 5 MG: 5 TABLET ORAL at 17:09

## 2024-01-01 RX ADMIN — LORAZEPAM 0.25 MG: 0.5 TABLET ORAL at 15:03

## 2024-01-01 RX ADMIN — VALPROIC ACID 125 MG: 250 SOLUTION ORAL at 05:25

## 2024-01-01 RX ADMIN — MEMANTINE HYDROCHLORIDE 5 MG: 10 TABLET ORAL at 13:35

## 2024-01-01 RX ADMIN — MEMANTINE HYDROCHLORIDE 5 MG: 10 TABLET ORAL at 05:56

## 2024-01-01 RX ADMIN — BACITRACIN ZINC: 500 OINTMENT TOPICAL at 05:16

## 2024-01-01 RX ADMIN — VALPROIC ACID 125 MG: 250 SOLUTION ORAL at 17:03

## 2024-01-01 RX ADMIN — TRAZODONE HYDROCHLORIDE 150 MG: 150 TABLET ORAL at 19:57

## 2024-01-01 RX ADMIN — Medication 5 MG: at 01:12

## 2024-01-01 RX ADMIN — MORPHINE SULFATE 5 MG: 10 SOLUTION ORAL at 02:00

## 2024-01-01 RX ADMIN — DONEPEZIL HYDROCHLORIDE 5 MG: 5 TABLET, FILM COATED ORAL at 16:21

## 2024-01-01 RX ADMIN — CLONIDINE HYDROCHLORIDE 0.1 MG: 0.1 TABLET ORAL at 16:22

## 2024-01-01 RX ADMIN — INSULIN GLARGINE-YFGN 11 UNITS: 100 INJECTION, SOLUTION SUBCUTANEOUS at 18:33

## 2024-01-01 RX ADMIN — MEMANTINE HYDROCHLORIDE 5 MG: 10 TABLET ORAL at 05:10

## 2024-01-01 RX ADMIN — ATORVASTATIN CALCIUM 80 MG: 80 TABLET, FILM COATED ORAL at 06:17

## 2024-01-01 RX ADMIN — TAMSULOSIN HYDROCHLORIDE 0.4 MG: 0.4 CAPSULE ORAL at 10:00

## 2024-01-01 RX ADMIN — TRAZODONE HYDROCHLORIDE 150 MG: 150 TABLET ORAL at 20:16

## 2024-01-01 RX ADMIN — METOPROLOL TARTRATE 12.5 MG: 25 TABLET, FILM COATED ORAL at 05:15

## 2024-01-01 RX ADMIN — GLYBURIDE 5 MG: 5 TABLET ORAL at 09:07

## 2024-01-01 RX ADMIN — LORAZEPAM 0.5 MG: 2 INJECTION INTRAMUSCULAR; INTRAVENOUS at 22:46

## 2024-01-01 RX ADMIN — VALPROIC ACID 125 MG: 250 SOLUTION ORAL at 16:43

## 2024-01-01 RX ADMIN — VALPROIC ACID 125 MG: 250 SOLUTION ORAL at 17:58

## 2024-01-01 RX ADMIN — INSULIN HUMAN 8 UNITS: 100 INJECTION, SOLUTION PARENTERAL at 19:17

## 2024-01-01 RX ADMIN — SERTRALINE HYDROCHLORIDE 50 MG: 50 TABLET ORAL at 04:59

## 2024-01-01 RX ADMIN — METOPROLOL SUCCINATE 25 MG: 25 TABLET, EXTENDED RELEASE ORAL at 05:02

## 2024-01-01 RX ADMIN — LORAZEPAM 0.25 MG: 0.5 TABLET ORAL at 18:19

## 2024-01-01 RX ADMIN — POLYETHYLENE GLYCOL 3350 1 PACKET: 17 POWDER, FOR SOLUTION ORAL at 05:35

## 2024-01-01 RX ADMIN — CLONIDINE HYDROCHLORIDE 0.1 MG: 0.1 TABLET ORAL at 16:24

## 2024-01-01 RX ADMIN — HALOPERIDOL LACTATE 3 MG: 5 INJECTION, SOLUTION INTRAMUSCULAR at 16:17

## 2024-01-01 RX ADMIN — MEMANTINE HYDROCHLORIDE 5 MG: 10 TABLET ORAL at 04:39

## 2024-01-01 RX ADMIN — LORAZEPAM 0.25 MG: 0.5 TABLET ORAL at 04:08

## 2024-01-01 RX ADMIN — INSULIN HUMAN 4 UNITS: 100 INJECTION, SOLUTION PARENTERAL at 17:37

## 2024-01-01 RX ADMIN — INSULIN HUMAN 3 UNITS: 100 INJECTION, SOLUTION PARENTERAL at 21:00

## 2024-01-01 RX ADMIN — GLYBURIDE 5 MG: 5 TABLET ORAL at 08:20

## 2024-01-01 RX ADMIN — DOXAZOSIN MESYLATE 2 MG: 1 TABLET ORAL at 21:22

## 2024-01-01 RX ADMIN — SERTRALINE HYDROCHLORIDE 50 MG: 50 TABLET ORAL at 05:47

## 2024-01-01 RX ADMIN — INSULIN HUMAN 3 UNITS: 100 INJECTION, SOLUTION PARENTERAL at 20:14

## 2024-01-01 RX ADMIN — QUETIAPINE FUMARATE 50 MG: 25 TABLET ORAL at 05:35

## 2024-01-01 RX ADMIN — CEPHALEXIN 500 MG: 500 CAPSULE ORAL at 13:43

## 2024-01-01 RX ADMIN — DOXAZOSIN MESYLATE 2 MG: 1 TABLET ORAL at 20:38

## 2024-01-01 RX ADMIN — POLYETHYLENE GLYCOL 3350 1 PACKET: 17 POWDER, FOR SOLUTION ORAL at 04:06

## 2024-01-01 RX ADMIN — BACITRACIN ZINC: 500 OINTMENT TOPICAL at 18:26

## 2024-01-01 RX ADMIN — POLYETHYLENE GLYCOL 3350 1 PACKET: 17 POWDER, FOR SOLUTION ORAL at 05:48

## 2024-01-01 RX ADMIN — FINASTERIDE 5 MG: 5 TABLET, FILM COATED ORAL at 05:22

## 2024-01-01 RX ADMIN — DONEPEZIL HYDROCHLORIDE 5 MG: 5 TABLET, FILM COATED ORAL at 16:15

## 2024-01-01 RX ADMIN — GLYBURIDE 5 MG: 5 TABLET ORAL at 18:18

## 2024-01-01 RX ADMIN — HALOPERIDOL LACTATE 3 MG: 5 INJECTION, SOLUTION INTRAMUSCULAR at 00:20

## 2024-01-01 RX ADMIN — POLYETHYLENE GLYCOL 3350 1 PACKET: 17 POWDER, FOR SOLUTION ORAL at 08:38

## 2024-01-01 RX ADMIN — GLYBURIDE 5 MG: 5 TABLET ORAL at 17:23

## 2024-01-01 RX ADMIN — LORAZEPAM 0.25 MG: 0.5 TABLET ORAL at 05:14

## 2024-01-01 RX ADMIN — QUETIAPINE FUMARATE 50 MG: 25 TABLET ORAL at 17:30

## 2024-01-01 RX ADMIN — LORAZEPAM 0.5 MG: 2 INJECTION INTRAMUSCULAR; INTRAVENOUS at 23:46

## 2024-01-01 RX ADMIN — POLYETHYLENE GLYCOL 3350 1 PACKET: 17 POWDER, FOR SOLUTION ORAL at 06:04

## 2024-01-01 RX ADMIN — INSULIN HUMAN 2 UNITS: 100 INJECTION, SOLUTION PARENTERAL at 08:41

## 2024-01-01 RX ADMIN — VALPROIC ACID 125 MG: 250 SOLUTION ORAL at 16:16

## 2024-01-01 RX ADMIN — DONEPEZIL HYDROCHLORIDE 5 MG: 5 TABLET, FILM COATED ORAL at 16:24

## 2024-01-01 RX ADMIN — HALOPERIDOL LACTATE 1 MG: 5 INJECTION, SOLUTION INTRAMUSCULAR at 02:22

## 2024-01-01 RX ADMIN — TRAZODONE HYDROCHLORIDE 150 MG: 150 TABLET ORAL at 20:17

## 2024-01-01 RX ADMIN — INSULIN HUMAN 7 UNITS: 100 INJECTION, SOLUTION PARENTERAL at 16:53

## 2024-01-01 RX ADMIN — QUETIAPINE FUMARATE 50 MG: 25 TABLET ORAL at 18:19

## 2024-01-01 RX ADMIN — INSULIN HUMAN 14 UNITS: 100 INJECTION, SOLUTION PARENTERAL at 07:43

## 2024-01-01 RX ADMIN — QUETIAPINE FUMARATE 50 MG: 25 TABLET ORAL at 16:59

## 2024-01-01 RX ADMIN — BENZONATATE 100 MG: 100 CAPSULE ORAL at 12:45

## 2024-01-01 RX ADMIN — FINASTERIDE 5 MG: 5 TABLET, FILM COATED ORAL at 06:04

## 2024-01-01 RX ADMIN — POLYETHYLENE GLYCOL 3350 1 PACKET: 17 POWDER, FOR SOLUTION ORAL at 05:12

## 2024-01-01 RX ADMIN — FINASTERIDE 5 MG: 5 TABLET, FILM COATED ORAL at 04:59

## 2024-01-01 RX ADMIN — SERTRALINE HYDROCHLORIDE 50 MG: 50 TABLET ORAL at 05:19

## 2024-01-01 RX ADMIN — INSULIN HUMAN 10 UNITS: 100 INJECTION, SOLUTION PARENTERAL at 20:24

## 2024-01-01 RX ADMIN — POLYETHYLENE GLYCOL 3350 1 PACKET: 17 POWDER, FOR SOLUTION ORAL at 04:38

## 2024-01-01 RX ADMIN — INSULIN HUMAN 3 UNITS: 100 INJECTION, SOLUTION PARENTERAL at 21:54

## 2024-01-01 RX ADMIN — METOPROLOL TARTRATE 12.5 MG: 25 TABLET, FILM COATED ORAL at 17:33

## 2024-01-01 RX ADMIN — MEMANTINE HYDROCHLORIDE 5 MG: 10 TABLET ORAL at 04:45

## 2024-01-01 RX ADMIN — METOPROLOL TARTRATE 12.5 MG: 25 TABLET, FILM COATED ORAL at 18:17

## 2024-01-01 RX ADMIN — INSULIN GLARGINE-YFGN 10 UNITS: 100 INJECTION, SOLUTION SUBCUTANEOUS at 17:47

## 2024-01-01 RX ADMIN — LORAZEPAM 0.25 MG: 0.5 TABLET ORAL at 05:33

## 2024-01-01 RX ADMIN — QUETIAPINE FUMARATE 50 MG: 25 TABLET ORAL at 17:05

## 2024-01-01 RX ADMIN — LORAZEPAM 0.5 MG: 2 INJECTION INTRAMUSCULAR; INTRAVENOUS at 15:58

## 2024-01-01 RX ADMIN — TRAZODONE HYDROCHLORIDE 150 MG: 150 TABLET ORAL at 21:18

## 2024-01-01 RX ADMIN — POLYETHYLENE GLYCOL 3350 1 PACKET: 17 POWDER, FOR SOLUTION ORAL at 05:46

## 2024-01-01 RX ADMIN — BISACODYL 10 MG: 5 TABLET, COATED ORAL at 20:36

## 2024-01-01 RX ADMIN — QUETIAPINE FUMARATE 100 MG: 100 TABLET ORAL at 05:10

## 2024-01-01 RX ADMIN — METOPROLOL SUCCINATE 25 MG: 25 TABLET, EXTENDED RELEASE ORAL at 04:39

## 2024-01-01 RX ADMIN — LORAZEPAM 0.25 MG: 0.5 TABLET ORAL at 16:21

## 2024-01-01 RX ADMIN — BACITRACIN ZINC: 500 OINTMENT TOPICAL at 04:15

## 2024-01-01 RX ADMIN — VALPROIC ACID 125 MG: 250 SOLUTION ORAL at 22:37

## 2024-01-01 RX ADMIN — METOPROLOL SUCCINATE 25 MG: 25 TABLET, EXTENDED RELEASE ORAL at 05:35

## 2024-01-01 RX ADMIN — LOSARTAN POTASSIUM 100 MG: 50 TABLET, FILM COATED ORAL at 04:37

## 2024-01-01 RX ADMIN — MEMANTINE HYDROCHLORIDE 5 MG: 10 TABLET ORAL at 04:05

## 2024-01-01 RX ADMIN — INSULIN HUMAN 7 UNITS: 100 INJECTION, SOLUTION PARENTERAL at 11:53

## 2024-01-01 RX ADMIN — QUETIAPINE FUMARATE 50 MG: 25 TABLET ORAL at 04:48

## 2024-01-01 RX ADMIN — QUETIAPINE FUMARATE 50 MG: 25 TABLET ORAL at 17:56

## 2024-01-01 RX ADMIN — DIAZEPAM 5 MG: 10 INJECTION, SOLUTION INTRAMUSCULAR; INTRAVENOUS at 20:50

## 2024-01-01 RX ADMIN — QUETIAPINE FUMARATE 50 MG: 25 TABLET ORAL at 20:37

## 2024-01-01 RX ADMIN — MEMANTINE HYDROCHLORIDE 5 MG: 10 TABLET ORAL at 04:02

## 2024-01-01 RX ADMIN — VALPROIC ACID 125 MG: 250 SOLUTION ORAL at 05:39

## 2024-01-01 RX ADMIN — INSULIN HUMAN 10 UNITS: 100 INJECTION, SOLUTION PARENTERAL at 16:48

## 2024-01-01 RX ADMIN — VALPROIC ACID 125 MG: 250 SOLUTION ORAL at 17:59

## 2024-01-01 RX ADMIN — RIVAROXABAN 10 MG: 10 TABLET, FILM COATED ORAL at 17:16

## 2024-01-01 RX ADMIN — VALPROIC ACID 125 MG: 250 SOLUTION ORAL at 05:14

## 2024-01-01 RX ADMIN — VALPROIC ACID 125 MG: 250 SOLUTION ORAL at 17:34

## 2024-01-01 RX ADMIN — POLYETHYLENE GLYCOL 3350 1 PACKET: 17 POWDER, FOR SOLUTION ORAL at 04:40

## 2024-01-01 RX ADMIN — INSULIN HUMAN 3 UNITS: 100 INJECTION, SOLUTION PARENTERAL at 13:11

## 2024-01-01 RX ADMIN — VALPROIC ACID 125 MG: 250 SOLUTION ORAL at 17:29

## 2024-01-01 RX ADMIN — VALPROIC ACID 125 MG: 250 SOLUTION ORAL at 04:46

## 2024-01-01 RX ADMIN — SERTRALINE HYDROCHLORIDE 50 MG: 50 TABLET ORAL at 05:22

## 2024-01-01 RX ADMIN — DOXAZOSIN MESYLATE 2 MG: 1 TABLET ORAL at 19:57

## 2024-01-01 RX ADMIN — GLYBURIDE 5 MG: 5 TABLET ORAL at 08:26

## 2024-01-01 RX ADMIN — DONEPEZIL HYDROCHLORIDE 5 MG: 5 TABLET, FILM COATED ORAL at 18:26

## 2024-01-01 RX ADMIN — TAMSULOSIN HYDROCHLORIDE 0.4 MG: 0.4 CAPSULE ORAL at 08:27

## 2024-01-01 RX ADMIN — INSULIN HUMAN 4 UNITS: 100 INJECTION, SOLUTION PARENTERAL at 20:16

## 2024-01-01 RX ADMIN — LORAZEPAM 0.5 MG: 0.5 TABLET ORAL at 09:08

## 2024-01-01 RX ADMIN — QUETIAPINE FUMARATE 50 MG: 25 TABLET ORAL at 18:10

## 2024-01-01 RX ADMIN — GLYBURIDE 5 MG: 5 TABLET ORAL at 17:21

## 2024-01-01 RX ADMIN — QUETIAPINE FUMARATE 100 MG: 100 TABLET ORAL at 21:00

## 2024-01-01 RX ADMIN — QUETIAPINE FUMARATE 50 MG: 25 TABLET ORAL at 18:51

## 2024-01-01 RX ADMIN — INSULIN HUMAN 12 UNITS: 100 INJECTION, SOLUTION PARENTERAL at 08:16

## 2024-01-01 RX ADMIN — CEPHALEXIN 500 MG: 500 CAPSULE ORAL at 21:18

## 2024-01-01 RX ADMIN — HALOPERIDOL LACTATE 1 MG: 5 INJECTION, SOLUTION INTRAMUSCULAR at 21:17

## 2024-01-01 RX ADMIN — CLONIDINE HYDROCHLORIDE 0.1 MG: 0.1 TABLET ORAL at 18:33

## 2024-01-01 RX ADMIN — INSULIN GLARGINE-YFGN 20 UNITS: 100 INJECTION, SOLUTION SUBCUTANEOUS at 18:21

## 2024-01-01 RX ADMIN — TRAZODONE HYDROCHLORIDE 150 MG: 150 TABLET ORAL at 21:00

## 2024-01-01 RX ADMIN — QUETIAPINE FUMARATE 50 MG: 25 TABLET ORAL at 17:42

## 2024-01-01 RX ADMIN — DONEPEZIL HYDROCHLORIDE 5 MG: 5 TABLET, FILM COATED ORAL at 19:08

## 2024-01-01 RX ADMIN — INSULIN GLARGINE-YFGN 20 UNITS: 100 INJECTION, SOLUTION SUBCUTANEOUS at 00:28

## 2024-01-01 RX ADMIN — LOSARTAN POTASSIUM 100 MG: 50 TABLET, FILM COATED ORAL at 04:52

## 2024-01-01 RX ADMIN — TRAZODONE HYDROCHLORIDE 150 MG: 150 TABLET ORAL at 23:12

## 2024-01-01 RX ADMIN — INSULIN HUMAN 10 UNITS: 100 INJECTION, SOLUTION PARENTERAL at 08:25

## 2024-01-01 RX ADMIN — LOSARTAN POTASSIUM 100 MG: 50 TABLET, FILM COATED ORAL at 04:48

## 2024-01-01 RX ADMIN — POLYETHYLENE GLYCOL 3350 1 PACKET: 17 POWDER, FOR SOLUTION ORAL at 05:36

## 2024-01-01 RX ADMIN — SERTRALINE 100 MG: 100 TABLET, FILM COATED ORAL at 05:14

## 2024-01-01 RX ADMIN — SODIUM CHLORIDE 1000 ML: 9 INJECTION, SOLUTION INTRAVENOUS at 05:56

## 2024-01-01 RX ADMIN — INSULIN HUMAN 2 UNITS: 100 INJECTION, SOLUTION PARENTERAL at 20:27

## 2024-01-01 RX ADMIN — DONEPEZIL HYDROCHLORIDE 5 MG: 5 TABLET, FILM COATED ORAL at 18:04

## 2024-01-01 RX ADMIN — DONEPEZIL HYDROCHLORIDE 5 MG: 5 TABLET, FILM COATED ORAL at 19:20

## 2024-01-01 RX ADMIN — INSULIN HUMAN 7 UNITS: 100 INJECTION, SOLUTION PARENTERAL at 17:34

## 2024-01-01 RX ADMIN — MEMANTINE HYDROCHLORIDE 5 MG: 10 TABLET ORAL at 04:14

## 2024-01-01 RX ADMIN — TAMSULOSIN HYDROCHLORIDE 0.4 MG: 0.4 CAPSULE ORAL at 14:04

## 2024-01-01 RX ADMIN — DOXAZOSIN MESYLATE 2 MG: 1 TABLET ORAL at 20:48

## 2024-01-01 RX ADMIN — HALOPERIDOL LACTATE 1 MG: 5 INJECTION, SOLUTION INTRAMUSCULAR at 00:49

## 2024-01-01 RX ADMIN — QUETIAPINE FUMARATE 50 MG: 25 TABLET ORAL at 16:53

## 2024-01-01 RX ADMIN — DONEPEZIL HYDROCHLORIDE 5 MG: 5 TABLET, FILM COATED ORAL at 18:21

## 2024-01-01 RX ADMIN — LORAZEPAM 2 MG: 2 INJECTION INTRAMUSCULAR; INTRAVENOUS at 04:40

## 2024-01-01 RX ADMIN — QUETIAPINE FUMARATE 100 MG: 100 TABLET ORAL at 20:24

## 2024-01-01 RX ADMIN — INSULIN HUMAN 3 UNITS: 100 INJECTION, SOLUTION PARENTERAL at 13:20

## 2024-01-01 RX ADMIN — CEPHALEXIN 500 MG: 500 CAPSULE ORAL at 06:26

## 2024-01-01 RX ADMIN — LOSARTAN POTASSIUM 100 MG: 50 TABLET, FILM COATED ORAL at 05:31

## 2024-01-01 RX ADMIN — INSULIN HUMAN 7 UNITS: 100 INJECTION, SOLUTION PARENTERAL at 17:00

## 2024-01-01 RX ADMIN — DOCUSATE SODIUM 50 MG AND SENNOSIDES 8.6 MG 1 TABLET: 8.6; 5 TABLET, FILM COATED ORAL at 06:25

## 2024-01-01 RX ADMIN — CEPHALEXIN 500 MG: 500 CAPSULE ORAL at 05:23

## 2024-01-01 RX ADMIN — RIVAROXABAN 10 MG: 10 TABLET, FILM COATED ORAL at 18:24

## 2024-01-01 RX ADMIN — LOSARTAN POTASSIUM 100 MG: 50 TABLET, FILM COATED ORAL at 06:04

## 2024-01-01 RX ADMIN — MEMANTINE HYDROCHLORIDE 5 MG: 10 TABLET ORAL at 04:37

## 2024-01-01 RX ADMIN — DOCUSATE SODIUM 50 MG AND SENNOSIDES 8.6 MG 1 TABLET: 8.6; 5 TABLET, FILM COATED ORAL at 05:15

## 2024-01-01 RX ADMIN — TRAZODONE HYDROCHLORIDE 150 MG: 150 TABLET ORAL at 23:03

## 2024-01-01 RX ADMIN — CLOPIDOGREL BISULFATE 75 MG: 75 TABLET ORAL at 04:04

## 2024-01-01 RX ADMIN — METOPROLOL SUCCINATE 25 MG: 25 TABLET, EXTENDED RELEASE ORAL at 06:04

## 2024-01-01 RX ADMIN — TRAZODONE HYDROCHLORIDE 150 MG: 150 TABLET ORAL at 20:38

## 2024-01-01 RX ADMIN — INSULIN HUMAN 3 UNITS: 100 INJECTION, SOLUTION PARENTERAL at 08:31

## 2024-01-01 RX ADMIN — VALPROIC ACID 125 MG: 250 SOLUTION ORAL at 04:52

## 2024-01-01 RX ADMIN — DOCUSATE SODIUM 50 MG AND SENNOSIDES 8.6 MG 1 TABLET: 8.6; 5 TABLET, FILM COATED ORAL at 05:11

## 2024-01-01 RX ADMIN — CEPHALEXIN 500 MG: 500 CAPSULE ORAL at 22:47

## 2024-01-01 RX ADMIN — SERTRALINE HYDROCHLORIDE 50 MG: 50 TABLET ORAL at 04:55

## 2024-01-01 RX ADMIN — INSULIN HUMAN 7 UNITS: 100 INJECTION, SOLUTION PARENTERAL at 13:50

## 2024-01-01 RX ADMIN — VALPROIC ACID 125 MG: 250 SOLUTION ORAL at 04:06

## 2024-01-01 RX ADMIN — QUETIAPINE FUMARATE 50 MG: 25 TABLET ORAL at 18:26

## 2024-01-01 RX ADMIN — POLYETHYLENE GLYCOL 3350 1 PACKET: 17 POWDER, FOR SOLUTION ORAL at 05:57

## 2024-01-01 RX ADMIN — CLOPIDOGREL BISULFATE 75 MG: 75 TABLET ORAL at 05:31

## 2024-01-01 RX ADMIN — TRAZODONE HYDROCHLORIDE 150 MG: 150 TABLET ORAL at 20:24

## 2024-01-01 RX ADMIN — VALPROIC ACID 125 MG: 250 SOLUTION ORAL at 17:09

## 2024-01-01 RX ADMIN — INSULIN HUMAN 14 UNITS: 100 INJECTION, SOLUTION PARENTERAL at 08:26

## 2024-01-01 RX ADMIN — METOPROLOL TARTRATE 12.5 MG: 25 TABLET, FILM COATED ORAL at 05:31

## 2024-01-01 RX ADMIN — DEXTROSE MONOHYDRATE 25 G: 100 INJECTION, SOLUTION INTRAVENOUS at 08:16

## 2024-01-01 RX ADMIN — INSULIN HUMAN 4 UNITS: 100 INJECTION, SOLUTION PARENTERAL at 13:47

## 2024-01-01 RX ADMIN — QUETIAPINE FUMARATE 50 MG: 25 TABLET ORAL at 04:14

## 2024-01-01 RX ADMIN — INSULIN HUMAN 4 UNITS: 100 INJECTION, SOLUTION PARENTERAL at 13:32

## 2024-01-01 RX ADMIN — INSULIN HUMAN 12 UNITS: 100 INJECTION, SOLUTION PARENTERAL at 21:51

## 2024-01-01 RX ADMIN — QUETIAPINE FUMARATE 50 MG: 25 TABLET ORAL at 17:16

## 2024-01-01 RX ADMIN — METOPROLOL TARTRATE 12.5 MG: 25 TABLET, FILM COATED ORAL at 05:46

## 2024-01-01 RX ADMIN — HALOPERIDOL LACTATE 5 MG: 5 INJECTION, SOLUTION INTRAMUSCULAR at 18:15

## 2024-01-01 RX ADMIN — INSULIN HUMAN 10 UNITS: 100 INJECTION, SOLUTION PARENTERAL at 16:46

## 2024-01-01 RX ADMIN — METOPROLOL TARTRATE 12.5 MG: 25 TABLET, FILM COATED ORAL at 05:26

## 2024-01-01 RX ADMIN — DIAZEPAM 5 MG: 10 INJECTION, SOLUTION INTRAMUSCULAR; INTRAVENOUS at 01:51

## 2024-01-01 RX ADMIN — VALPROIC ACID 125 MG: 250 SOLUTION ORAL at 18:25

## 2024-01-01 RX ADMIN — HALOPERIDOL LACTATE 5 MG: 5 INJECTION, SOLUTION INTRAMUSCULAR at 00:11

## 2024-01-01 RX ADMIN — GLYBURIDE 5 MG: 5 TABLET ORAL at 17:33

## 2024-01-01 RX ADMIN — TRAZODONE HYDROCHLORIDE 150 MG: 150 TABLET ORAL at 19:45

## 2024-01-01 RX ADMIN — LORAZEPAM 0.25 MG: 0.5 TABLET ORAL at 04:03

## 2024-01-01 RX ADMIN — INSULIN HUMAN 7 UNITS: 100 INJECTION, SOLUTION PARENTERAL at 17:48

## 2024-01-01 RX ADMIN — LOSARTAN POTASSIUM 100 MG: 50 TABLET, FILM COATED ORAL at 05:36

## 2024-01-01 RX ADMIN — INSULIN HUMAN 10 UNITS: 100 INJECTION, SOLUTION PARENTERAL at 18:17

## 2024-01-01 RX ADMIN — CEPHALEXIN 500 MG: 500 CAPSULE ORAL at 05:33

## 2024-01-01 RX ADMIN — BISACODYL 10 MG: 10 SUPPOSITORY RECTAL at 11:11

## 2024-01-01 RX ADMIN — VALPROIC ACID 125 MG: 250 SOLUTION ORAL at 04:49

## 2024-01-01 RX ADMIN — HALOPERIDOL LACTATE 1 MG: 5 INJECTION, SOLUTION INTRAMUSCULAR at 23:54

## 2024-01-01 RX ADMIN — CEPHALEXIN 500 MG: 500 CAPSULE ORAL at 13:08

## 2024-01-01 RX ADMIN — QUETIAPINE FUMARATE 50 MG: 25 TABLET ORAL at 18:00

## 2024-01-01 RX ADMIN — DONEPEZIL HYDROCHLORIDE 5 MG: 5 TABLET, FILM COATED ORAL at 16:53

## 2024-01-01 RX ADMIN — VALPROIC ACID 125 MG: 250 SOLUTION ORAL at 18:31

## 2024-01-01 RX ADMIN — BACITRACIN ZINC: 500 OINTMENT TOPICAL at 16:47

## 2024-01-01 RX ADMIN — DOXAZOSIN MESYLATE 2 MG: 1 TABLET ORAL at 20:02

## 2024-01-01 RX ADMIN — INSULIN HUMAN 4 UNITS: 100 INJECTION, SOLUTION PARENTERAL at 14:50

## 2024-01-01 RX ADMIN — QUETIAPINE FUMARATE 50 MG: 25 TABLET ORAL at 04:37

## 2024-01-01 RX ADMIN — HALOPERIDOL LACTATE 5 MG: 5 INJECTION, SOLUTION INTRAMUSCULAR at 17:41

## 2024-01-01 RX ADMIN — POLYETHYLENE GLYCOL 3350 1 PACKET: 17 POWDER, FOR SOLUTION ORAL at 05:10

## 2024-01-01 RX ADMIN — INSULIN HUMAN 3 UNITS: 100 INJECTION, SOLUTION PARENTERAL at 20:20

## 2024-01-01 RX ADMIN — CLONIDINE HYDROCHLORIDE 0.1 MG: 0.1 TABLET ORAL at 16:52

## 2024-01-01 RX ADMIN — LORAZEPAM 0.5 MG: 2 INJECTION INTRAMUSCULAR; INTRAVENOUS at 17:06

## 2024-01-01 RX ADMIN — SERTRALINE 100 MG: 100 TABLET, FILM COATED ORAL at 05:09

## 2024-01-01 RX ADMIN — POLYETHYLENE GLYCOL 3350 1 PACKET: 17 POWDER, FOR SOLUTION ORAL at 04:46

## 2024-01-01 RX ADMIN — VALPROIC ACID 125 MG: 250 SOLUTION ORAL at 17:16

## 2024-01-01 RX ADMIN — SERTRALINE HYDROCHLORIDE 50 MG: 50 TABLET ORAL at 05:24

## 2024-01-01 RX ADMIN — INSULIN HUMAN 2 UNITS: 100 INJECTION, SOLUTION PARENTERAL at 08:11

## 2024-01-01 RX ADMIN — METOPROLOL TARTRATE 12.5 MG: 25 TABLET, FILM COATED ORAL at 17:52

## 2024-01-01 RX ADMIN — TRAZODONE HYDROCHLORIDE 150 MG: 150 TABLET ORAL at 22:05

## 2024-01-01 RX ADMIN — DOCUSATE SODIUM 50 MG AND SENNOSIDES 8.6 MG 1 TABLET: 8.6; 5 TABLET, FILM COATED ORAL at 09:55

## 2024-01-01 RX ADMIN — INSULIN HUMAN 7 UNITS: 100 INJECTION, SOLUTION PARENTERAL at 14:13

## 2024-01-01 RX ADMIN — DOXAZOSIN MESYLATE 2 MG: 1 TABLET ORAL at 20:28

## 2024-01-01 RX ADMIN — QUETIAPINE FUMARATE 50 MG: 25 TABLET ORAL at 18:41

## 2024-01-01 RX ADMIN — MEMANTINE HYDROCHLORIDE 5 MG: 10 TABLET ORAL at 04:08

## 2024-01-01 RX ADMIN — VALPROIC ACID 125 MG: 250 SOLUTION ORAL at 05:40

## 2024-01-01 RX ADMIN — CLONIDINE HYDROCHLORIDE 0.1 MG: 0.1 TABLET ORAL at 18:51

## 2024-01-01 RX ADMIN — TAMSULOSIN HYDROCHLORIDE 0.4 MG: 0.4 CAPSULE ORAL at 09:53

## 2024-01-01 RX ADMIN — SERTRALINE HYDROCHLORIDE 25 MG: 50 TABLET ORAL at 12:45

## 2024-01-01 RX ADMIN — INSULIN HUMAN 6 UNITS: 100 INJECTION, SOLUTION PARENTERAL at 13:11

## 2024-01-01 RX ADMIN — INSULIN HUMAN 8 UNITS: 100 INJECTION, SOLUTION PARENTERAL at 17:46

## 2024-01-01 RX ADMIN — QUETIAPINE FUMARATE 100 MG: 100 TABLET ORAL at 19:20

## 2024-01-01 RX ADMIN — VALPROIC ACID 125 MG: 250 SOLUTION ORAL at 04:57

## 2024-01-01 RX ADMIN — RIVAROXABAN 10 MG: 10 TABLET, FILM COATED ORAL at 19:30

## 2024-01-01 RX ADMIN — SODIUM CHLORIDE 1000 ML: 9 INJECTION, SOLUTION INTRAVENOUS at 08:34

## 2024-01-01 RX ADMIN — DONEPEZIL HYDROCHLORIDE 5 MG: 5 TABLET, FILM COATED ORAL at 17:16

## 2024-01-01 RX ADMIN — DOCUSATE SODIUM 50 MG AND SENNOSIDES 8.6 MG 1 TABLET: 8.6; 5 TABLET, FILM COATED ORAL at 05:31

## 2024-01-01 RX ADMIN — GLYBURIDE 5 MG: 5 TABLET ORAL at 17:58

## 2024-01-01 RX ADMIN — VALPROIC ACID 125 MG: 250 SOLUTION ORAL at 05:16

## 2024-01-01 RX ADMIN — QUETIAPINE FUMARATE 50 MG: 25 TABLET ORAL at 18:49

## 2024-01-01 RX ADMIN — MEMANTINE HYDROCHLORIDE 5 MG: 10 TABLET ORAL at 05:13

## 2024-01-01 RX ADMIN — DONEPEZIL HYDROCHLORIDE 5 MG: 5 TABLET, FILM COATED ORAL at 17:42

## 2024-01-01 RX ADMIN — METOPROLOL SUCCINATE 25 MG: 25 TABLET, EXTENDED RELEASE ORAL at 06:05

## 2024-01-01 RX ADMIN — MEMANTINE HYDROCHLORIDE 5 MG: 10 TABLET ORAL at 04:51

## 2024-01-01 RX ADMIN — CLONIDINE HYDROCHLORIDE 0.1 MG: 0.1 TABLET ORAL at 19:08

## 2024-01-01 RX ADMIN — TRAZODONE HYDROCHLORIDE 150 MG: 150 TABLET ORAL at 20:14

## 2024-01-01 RX ADMIN — DOXAZOSIN MESYLATE 2 MG: 1 TABLET ORAL at 20:24

## 2024-01-01 RX ADMIN — SERTRALINE HYDROCHLORIDE 50 MG: 50 TABLET ORAL at 04:14

## 2024-01-01 RX ADMIN — HALOPERIDOL LACTATE 1 MG: 5 INJECTION, SOLUTION INTRAMUSCULAR at 02:21

## 2024-01-01 RX ADMIN — QUETIAPINE FUMARATE 100 MG: 100 TABLET ORAL at 20:38

## 2024-01-01 RX ADMIN — MEMANTINE HYDROCHLORIDE 5 MG: 10 TABLET ORAL at 06:04

## 2024-01-01 RX ADMIN — DONEPEZIL HYDROCHLORIDE 5 MG: 5 TABLET, FILM COATED ORAL at 17:09

## 2024-01-01 RX ADMIN — DONEPEZIL HYDROCHLORIDE 5 MG: 5 TABLET, FILM COATED ORAL at 17:05

## 2024-01-01 RX ADMIN — TRAZODONE HYDROCHLORIDE 150 MG: 150 TABLET ORAL at 20:21

## 2024-01-01 RX ADMIN — TRAZODONE HYDROCHLORIDE 150 MG: 150 TABLET ORAL at 20:55

## 2024-01-01 RX ADMIN — QUETIAPINE FUMARATE 50 MG: 25 TABLET ORAL at 05:37

## 2024-01-01 RX ADMIN — VALPROIC ACID 125 MG: 250 SOLUTION ORAL at 18:20

## 2024-01-01 RX ADMIN — INSULIN GLARGINE-YFGN 10 UNITS: 100 INJECTION, SOLUTION SUBCUTANEOUS at 09:05

## 2024-01-01 RX ADMIN — QUETIAPINE FUMARATE 50 MG: 25 TABLET ORAL at 16:24

## 2024-01-01 RX ADMIN — SERTRALINE 100 MG: 100 TABLET, FILM COATED ORAL at 04:03

## 2024-01-01 RX ADMIN — METOPROLOL TARTRATE 12.5 MG: 25 TABLET, FILM COATED ORAL at 04:06

## 2024-01-01 RX ADMIN — DONEPEZIL HYDROCHLORIDE 5 MG: 5 TABLET, FILM COATED ORAL at 18:49

## 2024-01-01 RX ADMIN — INSULIN HUMAN 6 UNITS: 100 INJECTION, SOLUTION PARENTERAL at 19:21

## 2024-01-01 RX ADMIN — QUETIAPINE FUMARATE 50 MG: 25 TABLET ORAL at 05:46

## 2024-01-01 RX ADMIN — INSULIN HUMAN 3 UNITS: 100 INJECTION, SOLUTION PARENTERAL at 20:18

## 2024-01-01 RX ADMIN — INSULIN GLARGINE-YFGN 10 UNITS: 100 INJECTION, SOLUTION SUBCUTANEOUS at 19:23

## 2024-01-01 RX ADMIN — VALPROIC ACID 125 MG: 250 SOLUTION ORAL at 18:58

## 2024-01-01 RX ADMIN — CLONIDINE HYDROCHLORIDE 0.1 MG: 0.1 TABLET ORAL at 17:43

## 2024-01-01 RX ADMIN — INSULIN GLARGINE-YFGN 10 UNITS: 100 INJECTION, SOLUTION SUBCUTANEOUS at 17:45

## 2024-01-01 RX ADMIN — QUETIAPINE FUMARATE 50 MG: 25 TABLET ORAL at 21:22

## 2024-01-01 RX ADMIN — INSULIN HUMAN 5 UNITS: 100 INJECTION, SOLUTION PARENTERAL at 20:34

## 2024-01-01 RX ADMIN — MEMANTINE HYDROCHLORIDE 5 MG: 10 TABLET ORAL at 05:23

## 2024-01-01 RX ADMIN — TRAZODONE HYDROCHLORIDE 150 MG: 150 TABLET ORAL at 22:47

## 2024-01-01 RX ADMIN — MEMANTINE HYDROCHLORIDE 5 MG: 10 TABLET ORAL at 04:21

## 2024-01-01 RX ADMIN — DOXAZOSIN MESYLATE 2 MG: 1 TABLET ORAL at 20:03

## 2024-01-01 RX ADMIN — INSULIN HUMAN 12 UNITS: 100 INJECTION, SOLUTION PARENTERAL at 08:12

## 2024-01-01 RX ADMIN — METOPROLOL TARTRATE 12.5 MG: 25 TABLET, FILM COATED ORAL at 18:04

## 2024-01-01 RX ADMIN — QUETIAPINE FUMARATE 50 MG: 25 TABLET ORAL at 16:22

## 2024-01-01 RX ADMIN — TRAZODONE HYDROCHLORIDE 150 MG: 150 TABLET ORAL at 20:28

## 2024-01-01 RX ADMIN — LORAZEPAM 0.5 MG: 0.5 TABLET ORAL at 15:26

## 2024-01-01 RX ADMIN — SERTRALINE HYDROCHLORIDE 50 MG: 50 TABLET ORAL at 06:25

## 2024-01-01 RX ADMIN — INSULIN GLARGINE-YFGN 10 UNITS: 100 INJECTION, SOLUTION SUBCUTANEOUS at 07:29

## 2024-01-01 RX ADMIN — INSULIN HUMAN 7 UNITS: 100 INJECTION, SOLUTION PARENTERAL at 13:36

## 2024-01-01 RX ADMIN — QUETIAPINE FUMARATE 50 MG: 25 TABLET ORAL at 18:24

## 2024-01-01 RX ADMIN — POLYETHYLENE GLYCOL 3350 1 PACKET: 17 POWDER, FOR SOLUTION ORAL at 04:15

## 2024-01-01 RX ADMIN — INSULIN HUMAN 3 UNITS: 100 INJECTION, SOLUTION PARENTERAL at 17:27

## 2024-01-01 RX ADMIN — TRAZODONE HYDROCHLORIDE 150 MG: 150 TABLET ORAL at 19:31

## 2024-01-01 RX ADMIN — SERTRALINE HYDROCHLORIDE 50 MG: 50 TABLET ORAL at 05:33

## 2024-01-01 RX ADMIN — INSULIN HUMAN 10 UNITS: 100 INJECTION, SOLUTION PARENTERAL at 14:29

## 2024-01-01 RX ADMIN — DOXAZOSIN MESYLATE 2 MG: 1 TABLET ORAL at 20:58

## 2024-01-01 RX ADMIN — LORAZEPAM 0.25 MG: 0.5 TABLET ORAL at 17:21

## 2024-01-01 RX ADMIN — METOPROLOL SUCCINATE 25 MG: 25 TABLET, EXTENDED RELEASE ORAL at 06:26

## 2024-01-01 RX ADMIN — INSULIN HUMAN 12 UNITS: 100 INJECTION, SOLUTION PARENTERAL at 20:36

## 2024-01-01 RX ADMIN — POLYETHYLENE GLYCOL 3350 1 PACKET: 17 POWDER, FOR SOLUTION ORAL at 05:14

## 2024-01-01 RX ADMIN — VALPROIC ACID 125 MG: 250 SOLUTION ORAL at 18:19

## 2024-01-01 RX ADMIN — INSULIN HUMAN 10 UNITS: 100 INJECTION, SOLUTION PARENTERAL at 13:07

## 2024-01-01 RX ADMIN — ATORVASTATIN CALCIUM 80 MG: 80 TABLET, FILM COATED ORAL at 05:31

## 2024-01-01 RX ADMIN — FINASTERIDE 5 MG: 5 TABLET, FILM COATED ORAL at 05:33

## 2024-01-01 RX ADMIN — LORAZEPAM 0.25 MG: 0.5 TABLET ORAL at 17:51

## 2024-01-01 RX ADMIN — SERTRALINE HYDROCHLORIDE 50 MG: 50 TABLET ORAL at 05:37

## 2024-01-01 RX ADMIN — TAMSULOSIN HYDROCHLORIDE 0.4 MG: 0.4 CAPSULE ORAL at 07:21

## 2024-01-01 RX ADMIN — VALPROIC ACID 125 MG: 250 SOLUTION ORAL at 17:42

## 2024-01-01 RX ADMIN — VALPROIC ACID 125 MG: 250 SOLUTION ORAL at 09:00

## 2024-01-01 RX ADMIN — QUETIAPINE FUMARATE 50 MG: 25 TABLET ORAL at 05:23

## 2024-01-01 RX ADMIN — HALOPERIDOL LACTATE 1 MG: 5 INJECTION, SOLUTION INTRAMUSCULAR at 17:34

## 2024-01-01 RX ADMIN — INSULIN HUMAN 7 UNITS: 100 INJECTION, SOLUTION PARENTERAL at 22:56

## 2024-01-01 RX ADMIN — TRAZODONE HYDROCHLORIDE 150 MG: 150 TABLET ORAL at 20:27

## 2024-01-01 RX ADMIN — TRAZODONE HYDROCHLORIDE 150 MG: 150 TABLET ORAL at 20:58

## 2024-01-01 RX ADMIN — INSULIN HUMAN 9 UNITS: 100 INJECTION, SOLUTION PARENTERAL at 21:31

## 2024-01-01 RX ADMIN — QUETIAPINE FUMARATE 100 MG: 100 TABLET ORAL at 05:47

## 2024-01-01 RX ADMIN — TRAZODONE HYDROCHLORIDE 150 MG: 150 TABLET ORAL at 21:32

## 2024-01-01 RX ADMIN — FINASTERIDE 5 MG: 5 TABLET, FILM COATED ORAL at 04:55

## 2024-01-01 RX ADMIN — DOXAZOSIN MESYLATE 2 MG: 1 TABLET ORAL at 21:18

## 2024-01-01 RX ADMIN — HALOPERIDOL LACTATE 5 MG: 5 INJECTION, SOLUTION INTRAMUSCULAR at 19:17

## 2024-01-01 RX ADMIN — METOPROLOL SUCCINATE 25 MG: 25 TABLET, EXTENDED RELEASE ORAL at 04:37

## 2024-01-01 RX ADMIN — DOCUSATE SODIUM 50 MG AND SENNOSIDES 8.6 MG 1 TABLET: 8.6; 5 TABLET, FILM COATED ORAL at 04:05

## 2024-01-01 RX ADMIN — VALPROIC ACID 125 MG: 250 SOLUTION ORAL at 18:18

## 2024-01-01 RX ADMIN — QUETIAPINE FUMARATE 50 MG: 25 TABLET ORAL at 19:30

## 2024-01-01 RX ADMIN — VALPROIC ACID 125 MG: 250 SOLUTION ORAL at 05:49

## 2024-01-01 RX ADMIN — INSULIN HUMAN 10 UNITS: 100 INJECTION, SOLUTION PARENTERAL at 20:20

## 2024-01-01 RX ADMIN — VALPROIC ACID 125 MG: 250 SOLUTION ORAL at 05:21

## 2024-01-01 RX ADMIN — INSULIN HUMAN 4 UNITS: 100 INJECTION, SOLUTION PARENTERAL at 12:27

## 2024-01-01 RX ADMIN — POLYETHYLENE GLYCOL 3350 1 PACKET: 17 POWDER, FOR SOLUTION ORAL at 05:33

## 2024-01-01 RX ADMIN — TAMSULOSIN HYDROCHLORIDE 0.8 MG: 0.4 CAPSULE ORAL at 08:20

## 2024-01-01 RX ADMIN — INSULIN GLARGINE-YFGN 10 UNITS: 100 INJECTION, SOLUTION SUBCUTANEOUS at 19:18

## 2024-01-01 RX ADMIN — QUETIAPINE FUMARATE 50 MG: 25 TABLET ORAL at 06:14

## 2024-01-01 RX ADMIN — DOCUSATE SODIUM 50 MG AND SENNOSIDES 8.6 MG 1 TABLET: 8.6; 5 TABLET, FILM COATED ORAL at 05:14

## 2024-01-01 RX ADMIN — INSULIN HUMAN 4 UNITS: 100 INJECTION, SOLUTION PARENTERAL at 20:33

## 2024-01-01 RX ADMIN — SERTRALINE HYDROCHLORIDE 50 MG: 50 TABLET ORAL at 05:31

## 2024-01-01 RX ADMIN — DONEPEZIL HYDROCHLORIDE 5 MG: 5 TABLET, FILM COATED ORAL at 16:33

## 2024-01-01 RX ADMIN — DOCUSATE SODIUM 50 MG AND SENNOSIDES 8.6 MG 1 TABLET: 8.6; 5 TABLET, FILM COATED ORAL at 04:14

## 2024-01-01 RX ADMIN — HALOPERIDOL LACTATE 1 MG: 5 INJECTION, SOLUTION INTRAMUSCULAR at 04:11

## 2024-01-01 RX ADMIN — INSULIN HUMAN 4 UNITS: 100 INJECTION, SOLUTION PARENTERAL at 20:14

## 2024-01-01 RX ADMIN — VALPROIC ACID 125 MG: 250 SOLUTION ORAL at 05:35

## 2024-01-01 RX ADMIN — QUETIAPINE FUMARATE 50 MG: 25 TABLET ORAL at 17:09

## 2024-01-01 RX ADMIN — GLYBURIDE 5 MG: 5 TABLET ORAL at 08:11

## 2024-01-01 RX ADMIN — VALPROIC ACID 125 MG: 250 SOLUTION ORAL at 18:03

## 2024-01-01 RX ADMIN — LOSARTAN POTASSIUM 100 MG: 50 TABLET, FILM COATED ORAL at 06:15

## 2024-01-01 RX ADMIN — TAMSULOSIN HYDROCHLORIDE 0.4 MG: 0.4 CAPSULE ORAL at 09:34

## 2024-01-01 RX ADMIN — TAMSULOSIN HYDROCHLORIDE 0.4 MG: 0.4 CAPSULE ORAL at 09:55

## 2024-01-01 RX ADMIN — HALOPERIDOL LACTATE 1 MG: 5 INJECTION, SOLUTION INTRAMUSCULAR at 00:05

## 2024-01-01 RX ADMIN — METOPROLOL TARTRATE 12.5 MG: 25 TABLET, FILM COATED ORAL at 17:29

## 2024-01-01 RX ADMIN — FINASTERIDE 5 MG: 5 TABLET, FILM COATED ORAL at 05:20

## 2024-01-01 RX ADMIN — GLYBURIDE 5 MG: 5 TABLET ORAL at 09:33

## 2024-01-01 RX ADMIN — INSULIN HUMAN 12 UNITS: 100 INJECTION, SOLUTION PARENTERAL at 17:10

## 2024-01-01 RX ADMIN — TRAZODONE HYDROCHLORIDE 150 MG: 150 TABLET ORAL at 20:44

## 2024-01-01 RX ADMIN — TRAZODONE HYDROCHLORIDE 150 MG: 150 TABLET ORAL at 21:08

## 2024-01-01 RX ADMIN — INSULIN HUMAN 3 UNITS: 100 INJECTION, SOLUTION PARENTERAL at 21:30

## 2024-01-01 RX ADMIN — LORAZEPAM 0.25 MG: 0.5 TABLET ORAL at 04:07

## 2024-01-01 RX ADMIN — VALPROIC ACID 125 MG: 250 SOLUTION ORAL at 16:32

## 2024-01-01 RX ADMIN — TRAZODONE HYDROCHLORIDE 150 MG: 150 TABLET ORAL at 21:22

## 2024-01-01 RX ADMIN — LORAZEPAM 0.25 MG: 0.5 TABLET ORAL at 17:45

## 2024-01-01 RX ADMIN — HALOPERIDOL LACTATE 1 MG: 5 INJECTION, SOLUTION INTRAMUSCULAR at 20:55

## 2024-01-01 RX ADMIN — TRAZODONE HYDROCHLORIDE 150 MG: 150 TABLET ORAL at 21:47

## 2024-01-01 RX ADMIN — ACETAMINOPHEN 650 MG: 325 TABLET ORAL at 01:11

## 2024-01-01 RX ADMIN — QUETIAPINE FUMARATE 50 MG: 25 TABLET ORAL at 16:47

## 2024-01-01 RX ADMIN — SERTRALINE HYDROCHLORIDE 50 MG: 50 TABLET ORAL at 06:05

## 2024-01-01 RX ADMIN — INSULIN HUMAN 3 UNITS: 100 INJECTION, SOLUTION PARENTERAL at 08:03

## 2024-01-01 RX ADMIN — DOXAZOSIN MESYLATE 2 MG: 1 TABLET ORAL at 21:32

## 2024-01-01 RX ADMIN — VALPROIC ACID 125 MG: 250 SOLUTION ORAL at 05:37

## 2024-01-01 RX ADMIN — CLONIDINE HYDROCHLORIDE 0.1 MG: 0.1 TABLET ORAL at 16:43

## 2024-01-01 RX ADMIN — DOXAZOSIN MESYLATE 2 MG: 1 TABLET ORAL at 21:01

## 2024-01-01 RX ADMIN — DOCUSATE SODIUM 50 MG AND SENNOSIDES 8.6 MG 1 TABLET: 8.6; 5 TABLET, FILM COATED ORAL at 05:46

## 2024-01-01 RX ADMIN — DONEPEZIL HYDROCHLORIDE 5 MG: 5 TABLET, FILM COATED ORAL at 18:55

## 2024-01-01 RX ADMIN — HALOPERIDOL LACTATE 3 MG: 5 INJECTION, SOLUTION INTRAMUSCULAR at 16:45

## 2024-01-01 RX ADMIN — METOPROLOL SUCCINATE 25 MG: 25 TABLET, EXTENDED RELEASE ORAL at 05:30

## 2024-01-01 RX ADMIN — INSULIN HUMAN 4 UNITS: 100 INJECTION, SOLUTION PARENTERAL at 10:01

## 2024-01-01 RX ADMIN — DONEPEZIL HYDROCHLORIDE 5 MG: 5 TABLET, FILM COATED ORAL at 17:31

## 2024-01-01 RX ADMIN — INSULIN HUMAN 10 UNITS: 100 INJECTION, SOLUTION PARENTERAL at 13:09

## 2024-01-01 RX ADMIN — QUETIAPINE FUMARATE 50 MG: 25 TABLET ORAL at 05:33

## 2024-01-01 RX ADMIN — DONEPEZIL HYDROCHLORIDE 5 MG: 5 TABLET, FILM COATED ORAL at 17:52

## 2024-01-01 RX ADMIN — QUETIAPINE FUMARATE 50 MG: 25 TABLET ORAL at 18:33

## 2024-01-01 RX ADMIN — DONEPEZIL HYDROCHLORIDE 5 MG: 5 TABLET, FILM COATED ORAL at 18:33

## 2024-01-01 RX ADMIN — VALPROIC ACID 125 MG: 250 SOLUTION ORAL at 16:58

## 2024-01-01 RX ADMIN — VALPROIC ACID 125 MG: 250 SOLUTION ORAL at 04:07

## 2024-01-01 RX ADMIN — MEMANTINE HYDROCHLORIDE 5 MG: 10 TABLET ORAL at 04:58

## 2024-01-01 RX ADMIN — LOSARTAN POTASSIUM 100 MG: 50 TABLET, FILM COATED ORAL at 05:35

## 2024-01-01 RX ADMIN — LORAZEPAM 0.25 MG: 0.5 TABLET ORAL at 04:50

## 2024-01-01 RX ADMIN — FINASTERIDE 5 MG: 5 TABLET, FILM COATED ORAL at 06:05

## 2024-01-01 RX ADMIN — INSULIN HUMAN 4 UNITS: 100 INJECTION, SOLUTION PARENTERAL at 11:47

## 2024-01-01 RX ADMIN — SERTRALINE 100 MG: 100 TABLET, FILM COATED ORAL at 04:06

## 2024-01-01 RX ADMIN — DOXAZOSIN MESYLATE 2 MG: 1 TABLET ORAL at 20:22

## 2024-01-01 RX ADMIN — HALOPERIDOL LACTATE 1 MG: 5 INJECTION, SOLUTION INTRAMUSCULAR at 21:35

## 2024-01-01 RX ADMIN — QUETIAPINE FUMARATE 50 MG: 25 TABLET ORAL at 16:33

## 2024-01-01 RX ADMIN — DONEPEZIL HYDROCHLORIDE 5 MG: 5 TABLET, FILM COATED ORAL at 18:40

## 2024-01-01 RX ADMIN — SERTRALINE HYDROCHLORIDE 50 MG: 50 TABLET ORAL at 05:35

## 2024-01-01 RX ADMIN — METOPROLOL SUCCINATE 25 MG: 25 TABLET, EXTENDED RELEASE ORAL at 05:31

## 2024-01-01 RX ADMIN — FINASTERIDE 5 MG: 5 TABLET, FILM COATED ORAL at 04:45

## 2024-01-01 RX ADMIN — GLYBURIDE 5 MG: 5 TABLET ORAL at 16:15

## 2024-01-01 RX ADMIN — DONEPEZIL HYDROCHLORIDE 5 MG: 5 TABLET, FILM COATED ORAL at 18:00

## 2024-01-01 RX ADMIN — METOPROLOL SUCCINATE 25 MG: 25 TABLET, EXTENDED RELEASE ORAL at 05:23

## 2024-01-01 RX ADMIN — INSULIN HUMAN 12 UNITS: 100 INJECTION, SOLUTION PARENTERAL at 14:11

## 2024-01-01 RX ADMIN — DOCUSATE SODIUM 50 MG AND SENNOSIDES 8.6 MG 1 TABLET: 8.6; 5 TABLET, FILM COATED ORAL at 05:48

## 2024-01-01 RX ADMIN — METOPROLOL TARTRATE 12.5 MG: 25 TABLET, FILM COATED ORAL at 16:21

## 2024-01-01 RX ADMIN — INSULIN HUMAN 7 UNITS: 100 INJECTION, SOLUTION PARENTERAL at 18:22

## 2024-01-01 RX ADMIN — INSULIN HUMAN 2 UNITS: 100 INJECTION, SOLUTION PARENTERAL at 17:03

## 2024-01-01 RX ADMIN — DONEPEZIL HYDROCHLORIDE 5 MG: 5 TABLET, FILM COATED ORAL at 16:22

## 2024-01-01 RX ADMIN — INSULIN HUMAN 10 UNITS: 100 INJECTION, SOLUTION PARENTERAL at 12:58

## 2024-01-01 RX ADMIN — LORAZEPAM 0.25 MG: 0.5 TABLET ORAL at 05:48

## 2024-01-01 RX ADMIN — MEMANTINE HYDROCHLORIDE 5 MG: 10 TABLET ORAL at 04:48

## 2024-01-01 RX ADMIN — INSULIN HUMAN 3 UNITS: 100 INJECTION, SOLUTION PARENTERAL at 18:47

## 2024-01-01 RX ADMIN — GLYBURIDE 5 MG: 5 TABLET ORAL at 18:19

## 2024-01-01 RX ADMIN — HALOPERIDOL LACTATE 3 MG: 5 INJECTION, SOLUTION INTRAMUSCULAR at 15:18

## 2024-01-01 RX ADMIN — MEMANTINE HYDROCHLORIDE 5 MG: 10 TABLET ORAL at 05:33

## 2024-01-01 RX ADMIN — INSULIN HUMAN 7 UNITS: 100 INJECTION, SOLUTION PARENTERAL at 18:19

## 2024-01-01 RX ADMIN — METOPROLOL TARTRATE 12.5 MG: 25 TABLET, FILM COATED ORAL at 04:02

## 2024-01-01 RX ADMIN — SERTRALINE HYDROCHLORIDE 50 MG: 50 TABLET ORAL at 05:46

## 2024-01-01 RX ADMIN — TRAZODONE HYDROCHLORIDE 150 MG: 150 TABLET ORAL at 20:34

## 2024-01-01 RX ADMIN — LOSARTAN POTASSIUM 100 MG: 50 TABLET, FILM COATED ORAL at 05:46

## 2024-01-01 RX ADMIN — METOPROLOL SUCCINATE 25 MG: 25 TABLET, EXTENDED RELEASE ORAL at 04:14

## 2024-01-01 RX ADMIN — DOCUSATE SODIUM 50 MG AND SENNOSIDES 8.6 MG 1 TABLET: 8.6; 5 TABLET, FILM COATED ORAL at 05:33

## 2024-01-01 RX ADMIN — GLYBURIDE 5 MG: 5 TABLET ORAL at 08:14

## 2024-01-01 RX ADMIN — TRAZODONE HYDROCHLORIDE 150 MG: 150 TABLET ORAL at 20:22

## 2024-01-01 RX ADMIN — FINASTERIDE 5 MG: 5 TABLET, FILM COATED ORAL at 05:45

## 2024-01-01 RX ADMIN — SERTRALINE HYDROCHLORIDE 50 MG: 50 TABLET ORAL at 05:15

## 2024-01-01 RX ADMIN — QUETIAPINE FUMARATE 100 MG: 100 TABLET ORAL at 17:33

## 2024-01-01 RX ADMIN — TRAZODONE HYDROCHLORIDE 150 MG: 150 TABLET ORAL at 20:20

## 2024-01-01 RX ADMIN — VALPROIC ACID 125 MG: 250 SOLUTION ORAL at 18:33

## 2024-01-01 RX ADMIN — SERTRALINE 100 MG: 100 TABLET, FILM COATED ORAL at 05:48

## 2024-01-01 RX ADMIN — VALPROIC ACID 125 MG: 250 SOLUTION ORAL at 16:59

## 2024-01-01 RX ADMIN — GLYBURIDE 5 MG: 5 TABLET ORAL at 08:55

## 2024-01-01 RX ADMIN — VALPROIC ACID 125 MG: 250 SOLUTION ORAL at 16:33

## 2024-01-01 RX ADMIN — VALPROIC ACID 125 MG: 250 SOLUTION ORAL at 17:33

## 2024-01-01 RX ADMIN — VALPROIC ACID 125 MG: 250 SOLUTION ORAL at 05:47

## 2024-01-01 RX ADMIN — TAMSULOSIN HYDROCHLORIDE 0.4 MG: 0.4 CAPSULE ORAL at 12:45

## 2024-01-01 RX ADMIN — VALPROIC ACID 125 MG: 250 SOLUTION ORAL at 19:20

## 2024-01-01 RX ADMIN — POLYETHYLENE GLYCOL 3350 1 PACKET: 17 POWDER, FOR SOLUTION ORAL at 05:30

## 2024-01-01 RX ADMIN — CLONIDINE HYDROCHLORIDE 0.1 MG: 0.1 TABLET ORAL at 16:33

## 2024-01-01 RX ADMIN — VALPROIC ACID 125 MG: 250 SOLUTION ORAL at 17:10

## 2024-01-01 RX ADMIN — METOPROLOL TARTRATE 12.5 MG: 25 TABLET, FILM COATED ORAL at 17:58

## 2024-01-01 RX ADMIN — METOPROLOL SUCCINATE 25 MG: 25 TABLET, EXTENDED RELEASE ORAL at 05:56

## 2024-01-01 RX ADMIN — INSULIN HUMAN 7 UNITS: 100 INJECTION, SOLUTION PARENTERAL at 20:50

## 2024-01-01 RX ADMIN — TAMSULOSIN HYDROCHLORIDE 0.4 MG: 0.4 CAPSULE ORAL at 08:53

## 2024-01-01 RX ADMIN — FINASTERIDE 5 MG: 5 TABLET, FILM COATED ORAL at 05:31

## 2024-01-01 RX ADMIN — DONEPEZIL HYDROCHLORIDE 5 MG: 5 TABLET, FILM COATED ORAL at 17:02

## 2024-01-01 RX ADMIN — CLONIDINE HYDROCHLORIDE 0.1 MG: 0.1 TABLET ORAL at 18:10

## 2024-01-01 RX ADMIN — TAMSULOSIN HYDROCHLORIDE 0.4 MG: 0.4 CAPSULE ORAL at 09:36

## 2024-01-01 RX ADMIN — INSULIN HUMAN 4 UNITS: 100 INJECTION, SOLUTION PARENTERAL at 08:23

## 2024-01-01 RX ADMIN — VALPROIC ACID 125 MG: 250 SOLUTION ORAL at 05:38

## 2024-01-01 RX ADMIN — INSULIN HUMAN 7 UNITS: 100 INJECTION, SOLUTION PARENTERAL at 17:26

## 2024-01-01 RX ADMIN — GLYBURIDE 5 MG: 5 TABLET ORAL at 17:51

## 2024-01-01 RX ADMIN — QUETIAPINE FUMARATE 50 MG: 25 TABLET ORAL at 16:15

## 2024-01-01 RX ADMIN — CEPHALEXIN 500 MG: 500 CAPSULE ORAL at 13:15

## 2024-01-01 RX ADMIN — VALPROIC ACID 125 MG: 250 SOLUTION ORAL at 04:15

## 2024-01-01 RX ADMIN — GLYBURIDE 5 MG: 5 TABLET ORAL at 19:20

## 2024-01-01 RX ADMIN — QUETIAPINE FUMARATE 50 MG: 25 TABLET ORAL at 18:21

## 2024-01-01 RX ADMIN — TAMSULOSIN HYDROCHLORIDE 0.4 MG: 0.4 CAPSULE ORAL at 11:24

## 2024-01-01 RX ADMIN — VALPROIC ACID 125 MG: 250 SOLUTION ORAL at 06:07

## 2024-01-01 RX ADMIN — INSULIN GLARGINE-YFGN 10 UNITS: 100 INJECTION, SOLUTION SUBCUTANEOUS at 08:11

## 2024-01-01 RX ADMIN — DONEPEZIL HYDROCHLORIDE 5 MG: 5 TABLET, FILM COATED ORAL at 18:51

## 2024-01-01 RX ADMIN — INSULIN HUMAN 3 UNITS: 100 INJECTION, SOLUTION PARENTERAL at 18:45

## 2024-01-01 RX ADMIN — DOCUSATE SODIUM 50 MG AND SENNOSIDES 8.6 MG 1 TABLET: 8.6; 5 TABLET, FILM COATED ORAL at 05:56

## 2024-01-01 RX ADMIN — VALPROIC ACID 125 MG: 250 SOLUTION ORAL at 05:45

## 2024-01-01 RX ADMIN — INSULIN HUMAN 4 UNITS: 100 INJECTION, SOLUTION PARENTERAL at 21:19

## 2024-01-01 RX ADMIN — GLYBURIDE 5 MG: 5 TABLET ORAL at 10:17

## 2024-01-01 RX ADMIN — INSULIN HUMAN 10 UNITS: 100 INJECTION, SOLUTION PARENTERAL at 18:45

## 2024-01-01 RX ADMIN — DONEPEZIL HYDROCHLORIDE 5 MG: 5 TABLET, FILM COATED ORAL at 17:00

## 2024-01-01 RX ADMIN — QUETIAPINE FUMARATE 50 MG: 25 TABLET ORAL at 05:13

## 2024-01-01 RX ADMIN — TRAZODONE HYDROCHLORIDE 150 MG: 150 TABLET ORAL at 21:40

## 2024-01-01 RX ADMIN — VALPROIC ACID 125 MG: 250 SOLUTION ORAL at 17:26

## 2024-01-01 RX ADMIN — HALOPERIDOL LACTATE 1 MG: 5 INJECTION, SOLUTION INTRAMUSCULAR at 23:39

## 2024-01-01 RX ADMIN — INSULIN HUMAN 4 UNITS: 100 INJECTION, SOLUTION PARENTERAL at 12:17

## 2024-01-01 RX ADMIN — DOCUSATE SODIUM 50 MG AND SENNOSIDES 8.6 MG 1 TABLET: 8.6; 5 TABLET, FILM COATED ORAL at 04:49

## 2024-01-01 RX ADMIN — BACITRACIN ZINC: 500 OINTMENT TOPICAL at 17:34

## 2024-01-01 RX ADMIN — GLYBURIDE 5 MG: 5 TABLET ORAL at 07:44

## 2024-01-01 RX ADMIN — BISACODYL 10 MG: 10 SUPPOSITORY RECTAL at 21:10

## 2024-01-01 RX ADMIN — DOXAZOSIN MESYLATE 2 MG: 1 TABLET ORAL at 23:11

## 2024-01-01 RX ADMIN — DOCUSATE SODIUM 50 MG AND SENNOSIDES 8.6 MG 1 TABLET: 8.6; 5 TABLET, FILM COATED ORAL at 05:18

## 2024-01-01 RX ADMIN — QUETIAPINE FUMARATE 100 MG: 100 TABLET ORAL at 16:15

## 2024-01-01 RX ADMIN — TRAZODONE HYDROCHLORIDE 150 MG: 150 TABLET ORAL at 20:36

## 2024-01-01 RX ADMIN — METOPROLOL TARTRATE 12.5 MG: 25 TABLET, FILM COATED ORAL at 04:51

## 2024-01-01 RX ADMIN — HALOPERIDOL LACTATE 1 MG: 5 INJECTION, SOLUTION INTRAMUSCULAR at 22:32

## 2024-01-01 RX ADMIN — DOCUSATE SODIUM 50 MG AND SENNOSIDES 8.6 MG 1 TABLET: 8.6; 5 TABLET, FILM COATED ORAL at 04:02

## 2024-01-01 RX ADMIN — VALPROIC ACID 125 MG: 250 SOLUTION ORAL at 06:05

## 2024-01-01 RX ADMIN — DONEPEZIL HYDROCHLORIDE 5 MG: 5 TABLET, FILM COATED ORAL at 17:29

## 2024-01-01 RX ADMIN — QUETIAPINE FUMARATE 50 MG: 25 TABLET ORAL at 06:26

## 2024-01-01 RX ADMIN — QUETIAPINE FUMARATE 50 MG: 25 TABLET ORAL at 05:36

## 2024-01-01 RX ADMIN — METOPROLOL TARTRATE 12.5 MG: 25 TABLET, FILM COATED ORAL at 17:09

## 2024-01-01 RX ADMIN — BENZONATATE 100 MG: 100 CAPSULE ORAL at 01:47

## 2024-01-01 RX ADMIN — HALOPERIDOL LACTATE 1 MG: 5 INJECTION, SOLUTION INTRAMUSCULAR at 02:00

## 2024-01-01 RX ADMIN — METOPROLOL SUCCINATE 25 MG: 25 TABLET, EXTENDED RELEASE ORAL at 05:33

## 2024-01-01 RX ADMIN — QUETIAPINE FUMARATE 100 MG: 100 TABLET ORAL at 20:02

## 2024-01-01 RX ADMIN — POLYETHYLENE GLYCOL 3350 1 PACKET: 17 POWDER, FOR SOLUTION ORAL at 05:19

## 2024-01-01 RX ADMIN — TRAZODONE HYDROCHLORIDE 150 MG: 150 TABLET ORAL at 20:48

## 2024-01-01 RX ADMIN — TRAZODONE HYDROCHLORIDE 150 MG: 150 TABLET ORAL at 21:56

## 2024-01-01 RX ADMIN — DONEPEZIL HYDROCHLORIDE 5 MG: 5 TABLET, FILM COATED ORAL at 17:34

## 2024-01-01 RX ADMIN — VALPROIC ACID 125 MG: 250 SOLUTION ORAL at 17:52

## 2024-01-01 RX ADMIN — INSULIN GLARGINE-YFGN 10 UNITS: 100 INJECTION, SOLUTION SUBCUTANEOUS at 16:45

## 2024-01-01 RX ADMIN — CEPHALEXIN 500 MG: 500 CAPSULE ORAL at 21:30

## 2024-01-01 RX ADMIN — VALPROIC ACID 125 MG: 250 SOLUTION ORAL at 18:52

## 2024-01-01 RX ADMIN — METOPROLOL TARTRATE 12.5 MG: 25 TABLET, FILM COATED ORAL at 17:21

## 2024-01-01 RX ADMIN — LOSARTAN POTASSIUM 100 MG: 50 TABLET, FILM COATED ORAL at 04:39

## 2024-01-01 RX ADMIN — SERTRALINE HYDROCHLORIDE 50 MG: 50 TABLET ORAL at 05:13

## 2024-01-01 RX ADMIN — POLYETHYLENE GLYCOL 3350 1 PACKET: 17 POWDER, FOR SOLUTION ORAL at 05:32

## 2024-01-01 RX ADMIN — ONDANSETRON 4 MG: 4 TABLET, ORALLY DISINTEGRATING ORAL at 04:51

## 2024-01-01 RX ADMIN — MEMANTINE HYDROCHLORIDE 5 MG: 10 TABLET ORAL at 05:35

## 2024-01-01 RX ADMIN — VALPROIC ACID 125 MG: 250 SOLUTION ORAL at 05:12

## 2024-01-01 RX ADMIN — VALPROIC ACID 125 MG: 250 SOLUTION ORAL at 17:23

## 2024-01-01 RX ADMIN — SERTRALINE 100 MG: 100 TABLET, FILM COATED ORAL at 05:15

## 2024-01-01 RX ADMIN — INSULIN HUMAN 4 UNITS: 100 INJECTION, SOLUTION PARENTERAL at 12:52

## 2024-01-01 RX ADMIN — QUETIAPINE FUMARATE 50 MG: 25 TABLET ORAL at 17:06

## 2024-01-01 RX ADMIN — VALPROIC ACID 125 MG: 250 SOLUTION ORAL at 18:22

## 2024-01-01 RX ADMIN — INSULIN HUMAN 3 UNITS: 100 INJECTION, SOLUTION PARENTERAL at 17:35

## 2024-01-01 RX ADMIN — VALPROIC ACID 125 MG: 250 SOLUTION ORAL at 05:30

## 2024-01-01 RX ADMIN — MEMANTINE HYDROCHLORIDE 5 MG: 10 TABLET ORAL at 06:05

## 2024-01-01 RX ADMIN — QUETIAPINE FUMARATE 50 MG: 25 TABLET ORAL at 05:56

## 2024-01-01 RX ADMIN — MEMANTINE HYDROCHLORIDE 5 MG: 10 TABLET ORAL at 06:26

## 2024-01-01 RX ADMIN — INSULIN HUMAN 3 UNITS: 100 INJECTION, SOLUTION PARENTERAL at 18:26

## 2024-01-01 RX ADMIN — SERTRALINE HYDROCHLORIDE 50 MG: 50 TABLET ORAL at 05:56

## 2024-01-01 RX ADMIN — QUETIAPINE FUMARATE 50 MG: 25 TABLET ORAL at 04:39

## 2024-01-01 RX ADMIN — DOCUSATE SODIUM 50 MG AND SENNOSIDES 8.6 MG 1 TABLET: 8.6; 5 TABLET, FILM COATED ORAL at 05:23

## 2024-01-01 RX ADMIN — GLYBURIDE 5 MG: 5 TABLET ORAL at 11:19

## 2024-01-01 RX ADMIN — MEMANTINE HYDROCHLORIDE 5 MG: 10 TABLET ORAL at 05:32

## 2024-01-01 RX ADMIN — GLYBURIDE 5 MG: 5 TABLET ORAL at 07:47

## 2024-01-01 RX ADMIN — INSULIN HUMAN 3 UNITS: 100 INJECTION, SOLUTION PARENTERAL at 08:25

## 2024-01-01 RX ADMIN — INSULIN GLARGINE-YFGN 11 UNITS: 100 INJECTION, SOLUTION SUBCUTANEOUS at 18:45

## 2024-01-01 RX ADMIN — INSULIN HUMAN 2 UNITS: 100 INJECTION, SOLUTION PARENTERAL at 16:46

## 2024-01-01 RX ADMIN — INSULIN HUMAN 4 UNITS: 100 INJECTION, SOLUTION PARENTERAL at 18:33

## 2024-01-01 RX ADMIN — VALPROIC ACID 125 MG: 250 SOLUTION ORAL at 19:30

## 2024-01-01 RX ADMIN — POLYETHYLENE GLYCOL 3350 1 PACKET: 17 POWDER, FOR SOLUTION ORAL at 06:05

## 2024-01-01 RX ADMIN — INSULIN HUMAN 7 UNITS: 100 INJECTION, SOLUTION PARENTERAL at 20:28

## 2024-01-01 RX ADMIN — INSULIN GLARGINE-YFGN 10 UNITS: 100 INJECTION, SOLUTION SUBCUTANEOUS at 18:02

## 2024-01-01 RX ADMIN — VALPROIC ACID 125 MG: 250 SOLUTION ORAL at 04:58

## 2024-01-01 RX ADMIN — VALPROIC ACID 125 MG: 250 SOLUTION ORAL at 04:21

## 2024-01-01 RX ADMIN — METOPROLOL SUCCINATE 25 MG: 25 TABLET, EXTENDED RELEASE ORAL at 05:37

## 2024-01-01 RX ADMIN — DOCUSATE SODIUM 50 MG AND SENNOSIDES 8.6 MG 1 TABLET: 8.6; 5 TABLET, FILM COATED ORAL at 05:13

## 2024-01-01 RX ADMIN — SERTRALINE HYDROCHLORIDE 25 MG: 50 TABLET ORAL at 05:35

## 2024-01-01 RX ADMIN — QUETIAPINE FUMARATE 50 MG: 25 TABLET ORAL at 04:45

## 2024-01-01 RX ADMIN — TRAZODONE HYDROCHLORIDE 150 MG: 150 TABLET ORAL at 20:57

## 2024-01-01 RX ADMIN — SODIUM CHLORIDE: 9 INJECTION, SOLUTION INTRAVENOUS at 11:11

## 2024-01-01 RX ADMIN — LORAZEPAM 0.25 MG: 0.5 TABLET ORAL at 18:53

## 2024-01-01 RX ADMIN — CEPHALEXIN 500 MG: 500 CAPSULE ORAL at 05:56

## 2024-01-01 RX ADMIN — METOPROLOL SUCCINATE 25 MG: 25 TABLET, EXTENDED RELEASE ORAL at 04:52

## 2024-01-01 RX ADMIN — MEMANTINE HYDROCHLORIDE 5 MG: 10 TABLET ORAL at 05:14

## 2024-01-01 RX ADMIN — QUETIAPINE FUMARATE 50 MG: 25 TABLET ORAL at 04:55

## 2024-01-01 RX ADMIN — INSULIN HUMAN 3 UNITS: 100 INJECTION, SOLUTION PARENTERAL at 08:21

## 2024-01-01 RX ADMIN — INSULIN GLARGINE-YFGN 10 UNITS: 100 INJECTION, SOLUTION SUBCUTANEOUS at 18:46

## 2024-01-01 RX ADMIN — METOPROLOL TARTRATE 12.5 MG: 25 TABLET, FILM COATED ORAL at 05:48

## 2024-01-01 RX ADMIN — INSULIN HUMAN 3 UNITS: 100 INJECTION, SOLUTION PARENTERAL at 18:00

## 2024-01-01 RX ADMIN — VALPROIC ACID 125 MG: 250 SOLUTION ORAL at 06:26

## 2024-01-01 RX ADMIN — DONEPEZIL HYDROCHLORIDE 5 MG: 5 TABLET, FILM COATED ORAL at 17:06

## 2024-01-01 RX ADMIN — POLYETHYLENE GLYCOL 3350 1 PACKET: 17 POWDER, FOR SOLUTION ORAL at 04:07

## 2024-01-01 RX ADMIN — DONEPEZIL HYDROCHLORIDE 5 MG: 5 TABLET, FILM COATED ORAL at 17:21

## 2024-01-01 RX ADMIN — VALPROIC ACID 125 MG: 250 SOLUTION ORAL at 05:36

## 2024-01-01 RX ADMIN — METOPROLOL TARTRATE 12.5 MG: 25 TABLET, FILM COATED ORAL at 05:14

## 2024-01-01 RX ADMIN — QUETIAPINE FUMARATE 50 MG: 25 TABLET ORAL at 17:34

## 2024-01-01 RX ADMIN — TRAZODONE HYDROCHLORIDE 150 MG: 150 TABLET ORAL at 20:50

## 2024-01-01 RX ADMIN — INSULIN GLARGINE-YFGN 20 UNITS: 100 INJECTION, SOLUTION SUBCUTANEOUS at 06:06

## 2024-01-01 RX ADMIN — TAMSULOSIN HYDROCHLORIDE 0.4 MG: 0.4 CAPSULE ORAL at 07:35

## 2024-01-01 RX ADMIN — INSULIN HUMAN 3 UNITS: 100 INJECTION, SOLUTION PARENTERAL at 17:09

## 2024-01-01 RX ADMIN — INSULIN GLARGINE-YFGN 11 UNITS: 100 INJECTION, SOLUTION SUBCUTANEOUS at 17:07

## 2024-01-01 RX ADMIN — INSULIN GLARGINE-YFGN 11 UNITS: 100 INJECTION, SOLUTION SUBCUTANEOUS at 18:19

## 2024-01-01 RX ADMIN — DOXAZOSIN MESYLATE 2 MG: 1 TABLET ORAL at 21:00

## 2024-01-01 RX ADMIN — HALOPERIDOL LACTATE 5 MG: 5 INJECTION, SOLUTION INTRAMUSCULAR at 02:59

## 2024-01-01 RX ADMIN — MEMANTINE HYDROCHLORIDE 5 MG: 10 TABLET ORAL at 04:56

## 2024-01-01 RX ADMIN — QUETIAPINE FUMARATE 50 MG: 25 TABLET ORAL at 17:01

## 2024-01-01 RX ADMIN — GLYBURIDE 5 MG: 5 TABLET ORAL at 08:31

## 2024-01-01 RX ADMIN — METOPROLOL TARTRATE 12.5 MG: 25 TABLET, FILM COATED ORAL at 18:56

## 2024-01-01 RX ADMIN — QUETIAPINE FUMARATE 100 MG: 100 TABLET ORAL at 21:01

## 2024-01-01 ASSESSMENT — PAIN SCALES - PAIN ASSESSMENT IN ADVANCED DEMENTIA (PAINAD)
CONSOLABILITY: NO NEED TO CONSOLE
TOTALSCORE: 2
BODYLANGUAGE: RIGID, FISTS CLENCHED, KNEES UP, PUSHING/PULLING AWAY, STRIKES OUT
BODYLANGUAGE: RELAXED
CONSOLABILITY: NO NEED TO CONSOLE
BREATHING: NORMAL
BREATHING: NORMAL
BODYLANGUAGE: RELAXED
BREATHING: NORMAL
TOTALSCORE: 5
FACIALEXPRESSION: SMILING OR INEXPRESSIVE
BODYLANGUAGE: RELAXED
BODYLANGUAGE: RELAXED
CONSOLABILITY: DISTRACTED OR REASSURED BY VOICE/TOUCH
TOTALSCORE: 2
FACIALEXPRESSION: SMILING OR INEXPRESSIVE
BREATHING: NORMAL
FACIALEXPRESSION: SAD, FRIGHTENED, FROWN
CONSOLABILITY: NO NEED TO CONSOLE
TOTALSCORE: 0
BREATHING: NORMAL
CONSOLABILITY: NO NEED TO CONSOLE
BODYLANGUAGE: RELAXED
TOTALSCORE: 0
FACIALEXPRESSION: SAD, FRIGHTENED, FROWN
TOTALSCORE: 0
TOTALSCORE: 2
BODYLANGUAGE: RELAXED
BODYLANGUAGE: RELAXED
NEGVOCALIZATION: OCCASIONAL MOAN/GROAN, LOW SPEECH, NEGATIVE/DISAPPROVING QUALITY
FACIALEXPRESSION: SMILING OR INEXPRESSIVE
BREATHING: NORMAL
CONSOLABILITY: NO NEED TO CONSOLE
BODYLANGUAGE: RELAXED
CONSOLABILITY: NO NEED TO CONSOLE
FACIALEXPRESSION: SAD, FRIGHTENED, FROWN
FACIALEXPRESSION: SMILING OR INEXPRESSIVE
FACIALEXPRESSION: SMILING OR INEXPRESSIVE
BREATHING: NORMAL
FACIALEXPRESSION: SMILING OR INEXPRESSIVE
BREATHING: NORMAL
FACIALEXPRESSION: SMILING OR INEXPRESSIVE
TOTALSCORE: 1
FACIALEXPRESSION: SAD, FRIGHTENED, FROWN
BREATHING: NORMAL
BREATHING: NORMAL
BODYLANGUAGE: RELAXED
FACIALEXPRESSION: SAD, FRIGHTENED, FROWN
TOTALSCORE: 0
FACIALEXPRESSION: SAD, FRIGHTENED, FROWN
CONSOLABILITY: DISTRACTED OR REASSURED BY VOICE/TOUCH
TOTALSCORE: 2
TOTALSCORE: 1
BREATHING: NORMAL
FACIALEXPRESSION: SMILING OR INEXPRESSIVE
BODYLANGUAGE: RELAXED
CONSOLABILITY: DISTRACTED OR REASSURED BY VOICE/TOUCH
BODYLANGUAGE: RELAXED
BREATHING: NORMAL
FACIALEXPRESSION: SMILING OR INEXPRESSIVE
BREATHING: NORMAL
CONSOLABILITY: NO NEED TO CONSOLE
TOTALSCORE: 0
CONSOLABILITY: NO NEED TO CONSOLE
FACIALEXPRESSION: SMILING OR INEXPRESSIVE
TOTALSCORE: 2
BODYLANGUAGE: TENSE, DISTRESSED PACING, FIDGETING
BODYLANGUAGE: RELAXED
FACIALEXPRESSION: SMILING OR INEXPRESSIVE
CONSOLABILITY: NO NEED TO CONSOLE
BREATHING: NORMAL
BODYLANGUAGE: RELAXED
BODYLANGUAGE: RELAXED
CONSOLABILITY: NO NEED TO CONSOLE
TOTALSCORE: 0
BREATHING: NORMAL
TOTALSCORE: 0
CONSOLABILITY: DISTRACTED OR REASSURED BY VOICE/TOUCH
BREATHING: NORMAL
CONSOLABILITY: DISTRACTED OR REASSURED BY VOICE/TOUCH
CONSOLABILITY: NO NEED TO CONSOLE
BODYLANGUAGE: TENSE, DISTRESSED PACING, FIDGETING
TOTALSCORE: 0
BODYLANGUAGE: RELAXED
FACIALEXPRESSION: SAD, FRIGHTENED, FROWN
BREATHING: NORMAL
BREATHING: NORMAL
CONSOLABILITY: NO NEED TO CONSOLE
CONSOLABILITY: DISTRACTED OR REASSURED BY VOICE/TOUCH
TOTALSCORE: 1

## 2024-01-01 ASSESSMENT — COGNITIVE AND FUNCTIONAL STATUS - GENERAL
PERSONAL GROOMING: A LITTLE
MOBILITY SCORE: 17
HELP NEEDED FOR BATHING: A LOT
DRESSING REGULAR UPPER BODY CLOTHING: A LITTLE
MOBILITY SCORE: 17
MOVING FROM LYING ON BACK TO SITTING ON SIDE OF FLAT BED: A LITTLE
MOVING TO AND FROM BED TO CHAIR: A LITTLE
WALKING IN HOSPITAL ROOM: A LITTLE
DRESSING REGULAR LOWER BODY CLOTHING: A LITTLE
DRESSING REGULAR UPPER BODY CLOTHING: A LITTLE
WALKING IN HOSPITAL ROOM: A LITTLE
TOILETING: A LITTLE
CLIMB 3 TO 5 STEPS WITH RAILING: A LOT
STANDING UP FROM CHAIR USING ARMS: A LITTLE
TOILETING: A LITTLE
DAILY ACTIVITIY SCORE: 17
EATING MEALS: A LITTLE
EATING MEALS: A LITTLE
PERSONAL GROOMING: A LITTLE
CLIMB 3 TO 5 STEPS WITH RAILING: A LOT
SUGGESTED CMS G CODE MODIFIER MOBILITY: CK
STANDING UP FROM CHAIR USING ARMS: A LITTLE
MOVING TO AND FROM BED TO CHAIR: A LITTLE
TURNING FROM BACK TO SIDE WHILE IN FLAT BAD: A LITTLE
MOVING FROM LYING ON BACK TO SITTING ON SIDE OF FLAT BED: A LITTLE
SUGGESTED CMS G CODE MODIFIER DAILY ACTIVITY: CK
DRESSING REGULAR LOWER BODY CLOTHING: A LITTLE
DAILY ACTIVITIY SCORE: 17
TURNING FROM BACK TO SIDE WHILE IN FLAT BAD: A LITTLE
SUGGESTED CMS G CODE MODIFIER DAILY ACTIVITY: CK
SUGGESTED CMS G CODE MODIFIER MOBILITY: CK
HELP NEEDED FOR BATHING: A LOT

## 2024-01-01 ASSESSMENT — PATIENT HEALTH QUESTIONNAIRE - PHQ9
SUM OF ALL RESPONSES TO PHQ9 QUESTIONS 1 AND 2: 0
1. LITTLE INTEREST OR PLEASURE IN DOING THINGS: NOT AT ALL
1. LITTLE INTEREST OR PLEASURE IN DOING THINGS: NOT AT ALL
2. FEELING DOWN, DEPRESSED, IRRITABLE, OR HOPELESS: NOT AT ALL
SUM OF ALL RESPONSES TO PHQ9 QUESTIONS 1 AND 2: 0
2. FEELING DOWN, DEPRESSED, IRRITABLE, OR HOPELESS: NOT AT ALL
2. FEELING DOWN, DEPRESSED, IRRITABLE, OR HOPELESS: NOT AT ALL
CLINICAL INTERPRETATION OF PHQ2 SCORE: 0
1. LITTLE INTEREST OR PLEASURE IN DOING THINGS: NOT AT ALL
SUM OF ALL RESPONSES TO PHQ9 QUESTIONS 1 AND 2: 0

## 2024-01-01 ASSESSMENT — PAIN DESCRIPTION - PAIN TYPE
TYPE: ACUTE PAIN
TYPE: ACUTE PAIN;CHRONIC PAIN
TYPE: ACUTE PAIN
TYPE: ACUTE PAIN;CHRONIC PAIN
TYPE: ACUTE PAIN
TYPE: ACUTE PAIN
TYPE: ACUTE PAIN;CHRONIC PAIN;NEUROPATHIC PAIN
TYPE: ACUTE PAIN

## 2024-01-01 ASSESSMENT — LIFESTYLE VARIABLES
TOTAL SCORE: 0
HOW MANY TIMES IN THE PAST YEAR HAVE YOU HAD 5 OR MORE DRINKS IN A DAY: 0
EVER FELT BAD OR GUILTY ABOUT YOUR DRINKING: NO
DOES PATIENT WANT TO STOP DRINKING: NO
HAVE YOU EVER FELT YOU SHOULD CUT DOWN ON YOUR DRINKING: NO
DO YOU DRINK ALCOHOL: NO
ALCOHOL_USE: NO
TOTAL SCORE: 0
HAVE PEOPLE ANNOYED YOU BY CRITICIZING YOUR DRINKING: NO
CONSUMPTION TOTAL: NEGATIVE
EVER HAD A DRINK FIRST THING IN THE MORNING TO STEADY YOUR NERVES TO GET RID OF A HANGOVER: NO
AVERAGE NUMBER OF DAYS PER WEEK YOU HAVE A DRINK CONTAINING ALCOHOL: 0
TOTAL SCORE: 0
ON A TYPICAL DAY WHEN YOU DRINK ALCOHOL HOW MANY DRINKS DO YOU HAVE: 0

## 2024-01-01 ASSESSMENT — ENCOUNTER SYMPTOMS
FLANK PAIN: 0
PALPITATIONS: 0
HEADACHES: 0
ABDOMINAL PAIN: 0
SHORTNESS OF BREATH: 0
COUGH: 0
TREMORS: 1
EYE PAIN: 0

## 2024-01-01 ASSESSMENT — GAIT ASSESSMENTS
DEVIATION: SHUFFLED GAIT
DISTANCE (FEET): 150
GAIT LEVEL OF ASSIST: STANDBY ASSIST

## 2024-01-01 ASSESSMENT — FIBROSIS 4 INDEX
FIB4 SCORE: 1.32

## 2024-01-02 NOTE — TELEPHONE ENCOUNTER
Pt's brother Luis Carlos Santa came in asking for a referral for pt to [facility] for permanent housing. [address] [place] [address]. Please contact brother Luis Carlos with an update.

## 2024-01-08 PROBLEM — F02.818 EARLY ONSET ALZHEIMER'S DEMENTIA WITH BEHAVIORAL DISTURBANCE (HCC): Status: ACTIVE | Noted: 2024-01-08

## 2024-01-08 PROBLEM — G30.0 EARLY ONSET ALZHEIMER'S DEMENTIA WITH BEHAVIORAL DISTURBANCE (HCC): Status: ACTIVE | Noted: 2024-01-08

## 2024-01-08 NOTE — PROGRESS NOTES
"CHIEF COMPLAINT / REASON FOR VISIT  Dylon Santa is a 63 y.o. male that presents today for follow up    HISTORY OF PRESENT ILLNESS  Alzheimer's dementia, worsening, requires cares for even basic ADLs, such as feeding and grooming. He fairly frequently becomes agitated and aggressive    Living with brother who is taking care of him.     They are looking at [facility]     OBJECTIVE     BP (!) 142/72 (BP Location: Right arm, Patient Position: Sitting, BP Cuff Size: Adult)   Pulse 65   Temp 37 °C (98.6 °F) (Temporal)   Resp 18   Ht 1.727 m (5' 8\")   Wt 67.6 kg (149 lb)   SpO2 97%   BMI 22.66 kg/m²      PHYSICAL EXAM  Constitutional: Sitting comfortably, in no acute distress, responds to questions appropriately.  Skin: Warm and dry, no rashes or lesions on face or exposed upper extremities    ASSESSMENT & PLAN  1. Early onset Alzheimer's dementia with behavioral disturbance (HCC)  2. Impaired mobility and ADLs  Worsening Alzheimer's dementia, now severe with inability to complete even basic activities of daily living at home.  He is requiring full care/assistance with all ADLs, including medication administration, feeding, grooming/bathing.  Due to agitation and aggression I provided prescription for quetiapine 50 mg to twice daily as needed.  Recommend escalation of care to skilled nursing/memory care facility  - Referral to Skilled Nursing Facility  - QUEtiapine (SEROQUEL) 50 MG tablet; Take 1 Tablet by mouth 2 times a day as needed (agitation/aggression).  Dispense: 30 Tablet; Refill: 0    3. 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  Secondary to diabetes and hypertension.  Continue with current defined treatment plan: Control hyperglycemia and " hypertension.. Follow-up at least annually.  Diagnosis: E10.42 - Diabetic polyneuropathy associated with type 1 diabetes mellitus (HCC)  Assessment and plan: Chronic, stable. Continue with current defined treatment plan: Control hyperglycemia. Follow-up at least annually.  Diagnosis: E10.3593 - Proliferative diabetic retinopathy of both eyes without macular edema associated with type 1 diabetes mellitus (HCC)  Assessment and plan: Chronic, stable, as based on today's assessment and impact on other conditions evaluated today. Continue with current treatment plan: Follow-up with ophthalmology as directed, but at least annually.  Diagnosis: Z79.4 - Long-term insulin use (HCC)  Assessment and plan: Chronic, stable.  Due to type 1 diabetes.  Continue with current defined treatment plan: Continue insulin as prescribed. Follow-up at least annually.  Diagnosis: G30.9, F02.80 - Alzheimer disease ([facility])  Assessment and plan: Chronic, worsening. Treatment and follow up: Escalation of cares to memory care facility, prescribed quetiapine 50 mg up to twice daily as needed for agitation/aggression  Diagnosis: G31.9 - Cerebral atrophy ([facility])  Assessment and plan: Chronic, stable.  Secondary to Alzheimer's dementia continue with current defined treatment plan: Memory care facility.  Follow-up at least annually.  Diagnosis: F39 - Mood disorder ([facility])  Assessment and plan: Chronic, worsening.  Agitation and aggression related to Alzheimer's dementia.  Will initiate quetiapine 50 mg twice daily as needed. Follow-up at least annually.  Last edited 01/08/24 13:34 PST by Alexander Patel M.D.

## 2024-01-11 PROBLEM — R53.1 GENERALIZED WEAKNESS: Status: ACTIVE | Noted: 2024-01-01

## 2024-01-11 PROBLEM — E11.9 DM (DIABETES MELLITUS) (HCC): Status: ACTIVE | Noted: 2017-04-07

## 2024-01-11 NOTE — DISCHARGE PLANNING
MSW received SW consult from ERP. Pt's brother Luis Carlos Santa/POA/caregiver is no longer able to care for pt. He is receiving surgery tomorrow but pt has needed more long term care options. Pt is only 63, legally blind, insulin dependent and hx of Alzheimer's. Pt ambulates independently but pt's brother manages medications, meal preparation, finances from trust, helps pt with bathing/grooming/toileting. Per pt's brother, pt makes $1,500 in SSI and $600-700 in workers compensation payments. Advisement from [facility] and [facility] to bring pt to ER for placement. MSW updated ERP. Brother filled out Medicaid application.  requested PFA screen pt.  Pt is only 63y and doesn't qualify for GH waiver.     Case escalated to [facility] leadership for placement options.

## 2024-01-11 NOTE — ED NOTES
PIV established, blood drawn and sent to lab.   Family at bedside. Pt easily irritated due to advanced dementia so no bed alarm at this time. Will reassess prior to family leaving.

## 2024-01-11 NOTE — ED TRIAGE NOTES
Chief Complaint   Patient presents with    Other     Pt to triage accompanied by brother, concerned about increasing aggression since yesterday, has hx of dementia. Baseline aa0x1. Per brother who is caregiver will be having a surgery done tomorrow and states that he can't take care of the pt anymore, asking assistance for placement. Pt normally takes quetiapine fumarate for aggression but doesn't seem to help much yesterday.      Vitals:    01/11/24 1337   BP: 115/61   Pulse: 70   Resp: 15   Temp: 36.3 °C (97.4 °F)   SpO2: 96%

## 2024-01-11 NOTE — ED PROVIDER NOTES
ED Provider Note    CHIEF COMPLAINT  Chief Complaint   Patient presents with    Other     Pt to triage accompanied by brother, concerned about increasing aggression since yesterday, has hx of dementia. Baseline aa0x1. Per brother who is caregiver will be having a surgery done tomorrow and states that he can't take care of the pt anymore, asking assistance for placement. Pt normally takes quetiapine fumarate for aggression but doesn't seem to help much yesterday.            HPI/ROS    OUTSIDE HISTORIAN(S):  Family rather Austyn and Sofia take care of the patient    Dylon Santa is a 63 y.o. male who presents with his brother.  He has a history of Alzheimer's, insulin-dependent disease.  The patient's brother states he has surgery tomorrow morning on his right upper extremity can take care of the patient he more needs help.  He is trying to talk with [facility] and they said to come to the emergency department as they cannot help him currently.  The patient's brother states the patient is coming more aggressive that lidocaine is not working as well it was before.  Patient denies fever, shakes, chills, sweats, nausea, vomiting    PAST MEDICAL HISTORY   has a past medical history of Acute pain of right shoulder (3/22/2022), Anesthesia, Arthritis, Bronchitis (2016), Cataract, Cold (4/5/17), Coronary artery disease due to lipid rich plaque - mild to moderate on Cath 2019 Left Dominant, Coronary artery disease due to type 1 diabetes mellitus (HCC), Dental disorder, Diabetic neuropathy ([facility]) (12/31/2014), Diabetic retinopathy of both eyes ([facility]) (12/9/2016), Dyslipidemia, Heart murmur, Hemorrhagic disorder ([facility]), High cholesterol, History of cardiac catheterization 2019 in settin of  abnormal stress - mild to moderate CAD, History of shingles (12/31/2014), Hyperlipidemia, Hypertension, Kidney stones, Pain, Psychiatric problem, and Type II or unspecified type diabetes mellitus without mention of complication,  uncontrolled (12/31/2014).    SURGICAL HISTORY   has a past surgical history that includes eye surgery; appendectomy; knee arthroscopy; tonsillectomy; cataract extraction with iol (Bilateral); irrigation & debridement general (Left, 5/3/2017); and hand surgery (1990's).    FAMILY HISTORY  Family History   Problem Relation Age of Onset    Thyroid Mother     Lung Disease Mother         COPD    Cancer Mother         eyebrow     Thyroid Sister     Other Sister         mental health issues     Thyroid Brother     Lung Disease Brother         COPD    Hypertension Brother     Hyperlipidemia Brother     Lung Disease Father         COPD    Cancer Father     Diabetes Maternal Uncle     Cancer Paternal Aunt         type unknown    Diabetes Maternal Grandfather     Diabetes Paternal Grandmother     Cancer Paternal Grandfather         prostate    Stroke Neg Hx        SOCIAL HISTORY  Social History     Tobacco Use    Smoking status: Never    Smokeless tobacco: Former     Types: Chew     Quit date: 2016    Tobacco comments:     Chewing Tobacco - 1-2 times per month x15 years   Vaping Use    Vaping Use: Never used   Substance and Sexual Activity    Alcohol use: Yes     Alcohol/week: 0.0 oz     Comment: rare beer    Drug use: No    Sexual activity: Never     Comment: Never  , On disability due to lack of eye sight.       CURRENT MEDICATIONS  Home Medications       Reviewed by Robert Lee (Pharmacy Tech) on 01/11/24 at 1734  Med List Status: Complete     Medication Last Dose Status   acyclovir (ZOVIRAX) 200 MG Cap 1/11/2024 Active   atorvastatin (LIPITOR) 80 MG tablet 1/11/2024 Active   clopidogrel (PLAVIX) 75 MG Tab 1/11/2024 Active   Continuous Blood Gluc Sensor (FREESTYLE CARLOS 3 SENSOR) Misc ON Active   insulin aspart (NOVOLOG FLEXPEN) 100 UNIT/ML injection PEN 1/11/2024 Active   Insulin Glargine, 1 Unit Dial, (TOUJEO SOLOSTAR) 300 UNIT/ML Solution Pen-injector 1/10/2024 Active   Insulin Pen Needle 32 G x 4 mm  "SUPPLY Active   losartan (COZAAR) 100 MG Tab 1/11/2024 Active   metoprolol SR (TOPROL XL) 25 MG TABLET SR 24 HR 1/11/2024 Active   QUEtiapine (SEROQUEL) 50 MG tablet 1/11/2024 Active   traZODone (DESYREL) 150 MG Tab 1/10/2024 Active                    ALLERGIES  No Known Allergies    PHYSICAL EXAM  VITAL SIGNS: BP (!) 184/86   Pulse 70   Temp 36.3 °C (97.4 °F) (Temporal)   Resp 15   Ht 1.727 m (5' 8\")   Wt 68.3 kg (150 lb 9.2 oz)   SpO2 98%   BMI 22.89 kg/m²      Nursing notes and vitals reviewed.  Constitutional: Well developed, Well nourished, No acute distress, Non-toxic appearance.   Eyes: PERRLA, EOMI, Conjunctiva normal, No discharge.   HENT: Normocephalic, Atraumatic, moist mucous membranes, no facial edema Cardiovascular: Normal heart rate, Normal rhythm, No murmurs, No rubs, No gallops.   Thorax & Lungs: No respiratory distress, No rales, No rhonchi, No wheezing, No chest tenderness.   Abdomen: Bowel sounds normal, Soft, No tenderness, No guarding, No rebound, No masses, No pulsatile masses.   Skin: Warm, Dry, No erythema, No rash.   Extremities: No deformity, no edema, good range of motion range of motion upper lower extremes bilaterally  Neurologic: Alert & oriented x 1, moving his arms legs that difficulty   psychiatric: Affect normal for clinical presentation.      DIAGNOSTIC STUDIES / PROCEDURES  EKG  I have independently interpreted this EKG  Normal sinus rhythm on monitor    LABS  [id number] VIEW   Final Result         Large amount of stool throughout the colon.            RADIOLOGY  I have independently interpreted the diagnostic imaging associated with this visit and am waiting the final reading from the radiologist.   My preliminary interpretation is as follows: Abdomen shows evidence of large amount of stool.  Radiologist interpretation:   [id number] VIEW   Final Result         Large amount of stool throughout the colon.            COURSE & MEDICAL DECISION MAKING    ED Observation " Status? Yes; I am placing the patient in to an observation status due to a diagnostic uncertainty as well as therapeutic intensity. Patient placed in observation status at 2:42 PM, 1/11/2024.     Observation plan is as follows: Waiting for placement to skilled nursing facility      INITIAL ASSESSMENT, COURSE AND PLAN  Care Narrative: gentleman with diabetes and Alzheimer's who presents and the caretaker no longer take care of him.  Here the emergency room, patient has no significant lecture abnormality.  He has no evidence of endorgan damage, he is afebrile, no evidence of infection, no Inse urinary tract infection.  I discussed the patient with , Mary, who believes the patient will be in observation status until placement.  Here in the emergency room became agitated received Haldol 5 mg IM.  The nurses saying that the patient smells like he is constipated therefore KUB x-ray was ordered revealed evidence of constipation therefore received MiraLAX and Dulcolax.  We are awaiting placement for skilled nursing facility manage is Alzheimer's as well as diabetes and constipation.      ADDITIONAL PROBLEM LIST  Alzheimer  DISPOSITION AND DISCUSSIONS  I have discussed management of the patient with the following physicians and SRIRAM's: Dr. Almeida for hospital medicine who consulted on the patient states he does not to be hospitalized at this point.      FINAL DIAGNOSIS  1. Alzheimer's disease (HCC) Active   2. Type 1 diabetes mellitus with other specified complication (HCC) Active   3. Constipation, unspecified constipation type           Electronically signed by: Castro Bruno D.O., 1/11/2024 2:28 PM

## 2024-01-12 NOTE — ASSESSMENT & PLAN NOTE
Improving, likely an element of REGULO on CKD Stage III 2/2 DM nephropathy  No AGMA  Following intermittently

## 2024-01-12 NOTE — PROGRESS NOTES
Outpatient Social Work Follow-Up    Synopsis: Referral placed to CCM- Placement needs in SNF. STONE was completing chart review to attempt to contact Dylon and family to attempt to create a plan. STONE able to identify that Dylon is at the [facility] ER.     @1008  STONE attempted to call the ER SW to inform that notes in Epic stating that Dylon's family had been in touch with [facility]. There was no answer. STONE was able to leave a voicemail requesting a call back. STONE hopeful that HCM team is aware of [facility] and that this may potentially assist with d/c planning.     @1018  STONE received a call back from ER Mary RITTER. Mary aware of Dylon's family contacting [facility]. Dylon does not have Medicaid and Mary assisted with the application. Mary has attempted to contact [facility] for assistance and asked SW if this is something CCM SW can assist with. STONE informed Mary that this is not something [facility] assists with.     Plan: SW to no longer continue to follow. Dylon and family to continue to work closely with [facility] to coordinate a long term d/c plan.      Next Follow-Up: CCM referral closed.

## 2024-01-12 NOTE — ED NOTES
Bedside report received from off going RN: Kathia, assumed care of patient.  xray at bedside. Call light within reach, all needs addressed at this time.       Fall risk interventions in place: Move the patient closer to the nurse's station, Patient's personal possessions are with in their safe reach, Place socks on patient, Place fall risk sign on patient's door, Give patient urinal if applicable, Keep floor surfaces clean and dry, and Human-sitter if tele-sitter fails (all applicable per [facility] Fall risk assessment)   Continuous monitoring: Cardiac Leads, Pulse Ox, or Blood Pressure  IVF/IV medications: Not Applicable   Oxygen: Room Air  Bedside sitter: Report given to sitter  Isolation: Not Applicable

## 2024-01-12 NOTE — ASSESSMENT & PLAN NOTE
Advanced, with agitation   Has been tried on multiple medications this visit including Depakote, Valium, Namenda, Seroquel, trazodone, Zoloft, clonidine  Restraints off as of 1/28  Discussed behavioral disturbances with psychiatry.    Further medications per psychiatry, such as lorazepam  SNF cannot take patient with antipsychotics

## 2024-01-12 NOTE — ED NOTES
"Pt still refusing to eat and or drink water. Pt opens his eyes and closes them telling this RN to \"go away\".  "

## 2024-01-12 NOTE — ED NOTES
Unable to obtain med rec at this time. Patient unable to participate  Contacted brother via phone call and states he's on his way home to call him back in 15-20 minutes to go over medications

## 2024-01-12 NOTE — DISCHARGE PLANNING
MSW emailed Kaitlyn at [facility] to review pt's referral for long term care.    MSW emailed Elina at [facility] to review pt's referral.

## 2024-01-12 NOTE — ED NOTES
Med Rec complete per patient's brother via phone call   Allergies reviewed  Antibiotics in the past 30 days:no  Anticoagulant in past 14 days:no  Pharmacy patient utilizes:[facility] on [address]

## 2024-01-12 NOTE — ED NOTES
"Pt got up from bed and stating wanting to leave, attempted to re-orient pt. Pt would sit down and then get back up repetitively. Pt becoming increasingly agitated and irritable towards staff each time. Pt then would sit down and would try to sleep but would lean forward while sitting on edge of bed, attempted to encourage pt to lay down to rest. Pt would yell at this RN each time stating \"I want you to leave you fucker.\" Pt then would become increasingly agitated attempting to raise hand and attempted to grab this RN arm while yelling. De-escalation methods used, pt would continue to repeat episode. ERP notified, new orders placed for IV ativan. Pt medicated per MAR. Pt then drowsy after medication and assisted into hospital bed, adjusted to comfort. Sitter in direct view of pt.   "

## 2024-01-12 NOTE — ED NOTES
Attempted to medicated this pt again for his morning meds, pt refused stating no and keeping his eyes closed.

## 2024-01-12 NOTE — ED NOTES
Pt not cooperating in taking morning meds. Will try again later. Safety sitter in direct view of pt

## 2024-01-12 NOTE — ED NOTES
VS taken. Pt continues to be resting with eyes closed, respirations even and unlabored.   Sitter in direct view of pt.

## 2024-01-12 NOTE — ED NOTES
Pt resting in hospital bed, respirations even and unlabored. Laying on R side. NAD noted. Sitter in direct view of pt.

## 2024-01-12 NOTE — ASSESSMENT & PLAN NOTE
Started glyburide 2/10   Hga1c 8.7%  Patient has had hypoglycemia and hyperglycemia with labile blood sugars  Lantus has been discontinued and resumed   Started lantus 10 units PM - monitor sugars

## 2024-01-12 NOTE — PROGRESS NOTES
"ED Observation Progress Note    Date of Service: 01/12/24    Interval History and Interventions  Patient remains in the emergency department awaiting placement and psychiatric facility.  Patient with internal medicine consult, followed by PT, and OT as well.  Patient with history of dementia, CAD, diabetes, hyperlipidemia, hypertension and CKD stage III.  Unable to care for self.  Medical workup not significant for cause of admission and therefore patient remains in the emergency department.  Patient home medications have been ordered.  He is on a sliding scale for insulin as well as glargine at night.  No issues throughout my shift.    Physical Exam  /66   Pulse 77   Temp 36.3 °C (97.4 °F) (Temporal)   Resp 20   Ht 1.727 m (5' 8\")   Wt 68.3 kg (150 lb 9.2 oz)   SpO2 98%   BMI 22.89 kg/m² .    Constitutional: Awake and alert. Nontoxic  HENT:  Grossly normal  Eyes: Grossly normal  Neck: Normal range of motion  Cardiovascular: Normal heart rate   Thorax & Lungs: No respiratory distress  Abdomen: Nontender  Skin:  No pathologic rash.   Extremities: Well perfused  Psychiatric: Affect normal    Labs  Results for orders placed or performed during the hospital encounter of 01/11/24   CBC WITH DIFFERENTIAL   Result Value Ref Range    WBC 6.3 4.8 - 10.8 K/uL    RBC 4.43 (L) 4.70 - 6.10 M/uL    Hemoglobin 13.4 (L) 14.0 - 18.0 g/dL    Hematocrit 39.3 (L) 42.0 - 52.0 %    MCV 88.7 81.4 - 97.8 fL    MCH 30.2 27.0 - 33.0 pg    MCHC 34.1 32.3 - 36.5 g/dL    RDW 43.3 35.9 - 50.0 fL    Platelet Count 226 164 - 446 K/uL    MPV 10.3 9.0 - 12.9 fL    Neutrophils-Polys 68.00 44.00 - 72.00 %    Lymphocytes 19.30 (L) 22.00 - 41.00 %    Monocytes 10.50 0.00 - 13.40 %    Eosinophils 1.40 0.00 - 6.90 %    Basophils 0.50 0.00 - 1.80 %    Immature Granulocytes 0.30 0.00 - 0.90 %    Nucleated RBC 0.00 0.00 - 0.20 /100 WBC    Neutrophils (Absolute) 4.27 1.82 - 7.42 K/uL    Lymphs (Absolute) 1.21 1.00 - 4.80 K/uL    Monos (Absolute) " 0.66 0.00 - 0.85 K/uL    Eos (Absolute) 0.09 0.00 - 0.51 K/uL    Baso (Absolute) 0.03 0.00 - 0.12 K/uL    Immature Granulocytes (abs) 0.02 0.00 - 0.11 K/uL    NRBC (Absolute) 0.00 K/uL   CMP   Result Value Ref Range    Sodium 132 (L) 135 - 145 mmol/L    Potassium 4.5 3.6 - 5.5 mmol/L    Chloride 97 96 - 112 mmol/L    Co2 22 20 - 33 mmol/L    Anion Gap 13.0 7.0 - 16.0    Glucose 166 (H) 65 - 99 mg/dL    Bun 26 (H) 8 - 22 mg/dL    Creatinine 1.45 (H) 0.50 - 1.40 mg/dL    Calcium 9.3 8.5 - 10.5 mg/dL    Correct Calcium 9.3 8.5 - 10.5 mg/dL    AST(SGOT) 35 12 - 45 U/L    ALT(SGPT) 31 2 - 50 U/L    Alkaline Phosphatase 65 30 - 99 U/L    Total Bilirubin 0.5 0.1 - 1.5 mg/dL    Albumin 4.0 3.2 - 4.9 g/dL    Total Protein 7.2 6.0 - 8.2 g/dL    Globulin 3.2 1.9 - 3.5 g/dL    A-G Ratio 1.3 g/dL   URINALYSIS    Specimen: Urine   Result Value Ref Range    Color Yellow     Character Clear     Specific Gravity 1.007 <1.035    Ph 6.0 5.0 - 8.0    Glucose Negative Negative mg/dL    Ketones Negative Negative mg/dL    Protein Negative Negative mg/dL    Bilirubin Negative Negative    Urobilinogen, Urine 0.2 Negative    Nitrite Negative Negative    Leukocyte Esterase Negative Negative    Occult Blood Negative Negative    Micro Urine Req see below    ESTIMATED GFR   Result Value Ref Range    GFR (CKD-EPI) 54 (A) >60 mL/min/1.73 m 2   EKG   Result Value Ref Range    Report       [facility] Emergency Dept.    Test Date:  2024  Pt Name:    JEN JAMISON                   Department: ER  MRN:        [id number]                      Room:       [facility]  Gender:     Male                         Technician: [id number]  :        1960                   Requested By:CORBY MEDINA  Order #:    [id number]                    Ami MD:    Measurements  Intervals                                Axis  Rate:       68                           P:          45  FL:         156                          QRS:        -59  QRSD:        90                           T:          101  QT:         460  QTc:        490    Interpretive Statements  Sinus rhythm  Left anterior fascicular block  Abnormal R-wave progression, early transition  Abnormal T, consider ischemia, lateral leads  Borderline ST elevation, anterolateral leads  Artifact in lead(s) II,aVF,V2  Compared to ECG 10/21/2019 05:31:53  Left anterior fascicular block now present  T-wave abnormality now pr esent  Possible ischemia now present  ST (T wave) deviation now present  Left-axis deviation no longer present     POCT glucose device results   Result Value Ref Range    POC Glucose, Blood 184 (H) 65 - 99 mg/dL   POCT glucose device results   Result Value Ref Range    POC Glucose, Blood 106 (H) 65 - 99 mg/dL   POCT glucose device results   Result Value Ref Range    POC Glucose, Blood 247 (H) 65 - 99 mg/dL   POCT glucose device results   Result Value Ref Range    POC Glucose, Blood 127 (H) 65 - 99 mg/dL       Radiology  [id number] VIEW   Final Result         Large amount of stool throughout the colon.          Problem List    1. Alzheimer's disease (HCC) Active   2. Type 1 diabetes mellitus with other specified complication (HCC) Active   3. Constipation, unspecified constipation type

## 2024-01-12 NOTE — ED NOTES
Pt moved to G29, telesitter in place as no staff sitter is currently available. Pt requires frequent re-orientation. Bed alarm in place.

## 2024-01-12 NOTE — ED NOTES
Pt assisted with drinking juice and eating lunch. Pt drank two glasses of juice, and ate half his breakfast tray with staff assistance. 1:1 sitter at bedside for pt safety.

## 2024-01-12 NOTE — THERAPY
Occupational Therapy Contact Note    Patient Name: Dylon Santa  Age:  63 y.o., Sex:  male  Medical Record #: [id number]  Today's Date: 1/12/2024 01/12/24 1019   Interdisciplinary Plan of Care Collaboration   Collaboration Comments Attempted OT eval. Pt sedated per RN. Will re-attempt later as able.

## 2024-01-12 NOTE — DISCHARGE PLANNING
HTH/SCP TCN chart review completed. Collaborated with Mary RITTER. The most current review of medical record, knowledge of pt's PLOF and social support, LACE+ score of 80, no 6 clicks available..      Attempted to visit pt at bedside though note pt has baseline cognitive deficits and does not appear to be able to effectively discuss dc planning. Note that pt was living with brother prior to coming to [facility], who is now effectively unable/unwilling to care for pt per review and discussion with SW. Pt appears unable to care for self though appears this is more limited by cognitive deficits/safety issues than functional deficits as pt appears to have early onset dementia per review/chart.    Pt has PT and OT consults in place. TCN requested SNF referrals after discussion with HUM team. Note per discussion with HUM team, institutional Medicaid starts after they have been institutionalized for 30 days so hospital (may) count toward those days.  Should start Medicaid frank now if they can. If no one (SNFs) takes him... can retry in 30 days. Note per discussion with HUM team, unlikely that [facility], [facility] or [facility] will accept as they are currently or unable to accept LTC pt's as well. Requested referral to all other facilities and note referrals were sent to SNFs per review. Appreciate collaboration with SW as well and note SW attempting to reach out to LTC facilities as well.     TCN will continue to follow and collaborate with discharge planning team as additional post acute needs arise. Thank you.     Completed today:  PT/OT consults in place  Choice obtained: SNF referrals sent; see above

## 2024-01-12 NOTE — DISCHARGE PLANNING
MSW received call from pt's sister Mirta ([phone]). Pt's brother is in surgery and she is requesting an update on pt. MSW went over process of placement as explained to pt's brother yesterday. Mirta stated they are unable to provide care for pt as well. Mirta to call back for updates.

## 2024-01-12 NOTE — ASSESSMENT & PLAN NOTE
Unable to care for self  Will need a memory care unit versus group home  Family not interested in hospice

## 2024-01-12 NOTE — DISCHARGE PLANNING
Received choice form @: [id number]  Agency/Facility name: [facility]  Sent referral per choice form @: [id number]

## 2024-01-12 NOTE — ED NOTES
Bedside report received from off going RN: Miranda. Assumed care of patient.  POC discussed with patient. Call light within reach, all needs addressed at this time.       Fall risk interventions in place: Move the patient closer to the nurse's station, Patient's personal possessions are with in their safe reach, Place socks on patient, Place fall risk sign on patient's door, Give patient urinal if applicable, Keep floor surfaces clean and dry, and Accompanied to restroom (all applicable per [facility] Fall risk assessment)   Continuous monitoring: Cardiac Leads, Pulse Ox, or Blood Pressure  IVF/IV medications: Not Applicable   Oxygen: Room Air  Bedside sitter: Report given to sitter  Isolation: Not Applicable

## 2024-01-12 NOTE — THERAPY
Occupational Therapy   Initial Evaluation     Patient Name: Dylon Santa  Age:  63 y.o., Sex:  male  Medical Record #: [id number]  Today's Date: 1/12/2024     Precautions  Precautions: Fall Risk  Comments: Dementia    Assessment  Patient is a 63 y.o. male with a h/o dementia who was brought to the ED because his brother is no longer able to care for him. At baseline, the pt is ambulatory but requires assist with all ADLs. OT eval completed and pt found to be at his baseline.     Patient will not be actively followed for occupational therapy services at this time, however may be seen if requested by physician for 1 more visit during this hospital stay to address any discharge or equipment needs.       Plan    Occupational Therapy Initial Treatment Plan   Duration: Discharge Needs Only       Discharge Recommendations: Other - (recommend discharge to a supportive environment where pt will have assist for ADLs and IADLs)        Objective       01/12/24 1505   Prior Living Situation   Prior Services Intermittent Physical Support for ADL Per Family   Comments Per chart, pt lives with his brother. His brother is unfortunately not able to care for him any longer   Prior Level of ADL Function   Comments Per chart, pt ambulates independently at baseline but requires assist with all ADLs.   Precautions   Precautions Fall Risk   Comments Dementia   Vitals   O2 Delivery Device None - Room Air   Cognition    Cognition / Consciousness X   Level of Consciousness Alert   Comments confused, disoriented but cooperative.   Active ROM Upper Body   Active ROM Upper Body  WDL   Dominant Hand Right   Strength Upper Body   Comments some generalized weakness but appears functional for basic ADLs, unable to follow directions for strength testing   Upper Body Muscle Tone   Upper Body Muscle Tone  WDL   Coordination Upper Body   Coordination X   Comments UE intention tremors observed during self-feeding   Balance Assessment   Sitting Balance  (Static) Fair   Sitting Balance (Dynamic) Fair   Standing Balance (Static) Fair -   Standing Balance (Dynamic) Fair -   Weight Shift Sitting Fair   Weight Shift Standing Fair   Comments no AD   Bed Mobility    Supine to Sit Minimal Assist  (assist to initiate)   ADL Assessment   Eating Minimal Assist  (lunch, intermittent assist to scoop or stab the food but pt able to bring it to his mouth)   Toileting Minimal Assist  (just used bathroom with nsg with Christie)   6 Clicks Daily Activity Score 17   Functional Mobility   Sit to Stand Standby Assist   Bed, Chair, Wheelchair Transfer Standby Assist   Mobility walked without AD, slow moving   Education Group   Education Provided Role of Occupational Therapist   Role of Occupational Therapist Patient Response Patient;Acceptance;Explanation;No Learning Evidence   Occupational Therapy Initial Treatment Plan    Duration Discharge Needs Only   Problem List   Problem List   (Pt is at his functional baseline)

## 2024-01-12 NOTE — THERAPY
Physical Therapy   Initial Evaluation     Patient Name: Dylon Santa  Age:  63 y.o., Sex:  male  Medical Record #: [id number]  Today's Date: 1/12/2024     Precautions  Precautions: Fall Risk  Comments: Dementia    Assessment  Patient is 63 y.o. male brought in by his brother for increased agitation.  Pt has a hx of Alzheimer's, brother is having surgery and is unable to care for pt at this time.  Pt was seen for PT evaluation, required increased cues for initiation and continuing tasks.  He ambulated as detailed below with SBA for safety, is primarily limited by cognition & safety.  Pt would benefit from DC to a supportive living environment with supervision for ADLs and mobility with assistance as needed.  Given limited capacity for new learning and pt likely at his baseline acute PT will not follow. Patient will not be actively followed for physical therapy services at this time, however may be seen if requested by physician for 1 more visit within 30 days to address any discharge or equipment needs.    Plan    Physical Therapy Initial Treatment Plan   Duration: Discharge Needs Only    DC Equipment Recommendations: None  Discharge Recommendations: Other - (Pt would benefit from a supportive living environment with SPV and assistance PRN for ADLs and mobility.)     Objective     01/12/24 1501   Precautions   Precautions Fall Risk   Comments Dementia   Prior Living Situation   Prior Services Continuous (24 Hour) Care Giving Family   Comments Pt unable to provide home setup. Per chart pt was living with his brother who is not currently able to care for him as he is having surgery.   Prior Level of Functional Mobility   Comments Pt unable to provide PLOF but seems to require at least SPV due to dementia/cognitive deficits.   Cognition    Cognition / Consciousness X   Level of Consciousness Alert   Comments Cooperative, minimally conversive but pleasant. Required cues to keep eyes open.   Active ROM Lower Body     Active ROM Lower Body  WDL   Strength Lower Body   Lower Body Strength  WDL   Comments WFL with functional mobility   Sensation Lower Body   Comments Unable to determine   Balance Assessment   Sitting Balance (Static) Fair   Sitting Balance (Dynamic) Fair   Standing Balance (Static) Fair -   Standing Balance (Dynamic) Fair -   Weight Shift Sitting Fair   Weight Shift Standing Fair   Bed Mobility    Supine to Sit Minimal Assist (poor initiation)   Sit to Supine (NT, left sitting EOB with sitter)   Comments HOB flat   Gait Analysis   Gait Level Of Assist Standby Assist   Assistive Device None   Distance (Feet) 150   # of Times Distance was Traveled 1   Deviation Shuffled Gait (short step length)   Weight Bearing Status No restrictions   Comments SBA for safety   Functional Mobility   Sit to Stand Standby Assist   Bed, Chair, Wheelchair Transfer Standby Assist   Transfer Method Stand Step   Mobility bed mobility, ambulation   Activity Tolerance   Sitting Edge of Bed Post session   Standing 8 min +   Education Group   Education Provided Role of Physical Therapist   Role of Physical Therapist Patient Response Patient;No Learning Evidence   Physical Therapy Initial Treatment Plan    Duration Discharge Needs Only

## 2024-01-12 NOTE — ED NOTES
Pt finished dinner, brief checked for incontinence. No urine or bowel movement noted in brief. Sitter in direct view of pt.

## 2024-01-12 NOTE — CONSULTS
[facility] Medicine Consultation    Date of Service  1/11/2024    Referring Physician  DESMOND Anderson*    Consulting Physician  Nahun Almeida M.D.    Reason for Consultation  Generalized weakness    History of Presenting Illness  63 y.o. male with history of Alzheimer dementia, CAD, diabetes, hyperlipidemia, hypertension, CKD 3 who presented 1/11/2024 with evaluation for generalized weakness.  Patient is cared for by his brother and other family members, he requires 24-hour one-on-one assist at all times.  Apparently, patient caregiver (brother) is going for surgery tomorrow, unable to care for patient, therefore dropped off patient in the emergency room.  Medicine service is consulted for assistance with management of chronic co morbidities and evaluation of generalized weakness.    Review of Systems  Review of Systems   Unable to perform ROS: Dementia   Respiratory:  Negative for cough and shortness of breath.    Cardiovascular:  Negative for chest pain and palpitations.   All other systems reviewed and are negative.  Limited ROS due to dementia    Past Medical History   has a past medical history of Acute pain of right shoulder (3/22/2022), Anesthesia, Arthritis, Bronchitis (2016), Cataract, Cold (4/5/17), Coronary artery disease due to lipid rich plaque - mild to moderate on Cath 2019 Left Dominant, Coronary artery disease due to type 1 diabetes mellitus ([facility]), Dental disorder, Diabetic neuropathy ([facility]) (12/31/2014), Diabetic retinopathy of both eyes ([facility]) (12/9/2016), Dyslipidemia, Heart murmur, Hemorrhagic disorder ([facility]), High cholesterol, [facility] - mild to moderate CAD, History of shingles (12/31/2014), Hyperlipidemia, Hypertension, Kidney stones, Pain, Psychiatric problem, and Type II or unspecified type diabetes mellitus without mention of complication, uncontrolled (12/31/2014).    Surgical History   has a past surgical history that  includes eye surgery; appendectomy; knee arthroscopy; tonsillectomy; cataract extraction with iol (Bilateral); irrigation & debridement general (Left, 5/3/2017); and hand surgery ().    Family History  family history includes Cancer in his father, mother, paternal aunt, and paternal grandfather; Diabetes in his maternal grandfather, maternal uncle, and paternal grandmother; Hyperlipidemia in his brother; Hypertension in his brother; Lung Disease in his brother, father, and mother; Other in his sister; Thyroid in his brother, mother, and sister.    Social History   reports that he has never smoked. He quit smokeless tobacco use about 8 years ago.  His smokeless tobacco use included chew. He reports current alcohol use. He reports that he does not use drugs.    Medications  Prior to Admission Medications   Prescriptions Last Dose Informant Patient Reported? Taking?   Continuous Blood Gluc Sensor (FREESTYLE CARLOS 3 SENSOR) Misc   No No   Sig: Use 1 Carlos 3 sensor every 14 days for blood glucose monitoring. For type 1 diabetes mellitus with hyperglycemia.   Insulin Glargine, 1 Unit Dial, (TOUJEO SOLOSTAR) 300 UNIT/ML Solution Pen-injector   Yes No   Sig: Inject 20 Units under the skin at bedtime. Indications: Insulin-Dependent Diabetes   Insulin Pen Needle 32 G x 4 mm   No No   Si Each 4 Times a Day,Before Meals and at Bedtime.   NOVOLOG FLEXPEN 100 UNIT/ML solution for injection   No No   Sig: INJECT 10 UNITS SUBCUTANEOUSLY THREE TIMES DAILY BEFORE MEAL(S)   QUEtiapine (SEROQUEL) 50 MG tablet   No No   Sig: Take 1 Tablet by mouth 2 times a day as needed (agitation/aggression).   acyclovir (ZOVIRAX) 200 MG Cap   No No   Sig: Take 1 Capsule by mouth every day.   atorvastatin (LIPITOR) 80 MG tablet   No No   Sig: Take 1 Tablet by mouth every day.   clopidogrel (PLAVIX) 75 MG Tab   No No   Sig: Take 1 Tablet by mouth every day.   losartan (COZAAR) 100 MG Tab   No No   Sig: Take 1 Tablet by mouth every day.    metoprolol SR (TOPROL XL) 25 MG TABLET SR 24 HR   No No   Sig: Take 1 Tablet by mouth every day.   traZODone (DESYREL) 150 MG Tab   No No   Sig: Take 1 Tablet by mouth every evening.      Facility-Administered Medications: None       Allergies  No Known Allergies    Physical Exam  Temp:  [36.3 °C (97.4 °F)] 36.3 °C (97.4 °F)  Pulse:  [65-70] 65  Resp:  [15-18] 18  BP: (115-186)/(61-86) 186/85  SpO2:  [94 %-98 %] 96 %    Physical Exam  Vitals and nursing note reviewed.   Constitutional:       General: He is not in acute distress.  HENT:      Head: Normocephalic and atraumatic.      Nose: Nose normal.      Mouth/Throat:      Mouth: Mucous membranes are moist.      Pharynx: Oropharynx is clear.   Eyes:      General: No scleral icterus.     Extraocular Movements: Extraocular movements intact.   Cardiovascular:      Rate and Rhythm: Normal rate and regular rhythm.      Pulses: Normal pulses.      Heart sounds:      No friction rub.   Pulmonary:      Effort: No respiratory distress.      Breath sounds: No wheezing or rales.   Chest:      Chest wall: No tenderness.   Abdominal:      General: There is no distension.      Tenderness: There is no abdominal tenderness. There is no guarding or rebound.   Musculoskeletal:         General: Normal range of motion.      Cervical back: Neck supple. No tenderness.      Right lower leg: No edema.      Left lower leg: No edema.   Skin:     General: Skin is warm and dry.      Capillary Refill: Capillary refill takes less than 2 seconds.   Neurological:      General: No focal deficit present.      Mental Status: He is alert.      Comments: Alert and oriented to self  Able to follow some commands  Pleasant, minimally verbal   Psychiatric:         Mood and Affect: Mood normal.         Fluids      Laboratory  Recent Labs     01/11/24  1457   WBC 6.3   RBC 4.43*   HEMOGLOBIN 13.4*   HEMATOCRIT 39.3*   MCV 88.7   MCH 30.2   MCHC 34.1   RDW 43.3   PLATELETCT 226   MPV 10.3     Recent Labs      01/11/24  1457   SODIUM 132*   POTASSIUM 4.5   CHLORIDE 97   CO2 22   GLUCOSE 166*   BUN 26*   CREATININE 1.45*   CALCIUM 9.3                     Imaging  [id number] VIEW   Final Result         Large amount of stool throughout the colon.          Assessment/Plan  * Generalized weakness  Assessment & Plan  Unable to care for self  PT/OT/CM f/u for placement    Alzheimer disease (HCC)- (present on admission)  Assessment & Plan  Frequent reorientation  Continue home meds: Seroquel, trazodone  Minimize additional sedative    Coronary artery disease due to lipid rich plaque - mild to moderate on Cath 2019 Left Dominant- (present on admission)  Assessment & Plan  Continue Plavix, atorvastatin, metoprolol    DM (diabetes mellitus) (HCC)- (present on admission)  Assessment & Plan  Continue Lantus, ISS    Stage 3a chronic kidney disease (HCC)- (present on admission)  Assessment & Plan  Minimize nephrotoxic agents, renally dose meds    Dyslipidemia- (present on admission)  Assessment & Plan  Atorvastatin    Hypertension- (present on admission)  Assessment & Plan  Metoprolol, losartan      Thank you for consulting hospitalist medicine service in coordinating care for this patient.  Agree with ED observation status at this time, PT/OT/CM follow-up for possible placement.  Home medications have been reconciled.  Please call with questions or reconsult medicine service if any further assistance needed.

## 2024-01-12 NOTE — ED NOTES
Pt ambulatory to bathroom with x1 standby assist. Pt incontinent of urine on the way to bathroom. Pt pants and socks changed. Pt assisted back to room. Pt provided food tray, pt declined eating at this time. 1:1 safety sitter remains at bedside.

## 2024-01-12 NOTE — ED NOTES
PT/OT unavailable at this time.   Pt with increased agitation. Attempting to Get OOB with increased aggression. ERP notified. Tech at bedside for EKG.

## 2024-01-12 NOTE — ED NOTES
Pt medicated per MAR. Pt moved over to hospital bed x3 assistance.   Pt adjusted in bed to comfort.   Brief checked , no signs of incontinence. VS taken.   Sitter in direct view of pt.

## 2024-01-12 NOTE — ED NOTES
Pt medicated per MAR. FSBG checked -- 106. Pt given dinner tray. VS taken.   Sitter in direct view of pt.

## 2024-01-12 NOTE — ED NOTES
"Pt got up from bed and stating wanting to leave, pt stating \"I want my stuff.\" As he ambulated out of room. Attempted to re-orient pt and give reassurance, pt became increasingly agitated with staff, kept leaving room and not wanting to follow direction. Pt re-directed into room and sat down, pt then got up and attempted to leave and irritable towards staff, attempted de-escalation, pt continuing to be agitated and restless.  ERP notified, new orders place for IM haldol. Security called to bedside for assistance d/t to pt raising arms up at staff when letting him know that medication is going to be administered. Pt medicated per MAR. Pt sitting up in hospital bed, bed alarm on, sitter in direct view of pt.  "

## 2024-01-12 NOTE — ED NOTES
Received report from Wanda KHAN. Assumed pt care. Pt resting in hospital bed, safety sitter present

## 2024-01-12 NOTE — ED NOTES
"Patient's home medications have been reviewed by the pharmacy team.     Past Medical History:   Diagnosis Date    Acute pain of right shoulder 3/22/2022    Anesthesia     hx PONV    Arthritis     L knee; hands    Bronchitis 2016    Cataract     nicole iol    Cold 4/5/17    prod cough    Coronary artery disease due to lipid rich plaque - mild to moderate on Cath 2019 Left Dominant     Coronary artery disease due to type 1 diabetes mellitus (HCC)     Dental disorder     \"bad shape\"    Diabetic neuropathy ([facility]) 12/31/2014    retinopathy    Diabetic retinopathy of both eyes ([facility]) 12/9/2016    Dyslipidemia     Heart murmur     Hemorrhagic disorder ([facility])     nose bleeds    High cholesterol     History of cardiac catheterization 2019 [facility] abnormal stress - mild to moderate CAD     History of shingles 12/31/2014    Hyperlipidemia     Hypertension     Kidney stones     Pain     left leg/foot    Psychiatric problem     situational depression    Type II or unspecified type diabetes mellitus without mention of complication, uncontrolled 12/31/2014       Patient's Medications   New Prescriptions    No medications on file   Previous Medications    ACYCLOVIR (ZOVIRAX) 200 MG CAP    Take 1 Capsule by mouth every day.    ATORVASTATIN (LIPITOR) 80 MG TABLET    Take 1 Tablet by mouth every day.    CLOPIDOGREL (PLAVIX) 75 MG TAB    Take 1 Tablet by mouth every day.    CONTINUOUS BLOOD GLUC SENSOR (FREESTYLE CARLOS 3 SENSOR) [facility]    Use 1 Carlos 3 sensor every 14 days for blood glucose monitoring. For type 1 diabetes mellitus with hyperglycemia.    INSULIN ASPART (NOVOLOG FLEXPEN) 100 UNIT/ML INJECTION PEN    Inject  under the skin 3 times a day before meals. Sliding Scale   80 - 120 = 7 units  120 - 161 = 8 units  161 - 200 = 9 units  201 - 240 = 10 units  241 - 280 = 11 units  281 - 320 = 12 units  321 - 360 = 13 units  361 - 400 = 14 units  >400 Call MD    INSULIN GLARGINE, 1 UNIT DIAL, (TOUJEO SOLOSTAR) 300 UNIT/ML SOLUTION " PEN-INJECTOR    Inject 20 Units under the skin at bedtime. Indications: Insulin-Dependent Diabetes    INSULIN PEN NEEDLE 32 G X 4 MM    1 Each 4 Times a Day,Before Meals and at Bedtime.    LOSARTAN (COZAAR) 100 MG TAB    Take 1 Tablet by mouth every day.    METOPROLOL SR (TOPROL XL) 25 MG TABLET SR 24 HR    Take 1 Tablet by mouth every day.    QUETIAPINE (SEROQUEL) 50 MG TABLET    Take 1 Tablet by mouth 2 times a day as needed (agitation/aggression).    TRAZODONE (DESYREL) 150 MG TAB    Take 1 Tablet by mouth every evening.   Modified Medications    No medications on file   Discontinued Medications    NOVOLOG FLEXPEN 100 UNIT/ML SOLUTION FOR INJECTION    INJECT 10 UNITS SUBCUTANEOUSLY THREE TIMES DAILY BEFORE MEAL(S)          A:  Medications do not appear to be contributing to current complaints.     P:    No recommendations at this time. Home medications have been reordered as appropriate.    Bob Swartz, XaviD

## 2024-01-13 NOTE — ED NOTES
Pt at this time is communicating w/ RN utilizing full sentences. Pt requested water. Pt provided w/ water. W/ RN assistance pt able to Consume Dinner. Pt thank RN after meal consumption. Pt is calm and cooperative

## 2024-01-13 NOTE — ED NOTES
Checked on bed, with unlabored respirations. No safety risk noted  Sleeping  Sitter - 1:1 with continuous visual monitoring by Trained Personnel  Continued safety precaution  Mund[place]ivar in low position, side rail up for pt safety.   No needs identified at the moment

## 2024-01-13 NOTE — ED NOTES
Pt awake at this time. Pt went out of his room and voluntarily transferred to an available bed in [address]. 1:1 sitter continued, fall precautions continued. Side rails up for safety. Blankets refused.     0443 Pt asleep with even respirations at this time. 1:1 continued.

## 2024-01-13 NOTE — ED NOTES
Bedside report received from off going RN/tech: Ashish RN, assumed care of patient.  POC discussed with patient. Call light within reach, all needs addressed at this time.       Fall risk interventions in place: Move the patient closer to the nurse's station, Patient's personal possessions are with in their safe reach, Place socks on patient, Place fall risk sign on patient's door, Give patient urinal if applicable, Keep floor surfaces clean and dry, and Human-sitter if tele-sitter fails (all applicable per [place] Fall risk assessment)   Continuous monitoring: Pulse Ox or Blood Pressure  IVF/IV medications: Not Applicable   Oxygen: Room Air  Bedside sitter: Not Applicable   Isolation: Not Applicable

## 2024-01-13 NOTE — ED NOTES
"RN attempted to recheck vs x2 and tried to administer due medications  but pt refused. Pt states \" Get out of my way\" and went back to sleep.     1:1 sitter continued  "

## 2024-01-13 NOTE — DISCHARGE PLANNING
MSW spoke to pt's brother Luis Carlos regarding for an update on pt's placement. Still pending Medicaid screening. MSW asked Luis Carlos a breakdown of pt's trust. Per Luis Carlos, pt's trust is just his bank account. He gets his SSI and 3 workers comp payments around $600-$700 a month. Luis Carlos stated he uses pt's bank account for his medical expenses, medications co-pays and equipment needed to care for him. Right now pt has around $600 in his account. Currently is trying to work with pt's  to get pt's workers comp cases closed.

## 2024-01-13 NOTE — ED NOTES
Pt had a large soft bowel movement on the floor in room. Floor cleaned. Pt sitting in room on chair

## 2024-01-13 NOTE — PROGRESS NOTES
ED Observation Progress Note    Date of Service: 01/13/24    Interval History and Interventions  Patient has been resting comfortably throughout the night.  The patient has been refusing to take his medications.  His fingerstick blood sugar was 97 this morning so no need for immediate insulin.  Patient still is awaiting for transfer to a care facility.    Results for orders placed or performed during the hospital encounter of 01/11/24   CBC WITH DIFFERENTIAL   Result Value Ref Range    WBC 6.3 4.8 - 10.8 K/uL    RBC 4.43 (L) 4.70 - 6.10 M/uL    Hemoglobin 13.4 (L) 14.0 - 18.0 g/dL    Hematocrit 39.3 (L) 42.0 - 52.0 %    MCV 88.7 81.4 - 97.8 fL    MCH 30.2 27.0 - 33.0 pg    MCHC 34.1 32.3 - 36.5 g/dL    RDW 43.3 35.9 - 50.0 fL    Platelet Count 226 164 - 446 K/uL    MPV 10.3 9.0 - 12.9 fL    Neutrophils-Polys 68.00 44.00 - 72.00 %    Lymphocytes 19.30 (L) 22.00 - 41.00 %    Monocytes 10.50 0.00 - 13.40 %    Eosinophils 1.40 0.00 - 6.90 %    Basophils 0.50 0.00 - 1.80 %    Immature Granulocytes 0.30 0.00 - 0.90 %    Nucleated RBC 0.00 0.00 - 0.20 /100 WBC    Neutrophils (Absolute) 4.27 1.82 - 7.42 K/uL    Lymphs (Absolute) 1.21 1.00 - 4.80 K/uL    Monos (Absolute) 0.66 0.00 - 0.85 K/uL    Eos (Absolute) 0.09 0.00 - 0.51 K/uL    Baso (Absolute) 0.03 0.00 - 0.12 K/uL    Immature Granulocytes (abs) 0.02 0.00 - 0.11 K/uL    NRBC (Absolute) 0.00 K/uL   CMP   Result Value Ref Range    Sodium 132 (L) 135 - 145 mmol/L    Potassium 4.5 3.6 - 5.5 mmol/L    Chloride 97 96 - 112 mmol/L    Co2 22 20 - 33 mmol/L    Anion Gap 13.0 7.0 - 16.0    Glucose 166 (H) 65 - 99 mg/dL    Bun 26 (H) 8 - 22 mg/dL    Creatinine 1.45 (H) 0.50 - 1.40 mg/dL    Calcium 9.3 8.5 - 10.5 mg/dL    Correct Calcium 9.3 8.5 - 10.5 mg/dL    AST(SGOT) 35 12 - 45 U/L    ALT(SGPT) 31 2 - 50 U/L    Alkaline Phosphatase 65 30 - 99 U/L    Total Bilirubin 0.5 0.1 - 1.5 mg/dL    Albumin 4.0 3.2 - 4.9 g/dL    Total Protein 7.2 6.0 - 8.2 g/dL    Globulin 3.2 1.9 - 3.5  g/dL    A-G Ratio 1.3 g/dL   URINALYSIS    Specimen: Urine   Result Value Ref Range    Color Yellow     Character Clear     Specific Gravity 1.007 <1.035    Ph 6.0 5.0 - 8.0    Glucose Negative Negative mg/dL    Ketones Negative Negative mg/dL    Protein Negative Negative mg/dL    Bilirubin Negative Negative    Urobilinogen, Urine 0.2 Negative    Nitrite Negative Negative    Leukocyte Esterase Negative Negative    Occult Blood Negative Negative    Micro Urine Req see below    ESTIMATED GFR   Result Value Ref Range    GFR (CKD-EPI) 54 (A) >60 mL/min/1.73 m 2   EKG   Result Value Ref Range    Report       [facility] Emergency Dept.    Test Date:  2024  Pt Name:    JEN JAMISON                   Department: ER  MRN:        [id number]                      Room:       [facility]  Gender:     Male                         Technician: [id number]  :        1960                   Requested By:CORBY EMDINA  Order #:    [id number]                    Reading MD:    Measurements  Intervals                                Axis  Rate:       68                           P:          45  MO:         156                          QRS:        -59  QRSD:       90                           T:          101  QT:         460  QTc:        490    Interpretive Statements  Sinus rhythm  Left anterior fascicular block  Abnormal R-wave progression, early transition  Abnormal T, consider ischemia, lateral leads  Borderline ST elevation, anterolateral leads  Artifact in lead(s) II,aVF,V2  Compared to ECG 10/21/2019 05:31:53  Left anterior fascicular block now present  T-wave abnormality now pr esent  Possible ischemia now present  ST (T wave) deviation now present  Left-axis deviation no longer present     POCT glucose device results   Result Value Ref Range    POC Glucose, Blood 184 (H) 65 - 99 mg/dL   POCT glucose device results   Result Value Ref Range    POC Glucose, Blood 106 (H) 65 - 99 mg/dL   POCT glucose device  results   Result Value Ref Range    POC Glucose, Blood 247 (H) 65 - 99 mg/dL   POCT glucose device results   Result Value Ref Range    POC Glucose, Blood 127 (H) 65 - 99 mg/dL   POCT glucose device results   Result Value Ref Range    POC Glucose, Blood 96 65 - 99 mg/dL   POCT glucose device results   Result Value Ref Range    POC Glucose, Blood 157 (H) 65 - 99 mg/dL   POCT glucose device results   Result Value Ref Range    POC Glucose, Blood 93 65 - 99 mg/dL         Problem List  1.   1. Alzheimer's disease (HCC) Active Awaiting transfer to a skilled nursing facility.      2. Type 1 diabetes mellitus with other specified complication (HCC) Active       3. Constipation, unspecified constipation type              Electronically signed by: Roberto Carlos West M.D., 1/13/2024 12:22 PM

## 2024-01-13 NOTE — ED NOTES
Bedside report received from off going RN/tech: Kassie KHAN, assumed care of patient.  POC discussed with patient. Call light within reach, all needs addressed at this time.       Fall risk interventions in place: Move the patient closer to the nurse's station, Place socks on patient, Place fall risk sign on patient's door, and Human-sitter if tele-sitter fails (all applicable per [place] Fall risk assessment)   Continuous monitoring: Not Applicable   IVF/IV medications: Not Applicable   Oxygen: Room Air  Bedside sitter: Safety sitter @ bedside   Isolation: Not Applicable

## 2024-01-13 NOTE — ED NOTES
Bedside report received from off going RN/tech: assigned RN, assumed care of patient.  POC discussed with patient. Call light within reach, all needs addressed at this time.       Fall risk interventions in place: Move the patient closer to the nurse's station, Patient's personal possessions are with in their safe reach, Place socks on patient, Place fall risk sign on patient's door, Give patient urinal if applicable, Keep floor surfaces clean and dry, Accompanied to restroom, Bed Alarm in use, and Human-sitter if tele-sitter fails (all applicable per [place] Fall risk assessment)   Continuous monitoring: Not Applicable   IVF/IV medications: Not Applicable   Oxygen: Room Air  Bedside sitter: Pt on L2k SI with 1:1 sitter vanessa (name)  Isolation: Not Applicable

## 2024-01-13 NOTE — ED NOTES
Pt offered meal tray, declines at this time. Pt also offered assistance with repositioning. Became agitated when bed lowered. Refusing glucose to be checked at this time.

## 2024-01-13 NOTE — ED NOTES
Pt awake and sitting on a chair. VS rechecked. Oral fluids given,pt tolerated well. Pt redirected back to bed.     Warm blankets provided. Fall precautions in place, bed alarm in use. 1:1 SITTER for safety

## 2024-01-13 NOTE — ED NOTES
Pt blood glucose 157. Pt medicated per MAR. Pt resting in [facility] in direct view of 1:1 sitter for pt safety.

## 2024-01-13 NOTE — ED NOTES
[facility]  Neurology Progress Note    Herman Carlson Patient Status:  Inpatient    7/15/1968 MRN [id number]   Location [facility] 3W/SW Attending Oscar Luther MD   Hosp Day # 1 PCP No primary care provider on file. Subjective:   Funi Pt continues to rest in room. NAD . Respiration equal and unlabored.    

## 2024-01-14 PROBLEM — E88.89 KETOSIS (HCC): Status: ACTIVE | Noted: 2024-01-01

## 2024-01-14 PROBLEM — E11.65 TYPE 2 DIABETES MELLITUS WITH HYPERGLYCEMIA, WITH LONG-TERM CURRENT USE OF INSULIN (HCC): Status: ACTIVE | Noted: 2017-04-07

## 2024-01-14 PROBLEM — E86.0 DEHYDRATION: Status: ACTIVE | Noted: 2024-01-14

## 2024-01-14 PROBLEM — Z79.4 TYPE 2 DIABETES MELLITUS WITH HYPERGLYCEMIA, WITH LONG-TERM CURRENT USE OF INSULIN (HCC): Status: ACTIVE | Noted: 2017-04-07

## 2024-01-14 NOTE — ED NOTES
Meal tray given and pt eating some bites here and there  Refusing insulin at this time  Will try for VS and meds later  Sitter remains outside of room in direct, unobstructed view for safety  All safety measures in place  Pt is ambulatory

## 2024-01-14 NOTE — DISCHARGE SUMMARY
"ED Observation Discharge Summary    Scribed for Uzair Richard M.d. by Adolfo Gunn. 2024  7:39 AM    Patient:Dylon Santa  Patient : 1960  Patient MRN: [id number]  Patient PCP: Alexander Patel M.D.    Admit Date: 2024  Discharge Date and Time: 24 7:39 AM  Discharge Diagnosis:   1. Alzheimer's disease (HCC) Active   2. Type 1 diabetes mellitus with other specified complication (HCC) Active   3. Constipation, unspecified constipation type     Discharge Attending: Adolfo Gunn  Discharge Service: ED Observation    ED Course  Dylon is a 63 y.o. male who was evaluated at [facility] in ER for possible placement at care facility, however he has been aggressive and refusing medications. Due to this, laboratory studies were obtained which demonstrate hyperglycemia warranting admission.      Discharge Exam:  BP (!) 156/76   Pulse 84   Temp 36.9 °C (98.4 °F) (Temporal)   Resp 17   Ht 1.727 m (5' 8\")   Wt 68.3 kg (150 lb 9.2 oz)   SpO2 96%   BMI 22.89 kg/m² .    Constitutional: Awake and confused with a history of dementia. Nontoxic  HENT:  Grossly normal  Eyes: Grossly normal  Neck: Normal range of motion  Cardiovascular: Normal heart rate   Thorax & Lungs: No respiratory distress  Abdomen: Nontender  Skin:  No pathologic rash.   Extremities: Well perfused  Psychiatric: Affect normal    Labs  Results for orders placed or performed during the hospital encounter of 24   CBC WITH DIFFERENTIAL   Result Value Ref Range    WBC 6.3 4.8 - 10.8 K/uL    RBC 4.43 (L) 4.70 - 6.10 M/uL    Hemoglobin 13.4 (L) 14.0 - 18.0 g/dL    Hematocrit 39.3 (L) 42.0 - 52.0 %    MCV 88.7 81.4 - 97.8 fL    MCH 30.2 27.0 - 33.0 pg    MCHC 34.1 32.3 - 36.5 g/dL    RDW 43.3 35.9 - 50.0 fL    Platelet Count 226 164 - 446 K/uL    MPV 10.3 9.0 - 12.9 fL    Neutrophils-Polys 68.00 44.00 - 72.00 %    Lymphocytes 19.30 (L) 22.00 - 41.00 %    Monocytes 10.50 0.00 - 13.40 %    Eosinophils 1.40 0.00 - 6.90 %    " Basophils 0.50 0.00 - 1.80 %    Immature Granulocytes 0.30 0.00 - 0.90 %    Nucleated RBC 0.00 0.00 - 0.20 /100 WBC    Neutrophils (Absolute) 4.27 1.82 - 7.42 K/uL    Lymphs (Absolute) 1.21 1.00 - 4.80 K/uL    Monos (Absolute) 0.66 0.00 - 0.85 K/uL    Eos (Absolute) 0.09 0.00 - 0.51 K/uL    Baso (Absolute) 0.03 0.00 - 0.12 K/uL    Immature Granulocytes (abs) 0.02 0.00 - 0.11 K/uL    NRBC (Absolute) 0.00 K/uL   CMP   Result Value Ref Range    Sodium 132 (L) 135 - 145 mmol/L    Potassium 4.5 3.6 - 5.5 mmol/L    Chloride 97 96 - 112 mmol/L    Co2 22 20 - 33 mmol/L    Anion Gap 13.0 7.0 - 16.0    Glucose 166 (H) 65 - 99 mg/dL    Bun 26 (H) 8 - 22 mg/dL    Creatinine 1.45 (H) 0.50 - 1.40 mg/dL    Calcium 9.3 8.5 - 10.5 mg/dL    Correct Calcium 9.3 8.5 - 10.5 mg/dL    AST(SGOT) 35 12 - 45 U/L    ALT(SGPT) 31 2 - 50 U/L    Alkaline Phosphatase 65 30 - 99 U/L    Total Bilirubin 0.5 0.1 - 1.5 mg/dL    Albumin 4.0 3.2 - 4.9 g/dL    Total Protein 7.2 6.0 - 8.2 g/dL    Globulin 3.2 1.9 - 3.5 g/dL    A-G Ratio 1.3 g/dL   URINALYSIS    Specimen: Urine   Result Value Ref Range    Color Yellow     Character Clear     Specific Gravity 1.007 <1.035    Ph 6.0 5.0 - 8.0    Glucose Negative Negative mg/dL    Ketones Negative Negative mg/dL    Protein Negative Negative mg/dL    Bilirubin Negative Negative    Urobilinogen, Urine 0.2 Negative    Nitrite Negative Negative    Leukocyte Esterase Negative Negative    Occult Blood Negative Negative    Micro Urine Req see below    ESTIMATED GFR   Result Value Ref Range    GFR (CKD-EPI) 54 (A) >60 mL/min/1.73 m 2   CBC WITH DIFFERENTIAL   Result Value Ref Range    WBC 6.2 4.8 - 10.8 K/uL    RBC 4.36 (L) 4.70 - 6.10 M/uL    Hemoglobin 13.0 (L) 14.0 - 18.0 g/dL    Hematocrit 39.0 (L) 42.0 - 52.0 %    MCV 89.4 81.4 - 97.8 fL    MCH 29.8 27.0 - 33.0 pg    MCHC 33.3 32.3 - 36.5 g/dL    RDW 42.8 35.9 - 50.0 fL    Platelet Count 221 164 - 446 K/uL    MPV 10.4 9.0 - 12.9 fL    Neutrophils-Polys 61.00  44.00 - 72.00 %    Lymphocytes 23.60 22.00 - 41.00 %    Monocytes 13.20 0.00 - 13.40 %    Eosinophils 1.40 0.00 - 6.90 %    Basophils 0.60 0.00 - 1.80 %    Immature Granulocytes 0.20 0.00 - 0.90 %    Nucleated RBC 0.00 0.00 - 0.20 /100 WBC    Neutrophils (Absolute) 3.80 1.82 - 7.42 K/uL    Lymphs (Absolute) 1.47 1.00 - 4.80 K/uL    Monos (Absolute) 0.82 0.00 - 0.85 K/uL    Eos (Absolute) 0.09 0.00 - 0.51 K/uL    Baso (Absolute) 0.04 0.00 - 0.12 K/uL    Immature Granulocytes (abs) 0.01 0.00 - 0.11 K/uL    NRBC (Absolute) 0.00 K/uL   COMP METABOLIC PANEL   Result Value Ref Range    Sodium 128 (L) 135 - 145 mmol/L    Potassium 4.8 3.6 - 5.5 mmol/L    Chloride 91 (L) 96 - 112 mmol/L    Co2 23 20 - 33 mmol/L    Anion Gap 14.0 7.0 - 16.0    Glucose 544 (HH) 65 - 99 mg/dL    Bun 40 (H) 8 - 22 mg/dL    Creatinine 1.90 (H) 0.50 - 1.40 mg/dL    Calcium 9.1 8.5 - 10.5 mg/dL    Correct Calcium 9.1 8.5 - 10.5 mg/dL    AST(SGOT) 41 12 - 45 U/L    ALT(SGPT) 35 2 - 50 U/L    Alkaline Phosphatase 72 30 - 99 U/L    Total Bilirubin 0.7 0.1 - 1.5 mg/dL    Albumin 4.0 3.2 - 4.9 g/dL    Total Protein 7.2 6.0 - 8.2 g/dL    Globulin 3.2 1.9 - 3.5 g/dL    A-G Ratio 1.3 g/dL   VENOUS BLOOD GAS   Result Value Ref Range    Venous Bg Ph 7.35 7.31 - 7.45    Venous Bg Pco2 41.6 41.0 - 51.0 mmHg    Venous Bg Po2 35.7 25.0 - 40.0 mmHg    Venous Bg O2 Saturation 61.3 %    Venous Bg Hco3 22 (L) 24 - 28 mmol/L    Venous Bg Base Excess -3 mmol/L    Body Temp 36.1 Centigrade    O2 Therapy 0L    BETA-HYDROXYBUTYRIC ACID   Result Value Ref Range    beta-Hydroxybutyric Acid 0.79 (H) 0.02 - 0.27 mmol/L   ESTIMATED GFR   Result Value Ref Range    GFR (CKD-EPI) 39 (A) >60 mL/min/1.73 m 2   EKG   Result Value Ref Range    Report       [facility] Emergency Dept.    Test Date:  2024-01-11  Pt Name:    JEN JAMISON                   Department: ER  MRN:        [id number]                      Room:        29  Gender:     Male                          Technician: [id number]  :        1960                   Requested By:CORBY MEDINA  Order #:    [id number]                    Reading MD:    Measurements  Intervals                                Axis  Rate:       68                           P:          45  PA:         156                          QRS:        -59  QRSD:       90                           T:          101  QT:         460  QTc:        490    Interpretive Statements  Sinus rhythm  Left anterior fascicular block  Abnormal R-wave progression, early transition  Abnormal T, consider ischemia, lateral leads  Borderline ST elevation, anterolateral leads  Artifact in lead(s) II,aVF,V2  Compared to ECG 10/21/2019 05:31:53  Left anterior fascicular block now present  T-wave abnormality now pr esent  Possible ischemia now present  ST (T wave) deviation now present  Left-axis deviation no longer present     POCT glucose device results   Result Value Ref Range    POC Glucose, Blood 184 (H) 65 - 99 mg/dL   POCT glucose device results   Result Value Ref Range    POC Glucose, Blood 106 (H) 65 - 99 mg/dL   POCT glucose device results   Result Value Ref Range    POC Glucose, Blood 247 (H) 65 - 99 mg/dL   POCT glucose device results   Result Value Ref Range    POC Glucose, Blood 127 (H) 65 - 99 mg/dL   POCT glucose device results   Result Value Ref Range    POC Glucose, Blood 96 65 - 99 mg/dL   POCT glucose device results   Result Value Ref Range    POC Glucose, Blood 157 (H) 65 - 99 mg/dL   POCT glucose device results   Result Value Ref Range    POC Glucose, Blood 93 65 - 99 mg/dL   POCT glucose device results   Result Value Ref Range    POC Glucose, Blood 546 (HH) 65 - 99 mg/dL       Radiology  [id number] VIEW   Final Result         Large amount of stool throughout the colon.          Medications:   New Prescriptions    No medications on file       My final assessment includes hyperglycemia with renal insufficiency.   Upon Reevaluation, the patient's  condition has: not improved; and will be escalated to hospitalization.    Patient discharged from ED Observation status at 7:41 AM (Time) 1/14/2024 (Date).     Total time spent on this ED Observation discharge encounter is < 30 Minutes    {ERP Attestation (ERP ONLY):200109}

## 2024-01-14 NOTE — PROGRESS NOTES
"ED Observation Progress Note    Date of Service: 01/14/24    Interval History and Interventions  63-year-old male with history of dementia in the emergency department for more than 64 hours awaiting placement as family was no longer able to care for him.    5:42 AM no concerns overnight, however nursing staff approached me at this time stating that patient has been refusing medications, including insulin.  Last blood glucose was more than 24 hours ago.  Now greater than 500.  He does have an IV in place.  He is agitated.  Add Ativan, IV fluid bolus.  Labs been ordered per protocol to exclude DKA.  Encourage nursing staff to proceed with subcu insulin as ordered to include 9 units of regular insulin at this time and then Lantus since it was not given last night.  Patient will be signed out to my colleague, Dr. Richard for follow-up and monitoring.    Physical Exam  BP (!) 159/72   Pulse 84   Temp 36.9 °C (98.4 °F) (Temporal)   Resp 17   Ht 1.727 m (5' 8\")   Wt 68.3 kg (150 lb 9.2 oz)   SpO2 93%   BMI 22.89 kg/m² .    Constitutional: Awake and alert. Nontoxic  HENT:  Grossly normal  Eyes: Grossly normal  Neck: Normal range of motion  Cardiovascular: Normal peripheral perfusion  Thorax & Lungs: Nonlabored respirations  Skin:  No pathologic rash.   Extremities: Well perfused  Psychiatric: Agitated, aggressive but mostly redirectable    Labs  Results for orders placed or performed during the hospital encounter of 01/11/24   CBC WITH DIFFERENTIAL   Result Value Ref Range    WBC 6.3 4.8 - 10.8 K/uL    RBC 4.43 (L) 4.70 - 6.10 M/uL    Hemoglobin 13.4 (L) 14.0 - 18.0 g/dL    Hematocrit 39.3 (L) 42.0 - 52.0 %    MCV 88.7 81.4 - 97.8 fL    MCH 30.2 27.0 - 33.0 pg    MCHC 34.1 32.3 - 36.5 g/dL    RDW 43.3 35.9 - 50.0 fL    Platelet Count 226 164 - 446 K/uL    MPV 10.3 9.0 - 12.9 fL    Neutrophils-Polys 68.00 44.00 - 72.00 %    Lymphocytes 19.30 (L) 22.00 - 41.00 %    Monocytes 10.50 0.00 - 13.40 %    Eosinophils 1.40 " 0.00 - 6.90 %    Basophils 0.50 0.00 - 1.80 %    Immature Granulocytes 0.30 0.00 - 0.90 %    Nucleated RBC 0.00 0.00 - 0.20 /100 WBC    Neutrophils (Absolute) 4.27 1.82 - 7.42 K/uL    Lymphs (Absolute) 1.21 1.00 - 4.80 K/uL    Monos (Absolute) 0.66 0.00 - 0.85 K/uL    Eos (Absolute) 0.09 0.00 - 0.51 K/uL    Baso (Absolute) 0.03 0.00 - 0.12 K/uL    Immature Granulocytes (abs) 0.02 0.00 - 0.11 K/uL    NRBC (Absolute) 0.00 K/uL   CMP   Result Value Ref Range    Sodium 132 (L) 135 - 145 mmol/L    Potassium 4.5 3.6 - 5.5 mmol/L    Chloride 97 96 - 112 mmol/L    Co2 22 20 - 33 mmol/L    Anion Gap 13.0 7.0 - 16.0    Glucose 166 (H) 65 - 99 mg/dL    Bun 26 (H) 8 - 22 mg/dL    Creatinine 1.45 (H) 0.50 - 1.40 mg/dL    Calcium 9.3 8.5 - 10.5 mg/dL    Correct Calcium 9.3 8.5 - 10.5 mg/dL    AST(SGOT) 35 12 - 45 U/L    ALT(SGPT) 31 2 - 50 U/L    Alkaline Phosphatase 65 30 - 99 U/L    Total Bilirubin 0.5 0.1 - 1.5 mg/dL    Albumin 4.0 3.2 - 4.9 g/dL    Total Protein 7.2 6.0 - 8.2 g/dL    Globulin 3.2 1.9 - 3.5 g/dL    A-G Ratio 1.3 g/dL   URINALYSIS    Specimen: Urine   Result Value Ref Range    Color Yellow     Character Clear     Specific Gravity 1.007 <1.035    Ph 6.0 5.0 - 8.0    Glucose Negative Negative mg/dL    Ketones Negative Negative mg/dL    Protein Negative Negative mg/dL    Bilirubin Negative Negative    Urobilinogen, Urine 0.2 Negative    Nitrite Negative Negative    Leukocyte Esterase Negative Negative    Occult Blood Negative Negative    Micro Urine Req see below    ESTIMATED GFR   Result Value Ref Range    GFR (CKD-EPI) 54 (A) >60 mL/min/1.73 m 2   EKG   Result Value Ref Range    Report       [facility] Emergency Dept.    Test Date:  2024  Pt Name:    JEN JAMISON                   Department: ER  MRN:        [id number]                      Room:        29  Gender:     Male                         Technician: [id number]  :        1960                   Requested By:CORBY GREER  ADAM  Order #:    [id number]                    Reading MD:    Measurements  Intervals                                Axis  Rate:       68                           P:          45  DE:         156                          QRS:        -59  QRSD:       90                           T:          101  QT:         460  QTc:        490    Interpretive Statements  Sinus rhythm  Left anterior fascicular block  Abnormal R-wave progression, early transition  Abnormal T, consider ischemia, lateral leads  Borderline ST elevation, anterolateral leads  Artifact in lead(s) II,aVF,V2  Compared to ECG 10/21/2019 05:31:53  Left anterior fascicular block now present  T-wave abnormality now pr esent  Possible ischemia now present  ST (T wave) deviation now present  Left-axis deviation no longer present     POCT glucose device results   Result Value Ref Range    POC Glucose, Blood 184 (H) 65 - 99 mg/dL   POCT glucose device results   Result Value Ref Range    POC Glucose, Blood 106 (H) 65 - 99 mg/dL   POCT glucose device results   Result Value Ref Range    POC Glucose, Blood 247 (H) 65 - 99 mg/dL   POCT glucose device results   Result Value Ref Range    POC Glucose, Blood 127 (H) 65 - 99 mg/dL   POCT glucose device results   Result Value Ref Range    POC Glucose, Blood 96 65 - 99 mg/dL   POCT glucose device results   Result Value Ref Range    POC Glucose, Blood 157 (H) 65 - 99 mg/dL   POCT glucose device results   Result Value Ref Range    POC Glucose, Blood 93 65 - 99 mg/dL       Radiology  [id number] VIEW   Final Result         Large amount of stool throughout the colon.          Problem List  1.  Dementia -unable to care for self, family no longer able to care for patient; awaiting placement.  Ativan for current agitation.  2.  Hyperglycemia, history of diabetes -has been refusing Lantus.  Labs pending, encourage nursing staff to proceed with Lantus as ordered as well as IV fluid bolus.    Electronically signed by: Maria E BARON  NELY Jones, 1/14/2024 5:42 AM

## 2024-01-14 NOTE — ED NOTES
Pt up and down, restless, continues to open door  Sitter continues to divert pt back to bed  Remains outside of room in direct, unobstructed 1:1 view for safety

## 2024-01-14 NOTE — ED NOTES
PT up and walking around room, pt crawled back into bed and is attempting to put blankets over self. RN offered to put blankets over pt. Patient reported he was fine doing it himself. Pt resting with even chest rise and fall, reports no needs at this time, sitter remains bedside.

## 2024-01-14 NOTE — ED NOTES
Pt got out of bed and peed in corner of room. RN to bedside. Urine cleaned and EVS mopped room. Pt resting with even chest rise and fall, reports no needs at this time, sitter remains bedside.

## 2024-01-14 NOTE — ED NOTES
Pt medicated per MAR. New Piv inserted due to pts forearm IV not pulling blood, line can still flush. Pt swinging and attempting to bite Rns during PIV insertion. Pt resting even chest rise and fall, sitter remains bedside. PT on monitor.

## 2024-01-14 NOTE — ED NOTES
Break RN: Attempted finger stick for pt, finger stick completed however when RN went to place blood from finger stick on glucometer, pt grabbed RNs hands and started yelling. RN unable to obtain sample at this time. 1:1 sitter remains in direct view of patient.

## 2024-01-14 NOTE — CARE PLAN
The patient is Stable - Low risk of patient condition declining or worsening    Shift Goals  Clinical Goals: safety, tele sitter,    Progress made toward(s) clinical / shift goals:      Problem: Diabetes Management  Goal: Patient will achieve and maintain glucose in satisfactory range  1/14/2024 1447 by Vignesh Crenshaw R.N.  Outcome: Progressing  Note: 1300H fsbs 210  1/14/2024 1327 by Vignesh Crenshaw R.N.  Outcome: Progressing  Note: 1300H FSBS:210 coverage given.     Problem: Skin Integrity - Diabetes  Goal: Patient's skin on legs and feet will remain intact while hospitalized  1/14/2024 1447 by Vignesh Crenshaw R.N.  Outcome: Progressing  1/14/2024 1327 by Vignesh Crenshaw R.N.  Outcome: Progressing     Problem: Respiratory  Goal: Patient will achieve/maintain optimum respiratory ventilation and gas exchange  1/14/2024 1447 by Vignesh Crenshaw R.N.  Outcome: Progressing  1/14/2024 1327 by Vignesh Crenshaw R.N.  Outcome: Progressing     Problem: Fall Risk  Goal: Patient will remain free from falls  Outcome: Progressing       Patient is not progressing towards the following goals:      Problem: Knowledge Deficit - Standard  Goal: Patient and family/care givers will demonstrate understanding of plan of care, disease process/condition, diagnostic tests and medications  Outcome: Not Progressing  Note: Pt remains disoriented and confused. Pt axo1.       Axo1 oriented to self. Telesitter in place for safety. VSS WNL. Denies pain. Afebrile. On blood sugar monitoring. Running fluids NS @ 83. Due medications given as ordered. Placed call light and personal belongings within reach. 3 rails up, bed alarm on. Able to ambulate with one assist. Needs met at this time.

## 2024-01-14 NOTE — ED NOTES
Bladder scan =>1277, assisted pt to restroom, pt needing coaching to urinate but was able to .   Urinated est 1200cc

## 2024-01-14 NOTE — ED NOTES
Pts finger stick blood glucose 546, ERP notified. 2 RNS able to medicate pt and take vitals, however pt was combative with Rns. Pt swinging at Rns and telling pt to get out. PT is A&Ox1.  ERP notified of pts combativeness.

## 2024-01-14 NOTE — ED NOTES
Bedside report received from off going RN/tech: Charline KHAN, assumed care of patient.  POC discussed with patient. Call light within reach, all needs addressed at this time.       Fall risk interventions in place: Move the patient closer to the nurse's station, Place socks on patient, Place fall risk sign on patient's door, Give patient urinal if applicable, Keep floor surfaces clean and dry, Bed Alarm in use, and Human-sitter if tele-sitter fails (all applicable per [facility] Fall risk assessment)   Continuous monitoring: Not Applicable   IVF/IV medications: Not Applicable   Oxygen: Room Air  Bedside sitter: Report given to sitter  Isolation: Not Applicable

## 2024-01-14 NOTE — ED NOTES
Assist RN: Report given to Octavio KHAN on Annmarie 6. Pt transported to S601 via hospital bed by transport

## 2024-01-14 NOTE — ED NOTES
Break RN: Pt ambulating in room, provided with iced cranberry juice by this RN. Pt currently remaking bed. 1:1 sitter remains in direct view of patient.

## 2024-01-14 NOTE — H&P
Hospital Medicine History & Physical Note    Date of Service  1/14/2024    Primary Care Physician  Alexander Patel M.D.    Consultants  None     Code Status  Full Code    Chief Complaint  Chief Complaint   Patient presents with    Other     Pt to triage accompanied by brother, concerned about increasing aggression since yesterday, has hx of dementia. Baseline aa0x1. Per brother who is caregiver will be having a surgery done tomorrow and states that he can't take care of the pt anymore, asking assistance for placement. Pt normally takes quetiapine fumarate for aggression but doesn't seem to help much yesterday.      History of Presenting Illness   Dylon Santa is a 63 y.o. male with a past medical history of Alzheimer's dementia, diabetes, primary hypertension, hyperlipidemia and chronic kidney disease who presented 1/11/2024 with inability to take care of self.  He was in the emergency room for the last 60 hours, as he did not meet admission criteria.  Hospital services were reconsulted 1/14/2024 for marked hyperglycemia and ketosis.  I evaluated the patient at bedside.  He is alert and oriented to self only.  He is a very limited historian, but denies having any pain.  The patient has been intermittently refusing Accu-Cheks, insulin, fluid and fluids, despite counseling.      I discussed the plan of care with emergency department physician, the patient and emergency room nursing staff..    Review of Systems  Review of Systems   Unable to perform ROS: Dementia (Very limited)   Eyes:  Negative for pain.   Cardiovascular:  Negative for chest pain.   Gastrointestinal:  Negative for abdominal pain.   Genitourinary:  Negative for flank pain.   Neurological:  Negative for headaches.     Past Medical History   has a past medical history of Acute pain of right shoulder (3/22/2022), Anesthesia, Arthritis, Bronchitis (2016), Cataract, Cold (4/5/17), Coronary artery disease due to lipid rich plaque - mild to moderate on  Cath 2019 Left Dominant, Coronary artery disease due to type 1 diabetes mellitus (HCC), Dental disorder, Diabetic neuropathy (HCC) (12/31/2014), Diabetic retinopathy of both eyes (HCC) (12/9/2016), Dyslipidemia, Heart murmur, Hemorrhagic disorder (HCC), High cholesterol, [facility] abnormal stress - mild to moderate CAD, History of shingles (12/31/2014), Hyperlipidemia, Hypertension, Kidney stones, Pain, Psychiatric problem, and Type II or unspecified type diabetes mellitus without mention of complication, uncontrolled (12/31/2014).    Surgical History   has a past surgical history that includes eye surgery; appendectomy; knee arthroscopy; tonsillectomy; cataract extraction with iol (Bilateral); irrigation & debridement general (Left, 5/3/2017); and hand surgery (1990's).     Family History  family history includes Cancer in his father, mother, paternal aunt, and paternal grandfather; Diabetes in his maternal grandfather, maternal uncle, and paternal grandmother; Hyperlipidemia in his brother; Hypertension in his brother; Lung Disease in his brother, father, and mother; Other in his sister; Thyroid in his brother, mother, and sister.      Social History   reports that he has never smoked. He quit smokeless tobacco use about 8 years ago.  His smokeless tobacco use included chew. He reports current alcohol use. He reports that he does not use drugs.    Allergies  No Known Allergies    Medications  Prior to Admission Medications   Prescriptions Last Dose Informant Patient Reported? Taking?   Continuous Blood Gluc Sensor (FREESTYLE CARLOS 3 SENSOR) Misc ON at ON Family Member No No   Sig: Use 1 Carlos 3 sensor every 14 days for blood glucose monitoring. For type 1 diabetes mellitus with hyperglycemia.   Insulin Glargine, 1 Unit Dial, (TOUJEO SOLOSTAR) 300 UNIT/ML Solution Pen-injector 1/10/2024 at 2100 Family Member Yes No   Sig: Inject 20 Units under the skin at bedtime.  Indications: Insulin-Dependent Diabetes   Insulin Pen Needle 32 G x 4 mm SUPPLY at SUPPLY Family Member No No   Si Each 4 Times a Day,Before Meals and at Bedtime.   QUEtiapine (SEROQUEL) 50 MG tablet 2024 at 1030 Family Member No No   Sig: Take 1 Tablet by mouth 2 times a day as needed (agitation/aggression).   acyclovir (ZOVIRAX) 200 MG Cap 2024 at 1030 Family Member No No   Sig: Take 1 Capsule by mouth every day.   atorvastatin (LIPITOR) 80 MG tablet 2024 at 1030 Family Member No No   Sig: Take 1 Tablet by mouth every day.   clopidogrel (PLAVIX) 75 MG Tab 2024 at 1030 Family Member No No   Sig: Take 1 Tablet by mouth every day.   insulin aspart (NOVOLOG FLEXPEN) 100 UNIT/ML injection PEN 2024 at 1030 Family Member Yes Yes   Sig: Inject  under the skin 3 times a day before meals. Sliding Scale   80 - 120 = 7 units  120 - 161 = 8 units  161 - 200 = 9 units  201 - 240 = 10 units  241 - 280 = 11 units  281 - 320 = 12 units  321 - 360 = 13 units  361 - 400 = 14 units  >400 Call MD   losartan (COZAAR) 100 MG Tab 2024 at 1030 Family Member No No   Sig: Take 1 Tablet by mouth every day.   metoprolol SR (TOPROL XL) 25 MG TABLET SR 24 HR 2024 at 1030 Family Member No No   Sig: Take 1 Tablet by mouth every day.   traZODone (DESYREL) 150 MG Tab 1/10/2024 at 2100 Family Member No No   Sig: Take 1 Tablet by mouth every evening.      Facility-Administered Medications: None     Physical Exam  Temp:  [36.7 °C (98 °F)-36.9 °C (98.4 °F)] 36.7 °C (98 °F)  Pulse:  [] 78  Resp:  [17-20] 17  BP: (120-159)/(70-77) 132/70  SpO2:  [93 %-97 %] 95 %  Blood Pressure: (!) 156/76   Temperature: 36.9 °C (98.4 °F)   Pulse: 84   Respiration: 17   Pulse Oximetry: 96 %     Physical Exam  Constitutional:       General: He is not in acute distress.     Appearance: He is not ill-appearing or diaphoretic.   HENT:      Head: Atraumatic.      Right Ear: External ear normal.      Left Ear: External ear normal.  "     Nose: No congestion or rhinorrhea.      Mouth/Throat:      Mouth: Mucous membranes are dry.   Eyes:      General: No scleral icterus.        Right eye: No discharge.         Left eye: No discharge.      Pupils: Pupils are equal, round, and reactive to light.   Cardiovascular:      Rate and Rhythm: Regular rhythm. Tachycardia present.   Pulmonary:      Effort: Pulmonary effort is normal.   Abdominal:      General: There is no distension.   Musculoskeletal:      Cervical back: Neck supple. No rigidity. No muscular tenderness.      Right lower leg: No edema.      Left lower leg: No edema.   Skin:     General: Skin is dry.      Capillary Refill: Capillary refill takes 2 to 3 seconds.      Coloration: Skin is not jaundiced or pale.   Neurological:      Mental Status: He is alert. He is disoriented.      Coordination: Coordination normal.      Comments: Intermittently oriented x 1-2  Follows commands intermittently.  Is able to move all extremities.   Psychiatric:      Comments: Flat affect  Intermittently agitated/anxious       Laboratory:  Recent Labs     01/11/24  1457 01/14/24  0601   WBC 6.3 6.2   RBC 4.43* 4.36*   HEMOGLOBIN 13.4* 13.0*   HEMATOCRIT 39.3* 39.0*   MCV 88.7 89.4   MCH 30.2 29.8   MCHC 34.1 33.3   RDW 43.3 42.8   PLATELETCT 226 221   MPV 10.3 10.4     Recent Labs     01/11/24  1457 01/14/24  0601 01/14/24  1208   SODIUM 132* 128* 134*   POTASSIUM 4.5 4.8 4.5   CHLORIDE 97 91* 101   CO2 22 23 25   GLUCOSE 166* 544* 201*   BUN 26* 40* 33*   CREATININE 1.45* 1.90* 1.65*   CALCIUM 9.3 9.1 8.8     Recent Labs     01/11/24  1457 01/14/24  0601 01/14/24  1208   ALTSGPT 31 35  --    ASTSGOT 35 41  --    ALKPHOSPHAT 65 72  --    TBILIRUBIN 0.5 0.7  --    GLUCOSE 166* 544* 201*         No results for input(s): \"NTPROBNP\" in the last 72 hours.      No results for input(s): \"TROPONINT\" in the last 72 hours.    Imaging:  [id number] [id number]   Final Result         Large amount of stool throughout the colon.    "       Assessment/Plan:  Justification for Admission Status  I anticipate this patient will require at least two midnights for appropriate medical management, necessitating inpatient admission because the patient has marked hyperglycemia in addition to ketosis.  He is at high risk for diabetic ketoacidosis.  Will require intravenous fluids, close monitoring of blood sugars in addition to insulin    Patient will need a Med/Surg bed on MEDICAL service.  The patient has markedly elevated blood sugars with his ketosis and dehydration.    * Type 2 diabetes mellitus with hyperglycemia, with long-term current use of insulin ([facility])- (present on admission)  Assessment & Plan  With marked hyperglycemia 544   The patient has been in the emergency room for about 16 hours.  He has been intermittently refusing his insulin despite counseling.    Emphasized counseling and education   Last glycated hemoglobin was 8.7%  I will modify long-acting insulin, and increase the intensity of short acting insulin  I will order Accu-Checks, hypoglycemia protocol  Adjust according to blood sugars trend      Ketosis ([facility])- (present on admission)  Assessment & Plan  Likely secondary to refusing his insulin.  With hyperglycemia but without acidosis.  Anticipate improvement with intravenous fluids and insulin     Dehydration- (present on admission)  Assessment & Plan  Likely secondary to reduced oral intake, and increase in insensible loss due to osmotic diuresis secondary to hyperglycemia.  Encourage oral intake as tolerated, antiemetics as needed.  I will modify the dose of insulin glargine and short acting insulin.  Intravenous hydration until blood sugars are more controlled.    Generalized weakness- (present on admission)  Assessment & Plan  Unable to care for self  Will need a memory care unit     Alzheimer disease (HCC)- (present on admission)  Assessment & Plan  Try to obtain a window bed.   Continue frequent re-orientation, encourage  activity & try to maintain awake daytime state.   Try to avoid /minimize sedating medications as much as possible, reserve only for extreme agitation posing risk to harm self.      Resume quetiapine and trazodone     Coronary artery disease due to lipid rich plaque - mild to moderate on Cath 2019 Left Dominant- (present on admission)  Assessment & Plan  Cardiac diet.  Resume clopidogrel, atorvastatin and metoprolol with hold parameters      Stage 3a chronic kidney disease (HCC)- (present on admission)  Assessment & Plan  Avoid/minimize nephrotoxins as much as possible, renally dose medications, monitor inputs and outputs     Hypertension- (present on admission)  Assessment & Plan  Resume metoprolol and losartan with hold parameters.    Dyslipidemia- (present on admission)  Assessment & Plan  Cardiac diet.  Resume atorvastatin      VTE prophylaxis: SCDs/TEDs and Xarelto 10 mg daily as prophylaxis

## 2024-01-14 NOTE — ED NOTES
Bedside report received from off going RN Homero, assumed care of patient.  POC discussed with patient. Call light within reach, all needs addressed at this time.       Fall risk interventions in place: Give patient urinal if applicable, Keep floor surfaces clean and dry, Accompanied to restroom, Bed Alarm in use, and Human-sitter if tele-sitter fails (all applicable per [facility] Fall risk assessment)   Continuous monitoring: Not Applicable   IVF/IV medications: Not Applicable   Oxygen: Room Air  Bedside sitter: Pt on L2k SI with 1:1 sitter   (name)  Isolation: Not Applicable

## 2024-01-14 NOTE — ED NOTES
Pt into new scrubs, BS commode in room, and new socks  Cleaned up from pt having BM on chair and sitting in it  Pt became hostile and aggressive when attempting to clean him and divert him to using BS commode  This RN and x2 ED techs at  for assistance w/ pt behavior and cleaning chair, floor and pt

## 2024-01-14 NOTE — ED NOTES
Blood sent to lab, assisted pt with meal, needing help with food. Pt forgets that he is eating, frequent needing reminder.

## 2024-01-14 NOTE — DISCHARGE PLANNING
TSONE received call from pts brother Luis Carlos to check on pt status.  STONE confirmed that Luis Carlos is pts emergency contact and provided information that pt is being admitted today, no room information yet.  Luis Carlos requested to please be called once a room is available for pt.  [phone]

## 2024-01-14 NOTE — ED NOTES
Pt stripping pants and thought to be trying to defecate on the chair again, tried to be diverted to commode and became hostile   Pt continued to remove pants and got into bed  Lights off, swearing at staff  Pt uncooperative and will not allow BS check/insulin   Does not want anything else at this time  Sitter remains outside of room in direct, unobstructed 1:1 view for safety

## 2024-01-14 NOTE — ED NOTES
Pt awake , assisted back to bed, pt refusing to wear pants.   Bed alarm activated , sitter 1: 1   Fsbs 398

## 2024-01-14 NOTE — ED NOTES
Pt up and down, restless, continues to open door  Sitter continues to divert pt back to bed  Remains outside of room in direct, unobstructed 1:1 view for safety     Pt allowed VS recheck and took PM meds after some significant coaching and assistance    Pt continues to refuse BS checks and insulin despite multiple attempts, pt does become hostile when trying to talk w/ him regarding needing these and becomes increasingly agitated

## 2024-01-15 PROBLEM — Z71.89 ACP (ADVANCE CARE PLANNING): Status: ACTIVE | Noted: 2024-01-01

## 2024-01-15 NOTE — CARE PLAN
The patient is Stable - Low risk of patient condition declining or worsening    Shift Goals  Clinical Goals: free from injury or fall, ambulation, monitor blood glucose  Patient Goals: wil  Family Goals: wil    Progress made toward(s) clinical / shift goals:      Problem: Skin Integrity - Diabetes  Goal: Patient's skin on legs and feet will remain intact while hospitalized  Outcome: Progressing     Problem: Respiratory  Goal: Patient will achieve/maintain optimum respiratory ventilation and gas exchange  Outcome: Progressing     Problem: Fall Risk  Goal: Patient will remain free from falls  Outcome: Progressing  Note: Pt with 1:1 sitter in place for safety         Patient is not progressing towards the following goals:      Problem: Knowledge Deficit - Standard  Goal: Patient and family/care givers will demonstrate understanding of plan of care, disease process/condition, diagnostic tests and medications  Outcome: Not Progressing     Problem: Knowledge Deficit - Diabetes  Goal: Patient will demonstrate knowledge of insulin injection, symptoms, and treatment of hypoglycemia and diet prior to discharge  Outcome: Not Progressing  Note: Nurse education done. Pt does not show understanding.    1:1 sitter in place for safety. Ambulates with stand by assist. On blood sugar monitoring. Due medications given as ordered. Pt with episodes of refusal. Education provides. Does not show understanding. Needs met at this time.

## 2024-01-15 NOTE — CONSULTS
PALLIATIVE CARE SOCIAL WORK CONSULT NOTE    Patient: Dylon Santa  Age: 63  Gender: Male  MRN: [id number]  Insurance: [employer]  Date Admitted: 1/14/24  Date of Service: 1/15/24  Medical History: Type 1 diabetes, mild to moderate CAD, HTN, Alzheimer's    History of Present illness: Patient was brought into the ER by brother/caregiver due to increased aggression/level of need. Brother is not able to care for patient any longer. He initially did not meet criteria for admission. He now has hyperglycemia due to refusal of medication. Palliative care was consulted for ACP/hospice discussion.     LMSW met with patient. He was sleeping and unable to participate in discussion. Sitter was present.     SW contacted patient's brother/POA, Luis Carlos. SW introduced self and explained role of palliative care. Luis Carlos reports he has been taking care of patient for about 14-15 years. After their father passed in 2019, Luis Carlos noticed an increase in dementia symptoms. Within the last year patient has become increasingly aggressive and his level of care has increased. He helps with bathing, dressing, toileting. Patient has become incontinent of bowel and bladder. He has been working with [employer] for LTC placement. Patient has 3 workman comp accounts and brother is working with patient's  to close accounts so patient will be eligible for institutional Medicaid.     Luis Carlos spoke with their mom, Suad, and two other family members and they do not want to stop providing insulin at this time. They wish to continue with care. Luis Carlos has a call into their  for future discussion. He declined need for spiritual consult at this time. We discussed patient's code status: DNAR/DNI. Luis Carlos would like to continue with that. [facility] [place] brother, Luis Carlos, on 1/17 at 1:00pm to discuss/complete POLST. Patient has advance directive on file.     Will assist as needed.    Mary Harding [facility]  [place]all[place]

## 2024-01-15 NOTE — PROGRESS NOTES
2054: when attempting to assess patient's IV's, pt grabbed at stethoscope around RN's neck and tried to grab RN's forearm.  5 mg valium given with assistance of 4 staff. Patient was uncooperative and had to be held down against his chair to administer. Charge nurse aware and in attendance during administration.     Patient calmed down after administration. Care aide sitting for patient.

## 2024-01-15 NOTE — CARE PLAN
The patient is Watcher - Medium risk of patient condition declining or worsening    Shift Goals  Clinical Goals: patient safety, pt will not exhibit violent behavior  Patient Goals: wil  Family Goals: wil    Progress made toward(s) clinical / shift goals:  patient is a&o x0-1. O2 sats maintained on room air. 1:1 sitter in place, bed alarm in place. Patient transferred to bed via wheelchair. No reports or signs of pain. All needs addressed at this time.    Patient is not progressing towards the following goals:    Patient was aggressive with staff. Valium administered x1. Progress note of event recorded.    Problem: Knowledge Deficit - Standard  Goal: Patient and family/care givers will demonstrate understanding of plan of care, disease process/condition, diagnostic tests and medications  Outcome: Not Progressing     Problem: Knowledge Deficit - Diabetes  Goal: Patient will demonstrate knowledge of insulin injection, symptoms, and treatment of hypoglycemia and diet prior to discharge  Outcome: Not Progressing

## 2024-01-15 NOTE — ED NOTES
"Pt up and out of bed walking around room. RN to bedside. RN requesting to take blood sugar, pt refusing. RN asked pt if he could get back into bed. PT responded \"No, I need to go over there\" pointing at the door. Pt then told RN \"to go away\". Pt then got back into bed.Pt resting even chest rise and fall, sitter remains bedside.   " Detail Level: Zone Additional Notes: Patient consent was obtained to proceed with the visit and recommended plan of care after discussion of all risks and benefits, including the risks of COVID-19 exposure.\\n Render Risk Assessment In Note?: no

## 2024-01-15 NOTE — PROGRESS NOTES
[facility] Medicine Daily Progress Note    Date of Service  1/15/2024    Chief Complaint  Dylon Santa is a 63 y.o. male admitted 1/11/2024 with moderate to severe Alzheimer's dementia now with agitation and underlying insulin-dependent type 1-2 diabetes mellitus who was brought to the hospital by his brother who is his primary caretaker.  His brother can no longer take care of him given worsening agitation and behavioral issues over the last 2 months.  Patient was admitted for profound hyperglycemia without evidence of DKA or HHNK.  Patient does not have capacity and his brother Luis Carlos is the DURABLE POWER OF .  Patient has been made DNR/DNI and I have discussed initiating palliative care consult which his brother is agreeable with.    Hospital Course  See above    Interval Problem Update  1/15  Patient was very agitated overnight requiring security and 4 nursing assist. Refused medications and clinical interventions last night but did take his medications this AM. Patient cannot provide any history for me this am. Patient was given 5 mg IV valium with marginal results.     I discussed advance care planning with the patient's family for at least 19 minutes, including diagnosis, prognosis, plan of care, risks and benefits of any therapies that could be offered, as well as alternatives including palliation and hospice, as appropriate.  I spoke with the patient's brother Luis Carlos over the phone.  Luis Carlos has been his primary caretaker for the last 4 to 5 years and cannot longer take care of his brother given worsening agitation over the last 2 months and increasing dependence on him for all ADLs.  I discussed with Luis Carlos that he has ACP documentation on file that says that he would not like further treatment if he has an incurable condition which insulin-dependent diabetes mellitus in the setting of advanced dementia for feels this criteria.  I discussed in detail about changing CODE STATUS and she was agreeable to  DNR/DNI.  This has been changed in the computer.  Additionally I discussed with Luis Carlos about having a palliative care consultation and discussion about possible hospice and he is not against this.  He would like this have additional family members involved in this decision and conversation as well.  Luis Carlos understands that his dementia is a terminal and progressive condition and with his associated severe diabetes will place the patient at incredibly high risk for infections, strokes or heart attacks.    I have discussed this patient's plan of care and discharge plan at IDT rounds today with Case Management, Nursing, Nursing leadership, and other members of the IDT team.    Consultants/Specialty  Palliative care    Code Status  DNAR/DNI    Disposition  The patient is not medically cleared for discharge to home or a post-acute facility.  Anticipate discharge to: skilled nursing facility  Ultimate goal is likely GH with hospice    I have placed the appropriate orders for post-discharge needs.    Review of Systems  Review of Systems   Unable to perform ROS: Dementia        Physical Exam  Temp:  [35.8 °C (96.5 °F)-36.7 °C (98 °F)] 35.8 °C (96.5 °F)  Pulse:  [65-80] 80  Resp:  [16-19] 18  BP: (117-140)/(62-76) 124/69  SpO2:  [96 %-98 %] 96 %    Physical Exam  Vitals and nursing note reviewed.   Constitutional:       General: He is not in acute distress.     Appearance: He is well-developed.      Comments: Laying in bed, appears comfortable  Very sleepy, but does follow some simple commands   HENT:      Head: Normocephalic and atraumatic.      Mouth/Throat:      Pharynx: No oropharyngeal exudate.   Eyes:      General: No scleral icterus.     Pupils: Pupils are equal, round, and reactive to light.   Neck:      Thyroid: No thyromegaly.   Cardiovascular:      Rate and Rhythm: Normal rate and regular rhythm.      Heart sounds: Normal heart sounds. No murmur heard.  Pulmonary:      Effort: Pulmonary effort is normal. No respiratory  distress.      Breath sounds: Normal breath sounds. No wheezing.   Abdominal:      General: Bowel sounds are normal. There is no distension.      Palpations: Abdomen is soft.      Tenderness: There is no abdominal tenderness.   Musculoskeletal:         General: No tenderness. Normal range of motion.      Cervical back: Normal range of motion and neck supple.      Right lower leg: No edema.      Left lower leg: No edema.   Skin:     General: Skin is warm and dry.      Findings: No rash.   Neurological:      Mental Status: He is alert.      Comments: A&OX0-1         Fluids  No intake or output data in the 24 hours ending 01/15/24 1247    Laboratory  Recent Labs     01/14/24  0601 01/15/24  0317   WBC 6.2 6.4   RBC 4.36* 3.90*   HEMOGLOBIN 13.0* 11.8*   HEMATOCRIT 39.0* 34.5*   MCV 89.4 88.5   MCH 29.8 30.3   MCHC 33.3 34.2   RDW 42.8 42.1   PLATELETCT 221 202   MPV 10.4 10.3     Recent Labs     01/14/24  0601 01/14/24  1208 01/15/24  0317   SODIUM 128* 134* 134*   POTASSIUM 4.8 4.5 4.0   CHLORIDE 91* 101 102   CO2 23 25 20   GLUCOSE 544* 201* 84   BUN 40* 33* 28*   CREATININE 1.90* 1.65* 1.40   CALCIUM 9.1 8.8 8.9                   Imaging  [id number] VIEW   Final Result         Large amount of stool throughout the colon.           Assessment/Plan  * Type 2 diabetes mellitus with hyperglycemia, with long-term current use of insulin (HCC)- (present on admission)  Assessment & Plan  Hyperglycemia has considerably improved  Continue lantus 10 units BID  Continue ISS, adjust PRN  Likely moving towards hospice, so can keep insulin regimen at this time  However, if we want GH without hospice, need to transition to oral DM medications since patient will never be able to self-administer insulin medications  A1c 8.7    I personally reviewed the A1c and BS's on 1/15    ACP (advance care planning)- (present on admission)  Assessment & Plan  Changed to DNR/DNI  PC Consult  Luis Carlos, his brother, was primary caretaker and DPOA. He is  agreeable to possibly pursuing hospice, would like to discuss further with his family and PC    Dehydration- (present on admission)  Assessment & Plan  Resolved  Stop IVf's  Monitor sugars and oral intake    Ketosis (HCC)- (present on admission)  Assessment & Plan  Resolved    Generalized weakness- (present on admission)  Assessment & Plan  Unable to care for self  Will need a memory care unit   Working on  with hospice    Alzheimer disease (HCC)- (present on admission)  Assessment & Plan  Advanced, with agitation   Stop IV valium  Start depakote 125 mg BID and titrate upwards  Continue scheduled seroquel 50 mg BID  Add IV haldol 5 mg PRN  Start namenda 5 mg daily and aricept 5 mg daily  Continue to adjust PRN  Continue trazodone 150 mg QPM  Consider starting an SSRI as well    Coronary artery disease due to lipid rich plaque - mild to moderate on Cath 2019 Left Dominant- (present on admission)  Assessment & Plan  Diabetic diet  Continue ARB, BB, plavix    Stage 3a chronic kidney disease (HCC)- (present on admission)  Assessment & Plan  Improving, likely an element of REGULO on CKD Stage III 2/2 DM nephropathy  No AGMA  Monitor  I personally reviewed the bmp 1/15    Dyslipidemia- (present on admission)  Assessment & Plan  Diabetic diet for now  Stop statin    Hypertension- (present on admission)  Assessment & Plan  BP is decently controlled  Continue toprol XL 25 mg daily, losartan 100 mg daily  Adjust prn         VTE prophylaxis:    Xarelto 10mg daily as prophylaxis      I have performed a physical exam and reviewed and updated ROS and Plan today (1/15/2024). In review of yesterday's note (1/14/2024), there are no changes except as documented above.

## 2024-01-15 NOTE — PROGRESS NOTES
Unable to bladder scan patient due to non-cooperation and agitation. Patient voided once during shift at this time of clear yellow urine.

## 2024-01-15 NOTE — DISCHARGE PLANNING
"HTH/SCP TCN chart review completed. Appreciate PT and OT consults 1/12/2024 noted to \"would benefit from a supportive living environment with SPV and assistance PRN for ADLs and mobility.\"     Collaborated with DARNELL Churchill. Discussed current discharge considerations currently noted to long term care considerations at this time. As current considerations are long term placement no acute TCN needs anticipated in patient discharge planning at this time. TCN will continue to follow and collaborate with discharge planning team as additional post acute needs arise. Thank you.    Completed  PT with recommendations noted to Other - (Pt would benefit from a supportive living environment with SPV and assistance PRN for ADLs and mobility) on 1/12/2024  OT with recommendations noted to Other - (recommend discharge to a supportive environment where pt will have assist for ADLs and IADLs) on 1/12/2024  Choice obtained: SNF referral sent (please see TCN and SW notes 1/12/2024)  SCP with [facility] PCP   "

## 2024-01-15 NOTE — PROGRESS NOTES
4 Eyes Skin Assessment Completed by BILL Chiang and BILL Holt.    Head WDL  Ears WDL  Nose WDL  Mouth WDL  Neck WDL  Breast/Chest WDL  Shoulder Blades WDL  Spine WDL  (R) Arm/Elbow/Hand WDL  (L) Arm/Elbow/Hand WDL  Abdomen WDL  Groin WDL  Scrotum/Coccyx/Buttocks WDL  (R) Leg WDL  (L) Leg WDL  (R) Heel/Foot/Toe WDL  (L) Heel/Foot/Toe WDL      Interventions In Place N/A    Possible Skin Injury No    Pictures Uploaded Into Epic N/A  Wound Consult Placed N/A  RN Wound Prevention Protocol Ordered No

## 2024-01-15 NOTE — PROGRESS NOTES
"0150: Patient became aggressive and attempted to kick at RN when patient's legs were placed back into bed, stating \"Don't you ever fucking try to do that to me again\". Patient then got out of bed and was not redirectable. RN administered valium with assist of 4 nurses and patient was transferred back into bed. Breathing even and unlabored, respirations 18. Sitter in place. No signs of aggression or distress at this time.  "

## 2024-01-16 NOTE — CARE PLAN
The patient is Watcher - Medium risk of patient condition declining or worsening    Shift Goals  Clinical Goals: monitor BG, patient safety  Patient Goals: wil  Family Goals: wil    Progress made toward(s) clinical / shift goals: o2 sats maintained on room air. Patient remains free of aggression or agitation at this time. No reports of pain. No PRNs administered at this time. Safety sitter present. Patient mostly redirectable. No falls or injury to patient at this time. All needs addressed. Patient took all meds tonight.    Patient is not progressing towards the following goals: patient a&o x0.       Problem: Knowledge Deficit - Standard  Goal: Patient and family/care givers will demonstrate understanding of plan of care, disease process/condition, diagnostic tests and medications  Outcome: Not Progressing     Problem: Knowledge Deficit - Diabetes  Goal: Patient will demonstrate knowledge of insulin injection, symptoms, and treatment of hypoglycemia and diet prior to discharge  Outcome: Not Progressing

## 2024-01-16 NOTE — DISCHARGE PLANNING
"HTH/SCP TCN chart review completed. Current discharge considerations are TBD.  Patient has several SNFS ([facility]) pending and [facility] and [facility] remain pending as well.  All other Snfs have declined.  Noted per palliative, patient's brother Luis Carlos, who is the POA, \"has been working with [facility] for LTC placement.\".  TCN will continue to follow and collaborate with discharge planning team as additional post acute needs arise. Thank you.    Completed:  PT with recommendations noted to Other - (Pt would benefit from a supportive living environment with SPV and assistance PRN for ADLs and mobility) on 1/12/2024  OT with recommendations noted to Other - (recommend discharge to a supportive environment where pt will have assist for ADLs and IADLs) on 1/12/2024  Choice obtained: SNF referral sent (please see TCN and SW notes 1/12/2024)  SCP with [facility] PCP   "

## 2024-01-16 NOTE — CARE PLAN
The patient is Stable - Low risk of patient condition declining or worsening    Shift Goals  Clinical Goals: monitor BG, patient safety  Patient Goals: wil  Family Goals: wil    Progress made toward(s) clinical / shift goals:      Problem: Diabetes Management  Goal: Patient will achieve and maintain glucose in satisfactory range  Outcome: Progressing  Note: Pt     Problem: Skin Integrity - Diabetes  Goal: Patient's skin on legs and feet will remain intact while hospitalized  Outcome: Progressing     Problem: Fall Risk  Goal: Patient will remain free from falls  Outcome: Progressing       Patient is not progressing towards the following goals:      Problem: Knowledge Deficit - Standard  Goal: Patient and family/care givers will demonstrate understanding of plan of care, disease process/condition, diagnostic tests and medications  Outcome: Not Progressing     Problem: Knowledge Deficit - Diabetes  Goal: Patient will demonstrate knowledge of insulin injection, symptoms, and treatment of hypoglycemia and diet prior to discharge  Outcome: Not Progressing

## 2024-01-16 NOTE — PROGRESS NOTES
Hospital Medicine Daily Progress Note    Date of Service  1/16/2024    Chief Complaint  Dylon Santa is a 63 y.o. male admitted 1/11/2024 with moderate to severe Alzheimer's dementia now with agitation and underlying insulin-dependent type 1-2 diabetes mellitus who was brought to the hospital by his brother who is his primary caretaker.  His brother can no longer take care of him given worsening agitation and behavioral issues over the last 2 months.  Patient was admitted for profound hyperglycemia without evidence of DKA or HHNK.  Patient does not have capacity and his brother Luis Carlos is the DURABLE POWER OF .  Patient has been made DNR/DNI and I have discussed initiating palliative care consult which his brother is agreeable with.    Hospital Course  See above    Interval Problem Update  Patient was seen and examined at bedside.  I have personally reviewed and interpreted vitals, labs, and imaging.    1/16.  Afebrile.  Stable vitals.  On room air.  Refusing a lot of medications.  Patient is lethargic and demented.  Denies any pain.  Reports no needs.  Does not want to be disturbed and only wants to sleep.    I have discussed this patient's plan of care and discharge plan at IDT rounds today with Case Management, Nursing, Nursing leadership, and other members of the IDT team.    Consultants/Specialty  Palliative care    Code Status  DNAR/DNI    Disposition  The patient is not medically cleared for discharge to home or a post-acute facility.  Anticipate discharge to: skilled nursing facility  Ultimate goal is likely GH with hospice    I have placed the appropriate orders for post-discharge needs.    Review of Systems  Review of Systems   Unable to perform ROS: Dementia        Physical Exam  Temp:  [35.8 °C (96.5 °F)-36.2 °C (97.2 °F)] 36.1 °C (97 °F)  Pulse:  [75-86] 86  Resp:  [17-18] 18  BP: (115-142)/(69-95) 115/95  SpO2:  [96 %-99 %] 98 %    Physical Exam  Vitals and nursing note reviewed.    Constitutional:       General: He is not in acute distress.     Appearance: He is well-developed.      Comments: Laying in bed, appears comfortable  Very sleepy, but does follow some simple commands   HENT:      Head: Normocephalic and atraumatic.      Mouth/Throat:      Pharynx: No oropharyngeal exudate.   Eyes:      General: No scleral icterus.     Pupils: Pupils are equal, round, and reactive to light.   Neck:      Thyroid: No thyromegaly.   Cardiovascular:      Rate and Rhythm: Normal rate and regular rhythm.      Heart sounds: Normal heart sounds. No murmur heard.  Pulmonary:      Effort: Pulmonary effort is normal. No respiratory distress.      Breath sounds: Normal breath sounds. No wheezing.   Abdominal:      General: Bowel sounds are normal. There is no distension.      Palpations: Abdomen is soft.      Tenderness: There is no abdominal tenderness.   Musculoskeletal:         General: No tenderness. Normal range of motion.      Cervical back: Normal range of motion and neck supple.      Right lower leg: No edema.      Left lower leg: No edema.   Skin:     General: Skin is warm and dry.      Findings: No rash.   Neurological:      Mental Status: He is alert.      Comments: A&OX0-1         Fluids    Intake/Output Summary (Last 24 hours) at 1/16/2024 0502  Last data filed at 1/15/2024 2017  Gross per 24 hour   Intake 620 ml   Output --   Net 620 ml       Laboratory  Recent Labs     01/14/24  0601 01/15/24  0317   WBC 6.2 6.4   RBC 4.36* 3.90*   HEMOGLOBIN 13.0* 11.8*   HEMATOCRIT 39.0* 34.5*   MCV 89.4 88.5   MCH 29.8 30.3   MCHC 33.3 34.2   RDW 42.8 42.1   PLATELETCT 221 202   MPV 10.4 10.3       Recent Labs     01/14/24  0601 01/14/24  1208 01/15/24  0317   SODIUM 128* 134* 134*   POTASSIUM 4.8 4.5 4.0   CHLORIDE 91* 101 102   CO2 23 25 20   GLUCOSE 544* 201* 84   BUN 40* 33* 28*   CREATININE 1.90* 1.65* 1.40   CALCIUM 9.1 8.8 8.9                     Imaging  [id number] VIEW   Final Result         Large  amount of stool throughout the colon.           Assessment/Plan  * Type 2 diabetes mellitus with hyperglycemia, with long-term current use of insulin (HCC)- (present on admission)  Assessment & Plan  1/16/2024  Hyperglycemia has considerably improved  Continue lantus 10 units BID  Continue ISS, adjust PRN  Likely moving towards hospice, so can keep insulin regimen at this time  However, if we want GH without hospice, need to transition to oral DM medications since patient will never be able to self-administer insulin medications  A1c 8.7    ACP (advance care planning)- (present on admission)  Assessment & Plan  1/16/2024  Changed to DNR/DNI  Palliative Consult  Luis Carlos, his brother, was primary caretaker and DPOA. He is agreeable to possibly pursuing hospice, would like to discuss further with his family and Palliative Care    Dehydration- (present on admission)  Assessment & Plan  1/16/2024  Resolved  Stop IVf's  Monitor sugars and oral intake    Ketosis (HCC)- (present on admission)  Assessment & Plan  1/16/2024  Resolved    Generalized weakness- (present on admission)  Assessment & Plan  1/16/2024  Unable to care for self  Will need a memory care unit   Working on GH with hospice    Alzheimer disease (HCC)- (present on admission)  Assessment & Plan  1/16/2024  Advanced, with agitation   Stop IV valium  Start depakote 125 mg BID and titrate upwards  Continue scheduled seroquel 50 mg BID  Add IV haldol 5 mg PRN  Start namenda 5 mg daily and aricept 5 mg daily  Continue to adjust PRN  Continue trazodone 150 mg QPM  Consider starting an SSRI as well    Coronary artery disease due to lipid rich plaque - mild to moderate on Cath 2019 Left Dominant- (present on admission)  Assessment & Plan  1/16/2024  Diabetic diet  Continue ARB, BB, plavix    Stage 3a chronic kidney disease (HCC)- (present on admission)  Assessment & Plan  1/16/2024  Improving, likely an element of REGULO on CKD Stage III 2/2 DM nephropathy  No  [facility]  Monitor    Dyslipidemia- (present on admission)  Assessment & Plan  1/16/2024  Diabetic diet for now  Stop statin    Hypertension- (present on admission)  Assessment & Plan  1/16/2024  BP is decently controlled  Continue toprol XL 25 mg daily, losartan 100 mg daily  Adjust prn         VTE prophylaxis: Xarelto    I have performed a physical exam and reviewed and updated ROS and Plan today (1/16/2024). In review of yesterday's note (1/15/2024), there are no changes except as documented above.    Greater than 50 minutes spent prepping to see patient (e.g. review of tests) obtaining and/or reviewing separately obtained history. Performing a medically appropriate examination and/ evaluation.  Counseling and educating the patient/family/caregiver.  Ordering medications, tests, or procedures.  Referring and communicating with other health care professionals.  Documenting clinical information in EPIC.  Independently interpreting results and communicating results to patient/family/caregiver.  Care coordination.

## 2024-01-16 NOTE — PROGRESS NOTES
Nurse spoke with pt's mother. Mother spoke with pt and mother left her phone numbers to call in case staff needs to contact mother.  Landline: [phone]  Cell: [phone]

## 2024-01-17 NOTE — PROGRESS NOTES
PALLIATIVE CARE SOCIAL WORK FOLLOW UP NOTE    Patient: Dylon Santa  Age: 63  Gender: Male  MRN: [id number]  Date Admitted: 1/14/24  Date of Service: 1/17/24    LMSW met with patient, brother, Luis Carlos, and brother's girlfriend, Lexy. Patient was sleeping and not able to participate in conversation. He had a sitter present. Brother is hopeful to get worker's comp cases closed so patient will be eligible for institutional Medicaid with the possibility of moving to SNF for LTC. We explored the potential progression of illness and family stated understanding. We explored option of hospice once patient is eligible. Brother is familiar with hospice philosophy and is comfortable with making that decision when it is time. He would like to continue with treatment/care for now.     We discussed patient's code status: DNAR/DNI. Brother would like to continue with status. We discussed/completed POLST. Brother chose DNR with selective treatment and no feeding tube. Primary team signed document. SW obtained copy for medical record and provided original to patient. SW sent copy of document to brother via email.    Mary Harding LMSW  Palliative Care

## 2024-01-17 NOTE — PROGRESS NOTES
Assumed care of patient at 0645. Bedside report received. Assessment complete.  AA&Ox0. Denies CP/SOB.  Safety Sitter at Bedside   PAINAD Score of 0  Educated patient regarding pharmacologic and non pharmacologic modalities for pain management.  Skin per flowsheets  Tolerating CHO diet. Denies N/V.  + void via Straight Catheter. Last BM PTA   Pt ambulates SBA.  All needs met at this time. Call light within reach. Pt calls appropriately. Bed low and locked, non skid socks in place. Hourly rounding in place.

## 2024-01-17 NOTE — PROGRESS NOTES
Bladder Scan of 528 mL  Acierto Notified - Orders to Straight Cath Received   475 mL Yellow Urine Collected

## 2024-01-17 NOTE — CARE PLAN
Problem: Pain - Standard  Goal: Alleviation of pain or a reduction in pain to the patient’s comfort goal  Outcome: Progressing   The patient is Stable - Low risk of patient condition declining or worsening    Shift Goals  Clinical Goals: Decrease Agitation  Patient Goals: KATIANA  Family Goals: KATIANA    Progress made toward(s) clinical / shift goals:  Patient Straight Cathed, Pain medicated per MAR    Patient is not progressing towards the following goals:      Problem: Knowledge Deficit - Standard  Goal: Patient and family/care givers will demonstrate understanding of plan of care, disease process/condition, diagnostic tests and medications  Outcome: Not Progressing   MD to Bedside to update family on plan of care   Problem: Knowledge Deficit - Diabetes  Goal: Patient will demonstrate knowledge of insulin injection, symptoms, and treatment of hypoglycemia and diet prior to discharge  Outcome: Not Progressing  Fsbg Elevated, Medicated per MAR

## 2024-01-17 NOTE — PROGRESS NOTES
"Patient refused all 0600 meds. RN attempted 3x to offer meds with snacks. Patient only said \"no\" every time, and went back to sleep. No learning evidenced.   "

## 2024-01-17 NOTE — DISCHARGE PLANNING
"HTH/SCP TCN chart review completed. Current discharge considerations are TBD.  Patient has several SNFS ([facility]) pending and [facility] and [facility] remain pending as well.  All other Snfs have declined.  Noted per palliative, patient's brother Luis Carlos, who is the POA, \"has been working with [facility] for LTC placement.\" Per MD note on 1/16 at 7:02, \"ultimate goal is likely [facility] with hospice\" for d/c planning.  TCN will continue to follow and collaborate with discharge planning team as additional post acute needs arise. Thank you.     Completed:  PT with recommendations noted to Other - (Pt would benefit from a supportive living environment with SPV and assistance PRN for ADLs and mobility) on 1/12/2024  OT with recommendations noted to Other - (recommend discharge to a supportive environment where pt will have assist for ADLs and IADLs) on 1/12/2024  Choice obtained: SNF referral sent (please see TCN and SW notes 1/12/2024)  SCP with [facility] PCP    "

## 2024-01-17 NOTE — DISCHARGE PLANNING
Case Management Discharge Planning    Admission Date: 1/11/2024  GMLOS: 4  ALOS: 3    6-Clicks ADL Score: 17  6-Clicks Mobility Score: 17  PT and/or OT Eval ordered: Yes  Post-acute Referrals Ordered: Yes  Post-acute Choice Obtained: Yes  Has referral(s) been sent to post-acute provider:  Yes      Anticipated Discharge Dispo: Discharge Disposition: D/T to SNF with Medicare cert in anticipation of skilled care (03)    DME Needed: No    Action(s) Taken: LMSW met with the pt's brother Luis Carlos at bedside to discuss placement. Luis Carlos working on getting the pt's three MSP cases closed as that is a barrier for long term placement. Brother Luis Carlos is open to placement at [facility] and [facility]. Brother Luis Carlos is aware of the need to apply for Medicaid and this LMSW requested for PFA to reach out to brother Luis Carlos to get pt screened for Medicaid. [facility] does have documentation and applications from brother Luis Carlos that the ED CM worked with brother Luis Carlos on including the pt's bank statements for placement.      Escalations Completed: Complex DC    Medically Clear: Yes    Next Steps: LMSW to work on insurance and placement barriers.     Barriers to Discharge: Pending Placement    Is the patient up for discharge tomorrow: No
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
## 2024-01-17 NOTE — CARE PLAN
The patient is Watcher - Medium risk of patient condition declining or worsening    Shift Goals  Clinical Goals: monitor BG, patient safety  Patient Goals: wil  Family Goals: wil    Progress made toward(s) clinical / shift goals: o2 sats maintained on room air. No reports of pain. patient remains free of falls or injury. Restraints discontinued due to absence of aggression. Safety sitter in place.    Patient is not progressing towards the following goals: patient is a&o x0. Haldol given once for physical and verbal aggression towards staff. NV bilateral wrist restraints placed when patient was violent with staff again.       Problem: Knowledge Deficit - Standard  Goal: Patient and family/care givers will demonstrate understanding of plan of care, disease process/condition, diagnostic tests and medications  Outcome: Not Progressing     Problem: Diabetes Management  Goal: Patient will achieve and maintain glucose in satisfactory range  Outcome: Not Progressing  BG was greater than 400 x2 during shift. Latest BG check at 0054 was 163.     Problem: Knowledge Deficit - Diabetes  Goal: Patient will demonstrate knowledge of insulin injection, symptoms, and treatment of hypoglycemia and diet prior to discharge  Outcome: Not Progressing

## 2024-01-17 NOTE — PROGRESS NOTES
[facility] Medicine Daily Progress Note    Date of Service  1/17/2024    Chief Complaint  Dylon Santa is a 63 y.o. male admitted 1/11/2024 with moderate to severe Alzheimer's dementia now with agitation and underlying insulin-dependent type 1-2 diabetes mellitus who was brought to the hospital by his brother who is his primary caretaker.  His brother can no longer take care of him given worsening agitation and behavioral issues over the last 2 months.  Patient was admitted for profound hyperglycemia without evidence of DKA or HHNK.  Patient does not have capacity and his brother Luis Carlos is the DURABLE POWER OF .  Patient has been made DNR/DNI and I have discussed initiating palliative care consult which his brother is agreeable with.    Hospital Course  See above    Interval Problem Update  Patient was seen and examined at bedside.  I have personally reviewed and interpreted vitals, labs, and imaging.    1/16.  Afebrile.  Stable vitals.  On room air.  Refusing a lot of medications.  Patient is lethargic and demented.  Denies any pain.  Reports no needs.  Does not want to be disturbed and only wants to sleep.  1/17.  Afebrile.  Mild hypertension.  On room air.  Patient refused insulin during the day.  Has hyperglycemia last night.  Then became agitated being physical with care aides.  Required restraints and IM Haldol early this morning.  Refused all morning medications.  On exam he really does not address provider.  Denies pain.  Reports no complaints.  He did complete breakfast.  His brother came to bedside.  Patient has been declining medications for years.  His brother states that he is more likely to take medications if there are fewer.  Will focus on medications for blood pressure, dementia, behavioral disturbance.  Spoke with brother about clopidogrel with prior history of coronary disease as well as rivaroxaban for DVT prophylaxis.  Will discontinue at this time.  Minimize blood draws.  Brother is okay  with fingersticks for blood sugar.    I have discussed this patient's plan of care and discharge plan at IDT rounds today with Case Management, Nursing, Nursing leadership, and other members of the IDT team.    Consultants/Specialty  Palliative care    Code Status  DNAR/DNI    Disposition  The patient is not medically cleared for discharge to home or a post-acute facility.  Anticipate discharge to: skilled nursing facility  Ultimate goal is likely [facility] with hospice    I have placed the appropriate orders for post-discharge needs.    Review of Systems  Review of Systems   Unable to perform ROS: Dementia        Physical Exam  Temp:  [36.1 °C (96.9 °F)-37.8 °C (100.1 °F)] 37.8 °C (100.1 °F)  Pulse:  [] 100  Resp:  [17-18] 17  BP: (138-157)/(58-78) 157/65  SpO2:  [93 %-100 %] 99 %    Physical Exam  Vitals and nursing note reviewed.   Constitutional:       General: He is not in acute distress.     Appearance: He is well-developed.      Comments: Laying in bed, appears comfortable  Very sleepy, but does follow some simple commands   HENT:      Head: Normocephalic and atraumatic.      Mouth/Throat:      Pharynx: No oropharyngeal exudate.   Eyes:      General: No scleral icterus.     Pupils: Pupils are equal, round, and reactive to light.   Neck:      Thyroid: No thyromegaly.   Cardiovascular:      Rate and Rhythm: Normal rate and regular rhythm.      Heart sounds: Normal heart sounds. No murmur heard.  Pulmonary:      Effort: Pulmonary effort is normal. No respiratory distress.      Breath sounds: Normal breath sounds. No wheezing.   Abdominal:      General: Bowel sounds are normal. There is no distension.      Palpations: Abdomen is soft.      Tenderness: There is no abdominal tenderness.   Musculoskeletal:         General: No tenderness. Normal range of motion.      Cervical back: Normal range of motion and neck supple.      Right lower leg: No edema.      Left lower leg: No edema.   Skin:     General: Skin is warm  and dry.      Findings: No rash.   Neurological:      Mental Status: He is alert.      Comments: A&OX0-1         Fluids    Intake/Output Summary (Last 24 hours) at 1/17/2024 0758  Last data filed at 1/16/2024 1400  Gross per 24 hour   Intake 360 ml   Output --   Net 360 ml         Laboratory  Recent Labs     01/15/24  0317 01/17/24  0304   WBC 6.4 5.7   RBC 3.90* 4.36*   HEMOGLOBIN 11.8* 13.4*   HEMATOCRIT 34.5* 37.6*   MCV 88.5 86.2   MCH 30.3 30.7   MCHC 34.2 35.6   RDW 42.1 40.1   PLATELETCT 202 208   MPV 10.3 10.1       Recent Labs     01/15/24  0317 01/16/24  2202 01/17/24  0304   SODIUM 134* 131* 130*   POTASSIUM 4.0 5.1 4.3   CHLORIDE 102 95* 97   CO2 20 28 22   GLUCOSE 84 416* 169*   BUN 28* 27* 24*   CREATININE 1.40 1.63* 1.47*   CALCIUM 8.9 9.5 9.0                     Imaging  [id number] VIEW   Final Result         Large amount of stool throughout the colon.           Assessment/Plan  * Type 2 diabetes mellitus with hyperglycemia, with long-term current use of insulin (HCC)- (present on admission)  Assessment & Plan  1/17/2024  Hyperglycemia has considerably improved  Continue lantus 10 units BID  Continue ISS, adjust PRN  Likely moving towards hospice, so can keep insulin regimen at this time  However, if we want [facility] without hospice, need to transition to oral DM medications since patient will never be able to self-administer insulin medications  A1c 8.7    ACP (advance care planning)- (present on admission)  Assessment & Plan  1/17/2024  Changed to DNR/DNI  Palliative Consult  Luis Carlos, his brother, was primary caretaker and DPOA. He is agreeable to possibly pursuing hospice, would like to discuss further with his family and Palliative Care    Dehydration- (present on admission)  Assessment & Plan  1/17/2024  Resolved  Stop IVf's  Monitor sugars and oral intake    Ketosis (HCC)- (present on admission)  Assessment & Plan  1/17/2024  Resolved    Generalized weakness- (present on admission)  Assessment &  Plan  1/17/2024  Unable to care for self  Will need a memory care unit   Working on  with hospice    Alzheimer disease (HCC)- (present on admission)  Assessment & Plan  1/17/2024  Advanced, with agitation   Stop IV valium  Start depakote 125 mg BID and titrate upwards  Continue scheduled seroquel 50 mg BID  Add IV haldol 5 mg PRN  Start namenda 5 mg daily and aricept 5 mg daily  Continue to adjust PRN  Continue trazodone 150 mg QPM  Consider starting an SSRI as well    Coronary artery disease due to lipid rich plaque - mild to moderate on Cath 2019 Left Dominant- (present on admission)  Assessment & Plan  1/17/2024  Diabetic diet  Continue ARB, BB.  Discontinue Plavix    Stage 3a chronic kidney disease (HCC)- (present on admission)  Assessment & Plan  1/17/2024  Improving, likely an element of REGULO on CKD Stage III 2/2 DM nephropathy  No AGMA  Monitor    Dyslipidemia- (present on admission)  Assessment & Plan  1/17/2024  Diabetic diet for now  Stop statin    Hypertension- (present on admission)  Assessment & Plan  1/17/2024  BP is decently controlled  Continue toprol XL 25 mg daily, losartan 100 mg daily  Adjust prn         VTE prophylaxis: Xarelto    I have performed a physical exam and reviewed and updated ROS and Plan today (1/17/2024). In review of yesterday's note (1/16/2024), there are no changes except as documented above.    Greater than 51 minutes spent prepping to see patient (e.g. review of tests) obtaining and/or reviewing separately obtained history. Performing a medically appropriate examination and/ evaluation.  Counseling and educating the patient/family/caregiver.  Ordering medications, tests, or procedures.  Referring and communicating with other health care professionals.  Documenting clinical information in EPIC.  Independently interpreting results and communicating results to patient/family/caregiver.  Care coordination.
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
## 2024-01-18 NOTE — TELEPHONE ENCOUNTER
Pts brother came into office to request a referral for permanent housing through [facility]; Please advise if they need an appointment or if we can just send in a referral to them! Thank you in advance! Amalia Marley, Med Ass't

## 2024-01-18 NOTE — PROGRESS NOTES
[facility] Medicine Daily Progress Note    Date of Service  1/18/2024    Chief Complaint  Dylon Santa is a 63 y.o. male admitted 1/11/2024 with moderate to severe Alzheimer's dementia now with agitation and underlying insulin-dependent type 1-2 diabetes mellitus who was brought to the hospital by his brother who is his primary caretaker.  His brother can no longer take care of him given worsening agitation and behavioral issues over the last 2 months.  Patient was admitted for profound hyperglycemia without evidence of DKA or HHNK.  Patient does not have capacity and his brother Luis Carlos is the DURABLE POWER OF .  Patient has been made DNR/DNI and I have discussed initiating palliative care consult which his brother is agreeable with.    Hospital Course  See above    Interval Problem Update  Patient was seen and examined at bedside.  I have personally reviewed and interpreted vitals, labs, and imaging.    1/16.  Afebrile.  Stable vitals.  On room air.  Refusing a lot of medications.  Patient is lethargic and demented.  Denies any pain.  Reports no needs.  Does not want to be disturbed and only wants to sleep.  1/17.  Afebrile.  Mild hypertension.  On room air.  Patient refused insulin during the day.  Has hyperglycemia last night.  Then became agitated being physical with care aides.  Required restraints and IM Haldol early this morning.  Refused all morning medications.  On exam he really does not address provider.  Denies pain.  Reports no complaints.  He did complete breakfast.  His brother came to bedside.  Patient has been declining medications for years.  His brother states that he is more likely to take medications if there are fewer.  Will focus on medications for blood pressure, dementia, behavioral disturbance.  Spoke with brother about clopidogrel with prior history of coronary disease as well as rivaroxaban for DVT prophylaxis.  Will discontinue at this time.  Minimize blood draws.  Brother is okay  with fingersticks for blood sugar.  1/18.  Afebrile.  Stable vitals.  On room air.  Refused medications.  Increased insulin sliding scale.  Patient is lethargic.  States he is not feeling good.  Later denies pain and reports no needs.  Discussed with social work.  Pending Medicaid.  Has open Workmen's Comp. cases.    I have discussed this patient's plan of care and discharge plan at IDT rounds today with Case Management, Nursing, Nursing leadership, and other members of the IDT team.    Consultants/Specialty  Palliative care    Code Status  DNAR/DNI    Disposition  The patient is not medically cleared for discharge to home or a post-acute facility.  Anticipate discharge to: skilled nursing facility  Ultimate goal is likely [facility] with hospice    I have placed the appropriate orders for post-discharge needs.    Review of Systems  Review of Systems   Unable to perform ROS: Dementia        Physical Exam  Temp:  [36.1 °C (97 °F)-37.1 °C (98.8 °F)] 36.6 °C (97.8 °F)  Pulse:  [89-95] 95  Resp:  [17-18] 18  BP: (116-143)/(50-65) 132/50  SpO2:  [92 %-95 %] 92 %    Physical Exam  Vitals and nursing note reviewed.   Constitutional:       General: He is not in acute distress.     Appearance: He is well-developed.      Comments: Laying in bed, appears comfortable  Very sleepy, but does follow some simple commands   HENT:      Head: Normocephalic and atraumatic.      Mouth/Throat:      Pharynx: No oropharyngeal exudate.   Eyes:      General: No scleral icterus.     Pupils: Pupils are equal, round, and reactive to light.   Neck:      Thyroid: No thyromegaly.   Cardiovascular:      Rate and Rhythm: Normal rate and regular rhythm.      Heart sounds: Normal heart sounds. No murmur heard.  Pulmonary:      Effort: Pulmonary effort is normal. No respiratory distress.      Breath sounds: Normal breath sounds. No wheezing.   Abdominal:      General: Bowel sounds are normal. There is no distension.      Palpations: Abdomen is soft.       Tenderness: There is no abdominal tenderness.   Musculoskeletal:         General: No tenderness. Normal range of motion.      Cervical back: Normal range of motion and neck supple.      Right lower leg: No edema.      Left lower leg: No edema.   Skin:     General: Skin is warm and dry.      Findings: No rash.   Neurological:      Mental Status: He is alert.      Comments: A&OX0-1         Fluids    Intake/Output Summary (Last 24 hours) at 1/18/2024 0824  Last data filed at 1/18/2024 0400  Gross per 24 hour   Intake --   Output 1175 ml   Net -1175 ml         Laboratory  Recent Labs     01/17/24  0304   WBC 5.7   RBC 4.36*   HEMOGLOBIN 13.4*   HEMATOCRIT 37.6*   MCV 86.2   MCH 30.7   MCHC 35.6   RDW 40.1   PLATELETCT 208   MPV 10.1       Recent Labs     01/16/24  2202 01/17/24  0304   SODIUM 131* 130*   POTASSIUM 5.1 4.3   CHLORIDE 95* 97   CO2 28 22   GLUCOSE 416* 169*   BUN 27* 24*   CREATININE 1.63* 1.47*   CALCIUM 9.5 9.0                     Imaging  [id number] VIEW   Final Result         Large amount of stool throughout the colon.           Assessment/Plan  * Type 2 diabetes mellitus with hyperglycemia, with long-term current use of insulin (HCC)- (present on admission)  Assessment & Plan  Hyperglycemia has considerably improved  Continue lantus 10 units BID  Continue ISS, adjust PRN  Likely moving towards hospice, so can keep insulin regimen at this time  However, if we want GH without hospice, need to transition to oral DM medications since patient will never be able to self-administer insulin medications  A1c 8.7    ACP (advance care planning)- (present on admission)  Assessment & Plan  Changed to DNR/DNI  Palliative Consult  Luis Carlos, his brother, was primary caretaker and DPOA. He is agreeable to possibly pursuing hospice, would like to discuss further with his family and Palliative Care    Dehydration- (present on admission)  Assessment & Plan  Resolved  Stop IVf's  Monitor sugars and oral intake    Ketosis  (HCC)- (present on admission)  Assessment & Plan  Resolved    Generalized weakness- (present on admission)  Assessment & Plan  Unable to care for self  Will need a memory care unit   Working on  with hospice    Alzheimer disease (HCC)- (present on admission)  Assessment & Plan  Advanced, with agitation   Stop IV valium  Start depakote 125 mg BID and titrate upwards  Continue scheduled seroquel 50 mg BID  Add IV haldol 5 mg PRN  Start namenda 5 mg daily and aricept 5 mg daily  Continue to adjust PRN  Continue trazodone 150 mg QPM  Consider starting an SSRI as well    Coronary artery disease due to lipid rich plaque - mild to moderate on Cath 2019 Left Dominant- (present on admission)  Assessment & Plan  Diabetic diet  Continue ARB, BB.  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 In review of yesterday's note (1/17/2024), there are no changes except as documented above.    Greater than 42 minutes spent prepping to see patient (e.g. review of tests) obtaining and/or reviewing separately obtained history. Performing a medically appropriate examination and/ evaluation.  Counseling and educating the patient/family/caregiver.  Ordering medications, tests, or procedures.  Referring and communicating with other health care professionals.  Documenting clinical information in EPIC.  Independently interpreting results and communicating results to patient/family/caregiver.  Care coordination.

## 2024-01-19 NOTE — CARE PLAN
The patient is Watcher - Medium risk of patient condition declining or worsening    Shift Goals  Clinical Goals: patient safety with interventions in place  Patient Goals: KATIANA  Family Goals: KATIANA    Progress made toward(s) clinical / shift goals:      Problem: Pain - Standard  Goal: Alleviation of pain or a reduction in pain to the patient’s comfort goal  Outcome: Progressing     Problem: Knowledge Deficit - Standard  Goal: Patient and family/care givers will demonstrate understanding of plan of care, disease process/condition, diagnostic tests and medications  Outcome: Progressing     Problem: Diabetes Management  Goal: Patient will achieve and maintain glucose in satisfactory range  Outcome: Progressing     Problem: Knowledge Deficit - Diabetes  Goal: Patient will demonstrate knowledge of insulin injection, symptoms, and treatment of hypoglycemia and diet prior to discharge  Outcome: Progressing     Problem: Discharge Planning - Diabetes  Goal: Patient's continuum of care needs will be met  Outcome: Progressing     Problem: Skin Integrity - Diabetes  Goal: Patient's skin on legs and feet will remain intact while hospitalized  Outcome: Progressing       Patient is not progressing towards the following goals:

## 2024-01-19 NOTE — PROGRESS NOTES
[facility] Medicine Daily Progress Note    Date of Service  1/19/2024    Chief Complaint  Dylon Santa is a 63 y.o. male admitted 1/11/2024 with moderate to severe Alzheimer's dementia now with agitation and underlying insulin-dependent type 1-2 diabetes mellitus who was brought to the hospital by his brother who is his primary caretaker.  His brother can no longer take care of him given worsening agitation and behavioral issues over the last 2 months.  Patient was admitted for profound hyperglycemia without evidence of DKA or HHNK.  Patient does not have capacity and his brother Luis Carlos is the DURABLE POWER OF .  Patient has been made DNR/DNI and I have discussed initiating palliative care consult which his brother is agreeable with.    Hospital Course  See above    Interval Problem Update  Patient was seen and examined at bedside.  I have personally reviewed and interpreted vitals, labs, and imaging.    1/16.  Afebrile.  Stable vitals.  On room air.  Refusing a lot of medications.  Patient is lethargic and demented.  Denies any pain.  Reports no needs.  Does not want to be disturbed and only wants to sleep.  1/17.  Afebrile.  Mild hypertension.  On room air.  Patient refused insulin during the day.  Has hyperglycemia last night.  Then became agitated being physical with care aides.  Required restraints and IM Haldol early this morning.  Refused all morning medications.  On exam he really does not address provider.  Denies pain.  Reports no complaints.  He did complete breakfast.  His brother came to bedside.  Patient has been declining medications for years.  His brother states that he is more likely to take medications if there are fewer.  Will focus on medications for blood pressure, dementia, behavioral disturbance.  Spoke with brother about clopidogrel with prior history of coronary disease as well as rivaroxaban for DVT prophylaxis.  Will discontinue at this time.  Minimize blood draws.  Brother is okay  with fingersticks for blood sugar.  1/18.  Afebrile.  Stable vitals.  On room air.  Refused medications.  Increased insulin sliding scale.  Patient is lethargic.  States he is not feeling good.  Later denies pain and reports no needs.  Discussed with social work.  Pending Medicaid.  Has open Workmen's Comp. cases.  1/19.  Afebrile.  Stable vitals.  On room air.  Refused a lot of AM meds.  Patient very lethargic.  Shakes his head no and then goes back to sleep.  Does not want to address provider or participate in history taking.  He is more awake and afternoon with family here and reports no needs.  He does have a little cough.    I have discussed this patient's plan of care and discharge plan at IDT rounds today with Case Management, Nursing, Nursing leadership, and other members of the IDT team.    Consultants/Specialty  Palliative care    Code Status  DNAR/DNI    Disposition  The patient is not medically cleared for discharge to home or a post-acute facility.  Anticipate discharge to: skilled nursing facility  Ultimate goal is likely GH with hospice    I have placed the appropriate orders for post-discharge needs.    Review of Systems  Review of Systems   Unable to perform ROS: Dementia        Physical Exam  Temp:  [36.8 °C (98.3 °F)-37.1 °C (98.8 °F)] 36.8 °C (98.3 °F)  Pulse:  [] 81  Resp:  [18] 18  BP: (114-129)/(61-70) 114/61  SpO2:  [91 %-94 %] 91 %    Physical Exam  Vitals and nursing note reviewed.   Constitutional:       General: He is not in acute distress.     Appearance: He is well-developed.      Comments: Laying in bed, appears comfortable  Very sleepy, but does follow some simple commands   HENT:      Head: Normocephalic and atraumatic.      Mouth/Throat:      Pharynx: No oropharyngeal exudate.   Eyes:      General: No scleral icterus.     Pupils: Pupils are equal, round, and reactive to light.   Neck:      Thyroid: No thyromegaly.   Cardiovascular:      Rate and Rhythm: Normal rate and regular  rhythm.      Heart sounds: Normal heart sounds. No murmur heard.  Pulmonary:      Effort: Pulmonary effort is normal. No respiratory distress.      Breath sounds: Normal breath sounds. No wheezing.   Abdominal:      General: Bowel sounds are normal. There is no distension.      Palpations: Abdomen is soft.      Tenderness: There is no abdominal tenderness.   Musculoskeletal:         General: No tenderness. Normal range of motion.      Cervical back: Normal range of motion and neck supple.      Right lower leg: No edema.      Left lower leg: No edema.   Skin:     General: Skin is warm and dry.      Findings: No rash.   Neurological:      Mental Status: He is alert.      Comments: A&OX0-1         Fluids    Intake/Output Summary (Last 24 hours) at 1/19/2024 0950  Last data filed at 1/19/2024 0300  Gross per 24 hour   Intake --   Output 150 ml   Net -150 ml         Laboratory  Recent Labs     01/17/24  0304   WBC 5.7   RBC 4.36*   HEMOGLOBIN 13.4*   HEMATOCRIT 37.6*   MCV 86.2   MCH 30.7   MCHC 35.6   RDW 40.1   PLATELETCT 208   MPV 10.1       Recent Labs     01/16/24  2202 01/17/24  0304   SODIUM 131* 130*   POTASSIUM 5.1 4.3   CHLORIDE 95* 97   CO2 28 22   GLUCOSE 416* 169*   BUN 27* 24*   CREATININE 1.63* 1.47*   CALCIUM 9.5 9.0                     Imaging  [id number]   Final Result         Large amount of stool throughout the colon.           Assessment/Plan  * Type 2 diabetes mellitus with hyperglycemia, with long-term current use of insulin (HCC)- (present on admission)  Assessment & Plan  Hyperglycemia has considerably improved  Continue lantus 10 units BID  Continue ISS, adjust PRN  Likely moving towards hospice, so can keep insulin regimen at this time  However, if we want GH without hospice, need to transition to oral DM medications since patient will never be able to self-administer insulin medications  A1c 8.7    ACP (advance care planning)- (present on admission)  Assessment & Plan  Changed to  DNR/DNI  Palliative Consult  Luis Carlos, his brother, was primary caretaker and DPOA. He is agreeable to possibly pursuing hospice, would like to discuss further with his family and Palliative Care    Dehydration- (present on admission)  Assessment & Plan  Resolved  Stop IVf's  Monitor sugars and oral intake    Ketosis (HCC)- (present on admission)  Assessment & Plan  Resolved    Generalized weakness- (present on admission)  Assessment & Plan  Unable to care for self  Will need a memory care unit   Working on  with hospice    Alzheimer disease (HCC)- (present on admission)  Assessment & Plan  Advanced, with agitation   Stop IV valium  Start depakote 125 mg BID and titrate upwards  Continue scheduled seroquel 50 mg BID  Add IV haldol 5 mg PRN  Start namenda 5 mg daily and aricept 5 mg daily  Continue to adjust PRN  Continue trazodone 150 mg QPM  Consider starting an SSRI as well    Coronary artery disease due to lipid rich plaque - mild to moderate on Cath 2019 Left Dominant- (present on admission)  Assessment & Plan  Diabetic diet  Continue ARB, BB.  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 In review of yesterday's note (1/18/2024), there are no changes except as documented above.    Greater than 40 minutes spent prepping to see patient (e.g. review of tests) obtaining and/or reviewing separately obtained history. Performing a medically appropriate examination and/ evaluation.  Counseling and educating  the patient/family/caregiver.  Ordering medications, tests, or procedures.  Referring and communicating with other health care professionals.  Documenting clinical information in EPIC.  Independently interpreting results and communicating results to patient/family/caregiver.  Care coordination.

## 2024-01-20 NOTE — PROGRESS NOTES
[facility] Medicine Daily Progress Note    Date of Service  1/20/2024    Chief Complaint  Dylon Santa is a 63 y.o. male admitted 1/11/2024 with moderate to severe Alzheimer's dementia now with agitation and underlying insulin-dependent type 1-2 diabetes mellitus who was brought to the hospital by his brother who is his primary caretaker.  His brother can no longer take care of him given worsening agitation and behavioral issues over the last 2 months.  Patient was admitted for profound hyperglycemia without evidence of DKA or HHNK.  Patient does not have capacity and his brother Luis Carlos is the DURABLE POWER OF .  Patient has been made DNR/DNI and I have discussed initiating palliative care consult which his brother is agreeable with.    Hospital Course  See above    Interval Problem Update  Patient was seen and examined at bedside.  I have personally reviewed and interpreted vitals, labs, and imaging.    1/16.  Afebrile.  Stable vitals.  On room air.  Refusing a lot of medications.  Patient is lethargic and demented.  Denies any pain.  Reports no needs.  Does not want to be disturbed and only wants to sleep.  1/17.  Afebrile.  Mild hypertension.  On room air.  Patient refused insulin during the day.  Has hyperglycemia last night.  Then became agitated being physical with care aides.  Required restraints and IM Haldol early this morning.  Refused all morning medications.  On exam he really does not address provider.  Denies pain.  Reports no complaints.  He did complete breakfast.  His brother came to bedside.  Patient has been declining medications for years.  His brother states that he is more likely to take medications if there are fewer.  Will focus on medications for blood pressure, dementia, behavioral disturbance.  Spoke with brother about clopidogrel with prior history of coronary disease as well as rivaroxaban for DVT prophylaxis.  Will discontinue at this time.  Minimize blood draws.  Brother is okay  with fingersticks for blood sugar.  1/18.  Afebrile.  Stable vitals.  On room air.  Refused medications.  Increased insulin sliding scale.  Patient is lethargic.  States he is not feeling good.  Later denies pain and reports no needs.  Discussed with social work.  Pending Medicaid.  Has open [employer] cases.  1/19.  Afebrile.  Stable vitals.  On room air.  Refused a lot of AM meds.  Patient very lethargic.  Shakes his head no and then goes back to sleep.  Does not want to address provider or participate in history taking.  He is more awake and afternoon with family here and reports no needs.  He does have a little cough.  1/20.  Afebrile.  Stable vitals.  On room air.  Patient very lethargic this morning.  He usually wakes up in the afternoon when his brother is here.  Increase Lantus for elevated sugars.  Prematurely eats and takes meds when his family is at bedside.  He had another rough night last night requiring IM Haldol and restraints.  His symptoms are pretty well-controlled if he can take his meds during the day.  Will need placement.  Pending Medicaid and open [employer] cases.    I have discussed this patient's plan of care and discharge plan at IDT rounds today with Case Management, Nursing, Nursing leadership, and other members of the IDT team.    Consultants/Specialty  Palliative care    Code Status  DNAR/DNI    Disposition  The patient is not medically cleared for discharge to home or a post-acute facility.  Anticipate discharge to: skilled nursing facility  Ultimate goal is likely GH with hospice    I have placed the appropriate orders for post-discharge needs.    Review of Systems  Review of Systems   Unable to perform ROS: Dementia        Physical Exam  Temp:  [36.7 °C (98 °F)-37.1 °C (98.7 °F)] 37.1 °C (98.7 °F)  Pulse:  [78-87] 87  Resp:  [18] 18  BP: (117-136)/(62-70) 129/62  SpO2:  [92 %-93 %] 92 %    Physical Exam  Vitals and nursing note reviewed.   Constitutional:       General: He  is not in acute distress.     Appearance: He is well-developed.      Comments: Laying in bed, appears comfortable  Very sleepy, but does follow some simple commands   HENT:      Head: Normocephalic and atraumatic.      Mouth/Throat:      Pharynx: No oropharyngeal exudate.   Eyes:      General: No scleral icterus.     Pupils: Pupils are equal, round, and reactive to light.   Neck:      Thyroid: No thyromegaly.   Cardiovascular:      Rate and Rhythm: Normal rate and regular rhythm.      Heart sounds: Normal heart sounds. No murmur heard.  Pulmonary:      Effort: Pulmonary effort is normal. No respiratory distress.      Breath sounds: Normal breath sounds. No wheezing.   Abdominal:      General: Bowel sounds are normal. There is no distension.      Palpations: Abdomen is soft.      Tenderness: There is no abdominal tenderness.   Musculoskeletal:         General: No tenderness. Normal range of motion.      Cervical back: Normal range of motion and neck supple.      Right lower leg: No edema.      Left lower leg: No edema.   Skin:     General: Skin is warm and dry.      Findings: No rash.   Neurological:      Mental Status: He is alert.      Comments: A&OX0-1         Fluids    Intake/Output Summary (Last 24 hours) at 1/20/2024 0740  Last data filed at 1/20/2024 0448  Gross per 24 hour   Intake --   Output 575 ml   Net -575 ml         Laboratory                            Imaging  [id number] VIEW   Final Result         Large amount of stool throughout the colon.           Assessment/Plan  * Type 2 diabetes mellitus with hyperglycemia, with long-term current use of insulin (HCC)- (present on admission)  Assessment & Plan  Hyperglycemia has considerably improved  Continue lantus 10 units BID  Continue ISS, adjust PRN  Likely moving towards hospice, so can keep insulin regimen at this time  However, if we want GH without hospice, need to transition to oral DM medications since patient will never be able to self-administer  insulin medications  A1c 8.7    ACP (advance care planning)- (present on admission)  Assessment & Plan  Changed to DNR/DNI  Palliative Consult  Luis Carlos, his brother, was primary caretaker and DPOA. He is agreeable to possibly pursuing hospice, would like to discuss further with his family and Palliative Care    Dehydration- (present on admission)  Assessment & Plan  Resolved  Stop IVf's  Monitor sugars and oral intake    Ketosis (HCC)- (present on admission)  Assessment & Plan  Resolved    Generalized weakness- (present on admission)  Assessment & Plan  Unable to care for self  Will need a memory care unit   Working on  with hospice    Alzheimer disease (HCC)- (present on admission)  Assessment & Plan  Advanced, with agitation   Stop IV valium  Start depakote 125 mg BID and titrate upwards  Continue scheduled seroquel 50 mg BID  Add IV haldol 5 mg PRN  Start namenda 5 mg daily and aricept 5 mg daily  Continue to adjust PRN  Continue trazodone 150 mg QPM  Consider starting an SSRI as well    Coronary artery disease due to lipid rich plaque - mild to moderate on Cath 2019 Left Dominant- (present on admission)  Assessment & Plan  Diabetic diet  Continue ARB, BB.  Discontinue Plavix    Stage 3a chronic kidney disease (HCC)- (present on admission)  Assessment & Plan  Improving, likely an element of REGULO on CKD Stage III 2/2 DM nephropathy  No AGMA  Monitor    Dyslipidemia- (present on admission)  Assessment & Plan  Diabetic diet for now  Stop statin    Hypertension- (present on admission)  Assessment & Plan  BP is decently controlled  Continue toprol XL 25 mg daily, losartan 100 mg daily  Adjust prn         VTE prophylaxis: Xarelto    I have performed a physical exam and reviewed and updated ROS and Plan today (1/20/2024). In review of yesterday's note (1/19/2024), there are no changes except as documented above.    Greater than 39 minutes spent prepping to see patient (e.g. review of tests) obtaining and/or reviewing  separately obtained history. Performing a medically appropriate examination and/ evaluation.  Counseling and educating the patient/family/caregiver.  Ordering medications, tests, or procedures.  Referring and communicating with other health care professionals.  Documenting clinical information in EPIC.  Independently interpreting results and communicating results to patient/family/caregiver.  Care coordination.

## 2024-01-20 NOTE — CARE PLAN
The patient is Watcher - Medium risk of patient condition declining or worsening    Shift Goals  Clinical Goals: Patient safety  Patient Goals: KATIANA  Family Goals: KATIANA    Progress made toward(s) clinical / shift goals:  Patient denies any pain, Patient on 2 point bilateral wrist restraints, vest restraints and four rails up. With 1:1 sitter. Able to rest, patient free of injury from restraints. Call light in reach.     Patient is not progressing towards the following goals:      Problem: Knowledge Deficit - Standard  Goal: Patient and family/care givers will demonstrate understanding of plan of care, disease process/condition, diagnostic tests and medications  Description: Target End Date:  1-3 days or as soon as patient condition allows    Document in Patient Education    1.  Patient and family/caregiver oriented to unit, equipment, visitation policy and means for communicating concern  2.  Complete/review Learning Assessment  3.  Assess knowledge level of disease process/condition, treatment plan, diagnostic tests and medications  4.  Explain disease process/condition, treatment plan, diagnostic tests and medications  Outcome: Not Progressing     Problem: Diabetes Management  Goal: Patient will achieve and maintain glucose in satisfactory range  Description: Target End Date:  Prior to discharge or change in level of care    1.  Monitor glucose levels per provider order  2.  Assess for signs and symptoms of hyperglycemia (abdominal pain, bloating, nausea or vomiting)  3.  Assess for signs and symptoms of hypoglycemia (anxiety, tremors, slurred speech, change in LOC, tachycardia, fatigue)  4.  Monitor for early signs and symptoms of infection  5.  Monitor daily weights  6.  Consult to diabetes educator  Outcome: Not Progressing     Problem: Knowledge Deficit - Diabetes  Goal: Patient will demonstrate knowledge of insulin injection, symptoms, and treatment of hypoglycemia and diet prior to discharge  Description:  Target End Date:  1-3 days or as soon as patient condition allows    1.  Explain disease process, medications and methods used in treating diabetes  2.  Explain signs and symptoms of hyper/hypoglycemia  3.  Educate about lifestyle changes including exercise, diet (carb counting) and sick day management  4.  Educate importance of regular vision appointments, regular foot care and monitoring of skin lesions  5.  Educate patient and family/caregiver on diabetes management and proper use of equipment for testing (syringe, pens and site rotation, glucometer, test strips)  6.  Collaborate with Diabetes Educator  7.  Assess patient and family/caregiver skills with insulin pens and finger sticks. Document level of understanding in patient education  Outcome: Not Progressing

## 2024-01-20 NOTE — PROGRESS NOTES
NOC HOSPITALIST CROSS COVER    Notified by RN regarding patient becoming extremely agitated, verbally abusive, and aggressive towards staff.  He was pulling out all his catheters and lines and attempting to get out of bed.  He was also attempting to hit nursing staff.  He was unable to be verbally de-escalated.  Per nursing report, he was using vulgar language and calling the bedside RN very inappropriate names.  He was placed in bilateral soft wrist restraints, vest restraint, and 4 side rails..  He was given a dose of Haldol that was previously ordered for agitation.  Patient has a history of severe Alzheimer's dementia with aggressive behavioral disturbances that have been getting worse over the past few months per chart review.      Vitals:    01/19/24 1600   BP: 117/64   Pulse: 78   Resp: 18   Temp: 36.7 °C (98 °F)   SpO2: 93%      Plan:  # Agitation  -Bilat soft wrist restraints, vest restraint, and 4 side rails added for patient and staff safety  -Previously ordered Haldol given once restraints were in place  -Reorient patient as able  -De-escalate restraints as soon as safely possible      -----------------------------------------------------------------------------------------------------------    Electronically signed by:  Maurilio Rivas, JURGEN, APRN, SAEEDP-BC  Hospitalist Services

## 2024-01-20 NOTE — PROGRESS NOTES
Patient started verbally and physically abusive and aggressive to staff , standing on bed , staff assisted patient  back to bed and patient started kicking and pulling his galarza catheter out. Trying to hit staff. Patient given PRN haldol and notified MD for order of two point bilateral soft wrist restraint, vest restraint and four rails up. Patient on 1:1 sitter. Will continue to monitor patient.

## 2024-01-20 NOTE — CARE PLAN
The patient is Watcher - Medium risk of patient condition declining or worsening    Shift Goals  Clinical Goals: Patient safety  Patient Goals: KATIANA  Family Goals: KATIANA    Progress made toward(s) clinical / shift goals:      Problem: Pain - Standard  Goal: Alleviation of pain or a reduction in pain to the patient’s comfort goal  Outcome: Progressing     Problem: Knowledge Deficit - Standard  Goal: Patient and family/care givers will demonstrate understanding of plan of care, disease process/condition, diagnostic tests and medications  Outcome: Progressing     Problem: Diabetes Management  Goal: Patient will achieve and maintain glucose in satisfactory range  Outcome: Progressing     Problem: Knowledge Deficit - Diabetes  Goal: Patient will demonstrate knowledge of insulin injection, symptoms, and treatment of hypoglycemia and diet prior to discharge  Outcome: Progressing     Problem: Discharge Planning - Diabetes  Goal: Patient's continuum of care needs will be met  Outcome: Progressing       Patient is not progressing towards the following goals:

## 2024-01-21 NOTE — CARE PLAN
The patient is Watcher - Medium risk of patient condition declining or worsening    Shift Goals  Clinical Goals: Patient safety  Patient Goals: KATIANA  Family Goals: KATIANA    Progress made toward(s) clinical / shift goals:      Problem: Pain - Standard  Goal: Alleviation of pain or a reduction in pain to the patient’s comfort goal  Outcome: Progressing     Problem: Knowledge Deficit - Standard  Goal: Patient and family/care givers will demonstrate understanding of plan of care, disease process/condition, diagnostic tests and medications  Outcome: Progressing     Problem: Diabetes Management  Goal: Patient will achieve and maintain glucose in satisfactory range  Outcome: Progressing     Problem: Knowledge Deficit - Diabetes  Goal: Patient will demonstrate knowledge of insulin injection, symptoms, and treatment of hypoglycemia and diet prior to discharge  Outcome: Progressing       Patient is not progressing towards the following goals:

## 2024-01-21 NOTE — CONSULTS
[facility] PSYCHIATRIC CONSULT:Intake  Reason for admission:   Pt to triage accompanied by brother, concerned about increasing aggression since yesterday, has hx of dementia. Baseline aa0x1. Per brother who is caregiver will be having a surgery done tomorrow and states that he can't take care of the pt anymore, asking assistance for placement. Pt normally takes quetiapine fumarate for aggression but doesn't seem to help much yesterday.    Consulting Physician/APN/PA: Compa Vance D.O.   Reason for Consult: dementia with behavioral disturbance  Consultant: Ana Cristina Poole MD    Legal Status  vol     CC: heavily asleep without response to name or touch  HPI: 64 yo male reportedly dx with alzheimer's dementia who is Ox1 at baseline. Has been getting worse behaviorally over the past few months. .    Pt took depakote and seroquel at 449 this am: not sure if this has contributed to his sedation or not. Did not take trazodone last night    On chart review no mention of depression, psychosis or anxiety, SI/HI though he does try to hit others.    Notes:  1/20/2024: refusing a lot of medication and has been for years.   1/19/2024:  extremely agitated, verbally abusive, and aggressive towards staff.  He was pulling out all his catheters and lines and attempting to get out of bed.  He was also attempting to hit nursing staff.  He was unable to be verbally de-escalated.  Per nursing report, he was using vulgar language and calling the bedside RN very inappropriate names.  He was placed in bilateral soft wrist restraints, vest restraint, and 4 side rails..  He was given a dose of Haldol that was previously ordered for agitation.  Patient has a history of severe Alzheimer's dementia with aggressive behavioral disturbances that have been getting worse over the past few months per chart review.   1/20/2024: Patient trying to pull galarza catheter out. Patient jumping out of bed. MD made aware. Restraints placed on  "patient     Chart(s) Review:  No hx on file    Medical ROS:  Review of systems  per tx tm: reviewed    Psychiatric Exam (MSE):  Vitals:Blood pressure 115/56, pulse 88, temperature 36.7 °C (98 °F), temperature source Temporal, resp. rate 17, height 1.727 m (5' 8\"), weight 68.3 kg (150 lb 9.2 oz), SpO2 90 %.    Constitutional: normal habitus, clean, asleep  General Appearance:as noted  Musculoskeletal: restraints removed  Alert/Orientation asleep  Attn/Concentration: diminished  Fund of Knowledge: unable to test  Memory recent/remote:  unable to determine  Speech: none  Language:    unable to determine  Thought Content:  unable to determine  Thought Process:  unable to determine   Insight/Judgement:  unable to determine  Mood: unable to determine  Affect: blunted/flat    Past Medical Hx:     Past Medical History:   Diagnosis Date    Acute pain of right shoulder 3/22/2022    Anesthesia     hx PONV    Arthritis     L knee; hands    Bronchitis 2016    Cataract     nicole iol    Cold 4/5/17    prod cough    Coronary artery disease due to lipid rich plaque - mild to moderate on Cath 2019 Left Dominant     Coronary artery disease due to type 1 diabetes mellitus (HCC)     Dental disorder     \"bad shape\"    Diabetic neuropathy (HCC) 12/31/2014    retinopathy    Diabetic retinopathy of both eyes (HCC) 12/9/2016    Dyslipidemia     Heart murmur     Hemorrhagic disorder (HCC)     nose bleeds    High cholesterol     History of cardiac catheterization 2019 in [facility] abnormal stress - mild to moderate CAD     History of shingles 12/31/2014    Hyperlipidemia     Hypertension     Kidney stones     Pain     left leg/foot    Psychiatric problem     situational depression    Type II or unspecified type diabetes mellitus without mention of complication, uncontrolled 12/31/2014         Past Psychiatric Hx: none on file. Pt cannot participate       Family Psych Hx:  Family History   Problem Relation Age of Onset    Thyroid Mother     " Lung Disease Mother         COPD    Cancer Mother         eyebrow     Thyroid Sister     Other Sister         mental health issues     Thyroid Brother     Lung Disease Brother         COPD    Hypertension Brother     Hyperlipidemia Brother     Lung Disease Father         COPD    Cancer Father     Diabetes Maternal Uncle     Cancer Paternal Aunt         type unknown    Diabetes Maternal Grandfather     Diabetes Paternal Grandmother     Cancer Paternal Grandfather         prostate    Stroke Neg Hx        Social Hx: unknown       Labs:      Latest Reference Range & Units Most Recent   Bun 8 - 22 mg/dL 24 (H)  1/17/24 03:04   Creatinine 0.50 - 1.40 mg/dL 1.47 (H)  1/17/24 03:04   GFR (CKD-EPI) >60 mL/min/1.73 m 2 53 !  1/17/24 03:04   (H): Data is abnormally high  !: Data is abnormal   Latest Reference Range & Units Most Recent   Glycohemoglobin 5.8 % 8.7 !  1/8/24 13:58   !: Data is abnormal    Cranial Imaging: personally reviewed  Cranial CT 2019: neg  Cranial MRI: 2021: age related    EKG: QTc:  490       Meds Current:  Scheduled Medications   Medication Dose Frequency    insulin GLARGINE  15 Units BID    insulin regular  3-14 Units 4X/DAY ACHS    tamsulosin  0.4 mg AFTER BREAKFAST    valproic acid  125 mg BID    donepezil  5 mg Q EVENING    memantine  5 mg DAILY    losartan  100 mg DAILY    metoprolol SR  25 mg DAILY    QUEtiapine  50 mg BID    traZODone  150 mg Nightly    polyethylene glycol/lytes  1 Packet DAILY     Allergies: Patient has no known allergies.      Assessement    1. Dementia with behavioral disturbance. Likely Alzheimer's    Medical:   -hyponatremia: 130: can contribute to confusion and agitation   -DM1  -CAD  -CKD 3a  -HTN    Recommendations:  Legal Status:  vol       Discussed/voalted: D Acieto DO    Medication and Other Recommendations: final orders as per Tx Tm  Determined INCAPACITATED by tx tm: POA with brother  2    hospice being considered  3    agree with aricept and memantine: monitor for  GI  4   agree with current regimen of seroquel, depkaote and trazodone. However if seroquel ineffective and/or he refuses AND is agitated and aggressive, would proceed with prn haldol IV (if her refuses po) at 1 mg q hour for maximum of 10 mg/24 hours. Do NOT repeat if pt becomes sedated or shows signs of dystonia, etc. All antipsychotics can increase the risk of CV events. It will need to be cleared with his POA.    ALTERNATIVELY:   4    clonidine has been used for agitation and she has HTN. Can be placed as a patch in the middle of her back. Would start 0.1 mg  5    SSRIs have also been used for agitation but take a month to work at best. They are used as any antidepressant to full dose. Consider zoloft 25 mg/d with target of 100 mg increasing as tolerated. Note: would use clondine first as effect might be more immediate.  6   avoid anticholinergics    Will continue to follow with you.    Thank you for the consult.  I will be off until Friday 6. Please contact [facility] psychiatry for any concerns.    Discharge recommendations: per tx tm     If released from [facility]: Discharge Instructions:  -Reviewed safety plan: 911, ER, PCM, MHC, Suicide crisis line  -Please assist with outpatient Psychiatric/substance use follow up appointments at discharge once medically cleared.

## 2024-01-21 NOTE — PROGRESS NOTES
Patient trying to pull galarza catheter out. Patient jumping out of bed. MD made aware. Restraints placed on patient

## 2024-01-21 NOTE — PROGRESS NOTES
[facility] Medicine Daily Progress Note    Date of Service  1/21/2024    Chief Complaint  Dylon Santa is a 63 y.o. male admitted 1/11/2024 with moderate to severe Alzheimer's dementia now with agitation and underlying insulin-dependent type 1-2 diabetes mellitus who was brought to the hospital by his brother who is his primary caretaker.  His brother can no longer take care of him given worsening agitation and behavioral issues over the last 2 months.  Patient was admitted for profound hyperglycemia without evidence of DKA or HHNK.  Patient does not have capacity and his brother Luis Carlos is the DURABLE POWER OF .  Patient has been made DNR/DNI and I have discussed initiating palliative care consult which his brother is agreeable with.    Hospital Course  See above    Interval Problem Update  Patient was seen and examined at bedside.  I have personally reviewed and interpreted vitals, labs, and imaging.    1/16.  Afebrile.  Stable vitals.  On room air.  Refusing a lot of medications.  Patient is lethargic and demented.  Denies any pain.  Reports no needs.  Does not want to be disturbed and only wants to sleep.  1/17.  Afebrile.  Mild hypertension.  On room air.  Patient refused insulin during the day.  Has hyperglycemia last night.  Then became agitated being physical with care aides.  Required restraints and IM Haldol early this morning.  Refused all morning medications.  On exam he really does not address provider.  Denies pain.  Reports no complaints.  He did complete breakfast.  His brother came to bedside.  Patient has been declining medications for years.  His brother states that he is more likely to take medications if there are fewer.  Will focus on medications for blood pressure, dementia, behavioral disturbance.  Spoke with brother about clopidogrel with prior history of coronary disease as well as rivaroxaban for DVT prophylaxis.  Will discontinue at this time.  Minimize blood draws.  Brother is okay  with fingersticks for blood sugar.  1/18.  Afebrile.  Stable vitals.  On room air.  Refused medications.  Increased insulin sliding scale.  Patient is lethargic.  States he is not feeling good.  Later denies pain and reports no needs.  Discussed with social work.  Pending Medicaid.  Has open Work[employer]'s Comp. cases.  1/19.  Afebrile.  Stable vitals.  On room air.  Refused a lot of AM meds.  Patient very lethargic.  Shakes his head no and then goes back to sleep.  Does not want to address provider or participate in history taking.  He is more awake and afternoon with family here and reports no needs.  He does have a little cough.  1/20.  Afebrile.  Stable vitals.  On room air.  Patient very lethargic this morning.  He usually wakes up in the afternoon when his brother is here.  Increase Lantus for elevated sugars.  Prematurely eats and takes meds when his family is at bedside.  He had another rough night last night requiring IM Haldol and restraints.  His symptoms are pretty well-controlled if he can take his meds during the day.  Will need placement.  Pending Medicaid and open Workmen's Comp cases.  1/21.  Afebrile.  Episode of tachypnea has resolved.  On room air.  Patient had an episode of agitation last night jumping out of bed trying to pull out his Leslie despite taking his medications yesterday.  He was put in restraints.  Patient very lethargic.  Reports no pain.  Blood sugar still elevated.  Continue to adjust insulin.  Discussed with psychiatry about behavioral disturbances.  Appreciate recommendations    I have discussed this patient's plan of care and discharge plan at IDT rounds today with Case Management, Nursing, Nursing leadership, and other members of the IDT team.    Consultants/Specialty  Palliative care    Code Status  DNAR/DNI    Disposition  The patient is not medically cleared for discharge to home or a post-acute facility.  Anticipate discharge to: skilled nursing facility  Ultimate goal is  likely GH with hospice    I have placed the appropriate orders for post-discharge needs.    Review of Systems  Review of Systems   Unable to perform ROS: Dementia        Physical Exam  Temp:  [36.4 °C (97.6 °F)-37.3 °C (99.2 °F)] 36.7 °C (98 °F)  Pulse:  [77-86] 86  Resp:  [17-22] 18  BP: (123-149)/(48-84) 149/84  SpO2:  [90 %-96 %] 93 %    Physical Exam  Vitals and nursing note reviewed.   Constitutional:       General: He is not in acute distress.     Appearance: He is well-developed.      Comments: Laying in bed, appears comfortable  Very sleepy, but does follow some simple commands   HENT:      Head: Normocephalic and atraumatic.      Mouth/Throat:      Pharynx: No oropharyngeal exudate.   Eyes:      General: No scleral icterus.     Pupils: Pupils are equal, round, and reactive to light.   Neck:      Thyroid: No thyromegaly.   Cardiovascular:      Rate and Rhythm: Normal rate and regular rhythm.      Heart sounds: Normal heart sounds. No murmur heard.  Pulmonary:      Effort: Pulmonary effort is normal. No respiratory distress.      Breath sounds: Normal breath sounds. No wheezing.   Abdominal:      General: Bowel sounds are normal. There is no distension.      Palpations: Abdomen is soft.      Tenderness: There is no abdominal tenderness.   Musculoskeletal:         General: No tenderness. Normal range of motion.      Cervical back: Normal range of motion and neck supple.      Right lower leg: No edema.      Left lower leg: No edema.   Skin:     General: Skin is warm and dry.      Findings: No rash.   Neurological:      Mental Status: He is alert.      Comments: A&OX0-1         Fluids    Intake/Output Summary (Last 24 hours) at 1/21/2024 0813  Last data filed at 1/21/2024 0311  Gross per 24 hour   Intake --   Output 750 ml   Net -750 ml         Laboratory                            Imaging  [id number] VIEW   Final Result         Large amount of stool throughout the colon.           Assessment/Plan  * Type 2  diabetes mellitus with hyperglycemia, with long-term current use of insulin (HCC)- (present on admission)  Assessment & Plan  Hyperglycemia has considerably improved  Continue lantus 10 units BID  Continue ISS, adjust PRN  Likely moving towards hospice, so can keep insulin regimen at this time  However, if we want GH without hospice, need to transition to oral DM medications since patient will never be able to self-administer insulin medications  A1c 8.7      ACP (advance care planning)- (present on admission)  Assessment & Plan  Changed to DNR/DNI  Palliative Consult  Luis Carlos, his brother, was primary caretaker and DPOA. He is agreeable to possibly pursuing hospice, would like to discuss further with his family and Palliative Care    Dehydration- (present on admission)  Assessment & Plan  Resolved  Stop IVf's  Monitor sugars and oral intake    Ketosis (HCC)- (present on admission)  Assessment & Plan  Resolved    Generalized weakness- (present on admission)  Assessment & Plan  Unable to care for self  Will need a memory care unit   Working on  with hospice    Alzheimer disease (HCC)- (present on admission)  Assessment & Plan  Advanced, with agitation   Stop IV valium  Start depakote 125 mg BID and titrate upwards  Continue scheduled seroquel 50 mg BID  Start namenda 5 mg daily and aricept 5 mg daily  Continue to adjust PRN  Continue trazodone 150 mg QPM  Consider starting an SSRI as well    Discussed behavioral disturbances with psychiatry.  Modified PRN Haldol.  Continue to titrate meds.  Monitor need for restraints.    Coronary artery disease due to lipid rich plaque - mild to moderate on Cath 2019 Left Dominant- (present on admission)  Assessment & Plan  Diabetic diet  Continue ARB, BB.  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 In review of yesterday's note (1/20/2024), there are no changes except as documented above.    Greater than 50 minutes spent prepping to see patient (e.g. review of tests) obtaining and/or reviewing separately obtained history. Performing a medically appropriate examination and/ evaluation.  Counseling and educating the patient/family/caregiver.  Ordering medications, tests, or procedures.  Referring and communicating with other health care professionals.  Documenting clinical information in EPIC.  Independently interpreting results and communicating results to patient/family/caregiver.  Care coordination.

## 2024-01-22 NOTE — PROGRESS NOTES
[facility] Medicine Daily Progress Note    Date of Service  1/22/2024    Chief Complaint  Dylon Santa is a 63 y.o. male admitted 1/11/2024 with moderate to severe Alzheimer's dementia now with agitation and underlying insulin-dependent type 1-2 diabetes mellitus who was brought to the hospital by his brother who is his primary caretaker.  His brother can no longer take care of him given worsening agitation and behavioral issues over the last 2 months.  Patient was admitted for profound hyperglycemia without evidence of DKA or HHNK.  Patient does not have capacity and his brother Luis Carlos is the DURABLE POWER OF .  Patient has been made DNR/DNI and I have discussed initiating palliative care consult which his brother is agreeable with.    Hospital Course  See above    Interval Problem Update  Patient was seen and examined at bedside.  I have personally reviewed and interpreted vitals, labs, and imaging.    1/16.  Afebrile.  Stable vitals.  On room air.  Refusing a lot of medications.  Patient is lethargic and demented.  Denies any pain.  Reports no needs.  Does not want to be disturbed and only wants to sleep.  1/17.  Afebrile.  Mild hypertension.  On room air.  Patient refused insulin during the day.  Has hyperglycemia last night.  Then became agitated being physical with care aides.  Required restraints and IM Haldol early this morning.  Refused all morning medications.  On exam he really does not address provider.  Denies pain.  Reports no complaints.  He did complete breakfast.  His brother came to bedside.  Patient has been declining medications for years.  His brother states that he is more likely to take medications if there are fewer.  Will focus on medications for blood pressure, dementia, behavioral disturbance.  Spoke with brother about clopidogrel with prior history of coronary disease as well as rivaroxaban for DVT prophylaxis.  Will discontinue at this time.  Minimize blood draws.  Brother is okay  with fingersticks for blood sugar.  1/18.  Afebrile.  Stable vitals.  On room air.  Refused medications.  Increased insulin sliding scale.  Patient is lethargic.  States he is not feeling good.  Later denies pain and reports no needs.  Discussed with social work.  Pending Medicaid.  Has open [employer]. cases.  1/19.  Afebrile.  Stable vitals.  On room air.  Refused a lot of AM meds.  Patient very lethargic.  Shakes his head no and then goes back to sleep.  Does not want to address provider or participate in history taking.  He is more awake and afternoon with family here and reports no needs.  He does have a little cough.  1/20.  Afebrile.  Stable vitals.  On room air.  Patient very lethargic this morning.  He usually wakes up in the afternoon when his brother is here.  Increase Lantus for elevated sugars.  Prematurely eats and takes meds when his family is at bedside.  He had another rough night last night requiring IM Haldol and restraints.  His symptoms are pretty well-controlled if he can take his meds during the day.  Will need placement.  Pending Medicaid and open Workmen's Comp cases.  1/21.  Afebrile.  Episode of tachypnea has resolved.  On room air.  Patient had an episode of agitation last night jumping out of bed trying to pull out his Leslie despite taking his medications yesterday.  He was put in restraints.  Patient very lethargic.  Reports no pain.  Blood sugar still elevated.  Continue to adjust insulin.  Discussed with psychiatry about behavioral disturbances.  Appreciate recommendations  1/22.  Afebrile.  Tachypnea is improved.  On 0-1L NC.  Took most of his meds yesterday.  Blood sugars better controlled.  Lethargic.  Does not want to be bothered.  Pending Medicaid    I have discussed this patient's plan of care and discharge plan at IDT rounds today with Case Management, Nursing, Nursing leadership, and other members of the IDT team.    Consultants/Specialty  Palliative care    Code  Status  DNAR/DNI    Disposition  Medically ClearedUltimate goal is likely GH with hospice    I have placed the appropriate orders for post-discharge needs.    Review of Systems  Review of Systems   Unable to perform ROS: Dementia        Physical Exam  Temp:  [36.3 °C (97.3 °F)-36.7 °C (98 °F)] 36.4 °C (97.5 °F)  Pulse:  [68-88] 68  Resp:  [17] 17  BP: (115-133)/(56-72) 133/72  SpO2:  [90 %-95 %] 95 %    Physical Exam  Vitals and nursing note reviewed.   Constitutional:       General: He is not in acute distress.     Appearance: He is well-developed.      Comments: Laying in bed, appears comfortable  Very sleepy, but does follow some simple commands   HENT:      Head: Normocephalic and atraumatic.      Mouth/Throat:      Pharynx: No oropharyngeal exudate.   Eyes:      General: No scleral icterus.     Pupils: Pupils are equal, round, and reactive to light.   Neck:      Thyroid: No thyromegaly.   Cardiovascular:      Rate and Rhythm: Normal rate and regular rhythm.      Heart sounds: Normal heart sounds. No murmur heard.  Pulmonary:      Effort: Pulmonary effort is normal. No respiratory distress.      Breath sounds: Normal breath sounds. No wheezing.   Abdominal:      General: Bowel sounds are normal. There is no distension.      Palpations: Abdomen is soft.      Tenderness: There is no abdominal tenderness.   Musculoskeletal:         General: No tenderness. Normal range of motion.      Cervical back: Normal range of motion and neck supple.      Right lower leg: No edema.      Left lower leg: No edema.   Skin:     General: Skin is warm and dry.      Findings: No rash.   Neurological:      Mental Status: He is alert.      Comments: A&OX0-1         Fluids    Intake/Output Summary (Last 24 hours) at 1/22/2024 0604  Last data filed at 1/21/2024 1800  Gross per 24 hour   Intake 360 ml   Output 850 ml   Net -490 ml         Laboratory                            Imaging  [id number] (1 VIEW)   Final Result      1.  Hazy  medial right lower lobe opacity which could be due to pneumonitis or atelectasis.      [id number] VIEW   Final Result         Large amount of stool throughout the colon.           Assessment/Plan  * Type 2 diabetes mellitus with hyperglycemia, with long-term current use of insulin (HCC)- (present on admission)  Assessment & Plan  Hyperglycemia has considerably improved  Continue lantus 10 units BID  Continue ISS, adjust PRN  Likely moving towards hospice, so can keep insulin regimen at this time  However, if we want GH without hospice, need to transition to oral DM medications since patient will never be able to self-administer insulin medications  A1c 8.7      ACP (advance care planning)- (present on admission)  Assessment & Plan  Changed to DNR/DNI  Palliative Consult  Luis Carlos, his brother, was primary caretaker and DPOA. He is agreeable to possibly pursuing hospice, would like to discuss further with his family and Palliative Care    Dehydration- (present on admission)  Assessment & Plan  Resolved  Stop IVf's  Monitor sugars and oral intake    Ketosis (HCC)- (present on admission)  Assessment & Plan  Resolved    Generalized weakness- (present on admission)  Assessment & Plan  Unable to care for self  Will need a memory care unit   Working on GH with hospice    Alzheimer disease (HCC)- (present on admission)  Assessment & Plan  Advanced, with agitation   Stop IV valium  Start depakote 125 mg BID and titrate upwards  Continue scheduled seroquel 50 mg BID  Start namenda 5 mg daily and aricept 5 mg daily  Continue to adjust PRN  Continue trazodone 150 mg QPM  Consider starting an SSRI as well    Discussed behavioral disturbances with psychiatry.  Modified PRN Haldol.  Continue to titrate meds.  Monitor need for restraints.    Coronary artery disease due to lipid rich plaque - mild to moderate on Cath 2019 Left Dominant- (present on admission)  Assessment & Plan  Diabetic diet  Continue ARB, BB.  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 In review of yesterday's note (1/21/2024), there are no changes except as documented above.    Greater than 39 minutes spent prepping to see patient (e.g. review of tests) obtaining and/or reviewing separately obtained history. Performing a medically appropriate examination and/ evaluation.  Counseling and educating the patient/family/caregiver.  Ordering medications, tests, or procedures.  Referring and communicating with other health care professionals.  Documenting clinical information in EPIC.  Independently interpreting results and communicating results to patient/family/caregiver.  Care coordination.

## 2024-01-22 NOTE — CARE PLAN
The patient is Stable - Low risk of patient condition declining or worsening    Shift Goals  Clinical Goals: Patient remains safe throughuout shift  Patient Goals: KATIANA  Family Goals: KATIANA    Progress made toward(s) clinical / shift goals:  Pt remained safe and free of falls throughout night. Pt complained of coughing, PRN Tessalon administered, this helped. 1:1 sitter present for safety.     Patient is not progressing towards the following goals:

## 2024-01-22 NOTE — CARE PLAN
The patient is Stable - Low risk of patient condition declining or worsening    Shift Goals  Clinical Goals: Pt safety, free from injuries  Patient Goals: KATIANA  Family Goals: KATIANA    Progress made toward(s) clinical / shift goals:  Pt aox1. Confusion noted. Remains in 1:1 sitter. No behavioral disturbances noted at this time. Refusing care. Refused to take scheduled meds and refused to check blood glucose levels. Call light placed within reach.      Problem: Pain - Standard  Goal: Alleviation of pain or a reduction in pain to the patient’s comfort goal  Outcome: Progressing     Problem: Knowledge Deficit - Standard  Goal: Patient and family/care givers will demonstrate understanding of plan of care, disease process/condition, diagnostic tests and medications  Outcome: Progressing     Problem: Diabetes Management  Goal: Patient will achieve and maintain glucose in satisfactory range  Outcome: Progressing     Problem: Knowledge Deficit - Diabetes  Goal: Patient will demonstrate knowledge of insulin injection, symptoms, and treatment of hypoglycemia and diet prior to discharge  Outcome: Progressing     Problem: Discharge Planning - Diabetes  Goal: Patient's continuum of care needs will be met  Outcome: Progressing     Problem: Skin Integrity - Diabetes  Goal: Patient's skin on legs and feet will remain intact while hospitalized  Outcome: Progressing     Problem: Infection - Diabetes  Goal: Patient will remain free from signs and symptoms of infection  Outcome: Progressing     Problem: Respiratory  Goal: Patient will achieve/maintain optimum respiratory ventilation and gas exchange  Outcome: Progressing     Problem: Fluid Balance or Risk for Fluid Volume Deficit  Goal: Patient will demonstrate adequate hydration and vital signs  Outcome: Progressing     Problem: Fall Risk  Goal: Patient will remain free from falls  Outcome: Progressing       Patient is not progressing towards the following goals:

## 2024-01-22 NOTE — PROGRESS NOTES
Received phone call from younger brother, Lloyd Santa.  Family member was asking updates regarding pt's pending transfer to facility and questions about insurance. Reported to case management. Lloyd Santa provided his contact information [phone]

## 2024-01-23 NOTE — PROGRESS NOTES
Hospital Medicine Daily Progress Note    Date of Service  1/23/2024    Chief Complaint  Dylon Santa is a 63 y.o. male admitted 1/11/2024 with moderate to severe Alzheimer's dementia now with agitation and underlying insulin-dependent type 1-2 diabetes mellitus who was brought to the hospital by his brother who is his primary caretaker.  His brother can no longer take care of him given worsening agitation and behavioral issues over the last 2 months.  Patient was admitted for profound hyperglycemia without evidence of DKA or HHNK.  Patient does not have capacity and his brother Luis Carlos is the DURABLE POWER OF .  Patient has been made DNR/DNI and I have discussed initiating palliative care consult which his brother is agreeable with.    Hospital Course  See above    Interval Problem Update  Patient was seen and examined at bedside.  I have personally reviewed and interpreted vitals, labs, and imaging.      1/23    Lethargy    Afebrile          I have discussed this patient's plan of care and discharge plan at IDT rounds today with Case Management, Nursing, Nursing leadership, and other members of the IDT team.    Consultants/Specialty  Palliative care    Code Status  DNAR/DNI    Disposition    Anticipate discharge to: home with close outpatient follow-up  Ultimate goal is likely GH with hospice    I have placed the appropriate orders for post-discharge needs.    Review of Systems  Review of Systems   Unable to perform ROS: Dementia        Physical Exam  Temp:  [36.2 °C (97.1 °F)-36.4 °C (97.5 °F)] 36.3 °C (97.3 °F)  Pulse:  [61-79] 61  Resp:  [16-17] 17  BP: ()/(53-70) 93/53  SpO2:  [95 %-98 %] 96 %    Physical Exam  Vitals and nursing note reviewed.   Constitutional:       General: He is not in acute distress.     Appearance: He is well-developed.      Comments: In bed    No acute distress   HENT:      Head: Normocephalic and atraumatic.      Mouth/Throat:      Pharynx: No oropharyngeal exudate.    Eyes:      General: No scleral icterus.     Pupils: Pupils are equal, round, and reactive to light.   Neck:      Thyroid: No thyromegaly.   Cardiovascular:      Rate and Rhythm: Normal rate and regular rhythm.      Heart sounds: Normal heart sounds. No murmur heard.  Pulmonary:      Effort: Pulmonary effort is normal. No respiratory distress.      Breath sounds: Normal breath sounds. No wheezing.   Abdominal:      General: Bowel sounds are normal. There is no distension.      Palpations: Abdomen is soft.      Tenderness: There is no abdominal tenderness.   Musculoskeletal:         General: No tenderness. Normal range of motion.      Cervical back: Normal range of motion and neck supple.      Right lower leg: No edema.      Left lower leg: No edema.   Skin:     General: Skin is warm and dry.      Findings: No rash.   Neurological:      Mental Status: He is alert.      Motor: Weakness present.      Comments: A&OX0-1         Fluids    Intake/Output Summary (Last 24 hours) at 1/23/2024 1147  Last data filed at 1/23/2024 0520  Gross per 24 hour   Intake 240 ml   Output 825 ml   Net -585 ml         Laboratory                            Imaging  DX-CHEST-PORTABLE (1 VIEW)   Final Result      1.  Hazy medial right lower lobe opacity which could be due to pneumonitis or atelectasis.      [id number] VIEW   Final Result         Large amount of stool throughout the colon.           Assessment/Plan  * Type 2 diabetes mellitus with hyperglycemia, with long-term current use of insulin (HCC)- (present on admission)  Assessment & Plan  Hyperglycemia has considerably improved  Continue lantus 10 units BID  Continue ISS, adjust PRN  Likely moving towards hospice, so can keep insulin regimen at this time  However, if we want GH without hospice, need to transition to oral DM medications since patient will never be able to self-administer insulin medications  A1c 8.7      ACP (advance care planning)- (present on  admission)  Assessment & Plan  Changed to DNR/DNI  Palliative Consult  Luis Carlos, his brother, was primary caretaker and DPOA. He is agreeable to possibly pursuing hospice, would like to discuss further with his family and Palliative Care    Dehydration- (present on admission)  Assessment & Plan  Resolved  Stop IVf's  Monitor sugars and oral intake    Ketosis (HCC)- (present on admission)  Assessment & Plan  Resolved    Generalized weakness- (present on admission)  Assessment & Plan  Unable to care for self  Will need a memory care unit   Working on  with hospice    Alzheimer disease (HCC)- (present on admission)  Assessment & Plan  Advanced, with agitation   Stop IV valium  depakote 125 mg BID and titrate upwards  Continue scheduled seroquel 50 mg BID  namenda 5 mg daily and aricept 5 mg daily  Continue to adjust PRN  Continue trazodone 150 mg QPM  Consider starting an SSRI as well    Discussed behavioral disturbances with psychiatry.  Modified PRN Haldol.  Continue to titrate meds.  Monitor need for restraints.    Coronary artery disease due to lipid rich plaque - mild to moderate on Cath 2019 Left Dominant- (present on admission)  Assessment & Plan  Diabetic diet  Continue ARB, BB.  Discontinue Plavix    Stage 3a chronic kidney disease (HCC)- (present on admission)  Assessment & Plan  Improving, likely an element of REGULO on CKD Stage III 2/2 DM nephropathy  No AGMA  Monitor    Dyslipidemia- (present on admission)  Assessment & Plan  Diabetic diet for now  Stop statin    Hypertension- (present on admission)  Assessment & Plan  BP is decently controlled  Continue toprol XL 25 mg daily, losartan 100 mg daily  Adjust prn         VTE prophylaxis: Xarelto    I have performed a physical exam and reviewed and updated ROS and Plan today (1/23/2024). In review of yesterday's note (1/22/2024), there are no changes except as documented above.

## 2024-01-23 NOTE — PROGRESS NOTES
Pt became agitated while CNA was trying to take vital signs. Security was called due to patient hitting and kicking staff, IM Haldol was administered.   Non violent restraint order placed by provider for interference with care.

## 2024-01-23 NOTE — PROGRESS NOTES
Pt asleep in bed with unlabored breathing. Pt is less agitated, restraints were removed per protocol

## 2024-01-23 NOTE — CARE PLAN
The patient is Watcher - Medium risk of patient condition declining or worsening    Shift Goals  Clinical Goals: Pt remains free of injuried throughout shift  Patient Goals: KATIANA  Family Goals: KATIANA    Progress made toward(s) clinical / shift goals:  Pt was placed in 3 point restraints per providers order due to hitting and kicking staff as well as being non compliant with care. PRN Haldol was administered with charge RN and security present.     Patient is not progressing towards the following goals:

## 2024-01-23 NOTE — DISCHARGE PLANNING
Case Management Discharge Planning    Admission Date: 1/11/2024  GMLOS: 4  ALOS: 9    6-Clicks ADL Score: 17  6-Clicks Mobility Score: 17  PT and/or OT Eval ordered: Yes  Post-acute Referrals Ordered: Yes  Post-acute Choice Obtained: Yes  Has referral(s) been sent to post-acute provider:  Yes      Anticipated Discharge Dispo: Discharge Disposition: D/T to SNF with Medicare cert in anticipation of skilled care (03)    DME Needed: No    Action(s) Taken: LMSW spoke with brother Luis Carlos. Luis Carlos has placed multiple calls and email to the trust . Luis Carlos called the [place] company who reported that two of the workmans comp cases had been closed in 2018 and 2019. Luis Carlos requested a copy of the closure via email. Once Luis Carlos has that he will let this LMSW know of the closure. The third was under a company that is no longer in business. Luis Carlos to call Medicare at [phone] to double check the closure of the two cases and then to see if he can get the third case closed. Brother Luis Carlos reported that his younger brother Lloyd Santa [phone] can call for information, but that staff does not have to call to give Lloyd updates as Luis Carlos wants to be the main point of contact.     Escalations Completed: None    Medically Clear: No    Next Steps: LMSW to work on finding placement for the pt and submit the Medicaid application.     Barriers to Discharge: Medical clearance and Pending Placement    Is the patient up for discharge tomorrow: No

## 2024-01-23 NOTE — PROGRESS NOTES
"Pt refused all morning medications. Pt stated \"they dont care the importance and want to be left alone to go to bed\"   "

## 2024-01-23 NOTE — CONSULTS
"[facility]  PSYCHIATRIC CONSULTATION - Follow-up  Established Patient    DOS: 01/23/24     Reason for Admission:  63 y.o. male with a past medical history of Alzheimer's dementia, diabetes, primary hypertension, hyperlipidemia and chronic kidney disease who presented 1/11/2024 with inability to take care of self   Legal Hold Status: not applicable    CC:   Chief Complaint   Patient presents with    Other     Pt to triage accompanied by brother, concerned about increasing aggression since yesterday, has hx of dementia. Baseline aa0x1. Per brother who is caregiver will be having a surgery done tomorrow and states that he can't take care of the pt anymore, asking assistance for placement. Pt normally takes quetiapine fumarate for aggression but doesn't seem to help much yesterday.                S:   Patient observed in bed, oriented to self, unable to reorient to place, time situation despite multiple attempts. Limited direct responses to Sx questions. Majority of questions resulted in answering questions with questions or making a fearful response, \"what should I do\" or \"I want to get home.\" States \"they took me,\" when asked how he ended up in the hospital, unable to elaborate about reason. Did not respond appropriately when asked about family in the area, or siblings, stating \"no, what about you?\" Briefly tearful when asked how I could help, patient was unable to articulate an answer. No knowledge of medications, unable to provide additional details about medication being helpful. Did states \"tired\" when asked about mood, but stated \"what about you?\" when asked about sleep. Hx of Zoloft 50mg per documentation.     Per staff, patient has been difficulty to redirect at times, but has had no concerning behavioral outbursts today    O:   Medical ROS (as pertinent):   No results for input(s): \"WBC\", \"RBC\", \"HEMOGLOBIN\", \"HEMATOCRIT\", \"MCV\", \"MCH\", \"RDW\", \"PLATELETCT\", \"MPV\", \"NEUTSPOLYS\", \"LYMPHOCYTES\", " Nutrition Assessment   Assessment Type: Initial assessment  Reason for Visit: Malnutrition Screening Tool  Referral Requested By: Staff/Nurse  Chart Medications Lab Results Reviewed:  yes   Food Allergies: no    Current Diet Order: NPO  Diet Tolerance: N/A    Admission Information and Patient History: 6 day old patient born at 38 weeks and 3 days admitted with lack of bowel movement. Spoke with parents in room. Mom states that she has been breast feeding. Started giving patient Similac 360 total comfort two days ago d/t issues with latching. States patient was taking only 10-30 ml at each feeding 6-7 times per day. Pt currently NPO for LGI tomorrow.     Anthropometrics  Gender: male      Patient Age: 6 days   Using 38w3d GA  Weight: 2.68 kg; (10th - 25th %tile)  Height/Length: 51 cm; (75th - 85th%ile)   Weight for Length: <5th %tile; -3.3 Zscore)  Ideal Body Weight: 3.54 kg    Weight Classification: Underweight    Growth Assessment: Pt weight has decreased by 280 grams since birth. Weight has decreased on average 47 grams per day.     Estimated Nutritional Needs  Assessment Weight: 2.68 kg  Energy Needs: 110-130 kcal/kg  Protein Needs: 1.5-2.0 g/kg  Fluid Needs: 100 ml/kg    Nutrition Diagnosis (PES)  Inadequate PO Intake related to Decreased intake as evidenced by Calculated needs and Poor Weight Gain    Nutrition Plan  Recommended Nutrition Intervention: Other: Breast Milk and Formula  Monitor: Calorie and Protein Intake, Oral Intake and Weight  Discharge Needs: Other: Pending  Care Plan Discussed With: Family/Legal Guardian    Goals: Meet 75 to 100% of estimated needs/all source nutrient intake  Goal Progress: Initiated  Timeframe to Achieve Goal: By Discharge    Nutritional Status: S3    TREATMENT PLAN:   1. Recommend start feeding when medically appropriate. Recommend breast feeding as able and supplementing with Similac 360 Total Care as needed. To meet estimated needs, pt needs approximately 445 ml of breast  "\"MONOCYTES\", \"EOSINOPHILS\", \"BASOPHILS\", \"RBCMORPHOLO\" in the last 72 hours.  No results for input(s): \"SODIUM\", \"POTASSIUM\", \"CHLORIDE\", \"CO2\", \"GLUCOSE\", \"BUN\", \"CPKTOTAL\" in the last 72 hours.  No results for input(s): \"ALBUMIN\", \"TBILIRUBIN\", \"ALKPHOSPHAT\", \"TOTPROTEIN\", \"ALTSGPT\", \"ASTSGOT\", \"CREATININE\" in the last 72 hours.    EKG:   Results for orders placed or performed during the hospital encounter of 24   EKG   Result Value Ref Range    Report       [facility] Emergency Dept.    Test Date:  2024  Pt Name:    JEN JAMISON                   Department: ER  MRN:        [id number]                      Room:       [facility]  Gender:     Male                         Technician: [id number]  :        1960                   Requested By:CORBY MEDINA  Order #:    [id number]                    Reading MD:    Measurements  Intervals                                Axis  Rate:       68                           P:          45  WI:         156                          QRS:        -59  QRSD:       90                           T:          101  QT:         460  QTc:        490    Interpretive Statements  Sinus rhythm  Left anterior fascicular block  Abnormal R-wave progression, early transition  Abnormal T, consider ischemia, lateral leads  Borderline ST elevation, anterolateral leads  Artifact in lead(s) II,aVF,V2  Compared to ECG 10/21/2019 05:31:53  Left anterior fascicular block now present  T-wave abnormality now pr esent  Possible ischemia now present  ST (T wave) deviation now present  Left-axis deviation no longer present          MSE:   BP 93/53   Pulse 61   Temp 36.3 °C (97.3 °F) (Temporal)   Resp 17   Ht 1.727 m (5' 8\")   Wt 68.3 kg (150 lb 9.2 oz)   SpO2 96%     Constitutional: as noted above  General Appearance/Behavior: 63 y.o. appears normal habitus poor eye contact not able to participate meaningfully, Poor impulse control  Abnormal Movements: none, no PMA/PMR or " milk per day. Provides 111 kcal/kg, 1.5 g/kg protein, and 166 ml/kg fluid.     2. If formula needed for all nutrition recommend Similac 360 Total Care 445 ml per day. Provides 112 kcal/kg, 2.2 g/kg protein, and 166 ml/kg fluid.     3. Daily weights. Goal weight gain of 20-30 grams per day.     4. Recommend outpatient RD follow-up.     Cassia Peres RD  Clinical Dietitian     "tremor observed.  Gait and Posture: not observed  Musculoskeletal: as noted above  Mood: \"tired\"  Affect: Inappropriate   Speech: normal rate, normal rhythm, normal tone, normal volume, and normal fluency  Language:  spontaneous   Thought Process: KATIANA  Thought Content: KATIANA  Insight/Judgement:  unable to assess/unable to assess  Alert/Orientation: alert, only oriented to:, person  Attn/Concentration: not tested  MMSE: deferred this visit     Medications:  Scheduled Medications   Medication Dose Frequency    insulin GLARGINE  15 Units BID    insulin regular  3-14 Units 4X/DAY ACHS    tamsulosin  0.4 mg AFTER BREAKFAST    valproic acid  125 mg BID    donepezil  5 mg Q EVENING    memantine  5 mg DAILY    losartan  100 mg DAILY    metoprolol SR  25 mg DAILY    QUEtiapine  50 mg BID    traZODone  150 mg Nightly    polyethylene glycol/lytes  1 Packet DAILY       Allergies:   No Known Allergies     Assessment:   Diagnosis:   1. Alzheimer's disease (HCC) Active   2. Type 1 diabetes mellitus with other specified complication (HCC) Active   3. Constipation, unspecified constipation type    4. Hyperglycemia         Psychiatric:   Dementia with behavioral disturbances    Medical: as noted by the medical treatment team.      Recommendations:  Legal Status: not applicable     Observation status:   - Line of site with sitter  - Telesitter  - No sitter needed    Discussed/voalted: Bruce KHAN    Medication Recommendations: Final orders as per Treatment Team  Consider start Zoloft 25mg PO QAM to start 1/24  Consider gabapentin 100mg PO BID for anxiety   Continue Depakote 125mg BID, donepezil 5mg QHS, memantine 5mg QAM, Seroquel 50mg BID, Trazodone 150mg QHS    Reviewed safety plan: 911, ER, PCM, MHC, suicide crisis line, nursing staff while inpatient.    Will Continue to Follow. Thank you for the consult.       Discharge recommendations:   Does not meet IP MH criteria, could benefit from group home placement  "

## 2024-01-24 NOTE — DISCHARGE PLANNING
"Case Management Discharge Planning    Admission Date: 1/11/2024  GMLOS: 4  ALOS: 10    6-Clicks ADL Score: 17  6-Clicks Mobility Score: 17  PT and/or OT Eval ordered: Yes  Post-acute Referrals Ordered: Yes  Post-acute Choice Obtained: Yes  Has referral(s) been sent to post-acute provider:  Yes      Anticipated Discharge Dispo: Discharge Disposition: D/T to SNF with Medicare cert in anticipation of skilled care (03)    DME Needed: No    Action(s) Taken: Per DPA and [facility]: \"SCP is willing to pay for 1 month at [facility].   is the POA and they will need a copy faxed over.  [facility] will need a copy of the Medicaid [place] and needs to know if the pt will qualify for Medicaid.  SCP will need to be cancelled and pt needs straight Medicare.  [facility] can accept the pt the first of February if above is finished before then.\" With this information, LMSW to reach out to brother Luis Carlos.     Escalations Completed: None    Medically Clear: No    Next Steps: LMSW to reach out to brother Luis Carlos with information and see if he wants to proceed with [facility].     Barriers to Discharge: Pending Placement    Is the patient up for discharge tomorrow: No       "

## 2024-01-24 NOTE — PROGRESS NOTES
Received report from day shift nurse. Patient oriented to self only. Patient denies any pain at this time. Patient follows commands. Bed is locked and in lowest position. Bed alarm is on.    Patient agitated. Patient tried to get out of bed, Patient trying to hit staff and spitting at staff, security called. Patient verbally abusive towards staff, PRN medication given.      Patient placed on soft wrist restraints and right lower extremity restraint

## 2024-01-24 NOTE — PROGRESS NOTES
Hospital Medicine Daily Progress Note    Date of Service  1/24/2024    Chief Complaint  Dylon Santa is a 63 y.o. male admitted 1/11/2024 with moderate to severe Alzheimer's dementia now with agitation and underlying insulin-dependent type 1-2 diabetes mellitus who was brought to the hospital by his brother who is his primary caretaker.  His brother can no longer take care of him given worsening agitation and behavioral issues over the last 2 months.  Patient was admitted for profound hyperglycemia without evidence of DKA or HHNK.  Patient does not have capacity and his brother Luis Carlos is the DURABLE POWER OF .  Patient has been made DNR/DNI and I have discussed initiating palliative care consult which his brother is agreeable with.    Hospital Course  See above    Interval Problem Update  Patient was seen and examined at bedside.  I have personally reviewed and interpreted vitals, labs, and imaging.      1/24    In bed    Soft Restraints initiated due to agitation , in the early am    Case d/w psych team    Recs for zoloft nd clonidine-initiated        I have discussed this patient's plan of care and discharge plan at IDT rounds today with Case Management, Nursing, Nursing leadership, and other members of the IDT team.    Consultants/Specialty  Palliative care    Code Status  DNAR/DNI    Disposition  Medically ClearedUltimate goal is likely GH with hospice    I have placed the appropriate orders for post-discharge needs.    Review of Systems  Review of Systems   Unable to perform ROS: Dementia        Physical Exam  Temp:  [36 °C (96.8 °F)-36.3 °C (97.4 °F)] 36.3 °C (97.4 °F)  Pulse:  [64-71] 66  Resp:  [18] 18  BP: (121-137)/(63-76) 136/63  SpO2:  [93 %-98 %] 98 %    Physical Exam  Vitals and nursing note reviewed.   Constitutional:       General: He is not in acute distress.     Appearance: He is well-developed.      Comments: In bed    No acute distress   HENT:      Head: Normocephalic and atraumatic.       Mouth/Throat:      Pharynx: No oropharyngeal exudate.   Eyes:      General: No scleral icterus.     Pupils: Pupils are equal, round, and reactive to light.   Neck:      Thyroid: No thyromegaly.   Cardiovascular:      Rate and Rhythm: Normal rate and regular rhythm.      Heart sounds: Normal heart sounds. No murmur heard.  Pulmonary:      Effort: Pulmonary effort is normal. No respiratory distress.      Breath sounds: Normal breath sounds. No wheezing.   Abdominal:      General: Bowel sounds are normal. There is no distension.      Palpations: Abdomen is soft.      Tenderness: There is no abdominal tenderness.   Musculoskeletal:         General: No tenderness. Normal range of motion.      Cervical back: Normal range of motion and neck supple.      Right lower leg: No edema.      Left lower leg: No edema.   Skin:     General: Skin is warm and dry.      Findings: No rash.   Neurological:      Mental Status: He is alert.      Motor: Weakness present.      Comments: A&OX0-1         Fluids    Intake/Output Summary (Last 24 hours) at 1/24/2024 1428  Last data filed at 1/24/2024 1100  Gross per 24 hour   Intake 340 ml   Output 900 ml   Net -560 ml         Laboratory                            Imaging  DX-CHEST-PORTABLE (1 VIEW)   Final Result      1.  Hazy medial right lower lobe opacity which could be due to pneumonitis or atelectasis.      [id number] VIEW   Final Result         Large amount of stool throughout the colon.           Assessment/Plan  * Type 2 diabetes mellitus with hyperglycemia, with long-term current use of insulin (HCC)- (present on admission)  Assessment & Plan  Hyperglycemia has considerably improved  Continue lantus 10 units BID  Continue ISS, adjust PRN  Likely moving towards hospice, so can keep insulin regimen at this time  However, if we want GH without hospice, need to transition to oral DM medications since patient will never be able to self-administer insulin medications  A1c 8.7      ACP  (advance care planning)- (present on admission)  Assessment & Plan  Changed to DNR/DNI  Palliative Consult  Luis Carlos, his brother, was primary caretaker and DPOA. He is agreeable to possibly pursuing hospice, would like to discuss further with his family and Palliative Care    Dehydration- (present on admission)  Assessment & Plan  Resolved  Stop IVf's  Monitor sugars and oral intake    Ketosis (HCC)- (present on admission)  Assessment & Plan  Resolved    Generalized weakness- (present on admission)  Assessment & Plan  Unable to care for self  Will need a memory care unit   Working on  with hospice    Alzheimer disease (HCC)- (present on admission)  Assessment & Plan  Advanced, with agitation   Stop IV valium  depakote 125 mg BID   Continue scheduled seroquel 50 mg BID  namenda 5 mg daily and aricept 5 mg daily  Continue to adjust PRN  Continue trazodone 150 mg QPM  Po zoloft          Discussed behavioral disturbances with psychiatry.      Coronary artery disease due to lipid rich plaque - mild to moderate on Cath 2019 Left Dominant- (present on admission)  Assessment & Plan  Diabetic diet  Continue ARB, BB.      Stage 3a chronic kidney disease (HCC)- (present on admission)  Assessment & Plan  Improving, likely an element of REGULO on CKD Stage III 2/2 DM nephropathy  No AGMA  Monitor    Dyslipidemia- (present on admission)  Assessment & Plan  Diabetic diet for now      Hypertension- (present on admission)  Assessment & Plan  BP is decently controlled  Continue toprol XL 25 mg daily, losartan 100 mg daily  Adjust prn         VTE prophylaxis: Xarelto    I have performed a physical exam and reviewed and updated ROS and Plan today (1/24/2024). In review of yesterday's note (1/23/2024), there are no changes except as documented above.

## 2024-01-24 NOTE — CARE PLAN
The patient is Watcher - Medium risk of patient condition declining or worsening    Shift Goals  Clinical Goals: pt will remain agitation free throughout the shift  Patient Goals: KATIANA  Family Goals: KATIANA    Progress made toward(s) clinical / shift goals:  Patient verbally abusive towards staff at times. Patient does not need any PRN medication. Patient can be easily reoriented at times. Frequent reorientation is needed.   Patient agitated, patient agitated, patient trying to hit staff, patient spitting at staff.PRN medication needed. Security called.     Patient is not progressing towards the following goals:      Problem: Knowledge Deficit - Standard  Goal: Patient and family/care givers will demonstrate understanding of plan of care, disease process/condition, diagnostic tests and medications  Outcome: Not Progressing     Problem: Diabetes Management  Goal: Patient will achieve and maintain glucose in satisfactory range  Outcome: Not Progressing     Problem: Knowledge Deficit - Diabetes  Goal: Patient will demonstrate knowledge of insulin injection, symptoms, and treatment of hypoglycemia and diet prior to discharge  Outcome: Not Progressing   Patient refused dinner blood sugar check and insulin coverage. Night time blood sugar was high at 388. Insulin given per sliding scale.

## 2024-01-24 NOTE — CARE PLAN
The patient is Stable - Low risk of patient condition declining or worsening    Shift Goals  Clinical Goals: pt will be free from injury and calm throughout shift  Patient Goals: KATIANA  Family Goals: KATIANA    Progress made toward(s) clinical / shift goals:  Pt aox1. Remains under 1:1 sitter for safety. No aggressive behaviors noted at this time. Blood sugar monitoring done. Needs met. Call light within reach.      Problem: Pain - Standard  Goal: Alleviation of pain or a reduction in pain to the patient’s comfort goal  Outcome: Progressing     Problem: Knowledge Deficit - Standard  Goal: Patient and family/care givers will demonstrate understanding of plan of care, disease process/condition, diagnostic tests and medications  Outcome: Progressing     Problem: Diabetes Management  Goal: Patient will achieve and maintain glucose in satisfactory range  Outcome: Progressing     Problem: Knowledge Deficit - Diabetes  Goal: Patient will demonstrate knowledge of insulin injection, symptoms, and treatment of hypoglycemia and diet prior to discharge  Outcome: Progressing     Problem: Discharge Planning - Diabetes  Goal: Patient's continuum of care needs will be met  Outcome: Progressing     Problem: Skin Integrity - Diabetes  Goal: Patient's skin on legs and feet will remain intact while hospitalized  Outcome: Progressing     Problem: Infection - Diabetes  Goal: Patient will remain free from signs and symptoms of infection  Outcome: Met     Problem: Respiratory  Goal: Patient will achieve/maintain optimum respiratory ventilation and gas exchange  Outcome: Met     Problem: Fluid Balance or Risk for Fluid Volume Deficit  Goal: Patient will demonstrate adequate hydration and vital signs  Outcome: Progressing     Problem: Nutrition Deficit - Diabetes  Goal: Patient will demonstrate adequate hydration and vital signs  Outcome: Progressing     Problem: Diabetic Ulcer  Goal: Early identification of diabetic foot wound and initiation of  appropriate interventions  Outcome: Progressing     Problem: Fall Risk  Goal: Patient will remain free from falls  Outcome: Progressing       Patient is not progressing towards the following goals:

## 2024-01-24 NOTE — DISCHARGE PLANNING
@[id number]  Agency/Facility Name: [facility]  Spoke To: Kaitlyn  Outcome: [facility] is willing to pay for 1 month at [facility].  Brother is the POA and they will need a copy faxed over.  [facility] will need a copy of the Medicaid [place] and needs to know if the pt will qualify for Medicaid.  SCP will need to be cancelled and pt needs straight Medicare.  Kaitlyn can accept the pt the first of February if above is finished before then.    PAUL Churchill notified via Teams.

## 2024-01-24 NOTE — CONSULTS
"[facility]  PSYCHIATRIC CONSULTATION - Follow-up  Established Patient    DOS: 01/24/24     Reason for Admission:  63 y.o. male with a past medical history of Alzheimer's dementia, diabetes, primary hypertension, hyperlipidemia and chronic kidney disease who presented 1/11/2024 with inability to take care of self   Legal Hold Status: not applicable    CC:   Chief Complaint   Patient presents with    Other     Pt to triage accompanied by brother, concerned about increasing aggression since yesterday, has hx of dementia. Baseline aa0x1. Per brother who is caregiver will be having a surgery done tomorrow and states that he can't take care of the pt anymore, asking assistance for placement. Pt normally takes quetiapine fumarate for aggression but doesn't seem to help much yesterday.                S:   Patient observed, resting, did not wake for prolonged periods; no overt change in condition from previous encounter. Per documentation, patient verbally and physically aggressive during HS shift, resulting on security being called and patient being place in soft restraints, observed in place during encounter.     O:   Medical ROS (as pertinent):   No results for input(s): \"WBC\", \"RBC\", \"HEMOGLOBIN\", \"HEMATOCRIT\", \"MCV\", \"MCH\", \"RDW\", \"PLATELETCT\", \"MPV\", \"NEUTSPOLYS\", \"LYMPHOCYTES\", \"MONOCYTES\", \"EOSINOPHILS\", \"BASOPHILS\", \"RBCMORPHOLO\" in the last 72 hours.  No results for input(s): \"SODIUM\", \"POTASSIUM\", \"CHLORIDE\", \"CO2\", \"GLUCOSE\", \"BUN\", \"CPKTOTAL\" in the last 72 hours.  No results for input(s): \"ALBUMIN\", \"TBILIRUBIN\", \"ALKPHOSPHAT\", \"TOTPROTEIN\", \"ALTSGPT\", \"ASTSGOT\", \"CREATININE\" in the last 72 hours.    EKG:   Results for orders placed or performed during the hospital encounter of 01/11/24   EKG   Result Value Ref Range    Report       [facility] Emergency Dept.    Test Date:  2024-01-11  Pt Name:    JEN JAMISON                   Department: ER  MRN:        [id number]               " "       Room:       [facility]  Gender:     Male                         Technician: [id number]  :        1960                   Requested By:CORBY MEDINA  Order #:    [id number]                    Reading MD:    Measurements  Intervals                                Axis  Rate:       68                           P:          45  DE:         156                          QRS:        -59  QRSD:       90                           T:          101  QT:         460  QTc:        490    Interpretive Statements  Sinus rhythm  Left anterior fascicular block  Abnormal R-wave progression, early transition  Abnormal T, consider ischemia, lateral leads  Borderline ST elevation, anterolateral leads  Artifact in lead(s) II,aVF,V2  Compared to ECG 10/21/2019 05:31:53  Left anterior fascicular block now present  T-wave abnormality now pr esent  Possible ischemia now present  ST (T wave) deviation now present  Left-axis deviation no longer present          MSE:   /63   Pulse 66   Temp 36.3 °C (97.4 °F) (Temporal)   Resp 18   Ht 1.727 m (5' 8\")   Wt 68.3 kg (150 lb 9.2 oz)   SpO2 98%     Constitutional: as noted above  General Appearance/Behavior: 63 y.o. appears normal habitus poor eye contact not able to participate meaningfully, Poor impulse control  Abnormal Movements: none, no PMA/PMR or tremor observed.  Gait and Posture: not observed  Musculoskeletal: as noted above  Mood: did not answer directly   Affect: Flat   Speech: quiet  Language:  spontaneous   Thought Process: KATIANA  Thought Content: KATIANA  Insight/Judgement:  unable to assess/unable to assess  Alert/Orientation: drowsy  Attn/Concentration: not tested  MMSE: deferred this visit     Medications:  Scheduled Medications   Medication Dose Frequency    sertraline  25 mg DAILY    insulin GLARGINE  15 Units BID    insulin regular  3-14 Units 4X/DAY ACHS    tamsulosin  0.4 mg AFTER BREAKFAST    valproic acid  125 mg BID    donepezil  5 mg Q EVENING    memantine  5 " mg DAILY    losartan  100 mg DAILY    metoprolol SR  25 mg DAILY    QUEtiapine  50 mg BID    traZODone  150 mg Nightly    polyethylene glycol/lytes  1 Packet DAILY       Allergies:   No Known Allergies     Assessment:   Diagnosis:   1. Alzheimer's disease (HCC) Active   2. Type 1 diabetes mellitus with other specified complication (HCC) Active   3. Constipation, unspecified constipation type    4. Hyperglycemia         Psychiatric:   Dementia with behavioral disturbances     Medical: as noted by the medical treatment team.      Recommendations:  Legal Status: not applicable      Observation status:   - Line of site with sitter     Discussed/voalted: Bruce KHAN; DESMOND Oliva MD     Medication Recommendations: Final orders as per Treatment Team  Continue Zoloft 25mg QAM, Depakote 125mg BID, donepezil 5mg QHS, memantine 5mg QAM, Seroquel 50mg BID, Trazodone 150mg QHS  Consider clonidine 0.1mg daily    Reviewed safety plan: 911, ER, PCM, MHC, suicide crisis line, nursing staff while inpatient.     Will Continue to Follow. Thank you for the consult.        Discharge recommendations:   Does not meet IP MH criteria, could benefit from group home placement

## 2024-01-25 NOTE — CARE PLAN
The patient is Watcher - Medium risk of patient condition declining or worsening    Shift Goals  Clinical Goals: pt safety throughout shift, restraint management  Patient Goals: KATIANA  Family Goals: KATIANA    Progress made toward(s) clinical / shift goals:  Pt aox1. Confusion and agitation noted. Reoriented. Scheduled meds given but refused blood glucose monitoring. Remains in non-violent restraints, q2 charting, offered food,fluids, repositioning, and toileting needs. No injuries noted. Needs met. Will continue to monitor.        Problem: Knowledge Deficit - Standard  Goal: Patient and family/care givers will demonstrate understanding of plan of care, disease process/condition, diagnostic tests and medications  Outcome: Progressing     Problem: Diabetes Management  Goal: Patient will achieve and maintain glucose in satisfactory range  Outcome: Progressing        Problem: Skin Integrity  Goal: Skin integrity is maintained or improved  Outcome: Progressing      Problem: Fall Risk  Goal: Patient will remain free from falls  Outcome: Progressing                      Patient is not progressing towards the following goals:

## 2024-01-25 NOTE — CONSULTS
"[facility]  PSYCHIATRIC CONSULTATION - Follow-up  Established Patient    DOS: 01/25/24     Reason for Admission:  63 y.o. male with a past medical history of Alzheimer's dementia, diabetes, primary hypertension, hyperlipidemia and chronic kidney disease who presented 1/11/2024 with inability to take care of self   Legal Hold Status: not applicable    CC:   Chief Complaint   Patient presents with    Other     Pt to triage accompanied by brother, concerned about increasing aggression since yesterday, has hx of dementia. Baseline aa0x1. Per brother who is caregiver will be having a surgery done tomorrow and states that he can't take care of the pt anymore, asking assistance for placement. Pt normally takes quetiapine fumarate for aggression but doesn't seem to help much yesterday.                S:   Patient observed, awake, increased purposeless movement noted, remains in restraints following previous negative interaction; no overt change in condition from previous encounter. Patient unable to engage meaningfully during encounter. Continues to vocalize a desire to \"go home,\" require frequent verbal redirection.    O:   Medical ROS (as pertinent):   No results for input(s): \"WBC\", \"RBC\", \"HEMOGLOBIN\", \"HEMATOCRIT\", \"MCV\", \"MCH\", \"RDW\", \"PLATELETCT\", \"MPV\", \"NEUTSPOLYS\", \"LYMPHOCYTES\", \"MONOCYTES\", \"EOSINOPHILS\", \"BASOPHILS\", \"RBCMORPHOLO\" in the last 72 hours.  No results for input(s): \"SODIUM\", \"POTASSIUM\", \"CHLORIDE\", \"CO2\", \"GLUCOSE\", \"BUN\", \"CPKTOTAL\" in the last 72 hours.  No results for input(s): \"ALBUMIN\", \"TBILIRUBIN\", \"ALKPHOSPHAT\", \"TOTPROTEIN\", \"ALTSGPT\", \"ASTSGOT\", \"CREATININE\" in the last 72 hours.    EKG:   Results for orders placed or performed during the hospital encounter of 01/11/24   EKG   Result Value Ref Range    Report       [facility] Emergency Dept.    Test Date:  2024-01-11  Pt Name:    JEN JOSE GUADALUPENOMI                   Department: ER  MRN:        [id number]         " "             Room:       [facility]  Gender:     Male                         Technician: [id number]  :        1960                   Requested By:CORBY MEDINA  Order #:    [id number]                    Reading MD:    Measurements  Intervals                                Axis  Rate:       68                           P:          45  KS:         156                          QRS:        -59  QRSD:       90                           T:          101  QT:         460  QTc:        490    Interpretive Statements  Sinus rhythm  Left anterior fascicular block  Abnormal R-wave progression, early transition  Abnormal T, consider ischemia, lateral leads  Borderline ST elevation, anterolateral leads  Artifact in lead(s) II,aVF,V2  Compared to ECG 10/21/2019 05:31:53  Left anterior fascicular block now present  T-wave abnormality now pr esent  Possible ischemia now present  ST (T wave) deviation now present  Left-axis deviation no longer present          MSE:   /67   Pulse 72   Temp 36.3 °C (97.3 °F) (Temporal)   Resp 16   Ht 1.727 m (5' 8\")   Wt 68.3 kg (150 lb 9.2 oz)   SpO2 94%     Constitutional: as noted above  General Appearance/Behavior: 63 y.o. appears normal habitus poor eye contact not able to participate meaningfully, Poor impulse control  Abnormal Movements: none, no PMA/PMR or tremor observed.  Gait and Posture: not observed  Musculoskeletal: as noted above  Mood: \"fine\"   Affect: Inappropriate   Speech: normal rate, normal rhythm, normal tone, normal volume, and normal fluency  Language:  spontaneous   Thought Process: KATIANA  Thought Content: KATIANA  Insight/Judgement:  unable to assess/unable to assess  Alert/Orientation: Alert  Attn/Concentration: not tested  MMSE: deferred this visit     Medications:  Scheduled Medications   Medication Dose Frequency    sertraline  25 mg DAILY    cloNIDine  0.1 mg DAILY AT 1800    insulin GLARGINE  15 Units BID    insulin regular  3-14 Units 4X/DAY ACHS    " tamsulosin  0.4 mg AFTER BREAKFAST    valproic acid  125 mg BID    donepezil  5 mg Q EVENING    memantine  5 mg DAILY    losartan  100 mg DAILY    metoprolol SR  25 mg DAILY    QUEtiapine  50 mg BID    traZODone  150 mg Nightly    polyethylene glycol/lytes  1 Packet DAILY       Allergies:   No Known Allergies     Assessment:   Diagnosis:   1. Alzheimer's disease (HCC) Active   2. Type 1 diabetes mellitus with other specified complication (HCC) Active   3. Constipation, unspecified constipation type    4. Hyperglycemia         Psychiatric:   Dementia with behavioral disturbances     Medical: as noted by the medical treatment team.      Recommendations:  Legal Status: not applicable      Observation status:   - Line of site with sitter     Medication Recommendations: Final orders as per Treatment Team  Continue Zoloft 25mg QAM, Depakote 125mg BID, donepezil 5mg QHS, memantine 5mg QAM, Seroquel 50mg BID, Trazodone 150mg QHS, clonidine 0.1mg daily     Reviewed safety plan: 911, ER, PCM, MHC, suicide crisis line, nursing staff while inpatient.     Will Continue to Follow. Thank you for the consult.        Discharge recommendations:   Does not meet IP MH criteria, could benefit from group home placement.

## 2024-01-25 NOTE — PROGRESS NOTES
Hospital Medicine Daily Progress Note    Date of Service  1/25/2024    Chief Complaint  Dylon Santa is a 63 y.o. male admitted 1/11/2024 with moderate to severe Alzheimer's dementia now with agitation and underlying insulin-dependent type 1-2 diabetes mellitus who was brought to the hospital by his brother who is his primary caretaker.  His brother can no longer take care of him given worsening agitation and behavioral issues over the last 2 months.  Patient was admitted for profound hyperglycemia without evidence of DKA or HHNK.  Patient does not have capacity and his brother Luis Carlos is the DURABLE POWER OF .  Patient has been made DNR/DNI and I have discussed initiating palliative care consult which his brother is agreeable with.    Hospital Course  See above    Interval Problem Update  Patient was seen and examined at bedside.  I have personally reviewed and interpreted vitals, labs, and imaging.      1/24    In bed    Soft Restraints initiated due to agitation , in the early am    Case d/w psych team    Recs for zoloft nd clonidine-initiated    1/25    Remains in restraints    He is awake and confused    I have discussed this patient's plan of care and discharge plan at IDT rounds today with Case Management, Nursing, Nursing leadership, and other members of the IDT team.    Consultants/Specialty  Palliative care    Code Status  DNAR/DNI    Disposition  The patient is not medically cleared for discharge to home or a post-acute facility.    Ultimate goal is likely [facility] with hospice    I have placed the appropriate orders for post-discharge needs.    Review of Systems  Review of Systems   Unable to perform ROS: Dementia        Physical Exam  Temp:  [35.9 °C (96.6 °F)-36.3 °C (97.3 °F)] 36.3 °C (97.3 °F)  Pulse:  [62-72] 72  Resp:  [16-19] 16  BP: ()/(53-71) 131/67  SpO2:  [94 %-97 %] 94 %    Physical Exam  Vitals and nursing note reviewed.   Constitutional:       General: He is not in acute distress.      Appearance: He is well-developed.      Comments: In bed    No acute distress   HENT:      Head: Normocephalic and atraumatic.      Mouth/Throat:      Pharynx: No oropharyngeal exudate.   Eyes:      General: No scleral icterus.     Pupils: Pupils are equal, round, and reactive to light.   Neck:      Thyroid: No thyromegaly.   Cardiovascular:      Rate and Rhythm: Normal rate and regular rhythm.      Heart sounds: Normal heart sounds. No murmur heard.  Pulmonary:      Effort: Pulmonary effort is normal. No respiratory distress.      Breath sounds: Normal breath sounds. No wheezing.   Abdominal:      General: Bowel sounds are normal. There is no distension.      Palpations: Abdomen is soft.      Tenderness: There is no abdominal tenderness.   Musculoskeletal:         General: No tenderness. Normal range of motion.      Cervical back: Normal range of motion and neck supple.      Right lower leg: No edema.      Left lower leg: No edema.   Skin:     General: Skin is warm and dry.      Findings: No rash.   Neurological:      Mental Status: He is alert.      Motor: Weakness present.      Comments: A&OX0-1         Fluids    Intake/Output Summary (Last 24 hours) at 1/25/2024 1432  Last data filed at 1/25/2024 0900  Gross per 24 hour   Intake 120 ml   Output 600 ml   Net -480 ml         Laboratory                            Imaging  DX-CHEST-PORTABLE (1 VIEW)   Final Result      1.  Hazy medial right lower lobe opacity which could be due to pneumonitis or atelectasis.      [id number] VIEW   Final Result         Large amount of stool throughout the colon.           Assessment/Plan  * Type 2 diabetes mellitus with hyperglycemia, with long-term current use of insulin (HCC)- (present on admission)  Assessment & Plan  Hyperglycemia has considerably improved  Continue lantus 10 units BID  Continue ISS, adjust PRN  Likely moving towards hospice, so can keep insulin regimen at this time  However, if we want GH without hospice,  need to transition to oral DM medications since patient will never be able to self-administer insulin medications  A1c 8.7      ACP (advance care planning)- (present on admission)  Assessment & Plan  Changed to DNR/DNI  Palliative Consult  Luis Carlos, his brother, was primary caretaker and DPOA. He is agreeable to possibly pursuing hospice, would like to discuss further with his family and Palliative Care    Dehydration- (present on admission)  Assessment & Plan  Resolved  Stop IVf's  Monitor sugars and oral intake    Ketosis (HCC)- (present on admission)  Assessment & Plan  Resolved    Generalized weakness- (present on admission)  Assessment & Plan  Unable to care for self  Will need a memory care unit   Working on  with hospice    Alzheimer disease (HCC)- (present on admission)  Assessment & Plan  Advanced, with agitation   Stop IV valium  depakote 125 mg BID   Continue scheduled seroquel 50 mg BID  namenda 5 mg daily and aricept 5 mg daily  Continue to adjust PRN  Continue trazodone 150 mg QPM  Po zoloft  Po clonidine          Discussed behavioral disturbances with psychiatry.      Coronary artery disease due to lipid rich plaque - mild to moderate on Cath 2019 Left Dominant- (present on admission)  Assessment & Plan  Diabetic diet  Continue ARB, BB.      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 In review of yesterday's note (1/24/2024), there are no changes except as documented above.

## 2024-01-25 NOTE — DISCHARGE PLANNING
Case Management Discharge Planning    Admission Date: 1/11/2024  GMLOS: 4  ALOS: 11    6-Clicks ADL Score: 17  6-Clicks Mobility Score: 17  PT and/or OT Eval ordered: Yes  Post-acute Referrals Ordered: Yes  Post-acute Choice Obtained: Yes  Has referral(s) been sent to post-acute provider:  Yes      Anticipated Discharge Dispo: Discharge Disposition: D/T to SNF with Medicare cert in anticipation of skilled care (03)    DME Needed: No    Action(s) Taken: LMSW spoke with PFA at [id number]. PFA requesting for LMSW to resend request in the PFA chat for Medicaid Screening and put all information that LMSW has regarding screening process. LMSW placed note and request to PFA.     Addendum @ 1202: Per PFA, pt is excess income for Medicaid and will need to wait for the 30 day tierney for institutionalized Medicaid to be initiated. Pt is day 11 so pt will need 19 more days in the hospital to reach that thirty day requirement. Pt is not [facility] for placement at this time due to behaviors and restraints. Group Home placement will not be viable with the pt's insulin requirements along with behavioral needs. LTC options per brother Luis Carlos are [place] and [place].     Barriers: Insurance, medications, behaviors, placement.      Escalations Completed: None    Medically Clear: Yes    Next Steps: LMSW to follow for Medicaid Screening, placement, and DC plan.     Barriers to Discharge: Pending Placement    Is the patient up for discharge tomorrow: No

## 2024-01-25 NOTE — CARE PLAN
The patient is Stable - Low risk of patient condition declining or worsening    Shift Goals  Clinical Goals: pt will remain safe during this shift  Patient Goals: KATIANA  Family Goals: KATIANA    Progress made toward(s) clinical / shift goals:  Patient remains on restraint during the shift. Patient still verbally abusive towards staff.     Patient is not progressing towards the following goals:      Problem: Knowledge Deficit - Standard  Goal: Patient and family/care givers will demonstrate understanding of plan of care, disease process/condition, diagnostic tests and medications  1/25/2024 0030 by Ally Elkins, R.N.  Outcome: Not Progressing  1/25/2024 0029 by Ally Elkins, R.N.  Outcome: Not Progressing     Patient has to be reoriented, patient refuses some of his medication.     Problem: Safety - Medical Restraint  Goal: Free from restraint(s) (Restraint for Interference with Medical Device)  Outcome: Not Progressing   Patient still verbally abusive towards staff, patient also tries to interfere with care.

## 2024-01-25 NOTE — PROGRESS NOTES
Received report from day shift nurse. Patient oriented to self only. Patient denies any pain at this time. Patient on soft wrist restraints and left lower extremity restraint, patient verbally abusive and refuses night time trazodone dose.     Patient bladder scanned, bladder scan results were 776, on call hospitalist notified, galarza placed    0222 patient verbally abusive towards staff, patient trying to slide out of bed even with restraints in place, when trying to reorient patient and ask patient to lay back to bed patient screams and continues to slide down the bed. Security contacted. PRN medication given.     Patient remains verbally abusive towards staff, patient still tries to slide out of bed. On call hospitalist contacted, vest restraint placed

## 2024-01-25 NOTE — PROGRESS NOTES
Received report from off-going nurse.   Assumed pt care at change of shift.   Assessment completed.   Pt is A&Ox 1, pt refused vital signs at this time.  Denies pain, no apparent signs of discomfort.   Bed is in low and locked position, call light and belongings in reach, reminded to use call light, bed alarm on. Restraints in place as ordered.  No needs at this time.

## 2024-01-26 PROBLEM — R33.9 URINARY RETENTION: Status: ACTIVE | Noted: 2024-01-01

## 2024-01-26 NOTE — PROGRESS NOTES
Hospital Medicine Daily Progress Note    Date of Service  1/26/2024    Chief Complaint  Dylon Santa is a 63 y.o. male admitted 1/11/2024 with moderate to severe Alzheimer's dementia now with agitation and underlying insulin-dependent type 1-2 diabetes mellitus who was brought to the hospital by his brother who is his primary caretaker.  His brother can no longer take care of him given worsening agitation and behavioral issues over the last 2 months.  Patient was admitted for profound hyperglycemia without evidence of DKA or HHNK.  Patient does not have capacity and his brother Luis Carlos is the DURABLE POWER OF .  Patient has been made DNR/DNI and I have discussed initiating palliative care consult which his brother is agreeable with.    Hospital Course  See above    Interval Problem Update  Patient was seen and examined at bedside.  I have personally reviewed and interpreted vitals, labs, and imaging.      1/24    In bed    Soft Restraints initiated due to agitation , in the early am    Case d/w psych team    Recs for zoloft nd clonidine-initiated    1/25    Remains in restraints    He is awake and confused    1/26    Hypoglycemia today glucose of 35    Patient did receive= IV dextrose that improved his sugars to 80    I reviewed the chart and I decreased patient's Lantus from 50 units twice daily to 5 units twice daily as his oral intake has been diminished.      Patient had documented agitation last night and restraints are still needed at this time    Case discussed with psychiatry Dr. Delgado and they are recommending increasing Zoloft to 50 mg p.o. daily which I have done    Patient has urinary retention last night at which a Leslie catheter was placed      I have discussed this patient's plan of care and discharge plan at IDT rounds today with Case Management, Nursing, Nursing leadership, and other members of the IDT team.    Consultants/Specialty  Palliative care    Code  Status  DNAR/DNI    Disposition  The patient is not medically cleared for discharge to home or a post-acute facility.  Anticipate discharge to: home with close outpatient follow-up  Ultimate goal is likely GH with hospice    I have placed the appropriate orders for post-discharge needs.    Review of Systems  Review of Systems   Unable to perform ROS: Dementia        Physical Exam  Temp:  [36 °C (96.8 °F)-36.6 °C (97.9 °F)] 36.6 °C (97.8 °F)  Pulse:  [60-91] 60  Resp:  [16-18] 17  BP: ()/(50-76) 105/55  SpO2:  [94 %-97 %] 97 %    Physical Exam  Vitals and nursing note reviewed.   Constitutional:       General: He is not in acute distress.     Appearance: He is well-developed.      Comments: In bed    No acute distress   HENT:      Head: Normocephalic and atraumatic.      Mouth/Throat:      Pharynx: No oropharyngeal exudate.   Eyes:      General: No scleral icterus.     Pupils: Pupils are equal, round, and reactive to light.   Neck:      Thyroid: No thyromegaly.   Cardiovascular:      Rate and Rhythm: Normal rate and regular rhythm.      Heart sounds: Normal heart sounds. No murmur heard.  Pulmonary:      Effort: Pulmonary effort is normal. No respiratory distress.      Breath sounds: Normal breath sounds. No wheezing.   Abdominal:      General: Bowel sounds are normal. There is no distension.      Palpations: Abdomen is soft.      Tenderness: There is no abdominal tenderness.   Genitourinary:     Comments: Leslie  Musculoskeletal:         General: No tenderness. Normal range of motion.      Cervical back: Normal range of motion and neck supple.      Right lower leg: No edema.      Left lower leg: No edema.   Skin:     General: Skin is warm and dry.      Findings: No rash.   Neurological:      Mental Status: He is alert.      Motor: Weakness present.      Comments: A&OX0-1         Fluids    Intake/Output Summary (Last 24 hours) at 1/26/2024 1314  Last data filed at 1/26/2024 0840  Gross per 24 hour   Intake 600  ml   Output 500 ml   Net 100 ml         Laboratory                            Imaging  DX-CHEST-PORTABLE (1 VIEW)   Final Result      1.  Hazy medial right lower lobe opacity which could be due to pneumonitis or atelectasis.      [id number] VIEW   Final Result         Large amount of stool throughout the colon.           Assessment/Plan  * Type 2 diabetes mellitus with hyperglycemia, with long-term current use of insulin (HCC)- (present on admission)  Assessment & Plan  Decreased oral intake over the last couple of days so we have cut back patient's Lantus to 5 units twice daily= 1/26/2024        Continue ISS, adjust PRN  Likely moving towards hospice, so can keep insulin regimen at this time  However, if we want  without hospice, need to transition to oral DM medications since patient will never be able to self-administer insulin medications  A1c 8.7      Urinary retention- (present on admission)  Assessment & Plan  Leslie  placed on 1/25/2024  Proscar added to Flomax on 1/26/2024    ACP (advance care planning)- (present on admission)  Assessment & Plan  Changed to DNR/DNI  Palliative Consult  Luis Carlos, his brother, was primary caretaker and DPOA. He is agreeable to possibly pursuing hospice, would like to discuss further with his family and Palliative Care    Dehydration- (present on admission)  Assessment & Plan  Resolved  Stop IVf's  Monitor sugars and oral intake    Ketosis (HCC)- (present on admission)  Assessment & Plan  Resolved    Generalized weakness- (present on admission)  Assessment & Plan  Unable to care for self  Will need a memory care unit   Working on  with hospice    Alzheimer disease (HCC)- (present on admission)  Assessment & Plan  Advanced, with agitation   Stop IV valium  depakote 125 mg BID   Continue scheduled seroquel 50 mg BID  namenda 5 mg daily and aricept 5 mg daily  Continue to adjust PRN  Continue trazodone 150 mg QPM  Po zoloft..  Crease to 50 mg p.o. daily on 1/26/2024  Po  clonidine      Patient remains on restraints on 1/26/2024    Discussed behavioral disturbances with psychiatry.      Coronary artery disease due to lipid rich plaque - mild to moderate on Cath 2019 Left Dominant- (present on admission)  Assessment & Plan  Diabetic diet  Continue ARB, BB.      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 In review of yesterday's note (1/25/2024), there are no changes except as documented above.

## 2024-01-26 NOTE — CARE PLAN
The patient is Stable - Low risk of patient condition declining or worsening    Shift Goals  Clinical Goals: patient to remain in safe calm state througout shift  Patient Goals: wil  Family Goals: wil    Progress made toward(s) clinical / shift goals:  Patient updated on plan of care, reinforcement needed. Patients diabetes managed throughout shift, skin integrity maintained. Fall precautions in place, restraints in place as ordered and documented.     Patient is not progressing towards the following goals:      Problem: Knowledge Deficit - Standard  Goal: Patient and family/care givers will demonstrate understanding of plan of care, disease process/condition, diagnostic tests and medications  Outcome: Not Progressing     Problem: Knowledge Deficit - Diabetes  Goal: Patient will demonstrate knowledge of insulin injection, symptoms, and treatment of hypoglycemia and diet prior to discharge  Outcome: Not Progressing

## 2024-01-26 NOTE — ASSESSMENT & PLAN NOTE
Galarza  placed on 1/25/2024  Proscar added to Flomax on 1/26/2024  removal of galarza 2/4, continue to follow, bladder scans.  2/6:  replaced galarza for urine retention x 2 bladder scans.  Increased flomax 0.8 mg po dailyl with proscar 5mg nightly.  2/9:  patient unable to swallow pills, must be crushed since he chews on the pills.   Changed flomax and proscar to cardura qhs.

## 2024-01-26 NOTE — DIETARY
Nutrition Update: Pt seen for weekly rescreen, PO intake noted to be poor.     Day 12 of admit.  Dylon Santa is a 63 y.o. male with admitting DX of Ketosis (HCC) [E88.89].  Patient being followed to optimize nutrition.    Current Diet: Consistent CHO.   Per ADL, PO variable. Pt appeared to do better yesterday taking 50-75% of dinner, lunch took % of a supplement intake and 25-50% of breakfast; 0% meals consumed on 1/24. Reviewed with RN who did not have pt yesterday so unable to confirm intake but agreeable to help encourage supplements today if pt's PO intake is poor with meals.   Per MD notes: Pt confused and in restraints, family discussing hospice.   No new chem panel.   Pertinent Meds: Lantus, SSI, Imodium, Zofran PRN  No skin breakdown noted in chart.   No new wts to assess.          Problem: Nutritional:  Goal: Achieve adequate nutritional intake  Description: Patient will consume ~50%  of meals  Outcome: Progressing.       Plan/Rec:   Will add Glucerna supplement drinks to all meals help optimize intake.   Provide weekly wts. Reviewed with RN, she states they will try to get new wt today if pt cooperative enough to be taken out of restraints .  RD to monitor for adequate intake.   '

## 2024-01-26 NOTE — PROGRESS NOTES
Received report from day shift nurse. Patient is alert and oriented x1.patient only oriented to self. Patient is calm. Patient on vest restraint and 4 side rails up.     Patient starts to get agitated, patient starts to grab RN's hands and wont let go of them. Patient also starts to slide down bed. Security contacted. PRN medication given. On call hospitalist contacted. Soft wrist restraint initiated.    Patient slept for most of the rest of the night.     Patient took all of his morning medication during the shift.

## 2024-01-26 NOTE — CARE PLAN
The patient is Stable - Low risk of patient condition declining or worsening    Shift Goals  Clinical Goals: pt will remain agitation free throughout the shift  Patient Goals: KATIANA  Family Goals: KATIANA    Progress made toward(s) clinical / shift goals:    Problem: Safety - Medical Restraint  Goal: Remains free of injury from restraints (Restraint for Interference with Medical Device)  1/26/2024 0844 by Vasu Cottrell R.N.  Outcome: Progressing  1/26/2024 0844 by Vasu Cottrell R.N.  Outcome: Progressing  Goal: Free from restraint(s) (Restraint for Interference with Medical Device)  1/26/2024 0844 by Vasu Cottrell R.N.  Outcome: Progressing  1/26/2024 0844 by KAYE GillespieN.  Outcome: Progressing       Patient is not progressing towards the following goals:      Problem: Safety - Medical Restraint  Goal: Remains free of injury from restraints (Restraint for Interference with Medical Device)  Outcome: Progressing  Goal: Free from restraint(s) (Restraint for Interference with Medical Device)  Outcome: Progressing

## 2024-01-26 NOTE — CARE PLAN
The patient is Watcher - Medium risk of patient condition declining or worsening    Shift Goals  Clinical Goals: pt will remain agitation free throughout the shift  Patient Goals: KATIANA  Family Goals: KATIANA    Progress made toward(s) clinical / shift goals:  Patient calm during the beginning of the shift. Patient started getting agitated, patient grabbed RN's hands and will not let go of them. Patient slides out of bed.  When RN tries to reorient patient, patient gets agisted. Security called. PRN medication given.     Patient is not progressing towards the following goals:      Problem: Knowledge Deficit - Standard  Goal: Patient and family/care givers will demonstrate understanding of plan of care, disease process/condition, diagnostic tests and medications  Outcome: Not Progressing     Problem: Safety - Medical Restraint  Goal: Free from restraint(s) (Restraint for Interference with Medical Device)  Outcome: Not Progressing     Patient needed reorientation during the shift, patient gets agitated. Haldol given. Soft wrist restraints initiated.

## 2024-01-26 NOTE — CONSULTS
[facility] PSYCHIATRIC FOLLOW-UP:(established)  *Reason for admission:  Pt to triage accompanied by brother, concerned about increasing aggression since yesterday, has hx of dementia. Baseline aa0x1. Per brother who is caregiver will be having a surgery done tomorrow and states that he can't take care of the pt anymore, asking assistance for placement. Pt normally takes quetiapine fumarate for aggression but doesn't seem to help much yesterday.   *Legal Hold Status: not applicable                S:  heavily asleep, waist restraints and soft mittens.    O: Medical ROS (as pertinent):                      *Psychiatric Examination:   Vitals:   Vitals:    01/25/24 2018 01/26/24 0327 01/26/24 0533 01/26/24 0724   BP: 91/50 (!) 147/76 (!) 144/64 105/55   Pulse: 78 84 72 60   Resp: 16 16  17   Temp: 36.3 °C (97.4 °F) 36 °C (96.8 °F)  36.6 °C (97.8 °F)   TempSrc: Temporal Temporal  Temporal   SpO2: 95% 96%  97%   Weight:       Height:         Constitutional: normal habitus, clean, asleep  General Appearance:as noted  Musculoskeletal: restraints removed  Alert/Orientation asleep  Attn/Concentration: diminished  Fund of Knowledge: unable to test  Memory recent/remote:  unable to determine  Speech: none  Language:    unable to determine  Thought Content:  unable to determine  Thought Process:  unable to determine   Insight/Judgement:  unable to determine  Mood: unable to determine  Affect: blunted/flat     Current Medications:  Scheduled Medications   Medication Dose Frequency    insulin GLARGINE  10 Units BID    sertraline  25 mg DAILY    cloNIDine  0.1 mg DAILY AT 1800    insulin regular  3-14 Units 4X/DAY ACHS    tamsulosin  0.4 mg AFTER BREAKFAST    valproic acid  125 mg BID    donepezil  5 mg Q EVENING    memantine  5 mg DAILY    losartan  100 mg DAILY    metoprolol SR  25 mg DAILY    QUEtiapine  50 mg BID    traZODone  150 mg Nightly    polyethylene glycol/lytes  1 Packet DAILY           *ASSESSMENT/RECOMENDATIONS:  1. Dementia with behavioral disturbance. Likely Alzheimer's     Medical:   -hyponatremia: 130: can contribute to confusion and agitation   -DM2  -CAD  -CKD 3a  -HTN    Legal hold: not applicable    Discussed/voalted:DESMOND Oliva MD     Medication and Other Recommendations: final orders as per Tx Tm  Determined INCAPACITATED by tx tm: POA with brother   2    family discussing hospice   3    increase zoloft to 50 mg    Will continue to follow with you.        Discharge recommendations: per tx tm    If released from [facility]: Discharge Instructions:  -Reviewed safety plan: 911, ER, PCM, MHC, Suicide crisis line  -Please assist with outpatient Psychiatric/substance use follow up appointments at discharge once medically cleared.

## 2024-01-27 NOTE — PROGRESS NOTES
"Patient became very agitated and verbally aggressive with staff - he was hitting and kicking staff saying \"you're going to die\". Restraints reordered for soft BUE and soft LLE as well as four side rails and posey vest, ordered IM haldol administered. Patient monitored, free of any signs of injury at this time, still continues to be agitated and trying to jump oob.   "

## 2024-01-27 NOTE — PROGRESS NOTES
Hospital Medicine Daily Progress Note    Date of Service  1/27/2024    Chief Complaint  Dylon Santa is a 63 y.o. male admitted 1/11/2024 with moderate to severe Alzheimer's dementia now with agitation and underlying insulin-dependent type 1-2 diabetes mellitus who was brought to the hospital by his brother who is his primary caretaker.  His brother can no longer take care of him given worsening agitation and behavioral issues over the last 2 months.  Patient was admitted for profound hyperglycemia without evidence of DKA or HHNK.  Patient does not have capacity and his brother Luis Carlos is the DURABLE POWER OF .  Patient has been made DNR/DNI and I have discussed initiating palliative care consult which his brother is agreeable with.    Hospital Course  See above    Interval Problem Update  Patient was seen and examined at bedside.  I have personally reviewed and interpreted vitals, labs, and imaging.          1/26    Hypoglycemia today glucose of 35    Patient did receive= IV dextrose that improved his sugars to 80    I reviewed the chart and I decreased patient's Lantus from 50 units twice daily to 5 units twice daily as his oral intake has been diminished.      Patient had documented agitation last night and restraints are still needed at this time    Case discussed with psychiatry Dr. Delgado and they are recommending increasing Zoloft to 50 mg p.o. daily which I have done    Patient has urinary retention last night at which a Leslie catheter was placed      1/27    Voiding trial today    Attempt to remove restraints today    Still confused but appears to be a little more cooperative today        I have discussed this patient's plan of care and discharge plan at IDT rounds today with Case Management, Nursing, Nursing leadership, and other members of the IDT team.    Consultants/Specialty  Palliative care    Code Status  DNAR/DNI    Disposition  Medically ClearedUltimate goal is likely GH with hospice    I  have placed the appropriate orders for post-discharge needs.    Review of Systems  Review of Systems   Unable to perform ROS: Dementia        Physical Exam  Temp:  [36 °C (96.8 °F)-36.4 °C (97.6 °F)] 36.4 °C (97.6 °F)  Pulse:  [66-76] 67  Resp:  [17-18] 18  BP: ()/(53-72) 124/68  SpO2:  [95 %-98 %] 98 %    Physical Exam  Vitals and nursing note reviewed.   Constitutional:       General: He is not in acute distress.     Appearance: He is well-developed.      Comments: In bed    No acute distress   HENT:      Head: Normocephalic and atraumatic.      Mouth/Throat:      Pharynx: No oropharyngeal exudate.   Eyes:      General: No scleral icterus.     Pupils: Pupils are equal, round, and reactive to light.   Neck:      Thyroid: No thyromegaly.   Cardiovascular:      Rate and Rhythm: Normal rate and regular rhythm.      Heart sounds: Normal heart sounds. No murmur heard.  Pulmonary:      Effort: Pulmonary effort is normal. No respiratory distress.      Breath sounds: Normal breath sounds. No wheezing.   Abdominal:      General: Bowel sounds are normal. There is no distension.      Palpations: Abdomen is soft.      Tenderness: There is no abdominal tenderness.   Genitourinary:     Comments: Leslie  Musculoskeletal:         General: No tenderness. Normal range of motion.      Cervical back: Normal range of motion and neck supple.      Right lower leg: No edema.      Left lower leg: No edema.   Skin:     General: Skin is warm and dry.      Findings: No rash.   Neurological:      Mental Status: He is alert.      Motor: Weakness present.      Comments: A&OX0-1         Fluids    Intake/Output Summary (Last 24 hours) at 1/27/2024 1310  Last data filed at 1/27/2024 1000  Gross per 24 hour   Intake 960 ml   Output 500 ml   Net 460 ml         Laboratory  Recent Labs     01/27/24  0852   WBC 8.5   RBC 4.15*   HEMOGLOBIN 12.6*   HEMATOCRIT 37.5*   MCV 90.4   MCH 30.4   MCHC 33.6   RDW 42.0   PLATELETCT 314   MPV 10.1       Recent  Labs     01/27/24  0852   SODIUM 136   POTASSIUM 4.9   CHLORIDE 101   CO2 28   GLUCOSE 142*   BUN 45*   CREATININE 1.67*   CALCIUM 8.8                     Imaging  DX-CHEST-PORTABLE (1 VIEW)   Final Result      1.  Hazy medial right lower lobe opacity which could be due to pneumonitis or atelectasis.      [id number] VIEW   Final Result         Large amount of stool throughout the colon.           Assessment/Plan  * Type 2 diabetes mellitus with hyperglycemia, with long-term current use of insulin (HCC)- (present on admission)  Assessment & Plan  Decreased oral intake over the last couple of days so we have cut back patient's Lantus to 5 units twice daily= 1/26/2024        Continue ISS, adjust PRN  Likely moving towards hospice, so can keep insulin regimen at this time  However, if we want  without hospice, need to transition to oral DM medications since patient will never be able to self-administer insulin medications  A1c 8.7      Urinary retention- (present on admission)  Assessment & Plan  Leslie  placed on 1/25/2024  Proscar added to Flomax on 1/26/2024    Voiding Trial 1/27/2024    ACP (advance care planning)- (present on admission)  Assessment & Plan  Changed to DNR/DNI  Palliative Consult  Luis Carlos, his brother, was primary caretaker and DPOA. He is agreeable to possibly pursuing hospice, would like to discuss further with his family and Palliative Care    Dehydration- (present on admission)  Assessment & Plan  Resolved  Stop IVf's  Monitor sugars and oral intake    Ketosis (HCC)- (present on admission)  Assessment & Plan  Resolved    Generalized weakness- (present on admission)  Assessment & Plan  Unable to care for self  Will need a memory care unit   Working on  with hospice    Alzheimer disease (HCC)- (present on admission)  Assessment & Plan  Advanced, with agitation   Stop IV valium  depakote 125 mg BID   Continue scheduled seroquel 50 mg BID  namenda 5 mg daily and aricept 5 mg daily  Continue to  adjust PRN  Continue trazodone 150 mg QPM  Po zoloft..  Crease to 50 mg p.o. daily on 1/26/2024  Po clonidine      Patient remains on restraints on 1/26/2024    Discussed behavioral disturbances with psychiatry.      Coronary artery disease due to lipid rich plaque - mild to moderate on Cath 2019 Left Dominant- (present on admission)  Assessment & Plan  Diabetic diet  Continue ARB, BB.      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 In review of yesterday's note (1/26/2024), there are no changes except as documented above.

## 2024-01-27 NOTE — CARE PLAN
The patient is Stable - Low risk of patient condition declining or worsening    Shift Goals  Clinical Goals: patient will remain safe throughout shift, monitor CBG  Patient Goals: rest  Family Goals: KATIANA    Progress made toward(s) clinical / shift goals:    Problem: Pain - Standard  Goal: Alleviation of pain or a reduction in pain to the patient’s comfort goal  Outcome: Progressing     Problem: Diabetes Management  Goal: Patient will achieve and maintain glucose in satisfactory range  Outcome: Progressing       Patient is not progressing towards the following goals:

## 2024-01-27 NOTE — CARE PLAN
The patient is Watcher - Medium risk of patient condition declining or worsening    Shift Goals  Clinical Goals: patient will remain safe throughout shift, monitor CBG  Patient Goals: rest  Family Goals: KATIANA    Progress made toward(s) clinical / shift goals:    Problem: Knowledge Deficit - Standard  Goal: Patient and family/care givers will demonstrate understanding of plan of care, disease process/condition, diagnostic tests and medications  Outcome: Progressing       Patient is not progressing towards the following goals:

## 2024-01-28 NOTE — PROGRESS NOTES
Hospital Medicine Daily Progress Note    Date of Service  1/28/2024    Chief Complaint  Dylon Santa is a 63 y.o. male admitted 1/11/2024 with moderate to severe Alzheimer's dementia now with agitation and underlying insulin-dependent type 1-2 diabetes mellitus who was brought to the hospital by his brother who is his primary caretaker.  His brother can no longer take care of him given worsening agitation and behavioral issues over the last 2 months.  Patient was admitted for profound hyperglycemia without evidence of DKA or HHNK.  Patient does not have capacity and his brother Luis Carlos is the DURABLE POWER OF .  Patient has been made DNR/DNI and I have discussed initiating palliative care consult which his brother is agreeable with.    Hospital Course  See above    Interval Problem Update  Patient was seen and examined at bedside.  I have personally reviewed and interpreted vitals, labs, and imaging.          1/26    Hypoglycemia today glucose of 35    Patient did receive= IV dextrose that improved his sugars to 80    I reviewed the chart and I decreased patient's Lantus from 50 units twice daily to 5 units twice daily as his oral intake has been diminished.      Patient had documented agitation last night and restraints are still needed at this time    Case discussed with psychiatry Dr. Delgado and they are recommending increasing Zoloft to 50 mg p.o. daily which I have done    Patient has urinary retention last night at which a Leslie catheter was placed      1/27    Voiding trial today    Attempt to remove restraints today    Still confused but appears to be a little more cooperative today      1/28    Leslie out  However straight cath needed last night    Continue to monitor    In chair , at nursing station    Restraints off    Patient awake and interactive      I have discussed this patient's plan of care and discharge plan at IDT rounds today with Case Management, Nursing, Nursing leadership, and other  members of the IDT team.    Consultants/Specialty  Palliative care    Code Status  DNAR/DNI    Disposition  The patient is not medically cleared for discharge to home or a post-acute facility.  Anticipate discharge to: skilled nursing facility  Ultimate goal is likely [facility] with hospice    I have placed the appropriate orders for post-discharge needs.    Review of Systems  Review of Systems   Unable to perform ROS: Dementia        Physical Exam  Temp:  [36.1 °C (97 °F)-36.3 °C (97.4 °F)] 36.3 °C (97.4 °F)  Pulse:  [65-75] 65  Resp:  [16-18] 17  BP: ()/(53-58) 111/55  SpO2:  [96 %-98 %] 98 %    Physical Exam  Vitals and nursing note reviewed.   Constitutional:       General: He is not in acute distress.     Appearance: He is well-developed.      Comments: In bed    No acute distress   HENT:      Head: Normocephalic and atraumatic.      Mouth/Throat:      Pharynx: No oropharyngeal exudate.   Eyes:      General: No scleral icterus.     Pupils: Pupils are equal, round, and reactive to light.   Neck:      Thyroid: No thyromegaly.   Cardiovascular:      Rate and Rhythm: Normal rate and regular rhythm.      Heart sounds: Normal heart sounds. No murmur heard.  Pulmonary:      Effort: Pulmonary effort is normal. No respiratory distress.      Breath sounds: Normal breath sounds. No wheezing.   Abdominal:      General: Bowel sounds are normal. There is no distension.      Palpations: Abdomen is soft.      Tenderness: There is no abdominal tenderness.   Musculoskeletal:         General: No tenderness. Normal range of motion.      Cervical back: Normal range of motion and neck supple.      Right lower leg: No edema.      Left lower leg: No edema.   Skin:     General: Skin is warm and dry.      Findings: No rash.   Neurological:      Mental Status: He is alert.      Motor: Weakness present.   Psychiatric:      Comments: Flat affect         Fluids    Intake/Output Summary (Last 24 hours) at 1/28/2024 1141  Last data filed at  1/28/2024 0100  Gross per 24 hour   Intake 600 ml   Output 525 ml   Net 75 ml         Laboratory  Recent Labs     01/27/24  0852   WBC 8.5   RBC 4.15*   HEMOGLOBIN 12.6*   HEMATOCRIT 37.5*   MCV 90.4   MCH 30.4   MCHC 33.6   RDW 42.0   PLATELETCT 314   MPV 10.1       Recent Labs     01/27/24  0852   SODIUM 136   POTASSIUM 4.9   CHLORIDE 101   CO2 28   GLUCOSE 142*   BUN 45*   CREATININE 1.67*   CALCIUM 8.8                     Imaging  DX-CHEST-PORTABLE (1 VIEW)   Final Result      1.  Hazy medial right lower lobe opacity which could be due to pneumonitis or atelectasis.      [id number] VIEW   Final Result         Large amount of stool throughout the colon.           Assessment/Plan  * Type 2 diabetes mellitus with hyperglycemia, with long-term current use of insulin (HCC)- (present on admission)  Assessment & Plan  Decreased oral intake over the last couple of days so we have cut back patient's Lantus to 5 units twice daily= 1/26/2024        Continue ISS, adjust PRN  Likely moving towards hospice, so can keep insulin regimen at this time  However, if we want GH without hospice, need to transition to oral DM medications since patient will never be able to self-administer insulin medications  A1c 8.7      Urinary retention- (present on admission)  Assessment & Plan  Leslie  placed on 1/25/2024  Proscar added to Flomax on 1/26/2024    Voiding Trial 1/27/2024    ACP (advance care planning)- (present on admission)  Assessment & Plan  Changed to DNR/DNI  Palliative Consult  Luis Carlos, his brother, was primary caretaker and DPOA. He is agreeable to possibly pursuing hospice, would like to discuss further with his family and Palliative Care    Dehydration- (present on admission)  Assessment & Plan  Resolved  Stop IVf's  Monitor sugars and oral intake    Ketosis (HCC)- (present on admission)  Assessment & Plan  Resolved    Generalized weakness- (present on admission)  Assessment & Plan  Unable to care for self  Will need a memory  care unit   Working on  with hospice    Alzheimer disease (HCC)- (present on admission)  Assessment & Plan  Advanced, with agitation   Stop IV valium  depakote 125 mg BID   Continue scheduled seroquel 50 mg BID  namenda 5 mg daily and aricept 5 mg daily  Continue to adjust PRN  Continue trazodone 150 mg QPM  Po zoloft..  Crease to 50 mg p.o. daily on 1/26/2024  Po clonidine      Restraints off as off 1/28    Discussed behavioral disturbances with psychiatry.      Coronary artery disease due to lipid rich plaque - mild to moderate on Cath 2019 Left Dominant- (present on admission)  Assessment & Plan  Diabetic diet  Continue ARB, BB.      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 In review of yesterday's note (1/27/2024), there are no changes except as documented above.

## 2024-01-28 NOTE — PROGRESS NOTES
Patient still had not voided posted galarza catheter removal    -bladder scanned @ 2200 for 302  -bladder scanned @ 0100 for 509. SAMMI Wagner made aware - orders to straight cath patient for >400. Patient straight cath'd for 525 ml urine  -bladder scanned @ 0600 for 22    Patient encouraged to increase PO intake, not very direct able at this time

## 2024-01-28 NOTE — CARE PLAN
The patient is Stable - Low risk of patient condition declining or worsening    Shift Goals  Clinical Goals: remain restraint free  Patient Goals: sleep  Family Goals: KATIANA    Progress made toward(s) clinical / shift goals:    Problem: Pain - Standard  Goal: Alleviation of pain or a reduction in pain to the patient’s comfort goal  Outcome: Progressing     Problem: Diabetes Management  Goal: Patient will achieve and maintain glucose in satisfactory range  Outcome: Progressing       Patient is not progressing towards the following goals:

## 2024-01-28 NOTE — CONSULTS
"  [facility] PSYCHIATRIC FOLLOW-UP:(established)  *Reason for admission:  Pt to triage accompanied by brother, concerned about increasing aggression since yesterday, has hx of dementia. Baseline aa0x1. Per brother who is caregiver will be having a surgery done tomorrow and states that he can't take care of the pt anymore, asking assistance for placement. Pt normally takes quetiapine fumarate for aggression but doesn't seem to help much yesterday.   *Legal Hold Status: not applicable             S:  last documented aggression 1/26. Pt was taken to chair by nursing station. He was a stiff, slow, gait with very small steps almost shuffling. He sits with little spontaneous movement such as adjusting himself in chair, he does not look up. No tremors noted. Does not answer many questions and when he does it is usually no. Per staff he is not talkative with them and, also usually answers \"no\"    O: Medical ROS (as pertinent):                      *Psychiatric Examination:   Vitals:   Vitals:    01/27/24 1635 01/27/24 2008 01/28/24 0418 01/28/24 0722   BP: 117/54 98/53 130/58 111/55   Pulse: 65 66 75 65   Resp: 18 16 18 17   Temp: 36.2 °C (97.1 °F) 36.1 °C (97 °F) 36.2 °C (97.2 °F) 36.3 °C (97.4 °F)   TempSrc: Temporal Temporal Temporal Temporal   SpO2: 96% 97% 98% 98%   Weight:       Height:         General Appearance:  poor eye contact and not able to participate meaningfully  Abnormal Movements:  as noted above    Gait and Posture:  as noted above  Speech: minimal  Thought Process: slow rate  Associations:    not informative even if he does respond  Abnormal or Psychotic Thoughts: unable to assess  Judgement and Insight: impaired   Orientation: unable to determine  Recent and Remote Memory: unable to determine  Attention Span and Concentration:  diminished in that he doesn't answer much of the time: not even sure he heard me: he did on occasion  Language:unable to assess to minimal  Fund of Knowledge: " unable to assess  Mood and Affect: blunted  SI/HI:  unable to assess        Current Medications:  Scheduled Medications   Medication Dose Frequency    insulin GLARGINE  10 Units BID    sertraline  50 mg DAILY    finasteride  5 mg DAILY    cloNIDine  0.1 mg DAILY AT 1800    insulin regular  3-14 Units 4X/DAY ACHS    tamsulosin  0.4 mg AFTER BREAKFAST    valproic acid  125 mg BID    donepezil  5 mg Q EVENING    memantine  5 mg DAILY    losartan  100 mg DAILY    metoprolol SR  25 mg DAILY    QUEtiapine  50 mg BID    traZODone  150 mg Nightly    polyethylene glycol/lytes  1 Packet DAILY          *ASSESSMENT/RECOMENDATIONS:  1. Dementia with behavioral disturbance. Likely Alzheimer's. Behavior has been improved over 48 hours now.     Medical:   -hyponatremia: 130: can contribute to confusion and agitation   -DM2  -CAD  -CKD 3a  -HTN     Legal hold: not applicable     Discussed/voalted:DESMOND Oliva MD      Medication and Other Recommendations: final orders as per Tx Tm  Determined INCAPACITATED by tx tm: POA with brother   2    family discussing hospice   3    no changes     Signing off. Please reconsult as needed. Please contact [facility] psychiatry for concerns.      Discharge recommendations: per tx tm     If released from [facility]: Discharge Instructions:  -Reviewed safety plan: 911, ER, PCM, MHC, Suicide crisis line  -Please assist with outpatient Psychiatric/substance use follow up appointments at discharge once medically cleared.

## 2024-01-28 NOTE — CARE PLAN
The patient is Stable - Low risk of patient condition declining or worsening    Shift Goals  Clinical Goals: remain restraint free  Patient Goals: sleep  Family Goals: KATIANA    Progress made toward(s) clinical / shift goals:    Problem: Knowledge Deficit - Standard  Goal: Patient and family/care givers will demonstrate understanding of plan of care, disease process/condition, diagnostic tests and medications  Outcome: Not Progressing       Patient is not progressing towards the following goals:    Patient only oriented to self. Started to become a little bit agitated and attempted to get oob - haldol adminstered per orders will continue to monitor.     2130: patient calmly resting in bed at this time

## 2024-01-29 NOTE — CARE PLAN
The patient is Stable - Low risk of patient condition declining or worsening    Shift Goals  Clinical Goals: remain restraint free, rest  Patient Goals: sleep  Family Goals: KATIANA    Progress made toward(s) clinical / shift goals:    Problem: Knowledge Deficit - Standard  Goal: Patient and family/care givers will demonstrate understanding of plan of care, disease process/condition, diagnostic tests and medications  Outcome: Progressing       Patient is not progressing towards the following goals: patient alert to self, pleasant and cooperative with care. Call light and personal items within reach, room near nurses' station and bed alarm on

## 2024-01-29 NOTE — PROGRESS NOTES
Hospital Medicine Daily Progress Note    Date of Service  1/29/2024    Chief Complaint  Dylon Santa is a 63 y.o. male admitted 1/11/2024 with moderate to severe Alzheimer's dementia now with agitation and underlying insulin-dependent type 1-2 diabetes mellitus who was brought to the hospital by his brother who is his primary caretaker.  His brother can no longer take care of him given worsening agitation and behavioral issues over the last 2 months.  Patient was admitted for profound hyperglycemia without evidence of DKA or HHNK.  Patient does not have capacity and his brother Luis Carlos is the DURABLE POWER OF .  Patient has been made DNR/DNI and I have discussed initiating palliative care consult which his brother is agreeable with.    Hospital Course  See above    Interval Problem Update  Patient was seen and examined at bedside.  I have personally reviewed and interpreted vitals, labs, and imaging.          1/26    Hypoglycemia today glucose of 35    Patient did receive= IV dextrose that improved his sugars to 80    I reviewed the chart and I decreased patient's Lantus from 50 units twice daily to 5 units twice daily as his oral intake has been diminished.      Patient had documented agitation last night and restraints are still needed at this time    Case discussed with psychiatry Dr. Delgado and they are recommending increasing Zoloft to 50 mg p.o. daily which I have done    Patient has urinary retention last night at which a Leslie catheter was placed      1/27    Voiding trial today    Attempt to remove restraints today    Still confused but appears to be a little more cooperative today      1/28    Leslie out  However straight cath needed last night    Continue to monitor    In chair , at nursing station    Restraints off    Patient awake and interactive    1/29      Patient is Leslie had to be reinserted due to urinary retention    In Bed , in no distress    No restraints    Glucose rising as patient is  not eating more so we have increased Lantus back to 15 units twice daily    I have discussed this patient's plan of care and discharge plan at IDT rounds today with Case Management, Nursing, Nursing leadership, and other members of the IDT team.    Consultants/Specialty  Palliative care    Code Status  DNAR/DNI    Disposition  The patient is not medically cleared for discharge to home or a post-acute facility.    Ultimate goal is likely GH with hospice    I have placed the appropriate orders for post-discharge needs.    Review of Systems  Review of Systems   Unable to perform ROS: Dementia        Physical Exam  Temp:  [36.1 °C (97 °F)-36.3 °C (97.3 °F)] 36.2 °C (97.1 °F)  Pulse:  [71-76] 75  Resp:  [18] 18  BP: (105-135)/(57-78) 105/65  SpO2:  [96 %-100 %] 97 %    Physical Exam  Vitals and nursing note reviewed.   Constitutional:       General: He is not in acute distress.     Appearance: He is well-developed.      Comments: In bed    No acute distress   HENT:      Head: Normocephalic and atraumatic.      Mouth/Throat:      Pharynx: No oropharyngeal exudate.   Eyes:      General: No scleral icterus.     Pupils: Pupils are equal, round, and reactive to light.   Neck:      Thyroid: No thyromegaly.   Cardiovascular:      Rate and Rhythm: Normal rate and regular rhythm.      Heart sounds: Normal heart sounds. No murmur heard.  Pulmonary:      Effort: Pulmonary effort is normal. No respiratory distress.      Breath sounds: Normal breath sounds. No wheezing.   Abdominal:      General: Bowel sounds are normal. There is no distension.      Palpations: Abdomen is soft.      Tenderness: There is no abdominal tenderness.   Musculoskeletal:         General: No tenderness. Normal range of motion.      Cervical back: Normal range of motion and neck supple.      Right lower leg: No edema.      Left lower leg: No edema.   Skin:     General: Skin is warm and dry.      Findings: No rash.   Neurological:      Mental Status: He is  alert.      Motor: Weakness present.   Psychiatric:      Comments: Flat affect         Fluids    Intake/Output Summary (Last 24 hours) at 1/29/2024 1546  Last data filed at 1/29/2024 1100  Gross per 24 hour   Intake 920 ml   Output 1200 ml   Net -280 ml         Laboratory  Recent Labs     01/27/24  0852   WBC 8.5   RBC 4.15*   HEMOGLOBIN 12.6*   HEMATOCRIT 37.5*   MCV 90.4   MCH 30.4   MCHC 33.6   RDW 42.0   PLATELETCT 314   MPV 10.1       Recent Labs     01/27/24  0852   SODIUM 136   POTASSIUM 4.9   CHLORIDE 101   CO2 28   GLUCOSE 142*   BUN 45*   CREATININE 1.67*   CALCIUM 8.8                     Imaging  DX-CHEST-PORTABLE (1 VIEW)   Final Result      1.  Hazy medial right lower lobe opacity which could be due to pneumonitis or atelectasis.      [id number] VIEW   Final Result         Large amount of stool throughout the colon.           Assessment/Plan  * Type 2 diabetes mellitus with hyperglycemia, with long-term current use of insulin (HCC)- (present on admission)  Assessment & Plan  On 1/29/2024 Lantus back upwards to 15 units twice daily as patient is eating more        Continue ISS, adjust PRN  Likely moving towards hospice, so can keep insulin regimen at this time  However, if we want GH without hospice, need to transition to oral DM medications since patient will never be able to self-administer insulin medications  A1c 8.7      Urinary retention- (present on admission)  Assessment & Plan  Leslie  placed on 1/25/2024  Proscar added to Flomax on 1/26/2024    Voiding Trial 1/27/2024    ACP (advance care planning)- (present on admission)  Assessment & Plan  Changed to DNR/DNI  Palliative Consult  Luis Carlos, his brother, was primary caretaker and DPOA. He is agreeable to possibly pursuing hospice, would like to discuss further with his family and Palliative Care    Dehydration- (present on admission)  Assessment & Plan  Resolved  Stop IVf's  Monitor sugars and oral intake    Ketosis (HCC)- (present on  admission)  Assessment & Plan  Resolved    Generalized weakness- (present on admission)  Assessment & Plan  Unable to care for self  Will need a memory care unit   Working on  with hospice    Alzheimer disease (HCC)- (present on admission)  Assessment & Plan  Advanced, with agitation   Stop IV valium  depakote 125 mg BID   Continue scheduled seroquel 50 mg BID  namenda 5 mg daily and aricept 5 mg daily  Continue to adjust PRN  Continue trazodone 150 mg QPM  Po zoloft..  Crease to 50 mg p.o. daily on 1/26/2024  Po clonidine      Restraints off as off 1/28    Discussed behavioral disturbances with psychiatry.      Coronary artery disease due to lipid rich plaque - mild to moderate on Cath 2019 Left Dominant- (present on admission)  Assessment & Plan  Diabetic diet  Continue ARB, BB.      Stage 3a chronic kidney disease (HCC)- (present on admission)  Assessment & Plan  Improving, likely an element of REGULO on CKD Stage III 2/2 DM nephropathy  No AGMA  Monitor    Dyslipidemia- (present on admission)  Assessment & Plan  Diabetic diet for now      Hypertension- (present on admission)  Assessment & Plan  BP is decently controlled  Continue toprol XL 25 mg daily, losartan 100 mg daily  Adjust prn         VTE prophylaxis: Xarelto    I have performed a physical exam and reviewed and updated ROS and Plan today (1/29/2024). In review of yesterday's note (1/28/2024), there are no changes except as documented above.

## 2024-01-29 NOTE — CARE PLAN
The patient is Stable - Low risk of patient condition declining or worsening    Shift Goals  Clinical Goals: Patient remain free of restraints  Patient Goals: eat  Family Goals: wil      Patient is not progressing towards the following goals:      Problem: Fall Risk  Goal: Patient will remain free from falls  Description: Target End Date:  Prior to discharge or change in level of care    Document interventions on the Deanna Cason Fall Risk Assessment    1.  Assess for fall risk factors  2.  Implement fall precautions  Outcome: Not Progressing

## 2024-01-29 NOTE — DIETARY
Nutrition Update:    Day 15 of admit.  Dylon Santa is a 63 y.o. male with admitting DX of Ketosis (HCC) [E88.89].  Patient being followed to optimize nutrition.    Current Diet: Consistent CHO, Glucerna TID    Per ADLs, since intimal RD assessment on 1/26, pt has consumed 25-50% x 1 meal and 50-75%  x 5 meals for an average exceeding 50%. No supplement intake recorded.    No new wts taken since admit. Obtain updated wt as able.    Problem: Nutritional:  Goal: Achieve adequate nutritional intake  Description: Patient will consume ~50% of meals  Outcome: met      RD available PRN

## 2024-01-30 NOTE — PROGRESS NOTES
[facility] Medicine Daily Progress Note    Date of Service  1/30/2024    Chief Complaint  Inability to care for self    Hospital Course  Dylon Santa is a 63 y.o. male who was admitted to [facility] on 1/11/2024. 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 He was brought to the hospital by his brother who is his primary caretaker.  His brother can no longer take care of him given worsening agitation and behavioral issues over the last 2 months.  Patient was admitted for profound hyperglycemia without evidence of DKA or HHNK.  Patient does not have capacity and his brother Luis Carlos is the DURABLE POWER OF .  Patient has been made DNR/DNI. Hospice was initially considered and then declined    Interval Problem Update  Axo to person only, cooperative, following commands, bs improving, vitals and exam stable, galarza in place. Family opting for group home, will need another 14 days for insurance to change.    I have discussed this patient's plan of care and discharge plan at IDT rounds today with Case Management, Nursing, Nursing leadership, and other members of the IDT team.    Consultants/Specialty  Palliative care    Code Status  DNAR/DNI    Disposition      I have placed the appropriate orders for post-discharge needs.    Review of Systems  Review of Systems   Unable to perform ROS: Dementia        Physical Exam  Temp:  [36.3 °C (97.3 °F)-36.7 °C (98 °F)] 36.4 °C (97.6 °F)  Pulse:  [66-75] 69  Resp:  [16-18] 16  BP: (104-122)/(48-65) 114/50  SpO2:  [94 %-97 %] 96 %    Physical Exam  Vitals and nursing note reviewed.   Constitutional:       General: He is not in acute distress.     Appearance: He is well-developed.      Comments: In bed    No acute distress   HENT:      Head: Normocephalic and atraumatic.      Mouth/Throat:      Pharynx: No oropharyngeal exudate.   Eyes:      General: No scleral icterus.     Pupils: Pupils  are equal, round, and reactive to light.   Neck:      Thyroid: No thyromegaly.   Cardiovascular:      Rate and Rhythm: Normal rate and regular rhythm.      Heart sounds: Normal heart sounds. No murmur heard.  Pulmonary:      Effort: Pulmonary effort is normal. No respiratory distress.      Breath sounds: Normal breath sounds. No wheezing.   Abdominal:      General: Bowel sounds are normal. There is no distension.      Palpations: Abdomen is soft.      Tenderness: There is no abdominal tenderness.   Musculoskeletal:         General: No tenderness. Normal range of motion.      Cervical back: Normal range of motion and neck supple.      Right lower leg: No edema.      Left lower leg: No edema.   Skin:     General: Skin is warm and dry.      Findings: No rash.   Neurological:      Mental Status: He is alert.      Motor: Weakness present.   Psychiatric:      Comments: Flat affect         Fluids    Intake/Output Summary (Last 24 hours) at 1/30/2024 0825  Last data filed at 1/30/2024 0550  Gross per 24 hour   Intake 720 ml   Output 2700 ml   Net -1980 ml         Laboratory  Recent Labs     01/27/24  0852   WBC 8.5   RBC 4.15*   HEMOGLOBIN 12.6*   HEMATOCRIT 37.5*   MCV 90.4   MCH 30.4   MCHC 33.6   RDW 42.0   PLATELETCT 314   MPV 10.1       Recent Labs     01/27/24  0852   SODIUM 136   POTASSIUM 4.9   CHLORIDE 101   CO2 28   GLUCOSE 142*   BUN 45*   CREATININE 1.67*   CALCIUM 8.8                     Imaging  DX-CHEST-PORTABLE (1 VIEW)   Final Result      1.  Hazy medial right lower lobe opacity which could be due to pneumonitis or atelectasis.      [id number]-4 VIEW   Final Result         Large amount of stool throughout the colon.           Assessment/Plan  * Type 2 diabetes mellitus with hyperglycemia, with long-term current use of insulin (HCC)- (present on admission)  Assessment & Plan  On 1/29/2024 Lantus back upwards to 15 units twice daily as patient is eating more        Continue ISS, adjust PRN  Likely moving  towards hospice, so can keep insulin regimen at this time  However, if we want GH without hospice, need to transition to oral DM medications since patient will never be able to self-administer insulin medications  A1c 8.7      Urinary retention- (present on admission)  Assessment & Plan  Leslie  placed on 1/25/2024  Proscar added to Flomax on 1/26/2024    Voiding Trial 1/27/2024    ACP (advance care planning)- (present on admission)  Assessment & Plan  Changed to DNR/DNI  Palliative Consult  Luis Carlos, his brother, was primary caretaker and DPOA. He is agreeable to possibly pursuing hospice, would like to discuss further with his family and Palliative Care    Dehydration- (present on admission)  Assessment & Plan  Resolved  Stop IVf's  Monitor sugars and oral intake    Ketosis (HCC)- (present on admission)  Assessment & Plan  Resolved    Generalized weakness- (present on admission)  Assessment & Plan  Unable to care for self  Will need a memory care unit   Working on GH with hospice    Alzheimer disease (HCC)- (present on admission)  Assessment & Plan  Advanced, with agitation   Stop IV valium  depakote 125 mg BID   Continue scheduled seroquel 50 mg BID  namenda 5 mg daily and aricept 5 mg daily  Continue to adjust PRN  Continue trazodone 150 mg QPM  Po zoloft..  Crease to 50 mg p.o. daily on 1/26/2024  Po clonidine      Restraints off as off 1/28    Discussed behavioral disturbances with psychiatry.      Coronary artery disease due to lipid rich plaque - mild to moderate on Cath 2019 Left Dominant- (present on admission)  Assessment & Plan  Diabetic diet  Continue ARB, BB.      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 In review of yesterday's note (1/29/2024), there are no changes except as documented above.

## 2024-01-30 NOTE — DISCHARGE PLANNING
Case Management Discharge Planning    Admission Date: 1/11/2024  GMLOS: 4  ALOS: 16    6-Clicks ADL Score: 17  6-Clicks Mobility Score: 17  PT and/or OT Eval ordered: Yes  Post-acute Referrals Ordered: Yes  Post-acute Choice Obtained: Yes  Has referral(s) been sent to post-acute provider:  Yes      Anticipated Discharge Dispo: Discharge Disposition: D/T to SNF with Medicare cert in anticipation of skilled care (03)      Action(s) Taken:   RN DARNELL spoke with patient's brotherLuis Carlos via telephone.  He stated that they do not want hospice and want to continue insulin.  They would like patient to go to long term care at SNF ([facility] or [facility]).    Pt is day 16 of admission.  [facility]  Luis Carlos stated that he is checking with [employer] to stop worker's comp payment.  He will follow up with CM.    Escalations Completed: PFA    Medically Clear: Yes    Next Steps:   Follow up with patient's brotherLuis CarlosEdwina and [facility].  Follow up with PFA.    Barriers to Discharge:   [facility] Medicaid payor source for long term SNF.  Pending placement acceptance  Worker's comp

## 2024-01-30 NOTE — CARE PLAN
The patient is Stable - Low risk of patient condition declining or worsening    Shift Goals  Clinical Goals: Patient will remain free of any falls or injury within the shift  Patient Goals: KATIANA  Family Goals: KATIANA    Progress made toward(s) clinical / shift goals:  Patient is AOx1, calm and pleasant this shift, no aggression noted. Maintained on lap belt while on bed for safety, able to self-release. Bed frame alarm on and room close to nursing station, regular rounding done. Remain free of any falls or injury throughout shift.    Patient is not progressing towards the following goals:      Problem: Knowledge Deficit - Standard  Goal: Patient and family/care givers will demonstrate understanding of plan of care, disease process/condition, diagnostic tests and medications  Description: Target End Date:  1-3 days or as soon as patient condition allows    Document in Patient Education    1.  Patient and family/caregiver oriented to unit, equipment, visitation policy and means for communicating concern  2.  Complete/review Learning Assessment  3.  Assess knowledge level of disease process/condition, treatment plan, diagnostic tests and medications  4.  Explain disease process/condition, treatment plan, diagnostic tests and medications  Outcome: Not Progressing     Problem: Knowledge Deficit - Diabetes  Goal: Patient will demonstrate knowledge of insulin injection, symptoms, and treatment of hypoglycemia and diet prior to discharge  Description: Target End Date:  1-3 days or as soon as patient condition allows    1.  Explain disease process, medications and methods used in treating diabetes  2.  Explain signs and symptoms of hyper/hypoglycemia  3.  Educate about lifestyle changes including exercise, diet (carb counting) and sick day management  4.  Educate importance of regular vision appointments, regular foot care and monitoring of skin lesions  5.  Educate patient and family/caregiver on diabetes management and proper  use of equipment for testing (syringe, pens and site rotation, glucometer, test strips)  6.  Collaborate with Diabetes Educator  7.  Assess patient and family/caregiver skills with insulin pens and finger sticks. Document level of understanding in patient education  Outcome: Not Progressing

## 2024-01-31 NOTE — CARE PLAN
The patient is Stable - Low risk of patient condition declining or worsening    Shift Goals  Clinical Goals: ensure safety  Patient Goals: rest  Family Goals: wil    Progress made toward(s) clinical / shift goals:  Received patient in bed alert and incoherent. Denies pain at this time. Administered scheduled medications. Vital signs are stable and no sign of distress. Hourly rounds performed. Fall precautions in place and call light within reach. All other needs met.     Patient is not progressing towards the following goals:    Problem: Fall Risk  Goal: Patient will remain free from falls  Description: Target End Date:  Prior to discharge or change in level of care    Document interventions on the Huertatobias Cason Fall Risk Assessment    1.  Assess for fall risk factors  2.  Implement fall precautions  Outcome: Not Progressing

## 2024-01-31 NOTE — PROGRESS NOTES
[facility] Medicine Daily Progress Note    Date of Service  1/31/2024    Chief Complaint  Inability to care for self    Hospital Course  Dylon Santa is a 63 y.o. male who was admitted to [facility] on 1/11/2024. 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 He was brought to the hospital by his brother who is his primary caretaker.  His brother can no longer take care of him given worsening agitation and behavioral issues over the last 2 months.  Patient was admitted for profound hyperglycemia without evidence of DKA or HHNK.  Patient does not have capacity and his brother Luis Carlos is the DURABLE POWER OF .  Patient has been made DNR/DNI. Hospice was initially considered and then declined    Interval Problem Update  Remains axox to person, alert and awake, denies pain, ros otherwise negative, exam and vitals stable.    I have discussed this patient's plan of care and discharge plan at IDT rounds today with Case Management, Nursing, Nursing leadership, and other members of the IDT team.    Consultants/Specialty  Palliative care    Code Status  DNAR/DNI    Disposition      I have placed the appropriate orders for post-discharge needs.    Review of Systems  Review of Systems   Unable to perform ROS: Dementia        Physical Exam  Temp:  [36.1 °C (97 °F)-36.7 °C (98.1 °F)] 36.6 °C (97.9 °F)  Pulse:  [72-83] 74  Resp:  [16-18] 16  BP: ()/(45-66) 102/66  SpO2:  [95 %-100 %] 96 %    Physical Exam  Vitals and nursing note reviewed.   Constitutional:       General: He is not in acute distress.     Appearance: He is well-developed.      Comments: In bed    No acute distress   HENT:      Head: Normocephalic and atraumatic.      Mouth/Throat:      Pharynx: No oropharyngeal exudate.   Eyes:      General: No scleral icterus.     Pupils: Pupils are equal, round, and reactive to light.   Neck:      Thyroid: No thyromegaly.    Cardiovascular:      Rate and Rhythm: Normal rate and regular rhythm.      Heart sounds: Normal heart sounds. No murmur heard.  Pulmonary:      Effort: Pulmonary effort is normal. No respiratory distress.      Breath sounds: Normal breath sounds. No wheezing.   Abdominal:      General: Bowel sounds are normal. There is no distension.      Palpations: Abdomen is soft.      Tenderness: There is no abdominal tenderness.   Musculoskeletal:         General: No tenderness. Normal range of motion.      Cervical back: Normal range of motion and neck supple.      Right lower leg: No edema.      Left lower leg: No edema.   Skin:     General: Skin is warm and dry.      Findings: No rash.   Neurological:      Mental Status: He is alert.      Motor: Weakness present.   Psychiatric:      Comments: Flat affect         Fluids    Intake/Output Summary (Last 24 hours) at 1/31/2024 1138  Last data filed at 1/31/2024 1000  Gross per 24 hour   Intake 490 ml   Output 1200 ml   Net -710 ml       Laboratory                            Imaging  DX-CHEST-PORTABLE (1 VIEW)   Final Result      1.  Hazy medial right lower lobe opacity which could be due to pneumonitis or atelectasis.      [id number] VIEW   Final Result         Large amount of stool throughout the colon.           Assessment/Plan  * Type 2 diabetes mellitus with hyperglycemia, with long-term current use of insulin (HCC)- (present on admission)  Assessment & Plan  On 1/29/2024 Lantus back upwards to 15 units twice daily as patient is eating more        Continue ISS, adjust PRN  Following, will continue to adjust prn      Urinary retention- (present on admission)  Assessment & Plan  Leslie  placed on 1/25/2024  Proscar added to Flomax on 1/26/2024    Voiding Trial 1/27/2024    ACP (advance care planning)- (present on admission)  Assessment & Plan  Changed to DNR/DNI  Palliative following  Awaiting acceptance at [facility]    Dehydration- (present on admission)  Assessment &  Plan  Resolved  Stop IVf's  Monitor sugars and oral intake    Ketosis (HCC)- (present on admission)  Assessment & Plan  Resolved    Generalized weakness- (present on admission)  Assessment & Plan  Unable to care for self  Will need a memory care unit   Family not interested in hospice    Alzheimer disease (HCC)- (present on admission)  Assessment & Plan  Advanced, with agitation   Stop IV valium  depakote 125 mg BID   Continue scheduled seroquel 50 mg BID  namenda 5 mg daily and aricept 5 mg daily  Continue to adjust PRN  Continue trazodone 150 mg QPM  Po zoloft..  Crease to 50 mg p.o. daily on 1/26/2024  Po clonidine      Restraints off as off 1/28    Discussed behavioral disturbances with psychiatry.      Coronary artery disease due to lipid rich plaque - mild to moderate on Cath 2019 Left Dominant- (present on admission)  Assessment & Plan  Diabetic diet  Continue ARB, BB.      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 In review of yesterday's note (1/30/2024), there are no changes except as documented above.

## 2024-01-31 NOTE — CARE PLAN
The patient is Stable - Low risk of patient condition declining or worsening    Shift Goals  Clinical Goals: increase po  Patient Goals: wil  Family Goals: wil      Patient is not progressing towards the following goals:      Problem: Fall Risk  Goal: Patient will remain free from falls  Description: Target End Date:  Prior to discharge or change in level of care    Document interventions on the Deanna Cason Fall Risk Assessment    1.  Assess for fall risk factors  2.  Implement fall precautions  Outcome: Not Progressing

## 2024-02-01 NOTE — CARE PLAN
The patient is Stable - Low risk of patient condition declining or worsening    Shift Goals  Clinical Goals: patient remain free of falls, turn q2  Patient Goals: wil  Family Goals: wil      Patient is not progressing towards the following goals:      Problem: Fall Risk  Goal: Patient will remain free from falls  Description: Target End Date:  Prior to discharge or change in level of care    Document interventions on the Deanna Cason Fall Risk Assessment    1.  Assess for fall risk factors  2.  Implement fall precautions  Outcome: Not Progressing

## 2024-02-01 NOTE — DISCHARGE PLANNING
CM spoke with pt's brother Luis Carlos and he has spoken with [place] about 2018 and 2019 about closing these cases.   Waiting 12 more days for institutional Medicaid status to become eligible for LTC insurance at [place].

## 2024-02-01 NOTE — PROGRESS NOTES
[facility] Medicine Daily Progress Note    Date of Service  2/1/2024    Chief Complaint  Inability to care for self    Hospital Course  Dylon Santa is a 63 y.o. male who was admitted to [facility] on 1/11/2024. 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 He was brought to the hospital by his brother who is his primary caretaker.  His brother can no longer take care of him given worsening agitation and behavioral issues over the last 2 months.  Patient was admitted for profound hyperglycemia without evidence of DKA or HHNK.  Patient does not have capacity and his brother Luis Carlos is the DURABLE POWER OF .  Patient has been made DNR/DNI. Hospice was initially considered and then declined    Interval Problem Update  Remains axox to person, following commands, denies pain, exam and vitals stable, had hypoglycemia this am, am lantus held yesterday - will d/c am dose, continue to follow and adjust further as needed.    I have discussed this patient's plan of care and discharge plan at IDT rounds today with Case Management, Nursing, Nursing leadership, and other members of the IDT team.    Consultants/Specialty  Palliative care    Code Status  DNAR/DNI    Disposition      I have placed the appropriate orders for post-discharge needs.    Review of Systems  Review of Systems   Unable to perform ROS: Dementia        Physical Exam  Temp:  [36 °C (96.8 °F)-36.6 °C (97.9 °F)] 36.1 °C (97 °F)  Pulse:  [64-74] 74  Resp:  [14-16] 14  BP: ()/(47-66) 118/57  SpO2:  [95 %-97 %] 97 %    Physical Exam  Vitals and nursing note reviewed.   Constitutional:       General: He is not in acute distress.     Appearance: He is well-developed.      Comments: In bed    No acute distress   HENT:      Head: Normocephalic and atraumatic.      Mouth/Throat:      Pharynx: No oropharyngeal exudate.   Eyes:      General: No scleral icterus.     Pupils:  Pupils are equal, round, and reactive to light.   Neck:      Thyroid: No thyromegaly.   Cardiovascular:      Rate and Rhythm: Normal rate and regular rhythm.      Heart sounds: Normal heart sounds. No murmur heard.  Pulmonary:      Effort: Pulmonary effort is normal. No respiratory distress.      Breath sounds: Normal breath sounds. No wheezing.   Abdominal:      General: Bowel sounds are normal. There is no distension.      Palpations: Abdomen is soft.      Tenderness: There is no abdominal tenderness.   Musculoskeletal:         General: No tenderness. Normal range of motion.      Cervical back: Normal range of motion and neck supple.      Right lower leg: No edema.      Left lower leg: No edema.   Skin:     General: Skin is warm and dry.      Findings: No rash.   Neurological:      Mental Status: He is alert.      Motor: Weakness present.   Psychiatric:      Comments: Flat affect         Fluids    Intake/Output Summary (Last 24 hours) at 2/1/2024 0758  Last data filed at 2/1/2024 0540  Gross per 24 hour   Intake 960 ml   Output 700 ml   Net 260 ml         Laboratory                            Imaging  DX-CHEST-PORTABLE (1 VIEW)   Final Result      1.  Hazy medial right lower lobe opacity which could be due to pneumonitis or atelectasis.      [id number] VIEW   Final Result         Large amount of stool throughout the colon.           Assessment/Plan  * Type 2 diabetes mellitus with hyperglycemia, with long-term current use of insulin (HCC)- (present on admission)  Assessment & Plan  On 1/29/2024 Lantus back upwards to 15 units twice daily as patient is eating more        Continue ISS, adjust PRN  Following, will continue to adjust prn      Urinary retention- (present on admission)  Assessment & Plan  Leslie  placed on 1/25/2024  Proscar added to Flomax on 1/26/2024    Voiding Trial 1/27/2024    ACP (advance care planning)- (present on admission)  Assessment & Plan  Changed to DNR/DNI  Palliative  following  Awaiting acceptance at shelter    Dehydration- (present on admission)  Assessment & Plan  Resolved  Stop IVf's  Monitor sugars and oral intake    Ketosis (HCC)- (present on admission)  Assessment & Plan  Resolved    Generalized weakness- (present on admission)  Assessment & Plan  Unable to care for self  Will need a memory care unit   Family not interested in hospice    Alzheimer disease (HCC)- (present on admission)  Assessment & Plan  Advanced, with agitation   Stop IV valium  depakote 125 mg BID   Continue scheduled seroquel 50 mg BID  namenda 5 mg daily and aricept 5 mg daily  Continue to adjust PRN  Continue trazodone 150 mg QPM  Po zoloft..  Crease to 50 mg p.o. daily on 1/26/2024  Po clonidine      Restraints off as off 1/28    Discussed behavioral disturbances with psychiatry.      Coronary artery disease due to lipid rich plaque - mild to moderate on Cath 2019 Left Dominant- (present on admission)  Assessment & Plan  Diabetic diet  Continue ARB, BB.      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 In review of yesterday's note (1/31/2024), there are no changes except as documented above.

## 2024-02-01 NOTE — CARE PLAN
The patient is Stable - Low risk of patient condition declining or worsening    Shift Goals  Clinical Goals: Patient will remain free from any falls or injury throughout the shift  Patient Goals: KATIANA  Family Goals: Food, Rest    Progress made toward(s) clinical / shift goals:  Patient is AOx1, calm with flat affect, no aggression or agitation noted. Bed frame alarm on and room close to nursing station, regular rounding done. Due care rendered. Remained free of any falls or injury throughout shift.       Patient is not progressing towards the following goals:      Problem: Knowledge Deficit - Standard  Goal: Patient and family/care givers will demonstrate understanding of plan of care, disease process/condition, diagnostic tests and medications  Description: Target End Date:  1-3 days or as soon as patient condition allows    Document in Patient Education    1.  Patient and family/caregiver oriented to unit, equipment, visitation policy and means for communicating concern  2.  Complete/review Learning Assessment  3.  Assess knowledge level of disease process/condition, treatment plan, diagnostic tests and medications  4.  Explain disease process/condition, treatment plan, diagnostic tests and medications  Outcome: Not Progressing     Problem: Knowledge Deficit - Diabetes  Goal: Patient will demonstrate knowledge of insulin injection, symptoms, and treatment of hypoglycemia and diet prior to discharge  Description: Target End Date:  1-3 days or as soon as patient condition allows    1.  Explain disease process, medications and methods used in treating diabetes  2.  Explain signs and symptoms of hyper/hypoglycemia  3.  Educate about lifestyle changes including exercise, diet (carb counting) and sick day management  4.  Educate importance of regular vision appointments, regular foot care and monitoring of skin lesions  5.  Educate patient and family/caregiver on diabetes management and proper use of equipment for  testing (syringe, pens and site rotation, glucometer, test strips)  6.  Collaborate with Diabetes Educator  7.  Assess patient and family/caregiver skills with insulin pens and finger sticks. Document level of understanding in patient education  Outcome: Not Progressing

## 2024-02-02 PROBLEM — K59.00 CONSTIPATION: Status: ACTIVE | Noted: 2024-02-02

## 2024-02-02 NOTE — PROGRESS NOTES
[facility] Medicine Daily Progress Note    Date of Service  2/2/2024    Chief Complaint  Inability to care for self    Hospital Course  Dylon Santa is a 63 y.o. male who was admitted to [facility] on 1/11/2024. 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 He was brought to the hospital by his brother who is his primary caretaker.  His brother can no longer take care of him given worsening agitation and behavioral issues over the last 2 months.  Patient was admitted for profound hyperglycemia without evidence of DKA or HHNK.  Patient does not have capacity and his brother Luis Carlos is the DURABLE POWER OF .  Patient has been made DNR/DNI. Hospice was initially considered and then declined    Interval Problem Update  No significant changes overnight exam and vitals stable, no abdominal pain or tenderness, discussed with nursing and increasing bowel regimen. His bs above goal yesterday so will continue to adjust regimen and follow. ROS otherwise negative.     I have discussed this patient's plan of care and discharge plan at IDT rounds today with Case Management, Nursing, Nursing leadership, and other members of the IDT team.    Consultants/Specialty  Palliative care    Code Status  DNAR/DNI    Disposition      I have placed the appropriate orders for post-discharge needs.    Review of Systems  Review of Systems   Unable to perform ROS: Dementia        Physical Exam  Temp:  [36.1 °C (97 °F)-36.7 °C (98 °F)] 36.4 °C (97.5 °F)  Pulse:  [74-88] 80  Resp:  [16-18] 16  BP: (113-133)/(46-63) 113/57  SpO2:  [94 %-98 %] 98 %    Physical Exam  Vitals and nursing note reviewed.   Constitutional:       General: He is not in acute distress.     Appearance: He is well-developed.      Comments: In bed    No acute distress   HENT:      Head: Normocephalic and atraumatic.      Mouth/Throat:      Pharynx: No oropharyngeal exudate.   Eyes:       General: No scleral icterus.     Pupils: Pupils are equal, round, and reactive to light.   Neck:      Thyroid: No thyromegaly.   Cardiovascular:      Rate and Rhythm: Normal rate and regular rhythm.      Heart sounds: Normal heart sounds. No murmur heard.  Pulmonary:      Effort: Pulmonary effort is normal. No respiratory distress.      Breath sounds: Normal breath sounds. No wheezing.   Abdominal:      General: Bowel sounds are normal. There is no distension.      Palpations: Abdomen is soft.      Tenderness: There is no abdominal tenderness.   Musculoskeletal:         General: No tenderness. Normal range of motion.      Cervical back: Normal range of motion and neck supple.      Right lower leg: No edema.      Left lower leg: No edema.   Skin:     General: Skin is warm and dry.      Findings: No rash.   Neurological:      Mental Status: He is alert.      Motor: Weakness present.   Psychiatric:      Comments: Flat affect         Fluids    Intake/Output Summary (Last 24 hours) at 2/2/2024 1151  Last data filed at 2/2/2024 0746  Gross per 24 hour   Intake 480 ml   Output 1150 ml   Net -670 ml       Laboratory                            Imaging  DX-CHEST-PORTABLE (1 VIEW)   Final Result      1.  Hazy medial right lower lobe opacity which could be due to pneumonitis or atelectasis.      [id number] VIEW   Final Result         Large amount of stool throughout the colon.           Assessment/Plan  * Type 2 diabetes mellitus with hyperglycemia, with long-term current use of insulin (HCC)- (present on admission)  Assessment & Plan  Continue to adjust lantus, will start lower dose of am, continue same evening dose, following  Will continue to adjust as needed  See above    Continue ISS, adjust PRN      Constipation  Assessment & Plan  Following  Increased bowel regimen    Urinary retention- (present on admission)  Assessment & Plan  Leslie  placed on 1/25/2024  Proscar added to Flomax on 1/26/2024    Voiding Trial  1/27/2024    ACP (advance care planning)- (present on admission)  Assessment & Plan  Changed to DNR/DNI  Palliative following  [facility] at custod[facility]    Dehydration- (present on admission)  Assessment & Plan  Resolved  Stop IVf's  Monitor sugars and oral intake    Ketosis (HCC)- (present on admission)  Assessment & Plan  Resolved    Generalized weakness- (present on admission)  Assessment & Plan  Unable to care for self  Will need a memory care unit   Family not interested in hospice    Alzheimer disease (HCC)- (present on admission)  Assessment & Plan  Advanced, with agitation   Stop IV valium  depakote 125 mg BID   Continue scheduled seroquel 50 mg BID  namenda 5 mg daily and aricept 5 mg daily  Continue to adjust PRN  Continue trazodone 150 mg QPM  Po zoloft..  Crease to 50 mg p.o. daily on 1/26/2024  Po clonidine      Restraints off as off 1/28    Discussed behavioral disturbances with psychiatry.      Coronary artery disease due to lipid rich plaque - mild to moderate on Cath 2019 Left Dominant- (present on admission)  Assessment & Plan  Diabetic diet  Continue ARB, BB.      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 In review of yesterday's note (2/1/2024), there are no changes except as documented above.

## 2024-02-02 NOTE — PROGRESS NOTES
NOC HOSPITALIST CROSS COVER    Notified by RN regarding patient not having a bowel movement since 1/21/2024.  He has a semifirm abdomen with hypoactive bowel sounds.  He has been getting MiraLAX daily.  Lactulose enema ordered.      -----------------------------------------------------------------------------------------------------------    Electronically signed by:  Maurilio Rivas, JURGEN, SAMMI, LEVI-BC  Hospitalist Services

## 2024-02-02 NOTE — CARE PLAN
The patient is Watcher - Medium risk of patient condition declining or worsening    Shift Goals  Clinical Goals: Maintain safety at all times.  Patient Goals: KATIANA (Patient is A&O x1 (oriented to self only)  Family Goals: none present    Progress made toward(s) clinical / shift goals:Kept side rails up x3, bed alarm on, hourly rounds, bed wheels locked and in low position. No reported occurrence of fall and injury.    Patient is not progressing towards the following goals: N/A      Problem: Knowledge Deficit - Standard  Goal: Patient and family/care givers will demonstrate understanding of plan of care, disease process/condition, diagnostic tests and medications  Outcome: Not Met     Problem: Knowledge Deficit - Diabetes  Goal: Patient will demonstrate knowledge of insulin injection, symptoms, and treatment of hypoglycemia and diet prior to discharge  Outcome: Not Met

## 2024-02-02 NOTE — PROGRESS NOTES
Received patient on bed awake, A&O x 1 (oriented to self only), IV to saline locked at right forearm G20, flushed wnl. Bed alarm on, rails up x3, bed wheels locked and in low position. Will continue hourly monitoring.

## 2024-02-03 NOTE — CARE PLAN
The patient is Watcher - Medium risk of patient condition declining or worsening    Shift Goals  Clinical Goals: Maintain safety at all times.  Patient Goals: KATIANA (Patient is A&O x 1 (oriented to self only, disoriented to time, place, and situation).)  Family Goals: none present    Progress made toward(s) clinical / shift goals: Kept side rails up, bed alarm on, bed wheels locked and in low position, and hourly monitoring. No reported occurrence of fall and injury.    Patient is not progressing towards the following goals:      Problem: Knowledge Deficit - Standard  Goal: Patient and family/care givers will demonstrate understanding of plan of care, disease process/condition, diagnostic tests and medications  Outcome: Not Met     Problem: Knowledge Deficit - Diabetes  Goal: Patient will demonstrate knowledge of insulin injection, symptoms, and treatment of hypoglycemia and diet prior to discharge  Outcome: Not Met

## 2024-02-03 NOTE — PROGRESS NOTES
Received patient sitting up on bed awake, A&O x 1 (oriented to self only, disoriented to time, place and situation). Oriented patient frequently. IV to saline locked at right forearm G20, flushed wnl. and galarza to dependent gravity. Redness on buttocks noted, applied with barrier cream and foam heel dressing in place. Bed alarm on, rails up x3, bed wheels locked and in low position. Will continue hourly monitoring.

## 2024-02-03 NOTE — PROGRESS NOTES
[facility] Medicine Daily Progress Note    Date of Service  2/3/2024    Chief Complaint  Inability to care for self    Hospital Course  Dylon Santa is a 63 y.o. male who was admitted to [facility] on 1/11/2024. 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 He was brought to the hospital by his brother who is his primary caretaker.  His brother can no longer take care of him given worsening agitation and behavioral issues over the last 2 months.  Patient was admitted for profound hyperglycemia without evidence of DKA or HHNK.  Patient does not have capacity and his brother Luis Carlos is the DURABLE POWER OF .  Patient has been made DNR/DNI. Hospice was initially considered and then declined    Interval Problem Update  Large bm yesterday, this am hypoglycemic 35, no associated sx and bs increased with juice and food, will adjust lantus regimen. Alert, following commands, remains axox1, exam and vitals stable.     I have discussed this patient's plan of care and discharge plan at IDT rounds today with Case Management, Nursing, Nursing leadership, and other members of the IDT team.    Consultants/Specialty  Palliative care    Code Status  DNAR/DNI    Disposition      I have placed the appropriate orders for post-discharge needs.    Review of Systems  Review of Systems   Unable to perform ROS: Dementia        Physical Exam  Temp:  [36.4 °C (97.5 °F)-36.9 °C (98.5 °F)] 36.7 °C (98 °F)  Pulse:  [66-89] 66  Resp:  [15-16] 15  BP: (114-122)/(48-65) 120/52  SpO2:  [94 %-96 %] 96 %    Physical Exam  Vitals and nursing note reviewed.   Constitutional:       General: He is not in acute distress.     Appearance: He is well-developed.      Comments: In bed    No acute distress   HENT:      Head: Normocephalic and atraumatic.      Mouth/Throat:      Pharynx: No oropharyngeal exudate.   Eyes:      General: No scleral icterus.     Pupils:  Pupils are equal, round, and reactive to light.   Neck:      Thyroid: No thyromegaly.   Cardiovascular:      Rate and Rhythm: Normal rate and regular rhythm.      Heart sounds: Normal heart sounds. No murmur heard.  Pulmonary:      Effort: Pulmonary effort is normal. No respiratory distress.      Breath sounds: Normal breath sounds. No wheezing.   Abdominal:      General: Bowel sounds are normal. There is no distension.      Palpations: Abdomen is soft.      Tenderness: There is no abdominal tenderness.   Musculoskeletal:         General: No tenderness. Normal range of motion.      Cervical back: Normal range of motion and neck supple.      Right lower leg: No edema.      Left lower leg: No edema.   Skin:     General: Skin is warm and dry.      Findings: No rash.   Neurological:      Mental Status: He is alert.      Motor: Weakness present.   Psychiatric:      Comments: Flat affect         Fluids    Intake/Output Summary (Last 24 hours) at 2/3/2024 1415  Last data filed at 2/3/2024 0540  Gross per 24 hour   Intake 630 ml   Output 920 ml   Net -290 ml       Laboratory                            Imaging  DX-CHEST-PORTABLE (1 VIEW)   Final Result      1.  Hazy medial right lower lobe opacity which could be due to pneumonitis or atelectasis.      [id number] VIEW   Final Result         Large amount of stool throughout the colon.           Assessment/Plan  * Type 2 diabetes mellitus with hyperglycemia, with long-term current use of insulin (HCC)- (present on admission)  Assessment & Plan  Bs labile, hypoglycemia this am, will stop am lantus for now, reduce pm dose, will continue to follow and adjust  Will continue to adjust as needed  See above    Continue ISS, adjust PRN      Constipation  Assessment & Plan  Following  Increased bowel regimen    Urinary retention- (present on admission)  Assessment & Plan  Leslie  placed on 1/25/2024  Proscar added to Flomax on 1/26/2024    Voiding Trial 1/27/2024    ACP (advance care  planning)- (present on admission)  Assessment & Plan  Changed to DNR/DNI  Palliative following  [facility]waiting [facility] at senior living    Dehydration- (present on admission)  Assessment & Plan  Resolved  Stop IVf's  Monitor sugars and oral intake    Ketosis (HCC)- (present on admission)  Assessment & Plan  Resolved    Generalized weakness- (present on admission)  Assessment & Plan  Unable to care for self  Will need a memory care unit   Family not interested in hospice    Alzheimer disease (HCC)- (present on admission)  Assessment & Plan  Advanced, with agitation   Stop IV valium  depakote 125 mg BID   Continue scheduled seroquel 50 mg BID  namenda 5 mg daily and aricept 5 mg daily  Continue to adjust PRN  Continue trazodone 150 mg QPM  Po zoloft..  Crease to 50 mg p.o. daily on 1/26/2024  Po clonidine      Restraints off as off 1/28    Discussed behavioral disturbances with psychiatry.      Coronary artery disease due to lipid rich plaque - mild to moderate on Cath 2019 Left Dominant- (present on admission)  Assessment & Plan  Diabetic diet  Continue ARB, BB.      Stage 3a chronic kidney disease (HCC)- (present on admission)  Assessment & Plan  Improving, likely an element of REGULO on CKD Stage III 2/2 DM nephropathy  No AGMA  Following intermittently    Dyslipidemia- (present on admission)  Assessment & Plan        Hypertension- (present on admission)  Assessment & Plan  BP is decently controlled  Continue toprol XL 25 mg daily, losartan 100 mg daily  Adjust prn  Continue to follow         VTE prophylaxis: Xarelto    I have performed a physical exam and reviewed and updated ROS and Plan today (2/3/2024). In review of yesterday's note (2/2/2024), there are no changes except as documented above.

## 2024-02-03 NOTE — CARE PLAN
The patient is Watcher - Medium risk of patient condition declining or worsening    Shift Goals  Clinical Goals: Maintain safety at all times.  Patient Goals: KATIANA (Patient is A&O x1 (oriented to self only))  Family Goals: none present    Progress made toward(s) clinical / shift goals:      Problem: Skin Integrity - Diabetes  Goal: Patient's skin on legs and feet will remain intact while hospitalized  Outcome: Progressing     Problem: Infection - Diabetes  Goal: Promotion wound healing, line and drain management  Outcome: Progressing       Patient is not progressing towards the following goals:

## 2024-02-04 NOTE — CARE PLAN
The patient is Watcher - Medium risk of patient condition declining or worsening    Shift Goals  Clinical Goals: Free from fall and injury  Patient Goals: KATIANA (Patient is A&O x1 (oriented to self only)  Family Goals: KATIANA    Progress made toward(s) clinical / shift goals:     Patient is not progressing towards the following goals: Kept bed alarm on, side rails up x3, bed wheels locked and in low position, and continued hourly monitoring. Trazodone PO and Haldol IM given. No reported occurrence of injury and fall.       Problem: Knowledge Deficit - Standard  Goal: Patient and family/care givers will demonstrate understanding of plan of care, disease process/condition, diagnostic tests and medications  Outcome: Not Met     Problem: Knowledge Deficit - Diabetes  Goal: Patient will demonstrate knowledge of insulin injection, symptoms, and treatment of hypoglycemia and diet prior to discharge  Outcome: Not Met

## 2024-02-04 NOTE — PROGRESS NOTES
Communication with hospitalist, Iveth Fajardo MD, pt has galarza with noted DC orers from 01/23 and 01/25. Review of galarza and pulling, need of geovanni, RN to inquir if he needs it or if it should be removed. Noted pt with need of SCD and SCD placed. Hospitalist states to please do a trial without galarza, galarza removed.

## 2024-02-04 NOTE — PROGRESS NOTES
Patient is agitated and tried to get out of bed repeatedly. Provided safety measures at all times.Haldol 1mg IM administered. Bed alarm on and side rails up x3.

## 2024-02-04 NOTE — PROGRESS NOTES
[facility] Medicine Daily Progress Note    Date of Service  2/4/2024    Chief Complaint  Inability to care for self    Hospital Course  Dylon Santa is a 63 y.o. male who was admitted to [facility] on 1/11/2024. 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 He was brought to the hospital by his brother who is his primary caretaker.  His brother can no longer take care of him given worsening agitation and behavioral issues over the last 2 months.  Patient was admitted for profound hyperglycemia without evidence of DKA or HHNK.  Patient does not have capacity and his brother Luis Carlos is the DURABLE POWER OF .  Patient has been made DNR/DNI. Hospice was initially considered and then declined    Interval Problem Update  More alert this am, coloring in a coloring book, he is axox1, calm and cooperative, he denies pain, denies sob, bm yesterday, discussed trial without galarza with nursing. Will continue to adjust insulin, bs above goal, no hypoglycemia today, exam and vitals stable, ros otherwise negative     I have discussed this patient's plan of care and discharge plan at IDT rounds today with Case Management, Nursing, Nursing leadership, and other members of the IDT team.    Consultants/Specialty  Palliative care    Code Status  DNAR/DNI    Disposition      I have placed the appropriate orders for post-discharge needs.    Review of Systems  Review of Systems   Unable to perform ROS: Dementia        Physical Exam  Temp:  [36.3 °C (97.4 °F)-36.9 °C (98.5 °F)] 36.3 °C (97.4 °F)  Pulse:  [72-82] 72  Resp:  [17-18] 17  BP: (105-148)/(42-70) 132/52  SpO2:  [96 %-98 %] 98 %    Physical Exam  Vitals and nursing note reviewed.   Constitutional:       General: He is not in acute distress.     Appearance: He is well-developed.      Comments: In bed    No acute distress   HENT:      Head: Normocephalic and atraumatic.      Mouth/Throat:       Pharynx: No oropharyngeal exudate.   Eyes:      General: No scleral icterus.     Pupils: Pupils are equal, round, and reactive to light.   Neck:      Thyroid: No thyromegaly.   Cardiovascular:      Rate and Rhythm: Normal rate and regular rhythm.      Heart sounds: Normal heart sounds. No murmur heard.  Pulmonary:      Effort: Pulmonary effort is normal. No respiratory distress.      Breath sounds: Normal breath sounds. No wheezing.   Abdominal:      General: Bowel sounds are normal. There is no distension.      Palpations: Abdomen is soft.      Tenderness: There is no abdominal tenderness.   Musculoskeletal:         General: No tenderness. Normal range of motion.      Cervical back: Normal range of motion and neck supple.      Right lower leg: No edema.      Left lower leg: No edema.   Skin:     General: Skin is warm and dry.      Findings: No rash.   Neurological:      Mental Status: He is alert.      Motor: Weakness present.   Psychiatric:      Comments: Flat affect         Fluids    Intake/Output Summary (Last 24 hours) at 2/4/2024 1159  Last data filed at 2/4/2024 0535  Gross per 24 hour   Intake 1020 ml   Output 1150 ml   Net -130 ml       Laboratory                            Imaging  DX-CHEST-PORTABLE (1 VIEW)   Final Result      1.  Hazy medial right lower lobe opacity which could be due to pneumonitis or atelectasis.      [id number] VIEW   Final Result         Large amount of stool throughout the colon.           Assessment/Plan  * Type 2 diabetes mellitus with hyperglycemia, with long-term current use of insulin (HCC)- (present on admission)  Assessment & Plan  Bs labile no hypoglycemia this am, following, will continue to adjust lantus    Continue ISS, adjust PRN      Constipation  Assessment & Plan  Resolved  Continue bowel regimen    Urinary retention- (present on admission)  Assessment & Plan  Leslie  placed on 1/25/2024  Proscar added to Flomax on 1/26/2024  Will do another voiding trial  today    ACP (advance care planning)- (present on admission)  Assessment & Plan  Changed to DNR/DNI  Palliative following  [facility]    Dehydration- (present on admission)  Assessment & Plan  Resolved  Stop IVf's  Monitor sugars and oral intake    Ketosis (HCC)- (present on admission)  Assessment & Plan  Resolved    Generalized weakness- (present on admission)  Assessment & Plan  Unable to care for self  Will need a memory care unit   Family not interested in hospice    Alzheimer disease (HCC)- (present on admission)  Assessment & Plan  Advanced, with agitation   Stop IV valium  depakote 125 mg BID   Continue scheduled seroquel 50 mg BID  namenda 5 mg daily and aricept 5 mg daily  Continue to adjust PRN  Continue trazodone 150 mg QPM  Po zoloft..  Crease to 50 mg p.o. daily on 1/26/2024  Po clonidine      Restraints off as off 1/28    Discussed behavioral disturbances with psychiatry.      Coronary artery disease due to lipid rich plaque - mild to moderate on Cath 2019 Left Dominant- (present on admission)  Assessment & Plan  Diabetic diet  Continue ARB, BB.      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 In review of yesterday's note (2/3/2024), there are no changes except as documented above.

## 2024-02-04 NOTE — PROGRESS NOTES
Patient sitting up on bed awake with IVF, A&O x 1 (oriented to self only, disoriented to time, place and situation). Oriented patient frequently. Leslie to dependent gravity. Redness on buttocks noted, applied with barrier cream and foam heel dressing in place. Bed alarm on, rails up x3, bed wheels locked and in low position. Will continue hourly monitoring.

## 2024-02-05 NOTE — PROGRESS NOTES
Communication with hospitalist, pt is still a no void. Bladder scan with 277. Inquiry of next bladder scan needs for pt.

## 2024-02-05 NOTE — CARE PLAN
The patient is Stable - Low risk of patient condition declining or worsening    Shift Goals  Clinical Goals: Patient will be free from injury  Patient Goals: KATIANA  Family Goals: KATIANA    Progress made toward(s) clinical / shift goals:  Patient alert only to self, resting comfortably in bed.  All meds given per Mar.  All belongings within reach.  Bladder scanned  this evening post void at 100ml of urine.  Respirations even and unlabored.   Patient is not progressing towards the following goals:      Problem: Knowledge Deficit - Standard  Goal: Patient and family/care givers will demonstrate understanding of plan of care, disease process/condition, diagnostic tests and medications  Outcome: Not Progressing     Problem: Knowledge Deficit - Diabetes  Goal: Patient will demonstrate knowledge of insulin injection, symptoms, and treatment of hypoglycemia and diet prior to discharge  Outcome: Not Progressing

## 2024-02-05 NOTE — CARE PLAN
The patient is Watcher - Medium risk of patient condition declining or worsening    Shift Goals  Clinical Goals: Pt will be free from injury or fall by the end of shift.  Patient Goals: KATIANA-confusion.  Family Goals: KATIANA    Progress made toward(s) clinical / shift goals:  Pt is free from injury and fall by the end of shift.     Patient is not progressing towards the following goals:    Pt is confused and AAOx1, pt is unable to   Patient and family/care givers will demonstrate understanding of plan of care, disease process/condition, diagnostic tests and medications and Patient will demonstrate knowledge of insulin injection, symptoms, and treatment of hypoglycemia and diet prior to discharge     Problem: Knowledge Deficit - Standard  Goal: Patient and family/care givers will demonstrate understanding of plan of care, disease process/condition, diagnostic tests and medications  Outcome: Not Progressing     Problem: Diabetes Management  Goal: Patient will achieve and maintain glucose in satisfactory range  Outcome: Not Progressing     Problem: Knowledge Deficit - Diabetes  Goal: Patient will demonstrate knowledge of insulin injection, symptoms, and treatment of hypoglycemia and diet prior to discharge  Outcome: Not Progressing

## 2024-02-05 NOTE — PROGRESS NOTES
[facility] Medicine Daily Progress Note    Date of Service  2/5/2024    Chief Complaint  Inability to care for self    Hospital Course  Dylon Santa is a 63 y.o. male who was admitted to [facility] on 1/11/2024. 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 He was brought to the hospital by his brother who is his primary caretaker.  His brother can no longer take care of him given worsening agitation and behavioral issues over the last 2 months.  Patient was admitted for profound hyperglycemia without evidence of DKA or HHNK.  Patient does not have capacity and his brother Luis Carlos is the DURABLE POWER OF .  Patient has been made DNR/DNI. Hospice was initially considered and then declined    Interval Problem Update  No significant events overnight, bs improved, continue current regimen, tolerating trial without galarza, continue to follow, continue bladder scans. Exam and vitals stable. ROS otherwise negative    I have discussed this patient's plan of care and discharge plan at IDT rounds today with Case Management, Nursing, Nursing leadership, and other members of the IDT team.    Consultants/Specialty  Palliative care    Code Status  DNAR/DNI    Disposition      I have placed the appropriate orders for post-discharge needs.    Review of Systems  Review of Systems   Unable to perform ROS: Dementia        Physical Exam  Temp:  [36.2 °C (97.1 °F)-36.8 °C (98.3 °F)] 36.3 °C (97.4 °F)  Pulse:  [68-89] 68  Resp:  [17-18] 18  BP: (111-134)/(42-55) 120/42  SpO2:  [95 %-96 %] 96 %    Physical Exam  Vitals and nursing note reviewed.   Constitutional:       General: He is not in acute distress.     Appearance: He is well-developed.      Comments: In bed    No acute distress   HENT:      Head: Normocephalic and atraumatic.      Mouth/Throat:      Pharynx: No oropharyngeal exudate.   Eyes:      General: No scleral icterus.     Pupils:  Pupils are equal, round, and reactive to light.   Neck:      Thyroid: No thyromegaly.   Cardiovascular:      Rate and Rhythm: Normal rate and regular rhythm.      Heart sounds: Normal heart sounds. No murmur heard.  Pulmonary:      Effort: Pulmonary effort is normal. No respiratory distress.      Breath sounds: Normal breath sounds. No wheezing.   Abdominal:      General: Bowel sounds are normal. There is no distension.      Palpations: Abdomen is soft.      Tenderness: There is no abdominal tenderness.   Musculoskeletal:         General: No tenderness. Normal range of motion.      Cervical back: Normal range of motion and neck supple.      Right lower leg: No edema.      Left lower leg: No edema.   Skin:     General: Skin is warm and dry.      Findings: No rash.   Neurological:      Mental Status: He is alert.      Motor: Weakness present.   Psychiatric:      Comments: Flat affect         Fluids    Intake/Output Summary (Last 24 hours) at 2/5/2024 1329  Last data filed at 2/5/2024 1100  Gross per 24 hour   Intake 120 ml   Output 277 ml   Net -157 ml       Laboratory                            Imaging  DX-CHEST-PORTABLE (1 VIEW)   Final Result      1.  Hazy medial right lower lobe opacity which could be due to pneumonitis or atelectasis.      IT-HORNZYI-0 VIEW   Final Result         Large amount of stool throughout the colon.           Assessment/Plan  * Type 2 diabetes mellitus with hyperglycemia, with long-term current use of insulin (HCC)- (present on admission)  Assessment & Plan  Bs labile no hypoglycemia this am, following, will continue to adjust lantus    Continue ISS, adjust PRN      Constipation  Assessment & Plan  Resolved  Continue bowel regimen    Urinary retention- (present on admission)  Assessment & Plan  Galarza  placed on 1/25/2024  Proscar added to Flomax on 1/26/2024  Tolerating removal of galarza 2/4, continue to follow, bladder scans    ACP (advance care planning)- (present on  admission)  Assessment & Plan  Changed to DNR/DNI  Palliative following  Awaiting acceptance at [facility]    Dehydration- (present on admission)  Assessment & Plan  Resolved  Stop IVf's  Monitor sugars and oral intake    Ketosis (HCC)- (present on admission)  Assessment & Plan  Resolved    Generalized weakness- (present on admission)  Assessment & Plan  Unable to care for self  Will need a memory care unit   Family not interested in hospice    Alzheimer disease (HCC)- (present on admission)  Assessment & Plan  Advanced, with agitation   Stop IV valium  depakote 125 mg BID   Continue scheduled seroquel 50 mg BID  namenda 5 mg daily and aricept 5 mg daily  Continue to adjust PRN  Continue trazodone 150 mg QPM  Po zoloft..  Crease to 50 mg p.o. daily on 1/26/2024  Po clonidine      Restraints off as off 1/28    Discussed behavioral disturbances with psychiatry.      Coronary artery disease due to lipid rich plaque - mild to moderate on Cath 2019 Left Dominant- (present on admission)  Assessment & Plan  Diabetic diet  Continue ARB, BB.      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 In review of yesterday's note (2/4/2024), there are no changes except as documented above.

## 2024-02-05 NOTE — CARE PLAN
The patient is Stable - Low risk of patient condition declining or worsening    Shift Goals  Clinical Goals: Bladder scan, monitor I/o  Patient Goals: wil  Family Goals: updates    Patient is not progressing towards the following goals:      Problem: Fall Risk  Goal: Patient will remain free from falls  Description: Target End Date:  Prior to discharge or change in level of care    Document interventions on the Deanna Cason Fall Risk Assessment    1.  Assess for fall risk factors  2.  Implement fall precautions  2/5/2024 1518 by Terry Casas, R.N.  Outcome: Not Progressing  2/5/2024 1518 by Terry Casas REMILIE.  Reactivated

## 2024-02-06 PROBLEM — L03.114 CELLULITIS OF LEFT ELBOW: Status: ACTIVE | Noted: 2024-02-06

## 2024-02-06 NOTE — PROGRESS NOTES
"Assumed care of pt. All medications taken and tolerated, no c/o pain at this time. Pt restless and tearful this pm, attempted to get out of bed several times to \"go home.\"   Bladder scan yielded 600+ml, Hospitalist BETH Rivas notified. Orders placed for straight cath which yielded 620 ml urine.   Pt now resting in bed w/ call light and belongings in reach, bed alarm on, hourly rounding in place. No additional needs at this time.  "

## 2024-02-06 NOTE — PROGRESS NOTES
NOC HOSPITALIST CROSS COVER    Notified by RN regarding becoming acutely agitated after having been straight cathed.  Throughout the night he has become increasingly more impulsive and attempting to get out of bed frequently.  He was given a dose of Haldol with minimal to no improvement in his impulsiveness.  He is at high risk for falls.  Lapbelt restraint in use for patient safety.      Vitals:    02/05/24 2156   BP: 117/72   Pulse: 68   Resp: 18   Temp: 36.3 °C (97.3 °F)   SpO2: 97%          -----------------------------------------------------------------------------------------------------------    Electronically signed by:  Maurilio Rivas, JURGEN, APRN, LEVI-BC  Hospitalist Services

## 2024-02-06 NOTE — CARE PLAN
The patient is Stable - Low risk of patient condition declining or worsening    Shift Goals  Clinical Goals: Monitor I&O, bladder scan/straight cath, rest, comfort  Patient Goals: KATIANA  Family Goals: updates    Progress made toward(s) clinical / shift goals: Pt bladder scanned and straight catheterized as appropriate, I&Os monitored throughout night.     Problem: Knowledge Deficit - Standard  Goal: Patient and family/care givers will demonstrate understanding of plan of care, disease process/condition, diagnostic tests and medications  Outcome: Progressing     Problem: Nutrition Deficit - Diabetes  Goal: Patient will demonstrate adequate hydration and vital signs  Outcome: Progressing     Problem: Fall Risk  Goal: Patient will remain free from falls  Outcome: Progressing     Problem: Skin Integrity  Goal: Skin integrity is maintained or improved  Outcome: Progressing        11-May-2017 13:48

## 2024-02-06 NOTE — PROGRESS NOTES
"Pt grew more agitated/impulsive throughout night, attempting to get out of bed despite redirection and assistance. IV haldol administered. Pt frequently yelling, \"come here, come here\" and reaching for staff incessantly despite attempts to calm him down. Pt seated at nurses station to provide distraction/supervision. Pt still acutely distressed/agitated, calling for staff even if they are seated next to him. Lap belt applied and second dose of IV haldol administered.  Bladder scan as of 0530 yielded 340 mL.  "

## 2024-02-07 NOTE — PROGRESS NOTES
Assumed care of pt. All medications taken and tolerated, no c/o pain at this time. Leslie catheter in place. Lap belt reapplied d/t agitation, PRN IV haldol administered. Pt now resting in bed w/ call light and belongings in reach, bed alarm on, hourly rounding in place. No additional needs at this time.

## 2024-02-07 NOTE — CARE PLAN
The patient is Stable - Low risk of patient condition declining or worsening    Shift Goals  Clinical Goals: Leslie catheter insertion, manage agitation, rest, comfort  Patient Goals: KATIANA  Family Goals: katiana    Progress made toward(s) clinical / shift goals:  Leslie catheter in place, IV haldol administered as needed and lap belt applied.     Problem: Knowledge Deficit - Standard  Goal: Patient and family/care givers will demonstrate understanding of plan of care, disease process/condition, diagnostic tests and medications  Outcome: Progressing     Problem: Fall Risk  Goal: Patient will remain free from falls  Outcome: Progressing     Problem: Skin Integrity  Goal: Skin integrity is maintained or improved  Outcome: Progressing

## 2024-02-07 NOTE — TELEPHONE ENCOUNTER
Luis Carlos (Brother) is calling to inform you that patient is currently admitted in the hospital ([facility] - rm [id number]). Luis Carlos is concerned about his sugar levels, stating that his numbers are running high. Glucose reading are under labs.He is requesting that you review his chart and advise.

## 2024-02-07 NOTE — ASSESSMENT & PLAN NOTE
2/4:  review of media tab photos, superficial abrasions noted.  2/6:  noted by bedside RN, appears to have abrasions, erythema, edema and erythema c/w cellulitis from pressure injury.  Keflex 500mg po tid x 5 days.  Wound care consult for pressure injury.  2/7:  improved.  Decreased erythema decreased edema noted left hand and arm.  No joint involvement.  Patient has pressure bandage in place for elbow protection.  2/9:  negative u/s venous

## 2024-02-07 NOTE — PROGRESS NOTES
[facility] Medicine Daily Progress Note    Date of Service  2/6/2024    Chief Complaint  Inability to care for self    Hospital Course  Dylon Santa is a 63 y.o. male who was admitted to [facility] on 1/11/2024. 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 He was brought to the hospital by his brother who is his primary caretaker.  His brother can no longer take care of him given worsening agitation and behavioral issues over the last 2 months.  Patient was admitted for profound hyperglycemia without evidence of DKA or HHNK.  Patient does not have capacity and his brother Luis Carlos is the DURABLE POWER OF .  Patient has been made DNR/DNI. Hospice was initially considered and then declined    Interval Problem Update  2/6:  left elbow erythema, swelling, abrasions c/w cellulitis.  Started on keflex and wound care.  Likely from pressure injury.  Patient did not want to be disturbed on exam today, limiting exam.  H/o aggression. Pending long term SNF bed. Requiring replacement of Leslie catheter, retention x2 on bladder scans, increased flomax and dc'd clonidine and cozaar for hypotension.  I have discussed this patient's plan of care and discharge plan at IDT rounds today with Case Management, Nursing, Nursing leadership, and other members of the IDT team.    Consultants/Specialty  Palliative care    Code Status  DNAR/DNI    Disposition      I have placed the appropriate orders for post-discharge needs.    Review of Systems  Review of Systems   Unable to perform ROS: Dementia        Physical Exam  Temp:  [36.3 °C (97.3 °F)-36.7 °C (98 °F)] 36.7 °C (98 °F)  Pulse:  [65-87] 87  Resp:  [16-18] 16  BP: (102-117)/(54-72) 103/69  SpO2:  [95 %-97 %] 96 %    Physical Exam  Vitals and nursing note reviewed.   Constitutional:       General: He is not in acute distress.     Appearance: He is well-developed.      Comments: In bed    No  acute distress   HENT:      Head: Normocephalic and atraumatic.      Mouth/Throat:      Pharynx: No oropharyngeal exudate.   Eyes:      General: No scleral icterus.     Pupils: Pupils are equal, round, and reactive to light.   Neck:      Thyroid: No thyromegaly.   Cardiovascular:      Rate and Rhythm: Normal rate and regular rhythm.      Heart sounds: Normal heart sounds. No murmur heard.  Pulmonary:      Effort: Pulmonary effort is normal. No respiratory distress.      Breath sounds: Normal breath sounds. No wheezing.   Abdominal:      General: Bowel sounds are normal. There is no distension.      Palpations: Abdomen is soft.      Tenderness: There is no abdominal tenderness.   Musculoskeletal:         General: No tenderness. Normal range of motion.      Cervical back: Normal range of motion and neck supple.      Right lower leg: No edema.      Left lower leg: No edema.   Skin:     General: Skin is warm and dry.      Findings: No rash.   Neurological:      Mental Status: He is alert.      Motor: Weakness present.   Psychiatric:      Comments: Flat affect         Fluids    Intake/Output Summary (Last 24 hours) at 2/6/2024 1914  Last data filed at 2/6/2024 1613  Gross per 24 hour   Intake 940 ml   Output 1020 ml   Net -80 ml       Laboratory        Recent Labs     02/06/24  0150   SODIUM 131*   POTASSIUM 4.8   CHLORIDE 95*   CO2 25   GLUCOSE 173*   BUN 27*   CREATININE 1.60*   CALCIUM 9.2                     Imaging  DX-CHEST-PORTABLE (1 VIEW)   Final Result      1.  Hazy medial right lower lobe opacity which could be due to pneumonitis or atelectasis.      [id number] VIEW   Final Result         Large amount of stool throughout the colon.           Assessment/Plan  * Type 2 diabetes mellitus with hyperglycemia, with long-term current use of insulin (HCC)- (present on admission)  Assessment & Plan  Bs labile no hypoglycemia this am, following, will continue to adjust lantus    Continue ISS, adjust  PRN      Cellulitis of left elbow  Assessment & Plan  2/6:  noted by bedside RN, appears to have abrasions, erythema, edema and erythema c/w cellulitis from pressure injury.  Keflex 500mg po qid x 7 days.  Wound care consult for pressure injury.    Constipation  Assessment & Plan  Resolved  Continue bowel regimen    Urinary retention- (present on admission)  Assessment & Plan  Galarza  placed on 1/25/2024  Proscar added to Flomax on 1/26/2024  Tolerating removal of galarza 2/4, continue to follow, bladder scans.  2/6:  replaced galarza for urine retention x 2 bladder scans.  Increased flomax 0.8 mg po dailyl with proscar 5mg nightly.    ACP (advance care planning)- (present on admission)  Assessment & Plan  Changed to DNR/DNI  Palliative following  Awaiting acceptance at [facility]    Dehydration- (present on admission)  Assessment & Plan  Resolved  Stop IVf's  Monitor sugars and oral intake    Ketosis (HCC)- (present on admission)  Assessment & Plan  Resolved    Generalized weakness- (present on admission)  Assessment & Plan  Unable to care for self  Will need a memory care unit   Family not interested in hospice    Alzheimer disease (HCC)- (present on admission)  Assessment & Plan  Advanced, with agitation   Stop IV valium  depakote 125 mg BID   Continue scheduled seroquel 50 mg BID  namenda 5 mg daily and aricept 5 mg daily  Continue to adjust PRN  Continue trazodone 150 mg QPM  Po zoloft..  Crease to 50 mg p.o. daily on 1/26/2024  Po clonidine      Restraints off as off 1/28    Discussed behavioral disturbances with psychiatry.      Coronary artery disease due to lipid rich plaque - mild to moderate on Cath 2019 Left Dominant- (present on admission)  Assessment & Plan  Diabetic diet  Continue ARB, BB.      Stage 3a chronic kidney disease (HCC)- (present on admission)  Assessment & Plan  Improving, likely an element of REGULO on CKD Stage III 2/2 DM nephropathy  No AGMA  Following intermittently  Will check bmp in  am    Dyslipidemia- (present on admission)  Assessment & Plan        Hypertension- (present on admission)  Assessment & Plan  hypotensive  Continue toprol XL 25 mg daily,   Dc losartan 100 mg daily on 2/6  Dc clonidine 2/6.         VTE prophylaxis: Xarelto    I have performed a physical exam and reviewed and updated ROS and Plan today (2/6/2024). In review of yesterday's note (2/5/2024), there are no changes except as documented above.

## 2024-02-08 NOTE — CARE PLAN
The patient is Stable - Low risk of patient condition declining or worsening    Shift Goals  Clinical Goals: Manage agition, rest  Patient Goals: KATIANA  Family Goals: katiana    Problem: Knowledge Deficit - Standard  Goal: Patient and family/care givers will demonstrate understanding of plan of care, disease process/condition, diagnostic tests and medications  Outcome: Progressing     Problem: Diabetes Management  Goal: Patient will achieve and maintain glucose in satisfactory range  Outcome: Progressing     Problem: Knowledge Deficit - Diabetes  Goal: Patient will demonstrate knowledge of insulin injection, symptoms, and treatment of hypoglycemia and diet prior to discharge  Outcome: Progressing     Problem: Discharge Planning - Diabetes  Goal: Patient's continuum of care needs will be met  Outcome: Progressing     Problem: Skin Integrity - Diabetes  Goal: Patient's skin on legs and feet will remain intact while hospitalized  Outcome: Progressing     Problem: Infection - Diabetes  Goal: Promotion wound healing, line and drain management  Outcome: Progressing     Problem: Fluid Balance or Risk for Fluid Volume Deficit  Goal: Patient will demonstrate adequate hydration and vital signs  Outcome: Progressing     Problem: Nutrition Deficit - Diabetes  Goal: Patient will demonstrate adequate hydration and vital signs  Outcome: Progressing     Problem: Fall Risk  Goal: Patient will remain free from falls  Outcome: Progressing     Problem: Skin Integrity  Goal: Skin integrity is maintained or improved  Outcome: Progressing

## 2024-02-08 NOTE — WOUND TEAM
[facility]  Inpatient Services  Wound and Skin Care Brief Evaluation    Admission Date: 1/11/2024     Last order of IP CONSULT TO WOUND CARE was found on 2/6/2024 from Hospital Encounter on 1/11/2024     HPI, PMH, SH: Reviewed    Chief Complaint   Patient presents with    Other     Pt to triage accompanied by brother, concerned about increasing aggression since yesterday, has hx of dementia. Baseline aa0x1. Per brother who is caregiver will be having a surgery done tomorrow and states that he can't take care of the pt anymore, asking assistance for placement. Pt normally takes quetiapine fumarate for aggression but doesn't seem to help much yesterday.      Diagnosis: Ketosis (HCC) [E88.89]    Unit where seen by Wound Team: S612/02     Wound consult placed regarding L Elbow. Chart and images reviewed. This clinician in to assess patient. Healing abrasion to L elbow. Bacitracin ointment ordered.    No pressure injuries or advanced wound care needs identified. Wound consult completed. Wound team signing off, re-consult if patient has further advanced wound care needs.    Wound 02/04/24 Abrasion Arm Distal;Anterior Left (Active)   Date First Assessed/Time First Assessed: 02/04/24 2100   Primary Wound Type: Abrasion  Location: Arm  Wound Orientation: Distal;Anterior  Laterality: Left      Assessments 2/8/2024  1:00 PM   Wound Image     Site Assessment Scabbed;Dry   Periwound Assessment Clean;Dry;Intact   Margins Defined edges;Attached edges   Closure Open to air   Drainage Amount None   Treatments Cleansed   Wound Cleansing Normal Saline Irrigation   Dressing Status Clean;Dry;Intact   Dressing Changed New   Dressing Cleansing/Solutions Not Applicable   Dressing Options Open to Air   Wound Team Following Not following   Non-staged Wound Description Partial thickness       PREVENTATIVE INTERVENTIONS:    Q shift [employer] - performed per nursing policy  Q shift pressure point assessments - performed per nursing  policy

## 2024-02-08 NOTE — PROGRESS NOTES
[facility] Medicine Daily Progress Note    Date of Service  2/7/2024    Chief Complaint  Inability to care for self    Hospital Course  Dylon Santa is a 63 y.o. male who was admitted to [facility] on 1/11/2024. 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 He was brought to the hospital by his brother who is his primary caretaker.  His brother can no longer take care of him given worsening agitation and behavioral issues over the last 2 months.  Patient was admitted for profound hyperglycemia without evidence of DKA or HHNK.  Patient does not have capacity and his brother Luis Carlos is the DURABLE POWER OF .  Patient has been made DNR/DNI. Hospice was initially considered and then declined    Interval Problem Update  2/6:  left elbow erythema, swelling, abrasions c/w cellulitis.  Started on keflex and wound care.  Likely from pressure injury.  Patient did not want to be disturbed on exam today, limiting exam.  H/o aggression. Pending long term SNF bed. Requiring replacement of Leslie catheter, retention x2 on bladder scans, increased flomax and dc'd clonidine and cozaar for hypotension.  2/7:  spoke with brother, concerned that patient had fallen.  Updated brother Luis Carlos that his left arm cellulitis is improved today with wound care and keflex.  Review of photos under media tab show 2/4 left elbow abrasions only.  Brother concerned about nursing not keeping patient clean.  I updated the brother that he is currently clean getting wound care and antibiotics for his left elbow and has a Leslie catheter replaced due to persistent urinary retention.  Increased Lantus to 11 to 15 units nightly.  Renewed lapbelt restraint.  I have discussed this patient's plan of care and discharge plan at IDT rounds today with Case Management, Nursing, Nursing leadership, and other members of the IDT team.    Consultants/Specialty  Palliative  care    Code Status  DNAR/DNI    Disposition      I have placed the appropriate orders for post-discharge needs.    Review of Systems  Review of Systems   Unable to perform ROS: Dementia        Physical Exam  Temp:  [35.6 °C (96 °F)-36.1 °C (97 °F)] 36.1 °C (97 °F)  Pulse:  [72-78] 72  Resp:  [16-18] 18  BP: (111-152)/(48-82) 133/69  SpO2:  [93 %-98 %] 98 %    Physical Exam  Vitals and nursing note reviewed.   Constitutional:       General: He is not in acute distress.     Appearance: He is well-developed.      Comments: In bed    No acute distress   HENT:      Head: Normocephalic and atraumatic.      Mouth/Throat:      Pharynx: No oropharyngeal exudate.   Eyes:      General: No scleral icterus.     Pupils: Pupils are equal, round, and reactive to light.   Neck:      Thyroid: No thyromegaly.   Cardiovascular:      Rate and Rhythm: Normal rate and regular rhythm.      Heart sounds: Normal heart sounds. No murmur heard.  Pulmonary:      Effort: Pulmonary effort is normal. No respiratory distress.      Breath sounds: Normal breath sounds. No wheezing.   Abdominal:      General: Bowel sounds are normal. There is no distension.      Palpations: Abdomen is soft.      Tenderness: There is no abdominal tenderness.   Musculoskeletal:         General: No tenderness. Normal range of motion.      Cervical back: Normal range of motion and neck supple.      Right lower leg: No edema.      Left lower leg: No edema.   Skin:     General: Skin is warm and dry.      Findings: No rash.   Neurological:      Mental Status: He is alert.      Motor: Weakness present.   Psychiatric:      Comments: Flat affect         Fluids    Intake/Output Summary (Last 24 hours) at 2/7/2024 1619  Last data filed at 2/6/2024 2000  Gross per 24 hour   Intake 240 ml   Output --   Net 240 ml       Laboratory        Recent Labs     02/06/24  0150   SODIUM 131*   POTASSIUM 4.8   CHLORIDE 95*   CO2 25   GLUCOSE 173*   BUN 27*   CREATININE 1.60*   CALCIUM 9.2                      Imaging  DX-CHEST-PORTABLE (1 VIEW)   Final Result      1.  Hazy medial right lower lobe opacity which could be due to pneumonitis or atelectasis.      [id number] VIEW   Final Result         Large amount of stool throughout the colon.           Assessment/Plan  * Type 2 diabetes mellitus with hyperglycemia, with long-term current use of insulin (HCC)- (present on admission)  Assessment & Plan  Bs labile no hypoglycemia this am, following, will continue to adjust lantus    Continue ISS, adjust PRN      Cellulitis of left elbow- (present on admission)  Assessment & Plan  2/4:  review of media tab photos, superficial abrasions noted.  2/6:  noted by bedside RN, appears to have abrasions, erythema, edema and erythema c/w cellulitis from pressure injury.  Keflex 500mg po qid x 5 days.  Wound care consult for pressure injury.  2/7:  improved.  Decreased erythema decreased edema noted left hand and arm.  No joint involvement.  Patient has pressure bandage in place for elbow protection.    Constipation- (present on admission)  Assessment & Plan  Resolved  Continue bowel regimen    Urinary retention- (present on admission)  Assessment & Plan  Galarza  placed on 1/25/2024  Proscar added to Flomax on 1/26/2024  Tolerating removal of galarza 2/4, continue to follow, bladder scans.  2/6:  replaced galarza for urine retention x 2 bladder scans.  Increased flomax 0.8 mg po dailyl with proscar 5mg nightly.    ACP (advance care planning)- (present on admission)  Assessment & Plan  Changed to DNR/DNI  Palliative following  [facility]    Dehydration- (present on admission)  Assessment & Plan  Resolved  Stop IVf's  Monitor sugars and oral intake    Ketosis (HCC)- (present on admission)  Assessment & Plan  Resolved    Generalized weakness- (present on admission)  Assessment & Plan  Unable to care for self  Will need a memory care unit   Family not interested in hospice    Alzheimer disease (HCC)-  (present on admission)  Assessment & Plan  Advanced, with agitation   Stop IV valium  depakote 125 mg BID   Continue scheduled seroquel 50 mg BID  namenda 5 mg daily and aricept 5 mg daily  Continue to adjust PRN  Continue trazodone 150 mg QPM  Po zoloft..  Crease to 50 mg p.o. daily on 1/26/2024  Po clonidine      Restraints off as off 1/28    Discussed behavioral disturbances with psychiatry.      Coronary artery disease due to lipid rich plaque - mild to moderate on Cath 2019 Left Dominant- (present on admission)  Assessment & Plan  Diabetic diet  Continue ARB, BB.      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         VTE prophylaxis: Xarelto    I have performed a physical exam and reviewed and updated ROS and Plan today (2/7/2024). In review of yesterday's note (2/6/2024), there are no changes except as documented above.

## 2024-02-09 NOTE — PROGRESS NOTES
Blood sugar 44. Gave apple sauce, cream of wheat with sugar, and apple juice. Will recheck glucose. MD notified.

## 2024-02-09 NOTE — PROGRESS NOTES
[facility] Medicine Daily Progress Note    Date of Service  2/8/2024    Chief Complaint  Inability to care for self    Hospital Course  Dylon Santa is a 63 y.o. male who was admitted to [facility] on 1/11/2024. 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 He was brought to the hospital by his brother who is his primary caretaker.  His brother can no longer take care of him given worsening agitation and behavioral issues over the last 2 months.  Patient was admitted for profound hyperglycemia without evidence of DKA or HHNK.  Patient does not have capacity and his brother Luis Carlos is the DURABLE POWER OF .  Patient has been made DNR/DNI. Hospice was initially considered and then declined    Interval Problem Update  2/6:  left elbow erythema, swelling, abrasions c/w cellulitis.  Started on keflex and wound care.  Likely from pressure injury.  Patient did not want to be disturbed on exam today, limiting exam.  H/o aggression. Pending long term SNF bed. Requiring replacement of Leslie catheter, retention x2 on bladder scans, increased flomax and dc'd clonidine and cozaar for hypotension.  2/7:  spoke with brother, concerned that patient had fallen.  Updated brother Luis Carlos that his left arm cellulitis is improved today with wound care and keflex.  Review of photos under media tab show 2/4 left elbow abrasions only.  Brother concerned about nursing not keeping patient clean.  I updated the brother that he is currently clean getting wound care and antibiotics for his left elbow and has a Leslie catheter replaced due to persistent urinary retention.  Increased Lantus to 11 to 15 units nightly.  Renewed lapbelt restraint.  2/8:  improving left elbow cellulitis.  Sitting up in chair at nurses station.    I have discussed this patient's plan of care and discharge plan at IDT rounds today with Case Management, Nursing, Nursing  leadership, and other members of the IDT team.    Consultants/Specialty  Palliative care    Code Status  DNAR/DNI    Disposition      I have placed the appropriate orders for post-discharge needs.    Review of Systems  Review of Systems   Unable to perform ROS: Dementia        Physical Exam  Temp:  [35.8 °C (96.5 °F)-36.8 °C (98.2 °F)] 36.8 °C (98.2 °F)  Pulse:  [68-92] 92  Resp:  [16-18] 18  BP: (108-165)/(49-74) 165/67  SpO2:  [94 %-96 %] 96 %    Physical Exam  Vitals and nursing note reviewed.   Constitutional:       General: He is not in acute distress.     Appearance: He is well-developed.      Comments: In bed    No acute distress   HENT:      Head: Normocephalic and atraumatic.      Mouth/Throat:      Pharynx: No oropharyngeal exudate.   Eyes:      General: No scleral icterus.     Pupils: Pupils are equal, round, and reactive to light.   Neck:      Thyroid: No thyromegaly.   Cardiovascular:      Rate and Rhythm: Normal rate and regular rhythm.      Heart sounds: Normal heart sounds. No murmur heard.  Pulmonary:      Effort: Pulmonary effort is normal. No respiratory distress.      Breath sounds: Normal breath sounds. No wheezing.   Abdominal:      General: Bowel sounds are normal. There is no distension.      Palpations: Abdomen is soft.      Tenderness: There is no abdominal tenderness.   Musculoskeletal:         General: No tenderness. Normal range of motion.      Cervical back: Normal range of motion and neck supple.      Right lower leg: No edema.      Left lower leg: No edema.   Skin:     General: Skin is warm and dry.      Findings: No rash.   Neurological:      Mental Status: He is alert.      Motor: Weakness present.   Psychiatric:      Comments: Flat affect         Fluids    Intake/Output Summary (Last 24 hours) at 2/8/2024 0049  Last data filed at 2/8/2024 1700  Gross per 24 hour   Intake 480 ml   Output 2200 ml   Net -1720 ml       Laboratory        Recent Labs     02/06/24  0150   SODIUM 131*    POTASSIUM 4.8   CHLORIDE 95*   CO2 25   GLUCOSE 173*   BUN 27*   CREATININE 1.60*   CALCIUM 9.2                     Imaging  DX-CHEST-PORTABLE (1 VIEW)   Final Result      1.  Hazy medial right lower lobe opacity which could be due to pneumonitis or atelectasis.      [id number] VIEW   Final Result         Large amount of stool throughout the colon.           Assessment/Plan  * Type 2 diabetes mellitus with hyperglycemia, with long-term current use of insulin (HCC)- (present on admission)  Assessment & Plan  Bs labile no hypoglycemia this am, following, will continue to adjust lantus    Continue ISS, adjust PRN      Cellulitis of left elbow- (present on admission)  Assessment & Plan  2/4:  review of media tab photos, superficial abrasions noted.  2/6:  noted by bedside RN, appears to have abrasions, erythema, edema and erythema c/w cellulitis from pressure injury.  Keflex 500mg po qid x 5 days.  Wound care consult for pressure injury.  2/7:  improved.  Decreased erythema decreased edema noted left hand and arm.  No joint involvement.  Patient has pressure bandage in place for elbow protection.    Constipation- (present on admission)  Assessment & Plan  Resolved  Continue bowel regimen    Urinary retention- (present on admission)  Assessment & Plan  Galarza  placed on 1/25/2024  Proscar added to Flomax on 1/26/2024  Tolerating removal of galarza 2/4, continue to follow, bladder scans.  2/6:  replaced galarza for urine retention x 2 bladder scans.  Increased flomax 0.8 mg po dailyl with proscar 5mg nightly.    ACP (advance care planning)- (present on admission)  Assessment & Plan  Changed to DNR/DNI  Palliative following  Awaiting acceptance at [facility]    Dehydration- (present on admission)  Assessment & Plan  Resolved  Stop IVf's  Monitor sugars and oral intake    Ketosis (HCC)- (present on admission)  Assessment & Plan  Resolved    Generalized weakness- (present on admission)  Assessment & Plan  Unable to care for  self  Will need a memory care unit   Family not interested in hospice    Alzheimer disease (HCC)- (present on admission)  Assessment & Plan  Advanced, with agitation   Stop IV valium  depakote 125 mg BID   Continue scheduled seroquel 50 mg BID  namenda 5 mg daily and aricept 5 mg daily  Continue to adjust PRN  Continue trazodone 150 mg QPM  Po zoloft..  Crease to 50 mg p.o. daily on 1/26/2024  Po clonidine      Restraints off as off 1/28    Discussed behavioral disturbances with psychiatry.      Coronary artery disease due to lipid rich plaque - mild to moderate on Cath 2019 Left Dominant- (present on admission)  Assessment & Plan  Diabetic diet  Continue ARB, BB.      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         VTE prophylaxis: Xarelto    I have performed a physical exam and reviewed and updated ROS and Plan today (2/8/2024). In review of yesterday's note (2/7/2024), there are no changes except as documented above.

## 2024-02-09 NOTE — CARE PLAN
The patient is Stable - Low risk of patient condition declining or worsening    Shift Goals  Clinical Goals: Pt remains free of aggitation throughout shift  Patient Goals: KATIANA  Family Goals: KATIANA    Progress made toward(s) clinical / shift goals:  Pt was non aggressive and showed no signs of agitation throughout the night    Patient is not progressing towards the following goals:

## 2024-02-10 NOTE — CARE PLAN
The patient is Stable - Low risk of patient condition declining or worsening    Shift Goals  Clinical Goals: Pt remain free of falls and has bowel movement during shift  Patient Goals: KATIANA (Pt A&O x1)  Family Goals: KATIANA    Progress made toward(s) clinical / shift goals:  Pt remained free of falls and did not have a bowel movement during the night. Will administered scheduled senna and Miralax during morning medication administration    Patient is not progressing towards the following goals:    Problem: Knowledge Deficit - Standard  Goal: Patient and family/care givers will demonstrate understanding of plan of care, disease process/condition, diagnostic tests and medications  Outcome: Not Progressing

## 2024-02-10 NOTE — PROGRESS NOTES
[facility] Medicine Daily Progress Note    Date of Service  2/9/2024    Chief Complaint  Inability to care for self    Hospital Course  Dylon Santa is a 63 y.o. male who was admitted to [facility] on 1/11/2024. 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 He was brought to the hospital by his brother who is his primary caretaker.  His brother can no longer take care of him given worsening agitation and behavioral issues over the last 2 months.  Patient was admitted for profound hyperglycemia without evidence of DKA or HHNK.  Patient does not have capacity and his brother Luis Carlos is the DURABLE POWER OF .  Patient has been made DNR/DNI. Hospice was initially considered and then declined    Interval Problem Update  2/6:  left elbow erythema, swelling, abrasions c/w cellulitis.  Started on keflex and wound care.  Likely from pressure injury.  Patient did not want to be disturbed on exam today, limiting exam.  H/o aggression. Pending long term SNF bed. Requiring replacement of Leslie catheter, retention x2 on bladder scans, increased flomax and dc'd clonidine and cozaar for hypotension.  2/7:  spoke with brother, concerned that patient had fallen.  Updated brother Luis Carlos that his left arm cellulitis is improved today with wound care and keflex.  Review of photos under media tab show 2/4 left elbow abrasions only.  Brother concerned about nursing not keeping patient clean.  I updated the brother that he is currently clean getting wound care and antibiotics for his left elbow and has a Leslie catheter replaced due to persistent urinary retention.  Increased Lantus to 11 to 15 units nightly.  Renewed lapbelt restraint.  2/8:  improving left elbow cellulitis.  Sitting up in chair at nurses station.  2/9:  ambulated with 2 person assist to chair at nurses station today.  Cooperative.  persistent swelling left arm.  Abrasions and  cellulitis improved.  Ordered us rule out DVT.  Glucose level 44 this a.m.  dc'd lantus and started glyburide tomorrow.  Unable to swallow flomax or proscar, therefore changed to cardura (can be crushed in apple sauce). Remains with Leslie catheter.    I have discussed this patient's plan of care and discharge plan at IDT rounds today with Case Management, Nursing, Nursing leadership, and other members of the IDT team.    Consultants/Specialty  Palliative care    Code Status  DNAR/DNI    Disposition      I have placed the appropriate orders for post-discharge needs.    Review of Systems  Review of Systems   Unable to perform ROS: Dementia        Physical Exam  Temp:  [36.5 °C (97.7 °F)-36.7 °C (98 °F)] 36.6 °C (97.8 °F)  Pulse:  [68-83] 83  Resp:  [16-18] 17  BP: (120-141)/(67-77) 133/67  SpO2:  [93 %-96 %] 96 %    Physical Exam  Vitals and nursing note reviewed.   Constitutional:       General: He is not in acute distress.     Appearance: He is well-developed.      Comments: In bed    No acute distress   HENT:      Head: Normocephalic and atraumatic.      Mouth/Throat:      Pharynx: No oropharyngeal exudate.   Eyes:      General: No scleral icterus.     Pupils: Pupils are equal, round, and reactive to light.   Neck:      Thyroid: No thyromegaly.   Cardiovascular:      Rate and Rhythm: Normal rate and regular rhythm.      Heart sounds: Normal heart sounds. No murmur heard.  Pulmonary:      Effort: Pulmonary effort is normal. No respiratory distress.      Breath sounds: Normal breath sounds. No wheezing.   Abdominal:      General: Bowel sounds are normal. There is no distension.      Palpations: Abdomen is soft.      Tenderness: There is no abdominal tenderness.   Musculoskeletal:         General: No tenderness. Normal range of motion.      Cervical back: Normal range of motion and neck supple.      Right lower leg: No edema.      Left lower leg: No edema.   Skin:     General: Skin is warm and dry.      Findings: No  rash.   Neurological:      Mental Status: He is alert.      Motor: Weakness present.   Psychiatric:      Comments: Flat affect         Fluids    Intake/Output Summary (Last 24 hours) at 2/9/2024 1731  Last data filed at 2/9/2024 1542  Gross per 24 hour   Intake 360 ml   Output 1650 ml   Net -1290 ml       Laboratory  Recent Labs     02/09/24  0648   WBC 8.0   RBC 3.95*   HEMOGLOBIN 11.9*   HEMATOCRIT 34.9*   MCV 88.4   MCH 30.1   MCHC 34.1   RDW 41.3   PLATELETCT 312   MPV 9.9       Recent Labs     02/09/24  0648   SODIUM 133*   POTASSIUM 4.0   CHLORIDE 97   CO2 25   GLUCOSE 56*   BUN 27*   CREATININE 1.12   CALCIUM 9.0                     Imaging  DX-CHEST-PORTABLE (1 VIEW)   Final Result      1.  Hazy medial right lower lobe opacity which could be due to pneumonitis or atelectasis.      [id number] VIEW   Final Result         Large amount of stool throughout the colon.      US-EXTREMITY VENOUS UPPER UNILAT LEFT    (Results Pending)        Assessment/Plan  * Type 2 diabetes mellitus with hyperglycemia, with long-term current use of insulin (HCC)- (present on admission)  Assessment & Plan  Bs labile, persistent hypoglycemia.   dc'd lantus  Started glyburide 2/10.    Continue ISS, adjust PRN  Hga1c 8.7%      Cellulitis of left elbow- (present on admission)  Assessment & Plan  2/4:  review of media tab photos, superficial abrasions noted.  2/6:  noted by bedside RN, appears to have abrasions, erythema, edema and erythema c/w cellulitis from pressure injury.  Keflex 500mg po qid x 5 days.  Wound care consult for pressure injury.  2/7:  improved.  Decreased erythema decreased edema noted left hand and arm.  No joint involvement.  Patient has pressure bandage in place for elbow protection.  2/9:  check u/s venous, persistent edema.    Constipation- (present on admission)  Assessment & Plan  Resolved  Continue bowel regimen    Urinary retention- (present on admission)  Assessment & Plan  Leslie  placed on  1/25/2024  Proscar added to Flomax on 1/26/2024  removal of galarza 2/4, continue to follow, bladder scans.  2/6:  replaced galarza for urine retention x 2 bladder scans.  Increased flomax 0.8 mg po dailyl with proscar 5mg nightly.  2/9:  patient unable to swallow pills, must be crushed since he chews on the pills.   Changed flomax and proscar to cardura qhs.     ACP (advance care planning)- (present on admission)  Assessment & Plan  Changed to DNR/DNI  Palliative following  Awaiting acceptance at [facility]    Dehydration- (present on admission)  Assessment & Plan  Resolved  Stop IVf's  Monitor sugars and oral intake    Ketosis (HCC)- (present on admission)  Assessment & Plan  Resolved    Generalized weakness- (present on admission)  Assessment & Plan  Unable to care for self  Will need a memory care unit   Family not interested in hospice    Alzheimer disease (HCC)- (present on admission)  Assessment & Plan  Advanced, with agitation   Stop IV valium  depakote 125 mg BID   Continue scheduled seroquel 50 mg BID  namenda 5 mg daily and aricept 5 mg daily  Continue to adjust PRN  Continue trazodone 150 mg QPM  Po zoloft..  Crease to 50 mg p.o. daily on 1/26/2024  Po clonidine      Restraints off as off 1/28    Discussed behavioral disturbances with psychiatry.      Coronary artery disease due to lipid rich plaque - mild to moderate on Cath 2019 Left Dominant- (present on admission)  Assessment & Plan  Diabetic diet  Continue ARB, BB.      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         VTE prophylaxis: Xarelto    I have performed a  physical exam and reviewed and updated ROS and Plan today (2/9/2024). In review of yesterday's note (2/8/2024), there are no changes except as documented above.

## 2024-02-10 NOTE — PROGRESS NOTES
[facility] Medicine Daily Progress Note    Date of Service  2/10/2024    Chief Complaint  Inability to care for self    Hospital Course  Dylon Santa is a 63 y.o. male who was admitted to [facility] on 1/11/2024. 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 He was brought to the hospital by his brother who is his primary caretaker.  His brother can no longer take care of him given worsening agitation and behavioral issues over the last 2 months.  Patient was admitted for profound hyperglycemia without evidence of DKA or HHNK.  Patient does not have capacity and his brother Luis Carlos is the DURABLE POWER OF .  Patient has been made DNR/DNI. Hospice was initially considered and then declined    Interval Problem Update  2/6:  left elbow erythema, swelling, abrasions c/w cellulitis.  Started on keflex and wound care.  Likely from pressure injury.  Patient did not want to be disturbed on exam today, limiting exam.  H/o aggression. Pending long term SNF bed. Requiring replacement of Leslie catheter, retention x2 on bladder scans, increased flomax and dc'd clonidine and cozaar for hypotension.  2/7:  spoke with brother, concerned that patient had fallen.  Updated brother Luis Carlos that his left arm cellulitis is improved today with wound care and keflex.  Review of photos under media tab show 2/4 left elbow abrasions only.  Brother concerned about nursing not keeping patient clean.  I updated the brother that he is currently clean getting wound care and antibiotics for his left elbow and has a Leslie catheter replaced due to persistent urinary retention.  Increased Lantus to 11 to 15 units nightly.  Renewed lapbelt restraint.  2/8:  improving left elbow cellulitis.  Sitting up in chair at nurses station.  2/9:  ambulated with 2 person assist to chair at nurses station today.  Cooperative.  persistent swelling left arm.  Abrasions and  cellulitis improved.  Ordered us rule out DVT.  Glucose level 44 this a.m.  dc'd lantus and started glyburide tomorrow.  Unable to swallow flomax or proscar, therefore changed to cardura (can be crushed in apple sauce). Remains with Leslie catheter.  2/10:  left elbow improving, negative u/s LUE.    I have discussed this patient's plan of care and discharge plan at IDT rounds today with Case Management, Nursing, Nursing leadership, and other members of the IDT team.    Consultants/Specialty  Palliative care    Code Status  DNAR/DNI    Disposition  Long term bed SNF pending.      I have placed the appropriate orders for post-discharge needs.    Review of Systems  Review of Systems   Unable to perform ROS: Dementia        Physical Exam  Temp:  [36.2 °C (97.1 °F)-36.7 °C (98 °F)] 36.7 °C (98 °F)  Pulse:  [62-95] 62  Resp:  [16-18] 17  BP: (112-140)/(58-75) 112/58  SpO2:  [95 %-99 %] 99 %    Physical Exam  Vitals and nursing note reviewed.   Constitutional:       General: He is not in acute distress.     Appearance: He is well-developed.      Comments: In bed    No acute distress   HENT:      Head: Normocephalic and atraumatic.      Mouth/Throat:      Pharynx: No oropharyngeal exudate.   Eyes:      General: No scleral icterus.     Pupils: Pupils are equal, round, and reactive to light.   Neck:      Thyroid: No thyromegaly.   Cardiovascular:      Rate and Rhythm: Normal rate and regular rhythm.      Heart sounds: Normal heart sounds. No murmur heard.  Pulmonary:      Effort: Pulmonary effort is normal. No respiratory distress.      Breath sounds: Normal breath sounds. No wheezing.   Abdominal:      General: Bowel sounds are normal. There is no distension.      Palpations: Abdomen is soft.      Tenderness: There is no abdominal tenderness.   Musculoskeletal:         General: No tenderness. Normal range of motion.      Cervical back: Normal range of motion and neck supple.      Right lower leg: No edema.      Left lower  leg: No edema.   Skin:     General: Skin is warm and dry.      Findings: No rash.   Neurological:      Mental Status: He is alert.      Motor: Weakness present.   Psychiatric:      Comments: Flat affect         Fluids    Intake/Output Summary (Last 24 hours) at 2/10/2024 1506  Last data filed at 2/10/2024 1500  Gross per 24 hour   Intake 1060 ml   Output 1250 ml   Net -190 ml       Laboratory  Recent Labs     02/09/24  0648   WBC 8.0   RBC 3.95*   HEMOGLOBIN 11.9*   HEMATOCRIT 34.9*   MCV 88.4   MCH 30.1   MCHC 34.1   RDW 41.3   PLATELETCT 312   MPV 9.9       Recent Labs     02/09/24  0648   SODIUM 133*   POTASSIUM 4.0   CHLORIDE 97   CO2 25   GLUCOSE 56*   BUN 27*   CREATININE 1.12   CALCIUM 9.0                     Imaging  US-EXTREMITY VENOUS UPPER UNILAT LEFT   Final Result      DX-CHEST-PORTABLE (1 VIEW)   Final Result      1.  Hazy medial right lower lobe opacity which could be due to pneumonitis or atelectasis.      VH-OLOAIBE-2 VIEW   Final Result         Large amount of stool throughout the colon.           Assessment/Plan  * Type 2 diabetes mellitus with hyperglycemia, with long-term current use of insulin (HCC)- (present on admission)  Assessment & Plan  Bs labile, persistent hypoglycemia.   dc'd lantus  Started glyburide 2/10.    Continue ISS, adjust PRN  Hga1c 8.7%      Cellulitis of left elbow- (present on admission)  Assessment & Plan  2/4:  review of media tab photos, superficial abrasions noted.  2/6:  noted by bedside RN, appears to have abrasions, erythema, edema and erythema c/w cellulitis from pressure injury.  Keflex 500mg po tid x 5 days.  Wound care consult for pressure injury.  2/7:  improved.  Decreased erythema decreased edema noted left hand and arm.  No joint involvement.  Patient has pressure bandage in place for elbow protection.  2/9:  negative u/s venous    Constipation- (present on admission)  Assessment & Plan  Resolved  Continue bowel regimen    Urinary retention- (present on  admission)  Assessment & Plan  Galarza  placed on 1/25/2024  Proscar added to Flomax on 1/26/2024  removal of galarza 2/4, continue to follow, bladder scans.  2/6:  replaced galarza for urine retention x 2 bladder scans.  Increased flomax 0.8 mg po dailyl with proscar 5mg nightly.  2/9:  patient unable to swallow pills, must be crushed since he chews on the pills.   Changed flomax and proscar to cardura qhs.     ACP (advance care planning)- (present on admission)  Assessment & Plan  Changed to DNR/DNI  Palliative following  Awaiting acceptance at [facility]    Dehydration- (present on admission)  Assessment & Plan  Resolved  Stop IVf's  Monitor sugars and oral intake    Ketosis (HCC)- (present on admission)  Assessment & Plan  Resolved    Generalized weakness- (present on admission)  Assessment & Plan  Unable to care for self  Will need a memory care unit   Family not interested in hospice    Alzheimer disease (HCC)- (present on admission)  Assessment & Plan  Advanced, with agitation   Stop IV valium  depakote 125 mg BID   Continue scheduled seroquel 50 mg BID  namenda 5 mg daily and aricept 5 mg daily  Continue to adjust PRN  Continue trazodone 150 mg QPM  Po zoloft..  Crease to 50 mg p.o. daily on 1/26/2024  Po clonidine      Restraints off as off 1/28    Discussed behavioral disturbances with psychiatry.      Coronary artery disease due to lipid rich plaque - mild to moderate on Cath 2019 Left Dominant- (present on admission)  Assessment & Plan  Diabetic diet  Continue ARB, BB.      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         VTE  prophylaxis: Xarelto    I have performed a physical exam and reviewed and updated ROS and Plan today (2/10/2024). In review of yesterday's note (2/9/2024), there are no changes except as documented above.

## 2024-02-11 NOTE — PROGRESS NOTES
"Patient not swallowing capsules whole, he chewed up the keflex earlier when trying to \"float it\" on pudding. Also gave patient sips of water to see if he would swallow them that way.   "

## 2024-02-11 NOTE — CARE PLAN
The patient is Stable - Low risk of patient condition declining or worsening    Shift Goals  Clinical Goals: Patient will remain free from any falls or injury within the shift  Patient Goals: KATIANA  Family Goals: KATIANA    Progress made toward(s) clinical / shift goals:  Patient is AOx1, calm with flat affect, no aggression or agitation noted. Bed frame alarm on and room close to nursing station, regular rounding done. Safety precautions maintained. Remained free of any falls or injury throughout shift.     Patient is not progressing towards the following goals:     Problem: Knowledge Deficit - Standard  Goal: Patient and family/care givers will demonstrate understanding of plan of care, disease process/condition, diagnostic tests and medications  Description: Target End Date:  1-3 days or as soon as patient condition allows    Document in Patient Education    1.  Patient and family/caregiver oriented to unit, equipment, visitation policy and means for communicating concern  2.  Complete/review Learning Assessment  3.  Assess knowledge level of disease process/condition, treatment plan, diagnostic tests and medications  4.  Explain disease process/condition, treatment plan, diagnostic tests and medications  Outcome: Not Progressing     Problem: Communication  Goal: The ability to communicate needs accurately and effectively will improve  Description: Target End Date:  End of day 1    1.  Assess ability to communicate and understand  2.  Provide augmentative or alternative methods of communication devices  3.  Use /language line as appropriate  4.  Collaborate with Speech Therapy as needed  Outcome: Not Progressing     Problem: Mobility  Goal: Patient's capacity to carry out activities will improve  Description: Target End Date:  Prior to discharge or change in level of care    1.  Assess for barriers to mobility/activity  2.  Implement activity per interdisciplinary team recommendations  3.  Target activity  level identified and patient/family/caregiver aware of goal  4.  Provide assistive devices  5.  Instruct patient/caregiver on proper use of assistive/adaptive devices  6.  Schedule activities and rest periods to decrease effects of fatigue  7.  Encourage mobilization to extent of ability  8.  Maintain proper body alignment  9.  Provide adequate pain management to allow progressive mobilization  10. Implement pace maker precautions as needed  Outcome: Not Progressing     Problem: Self Care  Goal: Patient will have the ability to perform ADLs independently or with assistance (bathe, groom, dress, toilet and feed)  Description: Target End Date:  Prior to discharge or change in level of care    Document on ADL flowsheet    1.  Assess the capability and level of deficiency to perform ADLs  2.  Encourage family/care giver involvement  3.  Provide assistive devices  4.  Consider PT/OT evaluations  5.  Maintain support, give positive feedback, encourage self-care allowing extra time and verbal cuing as needed  6.  Avoid doing something for patients they can do themselves, but provide assistance as needed  7.  Assist in anticipating/planning individual needs  8.  Collaborate with Case Management and  to meet discharge needs  Outcome: Not Progressing

## 2024-02-11 NOTE — CARE PLAN
The patient is Stable - Low risk of patient condition declining or worsening    Shift Goals  Clinical Goals: Patient will remain free from falls this shift  Patient Goals: KATIANA (unable to verbalize)  Family Goals: KATIANA    Progress made toward(s) clinical / shift goals:      Problem: Fall Risk  Goal: Patient will remain free from falls this shift  Outcome: Progressing   No falls    Patient is not progressing towards the following goals:      Problem: Self Care  Goal: Patient will have the ability to perform ADLs independently or with assistance (bathe, groom, dress, toilet and feed) this shift  Outcome: Not Progressing   Tried encouraging help with teeth brushing and feeding, patient unable to perform so this nurse performed for patient

## 2024-02-12 NOTE — CARE PLAN
Problem: Pain - Standard  Goal: Alleviation of pain or a reduction in pain to the patient’s comfort goal  Outcome: Progressing     Problem: Knowledge Deficit - Standard  Goal: Patient and family/care givers will demonstrate understanding of plan of care, disease process/condition, diagnostic tests and medications  Outcome: Not Progressing     Problem: Communication  Goal: The ability to communicate needs accurately and effectively will improve  Outcome: Progressing     Problem: Mobility  Goal: Patient's capacity to carry out activities will improve  Outcome: Progressing     Problem: Self Care  Goal: Patient will have the ability to perform ADLs independently or with assistance (bathe, groom, dress, toilet and feed)  Outcome: Not Progressing     Problem: Fall Risk  Goal: Patient will remain free from falls  Outcome: Progressing   The patient is Stable - Low risk of patient condition declining or worsening    Shift Goals  Clinical Goals: Safety  Patient Goals: KATIANA  Family Goals: KATIANA    Progress made toward(s) clinical / shift goals:  No safety events so far this shift. Patient able to stand a pivot to chair. No complaints of pain.     Patient is not progressing towards the following goals:      Problem: Knowledge Deficit - Standard  Goal: Patient and family/care givers will demonstrate understanding of plan of care, disease process/condition, diagnostic tests and medications  Outcome: Not Progressing     Problem: Self Care  Goal: Patient will have the ability to perform ADLs independently or with assistance (bathe, groom, dress, toilet and feed)  Outcome: Not Progressing

## 2024-02-12 NOTE — CARE PLAN
The patient is Stable - Low risk of patient condition declining or worsening    Shift Goals  Clinical Goals: Patient will remain free from any falls or injury throughout the shift  Patient Goals: KATIANA  Family Goals: KATIANA    Progress made toward(s) clinical / shift goals:  Patient is AOx1, confused in conversation, no aggression or agitation noted this shift. Safety precautions maintained. Bed frame alarm on and room close to nursing station, regular rounding done. Remained free of any falls or injury throughout shift.      Patient is not progressing towards the following goals: Negative bowel movement x few days.     Bisacodyl suppository administered.     Problem: Knowledge Deficit - Standard  Goal: Patient and family/care givers will demonstrate understanding of plan of care, disease process/condition, diagnostic tests and medications  Description: Target End Date:  1-3 days or as soon as patient condition allows    Document in Patient Education    1.  Patient and family/caregiver oriented to unit, equipment, visitation policy and means for communicating concern  2.  Complete/review Learning Assessment  3.  Assess knowledge level of disease process/condition, treatment plan, diagnostic tests and medications  4.  Explain disease process/condition, treatment plan, diagnostic tests and medications  Outcome: Not Progressing     Problem: Psychosocial  Goal: Patient's ability to verbalize feelings about condition will improve  Description: Target End Date:  Prior to discharge or change in level of care    1.  Discuss coping with medical condition and its effects  2.  Encourage patient participation in care  3.  Encourage acknowledgement of body changes and accompanying emotions  4.  Perform depression screening  Outcome: Not Progressing     Problem: Communication  Goal: The ability to communicate needs accurately and effectively will improve  Description: Target End Date:  End of day 1    1.  Assess ability to communicate  and understand  2.  Provide augmentative or alternative methods of communication devices  3.  Use /language line as appropriate  4.  Collaborate with Speech Therapy as needed  Outcome: Not Progressing     Problem: Discharge Barriers/Planning  Goal: Patient's continuum of care needs are met  Description: Target End Date:  Prior to discharge or change in level of care    1.  Identify potential discharge barriers on admission and throughout hospitalization  2.  Collaborate with Case Management, , Clinical Educators, Navigators and others on the transitional care team to meet discharge needs  3.  Involve patient/family/caregivers in setting and prioritizing goals for hospitalization and discharge  4.  Ensure Flu vaccinations are addressed  5.  Inquire if patient is interested in the Meds to Bed program  6.  Ensure patient and family/caregiver are able to demonstrate use of equipment as prescribed  7.  Ensure patient and family/caregiver can verbalize understanding of patient education  8.  Explain discharge instructions and medication reconciliation to patient and family/caregiver  Outcome: Not Progressing     Problem: Mobility  Goal: Patient's capacity to carry out activities will improve  Description: Target End Date:  Prior to discharge or change in level of care    1.  Assess for barriers to mobility/activity  2.  Implement activity per interdisciplinary team recommendations  3.  Target activity level identified and patient/family/caregiver aware of goal  4.  Provide assistive devices  5.  Instruct patient/caregiver on proper use of assistive/adaptive devices  6.  Schedule activities and rest periods to decrease effects of fatigue  7.  Encourage mobilization to extent of ability  8.  Maintain proper body alignment  9.  Provide adequate pain management to allow progressive mobilization  10. Implement pace maker precautions as needed  Outcome: Not Progressing     Problem: Self Care  Goal:  Patient will have the ability to perform ADLs independently or with assistance (bathe, groom, dress, toilet and feed)  Description: Target End Date:  Prior to discharge or change in level of care    Document on ADL flowsheet    1.  Assess the capability and level of deficiency to perform ADLs  2.  Encourage family/care giver involvement  3.  Provide assistive devices  4.  Consider PT/OT evaluations  5.  Maintain support, give positive feedback, encourage self-care allowing extra time and verbal cuing as needed  6.  Avoid doing something for patients they can do themselves, but provide assistance as needed  7.  Assist in anticipating/planning individual needs  8.  Collaborate with Case Management and  to meet discharge needs  Outcome: Not Progressing     Problem: Bowel Elimination  Goal: Establish and maintain regular bowel function  Description: Target End Date:  Prior to discharge or change in level of care    1.   Note date of last BM  2.   Educate about diet, fluid intake, medication and activity to promote bowel function  3.   Educate signs and symptoms of constipation and interventions to implement  4.   Pharmacologic bowel management per provider order  5.   Regular toileting schedule  6.   Upright position for toileting  7.   High fiber diet  8.   Encourage hydration  9.   Collaborate with Clinical Dietician  10. Care and maintenance of ostomy if applicable  Outcome: Not Progressing

## 2024-02-12 NOTE — CARE PLAN
The patient is Stable - Low risk of patient condition declining or worsening    Shift Goals  Clinical Goals: Patient will remain safe and free from falls this shift  Patient Goals: KATIANA  Family Goals: KATIANA    Progress made toward(s) clinical / shift goals:        Problem: Fall Risk  Goal: Patient will remain free from falls this shift  Outcome: Progressing   Free from falls    Patient is not progressing towards the following goals:  Patient still tearful today reporting wanting to go home    Problem: Psychosocial  Goal: Patient's level of anxiety will decrease this shift  Outcome: Not Progressing

## 2024-02-12 NOTE — PROGRESS NOTES
[facility] Medicine Daily Progress Note    Date of Service  2/11/2024    Chief Complaint  Inability to care for self    Hospital Course  Dylon Santa is a 63 y.o. male who was admitted to [facility] on 1/11/2024. 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 He was brought to the hospital by his brother who is his primary caretaker.  His brother can no longer take care of him given worsening agitation and behavioral issues over the last 2 months.  Patient was admitted for profound hyperglycemia without evidence of DKA or HHNK.  Patient does not have capacity and his brother Luis Carlos is the DURABLE POWER OF .  Patient has been made DNR/DNI. Hospice was initially considered and then declined    Interval Problem Update  2/6:  left elbow erythema, swelling, abrasions c/w cellulitis.  Started on keflex and wound care.  Likely from pressure injury.  Patient did not want to be disturbed on exam today, limiting exam.  H/o aggression. Pending long term SNF bed. Requiring replacement of Leslie catheter, retention x2 on bladder scans, increased flomax and dc'd clonidine and cozaar for hypotension.  2/7:  spoke with brother, concerned that patient had fallen.  Updated brother Luis Carlos that his left arm cellulitis is improved today with wound care and keflex.  Review of photos under media tab show 2/4 left elbow abrasions only.  Brother concerned about nursing not keeping patient clean.  I updated the brother that he is currently clean getting wound care and antibiotics for his left elbow and has a Leslie catheter replaced due to persistent urinary retention.  Increased Lantus to 11 to 15 units nightly.  Renewed lapbelt restraint.  2/8:  improving left elbow cellulitis.  Sitting up in chair at nurses station.  2/9:  ambulated with 2 person assist to chair at nurses station today.  Cooperative.  persistent swelling left arm.  Abrasions and  cellulitis improved.  Ordered us rule out DVT.  Glucose level 44 this a.m.  dc'd lantus and started glyburide tomorrow.  Unable to swallow flomax or proscar, therefore changed to cardura (can be crushed in apple sauce). Remains with Leslie catheter.  2/10:  left elbow improving, negative u/s LUE.  2/11:  resolving left arm cellulitis and edema.  Leslie remains.  Eating diet with help.    I have discussed this patient's plan of care and discharge plan at IDT rounds today with Case Management, Nursing, Nursing leadership, and other members of the IDT team.    Consultants/Specialty  Palliative care    Code Status  DNAR/DNI    Disposition  Long term bed SNF pending.      I have placed the appropriate orders for post-discharge needs.    Review of Systems  Review of Systems   Unable to perform ROS: Dementia        Physical Exam  Temp:  [36.7 °C (98 °F)-37.1 °C (98.8 °F)] 37.1 °C (98.8 °F)  Pulse:  [69-99] 88  Resp:  [16-18] 16  BP: (126-156)/(59-80) 156/74  SpO2:  [94 %-96 %] 95 %    Physical Exam  Vitals and nursing note reviewed.   Constitutional:       General: He is not in acute distress.     Appearance: He is well-developed.      Comments: In bed    No acute distress   HENT:      Head: Normocephalic and atraumatic.      Mouth/Throat:      Pharynx: No oropharyngeal exudate.   Eyes:      General: No scleral icterus.     Pupils: Pupils are equal, round, and reactive to light.   Neck:      Thyroid: No thyromegaly.   Cardiovascular:      Rate and Rhythm: Normal rate and regular rhythm.      Heart sounds: Normal heart sounds. No murmur heard.  Pulmonary:      Effort: Pulmonary effort is normal. No respiratory distress.      Breath sounds: Normal breath sounds. No wheezing.   Abdominal:      General: Bowel sounds are normal. There is no distension.      Palpations: Abdomen is soft.      Tenderness: There is no abdominal tenderness.   Musculoskeletal:         General: No tenderness. Normal range of motion.      Cervical back:  Normal range of motion and neck supple.      Right lower leg: No edema.      Left lower leg: No edema.   Skin:     General: Skin is warm and dry.      Findings: No rash.   Neurological:      Mental Status: He is alert.      Motor: Weakness present.   Psychiatric:      Comments: Flat affect         Fluids    Intake/Output Summary (Last 24 hours) at 2/11/2024 1740  Last data filed at 2/11/2024 0600  Gross per 24 hour   Intake 360 ml   Output 900 ml   Net -540 ml       Laboratory  Recent Labs     02/09/24  0648   WBC 8.0   RBC 3.95*   HEMOGLOBIN 11.9*   HEMATOCRIT 34.9*   MCV 88.4   MCH 30.1   MCHC 34.1   RDW 41.3   PLATELETCT 312   MPV 9.9       Recent Labs     02/09/24  0648   SODIUM 133*   POTASSIUM 4.0   CHLORIDE 97   CO2 25   GLUCOSE 56*   BUN 27*   CREATININE 1.12   CALCIUM 9.0                     Imaging  US-EXTREMITY VENOUS UPPER UNILAT LEFT   Final Result      DX-CHEST-PORTABLE (1 VIEW)   Final Result      1.  Hazy medial right lower lobe opacity which could be due to pneumonitis or atelectasis.      NY-SWHRTAN-8 VIEW   Final Result         Large amount of stool throughout the colon.           Assessment/Plan  * Type 2 diabetes mellitus with hyperglycemia, with long-term current use of insulin (HCC)- (present on admission)  Assessment & Plan  Bs labile, persistent hypoglycemia.   dc'd lantus  Started glyburide 2/10.    Continue ISS, adjust PRN  Hga1c 8.7%      Cellulitis of left elbow- (present on admission)  Assessment & Plan  2/4:  review of media tab photos, superficial abrasions noted.  2/6:  noted by bedside RN, appears to have abrasions, erythema, edema and erythema c/w cellulitis from pressure injury.  Keflex 500mg po tid x 5 days.  Wound care consult for pressure injury.  2/7:  improved.  Decreased erythema decreased edema noted left hand and arm.  No joint involvement.  Patient has pressure bandage in place for elbow protection.  2/9:  negative u/s venous    Constipation- (present on  admission)  Assessment & Plan  Resolved  Continue bowel regimen    Urinary retention- (present on admission)  Assessment & Plan  Galarza  placed on 1/25/2024  Proscar added to Flomax on 1/26/2024  removal of galarza 2/4, continue to follow, bladder scans.  2/6:  replaced galarza for urine retention x 2 bladder scans.  Increased flomax 0.8 mg po dailyl with proscar 5mg nightly.  2/9:  patient unable to swallow pills, must be crushed since he chews on the pills.   Changed flomax and proscar to cardura qhs.     ACP (advance care planning)- (present on admission)  Assessment & Plan  Changed to DNR/DNI  Palliative following  Awaiting acceptance at [facility]    Dehydration- (present on admission)  Assessment & Plan  Resolved  Stop IVf's  Monitor sugars and oral intake    Ketosis (HCC)- (present on admission)  Assessment & Plan  Resolved    Generalized weakness- (present on admission)  Assessment & Plan  Unable to care for self  Will need a memory care unit   Family not interested in hospice    Alzheimer disease (HCC)- (present on admission)  Assessment & Plan  Advanced, with agitation   Stop IV valium  depakote 125 mg BID   Continue scheduled seroquel 50 mg BID  namenda 5 mg daily and aricept 5 mg daily  Continue to adjust PRN  Continue trazodone 150 mg QPM  Po zoloft..  Crease to 50 mg p.o. daily on 1/26/2024  Po clonidine      Restraints off as off 1/28    Discussed behavioral disturbances with psychiatry.      Coronary artery disease due to lipid rich plaque - mild to moderate on Cath 2019 Left Dominant- (present on admission)  Assessment & Plan  Diabetic diet  Continue ARB, BB.      Stage 3a chronic kidney disease (HCC)- (present on admission)  Assessment & Plan  Improving, likely an element of REGULO on CKD Stage III 2/2 DM nephropathy  No AGMA  Following intermittently  Will check bmp in am    Dyslipidemia- (present on admission)  Assessment & Plan        Hypertension- (present on admission)  Assessment &  Plan  hypotensive  Continue toprol XL 25 mg daily,   Dc losartan 100 mg daily on 2/6  Dc clonidine 2/6.         VTE prophylaxis: Xarelto    I have performed a physical exam and reviewed and updated ROS and Plan today (2/11/2024). In review of yesterday's note (2/10/2024), there are no changes except as documented above.

## 2024-02-13 NOTE — PROGRESS NOTES
[facility] Medicine Daily Progress Note    Date of Service  2/13/2024    Chief Complaint  Inability to care for self    Hospital Course  Dylon Santa is a 63 y.o. male who was admitted to [facility] on 1/11/2024. 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 He was brought to the hospital by his brother who is his primary caretaker.  His brother can no longer take care of him given worsening agitation and behavioral issues over the last 2 months.  Patient was admitted for profound hyperglycemia without evidence of DKA or HHNK.  Patient does not have capacity and his brother Luis Carlos is the DURABLE POWER OF .  Patient has been made DNR/DNI. Hospice was initially considered and then declined.     Interval Problem Update  2/6:  left elbow erythema, swelling, abrasions c/w cellulitis.  Started on keflex and wound care.  Likely from pressure injury.  Patient did not want to be disturbed on exam today, limiting exam.  H/o aggression. Pending long term SNF bed. Requiring replacement of Leslie catheter, retention x2 on bladder scans, increased flomax and dc'd clonidine and cozaar for hypotension.  2/7:  spoke with brother, concerned that patient had fallen.  Updated brother Luis Carlos that his left arm cellulitis is improved today with wound care and keflex.  Review of photos under media tab show 2/4 left elbow abrasions only.  Brother concerned about nursing not keeping patient clean.  I updated the brother that he is currently clean getting wound care and antibiotics for his left elbow and has a Leslie catheter replaced due to persistent urinary retention.  Increased Lantus to 11 to 15 units nightly.  Renewed lapbelt restraint.  2/8:  improving left elbow cellulitis.  Sitting up in chair at nurses station.  2/9:  ambulated with 2 person assist to chair at nurses station today.  Cooperative.  persistent swelling left arm.  Abrasions and  cellulitis improved.  Ordered us rule out DVT.  Glucose level 44 this a.m.  dc'd lantus and started glyburide tomorrow.  Unable to swallow flomax or proscar, therefore changed to cardura (can be crushed in apple sauce). Remains with Leslie catheter.  2/10:  left elbow improving, negative u/s LUE.  2/11:  resolving left arm cellulitis and edema.  Leslie remains.  Eating diet with help.  2/12:  elevated  blood sugars last 24 hours, increased glyburide 5 bid to 10mg po bid and added back lantus 10 units qhs.  Increased anxiety, tearful today.  Added ativan IV prn anxiety.  2/13: Anxious during lunch and wanting to leave.  Added oral Ativan prn and monitoring for respiratory depression.    I have discussed this patient's plan of care and discharge plan at IDT rounds today with Case Management, Nursing, Nursing leadership, and other members of the IDT team.    Consultants/Specialty  Palliative care    Code Status  DNAR/DNI    Disposition  Long term bed SNF pending, looking into possibly [facility].      I have placed the appropriate orders for post-discharge needs.    Review of Systems  Review of Systems   Unable to perform ROS: Dementia        Physical Exam  Temp:  [36.3 °C (97.4 °F)-36.7 °C (98 °F)] 36.4 °C (97.5 °F)  Pulse:  [60-74] 65  Resp:  [16-18] 18  BP: ()/(50-55) 115/55  SpO2:  [93 %-96 %] 94 %    Physical Exam  Vitals and nursing note reviewed.   Constitutional:       General: He is not in acute distress.     Appearance: He is well-developed.      Comments: In bed    No acute distress   HENT:      Head: Normocephalic and atraumatic.      Mouth/Throat:      Pharynx: No oropharyngeal exudate.   Neck:      Thyroid: No thyromegaly.   Cardiovascular:      Rate and Rhythm: Normal rate and regular rhythm.      Heart sounds: Normal heart sounds.      No gallop.   Pulmonary:      Effort: No respiratory distress.      Breath sounds: Normal breath sounds. No wheezing.   Abdominal:      General: Bowel sounds are normal.  There is no distension.      Palpations: Abdomen is soft.      Tenderness: There is no abdominal tenderness.   Musculoskeletal:         General: No tenderness.      Cervical back: Neck supple.      Right lower leg: No edema.      Left lower leg: No edema.   Skin:     General: Skin is warm and dry.      Findings: No rash.   Neurological:      Mental Status: He is alert.      Motor: Weakness present.   Psychiatric:      Comments: Flat affect         Fluids    Intake/Output Summary (Last 24 hours) at 2/13/2024 1443  Last data filed at 2/13/2024 0909  Gross per 24 hour   Intake 760 ml   Output 710 ml   Net 50 ml         Laboratory                                Imaging  US-EXTREMITY VENOUS UPPER UNILAT LEFT   Final Result      DX-CHEST-PORTABLE (1 VIEW)   Final Result      1.  Hazy medial right lower lobe opacity which could be due to pneumonitis or atelectasis.      [id number] VIEW   Final Result         Large amount of stool throughout the colon.           Assessment/Plan  * Type 2 diabetes mellitus with hyperglycemia, with long-term current use of insulin (HCC)- (present on admission)  Assessment & Plan  Bs labile, persistent hypoglycemia.   Started glyburide 2/10 after dc'd lantus 10 units.    Continue ISS, adjust PRN  Hga1c 8.7%  2/12: elevated blood sugars, increased glyburide 5 bid to 10 bid and restarted lantus 10 units qhs.      Cellulitis of left elbow- (present on admission)  Assessment & Plan  2/4:  review of media tab photos, superficial abrasions noted.  2/6:  noted by bedside RN, appears to have abrasions, erythema, edema and erythema c/w cellulitis from pressure injury.  Keflex 500mg po tid x 5 days.  Wound care consult for pressure injury.  2/7:  improved.  Decreased erythema decreased edema noted left hand and arm.  No joint involvement.  Patient has pressure bandage in place for elbow protection.  2/9:  negative u/s venous    Constipation- (present on admission)  Assessment &  Plan  Resolved  Continue bowel regimen    Urinary retention- (present on admission)  Assessment & Plan  Galarza  placed on 1/25/2024  Proscar added to Flomax on 1/26/2024  removal of galarza 2/4, continue to follow, bladder scans.  2/6:  replaced galarza for urine retention x 2 bladder scans.  Increased flomax 0.8 mg po dailyl with proscar 5mg nightly.  2/9:  patient unable to swallow pills, must be crushed since he chews on the pills.   Changed flomax and proscar to cardura qhs.     ACP (advance care planning)- (present on admission)  Assessment & Plan  Changed to DNR/DNI  Palliative following  Awaiting acceptance at [facility]    Dehydration- (present on admission)  Assessment & Plan  Resolved  Stop IVf's  Monitor sugars and oral intake    Ketosis (HCC)- (present on admission)  Assessment & Plan  Resolved    Generalized weakness- (present on admission)  Assessment & Plan  Unable to care for self  Will need a memory care unit versus group home  Family not interested in hospice    Alzheimer disease (HCC)- (present on admission)  Assessment & Plan  Advanced, with agitation   Stop IV valium  depakote 125 mg BID   Continue scheduled seroquel 50 mg BID  namenda 5 mg daily and aricept 5 mg daily  Continue to adjust PRN  Continue trazodone 150 mg QPM  Po zoloft..  Crease to 50 mg p.o. daily on 1/26/2024  Po clonidine      Restraints off as off 1/28    Discussed behavioral disturbances with psychiatry.      Coronary artery disease due to lipid rich plaque - mild to moderate on Cath 2019 Left Dominant- (present on admission)  Assessment & Plan  Diabetic diet  Continue ARB, BB.      Anxiety- (present on admission)  Assessment & Plan  2/12:  added ativan prn for severe anxiety, nervous, wanted to go home after family member left.    Stage 3a chronic kidney disease (HCC)- (present on admission)  Assessment & Plan  Improving, likely an element of REGULO on CKD Stage III 2/2 DM nephropathy  No AGMA  Following  intermittently    Dyslipidemia- (present on admission)  Assessment & Plan        Hypertension- (present on admission)  Assessment & Plan  hypotensive  Continue toprol XL 25 mg daily,   Dc losartan 100 mg daily on 2/6  Dc clonidine 2/6.         VTE prophylaxis: Xarelto    I have performed a physical exam and reviewed and updated ROS and Plan today (2/13/2024). In review of yesterday's note (2/12/2024), there are no changes except as documented above.

## 2024-02-13 NOTE — PROGRESS NOTES
[facility] Medicine Daily Progress Note    Date of Service  2/12/2024    Chief Complaint  Inability to care for self    Hospital Course  Dylon Santa is a 63 y.o. male who was admitted to [facility] on 1/11/2024. 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 He was brought to the hospital by his brother who is his primary caretaker.  His brother can no longer take care of him given worsening agitation and behavioral issues over the last 2 months.  Patient was admitted for profound hyperglycemia without evidence of DKA or HHNK.  Patient does not have capacity and his brother Luis Carlos is the DURABLE POWER OF .  Patient has been made DNR/DNI. Hospice was initially considered and then declined    Interval Problem Update  2/6:  left elbow erythema, swelling, abrasions c/w cellulitis.  Started on keflex and wound care.  Likely from pressure injury.  Patient did not want to be disturbed on exam today, limiting exam.  H/o aggression. Pending long term SNF bed. Requiring replacement of Leslie catheter, retention x2 on bladder scans, increased flomax and dc'd clonidine and cozaar for hypotension.  2/7:  spoke with brother, concerned that patient had fallen.  Updated brother Luis Carlos that his left arm cellulitis is improved today with wound care and keflex.  Review of photos under media tab show 2/4 left elbow abrasions only.  Brother concerned about nursing not keeping patient clean.  I updated the brother that he is currently clean getting wound care and antibiotics for his left elbow and has a Leslie catheter replaced due to persistent urinary retention.  Increased Lantus to 11 to 15 units nightly.  Renewed lapbelt restraint.  2/8:  improving left elbow cellulitis.  Sitting up in chair at nurses station.  2/9:  ambulated with 2 person assist to chair at nurses station today.  Cooperative.  persistent swelling left arm.  Abrasions and  cellulitis improved.  Ordered us rule out DVT.  Glucose level 44 this a.m.  dc'd lantus and started glyburide tomorrow.  Unable to swallow flomax or proscar, therefore changed to cardura (can be crushed in apple sauce). Remains with Leslie catheter.  2/10:  left elbow improving, negative u/s LUE.  2/11:  resolving left arm cellulitis and edema.  Leslie remains.  Eating diet with help.  2/12:  elevated  blood sugars last 24 hours, increased glyburide 5 bid to 10mg po bid and added back lantus 10 units qhs.  Increased anxiety, tearful today.  Added ativan IV prn anxiety.    I have discussed this patient's plan of care and discharge plan at IDT rounds today with Case Management, Nursing, Nursing leadership, and other members of the IDT team.    Consultants/Specialty  Palliative care    Code Status  DNAR/DNI    Disposition  Long term bed SNF pending.      I have placed the appropriate orders for post-discharge needs.    Review of Systems  Review of Systems   Unable to perform ROS: Dementia        Physical Exam  Temp:  [36.7 °C (98 °F)-37.1 °C (98.8 °F)] 37.1 °C (98.8 °F)  Pulse:  [80-82] 82  Resp:  [18] 18  BP: (108)/(52-64) 108/52  SpO2:  [95 %] 95 %    Physical Exam  Vitals and nursing note reviewed.   Constitutional:       General: He is not in acute distress.     Appearance: He is well-developed.      Comments: In bed    No acute distress   HENT:      Head: Normocephalic and atraumatic.      Mouth/Throat:      Pharynx: No oropharyngeal exudate.   Eyes:      General: No scleral icterus.     Pupils: Pupils are equal, round, and reactive to light.   Neck:      Thyroid: No thyromegaly.   Cardiovascular:      Rate and Rhythm: Normal rate and regular rhythm.      Heart sounds: Normal heart sounds. No murmur heard.  Pulmonary:      Effort: Pulmonary effort is normal. No respiratory distress.      Breath sounds: Normal breath sounds. No wheezing.   Abdominal:      General: Bowel sounds are normal. There is no distension.       Palpations: Abdomen is soft.      Tenderness: There is no abdominal tenderness.   Musculoskeletal:         General: No tenderness. Normal range of motion.      Cervical back: Normal range of motion and neck supple.      Right lower leg: No edema.      Left lower leg: No edema.   Skin:     General: Skin is warm and dry.      Findings: No rash.   Neurological:      Mental Status: He is alert.      Motor: Weakness present.   Psychiatric:      Comments: Flat affect         Fluids    Intake/Output Summary (Last 24 hours) at 2/12/2024 1654  Last data filed at 2/12/2024 0516  Gross per 24 hour   Intake 600 ml   Output 550 ml   Net 50 ml       Laboratory                                Imaging  US-EXTREMITY VENOUS UPPER UNILAT LEFT   Final Result      DX-CHEST-PORTABLE (1 VIEW)   Final Result      1.  Hazy medial right lower lobe opacity which could be due to pneumonitis or atelectasis.      ON-MJVOOYA-0 VIEW   Final Result         Large amount of stool throughout the colon.           Assessment/Plan  * Type 2 diabetes mellitus with hyperglycemia, with long-term current use of insulin (HCC)- (present on admission)  Assessment & Plan  Bs labile, persistent hypoglycemia.   Started glyburide 2/10 after dc'd lantus 10 units.    Continue ISS, adjust PRN  Hga1c 8.7%  2/12: elevated blood sugars, increased glyburide 5 bid to 10 bid and restarted lantus 10 units qhs.      Cellulitis of left elbow- (present on admission)  Assessment & Plan  2/4:  review of media tab photos, superficial abrasions noted.  2/6:  noted by bedside RN, appears to have abrasions, erythema, edema and erythema c/w cellulitis from pressure injury.  Keflex 500mg po tid x 5 days.  Wound care consult for pressure injury.  2/7:  improved.  Decreased erythema decreased edema noted left hand and arm.  No joint involvement.  Patient has pressure bandage in place for elbow protection.  2/9:  negative u/s venous    Constipation- (present on admission)  Assessment &  Plan  Resolved  Continue bowel regimen    Urinary retention- (present on admission)  Assessment & Plan  Galarza  placed on 1/25/2024  Proscar added to Flomax on 1/26/2024  removal of galarza 2/4, continue to follow, bladder scans.  2/6:  replaced galarza for urine retention x 2 bladder scans.  Increased flomax 0.8 mg po dailyl with proscar 5mg nightly.  2/9:  patient unable to swallow pills, must be crushed since he chews on the pills.   Changed flomax and proscar to cardura qhs.     ACP (advance care planning)- (present on admission)  Assessment & Plan  Changed to DNR/DNI  Palliative following  Awaiting acceptance at senior care    Dehydration- (present on admission)  Assessment & Plan  Resolved  Stop IVf's  Monitor sugars and oral intake    Ketosis (HCC)- (present on admission)  Assessment & Plan  Resolved    Generalized weakness- (present on admission)  Assessment & Plan  Unable to care for self  Will need a memory care unit   Family not interested in hospice    Alzheimer disease (HCC)- (present on admission)  Assessment & Plan  Advanced, with agitation   Stop IV valium  depakote 125 mg BID   Continue scheduled seroquel 50 mg BID  namenda 5 mg daily and aricept 5 mg daily  Continue to adjust PRN  Continue trazodone 150 mg QPM  Po zoloft..  Crease to 50 mg p.o. daily on 1/26/2024  Po clonidine      Restraints off as off 1/28    Discussed behavioral disturbances with psychiatry.      Coronary artery disease due to lipid rich plaque - mild to moderate on Cath 2019 Left Dominant- (present on admission)  Assessment & Plan  Diabetic diet  Continue ARB, BB.      Anxiety- (present on admission)  Assessment & Plan  2/12:  added ativan prn for severe anxiety, nervous, wanted to go home after family member left.    Stage 3a chronic kidney disease (HCC)- (present on admission)  Assessment & Plan  Improving, likely an element of REGULO on CKD Stage III 2/2 DM nephropathy  No AGMA  Following intermittently  Will check bmp in  am    Dyslipidemia- (present on admission)  Assessment & Plan        Hypertension- (present on admission)  Assessment & Plan  hypotensive  Continue toprol XL 25 mg daily,   Dc losartan 100 mg daily on 2/6  Dc clonidine 2/6.         VTE prophylaxis: Xarelto    I have performed a physical exam and reviewed and updated ROS and Plan today (2/12/2024). In review of yesterday's note (2/11/2024), there are no changes except as documented above.

## 2024-02-13 NOTE — HOSPITAL COURSE
Dylon Santa is a 63 y.o. male who was admitted to [facility] on 1/11/2024. 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 He was brought to the hospital by his brother who is his primary caretaker.  His brother can no longer take care of him given worsening agitation and behavioral issues over the last 2 months.  Patient was admitted for profound hyperglycemia without evidence of DKA or HHNK.  Patient does not have capacity and his brother Luis Carlos is the DURABLE POWER OF .  Patient has been made DNR/DNI.     Patient transitioned to hospice 2/21.

## 2024-02-13 NOTE — CARE PLAN
Pt AO x0-1, oriented to self only.  Pt denies pain, PAINAD does not indicate pain at this time. Pt expresses wanting to go home.  Call light and belongings within reach.  Bed locked and in lowest position.  Hourly rounding.  Needs currently met.           The patient is Watcher - Medium risk of patient condition declining or worsening    Shift Goals  Clinical Goals: safety  Patient Goals: KATIANA (advanced dementia)  Family Goals: KATIANA    Progress made toward(s) clinical / shift goals:    Pt was able to remain fall and incident free throughout shift.     Patient is not progressing towards the following goals:  Pt has severe dementia, unable to comprehend or complete self care.     Problem: Knowledge Deficit - Standard  Goal: Patient and family/care givers will demonstrate understanding of plan of care, disease process/condition, diagnostic tests and medications  Outcome: Not Progressing     Problem: Self Care  Goal: Patient will have the ability to perform ADLs independently or with assistance (bathe, groom, dress, toilet and feed)  Outcome: Not Progressing

## 2024-02-13 NOTE — CARE PLAN
The patient is Watcher - Medium risk of patient condition declining or worsening    Shift Goals  Clinical Goals: Maintain safety at all times  Patient Goals: KATIANA (Patient is flat affect with A&O x1 (oriented to self only).)  Family Goals: none present    Progress made toward(s) clinical / shift goals: Maintained side rails up x3, hourly monitoring, bed alarm on, bed wheels locked and in low position. No reported occurrence of fall and injury.    Patient is not progressing towards the following goals:       Problem: Knowledge Deficit - Standard  Goal: Patient and family/care givers will demonstrate understanding of plan of care, disease process/condition, diagnostic tests and medications  Outcome: Not Met     Problem: Communication  Goal: The ability to communicate needs accurately and effectively will improve  Outcome: Not Met     Problem: Self Care  Goal: Patient will have the ability to perform ADLs independently or with assistance (bathe, groom, dress, toilet and feed)  Outcome: Not Met

## 2024-02-14 NOTE — CARE PLAN
The patient is Watcher - Medium risk of patient condition declining or worsening    Shift Goals  Clinical Goals: Maintain safety at all times  Patient Goals: KATIANA (A&O x1 (self), dx with Dementia)  Family Goals: none present    Progress made toward(s) clinical / shift goals: Patient was agitated, Lorazepam and Haldol given (see MAR). Maintained side rails up x3, bed alarm on, hourly rounds, bed wheels locked and in low position. No reported occurrence of injury and fall.    Patient is not progressing towards the following goals:      Problem: Knowledge Deficit - Standard  Goal: Patient and family/care givers will demonstrate understanding of plan of care, disease process/condition, diagnostic tests and medications  Outcome: Not Met     Problem: Psychosocial  Goal: Patient's level of anxiety will decrease  Outcome: Not Met  Goal: Patient's ability to verbalize feelings about condition will improve  Outcome: Not Met     Problem: Communication  Goal: The ability to communicate needs accurately and effectively will improve  Outcome: Not Met     Problem: Mobility  Goal: Patient's capacity to carry out activities will improve  Outcome: Not Met     Problem: Self Care  Goal: Patient will have the ability to perform ADLs independently or with assistance (bathe, groom, dress, toilet and feed)  Outcome: Not Met

## 2024-02-14 NOTE — DISCHARGE PLANNING
CM reached out to [facility] for possible admission under institutional Medicaid should be available today.

## 2024-02-14 NOTE — PROGRESS NOTES
[facility] Medicine Daily Progress Note    Date of Service  2/14/2024    Chief Complaint  Inability to care for self    Hospital Course  Dylon Santa is a 63 y.o. male who was admitted to [facility] on 1/11/2024. 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 He was brought to the hospital by his brother who is his primary caretaker.  His brother can no longer take care of him given worsening agitation and behavioral issues over the last 2 months.  Patient was admitted for profound hyperglycemia without evidence of DKA or HHNK.  Patient does not have capacity and his brother Luis Carlos is the DURABLE POWER OF .  Patient has been made DNR/DNI. Hospice was initially considered and then declined.     Interval Problem Update  2/6:  left elbow erythema, swelling, abrasions c/w cellulitis.  Started on keflex and wound care.  Likely from pressure injury.  Patient did not want to be disturbed on exam today, limiting exam.  H/o aggression. Pending long term SNF bed. Requiring replacement of Leslie catheter, retention x2 on bladder scans, increased flomax and dc'd clonidine and cozaar for hypotension.  2/7:  spoke with brother, concerned that patient had fallen.  Updated brother Luis Cralos that his left arm cellulitis is improved today with wound care and keflex.  Review of photos under media tab show 2/4 left elbow abrasions only.  Brother concerned about nursing not keeping patient clean.  I updated the brother that he is currently clean getting wound care and antibiotics for his left elbow and has a Leslie catheter replaced due to persistent urinary retention.  Increased Lantus to 11 to 15 units nightly.  Renewed lapbelt restraint.  2/8:  improving left elbow cellulitis.  Sitting up in chair at nurses station.  2/9:  ambulated with 2 person assist to chair at nurses station today.  Cooperative.  persistent swelling left arm.  Abrasions and  cellulitis improved.  Ordered us rule out DVT.  Glucose level 44 this a.m.  dc'd lantus and started glyburide tomorrow.  Unable to swallow flomax or proscar, therefore changed to cardura (can be crushed in apple sauce). Remains with Leslie catheter.  2/10:  left elbow improving, negative u/s LUE.  2/11:  resolving left arm cellulitis and edema.  Leslie remains.  Eating diet with help.  2/12:  elevated  blood sugars last 24 hours, increased glyburide 5 bid to 10mg po bid and added back lantus 10 units qhs.  Increased anxiety, tearful today.  Added ativan IV prn anxiety.  2/13: Anxious during lunch and wanting to leave.  Added oral Ativan prn and monitoring for respiratory depression.  2/14: Tearful and agitated.  Increased Haldol as needed.  Increased Seroquel dosing.    I have discussed this patient's plan of care and discharge plan at IDT rounds today with Case Management, Nursing, Nursing leadership, and other members of the IDT team.    Consultants/Specialty  Palliative care    Code Status  DNAR/DNI    Disposition  Long term bed SNF pending, looking into possibly [place].      I have placed the appropriate orders for post-discharge needs.    Review of Systems  Review of Systems   Unable to perform ROS: Dementia        Physical Exam  Temp:  [36.1 °C (97 °F)-37.1 °C (98.7 °F)] 36.1 °C (97 °F)  Pulse:  [] 70  Resp:  [16-18] 16  BP: (100-124)/(45-62) 124/60  SpO2:  [92 %-96 %] 96 %    Physical Exam  Vitals and nursing note reviewed.   Constitutional:       General: He is not in acute distress.     Appearance: He is well-developed.      Comments: In bed    No acute distress   HENT:      Head: Normocephalic and atraumatic.      Mouth/Throat:      Pharynx: No oropharyngeal exudate.   Neck:      Thyroid: No thyromegaly.   Cardiovascular:      Rate and Rhythm: Normal rate.      Heart sounds:      No gallop.   Pulmonary:      Effort: No respiratory distress.      Breath sounds: No wheezing.   Abdominal:      General:  There is no distension.      Palpations: Abdomen is soft.      Tenderness: There is no abdominal tenderness.   Musculoskeletal:         General: No tenderness.      Cervical back: Neck supple.      Right lower leg: No edema.      Left lower leg: No edema.   Skin:     General: Skin is warm and dry.      Findings: No rash.   Neurological:      Mental Status: He is alert.      Motor: Weakness present.   Psychiatric:         Mood and Affect: Affect is tearful.      Comments: Flat affect         Fluids    Intake/Output Summary (Last 24 hours) at 2/14/2024 1519  Last data filed at 2/14/2024 0422  Gross per 24 hour   Intake 150 ml   Output 1100 ml   Net -950 ml       Laboratory                                Imaging  US-EXTREMITY VENOUS UPPER UNILAT LEFT   Final Result      DX-CHEST-PORTABLE (1 VIEW)   Final Result      1.  Hazy medial right lower lobe opacity which could be due to pneumonitis or atelectasis.      [id number] VIEW   Final Result         Large amount of stool throughout the colon.           Assessment/Plan  * Type 2 diabetes mellitus with hyperglycemia, with long-term current use of insulin (HCC)- (present on admission)  Assessment & Plan  Bs labile, persistent hypoglycemia.   Started glyburide 2/10 after dc'd lantus 10 units.    Continue ISS, adjust PRN  Hga1c 8.7%  2/12: elevated blood sugars, increased glyburide 5 bid to 10 bid and restarted lantus 10 units qhs.      Cellulitis of left elbow- (present on admission)  Assessment & Plan  2/4:  review of media tab photos, superficial abrasions noted.  2/6:  noted by bedside RN, appears to have abrasions, erythema, edema and erythema c/w cellulitis from pressure injury.  Keflex 500mg po tid x 5 days.  Wound care consult for pressure injury.  2/7:  improved.  Decreased erythema decreased edema noted left hand and arm.  No joint involvement.  Patient has pressure bandage in place for elbow protection.  2/9:  negative u/s venous    Constipation- (present on  admission)  Assessment & Plan  Resolved  Continue bowel regimen    Urinary retention- (present on admission)  Assessment & Plan  Galarza  placed on 1/25/2024  Proscar added to Flomax on 1/26/2024  removal of galarza 2/4, continue to follow, bladder scans.  2/6:  replaced galarza for urine retention x 2 bladder scans.  Increased flomax 0.8 mg po dailyl with proscar 5mg nightly.  2/9:  patient unable to swallow pills, must be crushed since he chews on the pills.   Changed flomax and proscar to cardura qhs.     ACP (advance care planning)- (present on admission)  Assessment & Plan  Changed to DNR/DNI  Palliative following  Awaiting acceptance at custodial    Dehydration- (present on admission)  Assessment & Plan  Resolved  Stop IVf's  Monitor sugars and oral intake    Ketosis (HCC)- (present on admission)  Assessment & Plan  Resolved    Generalized weakness- (present on admission)  Assessment & Plan  Unable to care for self  Will need a memory care unit versus group home  Family not interested in hospice    Alzheimer disease (HCC)- (present on admission)  Assessment & Plan  Advanced, with agitation   Stop IV valium  depakote 125 mg BID   Continue scheduled seroquel 50 mg BID  namenda 5 mg daily and aricept 5 mg daily  Continue to adjust PRN  Continue trazodone 150 mg QPM  Po zoloft..  Crease to 50 mg p.o. daily on 1/26/2024  Po clonidine      Restraints off as off 1/28    Discussed behavioral disturbances with psychiatry.      Coronary artery disease due to lipid rich plaque - mild to moderate on Cath 2019 Left Dominant- (present on admission)  Assessment & Plan  Diabetic diet  Continue ARB, BB.      Anxiety- (present on admission)  Assessment & Plan  Now has Ativan as needed both oral and IV.    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         VTE prophylaxis: Xarelto    I have performed a physical exam and reviewed and updated ROS and Plan today (2/14/2024). In review of yesterday's note (2/13/2024), there are no changes except as documented above.

## 2024-02-14 NOTE — CARE PLAN
Pt AO x1.  Pt currently sleeping comfortably.  No signs of pain or distress.  Call light within reach.  Bed locked and in lowest position.  Hourly rounding.  Needs met at this time.          The patient is Watcher - Medium risk of patient condition declining or worsening    Shift Goals  Clinical Goals: safety  Patient Goals: KATIANA (Dementia)  Family Goals: KATIANA    Progress made toward(s) clinical / shift goals:    Pt was able to stay incident and fall free during entirety of shift.         Patient is not progressing towards the following goals:  Pt dx with dementia, unable to retain teachings.     Problem: Knowledge Deficit - Standard  Goal: Patient and family/care givers will demonstrate understanding of plan of care, disease process/condition, diagnostic tests and medications  Outcome: Not Progressing

## 2024-02-15 NOTE — PROGRESS NOTES
Pharmacy Pharmacotherapy Consult for LOS >30 days    Admit Date: 1/11/2024      Medications were reviewed for appropriateness and ongoing need.     Current Facility-Administered Medications   Medication Dose Route Frequency Provider Last Rate Last Admin    QUEtiapine (SEROquel) tablet 100 mg  100 mg Oral BID Jose L Cristina M.D.   100 mg at 02/15/24 0510    haloperidol lactate (Haldol) injection 3 mg  3 mg Intravenous Q HOUR PRN Jose L Cristina M.D.   3 mg at 02/15/24 0020    LORazepam (Ativan) tablet 0.5 mg  0.5 mg Oral Q4HRS PRN Jose L Cristina M.D.   0.5 mg at 02/13/24 1526    LORazepam (Ativan) injection 0.5 mg  0.5 mg Intravenous Q8HRS PRN Sheila Thompson M.D.   0.5 mg at 02/14/24 1949    metoprolol tartrate (Lopressor) tablet 12.5 mg  12.5 mg Oral BID Sheila Thompson M.D.   12.5 mg at 02/15/24 0510    glyBURIDE (Diabeta) tablet 5 mg  5 mg Oral BID WITH MEALS Sheila Thompson M.D.   5 mg at 02/15/24 0900    doxazosin (Cardura) tablet 2 mg  2 mg Oral QHS Sheila Thompson M.D.   2 mg at 02/14/24 2311    melatonin tablet 5 mg  5 mg Oral HS PRN NGUYEN GrossP.KAYENWendi   5 mg at 02/08/24 0112    bisacodyl (Dulcolax) suppository 10 mg  10 mg Rectal QDAY PRN Iveth Fajardo M.D.   10 mg at 02/11/24 2110    polyethylene glycol/lytes (Miralax) Packet 1 Packet  1 Packet Oral QDAY PRN Iveth Fajardo M.D.        senna-docusate (Pericolace Or Senokot S) 8.6-50 MG per tablet 1 Tablet  1 Tablet Oral DAILY Iveth Fajardo M.D.   1 Tablet at 02/15/24 0511    sertraline (Zoloft) tablet 50 mg  50 mg Oral DAILY Kan Oliva M.D.   50 mg at 02/15/24 0510    insulin regular (HumuLIN R,NovoLIN R) injection  3-14 Units Subcutaneous 4X/DAY ACHS Compa Vance D.O.   7 Units at 02/15/24 1153    And    dextrose 10 % BOLUS 25 g  25 g Intravenous Q15 MIN PRN Compa Vance D.O. 999 mL/hr at 01/26/24 0816 25 g at 01/26/24 0816    valproic acid (Depakene) IR oral solution 125 mg  125 mg Oral BID Victor Hugo Malik M.D.   125 mg at 02/15/24 0900    donepezil  (Aricept) tablet 5 mg  5 mg Oral Q EVENING Victor Hugo Malik M.D.   5 mg at 02/14/24 1920    memantine (Namenda) tablet 5 mg  5 mg Oral DAILY Victor Hugo Malik M.D.   5 mg at 02/15/24 0510    traZODone (Desyrel) tablet 150 mg  150 mg Oral Nightly Nahun Almeida M.D.   150 mg at 02/14/24 2312    acetaminophen (Tylenol) tablet 650 mg  650 mg Oral Q4HRS PRN Nahun Almeida M.D.   650 mg at 01/13/24 0111    polyethylene glycol/lytes (Miralax) Packet 1 Packet  1 Packet Oral DAILY Castro Bruno D.O.   1 Packet at 02/15/24 0510       Recommendations:  No recommendations at this time.     Armand Barger, PharmD

## 2024-02-15 NOTE — PROGRESS NOTES
Patient is agitated and attempted to get out of bed multiple times.Haldol 3mg IVP given. Kept side rails up x3, bed alarm on, bed wheels locked and in low position and continue hourly monitoring.

## 2024-02-15 NOTE — PROGRESS NOTES
Patient is anxious and agitated and tried to get out of bed multiple times. Lorazepam 0.5mg IVP given. Kept side rails up x3, bed alarm on, bed wheels locked and in low position and continue hourly monitoring.

## 2024-02-15 NOTE — PROGRESS NOTES
[facility] Medicine Daily Progress Note    Date of Service  2/15/2024    Chief Complaint  Inability to care for self    Hospital Course  Dylon Santa is a 63 y.o. male who was admitted to [facility] on 1/11/2024. 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 He was brought to the hospital by his brother who is his primary caretaker.  His brother can no longer take care of him given worsening agitation and behavioral issues over the last 2 months.  Patient was admitted for profound hyperglycemia without evidence of DKA or HHNK.  Patient does not have capacity and his brother Luis Carlos is the DURABLE POWER OF .  Patient has been made DNR/DNI. Hospice was initially considered and then declined.     Interval Problem Update  2/6:  left elbow erythema, swelling, abrasions c/w cellulitis.  Started on keflex and wound care.  Likely from pressure injury.  Patient did not want to be disturbed on exam today, limiting exam.  H/o aggression. Pending long term SNF bed. Requiring replacement of Leslie catheter, retention x2 on bladder scans, increased flomax and dc'd clonidine and cozaar for hypotension.  2/7:  spoke with brother, concerned that patient had fallen.  Updated brother Luis Carlos that his left arm cellulitis is improved today with wound care and keflex.  Review of photos under media tab show 2/4 left elbow abrasions only.  Brother concerned about nursing not keeping patient clean.  I updated the brother that he is currently clean getting wound care and antibiotics for his left elbow and has a Leslie catheter replaced due to persistent urinary retention.  Increased Lantus to 11 to 15 units nightly.  Renewed lapbelt restraint.  2/8:  improving left elbow cellulitis.  Sitting up in chair at nurses station.  2/9:  ambulated with 2 person assist to chair at nurses station today.  Cooperative.  persistent swelling left arm.  Abrasions and  cellulitis improved.  Ordered us rule out DVT.  Glucose level 44 this a.m.  dc'd lantus and started glyburide tomorrow.  Unable to swallow flomax or proscar, therefore changed to cardura (can be crushed in apple sauce). Remains with Leslie catheter.  2/10:  left elbow improving, negative u/s LUE.  2/11:  resolving left arm cellulitis and edema.  Leslie remains.  Eating diet with help.  2/12:  elevated  blood sugars last 24 hours, increased glyburide 5 bid to 10mg po bid and added back lantus 10 units qhs.  Increased anxiety, tearful today.  Added ativan IV prn anxiety.  2/13: Anxious during lunch and wanting to leave.  Added oral Ativan prn and monitoring for respiratory depression.  2/14: Tearful and agitated.  Increased Haldol as needed.  Increased Seroquel dosing.  2/15: Patient depressed but was able to be redirected with some effort.    I have discussed this patient's plan of care and discharge plan at IDT rounds today with Case Management, Nursing, Nursing leadership, and other members of the IDT team.    Consultants/Specialty  Palliative care    Code Status  DNAR/DNI    Disposition  Long term bed SNF pending, looking into possibly [facility].      I have placed the appropriate orders for post-discharge needs.    Review of Systems  Review of Systems   Unable to perform ROS: Dementia        Physical Exam  Temp:  [35.9 °C (96.7 °F)-36.7 °C (98.1 °F)] 35.9 °C (96.7 °F)  Pulse:  [71-86] 71  Resp:  [16-17] 16  BP: (129-150)/(59-70) 150/70  SpO2:  [93 %-97 %] 97 %    Physical Exam  Vitals and nursing note reviewed.   Constitutional:       General: He is not in acute distress.     Appearance: He is well-developed.      Comments: In bed    No acute distress   HENT:      Head: Normocephalic and atraumatic.      Mouth/Throat:      Pharynx: No oropharyngeal exudate.   Neck:      Thyroid: No thyromegaly.   Cardiovascular:      Rate and Rhythm: Normal rate.      Heart sounds:      No gallop.   Pulmonary:      Effort: No  respiratory distress.      Breath sounds: No wheezing.   Abdominal:      General: There is no distension.      Palpations: Abdomen is soft.      Tenderness: There is no abdominal tenderness.   Musculoskeletal:      Cervical back: Neck supple.      Right lower leg: No edema.      Left lower leg: No edema.   Skin:     General: Skin is warm and dry.      Findings: No rash.   Neurological:      Mental Status: He is alert.      Motor: Weakness present.   Psychiatric:         Mood and Affect: Affect is tearful.      Comments: Flat affect         Fluids    Intake/Output Summary (Last 24 hours) at 2/15/2024 1439  Last data filed at 2/15/2024 0834  Gross per 24 hour   Intake 320 ml   Output 1600 ml   Net -1280 ml       Laboratory                                Imaging  US-EXTREMITY VENOUS UPPER UNILAT LEFT   Final Result      DX-CHEST-PORTABLE (1 VIEW)   Final Result      1.  Hazy medial right lower lobe opacity which could be due to pneumonitis or atelectasis.      [id number] VIEW   Final Result         Large amount of stool throughout the colon.           Assessment/Plan  * Type 2 diabetes mellitus with hyperglycemia, with long-term current use of insulin (HCC)- (present on admission)  Assessment & Plan  Bs labile, persistent hypoglycemia.   Started glyburide 2/10 after dc'd lantus 10 units.    Continue ISS, adjust PRN  Hga1c 8.7%  2/12: elevated blood sugars, increased glyburide 5 bid to 10 bid and restarted lantus 10 units qhs.      Cellulitis of left elbow- (present on admission)  Assessment & Plan  2/4:  review of media tab photos, superficial abrasions noted.  2/6:  noted by bedside RN, appears to have abrasions, erythema, edema and erythema c/w cellulitis from pressure injury.  Keflex 500mg po tid x 5 days.  Wound care consult for pressure injury.  2/7:  improved.  Decreased erythema decreased edema noted left hand and arm.  No joint involvement.  Patient has pressure bandage in place for elbow protection.  2/9:   negative u/s venous    Constipation- (present on admission)  Assessment & Plan  Resolved  Continue bowel regimen (scheduled)    Urinary retention- (present on admission)  Assessment & Plan  Galarza  placed on 1/25/2024  Proscar added to Flomax on 1/26/2024  removal of galarza 2/4, continue to follow, bladder scans.  2/6:  replaced galarza for urine retention x 2 bladder scans.  Increased flomax 0.8 mg po dailyl with proscar 5mg nightly.  2/9:  patient unable to swallow pills, must be crushed since he chews on the pills.   Changed flomax and proscar to cardura qhs.     ACP (advance care planning)- (present on admission)  Assessment & Plan  Changed to DNR/DNI  Palliative following  Awaiting acceptance at [facility]    Dehydration- (present on admission)  Assessment & Plan  Resolved  Stop IVf's  Monitor sugars and oral intake    Ketosis (HCC)- (present on admission)  Assessment & Plan  Resolved    Generalized weakness- (present on admission)  Assessment & Plan  Unable to care for self  Will need a memory care unit versus group home  Family not interested in hospice    Alzheimer disease (HCC)- (present on admission)  Assessment & Plan  Advanced, with agitation   Stop IV valium  depakote 125 mg BID   Continue scheduled seroquel 50 mg BID  namenda 5 mg daily and aricept 5 mg daily  Continue to adjust PRN  Continue trazodone 150 mg QPM  Po zoloft..  Crease to 50 mg p.o. daily on 1/26/2024  Po clonidine      Restraints off as off 1/28    Discussed behavioral disturbances with psychiatry.      Coronary artery disease due to lipid rich plaque - mild to moderate on Cath 2019 Left Dominant- (present on admission)  Assessment & Plan  Diabetic diet  Continue ARB, BB.      Anxiety- (present on admission)  Assessment & Plan  Now has Ativan as needed both oral and IV.    Stage 3a chronic kidney disease (HCC)- (present on admission)  Assessment & Plan  Improving, likely an element of REGULO on CKD Stage III 2/2 DM nephropathy  No  AGMA  Following intermittently    Dyslipidemia- (present on admission)  Assessment & Plan        Hypertension- (present on admission)  Assessment & Plan  hypotensive  Continue toprol XL 25 mg daily,   Dc losartan 100 mg daily on 2/6  Dc clonidine 2/6.         VTE prophylaxis: Xarelto    I have performed a physical exam and reviewed and updated ROS and Plan today (2/15/2024). In review of yesterday's note (2/14/2024), there are no changes except as documented above.

## 2024-02-15 NOTE — CARE PLAN
The patient is Watcher - Medium risk of patient condition declining or worsening    Shift Goals  Clinical Goals: Will manage agitation and maintain safety at all times  Patient Goals: KATIAAN (A&O x1 (self), agitated and dx with Dementia)  Family Goals: none present    Progress made toward(s) clinical / shift goals: After giving Lorazepam 0.5mg IV patient seen comfortably sleeping. However, at 12:20 patient woke up and agitated, Haldol 3mg IVP given. Kept side rails up x3, bed alarm on, bed wheels locked and in low position.    Patient is not progressing towards the following goals:      Problem: Knowledge Deficit - Standard  Goal: Patient and family/care givers will demonstrate understanding of plan of care, disease process/condition, diagnostic tests and medications  Outcome: Not Met     Problem: Psychosocial  Goal: Patient's level of anxiety will decrease  Outcome: Not Met  Goal: Patient's ability to verbalize feelings about condition will improve  Outcome: Not Met     Problem: Communication  Goal: The ability to communicate needs accurately and effectively will improve  Outcome: Not Met     Problem: Mobility  Goal: Patient's capacity to carry out activities will improve  Outcome: Not Met     Problem: Self Care  Goal: Patient will have the ability to perform ADLs independently or with assistance (bathe, groom, dress, toilet and feed)  Outcome: Not Met

## 2024-02-15 NOTE — WOUND TEAM
[facility]  Inpatient Services  Wound and Skin Care Brief Evaluation    Admission Date: 1/11/2024     Last order of IP CONSULT TO WOUND CARE was found on 2/14/2024 from Hospital Encounter on 1/11/2024     HPI, PMH, SH: Reviewed    Chief Complaint   Patient presents with    Other     Pt to triage accompanied by brother, concerned about increasing aggression since yesterday, has hx of dementia. Baseline aa0x1. Per brother who is caregiver will be having a surgery done tomorrow and states that he can't take care of the pt anymore, asking assistance for placement. Pt normally takes quetiapine fumarate for aggression but doesn't seem to help much yesterday.      Diagnosis: Ketosis (HCC) [E88.89]    Unit where seen by Wound Team: S612/02     Wound consult placed regarding R rib cage. Chart and images reviewed. This clinician in to assess patient. R lateral chest with dry, healing abrasion. OK to leave AGNES and allow to resolve on its own.     No pressure injuries or advanced wound care needs identified. Wound consult completed. No further follow up unless indicated and consulted.     Wound 02/14/24 Abrasion Chest Lateral Right (Active)   Date First Assessed/Time First Assessed: 02/14/24 1516   Present on Original Admission: No  Hand Hygiene Completed: Yes  Primary Wound Type: Abrasion  Location: Chest  Wound Orientation: Lateral  Laterality: Right      Assessments 2/15/2024 12:30 PM   Wound Image     Site Assessment Red;Dry   Periwound Assessment Pink;Red   Margins Attached edges   Closure Open to air   Drainage Amount None   Wound Team Following Not following       PREVENTATIVE INTERVENTIONS:    Q shift [employer] - performed per nursing policy  Q shift pressure point assessments - performed per nursing policy    Surface/Positioning  Low [place] - Currently in Place

## 2024-02-16 NOTE — PROGRESS NOTES
Patient is agitated and tried to get out of bed. Lorazepam 0.5mg IVP given. Kept side rails up x3, bed alarm on, bed wheels locked and in low position. Will continue to monitor.

## 2024-02-16 NOTE — CARE PLAN
The patient is Watcher - Medium risk of patient condition declining or worsening    Shift Goals  Clinical Goals: Maintain safety at all times  Patient Goals: KATIANA (A&O x1, dx with dementia)  Family Goals: none present    Progress made toward(s) clinical / shift goals:     Patient is not progressing towards the following goals: After giving Lorazepam 0.5mg IV patient seen comfortably sleeping, visualized rise and fall of chest with even respirations. Kept side rails up x3, bed alarm on, bed wheels locked and in low position.       Problem: Knowledge Deficit - Standard  Goal: Patient and family/care givers will demonstrate understanding of plan of care, disease process/condition, diagnostic tests and medications  Outcome: Not Met     Problem: Psychosocial  Goal: Patient's ability to verbalize feelings about condition will improve  Outcome: Not Met     Problem: Communication  Goal: The ability to communicate needs accurately and effectively will improve  Outcome: Not Met     Problem: Mobility  Goal: Patient's capacity to carry out activities will improve  Outcome: Not Met     Problem: Self Care  Goal: Patient will have the ability to perform ADLs independently or with assistance (bathe, groom, dress, toilet and feed)  Outcome: Not Met

## 2024-02-16 NOTE — CARE PLAN
The patient is Stable - Low risk of patient condition declining or worsening    Shift Goals  Clinical Goals: Maintain safety  Patient Goals: KATIANA  Family Goals: none present    Progress made toward(s) clinical / shift goals:    Problem: Fall Risk  Goal: Patient will remain free from falls  Description: Target End Date:  Prior to discharge or change in level of care    Document interventions on the Deanna Cason Fall Risk Assessment    1.  Assess for fall risk factors  2.  Implement fall precautions  Outcome: Progressing  Note: Pt is alert to person, no s/s of pain or discomfort noted.  Pt agitated and attempted to get out of bed multiple times.Haldol 3mg Inj and remained agitated. Lorazepam 0.5 mg inj. administered with effectiveness. Pt is resting, visualized rise and fall of pt's chest.Bed alarm on, safety measures maintained. Pt remains free from injury.        Patient is not progressing towards the following goals:

## 2024-02-16 NOTE — CONSULTS
"  [facility] PSYCHIATRIC FOLLOW-UP:(established)  *Reason for admission: Last seen by psych 1/28/2024:   Pt to triage accompanied by brother, concerned about increasing aggression since yesterday, has hx of dementia. Baseline aa0x1. Per brother who is caregiver will be having a surgery done tomorrow and states that he can't take care of the pt anymore, asking assistance for placement. Pt normally takes quetiapine fumarate for aggression but doesn't seem to help much yesterday.  Reconsult: reportedly SNF won't give any antipsychotics to a pt without a bipolar, schizophrenia dx.  *Legal Hold Status: not applicable                S:  pt seems to be seeing things and talking to it but can't tell me what it is and if asked directly if he is seeing something, denies it. He does not answer questions meaningfully at all. Reliability is questionable as a result when the answer seemed coherent: asked if he was depressed, \"yes\", however unable to give me details or other symptoms.    Notes:  2/12-2/15/2024: Increased anxiety, tearful today.  Added ativan IV prn anxiety.  2/13: Anxious during lunch and wanting to leave.  Added oral Ativan prn and monitoring for respiratory depression.  2/14: Tearful and agitated.  Increased Haldol as needed.  Increased Seroquel dosing.  2/15: Patient depressed but was able to be redirected with some effort.    O: Medical ROS (as pertinent):                      *Psychiatric Examination:   Vitals:   Vitals:    02/15/24 1700 02/15/24 1952 02/16/24 0450 02/16/24 0900   BP: 110/60 117/63 138/71 133/63   Pulse: 71 62 79 73   Resp:  17 16 17   Temp:  36.1 °C (97 °F) 36.2 °C (97.2 °F) 35.9 °C (96.7 °F)   TempSrc:  Temporal Temporal Temporal   SpO2:  97% 99% 96%   Weight:       Height:         General Appearance:  poor eye contact and tries to cooperate, normal habitus, clear, moving from one side of bed to other.  Abnormal Movements: hand tremors intermittent   Gait and Posture: not " observed  Speech: minimal  Thought Process: normal rate  Associations:    not meaningful  Abnormal or Psychotic Thoughts:  appears to be responding to internal stimuli  Judgement and Insight: impaired   Orientation:unable to determine  Recent and Remote Memory: impaired  Attention Span and Concentration: distracted  Language:fluent  Fund of Knowledge: not tested  Mood and Affect: depressed and anxious, restless  SI/HI:  unable to assess        Current Medications:  Scheduled Medications   Medication Dose Frequency    insulin regular  3-14 Units 4X/DAY ACHS    insulin GLARGINE  10 Units Q EVENING    QUEtiapine  100 mg BID    metoprolol tartrate  12.5 mg BID    glyBURIDE  5 mg BID WITH MEALS    doxazosin  2 mg QHS    senna-docusate  1 Tablet DAILY    sertraline  50 mg DAILY    valproic acid  125 mg BID    donepezil  5 mg Q EVENING    memantine  5 mg DAILY    traZODone  150 mg Nightly    polyethylene glycol/lytes  1 Packet DAILY          *ASSESSMENT/RECOMENDATIONS:  1. Dementia with behavioral disturbance. Likely Alzheimer's. Behavior has been improved over 48 hours now.     Medical:   -DM2  -CAD  -CKD 3a  -HTN  -urinary retention: on meds    Legal hold: not applicable     Discussed/voalted: MELBA Cristina MD    Medication and Other Recommendations: final orders as per Tx Tm  Would ask CM to double check about the restriction on antipsychotics at the SNF. I have not come across this kind of restriction before. Is it a firm, SNF-wide policy or a guideline?  Increase zoloft to 100 mg/d  3    Not capacitated per tx tm: brother is POA. Brother unable to care for pt per notes. Hospice declined.     Will continue to follow with you.        Discharge recommendations: per tx  tm    If released from [facility]: Discharge Instructions:  -Reviewed safety plan: 911, ER, PCM, [facility], Suicide crisis line  -Please assist with outpatient Psychiatric/substance use follow up appointments at discharge once medically cleared.

## 2024-02-17 NOTE — CARE PLAN
The patient is Watcher - Medium risk of patient condition declining or worsening    Shift Goals  Clinical Goals: safety, monitor BG  Patient Goals: wil  Family Goals: wil    Progress made toward(s) clinical / shift goals:  patient is a&o x1. O2 sats maintained on room air. No reports of pain. Bed alarm in place. Pt remains free of falls at this time. All needs addressed.    Patient is not progressing towards the following goals:      Problem: Knowledge Deficit - Standard  Goal: Patient and family/care givers will demonstrate understanding of plan of care, disease process/condition, diagnostic tests and medications  Outcome: Not Progressing     Problem: Diabetes Management  Goal: Patient will achieve and maintain glucose in satisfactory range  Outcome: Not Progressing     Problem: Knowledge Deficit - Diabetes  Goal: Patient will demonstrate knowledge of insulin injection, symptoms, and treatment of hypoglycemia and diet prior to discharge  Outcome: Not Progressing

## 2024-02-17 NOTE — PROGRESS NOTES
[facility] Medicine Daily Progress Note    Date of Service  2/16/2024    Chief Complaint  Inability to care for self    Hospital Course  Dylon Santa is a 63 y.o. male who was admitted to [facility] on 1/11/2024. 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 He was brought to the hospital by his brother who is his primary caretaker.  His brother can no longer take care of him given worsening agitation and behavioral issues over the last 2 months.  Patient was admitted for profound hyperglycemia without evidence of DKA or HHNK.  Patient does not have capacity and his brother Luis Carlos is the DURABLE POWER OF .  Patient has been made DNR/DNI. Hospice was initially considered and then declined.     Interval Problem Update  2/6:  left elbow erythema, swelling, abrasions c/w cellulitis.  Started on keflex and wound care.  Likely from pressure injury.  Patient did not want to be disturbed on exam today, limiting exam.  H/o aggression. Pending long term SNF bed. Requiring replacement of Leslie catheter, retention x2 on bladder scans, increased flomax and dc'd clonidine and cozaar for hypotension.  2/7:  spoke with brother, concerned that patient had fallen.  Updated brother Luis Carlos that his left arm cellulitis is improved today with wound care and keflex.  Review of photos under media tab show 2/4 left elbow abrasions only.  Brother concerned about nursing not keeping patient clean.  I updated the brother that he is currently clean getting wound care and antibiotics for his left elbow and has a Leslie catheter replaced due to persistent urinary retention.  Increased Lantus to 11 to 15 units nightly.  Renewed lapbelt restraint.  2/8:  improving left elbow cellulitis.  Sitting up in chair at nurses station.  2/9:  ambulated with 2 person assist to chair at nurses station today.  Cooperative.  persistent swelling left arm.  Abrasions and  cellulitis improved.  Ordered us rule out DVT.  Glucose level 44 this a.m.  dc'd lantus and started glyburide tomorrow.  Unable to swallow flomax or proscar, therefore changed to cardura (can be crushed in apple sauce). Remains with Leslie catheter.  2/10:  left elbow improving, negative u/s LUE.  2/11:  resolving left arm cellulitis and edema.  Leslie remains.  Eating diet with help.  2/12:  elevated  blood sugars last 24 hours, increased glyburide 5 bid to 10mg po bid and added back lantus 10 units qhs.  Increased anxiety, tearful today.  Added ativan IV prn anxiety.  2/13: Anxious during lunch and wanting to leave.  Added oral Ativan prn and monitoring for respiratory depression.  2/14: Tearful and agitated.  Increased Haldol as needed.  Increased Seroquel dosing.  2/15: Patient depressed but was able to be redirected with some effort.  2/16: Noted glucose trend of hyperglycemia during the day with lowest blood sugar around 2200 in the past few days.  Decreased ISS and added p.m. Lantus.    I have discussed this patient's plan of care and discharge plan at IDT rounds today with Case Management, Nursing, Nursing leadership, and other members of the IDT team.    Consultants/Specialty  Palliative care    Code Status  DNAR/DNI    Disposition  Long term bed SNF pending, looking into possibly [facility].      I have placed the appropriate orders for post-discharge needs.    Review of Systems  Review of Systems   Unable to perform ROS: Dementia        Physical Exam  Temp:  [35.9 °C (96.7 °F)-36.3 °C (97.3 °F)] 36.3 °C (97.3 °F)  Pulse:  [62-89] 86  Resp:  [16-17] 17  BP: (110-168)/(60-87) 145/61  SpO2:  [92 %-99 %] 92 %    Physical Exam  Vitals and nursing note reviewed.   Constitutional:       General: He is not in acute distress.     Appearance: He is well-developed.      Comments: In bed    No acute distress   HENT:      Head: Normocephalic and atraumatic.      Mouth/Throat:      Pharynx: No oropharyngeal exudate.    Neck:      Thyroid: No thyromegaly.   Cardiovascular:      Rate and Rhythm: Normal rate.   Pulmonary:      Effort: No respiratory distress.      Breath sounds: No wheezing.   Abdominal:      General: There is no distension.      Palpations: Abdomen is soft.      Tenderness: There is no abdominal tenderness.   Musculoskeletal:      Cervical back: Neck supple.      Right lower leg: No edema.      Left lower leg: No edema.   Skin:     General: Skin is warm and dry.      Findings: No rash.   Neurological:      Mental Status: He is alert.      Motor: Weakness present.   Psychiatric:         Mood and Affect: Affect is tearful.      Comments: Flat affect         Fluids    Intake/Output Summary (Last 24 hours) at 2/16/2024 1621  Last data filed at 2/16/2024 0600  Gross per 24 hour   Intake 500 ml   Output 1300 ml   Net -800 ml       Laboratory                                Imaging  US-EXTREMITY VENOUS UPPER UNILAT LEFT   Final Result      DX-CHEST-PORTABLE (1 VIEW)   Final Result      1.  Hazy medial right lower lobe opacity which could be due to pneumonitis or atelectasis.      [id number] VIEW   Final Result         Large amount of stool throughout the colon.           Assessment/Plan  * Type 2 diabetes mellitus with hyperglycemia, with long-term current use of insulin (HCC)- (present on admission)  Assessment & Plan  Started glyburide 2/10   Hga1c 8.7%  Patient has had hypoglycemia and hyperglycemia with labile blood sugars  Lantus has been discontinued and resumed   Continue to adjust insulin dosing      Cellulitis of left elbow- (present on admission)  Assessment & Plan  2/4:  review of media tab photos, superficial abrasions noted.  2/6:  noted by bedside RN, appears to have abrasions, erythema, edema and erythema c/w cellulitis from pressure injury.  Keflex 500mg po tid x 5 days.  Wound care consult for pressure injury.  2/7:  improved.  Decreased erythema decreased edema noted left hand and arm.  No joint  involvement.  Patient has pressure bandage in place for elbow protection.  2/9:  negative u/s venous    Constipation- (present on admission)  Assessment & Plan  Resolved  Continue bowel regimen (scheduled)    Urinary retention- (present on admission)  Assessment & Plan  Galarza  placed on 1/25/2024  Proscar added to Flomax on 1/26/2024  removal of galarza 2/4, continue to follow, bladder scans.  2/6:  replaced galarza for urine retention x 2 bladder scans.  Increased flomax 0.8 mg po dailyl with proscar 5mg nightly.  2/9:  patient unable to swallow pills, must be crushed since he chews on the pills.   Changed flomax and proscar to cardura qhs.     ACP (advance care planning)- (present on admission)  Assessment & Plan  Changed to DNR/DNI  Palliative following  Awaiting acceptance at [facility]    Dehydration- (present on admission)  Assessment & Plan  Resolved  Stop IVf's  Monitor sugars and oral intake    Ketosis (HCC)- (present on admission)  Assessment & Plan  Resolved    Generalized weakness- (present on admission)  Assessment & Plan  Unable to care for self  Will need a memory care unit versus group home  Family not interested in hospice    Alzheimer disease (HCC)- (present on admission)  Assessment & Plan  Advanced, with agitation   Stop IV valium  depakote 125 mg BID   Continue scheduled seroquel 50 mg BID  namenda 5 mg daily and aricept 5 mg daily  Continue to adjust PRN  Continue trazodone 150 mg QPM  Po zoloft..  Crease to 50 mg p.o. daily on 1/26/2024  Po clonidine      Restraints off as off 1/28    Discussed behavioral disturbances with psychiatry.      Coronary artery disease due to lipid rich plaque - mild to moderate on Cath 2019 Left Dominant- (present on admission)  Assessment & Plan  Diabetic diet  Continue ARB, BB.      Anxiety- (present on admission)  Assessment & Plan  Now has Ativan as needed both oral and IV.    Stage 3a chronic kidney disease (HCC)- (present on admission)  Assessment &  Plan  Improving, likely an element of REGULO on CKD Stage III 2/2 DM nephropathy  No AGMA  Following intermittently    Dyslipidemia- (present on admission)  Assessment & Plan        Hypertension- (present on admission)  Assessment & Plan  hypotensive  Continue toprol XL 25 mg daily,   Dc losartan 100 mg daily on 2/6  Dc clonidine 2/6.         VTE prophylaxis: Xarelto    I have performed a physical exam and reviewed and updated ROS and Plan today (2/16/2024). In review of yesterday's note (2/15/2024), there are no changes except as documented above.

## 2024-02-17 NOTE — CONSULTS
[facility] PSYCHIATRIC FOLLOW-UP:(established)  *Reason for admission:   Last seen by psych 1/28/2024:   Pt to triage accompanied by brother, concerned about increasing aggression since yesterday, has hx of dementia. Baseline aa0x1. Per brother who is caregiver will be having a surgery done tomorrow and states that he can't take care of the pt anymore, asking assistance for placement. Pt normally takes quetiapine fumarate for aggression but doesn't seem to help much yesterday.  Reconsult: reportedly SNF won't give any antipsychotics to a pt without a bipolar, schizophrenia dx and are refusing him per CM  *Legal Hold Status: not applicable                   S:  heavily asleep. Chart reviewed.    O: Medical ROS (as pertinent):                      *Psychiatric Examination:   Vitals:   Vitals:    02/16/24 1610 02/16/24 2034 02/17/24 0425 02/17/24 0800   BP: (!) 145/61 115/52 (!) 147/77 137/66   Pulse: 86 81 83 75   Resp: 17 18 18 17   Temp: 36.3 °C (97.3 °F) 36.9 °C (98.5 °F) 36.5 °C (97.7 °F) 36.4 °C (97.5 °F)   TempSrc: Temporal Temporal Temporal Temporal   SpO2: 92% 94% 99% 95%   Weight:       Height:         General Appearance:  poor eye contact  Abnormal Movements: none   Gait and Posture: not observed  Speech: none  Thought Process: unable to assess  Associations:   unable to assess  Abnormal or Psychotic Thoughts: unable to assess  Judgement and Insight: unable to assess  Orientation: unable to determine  Recent and Remote Memory: unable to determine  Attention Span and Concentration: diminished  Language:unable to assess  Fund of Knowledge: unable to assess  Mood and Affect: unable to asssess  SI/HI:  unable to assess        Current Medications:  Scheduled Medications   Medication Dose Frequency    insulin regular  3-14 Units 4X/DAY ACHS    insulin GLARGINE  10 Units Q EVENING    sertraline  100 mg DAILY    QUEtiapine  100 mg BID    metoprolol tartrate  12.5 mg BID    glyBURIDE  5 mg BID WITH  MEALS    doxazosin  2 mg QHS    senna-docusate  1 Tablet DAILY    valproic acid  125 mg BID    donepezil  5 mg Q EVENING    memantine  5 mg DAILY    traZODone  150 mg Nightly    polyethylene glycol/lytes  1 Packet DAILY          *ASSESSMENT/RECOMENDATIONS:  1.. Dementia with behavioral disturbance. Likely Alzheimer's. Behavior has been improved over 48 hours now.     Medical:   -DM2  -CAD  -CKD 3a  -HTN  -urinary retention: on meds     Legal hold: not applicable    Discussed/voalted: MELBA Cristina MD    Medication and Other Recommendations: final orders as per Tx Tm  Not capacitated per tx tm: brother is POA. Brother unable to care for pt per notes. Hospice declined.   2    try ativan 0,25 mg bid am and afternoon and see if this helps anxiety.  3.   Also decrease seroquel to only 100 mg hs.  4    will reasses tomorrow.     Will continue to follow with you.        Discharge recommendations: per tx tm    If released from [facility]: Discharge Instructions:  -Reviewed safety plan: 911, ER, PCM, MHC, Suicide crisis line  -Please assist with outpatient Psychiatric/substance use follow up appointments at discharge once medically cleared.

## 2024-02-17 NOTE — PROGRESS NOTES
Pt is A&Ox1, VSS on RA. Pt , Dr. Cristina notified, pt medicated per Dr. Cristina order (see flowsheet).

## 2024-02-17 NOTE — PROGRESS NOTES
[facility] Medicine Daily Progress Note    Date of Service  2/17/2024    Chief Complaint  Inability to care for self    Hospital Course  Dylon Santa is a 63 y.o. male who was admitted to [facility] on 1/11/2024. 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 He was brought to the hospital by his brother who is his primary caretaker.  His brother can no longer take care of him given worsening agitation and behavioral issues over the last 2 months.  Patient was admitted for profound hyperglycemia without evidence of DKA or HHNK.  Patient does not have capacity and his brother Luis Carlos is the DURABLE POWER OF .  Patient has been made DNR/DNI. Hospice was initially considered and then declined.     Interval Problem Update  2/6:  left elbow erythema, swelling, abrasions c/w cellulitis.  Started on keflex and wound care.  Likely from pressure injury.  Patient did not want to be disturbed on exam today, limiting exam.  H/o aggression. Pending long term SNF bed. Requiring replacement of Leslie catheter, retention x2 on bladder scans, increased flomax and dc'd clonidine and cozaar for hypotension.  2/7:  spoke with brother, concerned that patient had fallen.  Updated brother Luis Carlos that his left arm cellulitis is improved today with wound care and keflex.  Review of photos under media tab show 2/4 left elbow abrasions only.  Brother concerned about nursing not keeping patient clean.  I updated the brother that he is currently clean getting wound care and antibiotics for his left elbow and has a Leslie catheter replaced due to persistent urinary retention.  Increased Lantus to 11 to 15 units nightly.  Renewed lapbelt restraint.  2/8:  improving left elbow cellulitis.  Sitting up in chair at nurses station.  2/9:  ambulated with 2 person assist to chair at nurses station today.  Cooperative.  persistent swelling left arm.  Abrasions and  cellulitis improved.  Ordered us rule out DVT.  Glucose level 44 this a.m.  dc'd lantus and started glyburide tomorrow.  Unable to swallow flomax or proscar, therefore changed to cardura (can be crushed in apple sauce). Remains with Leslie catheter.  2/10:  left elbow improving, negative u/s LUE.  2/11:  resolving left arm cellulitis and edema.  Leslie remains.  Eating diet with help.  2/12:  elevated  blood sugars last 24 hours, increased glyburide 5 bid to 10mg po bid and added back lantus 10 units qhs.  Increased anxiety, tearful today.  Added ativan IV prn anxiety.  2/13: Anxious during lunch and wanting to leave.  Added oral Ativan prn and monitoring for respiratory depression.  2/14: Tearful and agitated.  Increased Haldol as needed.  Increased Seroquel dosing.  2/15: Patient depressed but was able to be redirected with some effort.  2/16: Noted glucose trend of hyperglycemia during the day with lowest blood sugar around 2200 in the past few days.  Decreased ISS and added p.m. Lantus.  2/17: Discussed with psych.  Added scheduled Ativan 0.25 twice daily and decreased Seroquel to 100 daily at bedtime.  AM glucose 94.  Changed ISS to 3 times daily.    I have discussed this patient's plan of care and discharge plan at IDT rounds today with Case Management, Nursing, Nursing leadership, and other members of the IDT team.    Consultants/Specialty  Palliative care    Code Status  DNAR/DNI    Disposition  Long term bed SNF pending, looking into possibly [facility].  But will need to figure out antipsychotics first.    I have placed the appropriate orders for post-discharge needs.    Review of Systems  Review of Systems   Unable to perform ROS: Dementia        Physical Exam  Temp:  [36.3 °C (97.3 °F)-36.9 °C (98.5 °F)] 36.4 °C (97.5 °F)  Pulse:  [75-86] 75  Resp:  [17-18] 17  BP: (115-147)/(52-77) 137/66  SpO2:  [92 %-99 %] 95 %    Physical Exam  Vitals and nursing note reviewed.   Constitutional:       General: He is not  in acute distress.     Appearance: He is well-developed.      Comments: In bed    No acute distress   HENT:      Head: Normocephalic.      Mouth/Throat:      Pharynx: No oropharyngeal exudate.   Neck:      Thyroid: No thyromegaly.   Cardiovascular:      Rate and Rhythm: Normal rate.   Pulmonary:      Effort: No respiratory distress.      Breath sounds: No wheezing.   Abdominal:      General: There is no distension.      Palpations: Abdomen is soft.      Tenderness: There is no abdominal tenderness.   Musculoskeletal:      Right lower leg: No edema.      Left lower leg: No edema.   Skin:     General: Skin is warm.      Findings: No rash.   Neurological:      Mental Status: He is alert.      Motor: Weakness present.   Psychiatric:         Mood and Affect: Affect is tearful.      Comments: Flat affect         Fluids    Intake/Output Summary (Last 24 hours) at 2/17/2024 1516  Last data filed at 2/17/2024 1500  Gross per 24 hour   Intake 120 ml   Output 500 ml   Net -380 ml       Laboratory                                Imaging  US-EXTREMITY VENOUS UPPER UNILAT LEFT   Final Result      DX-CHEST-PORTABLE (1 VIEW)   Final Result      1.  Hazy medial right lower lobe opacity which could be due to pneumonitis or atelectasis.      [id number] VIEW   Final Result         Large amount of stool throughout the colon.           Assessment/Plan  * Type 2 diabetes mellitus with hyperglycemia, with long-term current use of insulin (HCC)- (present on admission)  Assessment & Plan  Started glyburide 2/10   Hga1c 8.7%  Patient has had hypoglycemia and hyperglycemia with labile blood sugars  Lantus has been discontinued and resumed   Continue to adjust insulin dosing including Lantus      Cellulitis of left elbow- (present on admission)  Assessment & Plan  2/4:  review of media tab photos, superficial abrasions noted.  2/6:  noted by bedside RN, appears to have abrasions, erythema, edema and erythema c/w cellulitis from pressure  injury.  Keflex 500mg po tid x 5 days.  Wound care consult for pressure injury.  2/7:  improved.  Decreased erythema decreased edema noted left hand and arm.  No joint involvement.  Patient has pressure bandage in place for elbow protection.  2/9:  negative u/s venous    Constipation- (present on admission)  Assessment & Plan  Resolved  Continue bowel regimen (scheduled)    Urinary retention- (present on admission)  Assessment & Plan  Galarza  placed on 1/25/2024  Proscar added to Flomax on 1/26/2024  removal of galarza 2/4, continue to follow, bladder scans.  2/6:  replaced galarza for urine retention x 2 bladder scans.  Increased flomax 0.8 mg po dailyl with proscar 5mg nightly.  2/9:  patient unable to swallow pills, must be crushed since he chews on the pills.   Changed flomax and proscar to cardura qhs.     ACP (advance care planning)- (present on admission)  Assessment & Plan  Changed to DNR/DNI  Palliative following  [facility]    Dehydration- (present on admission)  Assessment & Plan  Resolved  Stop IVf's  Monitor sugars and oral intake    Ketosis (HCC)- (present on admission)  Assessment & Plan  Resolved    Generalized weakness- (present on admission)  Assessment & Plan  Unable to care for self  Will need a memory care unit versus group home  Family not interested in hospice    Alzheimer disease (HCC)- (present on admission)  Assessment & Plan  Advanced, with agitation   Has been tried on multiple medications this visit including Depakote, Valium, Namenda, Seroquel, trazodone, Zoloft, clonidine  Restraints off as of 1/28  Discussed behavioral disturbances with psychiatry.    Further medications per psychiatry    Coronary artery disease due to lipid rich plaque - mild to moderate on Cath 2019 Left Dominant- (present on admission)  Assessment & Plan  Diabetic diet  Continue ARB, BB.      Anxiety- (present on admission)  Assessment & Plan  Now has Ativan as needed both oral and IV.    Stage 3a  chronic kidney disease (HCC)- (present on admission)  Assessment & Plan  Improving, likely an element of REGULO on CKD Stage III 2/2 DM nephropathy  No AGMA  Following intermittently    Dyslipidemia- (present on admission)  Assessment & Plan        Hypertension- (present on admission)  Assessment & Plan  hypotensive  Continue toprol XL 25 mg daily,   Dc losartan 100 mg daily on 2/6  Dc clonidine 2/6.         VTE prophylaxis: Xarelto    I have performed a physical exam and reviewed and updated ROS and Plan today (2/17/2024). In review of yesterday's note (2/16/2024), there are no changes except as documented above.

## 2024-02-18 NOTE — CARE PLAN
The patient is Watcher - Medium risk of patient condition declining or worsening    Shift Goals  Clinical Goals: safety, monitor BG  Patient Goals: wil  Family Goals: wil    Progress made toward(s) clinical / shift goals:  patient is a&o x1. O2 sats maintained on room air. Pt denies any pain. Pt remains free of falls or injury. All needs addressed at this time.    Patient is not progressing towards the following goals:      Problem: Knowledge Deficit - Standard  Goal: Patient and family/care givers will demonstrate understanding of plan of care, disease process/condition, diagnostic tests and medications  Outcome: Not Progressing

## 2024-02-18 NOTE — PROGRESS NOTES
[facility] Medicine Daily Progress Note    Date of Service  2/18/2024    Chief Complaint  Inability to care for self    Hospital Course  Dylon Santa is a 63 y.o. male who was admitted to [facility] on 1/11/2024. 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 He was brought to the hospital by his brother who is his primary caretaker.  His brother can no longer take care of him given worsening agitation and behavioral issues over the last 2 months.  Patient was admitted for profound hyperglycemia without evidence of DKA or HHNK.  Patient does not have capacity and his brother Luis Carlos is the DURABLE POWER OF .  Patient has been made DNR/DNI. Hospice was initially considered and then declined.     Interval Problem Update  2/6:  left elbow erythema, swelling, abrasions c/w cellulitis.  Started on keflex and wound care.  Likely from pressure injury.  Patient did not want to be disturbed on exam today, limiting exam.  H/o aggression. Pending long term SNF bed. Requiring replacement of Leslie catheter, retention x2 on bladder scans, increased flomax and dc'd clonidine and cozaar for hypotension.  2/7:  spoke with brother, concerned that patient had fallen.  Updated brother Luis Carlos that his left arm cellulitis is improved today with wound care and keflex.  Review of photos under media tab show 2/4 left elbow abrasions only.  Brother concerned about nursing not keeping patient clean.  I updated the brother that he is currently clean getting wound care and antibiotics for his left elbow and has a Leslie catheter replaced due to persistent urinary retention.  Increased Lantus to 11 to 15 units nightly.  Renewed lapbelt restraint.  2/8:  improving left elbow cellulitis.  Sitting up in chair at nurses station.  2/9:  ambulated with 2 person assist to chair at nurses station today.  Cooperative.  persistent swelling left arm.  Abrasions and  cellulitis improved.  Ordered us rule out DVT.  Glucose level 44 this a.m.  dc'd lantus and started glyburide tomorrow.  Unable to swallow flomax or proscar, therefore changed to cardura (can be crushed in apple sauce). Remains with Leslie catheter.  2/10:  left elbow improving, negative u/s LUE.  2/11:  resolving left arm cellulitis and edema.  Leslie remains.  Eating diet with help.  2/12:  elevated  blood sugars last 24 hours, increased glyburide 5 bid to 10mg po bid and added back lantus 10 units qhs.  Increased anxiety, tearful today.  Added ativan IV prn anxiety.  2/13: Anxious during lunch and wanting to leave.  Added oral Ativan prn and monitoring for respiratory depression.  2/14: Tearful and agitated.  Increased Haldol as needed.  Increased Seroquel dosing.  2/15: Patient depressed but was able to be redirected with some effort.  2/16: Noted glucose trend of hyperglycemia during the day with lowest blood sugar around 2200 in the past few days.  Decreased ISS and added p.m. Lantus.  2/17: Discussed with psych.  Added scheduled Ativan 0.25 twice daily and decreased Seroquel to 100 daily at bedtime.  AM glucose 94.  Changed ISS to 3 times daily.  2/18: Curled up in bed today but denies depression or being sad.  Monitoring glucose trend after insulin changes yesterday.    I have discussed this patient's plan of care and discharge plan at IDT rounds today with Case Management, Nursing, Nursing leadership, and other members of the IDT team.    Consultants/Specialty  Palliative care    Code Status  DNAR/DNI    Disposition  Long term bed SNF pending, looking into possibly [facility].  But will need to figure out antipsychotics first.  I have placed the appropriate orders for post-discharge needs.    Review of Systems  Review of Systems   Unable to perform ROS: Dementia        Physical Exam  Temp:  [35.9 °C (96.6 °F)-36.7 °C (98 °F)] 36.7 °C (98 °F)  Pulse:  [60-80] 70  Resp:  [16-18] 18  BP: (112-141)/(60-75)  118/75  SpO2:  [95 %-98 %] 95 %    Physical Exam  Vitals and nursing note reviewed.   Constitutional:       General: He is not in acute distress.     Appearance: He is well-developed.      Comments: In bed    No acute distress   HENT:      Head: Normocephalic.      Mouth/Throat:      Pharynx: No oropharyngeal exudate.   Neck:      Thyroid: No thyromegaly.   Cardiovascular:      Rate and Rhythm: Normal rate.   Pulmonary:      Effort: No respiratory distress.      Breath sounds: No wheezing.   Abdominal:      General: There is no distension.      Palpations: Abdomen is soft.      Tenderness: There is no abdominal tenderness.   Musculoskeletal:      Right lower leg: No edema.      Left lower leg: No edema.   Skin:     General: Skin is warm.      Findings: No rash.   Neurological:      Mental Status: He is alert.      Motor: Weakness present.   Psychiatric:         Mood and Affect: Affect is tearful.      Comments: Flat affect         Fluids    Intake/Output Summary (Last 24 hours) at 2/18/2024 1344  Last data filed at 2/18/2024 1030  Gross per 24 hour   Intake 560 ml   Output 150 ml   Net 410 ml       Laboratory                                Imaging  US-EXTREMITY VENOUS UPPER UNILAT LEFT   Final Result      DX-CHEST-PORTABLE (1 VIEW)   Final Result      1.  Hazy medial right lower lobe opacity which could be due to pneumonitis or atelectasis.      [id number] VIEW   Final Result         Large amount of stool throughout the colon.           Assessment/Plan  * Type 2 diabetes mellitus with hyperglycemia, with long-term current use of insulin (HCC)- (present on admission)  Assessment & Plan  Started glyburide 2/10   Hga1c 8.7%  Patient has had hypoglycemia and hyperglycemia with labile blood sugars  Lantus has been discontinued and resumed   Continue to adjust insulin dosing including Lantus      Cellulitis of left elbow- (present on admission)  Assessment & Plan  2/4:  review of media tab photos, superficial abrasions  noted.  2/6:  noted by bedside RN, appears to have abrasions, erythema, edema and erythema c/w cellulitis from pressure injury.  Keflex 500mg po tid x 5 days.  Wound care consult for pressure injury.  2/7:  improved.  Decreased erythema decreased edema noted left hand and arm.  No joint involvement.  Patient has pressure bandage in place for elbow protection.  2/9:  negative u/s venous    Constipation- (present on admission)  Assessment & Plan  Resolved  Continue bowel regimen (scheduled)    Urinary retention- (present on admission)  Assessment & Plan  Galarza  placed on 1/25/2024  Proscar added to Flomax on 1/26/2024  removal of galarza 2/4, continue to follow, bladder scans.  2/6:  replaced galarza for urine retention x 2 bladder scans.  Increased flomax 0.8 mg po dailyl with proscar 5mg nightly.  2/9:  patient unable to swallow pills, must be crushed since he chews on the pills.   Changed flomax and proscar to cardura qhs.     ACP (advance care planning)- (present on admission)  Assessment & Plan  Changed to DNR/DNI  Palliative following  Awaiting acceptance at SNF    Dehydration- (present on admission)  Assessment & Plan  Resolved  Stop IVf's  Monitor sugars and oral intake    Ketosis (HCC)- (present on admission)  Assessment & Plan  Resolved    Generalized weakness- (present on admission)  Assessment & Plan  Unable to care for self  Will need a memory care unit versus group home  Family not interested in hospice    Alzheimer disease (HCC)- (present on admission)  Assessment & Plan  Advanced, with agitation   Has been tried on multiple medications this visit including Depakote, Valium, Namenda, Seroquel, trazodone, Zoloft, clonidine  Restraints off as of 1/28  Discussed behavioral disturbances with psychiatry.    Further medications per psychiatry    Coronary artery disease due to lipid rich plaque - mild to moderate on Cath 2019 Left Dominant- (present on admission)  Assessment & Plan  Diabetic diet  Continue ARB,  BB.      Anxiety- (present on admission)  Assessment & Plan  Now has Ativan as needed both oral and IV.    Stage 3a chronic kidney disease (HCC)- (present on admission)  Assessment & Plan  Improving, likely an element of REGULO on CKD Stage III 2/2 DM nephropathy  No AGMA  Following intermittently    Dyslipidemia- (present on admission)  Assessment & Plan        Hypertension- (present on admission)  Assessment & Plan  hypotensive  Continue toprol XL 25 mg daily,   Dc losartan 100 mg daily on 2/6  Dc clonidine 2/6.         VTE prophylaxis: Xarelto    I have performed a physical exam and reviewed and updated ROS and Plan today (2/18/2024). In review of yesterday's note (2/17/2024), there are no changes except as documented above.

## 2024-02-18 NOTE — CARE PLAN
The patient is Stable - Low risk of patient condition declining or worsening    Shift Goals  Clinical Goals: Safety, monitor BG  Patient Goals: KATIANA  Family Goals: katiana    Progress made toward(s) clinical / shift goals:    Problem: Knowledge Deficit - Standard  Goal: Patient and family/care givers will demonstrate understanding of plan of care, disease process/condition, diagnostic tests and medications  Description: Target End Date:  1-3 days or as soon as patient condition allows    Document in Patient Education    1.  Patient and family/caregiver oriented to unit, equipment, visitation policy and means for communicating concern  2.  Complete/review Learning Assessment  3.  Assess knowledge level of disease process/condition, treatment plan, diagnostic tests and medications  4.  Explain disease process/condition, treatment plan, diagnostic tests and medications  Outcome: Progressing     Problem: Fall Risk  Goal: Patient will remain free from falls  Description: Target End Date:  Prior to discharge or change in level of care    Document interventions on the Huertatobias Cason Fall Risk Assessment    1.  Assess for fall risk factors  2.  Implement fall precautions  Outcome: Progressing  Note: Pt is A&Ox1, no s/s of acute distress noted. No additional needs at this time. Medicated per MD order. Bed alarm on, treaded socks on. Pt remains free from injury throughout shift.       Patient is not progressing towards the following goals:

## 2024-02-18 NOTE — PROGRESS NOTES
Received bedside report and accepted care of patient.  Patient currently resting in bed in no visible or stated distress.  Bed controls on and bed in locked position.  Bed alarm on.  Call light and personal possessions within reach.  Plan of care to include assistance with ADL's TID blood sugar monitoring and treatment, assistance with ADL's and continued discharge planning efforts.  Will continue to update notes/plan of care as needed throughout shift.

## 2024-02-18 NOTE — CONSULTS
"  [facility] PSYCHIATRIC FOLLOW-UP:(established)  *Reason for admission:   Last seen by psych 1/28/2024:   Pt to triage accompanied by brother, concerned about increasing aggression since yesterday, has hx of dementia. Baseline aa0x1. Per brother who is caregiver will be having a surgery done tomorrow and states that he can't take care of the pt anymore, asking assistance for placement. Pt normally takes quetiapine fumarate for aggression but doesn't seem to help much yesterday.  Reconsult: reportedly SNF won't give any antipsychotics to a pt without a bipolar, schizophrenia dx and are refusing him per CM  *Legal Hold Status: not applicable        S:  when greeted, replied, \"how are you\". On leaving wished me a good day. He mumbles and sometimes it is harder to understand but he denied feeling depressed or anxious today. He was alert though not oriented. No apparent side effects to meds.    O: Medical ROS (as pertinent):                      *Psychiatric Examination:   Vitals:   Vitals:    02/17/24 1700 02/17/24 1924 02/18/24 0350 02/18/24 0752   BP: (!) 141/60 112/75 114/68 118/75   Pulse: 60 66 80 70   Resp: 16 18 18 18   Temp: 35.9 °C (96.6 °F) 36.1 °C (96.9 °F) 36.1 °C (96.9 °F) 36.7 °C (98 °F)   TempSrc: Temporal Temporal Temporal Temporal   SpO2: 96% 98% 95% 95%   Weight:  63.7 kg (140 lb 6.9 oz)     Height:         General Appearance:  good eye contact, pleasant. NAD  Abnormal Movements: tremors R hand  Gait and Posture: not observed  Speech: mumbles and mute  Thought Process: unable to assess but was more socially appropriate in his interaction  Associations:   loose  Abnormal or Psychotic Thoughts: unable to assess well because of difficulty understanding at times but did not seem to be seeing things  Judgement and Insight: impaired   Orientation: unable to determine  Recent and Remote Memory: unable to determine  Attention Span and Concentration: intact as determined by his taking his turn " in interaction with me  Language:fluent at times when understood  Fund of Knowledge: not tested  Mood and Affect:  denied depression and anxiety. Mildly worried?  SI/HI:  did not appear to be        Current Medications:  Scheduled Medications   Medication Dose Frequency    LORazepam  0.25 mg BID    QUEtiapine  100 mg Nightly    insulin GLARGINE  10 Units Q EVENING    insulin regular  3-14 Units TID AC    sertraline  100 mg DAILY    metoprolol tartrate  12.5 mg BID    glyBURIDE  5 mg BID WITH MEALS    doxazosin  2 mg QHS    senna-docusate  1 Tablet DAILY    valproic acid  125 mg BID    donepezil  5 mg Q EVENING    memantine  5 mg DAILY    traZODone  150 mg Nightly    polyethylene glycol/lytes  1 Packet DAILY          *ASSESSMENT/RECOMENDATIONS:  1.. Dementia with behavioral disturbance. Likely Alzheimer's. Behavior has been improved over 48 hours now.     Medical:   -DM2  -CAD  -CKD 3a  -HTN  -urinary retention: on meds     Legal hold: not applicable        Medication and Other Recommendations: final orders as per Tx Tm  Not capacitated per tx tm: brother is POA. Brother unable to care for pt per notes. Hospice declined.   2    no changes today: the change in meds seems helpful so far. No documented issues.  .      Will continue to follow with you.         Discharge recommendations: per tx tm     If released from [facility]: Discharge Instructions:  -Reviewed safety plan: 911, ER, PCM, MHC, Suicide crisis line  -Please assist with outpatient Psychiatric/substance use follow up appointments at discharge once medically cleared.

## 2024-02-19 NOTE — PROGRESS NOTES
[facility] Medicine Daily Progress Note    Date of Service  2/19/2024    Chief Complaint  Inability to care for self    Hospital Course  Dylon Santa is a 63 y.o. male who was admitted to [facility] on 1/11/2024. 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 He was brought to the hospital by his brother who is his primary caretaker.  His brother can no longer take care of him given worsening agitation and behavioral issues over the last 2 months.  Patient was admitted for profound hyperglycemia without evidence of DKA or HHNK.  Patient does not have capacity and his brother Luis Carlos is the DURABLE POWER OF .  Patient has been made DNR/DNI. Hospice was initially considered and then declined.     Interval Problem Update  2/6:  left elbow erythema, swelling, abrasions c/w cellulitis.  Started on keflex and wound care.  Likely from pressure injury.  Patient did not want to be disturbed on exam today, limiting exam.  H/o aggression. Pending long term SNF bed. Requiring replacement of Leslie catheter, retention x2 on bladder scans, increased flomax and dc'd clonidine and cozaar for hypotension.  2/7:  spoke with brother, concerned that patient had fallen.  Updated brother Luis Carlos that his left arm cellulitis is improved today with wound care and keflex.  Review of photos under media tab show 2/4 left elbow abrasions only.  Brother concerned about nursing not keeping patient clean.  I updated the brother that he is currently clean getting wound care and antibiotics for his left elbow and has a Leslie catheter replaced due to persistent urinary retention.  Increased Lantus to 11 to 15 units nightly.  Renewed lapbelt restraint.  2/8:  improving left elbow cellulitis.  Sitting up in chair at nurses station.  2/9:  ambulated with 2 person assist to chair at nurses station today.  Cooperative.  persistent swelling left arm.  Abrasions and  cellulitis improved.  Ordered us rule out DVT.  Glucose level 44 this a.m.  dc'd lantus and started glyburide tomorrow.  Unable to swallow flomax or proscar, therefore changed to cardura (can be crushed in apple sauce). Remains with Leslie catheter.  2/10:  left elbow improving, negative u/s LUE.  2/11:  resolving left arm cellulitis and edema.  Leslie remains.  Eating diet with help.  2/12:  elevated  blood sugars last 24 hours, increased glyburide 5 bid to 10mg po bid and added back lantus 10 units qhs.  Increased anxiety, tearful today.  Added ativan IV prn anxiety.  2/13: Anxious during lunch and wanting to leave.  Added oral Ativan prn and monitoring for respiratory depression.  2/14: Tearful and agitated.  Increased Haldol as needed.  Increased Seroquel dosing.  2/15: Patient depressed but was able to be redirected with some effort.  2/16: Noted glucose trend of hyperglycemia during the day with lowest blood sugar around 2200 in the past few days.  Decreased ISS and added p.m. Lantus.  2/17: Discussed with psych.  Added scheduled Ativan 0.25 twice daily and decreased Seroquel to 100 daily at bedtime.  AM glucose 94.  Changed ISS to 3 times daily.  2/18: Curled up in bed today but denies depression or being sad.  Monitoring glucose trend after insulin changes yesterday.  2/19: Having right hand tremor while eating.    I have discussed this patient's plan of care and discharge plan at IDT rounds today with Case Management, Nursing, Nursing leadership, and other members of the IDT team.    Consultants/Specialty  Palliative care    Code Status  DNAR/DNI    Disposition  Long term bed SNF pending, looking into possibly [facility].  But will need to figure out antipsychotics first.  I have placed the appropriate orders for post-discharge needs.    Review of Systems  Review of Systems   Unable to perform ROS: Dementia   Neurological:  Positive for tremors.        Physical Exam  Temp:  [36.1 °C (97 °F)-36.9 °C (98.4 °F)]  36.1 °C (97 °F)  Pulse:  [66-94] 66  Resp:  [18] 18  BP: (136-168)/(56-86) 156/86  SpO2:  [92 %-96 %] 96 %    Physical Exam  Vitals and nursing note reviewed.   Constitutional:       General: He is not in acute distress.     Appearance: He is well-developed.      Comments: In bed    No acute distress   HENT:      Head: Normocephalic.      Mouth/Throat:      Pharynx: No oropharyngeal exudate.   Neck:      Thyroid: No thyromegaly.   Cardiovascular:      Rate and Rhythm: Normal rate.   Pulmonary:      Effort: No respiratory distress.      Breath sounds: No wheezing.   Abdominal:      General: There is no distension.      Palpations: Abdomen is soft.      Tenderness: There is no abdominal tenderness.   Skin:     General: Skin is warm.      Findings: No rash.   Neurological:      Mental Status: He is alert.      Motor: Weakness present.   Psychiatric:         Mood and Affect: Affect is tearful.      Comments: Flat affect         Fluids    Intake/Output Summary (Last 24 hours) at 2/19/2024 1411  Last data filed at 2/19/2024 1000  Gross per 24 hour   Intake 125 ml   Output 400 ml   Net -275 ml       Laboratory                                Imaging  US-EXTREMITY VENOUS UPPER UNILAT LEFT   Final Result      DX-CHEST-PORTABLE (1 VIEW)   Final Result      1.  Hazy medial right lower lobe opacity which could be due to pneumonitis or atelectasis.      [id number] VIEW   Final Result         Large amount of stool throughout the colon.           Assessment/Plan  * Type 2 diabetes mellitus with hyperglycemia, with long-term current use of insulin (HCC)- (present on admission)  Assessment & Plan  Started glyburide 2/10   Hga1c 8.7%  Patient has had hypoglycemia and hyperglycemia with labile blood sugars  Lantus has been discontinued and resumed   Continue to adjust insulin dosing including Lantus      Cellulitis of left elbow- (present on admission)  Assessment & Plan  2/4:  review of media tab photos, superficial abrasions  noted.  2/6:  noted by bedside RN, appears to have abrasions, erythema, edema and erythema c/w cellulitis from pressure injury.  Keflex 500mg po tid x 5 days.  Wound care consult for pressure injury.  2/7:  improved.  Decreased erythema decreased edema noted left hand and arm.  No joint involvement.  Patient has pressure bandage in place for elbow protection.  2/9:  negative u/s venous    Constipation- (present on admission)  Assessment & Plan  Resolved  Continue bowel regimen (scheduled)    Urinary retention- (present on admission)  Assessment & Plan  Galarza  placed on 1/25/2024  Proscar added to Flomax on 1/26/2024  removal of galarza 2/4, continue to follow, bladder scans.  2/6:  replaced galarza for urine retention x 2 bladder scans.  Increased flomax 0.8 mg po dailyl with proscar 5mg nightly.  2/9:  patient unable to swallow pills, must be crushed since he chews on the pills.   Changed flomax and proscar to cardura qhs.     ACP (advance care planning)- (present on admission)  Assessment & Plan  Changed to DNR/DNI  Palliative following  Awaiting acceptance at SNF    Dehydration- (present on admission)  Assessment & Plan  Resolved  Stop IVf's  Monitor sugars and oral intake    Ketosis (HCC)- (present on admission)  Assessment & Plan  Resolved    Generalized weakness- (present on admission)  Assessment & Plan  Unable to care for self  Will need a memory care unit versus group home  Family not interested in hospice    Alzheimer disease (HCC)- (present on admission)  Assessment & Plan  Advanced, with agitation   Has been tried on multiple medications this visit including Depakote, Valium, Namenda, Seroquel, trazodone, Zoloft, clonidine  Restraints off as of 1/28  Discussed behavioral disturbances with psychiatry.    Further medications per psychiatry, such as lorazepam  SNF cannot take patient with antipsychotics    Coronary artery disease due to lipid rich plaque - mild to moderate on Cath 2019 Left Dominant- (present  on admission)  Assessment & Plan  Diabetic diet  Continue ARB, BB.      Anxiety- (present on admission)  Assessment & Plan  Now has Ativan as needed both oral and IV.    Stage 3a chronic kidney disease (HCC)- (present on admission)  Assessment & Plan  Improving, likely an element of REGULO on CKD Stage III 2/2 DM nephropathy  No AGMA  Following intermittently    Dyslipidemia- (present on admission)  Assessment & Plan        Hypertension- (present on admission)  Assessment & Plan  hypotensive  Continue toprol XL 25 mg daily,   Dc losartan 100 mg daily on 2/6  Dc clonidine 2/6.         VTE prophylaxis: Xarelto    I have performed a physical exam and reviewed and updated ROS and Plan today (2/19/2024). In review of yesterday's note (2/18/2024), there are no changes except as documented above.

## 2024-02-19 NOTE — PROGRESS NOTES
Assumed care of Pt from NOC RN. Pt is asleep at bedside report. All needs are met and no s/sx of pain. Bed is in low position and locked and bed alarm is on. Call light is within reach.

## 2024-02-19 NOTE — CARE PLAN
The patient is Watcher - Medium risk of patient condition declining or worsening    Shift Goals  Clinical Goals: monitor BG, pt safety  Patient Goals: rest  Family Goals: pt clean and comfortable    Progress made toward(s) clinical / shift goals:  patient is a&o x1. O2 sats maintained on room air. IV patent and saline locked. No reports or signs of pain at this time. Patient remains free of falls or injury to self or others. All needs addressed.    Patient is not progressing towards the following goals:      Problem: Knowledge Deficit - Standard  Goal: Patient and family/care givers will demonstrate understanding of plan of care, disease process/condition, diagnostic tests and medications  Outcome: Not Progressing     Problem: Communication  Goal: The ability to communicate needs accurately and effectively will improve  Outcome: Not Progressing

## 2024-02-20 NOTE — CONSULTS
"[facility]  PSYCHIATRIC CONSULTATION - Follow-up  Established Patient    DOS: 02/20/24     Reason for Admission:  63 y.o. male with a past medical history of Alzheimer's dementia, diabetes, primary hypertension, hyperlipidemia and chronic kidney disease who presented 1/11/2024 with inability to take care of self   Legal Hold Status: not applicable    CC:   Chief Complaint   Patient presents with    Other     Pt to triage accompanied by brother, concerned about increasing aggression since yesterday, has hx of dementia. Baseline aa0x1. Per brother who is caregiver will be having a surgery done tomorrow and states that he can't take care of the pt anymore, asking assistance for placement. Pt normally takes quetiapine fumarate for aggression but doesn't seem to help much yesterday.                S:   Patient observed in bed, somewhat restless attempting to talk to this writer with quiet voice, does express a desire to \"go home,\" unable to state where home would be when asked, despite being provided supplemental information. Did not answer most questions appropriately, states \"yes,\" when asked about sleeping well, however reported mood was \"tired.\" No restraints in place, observed in lower portion of bed, appears able to reposition self, asking this writer for help out of bed on multiple occasion but redirectable without notable increase in anxiety. Unable to remain fully invested in Sx assessment. Protective devices in place including bed alarm.    O:   Medical ROS (as pertinent):   No results for input(s): \"WBC\", \"RBC\", \"HEMOGLOBIN\", \"HEMATOCRIT\", \"MCV\", \"MCH\", \"RDW\", \"PLATELETCT\", \"MPV\", \"NEUTSPOLYS\", \"LYMPHOCYTES\", \"MONOCYTES\", \"EOSINOPHILS\", \"BASOPHILS\", \"RBCMORPHOLO\" in the last 72 hours.  No results for input(s): \"SODIUM\", \"POTASSIUM\", \"CHLORIDE\", \"CO2\", \"GLUCOSE\", \"BUN\", \"CPKTOTAL\" in the last 72 hours.  No results for input(s): \"ALBUMIN\", \"TBILIRUBIN\", \"ALKPHOSPHAT\", \"TOTPROTEIN\", \"ALTSGPT\", " "\"ASTSGOT\", \"CREATININE\" in the last 72 hours.    EKG:   Results for orders placed or performed during the hospital encounter of 24   EKG   Result Value Ref Range    Report       [facility] Emergency Dept.    Test Date:  2024  Pt Name:    JEN JAMISON                   Department: ER  MRN:        [id number]                      Room:       [facility]  Gender:     Male                         Technician: [id number]  :        1960                   Requested By:CORBY MEDINA  Order #:    [id number]                    Reading MD:    Measurements  Intervals                                Axis  Rate:       68                           P:          45  SD:         156                          QRS:        -59  QRSD:       90                           T:          101  QT:         460  QTc:        490    Interpretive Statements  Sinus rhythm  Left anterior fascicular block  Abnormal R-wave progression, early transition  Abnormal T, consider ischemia, lateral leads  Borderline ST elevation, anterolateral leads  Artifact in lead(s) II,aVF,V2  Compared to ECG 10/21/2019 05:31:53  Left anterior fascicular block now present  T-wave abnormality now pr esent  Possible ischemia now present  ST (T wave) deviation now present  Left-axis deviation no longer present          MSE:   /75   Pulse 79   Temp 36.7 °C (98.1 °F) (Temporal)   Resp 17   Ht 1.727 m (5' 8\")   Wt 63.7 kg (140 lb 6.9 oz)   SpO2 97%     Constitutional: as noted above  General Appearance/Behavior: 63 y.o. appears normal habitus intermittent eye contact not able to participate meaningfully, Poor impulse control  Abnormal Movements: akathesia, no PMA/PMR or tremor observed.  Gait and Posture: not observed  Musculoskeletal: as noted above  Mood: \"tired\"  Affect: Inappropriate   Speech: quiet  Language:  spontaneous   Thought Process: Goal Directed  Thought Content: KATIANA  Insight/Judgement:  impaired based on behavior/impaired based " on behavior  Alert/Orientation: alert, only oriented to:, person  Attn/Concentration: not tested  MMSE: deferred this visit     Medications:  Scheduled Medications   Medication Dose Frequency    LORazepam  0.25 mg BID    QUEtiapine  100 mg Nightly    insulin GLARGINE  10 Units Q EVENING    insulin regular  3-14 Units TID AC    sertraline  100 mg DAILY    metoprolol tartrate  12.5 mg BID    glyBURIDE  5 mg BID WITH MEALS    doxazosin  2 mg QHS    senna-docusate  1 Tablet DAILY    valproic acid  125 mg BID    donepezil  5 mg Q EVENING    memantine  5 mg DAILY    traZODone  150 mg Nightly    polyethylene glycol/lytes  1 Packet DAILY       Allergies:   No Known Allergies     Assessment:   Diagnosis:   1. Alzheimer's disease (HCC) Active   2. Type 1 diabetes mellitus with other specified complication (HCC) Active   3. Constipation, unspecified constipation type    4. Hyperglycemia    5. Type 2 diabetes mellitus with hyperglycemia, with long-term current use of insulin (HCC)         Psychiatric:   Neurocognitive disorder, dementia with behavioral disturbances    Medical: as noted by the medical treatment team.      Recommendations:  Legal Status: not applicable - voluntary, has guardian per documentation    Medication Recommendations: Final orders as per Treatment Team  Continue Lorazepam 0.25mg PO BID, Seroquel 100mg QHS, Sertraline 100mg QAM, Depakote 125mg BID, donepezil 5mg QAH, memantine 5mg QAM, trazodone 150mg QHS    Reviewed safety plan: 911, ER, PCM, MHC, suicide crisis line, nursing staff while inpatient.    Will Continue to Follow. Thank you for the consult.       Discharge recommendations:   Patient does not appear to have the ability to care for self based on documented neurocognitive condition; would benefit from recommendations for placement based on auxiliary disciplines; could benefit from group home placement

## 2024-02-20 NOTE — CARE PLAN
Pt AO x1, only oriented to self.  Pt not showing any signs of pain or distress at this time.  Call light within reach.  Bed locked and in lowest position.  Hourly rounding.  Needs currently met.           The patient is Watcher - Medium risk of patient condition declining or worsening    Shift Goals  Clinical Goals: safety  Patient Goals: KATIANA  Family Goals: KATIANA    Progress made toward(s) clinical / shift goals:    Pt was able to remain fall and incident free throughout entirety of shift.    Problem: Knowledge Deficit - Standard  Goal: Patient and family/care givers will demonstrate understanding of plan of care, disease process/condition, diagnostic tests and medications  Outcome: Progressing     Problem: Discharge Barriers/Planning  Goal: Patient's continuum of care needs are met  Outcome: Progressing     Problem: Fall Risk  Goal: Patient will remain free from falls  Outcome: Progressing

## 2024-02-20 NOTE — DISCHARGE PLANNING
Medical Social Work  PC to [place] [employer] to obtain an update on the application submitted for Institutional Medicaid.  SW was told application was only received on 2/15/24, no updates. Policy states applications are to be approved/denied within 45 days but can  take longer, 90 days

## 2024-02-20 NOTE — CARE PLAN
The patient is Stable - Low risk of patient condition declining or worsening    Shift Goals  Clinical Goals: safety  Patient Goals: comfort  Family Goals: pt clean and comfortable    Progress made toward(s) clinical / shift goals:    Problem: Pain - Standard  Goal: Alleviation of pain or a reduction in pain to the patient’s comfort goal  Outcome: Progressing       Patient is not progressing towards the following goals:      Problem: Knowledge Deficit - Standard  Goal: Patient and family/care givers will demonstrate understanding of plan of care, disease process/condition, diagnostic tests and medications  Outcome: Not Progressing     Problem: Psychosocial  Goal: Patient's level of anxiety will decrease  Outcome: Not Progressing

## 2024-02-20 NOTE — PROGRESS NOTES
[facility] Medicine Daily Progress Note    Date of Service  2/20/2024    Chief Complaint  Dylon Santa is a 63 y.o. male admitted 1/11/2024 with [facility]    Hospital Course  Dylon Santa is a 63 y.o. male who was admitted to [facility] on 1/11/2024. 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 He was brought to the hospital by his brother who is his primary caretaker.  His brother can no longer take care of him given worsening agitation and behavioral issues over the last 2 months.  Patient was admitted for profound hyperglycemia without evidence of DKA or HHNK.  Patient does not have capacity and his brother Luis Carlos is the DURABLE POWER OF .  Patient has been made DNR/DNI. Hospice was initially considered and then declined.    Interval Problem Update  Patient is patient is calm, sleeping in bed.    Sugars better controlled.    Last as needed Haldol was used on 2/15.  Reviewed EMR, all medications are reasonable.  Discussed with case management to send updated MAR to skilled nursing facility.    Patient with no further episodes of agitation or aggression.    Medically clear for SNF.    I have discussed this patient's plan of care and discharge plan at IDT rounds today with Case Management, Nursing, Nursing leadership, and other members of the IDT team.    Consultants/Specialty  psychiatry    Code Status  DNAR/DNI    Disposition  The patient is medically cleared for discharge to home or a post-acute facility.      I have placed the appropriate orders for post-discharge needs.    Review of Systems  Review of Systems   All other systems reviewed and are negative.       Physical Exam  Temp:  [36.1 °C (97 °F)-36.7 °C (98.1 °F)] 36.7 °C (98.1 °F)  Pulse:  [63-82] 79  Resp:  [17-18] 17  BP: (115-131)/(67-89) 115/75  SpO2:  [91 %-97 %] 97 %    Physical Exam  Vitals and nursing note reviewed.   Constitutional:       General: He is  not in acute distress.     Appearance: He is well-developed.      Comments: In bed    No acute distress   HENT:      Head: Normocephalic.      Mouth/Throat:      Pharynx: No oropharyngeal exudate.   Neck:      Thyroid: No thyromegaly.   Cardiovascular:      Rate and Rhythm: Normal rate.   Pulmonary:      Effort: No respiratory distress.      Breath sounds: No wheezing.   Abdominal:      General: There is no distension.      Palpations: Abdomen is soft.      Tenderness: There is no abdominal tenderness.   Skin:     General: Skin is warm.      Findings: No rash.   Neurological:      Mental Status: He is alert.      Motor: Weakness present.   Psychiatric:         Mood and Affect: Affect is tearful.      Comments: Flat affect         Fluids    Intake/Output Summary (Last 24 hours) at 2/20/2024 1559  Last data filed at 2/20/2024 1420  Gross per 24 hour   Intake 460 ml   Output 700 ml   Net -240 ml       Laboratory                        Imaging  US-EXTREMITY VENOUS UPPER UNILAT LEFT   Final Result      DX-CHEST-PORTABLE (1 VIEW)   Final Result      1.  Hazy medial right lower lobe opacity which could be due to pneumonitis or atelectasis.      [id number] VIEW   Final Result         Large amount of stool throughout the colon.           Assessment/Plan  * Type 2 diabetes mellitus with hyperglycemia, with long-term current use of insulin (HCC)- (present on admission)  Assessment & Plan  Started glyburide 2/10   Hga1c 8.7%  Patient has had hypoglycemia and hyperglycemia with labile blood sugars  Lantus has been discontinued and resumed   Continue to adjust insulin dosing including Lantus      Cellulitis of left elbow- (present on admission)  Assessment & Plan  2/4:  review of media tab photos, superficial abrasions noted.  2/6:  noted by bedside RN, appears to have abrasions, erythema, edema and erythema c/w cellulitis from pressure injury.  Keflex 500mg po tid x 5 days.  Wound care consult for pressure injury.  2/7:   improved.  Decreased erythema decreased edema noted left hand and arm.  No joint involvement.  Patient has pressure bandage in place for elbow protection.  2/9:  negative u/s venous    Constipation- (present on admission)  Assessment & Plan  Resolved  Continue bowel regimen (scheduled)    Urinary retention- (present on admission)  Assessment & Plan  Galarza  placed on 1/25/2024  Proscar added to Flomax on 1/26/2024  removal of galarza 2/4, continue to follow, bladder scans.  2/6:  replaced galarza for urine retention x 2 bladder scans.  Increased flomax 0.8 mg po dailyl with proscar 5mg nightly.  2/9:  patient unable to swallow pills, must be crushed since he chews on the pills.   Changed flomax and proscar to cardura qhs.     ACP (advance care planning)- (present on admission)  Assessment & Plan  Changed to DNR/DNI  Palliative following  Awaiting acceptance at SNF    Dehydration- (present on admission)  Assessment & Plan  Resolved  Stop IVf's  Monitor sugars and oral intake    Ketosis (HCC)- (present on admission)  Assessment & Plan  Resolved    Generalized weakness- (present on admission)  Assessment & Plan  Unable to care for self  Will need a memory care unit versus group home  Family not interested in hospice    Alzheimer disease (HCC)- (present on admission)  Assessment & Plan  Advanced, with agitation   Has been tried on multiple medications this visit including Depakote, Valium, Namenda, Seroquel, trazodone, Zoloft, clonidine  Restraints off as of 1/28  Discussed behavioral disturbances with psychiatry.    Further medications per psychiatry, such as lorazepam  SNF cannot take patient with antipsychotics    Coronary artery disease due to lipid rich plaque - mild to moderate on Cath 2019 Left Dominant- (present on admission)  Assessment & Plan  Diabetic diet  Continue ARB, BB.      Anxiety- (present on admission)  Assessment & Plan  Now has Ativan as needed both oral and IV.    Stage 3a chronic kidney disease (HCC)-  (present on admission)  Assessment & Plan  Improving, likely an element of REGULO on CKD Stage III 2/2 DM nephropathy  No AGMA  Following intermittently    Dyslipidemia- (present on admission)  Assessment & Plan        Hypertension- (present on admission)  Assessment & Plan  hypotensive  Continue toprol XL 25 mg daily,   Dc losartan 100 mg daily on 2/6  Dc clonidine 2/6.         VTE prophylaxis: SCDs    I have performed a physical exam and reviewed and updated ROS and Plan today (2/20/2024). In review of yesterday's note (2/19/2024), there are no changes except as documented above.

## 2024-02-20 NOTE — DISCHARGE PLANNING
CM spoke with Sylvie at [facility] and they are declining accepting the pt due to increase behaviors.   Escalated to management as per discussion in IDT rounds as well a Complex case Management.    Case Management Discharge Planning    Admission Date: 1/11/2024  GMLOS: 3  ALOS: 37    6-Clicks ADL Score: 17  6-Clicks Mobility Score: 17  PT and/or OT Eval ordered: Yes  Post-acute Referrals Ordered: Yes  Post-acute Choice Obtained: Yes  Has referral(s) been sent to post-acute provider:  Yes      Anticipated Discharge Dispo: Discharge Disposition: D/T to SNF with Medicare cert in anticipation of skilled care (03)    DME Needed: No    Action(s) Taken: Updated Provider/Nurse on Discharge Plan    Escalations Completed: Pending Discharge Destination    Medically Clear: Yes    Next Steps: see above.    Barriers to Discharge: Pending Placement    Is the patient up for discharge tomorrow: No

## 2024-02-20 NOTE — CARE PLAN
The patient is Stable - Low risk of patient condition declining or worsening    Shift Goals  Clinical Goals: safety  Patient Goals: rest  Family Goals: pt clean and comfortable    Progress made toward(s) clinical / shift goals:  patient resting in bed. Alert. No acute events overnighth.    Patient is not progressing towards the following goals:

## 2024-02-20 NOTE — DISCHARGE PLANNING
Complex Case Management    Order received for Complex Case Management. Patient's chart has been reviewed.     Complex case management following? Yes     Yes: Case assigned to: Shankar Landers      NOTE: There may be cases that are NOT assigned to a CCM but will be followed for progression of care by leaders of the Complex Discharge Committee.

## 2024-02-20 NOTE — CARE PLAN
The patient is Stable - Low risk of patient condition declining or worsening    Shift Goals  Clinical Goals: safety  Patient Goals: rest  Family Goals: pt clean and comfortable    Progress made toward(s) clinical / shift goals:  patient alert. Resting in bed. No acute events overnight.    Patient is not progressing towards the following goals:

## 2024-02-21 NOTE — DISCHARGE PLANNING
DC Transport Scheduled    Transport Company Scheduled:  [facility]  Spoke with Krystal at [facility] to schedule transport.    Scheduled Date: 2/22/2024  Scheduled Time: 1200    Destination:   [facility]   Destination address: [address] [place] [place] [id number]      Notified care team of scheduled transport via [employer].     If there are any changes needed to the DC transportation scheduled, please contact [facility] at ext. [phone] between the hours of 9598-7850 Mon-Fri. If outside those hours, contact the ED Case Manager at ext. [phone].

## 2024-02-21 NOTE — DISCHARGE PLANNING
@[id number]  Kaitlyn e-mailed this DPA and requested a new SNF referral to e-mailed to [facility].  DPA e-mailed at [phone]. a SNF   RN DARNELL Dillon was copied on the e-mail.

## 2024-02-21 NOTE — PROGRESS NOTES
[facility] Medicine Daily Progress Note    Date of Service  2/21/2024    Chief Complaint  Dylon Santa is a 63 y.o. male admitted 1/11/2024 with [facility]    Hospital Course  Dylon Santa is a 63 y.o. male who was admitted to [facility] on 1/11/2024. 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 He was brought to the hospital by his brother who is his primary caretaker.  His brother can no longer take care of him given worsening agitation and behavioral issues over the last 2 months.  Patient was admitted for profound hyperglycemia without evidence of DKA or HHNK.  Patient does not have capacity and his brother Luis Carlos is the DURABLE POWER OF .  Patient has been made DNR/DNI. Hospice was initially considered and then declined.    Interval Problem Update  Patient is patient is calm, sleeping in bed.    Started lantus 10 units BID - monitor sugars    Patient's POA wants patient to made hospice, referral placed.    I have discussed this patient's plan of care and discharge plan at IDT rounds today with Case Management, Nursing, Nursing leadership, and other members of the IDT team.    Consultants/Specialty  psychiatry    Code Status  DNAR/DNI    Disposition  The patient is medically clear for discharge to home or a postacute facility. HOSPICE   Anticipate discharge to skilled nursing facility,     I have placed the appropriate orders for post-discharge needs.    Review of Systems  Review of Systems   All other systems reviewed and are negative.       Physical Exam  Temp:  [36.1 °C (97 °F)-36.7 °C (98 °F)] 36.1 °C (97 °F)  Pulse:  [] 68  Resp:  [17-19] 17  BP: (113-149)/(61-98) 129/74  SpO2:  [94 %-98 %] 98 %    Physical Exam  Vitals and nursing note reviewed.   Constitutional:       General: He is not in acute distress.     Appearance: He is well-developed.      Comments: In bed    No acute distress   HENT:      Head:  Normocephalic.      Mouth/Throat:      Pharynx: No oropharyngeal exudate.   Neck:      Thyroid: No thyromegaly.   Cardiovascular:      Rate and Rhythm: Normal rate.   Pulmonary:      Effort: No respiratory distress.      Breath sounds: No wheezing.   Abdominal:      General: There is no distension.      Palpations: Abdomen is soft.      Tenderness: There is no abdominal tenderness.   Skin:     General: Skin is warm.      Findings: No rash.   Neurological:      Mental Status: He is alert.      Motor: Weakness present.   Psychiatric:         Mood and Affect: Affect is tearful.      Comments: Flat affect         Fluids    Intake/Output Summary (Last 24 hours) at 2/21/2024 1342  Last data filed at 2/21/2024 0909  Gross per 24 hour   Intake 460 ml   Output 300 ml   Net 160 ml       Laboratory                        Imaging  US-EXTREMITY VENOUS UPPER UNILAT LEFT   Final Result      DX-CHEST-PORTABLE (1 VIEW)   Final Result      1.  Hazy medial right lower lobe opacity which could be due to pneumonitis or atelectasis.      [id number] VIEW   Final Result         Large amount of stool throughout the colon.           Assessment/Plan  * Type 2 diabetes mellitus with hyperglycemia, with long-term current use of insulin (HCC)- (present on admission)  Assessment & Plan  Started glyburide 2/10   Hga1c 8.7%  Patient has had hypoglycemia and hyperglycemia with labile blood sugars  Lantus has been discontinued and resumed   Started lantus 10 units BID - monitor sugars      Cellulitis of left elbow- (present on admission)  Assessment & Plan  2/4:  review of media tab photos, superficial abrasions noted.  2/6:  noted by bedside RN, appears to have abrasions, erythema, edema and erythema c/w cellulitis from pressure injury.  Keflex 500mg po tid x 5 days.  Wound care consult for pressure injury.  2/7:  improved.  Decreased erythema decreased edema noted left hand and arm.  No joint involvement.  Patient has pressure bandage in place  for elbow protection.  2/9:  negative u/s venous    Constipation- (present on admission)  Assessment & Plan  Resolved  Continue bowel regimen (scheduled)    Urinary retention- (present on admission)  Assessment & Plan  Galarza  placed on 1/25/2024  Proscar added to Flomax on 1/26/2024  removal of galarza 2/4, continue to follow, bladder scans.  2/6:  replaced galarza for urine retention x 2 bladder scans.  Increased flomax 0.8 mg po dailyl with proscar 5mg nightly.  2/9:  patient unable to swallow pills, must be crushed since he chews on the pills.   Changed flomax and proscar to cardura qhs.     ACP (advance care planning)- (present on admission)  Assessment & Plan  Changed to DNR/DNI  Palliative following  Awaiting acceptance at SNF    Dehydration- (present on admission)  Assessment & Plan  Resolved  Stop IVf's  Monitor sugars and oral intake    Ketosis (HCC)- (present on admission)  Assessment & Plan  Resolved    Generalized weakness- (present on admission)  Assessment & Plan  Unable to care for self  Will need a memory care unit versus group home  Family not interested in hospice    Alzheimer disease (HCC)- (present on admission)  Assessment & Plan  Advanced, with agitation   Has been tried on multiple medications this visit including Depakote, Valium, Namenda, Seroquel, trazodone, Zoloft, clonidine  Restraints off as of 1/28  Discussed behavioral disturbances with psychiatry.    Further medications per psychiatry, such as lorazepam  SNF cannot take patient with antipsychotics    Coronary artery disease due to lipid rich plaque - mild to moderate on Cath 2019 Left Dominant- (present on admission)  Assessment & Plan  Diabetic diet  Continue ARB, BB.      Anxiety- (present on admission)  Assessment & Plan  Now has Ativan as needed both oral and IV.    Stage 3a chronic kidney disease (HCC)- (present on admission)  Assessment & Plan  Improving, likely an element of REGULO on CKD Stage III 2/2 DM nephropathy  No  AGMA  Following intermittently    Dyslipidemia- (present on admission)  Assessment & Plan        Hypertension- (present on admission)  Assessment & Plan  hypotensive  Continue toprol XL 25 mg daily,   Dc losartan 100 mg daily on 2/6  Dc clonidine 2/6.         VTE prophylaxis: SCDs    I have performed a physical exam and reviewed and updated ROS and Plan today (2/21/2024). In review of yesterday's note (2/20/2024), there are no changes except as documented above.

## 2024-02-21 NOTE — CONSULTS
"[facility]  PSYCHIATRIC CONSULTATION - Follow-up  Established Patient    DOS: 02/21/24     Reason for Admission:  63 y.o. male with a past medical history of Alzheimer's dementia, diabetes, primary hypertension, hyperlipidemia and chronic kidney disease who presented 1/11/2024 with inability to take care of self   Legal Hold Status: not applicable    CC:   Chief Complaint   Patient presents with    Other     Pt to triage accompanied by brother, concerned about increasing aggression since yesterday, has hx of dementia. Baseline aa0x1. Per brother who is caregiver will be having a surgery done tomorrow and states that he can't take care of the pt anymore, asking assistance for placement. Pt normally takes quetiapine fumarate for aggression but doesn't seem to help much yesterday.                S:   Patient observed in bed, asleep, roused for brief period, unable to fully participate in Sx assessment, responded with \"good\" to questions about sleep, energy, mood. No overt Sx of danger, agitation, psychosis/anca. No acute change in condition from previous encounter.     O:   Medical ROS (as pertinent):   No results for input(s): \"WBC\", \"RBC\", \"HEMOGLOBIN\", \"HEMATOCRIT\", \"MCV\", \"MCH\", \"RDW\", \"PLATELETCT\", \"MPV\", \"NEUTSPOLYS\", \"LYMPHOCYTES\", \"MONOCYTES\", \"EOSINOPHILS\", \"BASOPHILS\", \"RBCMORPHOLO\" in the last 72 hours.  No results for input(s): \"SODIUM\", \"POTASSIUM\", \"CHLORIDE\", \"CO2\", \"GLUCOSE\", \"BUN\", \"CPKTOTAL\" in the last 72 hours.  No results for input(s): \"ALBUMIN\", \"TBILIRUBIN\", \"ALKPHOSPHAT\", \"TOTPROTEIN\", \"ALTSGPT\", \"ASTSGOT\", \"CREATININE\" in the last 72 hours.    EKG:   Results for orders placed or performed during the hospital encounter of 01/11/24   EKG   Result Value Ref Range    Report       [facility] Emergency Dept.    Test Date:  2024-01-11  Pt Name:    JEN JAMISON                   Department: ER  MRN:        [id number]                      Room:        29  Gender:     " "Male                         Technician: [id number]  :        1960                   Requested By:CORBY MEDINA  Order #:    [id number]                    Reading MD:    Measurements  Intervals                                Axis  Rate:       68                           P:          45  AL:         156                          QRS:        -59  QRSD:       90                           T:          101  QT:         460  QTc:        490    Interpretive Statements  Sinus rhythm  Left anterior fascicular block  Abnormal R-wave progression, early transition  Abnormal T, consider ischemia, lateral leads  Borderline ST elevation, anterolateral leads  Artifact in lead(s) II,aVF,V2  Compared to ECG 10/21/2019 05:31:53  Left anterior fascicular block now present  T-wave abnormality now pr esent  Possible ischemia now present  ST (T wave) deviation now present  Left-axis deviation no longer present          MSE:   /74   Pulse 68   Temp 36.1 °C (97 °F) (Temporal)   Resp 17   Ht 1.727 m (5' 8\")   Wt 63.7 kg (140 lb 6.9 oz)   SpO2 98%     Constitutional: as noted above  General Appearance/Behavior: 63 y.o. appears normal habitus poor eye contact superficially cooperative, No behavioral disturbances  Abnormal Movements: none, no PMA/PMR or tremor observed.  Gait and Posture: not observed  Musculoskeletal: as noted above  Mood: \"good\"  Affect: Appropriate   Speech: quiet  Language:  spontaneous   Thought Process: KATIANA  Thought Content: KATIANA  Insight/Judgement:  unable to assess/unable to assess  Alert/Orientation: alert, only oriented to:, person  Attn/Concentration: not tested  MMSE: deferred this visit     Medications:  Scheduled Medications   Medication Dose Frequency    insulin GLARGINE  5 Units QAM INSULIN    LORazepam  0.25 mg BID    QUEtiapine  100 mg Nightly    insulin GLARGINE  10 Units Q EVENING    insulin regular  3-14 Units TID AC    sertraline  100 mg DAILY    metoprolol tartrate  12.5 mg BID    " glyBURIDE  5 mg BID WITH MEALS    doxazosin  2 mg QHS    senna-docusate  1 Tablet DAILY    valproic acid  125 mg BID    donepezil  5 mg Q EVENING    memantine  5 mg DAILY    traZODone  150 mg Nightly    polyethylene glycol/lytes  1 Packet DAILY       Allergies:   No Known Allergies     Assessment:   Diagnosis:   1. Alzheimer's disease (HCC) Active   2. Type 1 diabetes mellitus with other specified complication (HCC) Active   3. Constipation, unspecified constipation type    4. Hyperglycemia    5. Type 2 diabetes mellitus with hyperglycemia, with long-term current use of insulin ([facility])         Psychiatric:   Neurocognitive disorder, dementia with behavioral disturbances     Medical: as noted by the medical treatment team.      Recommendations:  Legal Status: not applicable - voluntary, has guardian per documentation    Discussed/voalted: FARA Johnson DO    Medication Recommendations: Final orders as per Treatment Team  Continue Lorazepam 0.25mg PO BID, Seroquel 100mg QHS, Sertraline 100mg QAM, Depakote 125mg BID, donepezil 5mg QAH, memantine 5mg QAM, trazodone 150mg QHS     Reviewed safety plan: 911, ER, PCM, MHC, suicide crisis line, nursing staff while inpatient.     Will Continue to Follow. Thank you for the consult.        Discharge recommendations:   Patient does not appear to have the ability to care for self based on documented neurocognitive condition; would benefit from recommendations for placement based on auxiliary disciplines; could benefit from group home placement

## 2024-02-21 NOTE — DISCHARGE PLANNING
CM met with brother who picked [facility] for possible [facility] admission. Will review options after [facility] has had a chance to respond.  Pt is accepted at [facility] for admit tomorrow at 12 noon per KECIA.

## 2024-02-21 NOTE — CARE PLAN
"The patient is Stable - Low risk of patient condition declining or worsening    Shift Goals  Clinical Goals: Discharge to facility  Patient Goals: \"I wanna go home\"  Family Goals: KATIANA    Progress made toward(s) clinical / shift goals: Patient remained safe without use of any form of restraints. Patient remains only alert & oriented to self. Anticipating discharge to facility when accepted.    Patient is not progressing towards the following goals:    Problem: Knowledge Deficit - Standard  Goal: Patient and family/care givers will demonstrate understanding of plan of care, disease process/condition, diagnostic tests and medications  Outcome: Not Progressing     Problem: Psychosocial  Goal: Patient's level of anxiety will decrease  Outcome: Not Progressing  Goal: Patient's ability to verbalize feelings about condition will improve  Outcome: Not Progressing     Problem: Self Care  Goal: Patient will have the ability to perform ADLs independently or with assistance (bathe, groom, dress, toilet and feed)  Outcome: Not Progressing    Due to patient orientation, unable to assess understanding of plan of care. Remains very anxious & in need of frequent consolation.  "

## 2024-02-22 NOTE — PROGRESS NOTES
[facility] Medicine Daily Progress Note    Date of Service  2/22/2024    Chief Complaint  Dylon Santa is a 63 y.o. male admitted 1/11/2024 with [facility]    Hospital Course  Dylon Santa is a 63 y.o. male who was admitted to [facility] on 1/11/2024. 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 He was brought to the hospital by his brother who is his primary caretaker.  His brother can no longer take care of him given worsening agitation and behavioral issues over the last 2 months.  Patient was admitted for profound hyperglycemia without evidence of DKA or HHNK.  Patient does not have capacity and his brother Luis Carlos is the DURABLE POWER OF .  Patient has been made DNR/DNI.     Patient transitioned to hospice 2/21.    Interval Problem Update  Patient is patient is calm, sleeping in bed.    Continue lantus 10 units BID - monitor sugars    SNF facility will not accept patient with antipsychotic medications, even if patient is on hospice.     CM assisting with difficult discharge.    I have discussed this patient's plan of care and discharge plan at IDT rounds today with Case Management, Nursing, Nursing leadership, and other members of the IDT team.    Consultants/Specialty  psychiatry    Code Status  DNAR/DNI    Disposition  The patient is medically clear for discharge to home or a postacute facility. HOSPICE   Anticipate discharge to skilled nursing facility,     I have placed the appropriate orders for post-discharge needs.    Review of Systems  Review of Systems   All other systems reviewed and are negative.       Physical Exam  Temp:  [35.8 °C (96.5 °F)-36.7 °C (98 °F)] 35.8 °C (96.5 °F)  Pulse:  [63-69] 63  Resp:  [18] 18  BP: (128-149)/(74-79) 132/74  SpO2:  [96 %-98 %] 96 %    Physical Exam  Vitals and nursing note reviewed.   Constitutional:       General: He is not in acute distress.     Appearance: He is  well-developed.      Comments: In bed    No acute distress   HENT:      Head: Normocephalic.      Mouth/Throat:      Pharynx: No oropharyngeal exudate.   Neck:      Thyroid: No thyromegaly.   Cardiovascular:      Rate and Rhythm: Normal rate.   Pulmonary:      Effort: No respiratory distress.      Breath sounds: No wheezing.   Abdominal:      General: There is no distension.      Palpations: Abdomen is soft.      Tenderness: There is no abdominal tenderness.   Skin:     General: Skin is warm.      Findings: No rash.   Neurological:      Mental Status: He is alert.      Motor: Weakness present.   Psychiatric:         Mood and Affect: Affect is tearful.      Comments: Flat affect         Fluids    Intake/Output Summary (Last 24 hours) at 2/22/2024 1357  Last data filed at 2/22/2024 0605  Gross per 24 hour   Intake 610 ml   Output 700 ml   Net -90 ml       Laboratory                        Imaging  US-EXTREMITY VENOUS UPPER UNILAT LEFT   Final Result      DX-CHEST-PORTABLE (1 VIEW)   Final Result      1.  Hazy medial right lower lobe opacity which could be due to pneumonitis or atelectasis.      [id number] VIEW   Final Result         Large amount of stool throughout the colon.           Assessment/Plan  * Type 2 diabetes mellitus with hyperglycemia, with long-term current use of insulin (HCC)- (present on admission)  Assessment & Plan  Started glyburide 2/10   Hga1c 8.7%  Patient has had hypoglycemia and hyperglycemia with labile blood sugars  Lantus has been discontinued and resumed   Started lantus 10 units BID - monitor sugars      Cellulitis of left elbow- (present on admission)  Assessment & Plan  2/4:  review of media tab photos, superficial abrasions noted.  2/6:  noted by bedside RN, appears to have abrasions, erythema, edema and erythema c/w cellulitis from pressure injury.  Keflex 500mg po tid x 5 days.  Wound care consult for pressure injury.  2/7:  improved.  Decreased erythema decreased edema noted left  hand and arm.  No joint involvement.  Patient has pressure bandage in place for elbow protection.  2/9:  negative u/s venous    Constipation- (present on admission)  Assessment & Plan  Resolved  Continue bowel regimen (scheduled)    Urinary retention- (present on admission)  Assessment & Plan  Galarza  placed on 1/25/2024  Proscar added to Flomax on 1/26/2024  removal of galarza 2/4, continue to follow, bladder scans.  2/6:  replaced galarza for urine retention x 2 bladder scans.  Increased flomax 0.8 mg po dailyl with proscar 5mg nightly.  2/9:  patient unable to swallow pills, must be crushed since he chews on the pills.   Changed flomax and proscar to cardura qhs.     ACP (advance care planning)- (present on admission)  Assessment & Plan  Changed to DNR/DNI  Palliative following  Awaiting acceptance at SNF    Dehydration- (present on admission)  Assessment & Plan  Resolved  Stop IVf's  Monitor sugars and oral intake    Ketosis (HCC)- (present on admission)  Assessment & Plan  Resolved    Generalized weakness- (present on admission)  Assessment & Plan  Unable to care for self  Will need a memory care unit versus group home  Family not interested in hospice    Alzheimer disease (HCC)- (present on admission)  Assessment & Plan  Advanced, with agitation   Has been tried on multiple medications this visit including Depakote, Valium, Namenda, Seroquel, trazodone, Zoloft, clonidine  Restraints off as of 1/28  Discussed behavioral disturbances with psychiatry.    Further medications per psychiatry, such as lorazepam  SNF cannot take patient with antipsychotics    Coronary artery disease due to lipid rich plaque - mild to moderate on Cath 2019 Left Dominant- (present on admission)  Assessment & Plan  Diabetic diet  Continue ARB, BB.      Anxiety- (present on admission)  Assessment & Plan  Now has Ativan as needed both oral and IV.    Stage 3a chronic kidney disease (HCC)- (present on admission)  Assessment & Plan  Improving,  likely an element of REGULO on CKD Stage III 2/2 DM nephropathy  No AGMA  Following intermittently    Dyslipidemia- (present on admission)  Assessment & Plan        Hypertension- (present on admission)  Assessment & Plan  hypotensive  Continue toprol XL 25 mg daily,   Dc losartan 100 mg daily on 2/6  Dc clonidine 2/6.         VTE prophylaxis: SCDs    I have performed a physical exam and reviewed and updated ROS and Plan today (2/22/2024). In review of yesterday's note (2/21/2024), there are no changes except as documented above.

## 2024-02-22 NOTE — CARE PLAN
The patient is Watcher - Medium risk of patient condition declining or worsening    Shift Goals  Clinical Goals: Maintain safety at all times.  Patient Goals: Wants to go home  Family Goals: KATIANA    Progress made toward(s) clinical / shift goals: Patient was agitated at 1am, Haldol IV given (see MAR), kept side rails up, bed alarm on, hourly rounds, bed wheels locked and in low position. No occurrence of fall and injury.    Patient is not progressing towards the following goals:      Problem: Knowledge Deficit - Standard  Goal: Patient and family/care givers will demonstrate understanding of plan of care, disease process/condition, diagnostic tests and medications  Outcome: Not Met     Problem: Psychosocial  Goal: Patient's ability to verbalize feelings about condition will improve  Outcome: Not Met     Problem: Communication  Goal: The ability to communicate needs accurately and effectively will improve  Outcome: Not Met     Problem: Self Care  Goal: Patient will have the ability to perform ADLs independently or with assistance (bathe, groom, dress, toilet and feed)  Outcome: Not Met

## 2024-02-22 NOTE — PROGRESS NOTES
Patient is agitated and attempted to get out of bed multiple times.Haldol 3mg IVP given. Kept side rails up x3, bed alarm on, bed wheels locked and in low position and continue to monitor. Notified BILL Lin (charge nurse) of patient's BS 224mg/dL HS but no Insulin ordered for HS. Patient refused to eat his dinner.

## 2024-02-22 NOTE — DIETARY
Nutrition Update:    Day 39 of admit.  Dylon Santa is a 63 y.o. male with admitting DX of Ketosis (HCC) [E88.89].  Patient being followed to optimize nutrition.    Current Diet: Level 6 soft/bite-sized, Level 0 - thin liquids, Consistent CHO, 1:1 supervision by nursing team, Glucerna TID w/ meals. PO intake per ADLs varies from refused/<25% up to % over the past several days; most meals documented at <50%. PO intake of supplements is limited but has been %. RD called RN to discuss pt's intake due to pt is A&O x 1 and confused per flowsheets; per RN, today is first day w/ pt, unable to give insight on intake hx, such as food items/types that seem to be preferred by pt. Reports pt refused glucerna at breakfast today. Interventions are limited: per POLST (1/17/24), pt's desires are for no artificial nutrition or tube feeding. RD will trial adding additional protein items to meals, including Greek yogurt and cottage cheese w/ fresh fruit.    Per EMR, current plan is to discharge on hospice pending facility acceptance; had expected DC to [facility] but facility unable to accept.     Problem: Nutritional:  Goal: Achieve adequate nutritional intake  Description: Patient will consume >50% of meals and supplements  Outcome: Progressing slowly    RD continues to follow.

## 2024-02-22 NOTE — DISCHARGE PLANNING
CM had expected pt to go to [facility] and pt's nurse attempted to call report to [facility] and states not expecting pt over there today.  Per Sylvie pt needs to be off antipsychotics for awhile unknown how long is the period of time for pt to be off Seroquel if ordered like this.  [facility] is aware.  CM calling other facility to see if admit is possible.

## 2024-02-22 NOTE — CARE PLAN
The patient is Stable - Low risk of patient condition declining or worsening    Shift Goals  Clinical Goals: safety, q2 turns, monitor blood glucose, placement  Patient Goals: dc  Family Goals: wil    Progress made toward(s) clinical / shift goals:    Problem: Psychosocial  Goal: Patient's level of anxiety will decrease  Outcome: Progressing       Patient is not progressing towards the following goals:    Problem: Knowledge Deficit - Standard  Goal: Patient and family/care givers will demonstrate understanding of plan of care, disease process/condition, diagnostic tests and medications  Outcome: Not Progressing     Problem: Communication  Goal: The ability to communicate needs accurately and effectively will improve  Outcome: Not Progressing     Problem: Discharge Barriers/Planning  Goal: Patient's continuum of care needs are met  Outcome: Not Progressing  Flowsheets (Taken 2/22/2024 8661)  Continuum of Care Needs:   Collaborated with Case Management/   Communicated discharge barriers to interdisciplinary [facility]  Note: Pt supposed to DC today. Tried to call [facility] to give report. [facility] staff stated that pt is not schedule to arrived today. SW aware.     Problem: Self Care  Goal: Patient will have the ability to perform ADLs independently or with assistance (bathe, groom, dress, toilet and feed)  Outcome: Not Progressing  Note: Pt q2 turns, 1:1 supervision during meals and pt needs help with eating.

## 2024-02-22 NOTE — DISCHARGE SUMMARY
Discharge Summary    CHIEF COMPLAINT ON ADMISSION  Chief Complaint   Patient presents with    Other     Pt to triage accompanied by brother, concerned about increasing aggression since yesterday, has hx of dementia. Baseline aa0x1. Per brother who is caregiver will be having a surgery done tomorrow and states that he can't take care of the pt anymore, asking assistance for placement. Pt normally takes quetiapine fumarate for aggression but doesn't seem to help much yesterday.        Reason for Admission  Other    Admission Date  1/11/2024     CODE STATUS  DNAR/DNI    HPI & HOSPITAL COURSE  Dylon Santa is a 63 y.o. male who was admitted to [facility] on 1/11/2024. 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 He was brought to the hospital by his brother who is his primary caretaker.  His brother can no longer take care of him given worsening agitation and behavioral issues over the last 2 months.  Patient was admitted for profound hyperglycemia without evidence of DKA or HHNK.  Patient does not have capacity and his brother Luis Carlos is the DURABLE POWER OF .  Patient has been made DNR/DNI.     Patient transitioned to hospice 2/21.    Therefore, he is discharged in guarded and stable condition to hospice.    The patient met 2-midnight criteria for an inpatient stay at the time of discharge.      FOLLOW UP ITEMS POST DISCHARGE  Hospice    DISCHARGE DIAGNOSES  Principal Problem:    Type 2 diabetes mellitus with hyperglycemia, with long-term current use of insulin (HCC) (POA: Yes)      Overview: Patient is followed by endocrinology.  He was diagnosed at age 8 with type       1 diabetes and still has difficulty managing his sugars.  He is on       multiple medications including insulin and Toujeo.  His most recent A1c       was done about 1 week ago and it was 9.4.  Active Problems:    Hypertension (POA: Yes)    Dyslipidemia  (POA: Yes)    Stage 3a chronic kidney disease (HCC) (POA: Yes)    Anxiety (POA: Yes)    Coronary artery disease due to lipid rich plaque - mild to moderate on Cath 2019 Left Dominant (Chronic) (POA: Yes)    Alzheimer disease (HCC) (POA: Yes)    Generalized weakness (POA: Yes)    Ketosis (HCC) (POA: Yes)    Dehydration (POA: Yes)    ACP (advance care planning) (POA: Yes)    Urinary retention (POA: Yes)    Constipation (POA: Yes)    Cellulitis of left elbow (POA: Yes)  Resolved Problems:    * No resolved hospital problems. *      FOLLOW UP  Future Appointments   Date Time Provider Department Center   3/21/2024 10:30 AM Alexander Booker M.D. [facility]     No follow-up provider specified.    MEDICATIONS ON DISCHARGE     Medication List        START taking these medications        Instructions   donepezil 5 MG Tabs  Commonly known as: Aricept   Take 1 Tablet by mouth every evening.  Dose: 5 mg     doxazosin 2 MG Tabs  Commonly known as: Cardura   Take 1 Tablet by mouth at bedtime.  Dose: 2 mg     glyBURIDE 5 MG Tabs  Commonly known as: Diabeta   Take 1 Tablet by mouth 2 times a day with meals.  Dose: 5 mg     insulin glargine 100 UNIT/ML injection PEN  Generic drug: insulin glargine  Replaces: Toujeo SoloStar 300 UNIT/ML Sopn   Inject 10 Units under the skin 2 times a day.  Dose: 10 Units     insulin regular 100 Unit/mL Soln  Commonly known as: Humulin R   Inject 3-14 Units under the skin 3 times a day before meals.  Dose: 3-14 Units     memantine 5 MG Tabs  Commonly known as: Namenda   Take 1 Tablet by mouth every day.  Dose: 5 mg     metoprolol tartrate 25 MG Tabs  Commonly known as: Lopressor   Take 0.5 Tablets by mouth 2 times a day.  Dose: 12.5 mg     sertraline 100 MG Tabs  Commonly known as: Zoloft   Take 1 Tablet by mouth every day.  Dose: 100 mg     valproic acid 250 MG/5ML Soln  Commonly known as: Depakene   Take 2.5 mL by mouth 2 times a day.  Dose: 125 mg            CHANGE how you take these  medications        Instructions   QUEtiapine 100 MG Tabs  What changed:   medication strength  how much to take  when to take this  reasons to take this  Commonly known as: SEROquel   Take 1 Tablet by mouth every evening.  Dose: 100 mg            CONTINUE taking these medications        Instructions   FreeStyle Marcail 3 Sensor Misc   Use 1 Marcial 3 sensor every 14 days for blood glucose monitoring. For type 1 diabetes mellitus with hyperglycemia.     Insulin Pen Needle 32 G x 4 mm   1 Each 4 Times a Day,Before Meals and at Bedtime.  Dose: 1 Each     traZODone 150 MG Tabs  Commonly known as: Desyrel   Take 1 Tablet by mouth every evening.  Dose: 150 mg            STOP taking these medications      acyclovir 200 MG Caps  Commonly known as: Zovirax     atorvastatin 80 MG tablet  Commonly known as: Lipitor     clopidogrel 75 MG Tabs  Commonly known as: Plavix     losartan 100 MG Tabs  Commonly known as: Cozaar     metoprolol SR 25 MG Tb24  Commonly known as: Toprol XL     NovoLOG FlexPen 100 UNIT/ML solution for injection  Generic drug: insulin aspart     Toujeo SoloStar 300 UNIT/ML Sopn  Generic drug: Insulin Glargine (1 Unit Dial)  Replaced by: insulin glargine 100 UNIT/ML injection PEN              Allergies  No Known Allergies    DIET  Orders Placed This Encounter   Procedures    Diet Order Diet: Level 6 - Soft and Bite Sized (no bell peppers-Please deliver to nursing station); Liquid level: Level 0 - Thin; Second Modifier: (optional): Consistent CHO (Diabetic); Tray Modifications (optional): SLP - 1:1 Supervision by Nursi...     Standing Status:   Standing     Number of Occurrences:   1     Order Specific Question:   Diet:     Answer:   Level 6 - Soft and Bite Sized [23]     Comments:   no bell peppers-Please deliver to nursing station     Order Specific Question:   Liquid level     Answer:   Level 0 - Thin     Order Specific Question:   Second Modifier: (optional)     Answer:   Consistent CHO (Diabetic) [4]      Order Specific Question:   Tray Modifications (optional)     Answer:   SLP - 1:1 Supervision by Nursing       ACTIVITY  As tolerated and directed by skilled nursing.  Weight bearing as tolerated    LINES, DRAINS, AND WOUNDS  This is an automated list. Peripheral IVs will be removed prior to discharge.  Peripheral IV 02/15/24 20 G Left;Posterior Hand (Active)   Site Assessment Clean;Dry;Intact 02/21/24 1915   Dressing Type Transparent 02/21/24 1915   Line Status Flushed;Scrubbed the hub prior to access;Saline locked 02/21/24 1915   Dressing Status Clean;Dry;Intact 02/21/24 1915   Dressing Intervention N/A 02/21/24 1915   Dressing Change Due 02/24/24 02/21/24 1915   Infiltration Grading ([facility] 0 02/21/24 0800   Phlebitis Scale ([facility] Only) 0 02/21/24 0800     Urethral Catheter Latex 16 Fr. (Active)   Site Assessment Clean;Skin intact 02/21/24 1915   Collection Container Standard drainage bag 02/21/24 1915   Urinary Catheter Care Tamper Evident Seal in Place;Drainage Bag Not Overfilled;Drainage Tubing Properly Secured;Drainage Bag Below Bladder Level and Not on Floor 02/21/24 1915   Securement Method Securing device (Describe) 02/21/24 1915   Output (mL) 500 mL 02/22/24 0502      Wound 02/04/24 Abrasion Arm Distal;Anterior Left (Active)   Wound Image   02/08/24 1300   Site Assessment Clean;Dry;Scabbed 02/21/24 1915   Periwound Assessment Clean;Dry 02/21/24 1915   Margins Defined edges;Attached edges 02/21/24 0745   Closure Open to air 02/21/24 1915   Drainage Amount None 02/21/24 1915   Drainage Description Serosanguineous 02/08/24 0800   Treatments Site care;Cleansed 02/17/24 2024   Wound Cleansing Normal Saline Irrigation 02/11/24 1950   Dressing Status Open to Air 02/21/24 1915   Dressing Changed Observed 02/21/24 0745   Dressing Cleansing/Solutions Not Applicable 02/21/24 0745   Dressing Options Open to Air 02/21/24 1915   Wound Team Following Not following 02/09/24 2052   Non-staged Wound Description  Partial thickness 02/08/24 1300       Wound 02/14/24 Abrasion Chest Lateral Right (Active)   Wound Image   02/15/24 1230   Site Assessment Clean;Dry;Intact 02/21/24 1915   Periwound Assessment Clean;Dry;Intact 02/21/24 1915   Margins Attached edges 02/21/24 0745   Closure Open to air 02/21/24 1915   Drainage Amount None 02/21/24 0745   Wound Cleansing Not Applicable 02/21/24 1915   NEXT Weekly Photo (Inpatient Only) 02/21/24 02/14/24 1522   Wound Team Following Not following 02/15/24 1230       Peripheral IV 02/15/24 20 G Left;Posterior Hand (Active)   Site Assessment Clean;Dry;Intact 02/21/24 1915   Dressing Type Transparent 02/21/24 1915   Line Status Flushed;Scrubbed the hub prior to access;Saline locked 02/21/24 1915   Dressing Status Clean;Dry;Intact 02/21/24 1915   Dressing Intervention N/A 02/21/24 1915   Dressing Change Due 02/24/24 02/21/24 1915   Infiltration Grading ([facility]) 0 02/21/24 0800   Phlebitis Scale ([facility] Only) 0 02/21/24 0800           Urethral Catheter Latex 16 Fr. (Active)   Site Assessment Clean;Skin intact 02/21/24 1915   Collection Container Standard drainage bag 02/21/24 1915   Urinary Catheter Care Tamper Evident Seal in Place;Drainage Bag Not Overfilled;Drainage Tubing Properly Secured;Drainage Bag Below Bladder Level and Not on Floor 02/21/24 1915   Securement Method Securing device (Describe) 02/21/24 1915   Output (mL) 500 mL 02/22/24 0502        MENTAL STATUS ON TRANSFER             CONSULTATIONS  Psychiatry    PROCEDURES  None    LABORATORY  Lab Results   Component Value Date    SODIUM 133 (L) 02/09/2024    POTASSIUM 4.0 02/09/2024    CHLORIDE 97 02/09/2024    CO2 25 02/09/2024    GLUCOSE 56 (L) 02/09/2024    BUN 27 (H) 02/09/2024    CREATININE 1.12 02/09/2024        Lab Results   Component Value Date    WBC 8.0 02/09/2024    HEMOGLOBIN 11.9 (L) 02/09/2024    HEMATOCRIT 34.9 (L) 02/09/2024    PLATELETCT 312 02/09/2024      US-EXTREMITY VENOUS UPPER UNILAT LEFT   Final Result       DX-CHEST-PORTABLE (1 VIEW)   Final Result      1.  Hazy medial right lower lobe opacity which could be due to pneumonitis or atelectasis.      [id number] VIEW   Final Result         Large amount of stool throughout the colon.         Total time of the discharge process exceeds 45 minutes.

## 2024-02-23 NOTE — PROGRESS NOTES
DARNELL spoke with Sylvie at [facility] states they will have a male bed on Monday.    [facility] informed and Luis Carlos danielle's brother informed.  Dr Johnson informed.

## 2024-02-23 NOTE — CARE PLAN
The patient is Stable - Low risk of patient condition declining or worsening    Shift Goals  Clinical Goals: safety, q2 turns, monitor blood glucose, placement  Patient Goals: dc  Family Goals: wil    Progress made toward(s) clinical / shift goals:      Problem: Psychosocial  Goal: Patient's level of anxiety will decrease  Outcome: Progressing       Patient is not progressing towards the following goals:      Problem: Knowledge Deficit - Standard  Goal: Patient and family/care givers will demonstrate understanding of plan of care, disease process/condition, diagnostic tests and medications  Outcome: Not Progressing     Problem: Communication  Goal: The ability to communicate needs accurately and effectively will improve  2/22/2024 1817 by Vignesh Crenshaw, R.N.  Outcome: Not Progressing  2/22/2024 1552 by Vignesh Crenshaw, R.N.  Outcome: Not Progressing     Problem: Discharge Barriers/Planning  Goal: Patient's continuum of care needs are met  2/22/2024 1817 by Vignesh Crenshaw, R.N.  Outcome: Not Progressing  2/22/2024 1552 by Vignesh Crenshaw, R.N.  Outcome: Not Progressing  Flowsheets (Taken 2/22/2024 1552)  Continuum of Care Needs:   Collaborated with Case Management/   Communicated discharge barriers to interdisciplinary tream  Note: Pt supposed to DC today. Tried to call [facility] to give report. [place] staff stated that pt is not schedule to arrived today. SW aware.     Problem: Self Care  Goal: Patient will have the ability to perform ADLs independently or with assistance (bathe, groom, dress, toilet and feed)  Outcome: Not Progressing  Note: Pt q2 turns, 1:1 supervision during meals and pt needs help with eating.

## 2024-02-23 NOTE — PROGRESS NOTES
[facility] Medicine Daily Progress Note    Date of Service  2/23/2024    Chief Complaint  Dylon Santa is a 63 y.o. male admitted 1/11/2024 with [facility]    Hospital Course  Dylon Santa is a 63 y.o. male who was admitted to [facility] on 1/11/2024. 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 He was brought to the hospital by his brother who is his primary caretaker.  His brother can no longer take care of him given worsening agitation and behavioral issues over the last 2 months.  Patient was admitted for profound hyperglycemia without evidence of DKA or HHNK.  Patient does not have capacity and his brother Luis Carlos is the DURABLE POWER OF .  Patient has been made DNR/DNI.     Patient transitioned to hospice 2/21.    Interval Problem Update  Patient is patient is calm, sleeping in bed.    Continue lantus 10 units BID - monitor sugars    SNF facility will not accept patient with antipsychotic medications, even if patient is on hospice.  Seroquel discontinued 2/22, will monitor patient's behavior.    CM assisting with difficult discharge.    I have discussed this patient's plan of care and discharge plan at IDT rounds today with Case Management, Nursing, Nursing leadership, and other members of the IDT team.    Consultants/Specialty  psychiatry    Code Status  DNAR/DNI    Disposition  The patient is medically clear for discharge to home or a postacute facility. HOSPICE   Anticipate discharge to skilled nursing facility,     I have placed the appropriate orders for post-discharge needs.    Review of Systems  Review of Systems   All other systems reviewed and are negative.       Physical Exam  Temp:  [35.8 °C (96.5 °F)-36.7 °C (98 °F)] 36.7 °C (98 °F)  Pulse:  [] 104  Resp:  [18] 18  BP: (116-149)/(57-81) 138/70  SpO2:  [96 %-100 %] 98 %    Physical Exam  Vitals and nursing note reviewed.   Constitutional:        General: He is not in acute distress.     Appearance: He is well-developed.      Comments: In bed    No acute distress   HENT:      Head: Normocephalic.      Mouth/Throat:      Pharynx: No oropharyngeal exudate.   Neck:      Thyroid: No thyromegaly.   Cardiovascular:      Rate and Rhythm: Normal rate.   Pulmonary:      Effort: No respiratory distress.      Breath sounds: No wheezing.   Abdominal:      General: There is no distension.      Palpations: Abdomen is soft.      Tenderness: There is no abdominal tenderness.   Skin:     General: Skin is warm.      Findings: No rash.   Neurological:      Mental Status: He is alert.      Motor: Weakness present.   Psychiatric:         Mood and Affect: Affect is tearful.      Comments: Flat affect         Fluids    Intake/Output Summary (Last 24 hours) at 2/23/2024 0641  Last data filed at 2/23/2024 0317  Gross per 24 hour   Intake 220 ml   Output 300 ml   Net -80 ml       Laboratory                        Imaging  US-EXTREMITY VENOUS UPPER UNILAT LEFT   Final Result      DX-CHEST-PORTABLE (1 VIEW)   Final Result      1.  Hazy medial right lower lobe opacity which could be due to pneumonitis or atelectasis.      [id number] VIEW   Final Result         Large amount of stool throughout the colon.           Assessment/Plan  * Type 2 diabetes mellitus with hyperglycemia, with long-term current use of insulin (HCC)- (present on admission)  Assessment & Plan  Started glyburide 2/10   Hga1c 8.7%  Patient has had hypoglycemia and hyperglycemia with labile blood sugars  Lantus has been discontinued and resumed   Started lantus 10 units BID - monitor sugars      Cellulitis of left elbow- (present on admission)  Assessment & Plan  2/4:  review of media tab photos, superficial abrasions noted.  2/6:  noted by bedside RN, appears to have abrasions, erythema, edema and erythema c/w cellulitis from pressure injury.  Keflex 500mg po tid x 5 days.  Wound care consult for pressure  injury.  2/7:  improved.  Decreased erythema decreased edema noted left hand and arm.  No joint involvement.  Patient has pressure bandage in place for elbow protection.  2/9:  negative u/s venous    Constipation- (present on admission)  Assessment & Plan  Resolved  Continue bowel regimen (scheduled)    Urinary retention- (present on admission)  Assessment & Plan  Galarza  placed on 1/25/2024  Proscar added to Flomax on 1/26/2024  removal of galarza 2/4, continue to follow, bladder scans.  2/6:  replaced galarza for urine retention x 2 bladder scans.  Increased flomax 0.8 mg po dailyl with proscar 5mg nightly.  2/9:  patient unable to swallow pills, must be crushed since he chews on the pills.   Changed flomax and proscar to cardura qhs.     ACP (advance care planning)- (present on admission)  Assessment & Plan  Changed to DNR/DNI  Palliative following  Awaiting acceptance at SNF    Dehydration- (present on admission)  Assessment & Plan  Resolved  Stop IVf's  Monitor sugars and oral intake    Ketosis (HCC)- (present on admission)  Assessment & Plan  Resolved    Generalized weakness- (present on admission)  Assessment & Plan  Unable to care for self  Will need a memory care unit versus group home  Family not interested in hospice    Alzheimer disease (HCC)- (present on admission)  Assessment & Plan  Advanced, with agitation   Has been tried on multiple medications this visit including Depakote, Valium, Namenda, Seroquel, trazodone, Zoloft, clonidine  Restraints off as of 1/28  Discussed behavioral disturbances with psychiatry.    Further medications per psychiatry, such as lorazepam  SNF cannot take patient with antipsychotics    Coronary artery disease due to lipid rich plaque - mild to moderate on Cath 2019 Left Dominant- (present on admission)  Assessment & Plan  Diabetic diet  Continue ARB, BB.      Anxiety- (present on admission)  Assessment & Plan  Now has Ativan as needed both oral and IV.    Stage 3a chronic kidney  disease (HCC)- (present on admission)  Assessment & Plan  Improving, likely an element of REGULO on CKD Stage III 2/2 DM nephropathy  No AGMA  Following intermittently    Dyslipidemia- (present on admission)  Assessment & Plan        Hypertension- (present on admission)  Assessment & Plan  hypotensive  Continue toprol XL 25 mg daily,   Dc losartan 100 mg daily on 2/6  Dc clonidine 2/6.         VTE prophylaxis: SCDs    I have performed a physical exam and reviewed and updated ROS and Plan today (2/23/2024). In review of yesterday's note (2/22/2024), there are no changes except as documented above.

## 2024-02-23 NOTE — CARE PLAN
The patient is Watcher - Medium risk of patient condition declining or worsening    Shift Goals  Clinical Goals: safety, Q2 turns  Patient Goals:   Family Goals:     Progress made toward(s) clinical / shift goals:  Oriented to self only.  Pt denied any pain.  Q2 turns.  Pt is on RUSTY mattress.  Built in bed alarm on.  Leslie in place.      Patient is not progressing towards the following goals:      Problem: Knowledge Deficit - Standard  Goal: Patient and family/care givers will demonstrate understanding of plan of care, disease process/condition, diagnostic tests and medications  Outcome: Not Progressing

## 2024-02-24 NOTE — CARE PLAN
The patient is Watcher - Medium risk of patient condition declining or worsening    Shift Goals  Clinical Goals: safety, Q2 turns, galarza care  Patient Goals:   Family Goals:     Progress made toward(s) clinical / shift goals:  Confused.  Continues to have bed alarm.  Pt is in desk bed.  Galarza care completed by CNA.  Q2 turn while awake.      Patient is not progressing towards the following goals:

## 2024-02-24 NOTE — CONSULTS
[facility] PSYCHIATRIC FOLLOW-UP:(established)  *Reason for admission: Last seen by psych 1/28/2024:   Pt to triage accompanied by brother, concerned about increasing aggression since yesterday, has hx of dementia. Baseline aa0x1. Per brother who is caregiver will be having a surgery done tomorrow and states that he can't take care of the pt anymore, asking assistance for placement. Pt normally takes quetiapine fumarate for aggression but doesn't seem to help much yesterday.  Reconsult: reportedly SNF won't give any antipsychotics to a pt without a bipolar, schizophrenia dx and are refusing him per CM  *Legal Hold Status: not applicable                    S:  asleep, woke up but not speaking currently. Nurse came in to check BS. No reaction to the stick. Tremors are absent when asleep. Per nurse, he became calmer yesterday afternoon and has been more relaxed since.Per notes verbalized wanting to go home yesterday afternonn.     O: Medical ROS (as pertinent):                      *Psychiatric Examination:   Vitals:   Vitals:    02/23/24 0915 02/23/24 1724 02/23/24 1922 02/24/24 0405   BP: (!) 123/91 (!) 140/77 138/70 (!) 146/70   Pulse: 82 90 77 86   Resp: 18 18 18 18   Temp: 36.6 °C (97.9 °F) 36.9 °C (98.4 °F) 36.4 °C (97.5 °F) 36.3 °C (97.3 °F)   TempSrc: Temporal Temporal Temporal Temporal   SpO2: 96% 92% 93% 95%   Weight:       Height:         General Appearance:  good eye contact but not   interacting verbally today  Abnormal Movements: tremors (but not when sleeping)  Gait and Posture: not observed  Speech: none  Thought Process: unable to assess  Associations:   unable to assess  Abnormal or Psychotic Thoughts: unable to assess  Judgement and Insight: unable to assess  Orientation: unable to determine  Recent and Remote Memory: unable to determine  Attention Span and Concentration:  looks at the nurse, did follow directions to drink fluid with her prompting  Language:unable to assess  Fund  of Knowledge: unable to assess  Mood and Affect: blunted  SI/HI:  no reply        Current Medications:  Scheduled Medications   Medication Dose Frequency    insulin GLARGINE  10 Units BID    LORazepam  0.25 mg BID    insulin regular  3-14 Units TID AC    sertraline  100 mg DAILY    metoprolol tartrate  12.5 mg BID    glyBURIDE  5 mg BID WITH MEALS    doxazosin  2 mg QHS    senna-docusate  1 Tablet DAILY    valproic acid  125 mg BID    donepezil  5 mg Q EVENING    memantine  5 mg DAILY    traZODone  150 mg Nightly    polyethylene glycol/lytes  1 Packet DAILY          *ASSESSMENT/RECOMENDATIONS:  1. Dementia with behavioral disturbance. Likely Alzheimer's. Behavior has been improved over 48 hours now.     Medical:   -DM2  -CAD  -CKD 3a  -HTN  -urinary retention: on meds     Legal hold: not applicable     Discussed/voalted: MELBA Cristina MD     Medication and Other Recommendations: final orders as per Tx Tm  Not capacitated : brother is POA. Brother unable to care for pt per notes.        Will continue to follow with you.         Discharge recommendations: per tx  tm: .Monday will probably be placed in hospice.     If released from [facility]: Discharge Instructions:  -Reviewed safety plan: 911, ER, PCM, MHC, Suicide crisis line  -Please assist with outpatient Psychiatric/substance use follow up appointments at discharge once medically cleared.

## 2024-02-24 NOTE — PROGRESS NOTES
Received report from off-going nurse.   Assumed pt care at change of shift.   Assessment completed.   Pt is A&Ox 1, disoriented to place, time, and situation, vital signs are stable on RA.  Denies pain, no apparent signs of discomfort.   Bed is in low and locked position, call light and belongings in reach, reminded to use call light, bed alarm on.   No needs at this time.

## 2024-02-24 NOTE — CARE PLAN
The patient is Stable - Low risk of patient condition declining or worsening    Shift Goals  Clinical Goals: Q2 turns, safety  Patient Goals: dishcarge to SNF  Family Goals: [place]    Progress made toward(s) clinical / shift goals:      Problem: Discharge Barriers/Planning  Goal: Patient's continuum of care needs are met  Outcome: Progressing       Patient is not progressing towards the following goals:      Problem: Knowledge Deficit - Standard  Goal: Patient and family/care givers will demonstrate understanding of plan of care, disease process/condition, diagnostic tests and medications  Outcome: Not Progressing     Problem: Psychosocial  Goal: Patient's ability to verbalize feelings about condition will improve  Outcome: Not Progressing

## 2024-02-24 NOTE — PROGRESS NOTES
Hypoglycemia Intervention    Hypoglycemia protocol intervention:  Blood glucose 65 at 0803.  Intervention: 8 oz of fruit juice 0805  Repeat blood glucose 82 at 0820.  Intervention: 8 oz of fruit juice   Repeat blood glucose 126 at 0848.  Dr. Johnson notified of the above at 0900.

## 2024-02-24 NOTE — PROGRESS NOTES
[facility] Medicine Daily Progress Note    Date of Service  2/24/2024    Chief Complaint  Dylon Santa is a 63 y.o. male admitted 1/11/2024 with [facility]    Hospital Course  Dylon Santa is a 63 y.o. male who was admitted to [facility] on 1/11/2024. 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 He was brought to the hospital by his brother who is his primary caretaker.  His brother can no longer take care of him given worsening agitation and behavioral issues over the last 2 months.  Patient was admitted for profound hyperglycemia without evidence of DKA or HHNK.  Patient does not have capacity and his brother Luis Carlos is the DURABLE POWER OF .  Patient has been made DNR/DNI.     Patient transitioned to hospice 2/21.    Interval Problem Update  Patient is patient is calm, sleeping in bed.    Continue lantus 10 units Qpm, stopped AM lantus due to liable surgars - monitor sugars    SNF facility will not accept patient with antipsychotic medications, even if patient is on hospice. Last dose of  Seroquel was 2/21, will monitor patient's behavior.    Plan for discharge to [facility] on 2/26 on hospice.    I have discussed this patient's plan of care and discharge plan at IDT rounds today with Case Management, Nursing, Nursing leadership, and other members of the IDT team.    Consultants/Specialty  psychiatry    Code Status  DNAR/DNI    Disposition  The patient is medically clear for discharge to home or a postacute facility. HOSPICE   Anticipate discharge to skilled nursing facility,     I have placed the appropriate orders for post-discharge needs.    Review of Systems  Review of Systems   All other systems reviewed and are negative.       Physical Exam  Temp:  [36.3 °C (97.3 °F)-36.9 °C (98.4 °F)] 36.3 °C (97.3 °F)  Pulse:  [77-90] 86  Resp:  [18] 18  BP: (123-146)/(70-91) 146/70  SpO2:  [92 %-96 %] 95 %    Physical  Exam  Vitals and nursing note reviewed.   Constitutional:       General: He is not in acute distress.     Appearance: He is well-developed.      Comments: In bed    No acute distress   HENT:      Head: Normocephalic.      Mouth/Throat:      Pharynx: No oropharyngeal exudate.   Neck:      Thyroid: No thyromegaly.   Cardiovascular:      Rate and Rhythm: Normal rate.   Pulmonary:      Effort: No respiratory distress.      Breath sounds: No wheezing.   Abdominal:      General: There is no distension.      Palpations: Abdomen is soft.      Tenderness: There is no abdominal tenderness.   Skin:     General: Skin is warm.      Findings: No rash.   Neurological:      Mental Status: He is alert.      Motor: Weakness present.   Psychiatric:         Mood and Affect: Affect is tearful.      Comments: Flat affect         Fluids    Intake/Output Summary (Last 24 hours) at 2/24/2024 0700  Last data filed at 2/24/2024 0500  Gross per 24 hour   Intake 720 ml   Output 500 ml   Net 220 ml       Laboratory                        Imaging  US-EXTREMITY VENOUS UPPER UNILAT LEFT   Final Result      DX-CHEST-PORTABLE (1 VIEW)   Final Result      1.  Hazy medial right lower lobe opacity which could be due to pneumonitis or atelectasis.      [id number] VIEW   Final Result         Large amount of stool throughout the colon.           Assessment/Plan  * Type 2 diabetes mellitus with hyperglycemia, with long-term current use of insulin (HCC)- (present on admission)  Assessment & Plan  Started glyburide 2/10   Hga1c 8.7%  Patient has had hypoglycemia and hyperglycemia with labile blood sugars  Lantus has been discontinued and resumed   Started lantus 10 units BID - monitor sugars      Cellulitis of left elbow- (present on admission)  Assessment & Plan  2/4:  review of media tab photos, superficial abrasions noted.  2/6:  noted by bedside RN, appears to have abrasions, erythema, edema and erythema c/w cellulitis from pressure injury.  Keflex  500mg po tid x 5 days.  Wound care consult for pressure injury.  2/7:  improved.  Decreased erythema decreased edema noted left hand and arm.  No joint involvement.  Patient has pressure bandage in place for elbow protection.  2/9:  negative u/s venous    Constipation- (present on admission)  Assessment & Plan  Resolved  Continue bowel regimen (scheduled)    Urinary retention- (present on admission)  Assessment & Plan  Galarza  placed on 1/25/2024  Proscar added to Flomax on 1/26/2024  removal of galarza 2/4, continue to follow, bladder scans.  2/6:  replaced galazra for urine retention x 2 bladder scans.  Increased flomax 0.8 mg po dailyl with proscar 5mg nightly.  2/9:  patient unable to swallow pills, must be crushed since he chews on the pills.   Changed flomax and proscar to cardura qhs.     ACP (advance care planning)- (present on admission)  Assessment & Plan  Changed to DNR/DNI  Palliative following  Awaiting acceptance at SNF    Dehydration- (present on admission)  Assessment & Plan  Resolved  Stop IVf's  Monitor sugars and oral intake    Ketosis (HCC)- (present on admission)  Assessment & Plan  Resolved    Generalized weakness- (present on admission)  Assessment & Plan  Unable to care for self  Will need a memory care unit versus group home  Family not interested in hospice    Alzheimer disease (HCC)- (present on admission)  Assessment & Plan  Advanced, with agitation   Has been tried on multiple medications this visit including Depakote, Valium, Namenda, Seroquel, trazodone, Zoloft, clonidine  Restraints off as of 1/28  Discussed behavioral disturbances with psychiatry.    Further medications per psychiatry, such as lorazepam  SNF cannot take patient with antipsychotics    Coronary artery disease due to lipid rich plaque - mild to moderate on Cath 2019 Left Dominant- (present on admission)  Assessment & Plan  Diabetic diet  Continue ARB, BB.      Anxiety- (present on admission)  Assessment & Plan  Now has  Ativan as needed both oral and IV.    Stage 3a chronic kidney disease (HCC)- (present on admission)  Assessment & Plan  Improving, likely an element of REGULO on CKD Stage III 2/2 DM nephropathy  No AGMA  Following intermittently    Dyslipidemia- (present on admission)  Assessment & Plan        Hypertension- (present on admission)  Assessment & Plan  hypotensive  Continue toprol XL 25 mg daily,   Dc losartan 100 mg daily on 2/6  Dc clonidine 2/6.         VTE prophylaxis: SCDs    I have performed a physical exam and reviewed and updated ROS and Plan today (2/24/2024). In review of yesterday's note (2/23/2024), there are no changes except as documented above.

## 2024-02-25 NOTE — PROGRESS NOTES
[facility] Medicine Daily Progress Note    Date of Service  2/25/2024    Chief Complaint  Dylon Santa is a 63 y.o. male admitted 1/11/2024 with [facility]    Hospital Course  Dylon Santa is a 63 y.o. male who was admitted to [facility] on 1/11/2024. 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 He was brought to the hospital by his brother who is his primary caretaker.  His brother can no longer take care of him given worsening agitation and behavioral issues over the last 2 months.  Patient was admitted for profound hyperglycemia without evidence of DKA or HHNK.  Patient does not have capacity and his brother Luis Carlos is the DURABLE POWER OF .  Patient has been made DNR/DNI.     Patient transitioned to hospice 2/21.    Interval Problem Update  Patient noted to have episodes of hypoglycemia yesterday, held patient's glipizide and Lantus, continue with ISS as needed.    SNF facility will not accept patient with antipsychotic medications, even if patient is on hospice. Last dose of  Seroquel was 2/21, will monitor patient's behavior.    Plan for discharge to [facility] on 2/26 on hospice.    I have discussed this patient's plan of care and discharge plan at IDT rounds today with Case Management, Nursing, Nursing leadership, and other members of the IDT team.    Consultants/Specialty  psychiatry    Code Status  DNAR/DNI    Disposition  The patient is medically clear for discharge to home or a postacute facility. HOSPICE   Anticipate discharge to skilled nursing facility,     I have placed the appropriate orders for post-discharge needs.    Review of Systems  Review of Systems   All other systems reviewed and are negative.       Physical Exam  Temp:  [36.5 °C (97.7 °F)-36.7 °C (98 °F)] 36.6 °C (97.8 °F)  Pulse:  [] 99  Resp:  [18-19] 18  BP: (113-178)/() 159/84  SpO2:  [95 %-100 %] 96 %    Physical Exam  Vitals  and nursing note reviewed.   Constitutional:       General: He is not in acute distress.     Appearance: He is well-developed.      Comments: In bed    No acute distress   HENT:      Head: Normocephalic.      Mouth/Throat:      Pharynx: No oropharyngeal exudate.   Neck:      Thyroid: No thyromegaly.   Cardiovascular:      Rate and Rhythm: Normal rate.   Pulmonary:      Effort: No respiratory distress.      Breath sounds: No wheezing.   Abdominal:      General: There is no distension.      Palpations: Abdomen is soft.      Tenderness: There is no abdominal tenderness.   Skin:     General: Skin is warm.      Findings: No rash.   Neurological:      Mental Status: He is alert.      Motor: Weakness present.   Psychiatric:         Mood and Affect: Affect is tearful.      Comments: Flat affect         Fluids    Intake/Output Summary (Last 24 hours) at 2/25/2024 0648  Last data filed at 2/25/2024 0500  Gross per 24 hour   Intake --   Output 600 ml   Net -600 ml       Laboratory                        Imaging  US-EXTREMITY VENOUS UPPER UNILAT LEFT   Final Result      DX-CHEST-PORTABLE (1 VIEW)   Final Result      1.  Hazy medial right lower lobe opacity which could be due to pneumonitis or atelectasis.      [id number] VIEW   Final Result         Large amount of stool throughout the colon.           Assessment/Plan  * Type 2 diabetes mellitus with hyperglycemia, with long-term current use of insulin (HCC)- (present on admission)  Assessment & Plan  Started glyburide 2/10   Hga1c 8.7%  Patient has had hypoglycemia and hyperglycemia with labile blood sugars  Lantus has been discontinued and resumed   Started lantus 10 units PM - monitor sugars      Cellulitis of left elbow- (present on admission)  Assessment & Plan  2/4:  review of media tab photos, superficial abrasions noted.  2/6:  noted by bedside RN, appears to have abrasions, erythema, edema and erythema c/w cellulitis from pressure injury.  Keflex 500mg po tid x 5  days.  Wound care consult for pressure injury.  2/7:  improved.  Decreased erythema decreased edema noted left hand and arm.  No joint involvement.  Patient has pressure bandage in place for elbow protection.  2/9:  negative u/s venous    Constipation- (present on admission)  Assessment & Plan  Resolved  Continue bowel regimen (scheduled)    Urinary retention- (present on admission)  Assessment & Plan  Galarza  placed on 1/25/2024  Proscar added to Flomax on 1/26/2024  removal of galarza 2/4, continue to follow, bladder scans.  2/6:  replaced galarza for urine retention x 2 bladder scans.  Increased flomax 0.8 mg po dailyl with proscar 5mg nightly.  2/9:  patient unable to swallow pills, must be crushed since he chews on the pills.   Changed flomax and proscar to cardura qhs.     ACP (advance care planning)- (present on admission)  Assessment & Plan  Changed to DNR/DNI  Palliative following  Awaiting acceptance at SNF    Dehydration- (present on admission)  Assessment & Plan  Resolved  Stop IVf's  Monitor sugars and oral intake    Ketosis (HCC)- (present on admission)  Assessment & Plan  Resolved    Generalized weakness- (present on admission)  Assessment & Plan  Unable to care for self  Will need a memory care unit versus group home  Family not interested in hospice    Alzheimer disease (HCC)- (present on admission)  Assessment & Plan  Advanced, with agitation   Has been tried on multiple medications this visit including Depakote, Valium, Namenda, Seroquel, trazodone, Zoloft, clonidine  Restraints off as of 1/28  Discussed behavioral disturbances with psychiatry.    Further medications per psychiatry, such as lorazepam  SNF cannot take patient with antipsychotics    Coronary artery disease due to lipid rich plaque - mild to moderate on Cath 2019 Left Dominant- (present on admission)  Assessment & Plan  Diabetic diet  Continue ARB, BB.      Anxiety- (present on admission)  Assessment & Plan  Now has Ativan as needed both  oral and IV.    Stage 3a chronic kidney disease (HCC)- (present on admission)  Assessment & Plan  Improving, likely an element of REGULO on CKD Stage III 2/2 DM nephropathy  No AGMA  Following intermittently    Dyslipidemia- (present on admission)  Assessment & Plan        Hypertension- (present on admission)  Assessment & Plan  hypotensive  Continue toprol XL 25 mg daily,   Dc losartan 100 mg daily on 2/6  Dc clonidine 2/6.         VTE prophylaxis: SCDs    I have performed a physical exam and reviewed and updated ROS and Plan today (2/25/2024). In review of yesterday's note (2/24/2024), there are no changes except as documented above.

## 2024-02-25 NOTE — PROGRESS NOTES
Patient very drowsy this am.  Sat in chair for breakfast, refused oral intake.  Back to bed, noon FSBG 171,  Spoke with [employer] to hold noon insulin.

## 2024-02-25 NOTE — CARE PLAN
The patient is Stable - Low risk of patient condition declining or worsening    Shift Goals  Clinical Goals: safety  Patient Goals: KATIANA  Family Goals: Katiana    Progress made toward(s) clinical / shift goals:  Patient is alert to self only, resting comfortably in bed.  All meds given per Mar, however, patient needs coaxing to swallow pills as he seems to not understand what to do with the pills once they're in his mouth.  All belongings within reach.  Respirations even and unlabored. Leslie draining yellow urine.     Patient is not progressing towards the following goals:      Problem: Knowledge Deficit - Standard  Goal: Patient and family/care givers will demonstrate understanding of plan of care, disease process/condition, diagnostic tests and medications  Outcome: Not Progressing     Problem: Psychosocial  Goal: Patient's level of anxiety will decrease  Outcome: Not Progressing  Goal: Patient's ability to verbalize feelings about condition will improve  Outcome: Not Progressing     Problem: Communication  Goal: The ability to communicate needs accurately and effectively will improve  Outcome: Not Progressing     Problem: Self Care  Goal: Patient will have the ability to perform ADLs independently or with assistance (bathe, groom, dress, toilet and feed)  Outcome: Not Progressing

## 2024-02-25 NOTE — PROGRESS NOTES
Patient A&Ox0, drowsy this am.  Appears comfortable. Up to chair stand pivot, declined breakfast.

## 2024-02-25 NOTE — CARE PLAN
The patient is Watcher - Medium risk of patient condition declining or worsening    Shift Goals  Clinical Goals: Patient will remain free from injury over the next 12 hours  Patient Goals: KATIANA  Family Goals: Katiana    Progress made toward(s) clinical / shift goals:  Bed and chair alarm in use    Patient is not progressing towards the following goals:      Problem: Knowledge Deficit - Standard  Goal: Patient and family/care givers will demonstrate understanding of plan of care, disease process/condition, diagnostic tests and medications  Description: Target End Date:  1-3 days or as soon as patient condition allows    Document in Patient Education    1.  Patient and family/caregiver oriented to unit, equipment, visitation policy and means for communicating concern  2.  Complete/review Learning Assessment  3.  Assess knowledge level of disease process/condition, treatment plan, diagnostic tests and medications  4.  Explain disease process/condition, treatment plan, diagnostic tests and medications  Outcome: Not Progressing     Problem: Mobility  Goal: Patient's capacity to carry out activities will improve  Description: Target End Date:  Prior to discharge or change in level of care    1.  Assess for barriers to mobility/activity  2.  Implement activity per interdisciplinary team recommendations  3.  Target activity level identified and patient/family/caregiver aware of goal  4.  Provide assistive devices  5.  Instruct patient/caregiver on proper use of assistive/adaptive devices  6.  Schedule activities and rest periods to decrease effects of fatigue  7.  Encourage mobilization to extent of ability  8.  Maintain proper body alignment  9.  Provide adequate pain management to allow progressive mobilization  10. Implement pace maker precautions as needed  Outcome: Not Progressing

## 2024-02-26 NOTE — PROGRESS NOTES
Patient discharged with remsa, Stable. IV removed. All personal belongings sent with patient including POLST

## 2024-02-26 NOTE — DISCHARGE PLANNING
DC Transport Scheduled    Transport Company Scheduled:  [facility]  Spoke with Krystal at [facility] to schedule transport.    Scheduled Date: 2/26/2024  Scheduled Time: 1230    Destination: Home w./ [facility] at [address] [place]     Notified care team of scheduled transport via [employer].     If there are any changes needed to the DC transportation scheduled, please contact [facility] at ext. [phone] between the hours of 0843-4842 Mon-Fri. If outside those hours, contact the ED Case Manager at ext. [phone].

## 2024-02-26 NOTE — DISCHARGE PLANNING
Case Management Discharge Planning    Admission Date: 1/11/2024  GMLOS: 3  ALOS: 43    6-Clicks ADL Score: 17  6-Clicks Mobility Score: 17    Anticipated Discharge Dispo: Discharge Disposition: D/T to SNF with Medicare cert in anticipation of skilled care (03)  Home with [facility]    DME Needed: per hospice, they want galarza cath to stay . MD notified.     Action(s) Taken: Received notification from Siddharth at [facility] who asked CM to set up transport for 1230 today. Siddharth said that brother decided to take pt home.     Talked to brother Luis Carlos who confirmed acceptance at home and confirmed home address.    PCS and [employer] order sent.     Escalations Completed: n/a    Medically Clear: yes for hospice care    Next Steps: none    Barriers to Discharge: none    Is the patient up for discharge tomorrow: today

## 2024-02-26 NOTE — CARE PLAN
The patient is Stable - Low risk of patient condition declining or worsening    Shift Goals  Clinical Goals: Safety  Patient Goals: KATIANA  Family Goals: Katiana    Progress made toward(s) clinical / shift goals:  Patient alert to self only, resting comfortably in bed.  All belongings within reach.  Respirations even and unlabored.      Patient is not progressing towards the following goals:      Problem: Knowledge Deficit - Standard  Goal: Patient and family/care givers will demonstrate understanding of plan of care, disease process/condition, diagnostic tests and medications  Outcome: Not Progressing     Problem: Psychosocial  Goal: Patient's level of anxiety will decrease  Outcome: Not Progressing  Goal: Patient's ability to verbalize feelings about condition will improve  Outcome: Not Progressing     Problem: Communication  Goal: The ability to communicate needs accurately and effectively will improve  Outcome: Not Progressing     Problem: Mobility  Goal: Patient's capacity to carry out activities will improve  Outcome: Not Progressing     Problem: Self Care  Goal: Patient will have the ability to perform ADLs independently or with assistance (bathe, groom, dress, toilet and feed)  Outcome: Not Progressing

## 2024-02-26 NOTE — DISCHARGE SUMMARY
Discharge Summary    CHIEF COMPLAINT ON ADMISSION  Chief Complaint   Patient presents with    Other     Pt to triage accompanied by brother, concerned about increasing aggression since yesterday, has hx of dementia. Baseline aa0x1. Per brother who is caregiver will be having a surgery done tomorrow and states that he can't take care of the pt anymore, asking assistance for placement. Pt normally takes quetiapine fumarate for aggression but doesn't seem to help much yesterday.        Reason for Admission  Other    Admission Date  1/11/2024     CODE STATUS  DNAR/DNI    HPI & HOSPITAL COURSE  Dylon Santa is a 63 y.o. male who was admitted to [facility] on 1/11/2024. 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 He was brought to the hospital by his brother who is his primary caretaker.  His brother can no longer take care of him given worsening agitation and behavioral issues over the last 2 months.  Patient was admitted for profound hyperglycemia without evidence of DKA or HHNK.  Patient does not have capacity and his brother Luis Carlos is the DURABLE POWER OF .  Patient has been made DNR/DNI.     Patient transitioned to hospice 2/21.    Therefore, he is discharged in guarded and stable condition to hospice.    The patient met 2-midnight criteria for an inpatient stay at the time of discharge.      FOLLOW UP ITEMS POST DISCHARGE  Hospice    DISCHARGE DIAGNOSES  Principal Problem:    Type 2 diabetes mellitus with hyperglycemia, with long-term current use of insulin (HCC) (POA: Yes)      Overview: Patient is followed by endocrinology.  He was diagnosed at age 8 with type       1 diabetes and still has difficulty managing his sugars.  He is on       multiple medications including insulin and Toujeo.  His most recent A1c       was done about 1 week ago and it was 9.4.  Active Problems:    Hypertension (POA: Yes)    Dyslipidemia  (POA: Yes)    Stage 3a chronic kidney disease (HCC) (POA: Yes)    Anxiety (POA: Yes)    Coronary artery disease due to lipid rich plaque - mild to moderate on Cath 2019 Left Dominant (Chronic) (POA: Yes)    Alzheimer disease (HCC) (POA: Yes)    Generalized weakness (POA: Yes)    Ketosis (HCC) (POA: Yes)    Dehydration (POA: Yes)    ACP (advance care planning) (POA: Yes)    Urinary retention (POA: Yes)    Constipation (POA: Yes)    Cellulitis of left elbow (POA: Yes)  Resolved Problems:    * No resolved hospital problems. *      FOLLOW UP  Future Appointments   Date Time Provider Department Center   3/21/2024 10:30 AM MORRIS Scales M.D.  [address]  [facility] [id number] [phone]    Call  Please call your primary care provider to schedule a hospital follow up. Thank you.      MEDICATIONS ON DISCHARGE     Medication List        START taking these medications        Instructions   donepezil 5 MG Tabs  Commonly known as: Aricept   Take 1 Tablet by mouth every evening.  Dose: 5 mg     doxazosin 2 MG Tabs  Commonly known as: Cardura   Take 1 Tablet by mouth at bedtime.  Dose: 2 mg     glyBURIDE 5 MG Tabs  Commonly known as: Diabeta   Take 1 Tablet by mouth 2 times a day with meals.  Dose: 5 mg     insulin GLARGINE 100 UNIT/ML Soln  Commonly known as: Lantus,Semglee  Replaces: Toujeo SoloStar 300 UNIT/ML Sopn   Inject 15 Units under the skin every morning.  Dose: 15 Units     insulin regular 100 Unit/mL Soln  Commonly known as: Humulin R   Inject 3-14 Units under the skin 3 times a day before meals.  Dose: 3-14 Units     memantine 5 MG Tabs  Commonly known as: Namenda   Take 1 Tablet by mouth every day.  Dose: 5 mg     metoprolol tartrate 25 MG Tabs  Commonly known as: Lopressor   Take 0.5 Tablets by mouth 2 times a day.  Dose: 12.5 mg     sertraline 100 MG Tabs  Commonly known as: Zoloft   Take 1 Tablet by mouth every day.  Dose: 100 mg     valproic acid  250 MG/5ML Soln  Commonly known as: Depakene   Take 2.5 mL by mouth 2 times a day.  Dose: 125 mg            CONTINUE taking these medications        Instructions   FreeStyle Marcial 3 Sensor Misc   Use 1 Marcial 3 sensor every 14 days for blood glucose monitoring. For type 1 diabetes mellitus with hyperglycemia.     Insulin Pen Needle 32 G x 4 mm   1 Each 4 Times a Day,Before Meals and at Bedtime.  Dose: 1 Each     traZODone 150 MG Tabs  Commonly known as: Desyrel   Take 1 Tablet by mouth every evening.  Dose: 150 mg            STOP taking these medications      acyclovir 200 MG Caps  Commonly known as: Zovirax     atorvastatin 80 MG tablet  Commonly known as: Lipitor     clopidogrel 75 MG Tabs  Commonly known as: Plavix     losartan 100 MG Tabs  Commonly known as: Cozaar     metoprolol SR 25 MG Tb24  Commonly known as: Toprol XL     NovoLOG FlexPen 100 UNIT/ML solution for injection  Generic drug: insulin aspart     QUEtiapine 50 MG tablet  Commonly known as: SEROquel     Toujeo SoloStar 300 UNIT/ML Sopn  Generic drug: Insulin Glargine (1 Unit Dial)  Replaced by: insulin GLARGINE 100 UNIT/ML Soln              Allergies  No Known Allergies    DIET  Orders Placed This Encounter   Procedures    Diet Order Diet: Level 6 - Soft and Bite Sized (no bell peppers-Please deliver to nursing station); Liquid level: Level 0 - Thin; Second Modifier: (optional): Consistent CHO (Diabetic); Tray Modifications (optional): SLP - 1:1 Supervision by Nursi...     Standing Status:   Standing     Number of Occurrences:   1     Order Specific Question:   Diet:     Answer:   Level 6 - Soft and Bite Sized [23]     Comments:   no bell peppers-Please deliver to nursing station     Order Specific Question:   Liquid level     Answer:   Level 0 - Thin     Order Specific Question:   Second Modifier: (optional)     Answer:   Consistent CHO (Diabetic) [4]     Order Specific Question:   Tray Modifications (optional)     Answer:   SLP - 1:1 Supervision by  Nursing       ACTIVITY  As tolerated and directed by skilled nursing.  Weight bearing as tolerated    LINES, DRAINS, AND WOUNDS  This is an automated list. Peripheral IVs will be removed prior to discharge.  Peripheral IV 02/15/24 20 G Left;Posterior Hand (Active)   Site Assessment Clean;Dry;Intact 02/25/24 1900   Dressing Type Transparent 02/25/24 1900   Line Status Saline locked 02/25/24 1900   Dressing Status Intact 02/25/24 1900   Dressing Intervention N/A 02/25/24 1900   Dressing Change Due 02/24/24 02/23/24 2000   Date Primary Tubing Changed 02/24/24 02/24/24 2100   Infiltration Grading ([facility], [facility]) 0 02/25/24 1900   Phlebitis Scale ([facility] Only) 0 02/25/24 1900     Urethral Catheter Latex 16 Fr. (Active)   Site Assessment Clean;Skin intact 02/25/24 1957   Collection Container Standard drainage bag 02/25/24 1957   Urinary Catheter Care Tamper Evident Seal in Place;Drainage Bag Not Overfilled;Drainage Bag Below Bladder Level and Not on Floor;Cath Care Done with Soap & Water;All Wound Pts have Wound Consult;Drainage Tubing Properly Secured;Drainage Tube Extended 02/25/24 1957   Securement Method Securing device (Describe) 02/25/24 1957   Output (mL) 160 mL 02/26/24 0434      Wound 02/04/24 Abrasion Arm Distal;Anterior Left (Active)   Wound Image   02/08/24 1300   Site Assessment KATIANA 02/24/24 2300   Periwound Assessment Clean;Dry 02/24/24 0835   Margins Defined edges;Attached edges 02/24/24 0835   Closure Adhesive bandage 02/24/24 0835   Drainage Amount None 02/24/24 0835   Drainage Description Serosanguineous 02/08/24 0800   Treatments Site care;Cleansed 02/17/24 2024   Wound Cleansing Normal Saline Irrigation 02/11/24 1950   Dressing Status Clean;Dry;Intact 02/24/24 2300   Dressing Changed Changed 02/23/24 2000   Dressing Cleansing/Solutions Not Applicable 02/24/24 0835   Dressing Options Offloading Dressing - Heel 02/24/24 0835   Wound Team Following Not following 02/24/24 0835   Non-staged Wound Description  Partial thickness 02/08/24 1300       Wound 02/14/24 Abrasion Chest Lateral Right (Active)   Wound Image   02/15/24 1230   Site Assessment KATIANA 02/24/24 2300   Periwound Assessment Clean;Dry;Intact 02/24/24 0835   Margins Attached edges 02/24/24 0835   Closure Adhesive bandage 02/24/24 0835   Drainage Amount None 02/24/24 0835   Wound Cleansing Not Applicable 02/24/24 0835   Dressing Status Clean;Dry;Intact 02/24/24 2300   Dressing Changed Observed 02/23/24 2000   Dressing Options Silicone Adhesive Foam 02/24/24 0835   NEXT Weekly Photo (Inpatient Only) 02/21/24 02/14/24 1522   Wound Team Following Not following 02/24/24 0835       Peripheral IV 02/15/24 20 G Left;Posterior Hand (Active)   Site Assessment Clean;Dry;Intact 02/25/24 1900   Dressing Type Transparent 02/25/24 1900   Line Status Saline locked 02/25/24 1900   Dressing Status Intact 02/25/24 1900   Dressing Intervention N/A 02/25/24 1900   Dressing Change Due 02/24/24 02/23/24 2000   Date Primary Tubing Changed 02/24/24 02/24/24 2100   Infiltration Grading ([facility]) 0 02/25/24 1900   Phlebitis Scale ([facility] Only) 0 02/25/24 1900           Urethral Catheter Latex 16 Fr. (Active)   Site Assessment Clean;Skin intact 02/25/24 1957   Collection Container Standard drainage bag 02/25/24 1957   Urinary Catheter Care Tamper Evident Seal in Place;Drainage Bag Not Overfilled;Drainage Bag Below Bladder Level and Not on Floor;Cath Care Done with Soap & Water;All Wound Pts have Wound Consult;Drainage Tubing Properly Secured;Drainage Tube Extended 02/25/24 1957   Securement Method Securing device (Describe) 02/25/24 1957   Output (mL) 160 mL 02/26/24 0434        MENTAL STATUS ON TRANSFER             CONSULTATIONS  Psychiatry    PROCEDURES  None    LABORATORY  Lab Results   Component Value Date    SODIUM 133 (L) 02/09/2024    POTASSIUM 4.0 02/09/2024    CHLORIDE 97 02/09/2024    CO2 25 02/09/2024    GLUCOSE 56 (L) 02/09/2024    BUN 27 (H) 02/09/2024    CREATININE 1.12  02/09/2024        Lab Results   Component Value Date    WBC 8.0 02/09/2024    HEMOGLOBIN 11.9 (L) 02/09/2024    HEMATOCRIT 34.9 (L) 02/09/2024    PLATELETCT 312 02/09/2024      US-EXTREMITY VENOUS UPPER UNILAT LEFT   Final Result      DX-CHEST-PORTABLE (1 VIEW)   Final Result      1.  Hazy medial right lower lobe opacity which could be due to pneumonitis or atelectasis.      [id number] VIEW   Final Result         Large amount of stool throughout the colon.         Total time of the discharge process exceeds 45 minutes.

## 2024-03-21 ENCOUNTER — APPOINTMENT (OUTPATIENT)
Dept: ENDOCRINOLOGY | Facility: MEDICAL CENTER | Age: 64
End: 2024-03-21
Attending: INTERNAL MEDICINE
Payer: MEDICARE

## 2024-04-03 ENCOUNTER — DOCUMENTATION (OUTPATIENT)
Dept: HEALTH INFORMATION MANAGEMENT | Facility: OTHER | Age: 64
End: 2024-04-03

## 2025-01-29 NOTE — TELEPHONE ENCOUNTER
Has upcoming appt w/ PCP. Will send 3 months of fills to pharmacy. Although renal function is not great, it's not bad enough to stop this med.   No

## 2025-03-24 NOTE — CARE PLAN
The patient is Stable - Low risk of patient condition declining or worsening    Shift Goals  Clinical Goals: Safety  Patient Goals: KATIANA  Family Goals: none present    Progress made toward(s) clinical / shift goals:      Patient is not progressing towards the following goals:      Problem: Knowledge Deficit - Standard  Goal: Patient and family/care givers will demonstrate understanding of plan of care, disease process/condition, diagnostic tests and medications  Description: Target End Date:  1-3 days or as soon as patient condition allows    Document in Patient Education    1.  Patient and family/caregiver oriented to unit, equipment, visitation policy and means for communicating concern  2.  Complete/review Learning Assessment  3.  Assess knowledge level of disease process/condition, treatment plan, diagnostic tests and medications  4.  Explain disease process/condition, treatment plan, diagnostic tests and medications  2/16/2024 1820 by Florina Grimm, R.N.  Outcome: Progressing  2/16/2024 1819 by Florina Grimm, R.N.  Outcome: Progressing      [de-identified] : 03/2025 JL READP
